# Patient Record
Sex: FEMALE | Race: WHITE | Employment: OTHER | URBAN - METROPOLITAN AREA
[De-identification: names, ages, dates, MRNs, and addresses within clinical notes are randomized per-mention and may not be internally consistent; named-entity substitution may affect disease eponyms.]

---

## 2017-01-16 ENCOUNTER — TELEPHONE (OUTPATIENT)
Dept: INTERNAL MEDICINE CLINIC | Age: 77
End: 2017-01-16

## 2017-01-16 DIAGNOSIS — B02.29 POSTHERPETIC NEURALGIA: Primary | ICD-10-CM

## 2017-01-16 NOTE — TELEPHONE ENCOUNTER
I am still having pain from my shingles and wonder if I should take Gabopentin again to help with this residual nerve pain.  It is a nagging pain and especially when trying to sleep at night.  This is not like the initial pain in May of last year but is still going on and it is most aggravating. Kranthi Valencia suggestion would be appreciated. Kacie Montalvo you.

## 2017-01-17 RX ORDER — GABAPENTIN 300 MG/1
300 CAPSULE ORAL 2 TIMES DAILY
Qty: 60 CAP | Refills: 5 | Status: SHIPPED | OUTPATIENT
Start: 2017-01-17 | End: 2017-10-26 | Stop reason: ALTCHOICE

## 2017-01-17 NOTE — TELEPHONE ENCOUNTER
Called in isabel 300 BID  Start at qhs and inc to bid in 1 week if not working  Watch out for sedation

## 2017-03-22 DIAGNOSIS — E03.4 HYPOTHYROIDISM DUE TO ACQUIRED ATROPHY OF THYROID: ICD-10-CM

## 2017-03-23 RX ORDER — LEVOTHYROXINE SODIUM 50 UG/1
50 TABLET ORAL
Qty: 90 TAB | Refills: 3 | Status: SHIPPED | OUTPATIENT
Start: 2017-03-23 | End: 2018-10-10 | Stop reason: SDUPTHER

## 2017-03-23 RX ORDER — EZETIMIBE 10 MG/1
TABLET ORAL
Qty: 90 TAB | Refills: 3 | Status: SHIPPED | OUTPATIENT
Start: 2017-03-23 | End: 2018-06-22 | Stop reason: SDUPTHER

## 2017-04-18 ENCOUNTER — HOSPITAL ENCOUNTER (OUTPATIENT)
Dept: LAB | Age: 77
Discharge: HOME | End: 2017-04-18
Payer: MEDICARE

## 2017-04-18 DIAGNOSIS — E03.4 HYPOTHYROIDISM DUE TO ACQUIRED ATROPHY OF THYROID: ICD-10-CM

## 2017-04-18 LAB
T4 FREE SERPL-MCNC: 1.1 NG/DL (ref 0.7–1.5)
TSH SERPL DL<=0.05 MIU/L-ACNC: 2.57 UIU/ML (ref 0.36–3.74)

## 2017-04-18 PROCEDURE — 84439 ASSAY OF FREE THYROXINE: CPT | Performed by: INTERNAL MEDICINE

## 2017-04-18 PROCEDURE — 84443 ASSAY THYROID STIM HORMONE: CPT | Performed by: INTERNAL MEDICINE

## 2017-04-18 PROCEDURE — 36415 COLL VENOUS BLD VENIPUNCTURE: CPT | Performed by: INTERNAL MEDICINE

## 2017-04-22 NOTE — PROGRESS NOTES
This is a Subsequent Medicare Annual Wellness Visit providing Personalized Prevention Plan Services (PPPS)     I have reviewed the patient's medical history in detail and updated the computerized patient record. History     Past Medical History:   Diagnosis Date    Colon adenomas     2003 Dr Belén Fox; 4/13 Dr Jackson CROW (degenerative joint disease)     Dyslipidemia     FHx: heart disease     Gastritis 2006    on EGD Dr. Sada Ohara, neg sprue    Hypertension     Hypothyroidism 2007    Dr. Adalid Miller Hypovitaminosis D     Lung nodule 06/2016    6 mm RLL    Obesity     peak weight 187 lbs, bmi 33.1 from 4/13    Osteopenia FRAX 4/12 9.7 and 1.4     DEXA t score 0.6 spine, -1 hip (12/09);  -0.4 spine, -1.3 hip (4/12); -1.2 wrist, -1.6 hip w FRAX 11/2.2 (4/14); -1.0 wrist, -1.6 hip w FRAX 12/2.6 (4/16)    Psoriasis 2003    on bx Dr. Marcelino Farrell hearing loss     left side Dr Divina Qiu Venous insufficiency 2003    on dopplers    Zoster 05/2016    left T11-12; mild postherpetic neuralgia      Past Surgical History:   Procedure Laterality Date    CARDIAC SURG PROCEDURE UNLIST  09/2016    thallium negative    CHEST SURGERY PROCEDURE UNLISTED  06/2016    CTA neg PE, 6 mm subpleural nodule    HX Chuck Desouzaus Nose left    HX COLONOSCOPY      benign polyp 5/08 Dr Belén Fox;  4/13 negative Dr. Ingris Caruso  2007    neg MRA/MRI head [facility]   [address] [facility]  9/10    CT head negative    VASCULAR SURGERY PROCEDURE UNLIST  2007    [facility]     Current Outpatient Prescriptions   Medication Sig Dispense Refill    lidocaine (LIDODERM) 5 % Apply patch to the affected area for 12 hours a day and remove for 12 hours a day. 30 Each 5    diph,Pertuss,Acell,,Tet Vac-PF (ADACEL) 2 Lf-(2.5-5-3-5 mcg)-5Lf/0.5 mL susp 0.5 mL by IntraMUSCular route once for 1 dose.  1 Syringe 0    DULoxetine (CYMBALTA) 30 mg capsule Take 1 Cap by mouth daily. 90 Cap 3    levothyroxine (SYNTHROID) 50 mcg tablet Take 1 Tab by mouth Daily (before breakfast). 90 Tab 3    ezetimibe (ZETIA) 10 mg tablet TAKE 1 TABLET BY MOUTH DAILY 90 Tab 3    gabapentin (NEURONTIN) 300 mg capsule Take 1 Cap by mouth two (2) times a day. 60 Cap 5    aspirin 81 mg chewable tablet Take 1 Tab by mouth daily. 90 Tab 3    atorvastatin (LIPITOR) 40 mg tablet Take 1 Tab by mouth daily. 90 Tab 3    Levothyroxine 75 mcg cap Take one tablet by mouth every other day before breakfast. Alternates with 50mcg 45 Cap 3    ergocalciferol (VITAMIN D2) 50,000 unit capsule Take 1 Cap by mouth every seven (7) days. 12 Cap 3    diazepam (VALIUM) 10 mg tablet Take 1 Tab by mouth every twelve (12) hours as needed for Anxiety. Max Daily Amount: 20 mg. 60 Tab 1    diclofenac-misoprostol (ARTHROTEC 50)  mg-mcg per tablet Take 1 Tab by mouth two (2) times a day.  30 Tab 0     Allergies   Allergen Reactions    Atenolol Other (comments)     Felt poorly    Cozaar [Losartan] Other (comments)     Headaches    Norvasc [Amlodipine] Other (comments)     [place] poorly       Family History   Problem Relation Age of Onset    Breast Cancer Mother     Cancer Mother     Heart Disease Father      Social History   Substance Use Topics    Smoking status: Never Smoker    Smokeless tobacco: Never Used    Alcohol use Yes      Comment: occasionally     Depression Risk Factor Screening:     PHQ 2 / 9, over the last two weeks 4/25/2017   Little interest or pleasure in doing things Not at all   Feeling down, depressed or hopeless Not at all   Total Score PHQ 2 0     SCREENINGS  Colonoscopy last done 4/13 Dr Franco Pérez last done 4/16  DEXA last done 4/16  Gyn last done Dr Peyton Key 1/14    Immunization History   Administered Date(s) Administered    Hepatitis A Vaccine 09/01/2008    Influenza High Dose Vaccine PF 10/17/2016    Pneumococcal Vaccine (Unspecified Type) 09/01/2008    TD Vaccine 09/01/2008    Typhoid Vaccine 09/01/2008    Yellow Fever Vaccine 09/01/2008    Zoster Vaccine, Live 01/01/2008     Alcohol Risk Factor Screening: On any occasion during the past 3 months, have you had more than 3 drinks containing alcohol? No    Do you average more than 7 drinks per week? No      Functional Ability and Level of Safety:     Hearing Loss   normal-to-mild    Vision - no acute complaints and is followed by Dr. Silviano Damon [facility]   Self-care. Requires assistance with: no ADLs    Fall Risk     Fall Risk Assessment, last 12 mths 4/25/2017   Able to walk? Yes   Fall in past 12 months? No     Abuse Screen   Patient is not abused    Review of Systems   A comprehensive review of systems was negative except for that written in the HPI. Physical Examination     Visit Vitals    /80    Pulse 77    Temp 98.3 °F (36.8 °C) (Oral)    Ht 5' 3\" (1.6 m)    Wt 170 lb (77.1 kg)    SpO2 97%    BMI 30.11 kg/m2       Evaluation of Cognitive Function:  Mood/affect:  happy  Appearance: age appropriate, overweight, well dressed and within normal Limits  Family member/caregiver input: na    Patient Care Team:  Nola Lawler MD as PCP - General (Internal Medicine)  Joce Lovelace MD (Surgery)  Shalini Malone, RN as Ambulatory Care Navigator (Internal Medicine)    Advice/Referrals/Counseling   Education and counseling provided:  Are appropriate based on today's review and evaluation  End-of-Life planning (with patient's consent)  Pneumococcal Vaccine  Screening Mammography  Cardiovascular screening blood test  Bone mass measurement (DEXA)  Diabetes screening test    Assessment/Plan       ICD-10-CM ICD-9-CM    1. Routine general medical examination at a health care facility Z00.00 V70.0    2. Screening for alcoholism Z13.89 V79.1    3. Advanced directives, counseling/discussion Z71.89 V65.49 ADVANCE CARE PLANNING FIRST 30 MINS   4.  Screening for depression Z13.89 V79.0 DEPRESSION SCREEN ANNUAL   5. Screen for colon cancer Z12.11 V76.51    6. Screening for diabetes mellitus Z13.1 V77.1    7. Screening for ischemic heart disease Z13.6 V81.0    8. Encounter for screening mammogram for malignant neoplasm of breast Z12.31 V76.12 [facility] MAMMO BI SCREENING INCL CAD   9. Postmenopausal Z78.0 V49.81    10. Postherpetic neuralgia B02.29 053.19 lidocaine (LIDODERM) 5 %      DULoxetine (CYMBALTA) 30 mg capsule   11. Primary hypertension I10 401.9    12. Obesity (BMI 30.0-34. 9) E66.9 278.00    13. Hypothyroidism due to acquired atrophy of thyroid E03.4 244.8      246.8    14. Dyslipidemia E78.5 272.4 LIPID PANEL      METABOLIC PANEL, COMPREHENSIVE      CBC W/O DIFF   15. Venous insufficiency dopplers 2003 I87.2 459.81    16. Colon adenomas 2003, last 2014 Dr Debbie Ann D12.6 211.3      current treatment plan is effective, no change in therapy  lab results and schedule of future lab studies reviewed with patient  reviewed diet, exercise and weight control. End of life discussion undertaken.   Will work on amd  Tdap script given  Colonoscopy beyond age  [de-identified] scheduled  DEXA to be scheduled 2018

## 2017-04-22 NOTE — ACP (ADVANCE CARE PLANNING)
Advance Care Planning    Advance Care Planning (ACP) Provider Note - Comprehensive     Date of ACP Conversation: 04/25/17  Persons included in Conversation:  patient  Length of ACP Conversation in minutes:  16 minutes    Authorized Decision Maker (if patient is incapable of making informed decisions): This person is:   Other Legally Authorized Decision Maker (e.g. Next of Kin)          General ACP for ALL Patients with Decision Making Capacity:   Importance of advance care planning, including choosing a healthcare agent to communicate patient's healthcare decisions if patient lost the ability to make decisions, such as after a sudden illness or accident  Understanding of the healthcare agent role was assessed and information provided  Exploration of values, goals, and preferences if recovery is not expected, even with continued medical treatment in the event of: Imminent death  Severe, permanent brain injury    Review of Existing Advance Directive:  na    For Serious or Chronic Illness:  Understanding of medical condition    Understanding of CPR, goals and expected outcomes, benefits and burdens discussed.     Interventions Provided:  Recommended completion of Advance Directive form after review of ACP materials and conversation with prospective healthcare agent   Recommended communicating the plan and making copies for the healthcare agent, personal physician, and others as appropriate (e.g., health system)  Recommended review of completed ACP document annually or upon change in health status

## 2017-04-22 NOTE — PROGRESS NOTES
64 y.o. WHITE OR  female who presents for evaluation. The postherpetic neuralgia is better on isabel. However, she is wondering if the cymbalta might be a better option for her. We discussed possible sfs    Vitals 4/25/2017 10/17/2016 9/7/2016 7/20/2016 6/27/2016   Weight 170 lb 169 lb 167 lb 178 lb 178 lb     She remains off bp med but has not been following her readings much. No cardiovascular complaints, however.  They are doing some work on the house but not much more exercise or activity than that    She was incidentally noted to have a RLL nodule in 6/16 on CTA    Denied any gi or gu complaints    No sx referable to the thyroid at this time    Past Medical History:   Diagnosis Date    Colon adenomas     2003 Dr Milan Alves; 4/13 Dr Mario CROW (degenerative joint disease)     Dyslipidemia     FHx: heart disease     Gastritis 2006    on EGD Dr. Filomena Pavon, neg sprue    Hypertension     Hypothyroidism 2007    Dr. Artur Rapp Hypovitaminosis D     Lung nodule 06/2016    6 mm RLL    Obesity     peak weight 187 lbs, bmi 33.1 from 4/13    Osteopenia FRAX 4/12 9.7 and 1.4     DEXA t score 0.6 spine, -1 hip (12/09);  -0.4 spine, -1.3 hip (4/12); -1.2 wrist, -1.6 hip w FRAX 11/2.2 (4/14); -1.0 wrist, -1.6 hip w FRAX 12/2.6 (4/16)    Psoriasis 2003    on bx Dr. Cheli Triplett hearing loss     left side Dr Kimberly Dotson Venous insufficiency 2003    on dopplers    Zoster 05/2016    left T11-12; mild postherpetic neuralgia     Past Surgical History:   Procedure Laterality Date    CARDIAC SURG PROCEDURE UNLIST  09/2016    thallium negative    CHEST SURGERY PROCEDURE UNLISTED  06/2016    CTA neg PE, 6 mm subpleural nodule    HX Alpheus Dun      Dr. Del Toro Backbone left    HX COLONOSCOPY      benign polyp 5/08 Dr Milan Alves;  4/13 negative Dr. Abilio Springer  2007    neg MRA/MRI head in [facility]  9/10    CT head negative    VASCULAR SURGERY PROCEDURE UNLIST  2007    [facility] AAA screen [facility]     Social History     Social History    Marital status:      Spouse name: N/A    Number of children: N/A    Years of education: N/A     Occupational History    ret office mgr      Social History Main Topics    Smoking status: Never Smoker    Smokeless tobacco: Never Used    Alcohol use Yes      Comment: occasionally    Drug use: No    Sexual activity: Not on file     Other Topics Concern    Not on file     Social History Narrative     Allergies   Allergen Reactions    Atenolol Other (comments)     Felt poorly    Cozaar [Losartan] Other (comments)     Headaches    Norvasc [Amlodipine] Other (comments)     Felt poorly       Current Outpatient Prescriptions   Medication Sig    lidocaine (LIDODERM) 5 % Apply patch to the affected area for 12 hours a day and remove for 12 hours a day.  diph,Pertuss,Acell,,Tet Vac-PF (ADACEL) 2 Lf-(2.5-5-3-5 mcg)-5Lf/0.5 mL susp 0.5 mL by IntraMUSCular route once for 1 dose.  DULoxetine (CYMBALTA) 30 mg capsule Take 1 Cap by mouth daily.  levothyroxine (SYNTHROID) 50 mcg tablet Take 1 Tab by mouth Daily (before breakfast).  ezetimibe (ZETIA) 10 mg tablet TAKE 1 TABLET BY MOUTH DAILY    gabapentin (NEURONTIN) 300 mg capsule Take 1 Cap by mouth two (2) times a day.  aspirin 81 mg chewable tablet Take 1 Tab by mouth daily.  atorvastatin (LIPITOR) 40 mg tablet Take 1 Tab by mouth daily.  Levothyroxine 75 mcg cap Take one tablet by mouth every other day before breakfast. Alternates with 50mcg    ergocalciferol (VITAMIN D2) 50,000 unit capsule Take 1 Cap by mouth every seven (7) days.  diazepam (VALIUM) 10 mg tablet Take 1 Tab by mouth every twelve (12) hours as needed for Anxiety. Max Daily Amount: 20 mg.    diclofenac-misoprostol (ARTHROTEC 50)  mg-mcg per tablet Take 1 Tab by mouth two (2) times a day.      No current facility-administered medications for this visit. LAST MEDICARE WELLNESS EXAM: 4/1/14, 4/8/14, 4/14/16, 4/25/17  ACP 4/25/17    REVIEW OF SYSTEMS:  Dr. Peyton Key 2014, mammo 4/16, DEXA 4/16, colo 4/13 Dr Santiago Favre, sees Dr Rad Flanagan  Ophtho  no vision change or eye pain  Oral  no mouth pain, tongue or tooth problems  Ears  no hearing loss, ear pain, fullness  Throat  no swallowing problems  Cardiac  no CP, PND, orthopnea, edema, palpitations or syncope  Chest  no breast masses  Resp  no wheezing, chronic coughing, dyspnea  GI  no heartburn, nausea, vomiting, change in bowel habits, bleeding, hemorrhoids  Urinary  no dysuria, hematuria, flank pain, urgency, frequency  Neuro  no focal weakness, numbness, paresthesias, incoordination, ataxia, involuntary movements    Visit Vitals    /80    Pulse 77    Temp 98.3 °F (36.8 °C) (Oral)    Ht 5' 3\" (1.6 m)    Wt 170 lb (77.1 kg)    SpO2 97%    BMI 30.11 kg/m2   A&O x3  Affect is appropriate. Mood stable  No apparent distress  Anicteric, no JVD, adenopathy or thyromegaly. No carotid bruits or radiated murmur  Lungs clear to auscultation, no wheezes or rales  Heart showed regular rate and rhythm. No murmur, rubs, gallops  Abdomen soft nontender, no hepatosplenomegaly or masses. Extremities without edema.   Pulses 1-2+ symmetrically    LABS  From 3/10 showed                       vit d 38.0  From 9/10 showed                         wbc 7.2, hb 14.3, plt 158  From 3/12 showed   gluc 88, cr 0.87, gfr 66, alt 15, tsh 1.04, ldl-p 1515  From 9/12 showed                                                    tsh 1.69, ldl-p 1469, chol 152, tg 165, hdl 42, ldl-c 77  From 3/13 showed   gluc 83, cr 0.91, gfr 62, alt 10,     ldl-p 1274, chol 124, tg 137, hdl 37, ldl-c 60, wbc 7.0, hb 14.3, plt 164  From 9/13 showed                chol 147, tg 139, hdl 39, ldl-c 80  From 3/14 showed   gluc 89, cr 1.02, gfr 53, alt<5,  tsh 1.66,         chol 148, tg 132, hdl 36, ldl-c 86, wbc 6.7, hb 14.4, plt 179, vit d 45.4   From 10/14 showed                chol 137, tg 117, hdl 40, ldl-c 74  From 4/15 showed   gluc 91, cr 1.24, gfr 42, alt 30,          wbc 6.8, hb 14.8, plt 176, tsh 0.64  From 10/15 showed gluc 93, cr 1.13, gfr 47,            tsh 0.32,          chol 139, tg 131, hdl 36, ldl-c 77  From 4/16 showed   gluc 86, cr 1.00, gfr 54, alt 24,            chol 142, tg 137, hdl 41, ldl-c 74, wbc 6.4, hb 13.9, plt 203, ua pro 30  From 6/16 showed   gluc 97, cr 1.20,           wbc 7.3, hb 14.1, plt 216, ck/trop neg  From 10/16 showed gluc 78, cr 1.03, gfr 52, alt 21,            chol 145, tg 151, hdl 43, ldl-c 72,           tsh 1.21    Results for orders placed or performed during the hospital encounter of 04/18/17   T4, FREE   Result Value Ref Range    T4, Free 1.1 0.7 - 1.5 NG/DL   TSH 3RD GENERATION   Result Value Ref Range    TSH 2.57 0.36 - 3.74 uIU/mL     Patient Active Problem List   Diagnosis Code    Colon adenomas 2003, last 2014 Dr Velázquez Hearing D12.6    Hypovitaminosis D E55.9    Venous insufficiency dopplers 2003 I87.2    Osteopenia FRAX 4/14 11 and 2.2 M85.80    Dyslipidemia E78.5    Hypothyroidism due to acquired atrophy of thyroid E03.4    Arthritis, degenerative M19.90    Obesity (BMI 30.0-34. 9) E66.9    Advance directive discussed with patient Z70.80    Primary hypertension I10     ASSESSMENT AND PLAN:  1. General.  F/U Dr. Meena Haines  2. Hypertension. Holding meds  3. Hypovit D. Continue current  4. Colon adenomas. F/U colo 2018, fiber  5. Dermatology. F/U Dr. Fidel Rogers  6. Lung nodule. F/U CT 6/17 to nesure stability  7. Dyslipidemia. Continue current   8. Hypothyroidism. Therapeutic dosing  9. Osteopenia. Wt bearing exercise, ca/D, DEXA 2016  10. Postherpetic neuralgia. Will try cymbalta, dc isabel if it works        RTC 10/17    Above conditions discussed at length and patient vocalized understanding.   All questions answered to patient satifaction

## 2017-04-25 ENCOUNTER — OFFICE VISIT (OUTPATIENT)
Dept: INTERNAL MEDICINE CLINIC | Age: 77
End: 2017-04-25

## 2017-04-25 VITALS
DIASTOLIC BLOOD PRESSURE: 80 MMHG | HEIGHT: 63 IN | SYSTOLIC BLOOD PRESSURE: 132 MMHG | BODY MASS INDEX: 30.12 KG/M2 | HEART RATE: 77 BPM | WEIGHT: 170 LBS | TEMPERATURE: 98.3 F | OXYGEN SATURATION: 97 %

## 2017-04-25 DIAGNOSIS — Z00.00 ROUTINE GENERAL MEDICAL EXAMINATION AT A HEALTH CARE FACILITY: Primary | ICD-10-CM

## 2017-04-25 DIAGNOSIS — Z13.1 SCREENING FOR DIABETES MELLITUS: ICD-10-CM

## 2017-04-25 DIAGNOSIS — B02.29 POSTHERPETIC NEURALGIA: ICD-10-CM

## 2017-04-25 DIAGNOSIS — Z78.0 POSTMENOPAUSAL: ICD-10-CM

## 2017-04-25 DIAGNOSIS — E03.4 HYPOTHYROIDISM DUE TO ACQUIRED ATROPHY OF THYROID: ICD-10-CM

## 2017-04-25 DIAGNOSIS — Z13.39 SCREENING FOR ALCOHOLISM: ICD-10-CM

## 2017-04-25 DIAGNOSIS — Z13.31 SCREENING FOR DEPRESSION: ICD-10-CM

## 2017-04-25 DIAGNOSIS — I10 ESSENTIAL HYPERTENSION WITH GOAL BLOOD PRESSURE LESS THAN 140/90: ICD-10-CM

## 2017-04-25 DIAGNOSIS — Z71.89 ADVANCED DIRECTIVES, COUNSELING/DISCUSSION: ICD-10-CM

## 2017-04-25 DIAGNOSIS — D12.6 COLON ADENOMAS: ICD-10-CM

## 2017-04-25 DIAGNOSIS — I87.2 VENOUS INSUFFICIENCY: ICD-10-CM

## 2017-04-25 DIAGNOSIS — Z12.31 ENCOUNTER FOR SCREENING MAMMOGRAM FOR MALIGNANT NEOPLASM OF BREAST: ICD-10-CM

## 2017-04-25 DIAGNOSIS — Z13.6 SCREENING FOR ISCHEMIC HEART DISEASE: ICD-10-CM

## 2017-04-25 DIAGNOSIS — Z12.11 SCREEN FOR COLON CANCER: ICD-10-CM

## 2017-04-25 DIAGNOSIS — E78.5 DYSLIPIDEMIA: ICD-10-CM

## 2017-04-25 DIAGNOSIS — E66.9 OBESITY (BMI 30.0-34.9): ICD-10-CM

## 2017-04-25 RX ORDER — LIDOCAINE 50 MG/G
PATCH TOPICAL
Qty: 30 EACH | Refills: 5 | Status: SHIPPED | OUTPATIENT
Start: 2017-04-25 | End: 2018-06-22 | Stop reason: SDUPTHER

## 2017-04-25 RX ORDER — DULOXETIN HYDROCHLORIDE 30 MG/1
30 CAPSULE, DELAYED RELEASE ORAL DAILY
Qty: 90 CAP | Refills: 3 | Status: SHIPPED | OUTPATIENT
Start: 2017-04-25 | End: 2017-10-26 | Stop reason: ALTCHOICE

## 2017-04-25 NOTE — MR AVS SNAPSHOT
Visit Information Date & Time Provider Department Dept. Phone Encounter #  
 4/25/2017  8:30 AM Redge Landau, MD Internist of [address] [place] [address] [phone] [id number] Your Appointments 10/18/2017  9:15 AM  
LAB with C NURSE VISIT Internist of [facility] ([facility]) Appt Note: lab  
 [address], [address] [facility], [facility] [address] [place]  
  
   
 [address], [address]  
  
    
 10/25/2017  9:30 AM  
Office Visit with Redge Landau, MD  
Intern[facility] of [address] [place] [address] [facility] Appt Note: 6 mo follow up  
 [address] [facility], [address] [id number] [address] [place]  
  
   
 [address], [address] Upcoming Health Maintenance Date Due DTaP/Tdap/Td series (1 - Tdap) 9/2/2008 GLAUCOMA SCREENING Q2Y 3/1/2018 MEDICARE YEARLY EXAM 4/26/2018 Allergies as of 4/25/2017  Review Complete On: 10/17/2016 By: Redge Landau, MD  
  
 Severity Noted Reaction Type Reactions Atenolol    Other (comments) [place] poorly Cozaar [Losartan]    Other (comments) Headaches Norvasc [Amlodipine]    Other (comments) Felt poorly Current Immunizations  Reviewed on 4/8/2015 Name Date Hepatitis A Vaccine 9/1/2008 Influenza High Dose Vaccine PF 10/17/2016 Pneumococcal Vaccine (Unspecified Type) 9/1/2008 TD Vaccine 9/1/2008 Typhoid Vaccine 9/1/2008 Yellow Fever Vaccine 9/1/2008 Zoster Vaccine, Live 1/1/2008 Not reviewed this visit You Were Diagnosed With   
  
 Codes Comments Routine general medical examination at a health care facility    -  Primary ICD-10-CM: Z00.00 ICD-9-CM: V70.0 Screening for alcoholism     ICD-10-CM: Z13.89 ICD-9-CM: V79.1 Advanced directives, counseling/discussion     ICD-10-CM: Z71.89 ICD-9-CM: V65.49 Screening for depression     ICD-10-CM: Z13.89 ICD-9-CM: V79.0 Screen for colon cancer     ICD-10-CM: Z12.11 ICD-9-CM: V76.51 Screening for diabetes mellitus     ICD-10-CM: Z13.1 ICD-9-CM: V77.1 Screening for ischemic heart disease     ICD-10-CM: Z13.6 ICD-9-CM: V81.0 Encounter for screening mammogram for malignant neoplasm of breast     ICD-10-CM: Z12.31 
ICD-9-CM: V76.12 Postmenopausal     ICD-10-CM: Z78.0 ICD-9-CM: V49.81 Postherpetic neuralgia     ICD-10-CM: B02.29 ICD-9-CM: 053.19 Vitals BP Pulse Temp Height(growth percentile) Weight(growth percentile) SpO2  
 132/80 77 98.3 °F (36.8 °C) (Oral) 5' 3\" (1.6 m) 170 lb (77.1 kg) 97% BMI OB Status Smoking Status 30.11 kg/m2 Postmenopausal Never Smoker Vitals History BMI and BSA Data Body Mass Index Body Surface Area  
 30.11 kg/m 2 1.85 m 2 Preferred Pharmacy Pharmacy Name Phone [address] [place] [address], [address] [place] [address] [phone] Your Updated Medication List  
  
   
This list is accurate as of: 4/25/17  9:12 AM.  Always use your most recent med list.  
  
  
  
  
 aspirin 81 mg chewable tablet Take 1 Tab by mouth daily. atorvastatin 40 mg tablet Commonly known as:  LIPITOR Take 1 Tab by mouth daily. diazePAM 10 mg tablet Commonly known as:  VALIUM Take 1 Tab by mouth every twelve (12) hours as needed for Anxiety. Max Daily Amount: 20 mg.  
  
 diclofenac-miSOPROStol  mg-mcg per tablet Commonly known as:  ARTHROTEC 50 Take 1 Tab by mouth two (2) times a day. diph,Pertuss(Acell),Tet Vac-PF 2 Lf-(2.5-5-3-5 mcg)-5Lf/0.5 mL susp Commonly known as:  ADACEL  
0.5 mL by IntraMUSCular route once for 1 dose. ergocalciferol 50,000 unit capsule Commonly known as:  VITAMIN D2 Take 1 Cap by mouth every seven (7) days. ezetimibe 10 mg tablet Commonly known as:  Fabiola Flow TAKE 1 TABLET BY MOUTH DAILY  
  
 gabapentin 300 mg capsule Commonly known as:  NEURONTIN  
 Take 1 Cap by mouth two (2) times a day. * Levothyroxine 75 mcg Cap Take one tablet by mouth every other day before breakfast. Alternates with 50mcg * levothyroxine 50 mcg tablet Commonly known as:  SYNTHROID Take 1 Tab by mouth Daily (before breakfast). lidocaine 5 % Commonly known as:  Adriana Gutting Apply patch to the affected area for 12 hours a day and remove for 12 hours a day. * Notice: This list has 2 medication(s) that are the same as other medications prescribed for you. Read the directions carefully, and ask your doctor or other care provider to review them with you. Prescriptions Printed Refills diph,Pertuss,Acell,,Tet Vac-PF (ADACEL) 2 Lf-(2.5-5-3-5 mcg)-5Lf/0.5 mL susp 0 Si.5 mL by IntraMUSCular route once for 1 dose. Class: Print Route: IntraMUSCular Prescriptions Sent to Pharmacy Refills  
 lidocaine (LIDODERM) 5 % 5 Sig: Apply patch to the affected area for 12 hours a day and remove for 12 hours a day. Class: Normal  
 Pharmacy: [address] [place] [address] Ph #: [phone] We Performed the Following ADVANCE CARE PLANNING FIRST 30 MINS [39120 CPT(R)] [place] [address] [[id number] [facility]] To-Do List   
 2017 Imaging:  [facility] MAMMO BI SCREENING INCL CAD   
  
 2017 11:00 AM  
  Appointment with KYM PERDOMO at [address] [place] [address] ([phone]) OUTSIDE FILMS  - Any outside films related to the study being scheduled should be brought with you on the day of the exam.  If this cannot be done there may be a delay in the reading of the study.   MEDICATIONS  - Patient must bring a complete list of all medications currently taking to include prescriptions, over-the-counter meds, herbals, vitamins & any dietary supplements  GENERAL INSTRUCTIONS  - On the day of your exam do not use any bath powder, deodorant or lotions on the armpit area. -Tenderness of breasts may cause an increase of discomfort during procedure. If you are experiencing breast tenderness on the day of your appointment and would like to reschedule, please call [phone]. Introducing [facility] SERVICES! Dear Gissell Mendoza: Thank you for requesting a SNADEC account. Our records indicate that you already have an active SNADEC account. You can access your account anytime at [email or web address] Did you know that you can access your hospital and ER discharge instructions at any time in SNADEC? You can also review all of your test results from your hospital stay or ER visit. Additional Information If you have questions, please visit the Frequently Asked Questions section of the SNADEC website at [email or web address] Remember, SNADEC is NOT to be used for urgent needs. For medical emergencies, dial 911. Now available from your iPhone and Android! Please provide this summary of care documentation to your next provider. Your primary care clinician is listed as Paola Ding. If you have any questions after today's visit, please call [phone].

## 2017-04-25 NOTE — PROGRESS NOTES
1. Have you been to the ER, urgent care clinic or hospitalized since your last visit? NO.     2. Have you seen or consulted any other health care providers outside of the [facility] since your last visit (Include any pap smears or colon screening)? NO      Do you have an Advanced Directive? NO    Would you like information on Advanced Directives?  YES

## 2017-04-29 ENCOUNTER — HOSPITAL ENCOUNTER (OUTPATIENT)
Dept: MAMMOGRAPHY | Age: 77
Discharge: HOME | End: 2017-04-29
Attending: INTERNAL MEDICINE
Payer: MEDICARE

## 2017-04-29 DIAGNOSIS — Z12.31 ENCOUNTER FOR SCREENING MAMMOGRAM FOR MALIGNANT NEOPLASM OF BREAST: ICD-10-CM

## 2017-04-29 PROCEDURE — 77063 BREAST TOMOSYNTHESIS BI: CPT

## 2017-05-18 ENCOUNTER — OFFICE VISIT (OUTPATIENT)
Dept: INTERNAL MEDICINE CLINIC | Age: 77
End: 2017-05-18

## 2017-05-18 VITALS
WEIGHT: 171 LBS | TEMPERATURE: 98.3 F | DIASTOLIC BLOOD PRESSURE: 88 MMHG | BODY MASS INDEX: 30.3 KG/M2 | SYSTOLIC BLOOD PRESSURE: 140 MMHG | HEART RATE: 83 BPM | HEIGHT: 63 IN | OXYGEN SATURATION: 98 %

## 2017-05-18 DIAGNOSIS — R21 RASH: ICD-10-CM

## 2017-05-18 DIAGNOSIS — L29.9 PRURITUS: Primary | ICD-10-CM

## 2017-05-18 RX ORDER — HYDROXYZINE PAMOATE 25 MG/1
25 CAPSULE ORAL
Qty: 30 CAP | Refills: 1 | Status: SHIPPED | OUTPATIENT
Start: 2017-05-18 | End: 2017-10-26 | Stop reason: ALTCHOICE

## 2017-05-18 NOTE — MR AVS SNAPSHOT
Visit Information Date & Time Provider Department Dept. Phone Encounter #  
 5/18/2017  1:45 PM Stephy Brown MD Internist of [address] [id number] Your Appointments 10/18/2017  9:15 AM  
LAB with [facility] NURSE VISIT [facility] [place] [facility] Appt Note: lab  
 [address], [address] Critical access hospital [address]  
  
   
 [address], [address]  
  
    
 10/25/2017  9:30 AM  
Office Visit with Stephy Brown MD  
[facility] of [address] [facility] Appt Note: 6 mo follow up  
 [address] [facility], [address] [id number] [address]  
  
   
 [address], [address] Upcoming Health Maintenance Date Due INFLUENZA AGE 9 TO ADULT 8/1/2017 GLAUCOMA SCREENING Q2Y 3/1/2018 MEDICARE YEARLY EXAM 4/26/2018 DTaP/Tdap/Td series (2 - Td) 4/25/2027 Allergies as of 5/18/2017  Review Complete On: 4/25/2017 By: Stephy Brown MD  
  
 Severity Noted Reaction Type Reactions Atenolol    Other (comments) [place] poorly Cozaar [Losartan]    Other (comments) Headaches Norvasc [Amlodipine]    Other (comments) Felt poorly Current Immunizations  Reviewed on 4/8/2015 Name Date Hepatitis A Vaccine 9/1/2008 Influenza High Dose Vaccine PF 10/17/2016 Pneumococcal Conjugate (PCV-13) 5/10/2017 Pneumococcal Vaccine (Unspecified Type) 9/1/2008 TD Vaccine 9/1/2008 Tdap 5/10/2017 Typhoid Vaccine 9/1/2008 Yellow Fever Vaccine 9/1/2008 Zoster Vaccine, Live 1/1/2008 Not reviewed this visit Vitals BP Pulse Temp Height(growth percentile) Weight(growth percentile) SpO2  
 140/88 83 98.3 °F (36.8 °C) (Oral) 5' 3\" (1.6 m) 171 lb (77.6 kg) 98% BMI OB Status Smoking Status 30.29 kg/m2 Postmenopausal Never Smoker Vitals History BMI and BSA Data Body Mass Index Body Surface Area 30.29 kg/m 2 1.86 m 2 Preferred Pharmacy Pharmacy Name Phone [address], [facility] [address] [facility] [facility] [address] [phone] Your Updated Medication List  
  
   
This list is accurate as of: 5/18/17  2:26 PM.  Always use your most recent med list.  
  
  
  
  
 aspirin 81 mg chewable tablet Take 1 Tab by mouth daily. atorvastatin 40 mg tablet Commonly known as:  LIPITOR Take 1 Tab by mouth daily. diazePAM 10 mg tablet Commonly known as:  VALIUM Take 1 Tab by mouth every twelve (12) hours as needed for Anxiety. Max Daily Amount: 20 mg.  
  
 diclofenac-miSOPROStol  mg-mcg per tablet Commonly known as:  ARTHROTEC 50 Take 1 Tab by mouth two (2) times a day. DULoxetine 30 mg capsule Commonly known as:  CYMBALTA Take 1 Cap by mouth daily. ergocalciferol 50,000 unit capsule Commonly known as:  VITAMIN D2 Take 1 Cap by mouth every seven (7) days. ezetimibe 10 mg tablet Commonly known as:  Fabiola Flow TAKE 1 TABLET BY MOUTH DAILY  
  
 gabapentin 300 mg capsule Commonly known as:  NEURONTIN Take 1 Cap by mouth two (2) times a day. * Levothyroxine 75 mcg Cap Take one tablet by mouth every other day before breakfast. Alternates with 50mcg * levothyroxine 50 mcg tablet Commonly known as:  SYNTHROID Take 1 Tab by mouth Daily (before breakfast). lidocaine 5 % Commonly known as:  Annamary Manual Apply patch to the affected area for 12 hours a day and remove for 12 hours a day. * Notice: This list has 2 medication(s) that are the same as other medications prescribed for you. Read the directions carefully, and ask your doctor or other care provider to review them with you. Introducing [facility]! Dear Avery Sanchez: Thank you for requesting a PayEase account. Our records indicate that you already have an active PayEase account.   You can access your account anytime at [email or web address] Did you know that you can access your hospital and ER discharge instructions at any time in Whistle [employer]? You can also review all of your test results from your hospital stay or ER visit. Additional Information If you have questions, please visit the Frequently Asked Questions section of the Whistle Group website at [email or web address] Remember, [employer] is NOT to be used for urgent needs. For medical emergencies, dial 911. Now available from your iPhone and Android! Please provide this summary of care documentation to your next provider. Your primary care clinician is listed as Edward Fontana. If you have any questions after today's visit, please call [phone].

## 2017-05-18 NOTE — PROGRESS NOTES
1. Have you been to the ER, urgent care clinic or hospitalized since your last visit? NO.     2. Have you seen or consulted any other health care providers outside of the [facility] since your last visit (Include any pap smears or colon screening)? NO      Do you have an Advanced Directive? NO    Would you like information on Advanced Directives?  YES

## 2017-05-18 NOTE — PROGRESS NOTES
64 y.o. WHITE OR  female who presents for evaluation. She thinks she might be getting bites. Started about a week ago, mostly in her legs/thighs. It's gone up into her abdomen and arms now. They did note some ticks the last time they were out in the yard but her  looks her over very closely and he got all of them off. No target-like lesions. She reports no viral syndrome type presentation. The rash is fairly pruritic, some new ones have popped up near her neck line in the last day or so. She is not on any new meds, she tried the Cymbalta one day and did not like it so she stopped it. She has appt w dr Vipul Beavers tomorrow    Past Medical History:   Diagnosis Date    Colon adenomas     2003 Dr Jayne Peralta; 4/13 Dr Rosalind CROW (degenerative joint disease)     Dyslipidemia     FHx: heart disease     Gastritis 2006    on EGD Dr. Sj Mathews, neg sprue    Hypertension     Hypothyroidism 2007    Dr. Jacinto Schwarz Hypovitaminosis D     Lung nodule 06/2016    6 mm RLL    Obesity     peak weight 187 lbs, bmi 33.1 from 4/13    Osteopenia FRAX 4/12 9.7 and 1.4     DEXA t score 0.6 spine, -1 hip (12/09);  -0.4 spine, -1.3 hip (4/12); -1.2 wrist, -1.6 hip w FRAX 11/2.2 (4/14); -1.0 wrist, -1.6 hip w FRAX 12/2.6 (4/16)    Psoriasis 2003    on bx Dr. Wilfrido Love hearing loss     left side Dr India Cunningham Venous insufficiency 2003    on dopplers    Zoster 05/2016    left T11-12; mild postherpetic neuralgia     Current Outpatient Prescriptions   Medication Sig    hydrOXYzine pamoate (VISTARIL) 25 mg capsule Take 1 Cap by mouth three (3) times daily as needed for Itching.  lidocaine (LIDODERM) 5 % Apply patch to the affected area for 12 hours a day and remove for 12 hours a day.  DULoxetine (CYMBALTA) 30 mg capsule Take 1 Cap by mouth daily.  levothyroxine (SYNTHROID) 50 mcg tablet Take 1 Tab by mouth Daily (before breakfast).     ezetimibe (ZETIA) 10 mg tablet TAKE 1 TABLET BY MOUTH DAILY    gabapentin (NEURONTIN) 300 mg capsule Take 1 Cap by mouth two (2) times a day.  aspirin 81 mg chewable tablet Take 1 Tab by mouth daily.  atorvastatin (LIPITOR) 40 mg tablet Take 1 Tab by mouth daily.  Levothyroxine 75 mcg cap Take one tablet by mouth every other day before breakfast. Alternates with 50mcg    ergocalciferol (VITAMIN D2) 50,000 unit capsule Take 1 Cap by mouth every seven (7) days.  diazepam (VALIUM) 10 mg tablet Take 1 Tab by mouth every twelve (12) hours as needed for Anxiety. Max Daily Amount: 20 mg.    diclofenac-misoprostol (ARTHROTEC 50)  mg-mcg per tablet Take 1 Tab by mouth two (2) times a day. No current facility-administered medications for this visit. Allergies   Allergen Reactions    Atenolol Other (comments)     Felt poorly    Cozaar [Losartan] Other (comments)     Headaches    Norvasc [Amlodipine] Other (comments)     [place] poorly       Visit Vitals    /88    Pulse 83    Temp 98.3 °F (36.8 °C) (Oral)    Ht 5' 3\" (1.6 m)    Wt 171 lb (77.6 kg)    SpO2 98%    BMI 30.29 kg/m2    scattered lesions that to me look like individual bites in her thighs, torso mostly in the anterior abdomen region, upper arms bilaterally. No secondary signs of infection or cellulitis. Assessment and plan:  1. Rash, probable insect bites. I gave her Vistaril, she will still go see Dr. Maki Wagner tomorrow. Suggested bombing the most occupied parts of the house with insecticide. Above conditions discussed at length and patient vocalized understanding.   All questions answered to patient satifaction

## 2017-10-18 ENCOUNTER — HOSPITAL ENCOUNTER (OUTPATIENT)
Dept: LAB | Age: 77
Discharge: HOME | End: 2017-10-18
Payer: MEDICARE

## 2017-10-18 DIAGNOSIS — E78.5 DYSLIPIDEMIA: ICD-10-CM

## 2017-10-18 LAB
ALBUMIN SERPL-MCNC: 4.1 G/DL (ref 3.4–5)
ALBUMIN/GLOB SERPL: 1.3 {RATIO} (ref 0.8–1.7)
ALP SERPL-CCNC: 117 U/L (ref 45–117)
ALT SERPL-CCNC: 27 U/L (ref 13–56)
ANION GAP SERPL CALC-SCNC: 9 MMOL/L (ref 3–18)
AST SERPL-CCNC: 23 U/L (ref 15–37)
BILIRUB SERPL-MCNC: 0.5 MG/DL (ref 0.2–1)
BUN SERPL-MCNC: 22 MG/DL (ref 7–18)
BUN/CREAT SERPL: 21 (ref 12–20)
CALCIUM SERPL-MCNC: 9.5 MG/DL (ref 8.5–10.1)
CHLORIDE SERPL-SCNC: 105 MMOL/L (ref 100–108)
CHOLEST SERPL-MCNC: 199 MG/DL
CO2 SERPL-SCNC: 27 MMOL/L (ref 21–32)
CREAT SERPL-MCNC: 1.03 MG/DL (ref 0.6–1.3)
ERYTHROCYTE [DISTWIDTH] IN BLOOD BY AUTOMATED COUNT: 15.9 % (ref 11.6–14.5)
GLOBULIN SER CALC-MCNC: 3.2 G/DL (ref 2–4)
GLUCOSE SERPL-MCNC: 79 MG/DL (ref 74–99)
HCT VFR BLD AUTO: 46.5 % (ref 35–45)
HDLC SERPL-MCNC: 45 MG/DL (ref 40–60)
HDLC SERPL: 4.4 {RATIO} (ref 0–5)
HGB BLD-MCNC: 14.7 G/DL (ref 12–16)
LDLC SERPL CALC-MCNC: 121.8 MG/DL (ref 0–100)
LIPID PROFILE,FLP: ABNORMAL
MCH RBC QN AUTO: 30.8 PG (ref 24–34)
MCHC RBC AUTO-ENTMCNC: 31.6 G/DL (ref 31–37)
MCV RBC AUTO: 97.3 FL (ref 74–97)
PLATELET # BLD AUTO: 208 K/UL (ref 135–420)
PMV BLD AUTO: 11.8 FL (ref 9.2–11.8)
POTASSIUM SERPL-SCNC: 4.2 MMOL/L (ref 3.5–5.5)
PROT SERPL-MCNC: 7.3 G/DL (ref 6.4–8.2)
RBC # BLD AUTO: 4.78 M/UL (ref 4.2–5.3)
SODIUM SERPL-SCNC: 141 MMOL/L (ref 136–145)
TRIGL SERPL-MCNC: 161 MG/DL (ref ?–150)
VLDLC SERPL CALC-MCNC: 32.2 MG/DL
WBC # BLD AUTO: 6.3 K/UL (ref 4.6–13.2)

## 2017-10-18 PROCEDURE — 80061 LIPID PANEL: CPT | Performed by: INTERNAL MEDICINE

## 2017-10-18 PROCEDURE — 80053 COMPREHEN METABOLIC PANEL: CPT | Performed by: INTERNAL MEDICINE

## 2017-10-18 PROCEDURE — 85027 COMPLETE CBC AUTOMATED: CPT | Performed by: INTERNAL MEDICINE

## 2017-10-18 PROCEDURE — 36415 COLL VENOUS BLD VENIPUNCTURE: CPT | Performed by: INTERNAL MEDICINE

## 2017-10-23 NOTE — PROGRESS NOTES
76 y.o. WHITE OR  female who presents for evaluation. The postherpetic neuralgia is a lot better and she's off the isabel and cymbalta. Occasionally flares up but tolerable    Vitals 4/25/2017 10/17/2016 9/7/2016 7/20/2016 6/27/2016   Weight 170 lb 169 lb 167 lb 178 lb 178 lb     She remains off alia and no elev readings. No cardiovascular complaints, however.  Not much exercise outside of everyday chores    Denied any gi or gu complaints    No sx referable to the thyroid     She held the lipid meds when she was having the rash issues at the behest of the dermatologist.  She restarted back recently    Past Medical History:   Diagnosis Date    Colon adenomas     2003 Dr Yasmin Means; 4/13 Dr Rema Canseco    DJD (degenerative joint disease)     Dyslipidemia     FHx: heart disease     Gastritis 2006    on EGD Dr. Theda Bence, neg sprue    Hypertension     Hypothyroidism 2007    Dr. Liz Mckinnon Hypovitaminosis D     Lung nodule 06/2016    6 mm RLL    Obesity     peak weight 187 lbs, bmi 33.1 from 4/13    Osteopenia FRAX 4/12 9.7 and 1.4     DEXA t score 0.6 spine, -1 hip (12/09);  -0.4 spine, -1.3 hip (4/12); -1.2 wrist, -1.6 hip w FRAX 11/2.2 (4/14); -1.0 wrist, -1.6 hip w FRAX 12/2.6 (4/16)    Psoriasis 2003    on bx Dr. Dalphine Dakin hearing loss     left side Dr Jacinto Lemon Venous insufficiency 2003    on dopplers    Zoster 05/2016    left T11-12; mild postherpetic neuralgia     Past Surgical History:   Procedure Laterality Date    CARDIAC SURG PROCEDURE UNLIST  09/2016    thallium negative    CHEST SURGERY PROCEDURE UNLISTED  06/2016    CTA neg PE, 6 mm subpleural nodule    HX Maureen Bee      Dr. Kavon Lin left    HX COLONOSCOPY      benign polyp 5/08 Dr Yasmin Means;  4/13 negative Dr. Gris Flor  2007    neg MRA/MRI head in [address] [place] [address]  9/10    CT head negative    VASCULAR SURGERY PROCEDURE UNLIST  2007    neg AAA screen [facility]     Social History     Social History    Marital status:      Spouse name: N/A    Number of children: N/A    Years of education: N/A     Occupational History    ret office mgr      Social History Main Topics    Smoking status: Never Smoker    Smokeless tobacco: Never Used    Alcohol use Yes      Comment: occasionally    Drug use: No    Sexual activity: Not on file     Other Topics Concern    Not on file     Social History Narrative     Allergies   Allergen Reactions    Atenolol Other (comments)     Felt poorly    Cozaar [Losartan] Other (comments)     Headaches    Norvasc [Amlodipine] Other (comments)     Felt poorly       Current Outpatient Prescriptions   Medication Sig    lidocaine (LIDODERM) 5 % Apply patch to the affected area for 12 hours a day and remove for 12 hours a day.  levothyroxine (SYNTHROID) 50 mcg tablet Take 1 Tab by mouth Daily (before breakfast).  ezetimibe (ZETIA) 10 mg tablet TAKE 1 TABLET BY MOUTH DAILY    aspirin 81 mg chewable tablet Take 1 Tab by mouth daily.  atorvastatin (LIPITOR) 40 mg tablet Take 1 Tab by mouth daily.  Levothyroxine 75 mcg cap Take one tablet by mouth every other day before breakfast. Alternates with 50mcg    ergocalciferol (VITAMIN D2) 50,000 unit capsule Take 1 Cap by mouth every seven (7) days.  diazepam (VALIUM) 10 mg tablet Take 1 Tab by mouth every twelve (12) hours as needed for Anxiety. Max Daily Amount: 20 mg.    hydrOXYzine pamoate (VISTARIL) 25 mg capsule Take 1 Cap by mouth three (3) times daily as needed for Itching.  DULoxetine (CYMBALTA) 30 mg capsule Take 1 Cap by mouth daily.  gabapentin (NEURONTIN) 300 mg capsule Take 1 Cap by mouth two (2) times a day.  diclofenac-misoprostol (ARTHROTEC 50)  mg-mcg per tablet Take 1 Tab by mouth two (2) times a day. No current facility-administered medications for this visit.       LAST MEDICARE WELLNESS EXAM: 4/1/14, 4/8/14, 4/14/16, 4/25/17  ACP 4/25/17    REVIEW OF SYSTEMS:  Dr. David Martinez 2014, mammo 4/16, DEXA 4/16, colo 4/13 Dr Amalia Tong, sees Dr Jake Matos  no vision change or eye pain  Oral  no mouth pain, tongue or tooth problems  Ears  no hearing loss, ear pain, fullness  Throat  no swallowing problems  Cardiac  no CP, PND, orthopnea, edema, palpitations or syncope  Chest  no breast masses  Resp  no wheezing, chronic coughing, dyspnea  GI  no heartburn, nausea, vomiting, change in bowel habits, bleeding, hemorrhoids  Urinary  no dysuria, hematuria, flank pain, urgency, frequency  Neuro  no focal weakness, numbness, paresthesias, incoordination, ataxia, involuntary movements    Visit Vitals    /70 (BP 1 Location: Right arm, BP Patient Position: Sitting)    Pulse 76    Temp 98 °F (36.7 °C) (Oral)    Resp 14    Ht 5' 3\" (1.6 m)    Wt 171 lb (77.6 kg)    SpO2 100%    BMI 30.29 kg/m2   A&O x3  Affect is appropriate. Mood stable  No apparent distress  Anicteric, no JVD, adenopathy or thyromegaly. No carotid bruits or radiated murmur  Lungs clear to auscultation, no wheezes or rales  Heart showed regular rate and rhythm. No murmur, rubs, gallops  Abdomen soft nontender, no hepatosplenomegaly or masses. Extremities without edema.   Pulses 1-2+ symmetrically    LABS  From 3/10 showed                       vit d 38.0  From 9/10 showed                         wbc 7.2, hb 14.3, plt 158  From 3/12 showed   gluc 88, cr 0.87, gfr 66, alt 15, tsh 1.04, ldl-p 1515  From 9/12 showed                                                    tsh 1.69, ldl-p 1469, chol 152, tg 165, hdl 42, ldl-c 77  From 3/13 showed   gluc 83, cr 0.91, gfr 62, alt 10,     ldl-p 1274, chol 124, tg 137, hdl 37, ldl-c 60, wbc 7.0, hb 14.3, plt 164  From 9/13 showed                chol 147, tg 139, hdl 39, ldl-c 80  From 3/14 showed   gluc 89, cr 1.02, gfr 53, alt<5,  tsh 1.66,         chol 148, tg 132, hdl 36, ldl-c 86, wbc 6.7, hb 14.4, plt 179, vit d 45.4   From 10/14 showed                chol 137, tg 117, hdl 40, ldl-c 74  From 4/15 showed   gluc 91, cr 1.24, gfr 42, alt 30,          wbc 6.8, hb 14.8, plt 176, tsh 0.64  From 10/15 showed gluc 93, cr 1.13, gfr 47,            tsh 0.32,          chol 139, tg 131, hdl 36, ldl-c 77  From 4/16 showed   gluc 86, cr 1.00, gfr 54, alt 24,            chol 142, tg 137, hdl 41, ldl-c 74, wbc 6.4, hb 13.9, plt 203, ua pro 30  From 6/16 showed   gluc 97, cr 1.20,           wbc 7.3, hb 14.1, plt 216, ck/trop neg  From 10/16 showed gluc 78, cr 1.03, gfr 52, alt 21,            chol 145, tg 151, hdl 43, ldl-c 72,           tsh 1.21  From 4/17 showed                       tsh 2.57, ft4 1.10    Results for orders placed or performed during the hospital encounter of 10/18/17   LIPID PANEL   Result Value Ref Range    LIPID PROFILE          Cholesterol, total 199 <200 MG/DL    Triglyceride 161 (H) <150 MG/DL    HDL Cholesterol 45 40 - 60 MG/DL    LDL, calculated 121.8 (H) 0 - 100 MG/DL    VLDL, calculated 32.2 MG/DL    CHOL/HDL Ratio 4.4 0 - 5.0     METABOLIC PANEL, COMPREHENSIVE   Result Value Ref Range    Sodium 141 136 - 145 mmol/L    Potassium 4.2 3.5 - 5.5 mmol/L    Chloride 105 100 - 108 mmol/L    CO2 27 21 - 32 mmol/L    Anion gap 9 3.0 - 18 mmol/L    Glucose 79 74 - 99 mg/dL    BUN 22 (H) 7.0 - 18 MG/DL    Creatinine 1.03 0.6 - 1.3 MG/DL    BUN/Creatinine ratio 21 (H) 12 - 20      GFR est AA >60 >60 ml/min/1.73m2    GFR est non-AA 52 (L) >60 ml/min/1.73m2    Calcium 9.5 8.5 - 10.1 MG/DL    Bilirubin, total 0.5 0.2 - 1.0 MG/DL    ALT (SGPT) 27 13 - 56 U/L    AST (SGOT) 23 15 - 37 U/L    Alk.  phosphatase 117 45 - 117 U/L    Protein, total 7.3 6.4 - 8.2 g/dL    Albumin 4.1 3.4 - 5.0 g/dL    Globulin 3.2 2.0 - 4.0 g/dL    A-G Ratio 1.3 0.8 - 1.7     CBC W/O DIFF   Result Value Ref Range    WBC 6.3 4.6 - 13.2 K/uL    RBC 4.78 4.20 - 5.30 M/uL    HGB 14.7 12.0 - 16.0 g/dL    HCT 46.5 (H) 35.0 - 45.0 %    MCV 97.3 (H) 74.0 - 97.0 FL    MCH 30.8 24.0 - 34.0 PG    MCHC 31.6 31.0 - 37.0 g/dL    RDW 15.9 (H) 11.6 - 14.5 %    PLATELET 418 285 - 635 K/uL    MPV 11.8 9.2 - 11.8 FL     Patient Active Problem List   Diagnosis Code    Colon adenomas 2003, last 2014 Dr Ryan Sanchez D12.6    Hypovitaminosis D E55.9    Venous insufficiency dopplers 2003 I87.2    Osteopenia FRAX 4/14 11 and 2.2 M85.80    Dyslipidemia E78.5    Hypothyroidism due to acquired atrophy of thyroid E03.4    Arthritis, degenerative M19.90    Obesity (BMI 30.0-34. 9) E66.9    Advance directive discussed with patient Z70.80    Primary hypertension I10     ASSESSMENT AND PLAN:  1. General.  F/U Dr. Berhane Stokes  2. Hypertension off meds currently  3. Hypovit D. Check labs next draw  4. Colon adenomas. F/U colo 2018, fiber  5. Dermatology. F/U Dr. Jarome Osler  6. Lung nodule. F/U CT to ensure stability  7. Dyslipidemia. Restarted meds as above  8. Hypothyroidism. Therapeutic dosing  9. Osteopenia. Wt bearing exercise, ca/D, DEXA 2016        RTC 4/18    Above conditions discussed at length and patient vocalized understanding.   All questions answered to patient satifaction

## 2017-10-25 ENCOUNTER — OFFICE VISIT (OUTPATIENT)
Dept: INTERNAL MEDICINE CLINIC | Age: 77
End: 2017-10-25

## 2017-10-25 VITALS
SYSTOLIC BLOOD PRESSURE: 120 MMHG | TEMPERATURE: 98 F | BODY MASS INDEX: 30.3 KG/M2 | OXYGEN SATURATION: 100 % | WEIGHT: 171 LBS | DIASTOLIC BLOOD PRESSURE: 70 MMHG | RESPIRATION RATE: 14 BRPM | HEIGHT: 63 IN | HEART RATE: 76 BPM

## 2017-10-25 DIAGNOSIS — I10 ESSENTIAL HYPERTENSION WITH GOAL BLOOD PRESSURE LESS THAN 140/90: ICD-10-CM

## 2017-10-25 DIAGNOSIS — R91.1 PULMONARY NODULE: ICD-10-CM

## 2017-10-25 DIAGNOSIS — E03.4 HYPOTHYROIDISM DUE TO ACQUIRED ATROPHY OF THYROID: ICD-10-CM

## 2017-10-25 DIAGNOSIS — D12.6 COLON ADENOMAS: ICD-10-CM

## 2017-10-25 DIAGNOSIS — M85.80 OSTEOPENIA, UNSPECIFIED LOCATION: ICD-10-CM

## 2017-10-25 DIAGNOSIS — E78.5 DYSLIPIDEMIA: ICD-10-CM

## 2017-10-25 DIAGNOSIS — I87.2 VENOUS INSUFFICIENCY: ICD-10-CM

## 2017-10-25 DIAGNOSIS — E55.9 HYPOVITAMINOSIS D: Primary | ICD-10-CM

## 2017-10-25 NOTE — MR AVS SNAPSHOT
Visit Information Date & Time Provider Department Dept. Phone Encounter #  
 10/25/2017  9:30 AM Treva Villanueva MD [facility] [phone] [id number] Your Appointments 4/30/2018 10:00 AM  
Office Visit with Treva Villanueva MD  
[facility]) Appt Note: 6 month f/u  
 [address] [facility], [address] [place] [address] [place]  
  
   
 [address] [facility] Upcoming Health Maintenance Date Due INFLUENZA AGE 9 TO ADULT 8/1/2017 GLAUCOMA SCREENING Q2Y 3/1/2018 MEDICARE YEARLY EXAM 4/26/2018 DTaP/Tdap/Td series (2 - Td) 5/10/2027 Allergies as of 10/25/2017  Review Complete On: 10/25/2017 By: Cooper Sales Severity Noted Reaction Type Reactions Atenolol    Other (comments) [place] poorly Cozaar [Losartan]    Other (comments) Headaches Norvasc [Amlodipine]    Other (comments) Felt poorly Current Immunizations  Reviewed on 4/8/2015 Name Date Hepatitis A Vaccine 9/1/2008 Influenza High Dose Vaccine PF 10/17/2016 Pneumococcal Conjugate (PCV-13) 5/10/2017 TD Vaccine 9/1/2008 Tdap 5/10/2017 Typhoid Vaccine 9/1/2008 Yellow Fever Vaccine 9/1/2008 ZZZ-RETIRED (DO NOT USE) Pneumococcal Vaccine (Unspecified Type) 9/1/2008 Zoster Vaccine, Live 1/1/2008 Not reviewed this visit Vitals BP Pulse Temp Resp Height(growth percentile) Weight(growth percentile) 120/70 (BP 1 Location: Right arm, BP Patient Position: Sitting) 76 98 °F (36.7 °C) (Oral) 14 5' 3\" (1.6 m) 171 lb (77.6 kg) SpO2 BMI OB Status Smoking Status 100% 30.29 kg/m2 Postmenopausal Never Smoker Vitals History BMI and BSA Data Body Mass Index Body Surface Area  
 30.29 kg/m 2 1.86 m 2 Preferred Pharmacy Pharmacy Name Phone [facility] [address] [place] [address], [address] [place], [place] [address] [phone] Your Updated Medication List  
  
   
This list is accurate as of: 10/25/17 10:25 AM.  Always use your most recent med list.  
  
  
  
  
 aspirin 81 mg chewable tablet Take 1 Tab by mouth daily. atorvastatin 40 mg tablet Commonly known as:  LIPITOR Take 1 Tab by mouth daily. diazePAM 10 mg tablet Commonly known as:  VALIUM Take 1 Tab by mouth every twelve (12) hours as needed for Anxiety. Max Daily Amount: 20 mg.  
  
 diclofenac-miSOPROStol  mg-mcg per tablet Commonly known as:  ARTHROTEC 50 Take 1 Tab by mouth two (2) times a day. DULoxetine 30 mg capsule Commonly known as:  CYMBALTA Take 1 Cap by mouth daily. ergocalciferol 50,000 unit capsule Commonly known as:  VITAMIN D2 Take 1 Cap by mouth every seven (7) days. ezetimibe 10 mg tablet Commonly known as:  Levell Antes TAKE 1 TABLET BY MOUTH DAILY  
  
 gabapentin 300 mg capsule Commonly known as:  NEURONTIN Take 1 Cap by mouth two (2) times a day.  
  
 hydrOXYzine pamoate 25 mg capsule Commonly known as:  VISTARIL Take 1 Cap by mouth three (3) times daily as needed for Itching. * Levothyroxine 75 mcg Cap Take one tablet by mouth every other day before breakfast. Alternates with 50mcg * levothyroxine 50 mcg tablet Commonly known as:  SYNTHROID Take 1 Tab by mouth Daily (before breakfast). lidocaine 5 % Commonly known as:  Delvis Greek Apply patch to the affected area for 12 hours a day and remove for 12 hours a day. * Notice: This list has 2 medication(s) that are the same as other medications prescribed for you. Read the directions carefully, and ask your doctor or other care provider to review them with you. Introducing [address]! Dear Bill Certain: Thank you for requesting a Applied [employer] account. Our records indicate that you already have an active Applied [employer] account. You can access your account anytime at [email or web address] Did you know that you can access your hospital and ER discharge instructions at any time in drumbi? You can also review all of your test results from your hospital stay or ER visit. Additional Information If you have questions, please visit the Frequently Asked Questions section of the drumbi website at [email or web address] Remember, drumbi is NOT to be used for urgent needs. For medical emergencies, dial 911. Now available from your iPhone and Android! Please provide this summary of care documentation to your next provider. Your primary care clinician is listed as Kofi Gallegos. If you have any questions after today's visit, please call [phone].

## 2017-10-25 NOTE — PROGRESS NOTES
1. Have you been to the ER, urgent care clinic or hospitalized since your last visit? NO.     2. Have you seen or consulted any other health care providers outside of the [address] [place] [address] since your last visit (Include any pap smears or colon screening)? NO      Do you have an Advanced Directive? NO    Would you like information on Advanced Directives?  YES

## 2017-10-30 ENCOUNTER — HOSPITAL ENCOUNTER (OUTPATIENT)
Dept: CT IMAGING | Age: 77
Discharge: HOME | End: 2017-10-30
Attending: INTERNAL MEDICINE
Payer: MEDICARE

## 2017-10-30 DIAGNOSIS — R91.1 PULMONARY NODULE: ICD-10-CM

## 2017-10-30 PROCEDURE — 71260 CT THORAX DX C+: CPT

## 2017-10-30 PROCEDURE — 74011636320 HC RX REV CODE- 636/320: Performed by: INTERNAL MEDICINE

## 2017-10-30 RX ADMIN — IOPAMIDOL 80 ML: 612 INJECTION, SOLUTION INTRAVENOUS at 07:33

## 2017-11-21 ENCOUNTER — TELEPHONE (OUTPATIENT)
Dept: INTERNAL MEDICINE CLINIC | Age: 77
End: 2017-11-21

## 2017-11-21 DIAGNOSIS — I51.7 CARDIOMEGALY: Primary | ICD-10-CM

## 2017-12-13 ENCOUNTER — HOSPITAL ENCOUNTER (OUTPATIENT)
Dept: NON INVASIVE DIAGNOSTICS | Age: 77
Discharge: HOME | End: 2017-12-13
Attending: INTERNAL MEDICINE
Payer: MEDICARE

## 2017-12-13 DIAGNOSIS — I51.7 CARDIOMEGALY: ICD-10-CM

## 2017-12-13 PROCEDURE — 93306 TTE W/DOPPLER COMPLETE: CPT

## 2017-12-18 RX ORDER — ERGOCALCIFEROL 1.25 MG/1
50000 CAPSULE ORAL
Qty: 12 CAP | Refills: 1 | Status: SHIPPED | OUTPATIENT
Start: 2017-12-18 | End: 2019-06-26 | Stop reason: SDUPTHER

## 2017-12-18 RX ORDER — LEVOTHYROXINE SODIUM 75 UG/1
CAPSULE ORAL
Qty: 45 CAP | Refills: 3 | Status: SHIPPED | OUTPATIENT
Start: 2017-12-18 | End: 2018-10-10 | Stop reason: SDUPTHER

## 2017-12-18 NOTE — TELEPHONE ENCOUNTER
From: Toney Cabral  To: Jose Alejandro Vaughn MD  Sent: 12/18/2017 9:28 AM EST  Subject: Medication Renewal Request    Original authorizing provider: MD Neil Moreno would like a refill of the following medications:  Levothyroxine 75 mcg cap Jose Alejandro Vaughn MD]    Preferred pharmacy: [address] [place] [address] Road:

## 2017-12-18 NOTE — TELEPHONE ENCOUNTER
Dr. Amanda Betancourt, we haven't checked patient's Vit D level since 3/2014 and it was 45 at that time. She is scheduled to have labs done in April and a Vit D level is ordered already. Do you want to only given enough Vit D to make it to that appt? Or have her hold the prescription strength until after labs are drawn?

## 2017-12-19 DIAGNOSIS — B02.29 POSTHERPETIC NEURALGIA: ICD-10-CM

## 2017-12-19 RX ORDER — DIAZEPAM 10 MG/1
10 TABLET ORAL
Qty: 60 TAB | Refills: 1 | Status: SHIPPED | OUTPATIENT
Start: 2017-12-19 | End: 2018-09-07 | Stop reason: SDUPTHER

## 2017-12-19 NOTE — TELEPHONE ENCOUNTER
From: Siddhartha Cabral  To: Yoandy Henry MD  Sent: 12/19/2017 4:01 PM EST  Subject: Medication Renewal Request    Original authorizing provider: MD Brandon Harden would like a refill of the following medications:  diazepam (VALIUM) 10 mg tablet Yoandy Henry MD]    Preferred pharmacy: [address] [place] [address] Road:

## 2018-02-27 RX ORDER — ATORVASTATIN CALCIUM 40 MG/1
40 TABLET, FILM COATED ORAL DAILY
Qty: 90 TAB | Refills: 3 | Status: SHIPPED | OUTPATIENT
Start: 2018-02-27 | End: 2018-08-18

## 2018-02-27 NOTE — TELEPHONE ENCOUNTER
From: Milton Cabral  To: Kaz Burton MD  Sent: 2/27/2018 8:49 AM EST  Subject: Medication Renewal Request    Original authorizing provider: MD Johana Rausch would like a refill of the following medications:  atorvastatin (LIPITOR) 40 mg tablet Kaz Burton MD]    Preferred pharmacy: [address] [place] [address] Road:

## 2018-03-27 ENCOUNTER — OFFICE VISIT (OUTPATIENT)
Dept: INTERNAL MEDICINE CLINIC | Age: 78
End: 2018-03-27

## 2018-03-27 DIAGNOSIS — I10 ESSENTIAL HYPERTENSION WITH GOAL BLOOD PRESSURE LESS THAN 140/90: ICD-10-CM

## 2018-03-27 DIAGNOSIS — E66.9 OBESITY (BMI 30.0-34.9): Primary | ICD-10-CM

## 2018-03-27 RX ORDER — LISINOPRIL 10 MG/1
10 TABLET ORAL DAILY
Qty: 90 TAB | Refills: 3 | Status: SHIPPED | OUTPATIENT
Start: 2018-03-27 | End: 2019-05-06 | Stop reason: SDUPTHER

## 2018-03-27 NOTE — PROGRESS NOTES
76 y.o. WHITE OR  female who presents for evaluation. Back in 6797, she was afflicted with zoster and lost a lot of weight due to dec appetite from the pain. So much so that she was actually able to come off the acei. In the last year or so, her bp is running high again, getting 150+ over 80+, completely asymptomatic. She's watching the diet, weight is actually continuing to be down.       Vitals 3/27/2018 10/25/2017 5/18/2017 4/25/2017 10/17/2016   Weight 162 lb 171 lb 171 lb 170 lb 169 lb     Vitals 9/7/2016 7/20/2016 6/27/2016 6/10/2016 6/10/2016 6/10/2016   Weight 167 lb 178 lb 178 lb        Vitals 6/10/2016 6/9/2016   Weight 183 lb 184 lb     LAST MEDICARE WELLNESS EXAM: 4/1/14, 4/8/14, 4/14/16, 4/25/17  ACP 4/25/17    IF 3/18    Past Medical History:   Diagnosis Date    Colon adenomas     2003 Dr Mona Chin; 4/13 Dr Barbara CROW (degenerative joint disease)     Dyslipidemia     FHx: heart disease     Gastritis 2006    on EGD Dr. Darnell Umaña, neg sprue    Hypertension     Hypothyroidism 2007    Dr. Jessica Saenz Hypovitaminosis D     Lung nodule 06/2016    6 mm RLL    Obesity     peak weight 187 lbs, bmi 33.1 from 4/13; IF 3/18    Osteopenia FRAX 4/12 9.7 and 1.4     DEXA t score 0.6 spine, -1 hip (12/09);  -0.4 spine, -1.3 hip (4/12); -1.2 wrist, -1.6 hip w FRAX 11/2.2 (4/14); -1.0 wrist, -1.6 hip w FRAX 12/2.6 (4/16)    Psoriasis 2003    on bx Dr. Franchesca Nolasco hearing loss     left side Dr Laura Fish Venous insufficiency 2003    on dopplers    Zoster 05/2016    left T11-12; mild postherpetic neuralgia     Past Surgical History:   Procedure Laterality Date    CARDIAC SURG PROCEDURE UNLIST  09/2016    thallium negative    CHEST SURGERY PROCEDURE UNLISTED  06/2016    CTA neg PE, 6 mm subpleural nodule    HX [facility]      Dr. Robert Vasquez left    HX COLONOSCOPY      benign polyp 5/08 Dr Mona Chin;  4/13 negative Dr. Hollingsworth Repress PROCEDURE UNLISTED  2007    neg MRA/MRI head in [address] [place] [address]  9/10    CT head negative    VASCULAR SURGERY PROCEDURE UNLIST  2007    [facility]     Social History     Social History    Marital status:      Spouse name: N/A    Number of children: N/A    Years of education: N/A     Occupational History    ret office mgr      Social History Main Topics    Smoking status: Never Smoker    Smokeless tobacco: Never Used    Alcohol use Yes      Comment: occasionally    Drug use: No    Sexual activity: Not on file     Other Topics Concern    Not on file     Social History Narrative     Allergies   Allergen Reactions    Atenolol Other (comments)     Felt poorly    Cozaar [Losartan] Other (comments)     Headaches    Norvasc [Amlodipine] Other (comments)     Felt poorly       Current Outpatient Prescriptions   Medication Sig    lisinopril (PRINIVIL, ZESTRIL) 10 mg tablet Take 1 Tab by mouth daily.  atorvastatin (LIPITOR) 40 mg tablet Take 1 Tab by mouth daily.  diazePAM (VALIUM) 10 mg tablet Take 1 Tab by mouth every twelve (12) hours as needed for Anxiety. Max Daily Amount: 20 mg.  Levothyroxine 75 mcg cap Take one tablet by mouth every other day before breakfast. Alternates with 50mcg    ergocalciferol (VITAMIN D2) 50,000 unit capsule Take 1 Cap by mouth every seven (7) days.  lidocaine (LIDODERM) 5 % Apply patch to the affected area for 12 hours a day and remove for 12 hours a day.  levothyroxine (SYNTHROID) 50 mcg tablet Take 1 Tab by mouth Daily (before breakfast).  ezetimibe (ZETIA) 10 mg tablet TAKE 1 TABLET BY MOUTH DAILY    aspirin 81 mg chewable tablet Take 1 Tab by mouth daily. No current facility-administered medications for this visit.       REVIEW OF SYSTEMS:  Dr. Jun Chase 2014, mammo 4/16, DEXA 4/16, colo 4/13 Dr Mallory Ortez, sees Dr Kylah Dodson  [facility]  no vision change or eye pain  Oral  no mouth pain, tongue or tooth problems  Ears  no hearing loss, ear pain, fullness  Throat  no swallowing problems  Cardiac  no CP, PND, orthopnea, edema, palpitations or syncope  Chest  no breast masses  Resp  no wheezing, chronic coughing, dyspnea  GI  no heartburn, nausea, vomiting, change in bowel habits, bleeding, hemorrhoids  Urinary  no dysuria, hematuria, flank pain, urgency, frequency  Neuro  no focal weakness, numbness, paresthesias, incoordination, ataxia, involuntary movements    Visit Vitals    BP (!) 155/95    Pulse 79    Temp 97.9 °F (36.6 °C) (Oral)    Resp 14    Ht 5' 3\" (1.6 m)    Wt 162 lb (73.5 kg)    SpO2 98%    BMI 28.7 kg/m2     Her machine was reading 155/95, manual was 132/88    A&O x3  Affect is appropriate. Mood stable  No apparent distress  Anicteric, no JVD, adenopathy or thyromegaly. No carotid bruits or radiated murmur  Lungs clear to auscultation, no wheezes or rales  Heart showed regular rate and rhythm. No murmur, rubs, gallops  Abdomen soft nontender, no hepatosplenomegaly or masses. Extremities without edema.   Pulses 1-2+ symmetrically    LABS  From 3/10 showed                         vit d 38.0  From 9/10 showed                            wbc 7.2, hb 14.3, plt 158  From 3/12 showed   gluc 88, cr 0.87, gfr 66, alt 15, tsh 1.04, ldl-p 1515  From 9/12 showed                                                    tsh 1.69, ldl-p 1469, chol 152, tg 165, hdl 42, ldl-c 77  From 3/13 showed   gluc 83, cr 0.91, gfr 62, alt 10,     ldl-p 1274, chol 124, tg 137, hdl 37, ldl-c 60,    wbc 7.0, hb 14.3, plt 164  From 9/13 showed                chol 147, tg 139, hdl 39, ldl-c 80  From 3/14 showed   gluc 89, cr 1.02, gfr 53, alt<5,  tsh 1.66,         chol 148, tg 132, hdl 36, ldl-c 86,    wbc 6.7, hb 14.4, plt 179, vit d 45.4   From 10/14 showed                chol 137, tg 117, hdl 40, ldl-c 74  From 4/15 showed   gluc 91, cr 1.24, gfr 42, alt 30,             wbc 6.8, hb 14.8, plt 176, tsh 0.64  From 10/15 showed gluc 93, cr 1.13, gfr 47,            tsh 0.32,          chol 139, tg 131, hdl 36, ldl-c 77   From 4/16 showed   gluc 86, cr 1.00, gfr 54, alt 24,            chol 142, tg 137, hdl 41, ldl-c 74,   wbc 6.4, hb 13.9, plt 203, ua pro 30  From 6/16 showed   gluc 97, cr 1.20,             wbc 7.3, hb 14.1, plt 216, ck/trop neg  From 10/16 showed gluc 78, cr 1.03, gfr 52, alt 21,            chol 145, tg 151, hdl 43, ldl-c 72,             tsh 1.21  From 4/17 showed                         tsh 2.57, ft4 1.10  From 10/17 showed gluc 79, cr 1.03, gfr 52, alt 27,            chol 199, tg 161, hdl 45, ldl-c 122, wbc 6.3, hb 14.7, plt 208    Patient Active Problem List   Diagnosis Code    Colon adenomas 2003, last 2014 Dr Howie Gutierrez D12.6    Hypovitaminosis D E55.9    Venous insufficiency dopplers 2003 I87.2    Osteopenia FRAX 4/14 11 and 2.2 M85.80    Dyslipidemia E78.5    Hypothyroidism due to acquired atrophy of thyroid E03.4    Arthritis, degenerative M19.90    Obesity (BMI 30.0-34. 9) E66.9    Advance directive discussed with patient Z70.80    Primary hypertension I10     ASSESSMENT AND PLAN:  1. General.  F/U Dr. Bull Quintanilla  2. Hypertension. Long discussion about new AHA/ACC recs. She is interested in trying lisinopril back as it worked before very well  3. Overweight. Congratulated her on the weight loss thus far. Intermittent fasting discussed at length. Explained the concepts in detail. Went over possible physiologic changes that could occur and how that would possibly impact her situation in a positive way. Discussed 16:8 program in particular. We also went over the differences between hunger and spencer hypoglycemia. Look up low insulin index foods also. Mirtha Brown She will do some more research and consider implementing in the near future          RTC 4/18    Above conditions discussed at length and patient vocalized understanding.   All questions answered to patient satisfaction

## 2018-03-27 NOTE — PROGRESS NOTES
1. Have you been to the ER, urgent care clinic or hospitalized since your last visit? NO.     2. Have you seen or consulted any other health care providers outside of the [address] [place] [address] since your last visit (Include any pap smears or colon screening)? NO      Do you have an Advanced Directive? NO    Would you like information on Advanced Directives? NO    Chief Complaint   Patient presents with    Hypertension     Patient stated for 1 week her blood pressure has been elevated. not currently on any blood pressure meds.

## 2018-04-05 VITALS
HEART RATE: 79 BPM | DIASTOLIC BLOOD PRESSURE: 95 MMHG | OXYGEN SATURATION: 98 % | WEIGHT: 172 LBS | HEIGHT: 63 IN | TEMPERATURE: 97.9 F | SYSTOLIC BLOOD PRESSURE: 155 MMHG | RESPIRATION RATE: 14 BRPM | BODY MASS INDEX: 30.48 KG/M2

## 2018-04-16 ENCOUNTER — OFFICE VISIT (OUTPATIENT)
Dept: ORTHOPEDIC SURGERY | Age: 78
End: 2018-04-16

## 2018-04-16 VITALS
WEIGHT: 172.4 LBS | SYSTOLIC BLOOD PRESSURE: 101 MMHG | HEART RATE: 74 BPM | BODY MASS INDEX: 30.55 KG/M2 | HEIGHT: 63 IN | DIASTOLIC BLOOD PRESSURE: 70 MMHG | RESPIRATION RATE: 16 BRPM

## 2018-04-16 DIAGNOSIS — M51.36 DDD (DEGENERATIVE DISC DISEASE), LUMBAR: ICD-10-CM

## 2018-04-16 DIAGNOSIS — M43.16 SPONDYLOLISTHESIS, LUMBAR REGION: ICD-10-CM

## 2018-04-16 DIAGNOSIS — M54.50 LOW BACK PAIN WITHOUT SCIATICA, UNSPECIFIED BACK PAIN LATERALITY, UNSPECIFIED CHRONICITY: Primary | ICD-10-CM

## 2018-04-16 DIAGNOSIS — M47.816 SPONDYLOSIS OF LUMBAR REGION WITHOUT MYELOPATHY OR RADICULOPATHY: ICD-10-CM

## 2018-04-16 DIAGNOSIS — M41.9 SCOLIOSIS OF LUMBAR SPINE, UNSPECIFIED SCOLIOSIS TYPE: ICD-10-CM

## 2018-04-16 RX ORDER — IBUPROFEN 800 MG/1
800 TABLET ORAL
Qty: 45 TAB | Refills: 0 | Status: SHIPPED | OUTPATIENT
Start: 2018-04-16 | End: 2020-02-06

## 2018-04-16 RX ORDER — METHYLPREDNISOLONE 4 MG/1
TABLET ORAL
Qty: 1 DOSE PACK | Refills: 0 | Status: SHIPPED | OUTPATIENT
Start: 2018-04-16 | End: 2018-05-07

## 2018-04-16 NOTE — MR AVS SNAPSHOT
[address] [place] [address] 
 
 
 [address] [facility] 
[phone] Patient: Deniz Johnson MRN: [id number] PKN:[id number] Visit Information Date & Time Provider Department Dept. Phone Encounter #  
 4/16/2018  9:25 AM Bobby Suarez  [facility] [address] [facility] - [place] [id number] Follow-up Instructions Return in about 4 weeks (around 5/14/2018). Your Appointments 4/23/2018  7:55 AM  
LAB with [facility] NURSE VISIT [facility] [address] [place] [address] ([address] [place] [address]) Appt Note: lab  
 [address], [address] [id number] [address]  
  
   
 [address], [address]  
  
    
 5/1/2018 10:35 AM  
LAB with [facility] NURSE VISIT [facility] [address] [place] [address] ([address] [place] [address]) Appt Note: pt r/s from 4/30/18  
 [address] [facility], [address] [address] [address] [address]  
[phone]  
  
    
 5/7/2018  9:20 AM  
Office Visit with Nasima Abbott MD  
Internists of [address] [place] [address]) Appt Note: 6 month f/u; pt r/s from 4/30/18  
 [address] [facility], [address] [address] [address]  
  
   
 [address], [address] Upcoming Health Maintenance Date Due  
 GLAUCOMA SCREENING Q2Y 3/1/2018 MEDICARE YEARLY EXAM 4/26/2018 DTaP/Tdap/Td series (2 - Td) 5/10/2027 Allergies as of 4/16/2018  Review Complete On: 4/16/2018 By: Bobby Suarez MD  
  
 Severity Noted Reaction Type Reactions Atenolol    Other (comments) [place] poorly Cozaar [Losartan]    Other (comments) Headaches Norvasc [Amlodipine]    Other (comments) Felt poorly Current Immunizations  Reviewed on 4/8/2015 Name Date Hepatitis A Vaccine 9/1/2008 Influenza High Dose Vaccine PF 10/17/2016 Pneumococcal Conjugate (PCV-13) 5/10/2017 TD Vaccine 9/1/2008 Tdap 5/10/2017 Typhoid Vaccine 9/1/2008 Yellow Fever Vaccine 9/1/2008 ZZZ-RETIRED (DO NOT USE) Pneumococcal Vaccine (Unspecified Type) 9/1/2008 Zoster Vaccine, Live 1/1/2008 Not reviewed this visit You Were Diagnosed With   
  
 Codes Comments Low back pain without sciatica, unspecified back pain laterality, unspecified chronicity    -  Primary ICD-10-CM: M54.5 ICD-9-CM: 724.2 Spondylolisthesis, lumbar region     ICD-10-CM: M43.16 
ICD-9-CM: 738.4 Spondylosis of lumbar region without myelopathy or radiculopathy     ICD-10-CM: M47.816 ICD-9-CM: 721.3 Scoliosis of lumbar spine, unspecified scoliosis type     ICD-10-CM: M41.9 ICD-9-CM: 737.30   
 DDD (degenerative disc disease), lumbar     ICD-10-CM: M51.36 
ICD-9-CM: 722.52 Vitals BP Pulse Resp Height(growth percentile) Weight(growth percentile) BMI  
 101/70 74 16 5' 3\" (1.6 m) 172 lb 6.4 oz (78.2 kg) 30.54 kg/m2 OB Status Smoking Status Postmenopausal Never Smoker BMI and BSA Data Body Mass Index Body Surface Area 30.54 kg/m 2 1.86 m 2 Preferred Pharmacy Pharmacy Name Phone [address] [place] [address], [address] [place] [address] [phone] Your Updated Medication List  
  
   
This list is accurate as of 4/16/18 10:01 AM.  Always use your most recent med list.  
  
  
  
  
 aspirin 81 mg chewable tablet Take 1 Tab by mouth daily. atorvastatin 40 mg tablet Commonly known as:  LIPITOR Take 1 Tab by mouth daily. diazePAM 10 mg tablet Commonly known as:  VALIUM Take 1 Tab by mouth every twelve (12) hours as needed for Anxiety. Max Daily Amount: 20 mg.  
  
 ergocalciferol 50,000 unit capsule Commonly known as:  VITAMIN D2 Take 1 Cap by mouth every seven (7) days. ezetimibe 10 mg tablet Commonly known as:  Nevin Maori TAKE 1 TABLET BY MOUTH DAILY  
  
 ibuprofen 800 mg tablet Commonly known as:  MOTRIN Take 1 Tab by mouth three (3) times daily (with meals). * levothyroxine 50 mcg tablet Commonly known as:  SYNTHROID Take 1 Tab by mouth Daily (before breakfast). * Levothyroxine 75 mcg Cap Take one tablet by mouth every other day before breakfast. Alternates with 50mcg  
  
 lidocaine 5 % Commonly known as:  Ja Ax Apply patch to the affected area for 12 hours a day and remove for 12 hours a day. lisinopril 10 mg tablet Commonly known as:  Prateek Astudilloorn Take 1 Tab by mouth daily. methylPREDNISolone 4 mg tablet Commonly known as:  Erika Linear Per dose pack instructions * Notice: This list has 2 medication(s) that are the same as other medications prescribed for you. Read the directions carefully, and ask your doctor or other care provider to review them with you. Prescriptions Printed Refills  
 methylPREDNISolone (MEDROL DOSEPACK) 4 mg tablet 0 Sig: Per dose pack instructions Class: Print  
 ibuprofen (MOTRIN) 800 mg tablet 0 Sig: Take 1 Tab by mouth three (3) times daily (with meals). Class: Print Route: Oral  
  
We Performed the Following AMB POC XRAY, SPINE, LUMBOSACRAL; 2 O [92325 CPT(R)] Follow-up Instructions Return in about 4 weeks (around 5/14/2018). Introducing [facility]! Dear Iram Velasquez: Thank you for requesting a [employer] account. Our records indicate that you already have an active [employer] account. You can access your account anytime at [email or web address]. [email or web address] Did you know that you can access your hospital and ER discharge instructions at any time in [employer]? You can also review all of your test results from your hospital stay or ER visit. Additional Information If you have questions, please visit the Frequently Asked Questions section of the [employer] website at [email or web address] Remember, [employer] is NOT to be used for urgent needs. For medical emergencies, dial 911. Now available from your iPhone and Android! Please provide this summary of care documentation to your next provider. Your primary care clinician is listed as Loretta Bolivar. If you have any questions after today's visit, please call [phone].

## 2018-05-02 ENCOUNTER — APPOINTMENT (OUTPATIENT)
Dept: INTERNAL MEDICINE CLINIC | Age: 78
End: 2018-05-02

## 2018-05-02 ENCOUNTER — HOSPITAL ENCOUNTER (OUTPATIENT)
Dept: LAB | Age: 78
Discharge: HOME | End: 2018-05-02
Payer: MEDICARE

## 2018-05-02 DIAGNOSIS — E78.5 DYSLIPIDEMIA: ICD-10-CM

## 2018-05-02 DIAGNOSIS — E55.9 HYPOVITAMINOSIS D: ICD-10-CM

## 2018-05-02 DIAGNOSIS — E03.4 HYPOTHYROIDISM DUE TO ACQUIRED ATROPHY OF THYROID: ICD-10-CM

## 2018-05-02 LAB
CHOLEST SERPL-MCNC: 300 MG/DL
HDLC SERPL-MCNC: 40 MG/DL (ref 40–60)
HDLC SERPL: 7.5 {RATIO} (ref 0–5)
LDLC SERPL CALC-MCNC: 220.8 MG/DL (ref 0–100)
LIPID PROFILE,FLP: ABNORMAL
T4 FREE SERPL-MCNC: 1.1 NG/DL (ref 0.7–1.5)
TRIGL SERPL-MCNC: 196 MG/DL (ref ?–150)
TSH SERPL DL<=0.05 MIU/L-ACNC: 5.23 UIU/ML (ref 0.36–3.74)
VLDLC SERPL CALC-MCNC: 39.2 MG/DL

## 2018-05-02 PROCEDURE — 84443 ASSAY THYROID STIM HORMONE: CPT | Performed by: INTERNAL MEDICINE

## 2018-05-02 PROCEDURE — 82306 VITAMIN D 25 HYDROXY: CPT | Performed by: INTERNAL MEDICINE

## 2018-05-02 PROCEDURE — 36415 COLL VENOUS BLD VENIPUNCTURE: CPT | Performed by: INTERNAL MEDICINE

## 2018-05-02 PROCEDURE — 80061 LIPID PANEL: CPT | Performed by: INTERNAL MEDICINE

## 2018-05-02 PROCEDURE — 84439 ASSAY OF FREE THYROXINE: CPT | Performed by: INTERNAL MEDICINE

## 2018-05-03 LAB — 25(OH)D3 SERPL-MCNC: 57.8 NG/ML (ref 30–100)

## 2018-05-07 ENCOUNTER — OFFICE VISIT (OUTPATIENT)
Dept: INTERNAL MEDICINE CLINIC | Age: 78
End: 2018-05-07

## 2018-05-07 ENCOUNTER — HOSPITAL ENCOUNTER (OUTPATIENT)
Dept: LAB | Age: 78
Discharge: HOME | End: 2018-05-07
Payer: MEDICARE

## 2018-05-07 VITALS
BODY MASS INDEX: 30.65 KG/M2 | TEMPERATURE: 97.8 F | SYSTOLIC BLOOD PRESSURE: 108 MMHG | OXYGEN SATURATION: 98 % | WEIGHT: 173 LBS | HEIGHT: 63 IN | HEART RATE: 80 BPM | DIASTOLIC BLOOD PRESSURE: 72 MMHG

## 2018-05-07 DIAGNOSIS — M43.16 SPONDYLOLISTHESIS, LUMBAR REGION: ICD-10-CM

## 2018-05-07 DIAGNOSIS — M94.9 DISORDER OF BONE AND CARTILAGE: ICD-10-CM

## 2018-05-07 DIAGNOSIS — M85.80 OSTEOPENIA, UNSPECIFIED LOCATION: ICD-10-CM

## 2018-05-07 DIAGNOSIS — Z13.1 SCREENING FOR DIABETES MELLITUS: ICD-10-CM

## 2018-05-07 DIAGNOSIS — Z13.31 SCREENING FOR DEPRESSION: ICD-10-CM

## 2018-05-07 DIAGNOSIS — R10.9 ABDOMINAL PAIN, UNSPECIFIED ABDOMINAL LOCATION: ICD-10-CM

## 2018-05-07 DIAGNOSIS — D12.6 COLON ADENOMAS: ICD-10-CM

## 2018-05-07 DIAGNOSIS — E03.4 HYPOTHYROIDISM DUE TO ACQUIRED ATROPHY OF THYROID: ICD-10-CM

## 2018-05-07 DIAGNOSIS — Z00.00 MEDICARE ANNUAL WELLNESS VISIT, SUBSEQUENT: Primary | ICD-10-CM

## 2018-05-07 DIAGNOSIS — I10 ESSENTIAL HYPERTENSION WITH GOAL BLOOD PRESSURE LESS THAN 140/90: ICD-10-CM

## 2018-05-07 DIAGNOSIS — E78.5 DYSLIPIDEMIA: ICD-10-CM

## 2018-05-07 DIAGNOSIS — E55.9 HYPOVITAMINOSIS D: ICD-10-CM

## 2018-05-07 DIAGNOSIS — Z13.39 SCREENING FOR ALCOHOLISM: ICD-10-CM

## 2018-05-07 DIAGNOSIS — E66.9 OBESITY (BMI 30.0-34.9): ICD-10-CM

## 2018-05-07 DIAGNOSIS — Z12.31 ENCOUNTER FOR SCREENING MAMMOGRAM FOR MALIGNANT NEOPLASM OF BREAST: ICD-10-CM

## 2018-05-07 DIAGNOSIS — Z12.11 SCREEN FOR COLON CANCER: ICD-10-CM

## 2018-05-07 DIAGNOSIS — M89.9 DISORDER OF BONE AND CARTILAGE: ICD-10-CM

## 2018-05-07 DIAGNOSIS — Z71.89 ADVANCED DIRECTIVES, COUNSELING/DISCUSSION: ICD-10-CM

## 2018-05-07 DIAGNOSIS — Z13.6 SCREENING FOR ISCHEMIC HEART DISEASE: ICD-10-CM

## 2018-05-07 LAB
ALBUMIN SERPL-MCNC: 3.9 G/DL (ref 3.4–5)
ALBUMIN/GLOB SERPL: 1.3 {RATIO} (ref 0.8–1.7)
ALP SERPL-CCNC: 91 U/L (ref 45–117)
ALT SERPL-CCNC: 22 U/L (ref 13–56)
ANION GAP SERPL CALC-SCNC: 10 MMOL/L (ref 3–18)
APPEARANCE UR: CLEAR
AST SERPL-CCNC: 20 U/L (ref 15–37)
BACTERIA URNS QL MICRO: NEGATIVE /HPF
BASOPHILS # BLD: 0 K/UL (ref 0–0.06)
BASOPHILS NFR BLD: 0 % (ref 0–2)
BILIRUB SERPL-MCNC: 0.4 MG/DL (ref 0.2–1)
BILIRUB UR QL: NEGATIVE
BUN SERPL-MCNC: 22 MG/DL (ref 7–18)
BUN/CREAT SERPL: 19 (ref 12–20)
CALCIUM SERPL-MCNC: 9.5 MG/DL (ref 8.5–10.1)
CHLORIDE SERPL-SCNC: 106 MMOL/L (ref 100–108)
CO2 SERPL-SCNC: 26 MMOL/L (ref 21–32)
COLOR UR: YELLOW
CREAT SERPL-MCNC: 1.16 MG/DL (ref 0.6–1.3)
DIFFERENTIAL METHOD BLD: ABNORMAL
EOSINOPHIL # BLD: 0.3 K/UL (ref 0–0.4)
EOSINOPHIL NFR BLD: 5 % (ref 0–5)
EPITH CASTS URNS QL MICRO: NORMAL /LPF (ref 0–5)
ERYTHROCYTE [DISTWIDTH] IN BLOOD BY AUTOMATED COUNT: 15.2 % (ref 11.6–14.5)
GLOBULIN SER CALC-MCNC: 3.1 G/DL (ref 2–4)
GLUCOSE SERPL-MCNC: 92 MG/DL (ref 74–99)
GLUCOSE UR STRIP.AUTO-MCNC: NEGATIVE MG/DL
HCT VFR BLD AUTO: 43.7 % (ref 35–45)
HGB BLD-MCNC: 13.8 G/DL (ref 12–16)
HGB UR QL STRIP: NEGATIVE
KETONES UR QL STRIP.AUTO: ABNORMAL MG/DL
LEUKOCYTE ESTERASE UR QL STRIP.AUTO: ABNORMAL
LIPASE SERPL-CCNC: 127 U/L (ref 73–393)
LYMPHOCYTES # BLD: 1.4 K/UL (ref 0.9–3.6)
LYMPHOCYTES NFR BLD: 25 % (ref 21–52)
MCH RBC QN AUTO: 31 PG (ref 24–34)
MCHC RBC AUTO-ENTMCNC: 31.6 G/DL (ref 31–37)
MCV RBC AUTO: 98.2 FL (ref 74–97)
MONOCYTES # BLD: 0.5 K/UL (ref 0.05–1.2)
MONOCYTES NFR BLD: 9 % (ref 3–10)
NEUTS SEG # BLD: 3.4 K/UL (ref 1.8–8)
NEUTS SEG NFR BLD: 61 % (ref 40–73)
NITRITE UR QL STRIP.AUTO: NEGATIVE
PH UR STRIP: 5 [PH] (ref 5–8)
PLATELET # BLD AUTO: 192 K/UL (ref 135–420)
PMV BLD AUTO: 12.1 FL (ref 9.2–11.8)
POTASSIUM SERPL-SCNC: 4 MMOL/L (ref 3.5–5.5)
PROT SERPL-MCNC: 7 G/DL (ref 6.4–8.2)
PROT UR STRIP-MCNC: NEGATIVE MG/DL
RBC # BLD AUTO: 4.45 M/UL (ref 4.2–5.3)
RBC #/AREA URNS HPF: 0 /HPF (ref 0–5)
SODIUM SERPL-SCNC: 142 MMOL/L (ref 136–145)
SP GR UR REFRACTOMETRY: 1.02 (ref 1–1.03)
UROBILINOGEN UR QL STRIP.AUTO: 0.2 EU/DL (ref 0.2–1)
WBC # BLD AUTO: 5.6 K/UL (ref 4.6–13.2)
WBC URNS QL MICRO: NORMAL /HPF (ref 0–4)

## 2018-05-07 PROCEDURE — 83690 ASSAY OF LIPASE: CPT | Performed by: INTERNAL MEDICINE

## 2018-05-07 PROCEDURE — 81001 URINALYSIS AUTO W/SCOPE: CPT | Performed by: INTERNAL MEDICINE

## 2018-05-07 PROCEDURE — 85025 COMPLETE CBC W/AUTO DIFF WBC: CPT | Performed by: INTERNAL MEDICINE

## 2018-05-07 PROCEDURE — 80053 COMPREHEN METABOLIC PANEL: CPT | Performed by: INTERNAL MEDICINE

## 2018-05-07 RX ORDER — OMEPRAZOLE 40 MG/1
40 CAPSULE, DELAYED RELEASE ORAL DAILY
Qty: 90 CAP | Refills: 1 | Status: SHIPPED | OUTPATIENT
Start: 2018-05-07 | End: 2021-05-12 | Stop reason: CLARIF

## 2018-05-07 NOTE — ACP (ADVANCE CARE PLANNING)
Advance Care Planning    Advance Care Planning (ACP) Provider Note - Comprehensive     Date of ACP Conversation: 05/07/18  Persons included in Conversation:  patient  Length of ACP Conversation in minutes:  16 minutes    Authorized Decision Maker (if patient is incapable of making informed decisions): This person is:   Other Legally Authorized Decision Maker (e.g. Next of Kin)          General ACP for ALL Patients with Decision Making Capacity:   Importance of advance care planning, including choosing a healthcare agent to communicate patient's healthcare decisions if patient lost the ability to make decisions, such as after a sudden illness or accident  Understanding of the healthcare agent role was assessed and information provided  Exploration of values, goals, and preferences if recovery is not expected, even with continued medical treatment in the event of: Imminent death  Severe, permanent brain injury    Review of Existing Advance Directive:  na    For Serious or Chronic Illness:  Understanding of medical condition    Understanding of CPR, goals and expected outcomes, benefits and burdens discussed.     Interventions Provided:  Recommended completion of Advance Directive form after review of ACP materials and conversation with prospective healthcare agent   Recommended communicating the plan and making copies for the healthcare agent, personal physician, and others as appropriate (e.g., health system)  Recommended review of completed ACP document annually or upon change in health status

## 2018-05-07 NOTE — PROGRESS NOTES
This is a Subsequent Medicare Annual Wellness Visit    I have reviewed the patient's medical history in detail and updated the computerized patient record. History     Past Medical History:   Diagnosis Date    Colon adenomas     2003 Dr Ngozi Shah; 4/13 Dr Dariana CROW (degenerative joint disease)     Dyslipidemia     FHx: heart disease     Gastritis 2006    on EGD Dr. Aubrey Alberts, neg sprue    Hypertension     Hypothyroidism 2007    Dr. Mcnamara Nurse Hypovitaminosis D     Lung nodule 06/2016    6 mm RLL (6/16); no change (10/17)    Obesity     peak weight 187 lbs, bmi 33.1 from 4/13; IF 3/18    Osteopenia FRAX 4/12 9.7 and 1.4     DEXA t score 0.6 spine, -1 hip (12/09);  -0.4 spine, -1.3 hip (4/12); -1.2 wrist, -1.6 hip w FRAX 11/2.2 (4/14); -1.0 wrist, -1.6 hip w FRAX 12/2.6 (4/16)    Psoriasis 2003    on bx Dr. Sima Jacob hearing loss     left side Dr Hilario Zacarias Venous insufficiency 2003    on dopplers    Zoster 05/2016    left T11-12; mild postherpetic neuralgia      Past Surgical History:   Procedure Laterality Date    CARDIAC SURG PROCEDURE UNLIST  09/2016    NST negative    CHEST SURGERY PROCEDURE UNLISTED  06/2016    CTA neg PE, 6 mm subpleural nodule    HX [facility]      Dr. River Zhou left    HX COLONOSCOPY      benign polyp 5/08 Dr Ngozi Shah;  4/13 negative Dr. Ellis Monzon  2007    neg MRA/MRI head in [facility]   [address] [facility]  9/10    CT head negative    VASCULAR SURGERY PROCEDURE UNLIST  2007    neg AAA screen [facility]     Current Outpatient Prescriptions   Medication Sig Dispense Refill    omeprazole (PRILOSEC) 40 mg capsule Take 1 Cap by mouth daily. 90 Cap 1    lisinopril (PRINIVIL, ZESTRIL) 10 mg tablet Take 1 Tab by mouth daily. 90 Tab 3    diazePAM (VALIUM) 10 mg tablet Take 1 Tab by mouth every twelve (12) hours as needed for Anxiety.  Max Daily Amount: 20 mg. 60 Tab 1    Levothyroxine 75 mcg cap Take one tablet by mouth every other day before breakfast. Alternates with 50mcg 45 Cap 3    ergocalciferol (VITAMIN D2) 50,000 unit capsule Take 1 Cap by mouth every seven (7) days. 12 Cap 1    lidocaine (LIDODERM) 5 % Apply patch to the affected area for 12 hours a day and remove for 12 hours a day. 30 Each 5    levothyroxine (SYNTHROID) 50 mcg tablet Take 1 Tab by mouth Daily (before breakfast). 90 Tab 3    aspirin 81 mg chewable tablet Take 1 Tab by mouth daily. 90 Tab 3    ibuprofen (MOTRIN) 800 mg tablet Take 1 Tab by mouth three (3) times daily (with meals). 45 Tab 0    atorvastatin (LIPITOR) 40 mg tablet Take 1 Tab by mouth daily.  90 Tab 3    ezetimibe (ZETIA) 10 mg tablet TAKE 1 TABLET BY MOUTH DAILY 90 Tab 3     Allergies   Allergen Reactions    Atenolol Other (comments)     Felt poorly    Cozaar [Losartan] Other (comments)     Headaches    Norvasc [Amlodipine] Other (comments)     [place] poorly       Family History   Problem Relation Age of Onset    Breast Cancer Mother     Cancer Mother     Heart Disease Father      Social History   Substance Use Topics    Smoking status: Never Smoker    Smokeless tobacco: Never Used    Alcohol use Yes      Comment: occasionally     Depression Risk Factor Screening:     PHQ over the last two weeks 5/7/2018   Little interest or pleasure in doing things Not at all   Feeling down, depressed or hopeless Not at all   Total Score PHQ 2 0     SCREENINGS  Colonoscopy last done 4/13 Dr Carmela Martinez last done 4/17  DEXA last done 4/16  Gyn last done Dr Jori Aase 1/14    Immunization History   Administered Date(s) Administered    Hepatitis A Vaccine 09/01/2008    Influenza High Dose Vaccine PF 10/17/2016    Pneumococcal Conjugate (PCV-13) 05/10/2017    TD Vaccine 09/01/2008    Tdap 05/10/2017    Typhoid Vaccine 09/01/2008    Yellow Fever Vaccine 09/01/2008    ZZZ-RETIRED (DO NOT USE) Pneumococcal Vaccine (Unspecified Type) 09/01/2008    Zoster Vaccine, Live 01/01/2008     Alcohol Risk Factor Screening: On any occasion during the past 3 months, have you had more than 3 drinks containing alcohol? No    Do you average more than 7 drinks per week? No      Functional Ability and Level of Safety:     Hearing Loss   normal-to-mild    Vision - no acute complaints and is followed by Dr. Harsha Vu of Daily Living   Self-care. Requires assistance with: no ADLs    Fall Risk     Fall Risk Assessment, last 12 mths 5/7/2018   Able to walk? Yes   Fall in past 12 months? No     Abuse Screen   Patient is not abused    Review of Systems   A comprehensive review of systems was negative except for that written in the HPI. Physical Examination     Visit Vitals    /72 (BP 1 Location: Right arm, BP Patient Position: Sitting)    Pulse 80    Temp 97.8 °F (36.6 °C) (Oral)    Ht 5' 3\" (1.6 m)    Wt 173 lb (78.5 kg)    SpO2 98%    BMI 30.65 kg/m2       Evaluation of Cognitive Function:  Mood/affect:  happy  Appearance: age appropriate, overweight, well dressed and within normal Limits  Family member/caregiver input: na    Patient Care Team:  Jame Edmonds MD as PCP - General (Internal Medicine)  Juan Bolden MD (Surgery)  Joan Givens, RN as Ambulatory Care Navigator (Internal Medicine)    Advice/Referrals/Counseling   Education and counseling provided:  Are appropriate based on today's review and evaluation  End-of-Life planning (with patient's consent)  Pneumococcal Vaccine  Screening Mammography  Cardiovascular screening blood test  Bone mass measurement (DEXA)  Diabetes screening test    Assessment/Plan       ICD-10-CM ICD-9-CM    1. Medicare annual wellness visit, subsequent Z00.00 V70.0    2. Abdominal pain, unspecified abdominal location D28.2 724.95 METABOLIC PANEL, COMPREHENSIVE      CBC WITH AUTOMATED DIFF      LIPASE      URINALYSIS W/ RFLX MICROSCOPIC      omeprazole (PRILOSEC) 40 mg capsule   3. Hypovitaminosis D E55.9 268.9 VITAMIN D, 25 HYDROXY   4. Colon adenomas 2003, last 2014 Dr Loretta Valiente D12.6 211.3    5. Dyslipidemia E78.5 272.4 LIPID PANEL   6. Hypothyroidism due to acquired atrophy of thyroid E03.4 244.8 T4, FREE     246.8 TSH 3RD GENERATION   7. Obesity (BMI 30.0-34. 9) E66.9 278.00    8. Primary hypertension I10 401.9    9. Spondylolisthesis, lumbar region M43.16 738.4    10. Osteopenia, unspecified location M85.80 733.90 DEXA BONE DENSITY STUDY AXIAL   11. Advanced directives, counseling/discussion Z71.89 V65.49 ADVANCE CARE PLANNING FIRST 30 MINS   12. Screening for alcoholism Z13.89 V79.1 [place] ANNUAL ALCOHOL SCREEN 15 MIN   13. Screening for depression Z13.89 V79.0 [facility] 68   14. Screen for colon cancer Z12.11 V76.51    15. Screening for diabetes mellitus Z13.1 V77.1    16. Screening for ischemic heart disease Z13.6 V81.0    17. Encounter for screening mammogram for malignant neoplasm of breast Z12.31 V76.12 SARWAT MAMMO BI SCREENING INCL CAD   25. Disorder of bone and cartilage M89.9 733.90 DEXA BONE DENSITY STUDY AXIAL    M94.9     19. Body mass index 30.0-30.9, adult Z68.30 V85.30      current treatment plan is effective, no change in therapy  lab results and schedule of future lab studies reviewed with patient  reviewed diet, exercise and weight control. End of life discussion undertaken. Will work on amd  Colonoscopy beyond age? ?  Mammo to be scheduled  DEXA to be scheduled 2018 or later

## 2018-05-07 NOTE — PROGRESS NOTES
1. Have you been to the ER, urgent care clinic or hospitalized since your last visit? NO.     2. Have you seen or consulted any other health care providers outside of the [facility] since your last visit (Include any pap smears or colon screening)? NO      Do you have an Advanced Directive? NO    Would you like information on Advanced Directives? NO    Chief Complaint   Patient presents with    Thyroid Problem     6 month follow up with labs    Abdominal Pain     pt stated having sharp pain across top abdomen that varies throught out day.  onset 1 week

## 2018-05-07 NOTE — PROGRESS NOTES
76 y.o. WHITE OR  female who presents for evaluation. At the last visit in March, we started her back on the alia as the bp went up. Better controlled now and no sfx to report    She came off the lipitor 'because of a rash in her left cheek a week ago'. She had seen derm years ago for something similar and they postulated that could be the reason but it didn't turn out to be as she's been on it since then no problems. Denies any cardiovascular complaints. Not much exercise as she has been helping her  who injured his shoulder    The last 2 weeks, she's been having intermittent midepig/BUQ discomfrot. No associated n/v/d/bleeding, no fever, no dysuria, hematuria, ureteral colic. It's episodic, sometimes happens at night, not related to eating.       Reports no sx referable to the thyroid    LAST MEDICARE WELLNESS EXAM: 4/1/14, 4/8/14, 4/14/16, 4/25/17, 5/7/18    Past Medical History:   Diagnosis Date    Colon adenomas     2003 Dr Jamel Michelle; 4/13 Dr You CROW (degenerative joint disease)     Dyslipidemia     FHx: heart disease     Gastritis 2006    on EGD Dr. Kait Ramos, neg sprue    Hypertension     Hypothyroidism 2007    Dr. Mariano Topete Hypovitaminosis D     Lung nodule 06/2016    6 mm RLL (6/16); no change (10/17)    Obesity     peak weight 187 lbs, bmi 33.1 from 4/13; IF 3/18    Osteopenia FRAX 4/12 9.7 and 1.4     DEXA t score 0.6 spine, -1 hip (12/09);  -0.4 spine, -1.3 hip (4/12); -1.2 wrist, -1.6 hip w FRAX 11/2.2 (4/14); -1.0 wrist, -1.6 hip w FRAX 12/2.6 (4/16)    Psoriasis 2003    on bx Dr. Blas White hearing loss     left side Dr Tessa Epstein Venous insufficiency 2003    on dopplers    Zoster 05/2016    left T11-12; mild postherpetic neuralgia     Past Surgical History:   Procedure Laterality Date    CARDIAC SURG PROCEDURE UNLIST  09/2016    NST negative    CHEST SURGERY PROCEDURE UNLISTED  06/2016    CTA neg PE, 6 mm subpleural nodule    HX CARPAL Almaz Delatorre left    HX COLONOSCOPY      benign polyp 5/08 Dr Oates Flatten;  4/13 negative Dr. Constantine Davis  2007    neg MRA/MRI head in [address] [place] [address]  9/10    CT head negative    VASCULAR SURGERY PROCEDURE UNLIST  2007    neg AAA [facility]     Social History     Social History    Marital status:      Spouse name: N/A    Number of children: N/A    Years of education: N/A     Occupational History    ret office mgr      Social History Main Topics    Smoking status: Never Smoker    Smokeless tobacco: Never Used    Alcohol use Yes      Comment: occasionally    Drug use: No    Sexual activity: Not on file     Other Topics Concern    Not on file     Social History Narrative     Allergies   Allergen Reactions    Atenolol Other (comments)     Felt poorly    Cozaar [Losartan] Other (comments)     Headaches    Norvasc [Amlodipine] Other (comments)     Felt poorly       Current Outpatient Prescriptions   Medication Sig    lisinopril (PRINIVIL, ZESTRIL) 10 mg tablet Take 1 Tab by mouth daily.  diazePAM (VALIUM) 10 mg tablet Take 1 Tab by mouth every twelve (12) hours as needed for Anxiety. Max Daily Amount: 20 mg.  Levothyroxine 75 mcg cap Take one tablet by mouth every other day before breakfast. Alternates with 50mcg    ergocalciferol (VITAMIN D2) 50,000 unit capsule Take 1 Cap by mouth every seven (7) days.  lidocaine (LIDODERM) 5 % Apply patch to the affected area for 12 hours a day and remove for 12 hours a day.  levothyroxine (SYNTHROID) 50 mcg tablet Take 1 Tab by mouth Daily (before breakfast).  aspirin 81 mg chewable tablet Take 1 Tab by mouth daily.  methylPREDNISolone (MEDROL DOSEPACK) 4 mg tablet Per dose pack instructions    ibuprofen (MOTRIN) 800 mg tablet Take 1 Tab by mouth three (3) times daily (with meals).     atorvastatin (LIPITOR) 40 mg tablet Take 1 Tab by mouth daily.  ezetimibe (ZETIA) 10 mg tablet TAKE 1 TABLET BY MOUTH DAILY     No current facility-administered medications for this visit. REVIEW OF SYSTEMS:  Dr. Tapan Dove 2014, mammo 4/16, DEXA 4/16, colo 4/13 Dr Loretta Valiente, sees Dr Hodges Hence  no vision change or eye pain  Oral  no mouth pain, tongue or tooth problems  Ears  no hearing loss, ear pain, fullness  Throat  no swallowing problems  Cardiac  no CP, PND, orthopnea, edema, palpitations or syncope  Chest  no breast masses  Resp  no wheezing, chronic coughing, dyspnea  GI  no heartburn, nausea, vomiting, change in bowel habits, bleeding, hemorrhoids  Urinary  no dysuria, hematuria, flank pain, urgency, frequency  Neuro  no focal weakness, numbness, paresthesias, incoordination, ataxia, involuntary movements    Visit Vitals    /72 (BP 1 Location: Right arm, BP Patient Position: Sitting)    Pulse 80    Temp 97.8 °F (36.6 °C) (Oral)    Ht 5' 3\" (1.6 m)    Wt 173 lb (78.5 kg)    SpO2 98%    BMI 30.65 kg/m2   A&O x3  Affect is appropriate. Mood stable  No apparent distress  Anicteric, no JVD, adenopathy or thyromegaly. No carotid bruits or radiated murmur  Lungs clear to auscultation, no wheezes or rales  Heart showed regular rate and rhythm. No murmur, rubs, gallops  Abdomen soft nondistended, no hepatosplenomegaly or masses. Minimal tenderness without rebound/guarding in the BUQ  Extremities without edema.   Pulses 1-2+ symmetrically    LABS  From 3/10 showed                         vit d 38.0  From 9/10 showed                            wbc 7.2, hb 14.3, plt 158  From 3/12 showed   gluc 88, cr 0.87, gfr 66, alt 15, tsh 1.04, ldl-p 1515  From 9/12 showed                                                    tsh 1.69, ldl-p 1469, chol 152, tg 165, hdl 42, ldl-c 77  From 3/13 showed   gluc 83, cr 0.91, gfr 62, alt 10,     ldl-p 1274, chol 124, tg 137, hdl 37, ldl-c 60,    wbc 7.0, hb 14.3, plt 164  From 9/13 showed chol 147, tg 139, hdl 39, ldl-c 80  From 3/14 showed   gluc 89, cr 1.02, gfr 53, alt<5,  tsh 1.66,         chol 148, tg 132, hdl 36, ldl-c 86,    wbc 6.7, hb 14.4, plt 179, vit d 45.4   From 10/14 showed                chol 137, tg 117, hdl 40, ldl-c 74  From 4/15 showed   gluc 91, cr 1.24, gfr 42, alt 30,             wbc 6.8, hb 14.8, plt 176, tsh 0.64  From 10/15 showed gluc 93, cr 1.13, gfr 47,            tsh 0.32,          chol 139, tg 131, hdl 36, ldl-c 77   From 4/16 showed   gluc 86, cr 1.00, gfr 54, alt 24,            chol 142, tg 137, hdl 41, ldl-c 74,   wbc 6.4, hb 13.9, plt 203, ua pro 30  From 6/16 showed   gluc 97, cr 1.20,             wbc 7.3, hb 14.1, plt 216, ck/trop neg  From 10/16 showed gluc 78, cr 1.03, gfr 52, alt 21,            chol 145, tg 151, hdl 43, ldl-c 72,             tsh 1.21  From 4/17 showed                         tsh 2.57, ft4 1.10  From 10/17 showed gluc 79, cr 1.03, gfr 52, alt 27,            chol 199, tg 161, hdl 45, ldl-c 122, wbc 6.3, hb 14.7, plt 208    Results for orders placed or performed during the hospital encounter of 05/02/18   VITAMIN D, 25 HYDROXY   Result Value Ref Range    Vitamin D 25-Hydroxy 57.8 30 - 100 ng/mL   LIPID PANEL   Result Value Ref Range    LIPID PROFILE          Cholesterol, total 300 (H) <200 MG/DL    Triglyceride 196 (H) <150 MG/DL    HDL Cholesterol 40 40 - 60 MG/DL    LDL, calculated 220.8 (H) 0 - 100 MG/DL    VLDL, calculated 39.2 MG/DL    CHOL/HDL Ratio 7.5 (H) 0 - 5.0     T4, FREE   Result Value Ref Range    T4, Free 1.1 0.7 - 1.5 NG/DL   TSH 3RD GENERATION   Result Value Ref Range    TSH 5.23 (H) 0.36 - 3.74 uIU/mL     Patient Active Problem List   Diagnosis Code    Colon adenomas 2003, last 2014 Dr Krishna Segura D12.6    Hypovitaminosis D E55.9    Venous insufficiency dopplers 2003 I87.2    Osteopenia FRAX 4/14 11 and 2.2 M85.80    Dyslipidemia E78.5    Hypothyroidism due to acquired atrophy of thyroid E03.4    Arthritis, degenerative M19.90    Obesity (BMI 30.0-34. 9) E66.9    Advance directive discussed with patient Z70.80    Primary hypertension I10    Spondylolisthesis, lumbar region M43.16    Spondylosis of lumbar region without myelopathy or radiculopathy M47.816    Scoliosis of lumbar spine M41.9    DDD (degenerative disc disease), lumbar M51.36     ASSESSMENT AND PLAN:  1. General.  F/U Dr. Mary Alice Escalera  2. Hypertension. Continue current regimen. 3.  Hypovit D.  dec vit d to every other week dosing  4. Colon adenomas. F/U colo 2018? Fiber  5. Overweight. Lifestyle and dietary measures. Portion control reiterated. 6.  Lung nodule. F/U CT 10/18  7. Dyslipidemia. Restart lipitor   8. Hypothyroidism. Recheck next draw and go from there  9. Osteopenia. Wt bearing exercise, ca/D, DEXA 2018 or later  10. Abd pain. Etiology unclear. Check labs, empiric ppi, CT or Us pending results        RTC 8/18    Above conditions discussed at length and patient vocalized understanding.   All questions answered to patient satisfaction

## 2018-05-07 NOTE — PATIENT INSTRUCTIONS

## 2018-05-13 ENCOUNTER — TELEPHONE (OUTPATIENT)
Dept: INTERNAL MEDICINE CLINIC | Age: 78
End: 2018-05-13

## 2018-05-13 DIAGNOSIS — R10.9 ABDOMINAL PAIN, UNSPECIFIED ABDOMINAL LOCATION: Primary | ICD-10-CM

## 2018-05-14 NOTE — TELEPHONE ENCOUNTER
Pt returned call, she is still having abdominal pain so she will proceed with CT scan. It is scheduled for  5/18/18. She is aware that labs were normal.  Verbalized understanding.

## 2018-05-14 NOTE — TELEPHONE ENCOUNTER
I sent , [facility], a message asking her to cancel this CT scan. When I spoke with patient earlier and asked though, she said she was still having the abdominal pain and wanted it done, I guess she changed her mind because the pain isn't consistent.

## 2018-05-14 NOTE — TELEPHONE ENCOUNTER
Pt calling, says she doesn't think she needs to have the cat scan right now. She will let RD know if she changes her mind. Says she hasn't really had any more pain.

## 2018-05-15 NOTE — TELEPHONE ENCOUNTER
Pt. Calling asking to speak to [facility]. Says she thinks she needs to do the CT scan now for Friday if it hasn't already been canceled.

## 2018-05-16 ENCOUNTER — OFFICE VISIT (OUTPATIENT)
Dept: ORTHOPEDIC SURGERY | Age: 78
End: 2018-05-16

## 2018-05-16 VITALS
HEART RATE: 73 BPM | BODY MASS INDEX: 30.83 KG/M2 | OXYGEN SATURATION: 95 % | SYSTOLIC BLOOD PRESSURE: 111 MMHG | HEIGHT: 63 IN | DIASTOLIC BLOOD PRESSURE: 68 MMHG | WEIGHT: 174 LBS

## 2018-05-16 DIAGNOSIS — M47.816 SPONDYLOSIS OF LUMBAR REGION WITHOUT MYELOPATHY OR RADICULOPATHY: ICD-10-CM

## 2018-05-16 DIAGNOSIS — M70.62 TROCHANTERIC BURSITIS, LEFT HIP: Primary | ICD-10-CM

## 2018-05-16 DIAGNOSIS — M43.16 SPONDYLOLISTHESIS, LUMBAR REGION: ICD-10-CM

## 2018-05-16 DIAGNOSIS — M51.36 DDD (DEGENERATIVE DISC DISEASE), LUMBAR: ICD-10-CM

## 2018-05-16 DIAGNOSIS — M41.9 SCOLIOSIS OF LUMBAR SPINE, UNSPECIFIED SCOLIOSIS TYPE: ICD-10-CM

## 2018-05-16 NOTE — TELEPHONE ENCOUNTER
I called and spoke with patient and she has changed her mind, again, and she wants to proceed with the CT scan as originally planned. I let her know that the CT for Friday had already been cancelled and I gave her the # to the Дмитрий Lozano, our  so that she can call and reschedule this.

## 2018-05-16 NOTE — PROGRESS NOTES
[facility]  [address], [place]   Phone: [phone]  Fax: [phone]        PROGRESS NOTE      HISTORY OF PRESENT ILLNESS:  The patient is a 76 y.o. female and was seen today for follow up of left-sided low back pain without radicular symptoms ongoing since late-March 2018. Denies specific injury/trauma. Lying on her left side exacerbates her pain. Pt reports left hip numbness d/t previous shingles. Pt has tried Ibuprofen without relief. Pt denies h/o lumbar spinal surgery/blocks. She has not attended physical therapy/chiropractor. Pt denies change in bowel or bladder habits. Pt denies fever, weight loss, or skin changes. Preliminary reading of lumbar plain films revealed mild-to-moderate scoliosis, apex L1 convex to the right. Grade I retrolisthesis of L2 on L3. Disc space narrowing noted throughout all levels of the lumbar spine; severity is difficult to assess due to her scoliosis. No acute pathology identified. The patient is RHD. At her last clinical appointment, the patient presented for acute onset of left-sided low back pain without radicular symptoms. She was neurologically intact. We discussed multiple treatment options. She wished to proceed conservatively. I started her on Medrol Dosepak. I gave her a prescription for Ibuprofen 800 mg TID x 2 weeks following completion of the Medrol Dosepak. The patient returns today with left hip bursitis. She rates pain 5/10, a slight decrease since her last visit (6/10). Pt states she gained no relief with MDP.  reviewed. Body mass index is 30.82 kg/(m^2).       PCP: Nyla Dietz MD      Past Medical History:   Diagnosis Date    Colon adenomas     2003 Dr Jonathan Pacheco; 4/13 Dr Namrata CROW (degenerative joint disease)     Dyslipidemia     FHx: heart disease     Gastritis 2006    on EGD Dr. Jennifer Molina, neg sprue    Hypertension     Hypothyroidism 2007    Dr. Kirt Meyers Hypovitaminosis D     Lung nodule 06/2016    6 mm RLL (6/16); no change (10/17)    Obesity     peak weight 187 lbs, bmi 33.1 from 4/13; IF 3/18    Osteopenia FRAX 4/12 9.7 and 1.4     DEXA t score 0.6 spine, -1 hip (12/09);  -0.4 spine, -1.3 hip (4/12); -1.2 wrist, -1.6 hip w FRAX 11/2.2 (4/14); -1.0 wrist, -1.6 hip w FRAX 12/2.6 (4/16)    Psoriasis 2003    on bx Dr. Zakia Whitehead    Sensorineural hearing loss     left side Dr Eric Zambrano Venous insufficiency 2003    on dopplers    Zoster 05/2016    left T11-12; mild postherpetic neuralgia        Social History     Social History    Marital status:      Spouse name: N/A    Number of children: N/A    Years of education: N/A     Occupational History    ret office mgr      Social History Main Topics    Smoking status: Never Smoker    Smokeless tobacco: Never Used    Alcohol use Yes      Comment: occasionally    Drug use: No    Sexual activity: Not on file     Other Topics Concern    Not on file     Social History Narrative       Current Outpatient Prescriptions   Medication Sig Dispense Refill    omeprazole (PRILOSEC) 40 mg capsule Take 1 Cap by mouth daily. 90 Cap 1    ibuprofen (MOTRIN) 800 mg tablet Take 1 Tab by mouth three (3) times daily (with meals). 45 Tab 0    lisinopril (PRINIVIL, ZESTRIL) 10 mg tablet Take 1 Tab by mouth daily. 90 Tab 3    atorvastatin (LIPITOR) 40 mg tablet Take 1 Tab by mouth daily. 90 Tab 3    diazePAM (VALIUM) 10 mg tablet Take 1 Tab by mouth every twelve (12) hours as needed for Anxiety. Max Daily Amount: 20 mg. 60 Tab 1    Levothyroxine 75 mcg cap Take one tablet by mouth every other day before breakfast. Alternates with 50mcg 45 Cap 3    ergocalciferol (VITAMIN D2) 50,000 unit capsule Take 1 Cap by mouth every seven (7) days. 12 Cap 1    lidocaine (LIDODERM) 5 % Apply patch to the affected area for 12 hours a day and remove for 12 hours a day.  30 Each 5    levothyroxine (SYNTHROID) 50 mcg tablet Take 1 Tab by mouth Daily (before breakfast). 90 Tab 3    ezetimibe (ZETIA) 10 mg tablet TAKE 1 TABLET BY MOUTH DAILY 90 Tab 3    aspirin 81 mg chewable tablet Take 1 Tab by mouth daily. 90 Tab 3       Allergies   Allergen Reactions    Atenolol Other (comments)     Felt poorly    Cozaar [Losartan] Other (comments)     Headaches    Norvasc [Amlodipine] Other (comments)     Felt poorly            PHYSICAL EXAMINATION    Visit Vitals    /68    Pulse 73    Ht 5' 3\" (1.6 m)    Wt 78.9 kg (174 lb)    SpO2 95%    BMI 30.82 kg/m2       CONSTITUTIONAL: NAD, A&O x 3  SENSATION: Increased tenderness to palpation of the left trochanteric bursa joint. Intact to light touch throughout  NEURO: Erlin's is negative bilaterally. RANGE OF MOTION: The patient has full passive range of motion in all four extremities. MOTOR:  Straight Leg Raise: Negative, bilateral               Hip Flex Knee Ext Knee Flex Ankle DF GTE Ankle PF Tone   Right +4/5 +4/5 +4/5 +4/5 +4/5 +4/5 +4/5   Left +4/5 +4/5 +4/5 +4/5 +4/5 +4/5 +4/5         ASSESSMENT   Diagnoses and all orders for this visit:    1. Trochanteric bursitis, left hip    2. Spondylolisthesis, lumbar region    3. Spondylosis of lumbar region without myelopathy or radiculopathy    4. Scoliosis of lumbar spine, unspecified scoliosis type    5. DDD (degenerative disc disease), lumbar          IMPRESSION AND PLAN:  Clinically, she demonstrates left trochanteric bursitis. She declines treatment. I will see the patient back on an as-needed basis. Written by Jessica Miguel, as dictated by Chi Kulkarni MD  I examined the patient, reviewed and agree with the note.

## 2018-05-16 NOTE — MR AVS SNAPSHOT
[facility] 
 
 
 [address] [facility] 
[phone] Patient: Caro Beck MRN: [id number] HDE:[id number] Visit Information Date & Time Provider Department Dept. Phone Encounter #  
 5/16/2018  1:45 PM Miguel Del Cid  [facility], [address] [facility] [place] [phone] [id number] Follow-up Instructions Return if symptoms worsen or fail to improve. Upcoming Health Maintenance Date Due  
 GLAUCOMA SCREENING Q2Y 3/1/2018 Influenza Age 5 to Adult 8/1/2018 MEDICARE YEARLY EXAM 5/8/2019 DTaP/Tdap/Td series (2 - Td) 5/10/2027 Allergies as of 5/16/2018  Review Complete On: 5/16/2018 By: Miguel Del Cid MD  
  
 Severity Noted Reaction Type Reactions Atenolol    Other (comments) [place] poorly Cozaar [Losartan]    Other (comments) Headaches Norvasc [Amlodipine]    Other (comments) Felt poorly Current Immunizations  Reviewed on 4/8/2015 Name Date Hepatitis A Vaccine 9/1/2008 Influenza High Dose Vaccine PF 10/17/2016 Pneumococcal Conjugate (PCV-13) 5/10/2017 TD Vaccine 9/1/2008 Tdap 5/10/2017 Typhoid Vaccine 9/1/2008 Yellow Fever Vaccine 9/1/2008 ZZZ-RETIRED (DO NOT USE) Pneumococcal Vaccine (Unspecified Type) 9/1/2008 Zoster Vaccine, Live 1/1/2008 Not reviewed this visit You Were Diagnosed With   
  
 Codes Comments Trochanteric bursitis, left hip    -  Primary ICD-10-CM: M70.62 ICD-9-CM: 726.5 Spondylolisthesis, lumbar region     ICD-10-CM: M43.16 
ICD-9-CM: 738.4 Spondylosis of lumbar region without myelopathy or radiculopathy     ICD-10-CM: M47.816 ICD-9-CM: 721.3 Scoliosis of lumbar spine, unspecified scoliosis type     ICD-10-CM: M41.9 ICD-9-CM: 737.30   
 DDD (degenerative disc disease), lumbar     ICD-10-CM: M51.36 
ICD-9-CM: 722.52 Vitals BP Pulse Height(growth percentile) Weight(growth percentile) SpO2 BMI 111/68 73 5' 3\" (1.6 m) 174 lb (78.9 kg) 95% 30.82 kg/m2 OB Status Smoking Status Postmenopausal Never Smoker BMI and BSA Data Body Mass Index Body Surface Area  
 30.82 kg/m 2 1.87 m 2 Preferred Pharmacy Pharmacy Name Phone [facility]te [address] . [facility] [address], [facility]. [facility] [address] [phone] Your Updated Medication List  
  
   
This list is accurate as of 5/16/18  2:09 PM.  Always use your most recent med list.  
  
  
  
  
 aspirin 81 mg chewable tablet Take 1 Tab by mouth daily. atorvastatin 40 mg tablet Commonly known as:  LIPITOR Take 1 Tab by mouth daily. diazePAM 10 mg tablet Commonly known as:  VALIUM Take 1 Tab by mouth every twelve (12) hours as needed for Anxiety. Max Daily Amount: 20 mg.  
  
 ergocalciferol 50,000 unit capsule Commonly known as:  VITAMIN D2 Take 1 Cap by mouth every seven (7) days. ezetimibe 10 mg tablet Commonly known as:  Nasra Rupal TAKE 1 TABLET BY MOUTH DAILY  
  
 ibuprofen 800 mg tablet Commonly known as:  MOTRIN Take 1 Tab by mouth three (3) times daily (with meals). * levothyroxine 50 mcg tablet Commonly known as:  SYNTHROID Take 1 Tab by mouth Daily (before breakfast). * Levothyroxine 75 mcg Cap Take one tablet by mouth every other day before breakfast. Alternates with 50mcg  
  
 lidocaine 5 % Commonly known as:  Arnie Constant Apply patch to the affected area for 12 hours a day and remove for 12 hours a day. lisinopril 10 mg tablet Commonly known as:  Deandra Patrice Take 1 Tab by mouth daily. omeprazole 40 mg capsule Commonly known as:  PRILOSEC Take 1 Cap by mouth daily. * Notice: This list has 2 medication(s) that are the same as other medications prescribed for you. Read the directions carefully, and ask your doctor or other care provider to review them with you. Follow-up Instructions Return if symptoms worsen or fail to improve. To-Do List   
 05/22/2018 8:30 AM  
  Appointment with [facility] SARWAT RM 1 at [address] [place] [address] ([phone]) OUTSIDE FILMS  - Any outside films related to the study being scheduled should be brought with you on the day of the exam.  If this cannot be done there may be a delay in the reading of the study. MEDICATIONS  - Patient must bring a complete list of all medications currently taking to include prescriptions, over-the-counter meds, herbals, vitamins & any dietary supplements  GENERAL INSTRUCTIONS  - On the day of your exam do not use any bath powder, deodorant or lotions on the armpit area. -Tenderness of breasts may cause an increase of discomfort during procedure. If you are experiencing breast tenderness on the day of your appointment and would like to reschedule, please call [phone].  
  
 05/22/2018 9:00 AM  
  Appointment with [facility] BONE DEXA RM 1 at [facility] RAD BONE DENSITY ([phone]) OUTSIDE FILMS  - Any outside films related to the study being scheduled should be brought with you on the day of the exam.  If this cannot be done there may be a delay in the reading of the study. MEDICATIONS  - Patient must bring a complete list of all medications currently taking to include prescriptions, over-the-counter meds, herbals, vitamins & any dietary supplements - Patient must discontinue use of calcium, vitamins, or calcium supplements including antacids (calcium based) 24 hours before scan. GENERAL INSTRUCTIONS  - [facility] cannot accommodate patients on stretchers, patient must be able to walk into the room and be able to sit up for a portion of the exam.  
  
  
Introducing [facility]! Dear Tristin Yousif: Thank you for requesting a [employer] account. Our records indicate that you already have an active [employer] account. You can access your account anytime at [email or web address] Did you know that you can access your hospital and ER discharge instructions at any time in [employer]? You can also review all of your test results from your hospital stay or ER visit. Additional Information If you have questions, please visit the Frequently Asked Questions section of the [employer] website at [email or web address]/. Remember, [employer] is NOT to be used for urgent needs. For medical emergencies, dial 911. Now available from your iPhone and Android! Please provide this summary of care documentation to your next provider. Your primary care clinician is listed as Akil Gillis. If you have any questions after today's visit, please call [phone].

## 2018-05-22 ENCOUNTER — HOSPITAL ENCOUNTER (OUTPATIENT)
Dept: MAMMOGRAPHY | Age: 78
Discharge: HOME | End: 2018-05-22
Attending: INTERNAL MEDICINE
Payer: MEDICARE

## 2018-05-22 ENCOUNTER — HOSPITAL ENCOUNTER (OUTPATIENT)
Dept: BONE DENSITY | Age: 78
Discharge: HOME | End: 2018-05-22
Attending: INTERNAL MEDICINE
Payer: MEDICARE

## 2018-05-22 DIAGNOSIS — M85.80 OSTEOPENIA, UNSPECIFIED LOCATION: ICD-10-CM

## 2018-05-22 DIAGNOSIS — Z12.31 ENCOUNTER FOR SCREENING MAMMOGRAM FOR MALIGNANT NEOPLASM OF BREAST: ICD-10-CM

## 2018-05-22 DIAGNOSIS — M89.9 DISORDER OF BONE AND CARTILAGE: ICD-10-CM

## 2018-05-22 DIAGNOSIS — M94.9 DISORDER OF BONE AND CARTILAGE: ICD-10-CM

## 2018-05-22 PROCEDURE — 77063 BREAST TOMOSYNTHESIS BI: CPT

## 2018-05-22 PROCEDURE — 77080 DXA BONE DENSITY AXIAL: CPT

## 2018-05-31 ENCOUNTER — HOSPITAL ENCOUNTER (OUTPATIENT)
Dept: CT IMAGING | Age: 78
Discharge: HOME | End: 2018-05-31
Attending: INTERNAL MEDICINE
Payer: MEDICARE

## 2018-05-31 DIAGNOSIS — R10.9 ABDOMINAL PAIN, UNSPECIFIED ABDOMINAL LOCATION: ICD-10-CM

## 2018-05-31 LAB — CREAT UR-MCNC: 1.2 MG/DL (ref 0.6–1.3)

## 2018-05-31 PROCEDURE — 74011636320 HC RX REV CODE- 636/320: Performed by: INTERNAL MEDICINE

## 2018-05-31 PROCEDURE — 74177 CT ABD & PELVIS W/CONTRAST: CPT

## 2018-05-31 PROCEDURE — 82565 ASSAY OF CREATININE: CPT

## 2018-05-31 PROCEDURE — 74178 CT ABD&PLV WO CNTR FLWD CNTR: CPT

## 2018-05-31 RX ADMIN — IOPAMIDOL 80 ML: 612 INJECTION, SOLUTION INTRAVENOUS at 09:16

## 2018-06-03 ENCOUNTER — TELEPHONE (OUTPATIENT)
Dept: INTERNAL MEDICINE CLINIC | Age: 78
End: 2018-06-03

## 2018-06-03 DIAGNOSIS — I71.40 ABDOMINAL AORTIC ANEURYSM (AAA) WITHOUT RUPTURE: ICD-10-CM

## 2018-06-03 DIAGNOSIS — R93.5 ABNORMAL CT OF THE ABDOMEN: Primary | ICD-10-CM

## 2018-06-03 NOTE — TELEPHONE ENCOUNTER
plscall    IMPRESSION:     1. No acute diagnostic abnormality identified. 2. Persistent moderate common bile duct dilatation with moderate ductal wall  thickening and enhancement at the distal portion. 4 mm hyperdense nodule also  seen at the posterior lumen of the mid common bile duct. Recommend further  evaluation by MRCP for further evaluation and to completely exclude potential  neoplastic process. 3. Tiny hypodense nodule in the dome of the right lobe appears stable since the  prior CT in 2016 as probably tiny cyst.    4. Fusiform infrarenal AAA with a large asymmetric intramural thrombosis. 5. Solid nodule at right middle lobe, stable over 2 years interval as likely a  noncalcified granuloma.         Radiology rec mrcp - ordered

## 2018-06-07 ENCOUNTER — HOSPITAL ENCOUNTER (OUTPATIENT)
Age: 78
Discharge: HOME | End: 2018-06-07
Attending: INTERNAL MEDICINE
Payer: MEDICARE

## 2018-06-07 DIAGNOSIS — R93.5 ABNORMAL CT OF THE ABDOMEN: ICD-10-CM

## 2018-06-07 PROCEDURE — 74181 MRI ABDOMEN W/O CONTRAST: CPT

## 2018-06-08 ENCOUNTER — TELEPHONE (OUTPATIENT)
Dept: INTERNAL MEDICINE CLINIC | Age: 78
End: 2018-06-08

## 2018-06-08 DIAGNOSIS — K80.50 COMMON BILE DUCT STONE: ICD-10-CM

## 2018-06-08 DIAGNOSIS — K86.2 PANCREAS CYST: Primary | ICD-10-CM

## 2018-06-08 NOTE — TELEPHONE ENCOUNTER
pls call    Impression:    1.  Chronic CBD dilation since 2016.  2. Adherent calculus versus polyp in mid CBD adjacent to but not obstructing  cystic duct insertion. Stable to 5/31/2018 exam. The Distinction could be made  with a with and without contrast CT or liver protocol exam to assess for  enhancement. 3. There is additional wall enhancement versus calcification in the lower CBD on  the prior CT. Current MRI appearance is not definitive for biliary malignancy at  this time, with a nonspecific mildly lobular contour here. 4. Subcentimeter pancreatic cyst favoring IPMN without malignant features. Chronic evidence of previous pancreatitis. 5. Subcentimeter renal cysts. 6. Stable AAA. 7. Scoliosis and degenerative disc disease. Possible stone or polyp in common bile duct?   Not sure if this is causing sx  Also possible panc cyst?  Unclear if any of there are causing her sx    Suggest opinion gastro dr Sincere Dennis - schedule pls

## 2018-06-13 ENCOUNTER — TELEPHONE (OUTPATIENT)
Dept: INTERNAL MEDICINE CLINIC | Age: 78
End: 2018-06-13

## 2018-06-13 NOTE — TELEPHONE ENCOUNTER
----- Message from LES Cabral sent at 6/13/2018  9:37 AM EDT -----  Regarding: Test Results Question  Contact: [phone]    Could you post a copy of my MRI done recently. Also, need to inquire about aneurysm on this test and is there anything I need to be cautious about, etc. or nothing to worry about. I have an appointment in July with vascular office.

## 2018-06-13 NOTE — TELEPHONE ENCOUNTER
Results should be on cc available for viewing on her home computer  Mail hard copy of last CT and mri pls

## 2018-06-14 NOTE — TELEPHONE ENCOUNTER
No, the aneurysm is small yet and won't need anything done anytime soon  Just needs to establish with the specialist so they can follow  Not much to do except keep the bp down really

## 2018-06-14 NOTE — TELEPHONE ENCOUNTER
RD,    Can you please address the part of the message that asks about her needing to worry about the aneurysm or was patient notified? Hard copies mailed.

## 2018-06-22 DIAGNOSIS — B02.29 POSTHERPETIC NEURALGIA: ICD-10-CM

## 2018-06-22 RX ORDER — EZETIMIBE 10 MG/1
TABLET ORAL
Qty: 90 TAB | Refills: 3 | Status: SHIPPED | OUTPATIENT
Start: 2018-06-22 | End: 2018-08-18

## 2018-06-22 RX ORDER — LIDOCAINE 50 MG/G
PATCH TOPICAL
Qty: 30 EACH | Refills: 5 | Status: SHIPPED | OUTPATIENT
Start: 2018-06-22 | End: 2020-01-31 | Stop reason: SDUPTHER

## 2018-06-22 NOTE — TELEPHONE ENCOUNTER
From: Shun Cabral  To: Nasima Abbott MD  Sent: 6/22/2018 10:44 AM EDT  Subject: Medication Renewal Request    Original authorizing provider: MD Mina Galvez Yimi Cabral would like a refill of the following medications:  ezetimibe (ZETIA) 10 mg tablet Nasima Abbott MD]    Preferred pharmacy: [address] [place] [address] Road:

## 2018-06-22 NOTE — TELEPHONE ENCOUNTER
From: Bowen Cabral  To: Heydi Cristobal MD  Sent: 6/22/2018 11:18 AM EDT  Subject: Medication Renewal Request    Original authorizing provider: MD Debbie Lindsay would like a refill of the following medications:  lidocaine (LIDODERM) 5 % Heydi Cristobal MD]    Preferred pharmacy: [address] [place] [address] Road:

## 2018-07-05 ENCOUNTER — HOSPITAL ENCOUNTER (OUTPATIENT)
Dept: GENERAL RADIOLOGY | Age: 78
Discharge: HOME | End: 2018-07-05
Payer: MEDICARE

## 2018-07-05 ENCOUNTER — HOSPITAL ENCOUNTER (OUTPATIENT)
Dept: LAB | Age: 78
Discharge: HOME | End: 2018-07-05
Payer: MEDICARE

## 2018-07-05 ENCOUNTER — OFFICE VISIT (OUTPATIENT)
Dept: VASCULAR SURGERY | Age: 78
End: 2018-07-05

## 2018-07-05 VITALS
BODY MASS INDEX: 30.48 KG/M2 | RESPIRATION RATE: 16 BRPM | WEIGHT: 172 LBS | HEIGHT: 63 IN | HEART RATE: 66 BPM | SYSTOLIC BLOOD PRESSURE: 138 MMHG | DIASTOLIC BLOOD PRESSURE: 70 MMHG

## 2018-07-05 DIAGNOSIS — Z01.818 PREOP TESTING: ICD-10-CM

## 2018-07-05 DIAGNOSIS — I10 HYPERTENSION, UNSPECIFIED TYPE: ICD-10-CM

## 2018-07-05 DIAGNOSIS — Z86.19 HISTORY OF SHINGLES: ICD-10-CM

## 2018-07-05 DIAGNOSIS — I71.40 ABDOMINAL AORTIC ANEURYSM (AAA) WITHOUT RUPTURE: Primary | ICD-10-CM

## 2018-07-05 DIAGNOSIS — K80.50: ICD-10-CM

## 2018-07-05 LAB
ANION GAP SERPL CALC-SCNC: 5 MMOL/L (ref 3–18)
ATRIAL RATE: 56 BPM
BASOPHILS # BLD: 0 K/UL (ref 0–0.06)
BASOPHILS NFR BLD: 1 % (ref 0–2)
BUN SERPL-MCNC: 20 MG/DL (ref 7–18)
BUN/CREAT SERPL: 18 (ref 12–20)
CALCIUM SERPL-MCNC: 9.4 MG/DL (ref 8.5–10.1)
CALCULATED P AXIS, ECG09: 41 DEGREES
CALCULATED R AXIS, ECG10: -37 DEGREES
CALCULATED T AXIS, ECG11: 23 DEGREES
CHLORIDE SERPL-SCNC: 108 MMOL/L (ref 100–108)
CO2 SERPL-SCNC: 29 MMOL/L (ref 21–32)
CREAT SERPL-MCNC: 1.09 MG/DL (ref 0.6–1.3)
DIAGNOSIS, 93000: NORMAL
DIFFERENTIAL METHOD BLD: ABNORMAL
EOSINOPHIL # BLD: 0.2 K/UL (ref 0–0.4)
EOSINOPHIL NFR BLD: 3 % (ref 0–5)
ERYTHROCYTE [DISTWIDTH] IN BLOOD BY AUTOMATED COUNT: 14.3 % (ref 11.6–14.5)
GLUCOSE SERPL-MCNC: 88 MG/DL (ref 74–99)
HCT VFR BLD AUTO: 41.2 % (ref 35–45)
HGB BLD-MCNC: 13.5 G/DL (ref 12–16)
LYMPHOCYTES # BLD: 1.8 K/UL (ref 0.9–3.6)
LYMPHOCYTES NFR BLD: 24 % (ref 21–52)
MCH RBC QN AUTO: 31 PG (ref 24–34)
MCHC RBC AUTO-ENTMCNC: 32.8 G/DL (ref 31–37)
MCV RBC AUTO: 94.7 FL (ref 74–97)
MONOCYTES # BLD: 0.6 K/UL (ref 0.05–1.2)
MONOCYTES NFR BLD: 8 % (ref 3–10)
NEUTS SEG # BLD: 4.9 K/UL (ref 1.8–8)
NEUTS SEG NFR BLD: 64 % (ref 40–73)
P-R INTERVAL, ECG05: 174 MS
PLATELET # BLD AUTO: 166 K/UL (ref 135–420)
PMV BLD AUTO: 12.1 FL (ref 9.2–11.8)
POTASSIUM SERPL-SCNC: 4.4 MMOL/L (ref 3.5–5.5)
Q-T INTERVAL, ECG07: 428 MS
QRS DURATION, ECG06: 106 MS
QTC CALCULATION (BEZET), ECG08: 413 MS
RBC # BLD AUTO: 4.35 M/UL (ref 4.2–5.3)
SODIUM SERPL-SCNC: 142 MMOL/L (ref 136–145)
VENTRICULAR RATE, ECG03: 56 BPM
WBC # BLD AUTO: 7.5 K/UL (ref 4.6–13.2)

## 2018-07-05 PROCEDURE — 80048 BASIC METABOLIC PNL TOTAL CA: CPT | Performed by: PHYSICIAN ASSISTANT

## 2018-07-05 PROCEDURE — 36415 COLL VENOUS BLD VENIPUNCTURE: CPT | Performed by: PHYSICIAN ASSISTANT

## 2018-07-05 PROCEDURE — 93005 ELECTROCARDIOGRAM TRACING: CPT

## 2018-07-05 PROCEDURE — 85025 COMPLETE CBC W/AUTO DIFF WBC: CPT | Performed by: PHYSICIAN ASSISTANT

## 2018-07-05 PROCEDURE — 71046 X-RAY EXAM CHEST 2 VIEWS: CPT

## 2018-07-05 NOTE — PROGRESS NOTES
[facility]    Chief Complaint   Patient presents with    New Patient    Abdominal Aortic Aneurysm       HPI    Monet Zamudio is a 76 y.o. female who presents to the office today at the request of her PCP for AAA. She states that several months ago she was having some upper abdominal discomfort and was sent for CT scan. She denies any associated nausea or vomiting. She denies any change in her bowel habits. She states that she was found to have common bile duct dilatation with concern for possible bilary stone and is scheduled for a procedure per GI on the 24th of this month. She states that her abdominal pain seems to have resolved at this time. She denies any current abdominal pain or back pain. She does have some left hip pain which she states is residual from her recent bout of shingles. CT scan also revealed incidental finding of fusiform infrarenal AAA measuring ~4.1x4.6cm with a large asymmetric intramural thrombosis. She does state that her father had an aortic aneurysm and one of her sisters also had some type of abdominal aneurysm. She denies any history of tobacco abuse. She has no personal history of diabetes. She denies any symptoms of claudication and no rest pain. She has no history of renal disease that she is aware of and no history of heart disease. Otherwise she states that she is doing fairly well and is in her usual state of health.     Past Medical History:   Diagnosis Date    Colon adenomas     2003 Dr Wilber Ospina; 4/13 Dr Flavio CROW (degenerative joint disease)     Dyslipidemia     FHx: heart disease     Gastritis 2006    on EGD Dr. Dilma Owens, neg sprue    Hypertension     Hypothyroidism 2007    Dr. Adrianne Molina Hypovitaminosis D     Lung nodule 06/2016    6 mm RLL (6/16); no change (10/17)    Obesity     peak weight 187 lbs, bmi 33.1 from 4/13; IF 3/18    Osteopenia FRAX 4/12 9.7 and 1.4     DEXA t score 0.6 spine, -1 hip (12/09);  -0.4 spine, -1.3 hip (4/12); -1.2 wrist, -1.6 hip w FRAX 11/2.2 (4/14); -1.0 wrist, -1.6 hip w FRAX 12/2.6 (4/16); -0.7 wrist, -1.6 hip (5/18)    Psoriasis 2003    on bx Dr. Adan Long hearing loss     left side Dr Derick Fung Venous insufficiency 2003    on dopplers    Zoster 05/2016    left T11-12; mild postherpetic neuralgia     Patient Active Problem List   Diagnosis Code    Colon adenomas 2003, last 2014 Dr Dolores Allen D12.6    Hypovitaminosis D E55.9    Venous insufficiency dopplers 2003 I87.2    Osteopenia FRAX 4/14 11 and 2.2 M85.80    Dyslipidemia E78.5    Hypothyroidism due to acquired atrophy of thyroid E03.4    Arthritis, degenerative M19.90    Obesity (BMI 30.0-34. 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 Valentin [employer] left    HX COLONOSCOPY      benign polyp 5/08 Dr Dorota Zavala;  4/13 negative Dr. Tika Mcfarlane  2007    neg MRA/MRI head [facility]    NEUROLOGICAL PROCEDURE UNLISTED  9/10    CT head negative    VASCULAR SURGERY PROCEDURE UNLIST  2007    [facility]     Current Outpatient Prescriptions   Medication Sig Dispense Refill    ezetimibe (ZETIA) 10 mg tablet TAKE 1 TABLET BY MOUTH DAILY 90 Tab 3    lidocaine (LIDODERM) 5 % Apply patch to the affected area for 12 hours a day and remove for 12 hours a day. 30 Each 5    omeprazole (PRILOSEC) 40 mg capsule Take 1 Cap by mouth daily.  90 Cap 1    ibuprofen (MOTRIN) 800 mg tablet Take 1 Tab by mouth three (3) times daily (with meals). 45 Tab 0    lisinopril (PRINIVIL, ZESTRIL) 10 mg tablet Take 1 Tab by mouth daily. 90 Tab 3    atorvastatin (LIPITOR) 40 mg tablet Take 1 Tab by mouth daily. 90 Tab 3    diazePAM (VALIUM) 10 mg tablet Take 1 Tab by mouth every twelve (12) hours as needed for Anxiety. Max Daily Amount: 20 mg. 60 Tab 1    Levothyroxine 75 mcg cap Take one tablet by mouth every other day before breakfast. Alternates with 50mcg 45 Cap 3    ergocalciferol (VITAMIN D2) 50,000 unit capsule Take 1 Cap by mouth every seven (7) days. 12 Cap 1    levothyroxine (SYNTHROID) 50 mcg tablet Take 1 Tab by mouth Daily (before breakfast). 90 Tab 3    aspirin 81 mg chewable tablet Take 1 Tab by mouth daily.  90 Tab 3     Allergies   Allergen Reactions    Atenolol Other (comments)     Felt poorly    Cozaar [Losartan] Other (comments)     Headaches    Norvasc [Amlodipine] Other (comments)     Felt poorly       Social History     Social History    Marital status:      Spouse name: N/A    Number of children: N/A    Years of education: N/A     Occupational History    ret office mgr      Social History Main Topics    Smoking status: Never Smoker    Smokeless tobacco: Never Used    Alcohol use Yes      Comment: occasionally    Drug use: No    Sexual activity: Not on file     Other Topics Concern    Not on file     Social History Narrative      Family History   Problem Relation Age of Onset    Breast Cancer Mother     Cancer Mother     Heart Disease Father        Review of Systems    Constitutional: negative   HEENT: negative   Respiratory: negative   Cardiovascular: negative   Gastrointestinal: negative   Genitourinary:negative   Hematologic/lymphatic: negative   Musculoskeletal:negative   Neurological: positive for postherpetic left hip pain, intermittent RUQ pain   Behavioral/Psych: negative   Endocrine: negative   Allergic/Immunologic: negative      Physical Exam:    Visit Vitals    BP 138/70 (BP 1 Location: Left arm, BP Patient Position: Sitting)    Pulse 66    Resp 16    Ht 5' 3\" (1.6 m)    Wt 172 lb (78 kg)    BMI 30.47 kg/m2      General: Well-appearing elderly female in no acute distress   HEENT: EOMI, no scleral icterus is noted. No carotid bruits are heard bilaterally   Cardiovascular: Regular rhythm normal S1-S2 no rubs murmurs or gallops   Pulmonary: No increased work or breathing is noted. Clear to auscultation bilaterally. No wheeze, rales or rhonchi. Abdomen: Positive bowel sounds, soft, nontender, nondistended. No rebound or guarding is noted. Palpable pulsatile abdominal mass is felt. Extremities: Warm and well perfused bilaterally. Pt has no BLE edema. Palpable femoral and pedal pulses. Neuro: Cranial nerves II through XII are grossly intact   Integument: No ulcerations are identified visibly      Impression and Plan:  Aldo Pickens is a 76 y.o. female with 4.6 cm AAA. Currently she is asymptomatic. She denies any abdominal pains or back pains at this time. Imaging was reviewed with patient in the office today. I also reviewed her CT scan with Dr. Jeannie Panchal and she has been recommended for aneurysm repair with EVAR. Risks versus benefits of the procedure were discussed with patient at length. Educational material was also given in the office today. All questions answered to the best my ability. Patient would like to proceed with aortic repair at this time. She states that she would like to have her aneurysm repaired prior to her GI procedure as she feels that this is the most dangerous of her problems. I did discuss with her that she is perfectly fine to wait until after the 24th for her repair as this is not emergent/urgent surgery; however she feels that she would rather have her EVAR first.  She will proceed today with preoperative testing and is scheduled for the 17th of this month for surgery. Plan was discussed.  Patient expresses understanding and agrees. [address] [place], [place]        PLEASE NOTE:  This document has been produced using voice recognition software. Unrecognized errors in transcription may be present.

## 2018-07-13 ENCOUNTER — ANESTHESIA EVENT (OUTPATIENT)
Dept: CARDIOTHORACIC SURGERY | Age: 78
DRG: 269 | End: 2018-07-13
Payer: MEDICARE

## 2018-07-17 ENCOUNTER — HOSPITAL ENCOUNTER (INPATIENT)
Age: 78
LOS: 1 days | Discharge: HOME | DRG: 269 | End: 2018-07-18
Attending: SURGERY | Admitting: SURGERY
Payer: MEDICARE

## 2018-07-17 ENCOUNTER — ANESTHESIA (OUTPATIENT)
Dept: CARDIOTHORACIC SURGERY | Age: 78
DRG: 269 | End: 2018-07-17
Payer: MEDICARE

## 2018-07-17 ENCOUNTER — APPOINTMENT (OUTPATIENT)
Dept: GENERAL RADIOLOGY | Age: 78
DRG: 269 | End: 2018-07-17
Attending: SURGERY
Payer: MEDICARE

## 2018-07-17 DIAGNOSIS — I71.40 AAA (ABDOMINAL AORTIC ANEURYSM) WITHOUT RUPTURE: Primary | ICD-10-CM

## 2018-07-17 LAB
ABO + RH BLD: NORMAL
BLOOD GROUP ANTIBODIES SERPL: NORMAL
SPECIMEN EXP DATE BLD: NORMAL

## 2018-07-17 PROCEDURE — C1894 INTRO/SHEATH, NON-LASER: HCPCS | Performed by: SURGERY

## 2018-07-17 PROCEDURE — C1769 GUIDE WIRE: HCPCS | Performed by: SURGERY

## 2018-07-17 PROCEDURE — 74011250636 HC RX REV CODE- 250/636: Performed by: SURGERY

## 2018-07-17 PROCEDURE — 77030018719 HC DRSG PTCH ANTIMIC J&J -A

## 2018-07-17 PROCEDURE — 77030002986 HC SUT PROL J&J -A: Performed by: SURGERY

## 2018-07-17 PROCEDURE — 86900 BLOOD TYPING SEROLOGIC ABO: CPT | Performed by: SURGERY

## 2018-07-17 PROCEDURE — 77030018836 HC SOL IRR NACL ICUM -A: Performed by: SURGERY

## 2018-07-17 PROCEDURE — 74011250636 HC RX REV CODE- 250/636: Performed by: ANESTHESIOLOGY

## 2018-07-17 PROCEDURE — C1760 CLOSURE DEV, VASC: HCPCS | Performed by: SURGERY

## 2018-07-17 PROCEDURE — 77030005401 HC CATH RAD ARRO -A

## 2018-07-17 PROCEDURE — 77030026918 HC ADMN ST IV BLD BD -A

## 2018-07-17 PROCEDURE — 77030013079 HC BLNKT BAIR HGGR 3M -A: Performed by: SURGERY

## 2018-07-17 PROCEDURE — 77030002933 HC SUT MCRYL J&J -A: Performed by: SURGERY

## 2018-07-17 PROCEDURE — 77030008584 HC TOOL GDWRE DEV TERU -A: Performed by: SURGERY

## 2018-07-17 PROCEDURE — 74011250637 HC RX REV CODE- 250/637: Performed by: SURGERY

## 2018-07-17 PROCEDURE — 74011250636 HC RX REV CODE- 250/636

## 2018-07-17 PROCEDURE — C1753 CATH, INTRAVAS ULTRASOUND: HCPCS | Performed by: SURGERY

## 2018-07-17 PROCEDURE — 77030019896 HC KT ARTERIAL LN TELE -B: Performed by: SURGERY

## 2018-07-17 PROCEDURE — 77030003390 HC NDL ANGI MRTM -A: Performed by: SURGERY

## 2018-07-17 PROCEDURE — 65620000000 HC RM CCU GENERAL

## 2018-07-17 PROCEDURE — 74011250637 HC RX REV CODE- 250/637: Performed by: ANESTHESIOLOGY

## 2018-07-17 PROCEDURE — C1725 CATH, TRANSLUMIN NON-LASER: HCPCS | Performed by: SURGERY

## 2018-07-17 PROCEDURE — 03HY32Z INSERTION OF MONITORING DEVICE INTO UPPER ARTERY, PERCUTANEOUS APPROACH: ICD-10-PCS | Performed by: SURGERY

## 2018-07-17 PROCEDURE — 74011000250 HC RX REV CODE- 250

## 2018-07-17 PROCEDURE — 04V03DZ RESTRICTION OF ABDOMINAL AORTA WITH INTRALUMINAL DEVICE, PERCUTANEOUS APPROACH: ICD-10-PCS | Performed by: SURGERY

## 2018-07-17 PROCEDURE — 76060000035 HC ANESTHESIA 2 TO 2.5 HR: Performed by: SURGERY

## 2018-07-17 PROCEDURE — 77030028837 HC SYR ANGI PWR INJ COEU -A: Performed by: SURGERY

## 2018-07-17 PROCEDURE — 77030013079 HC BLNKT BAIR HGGR 3M -A

## 2018-07-17 PROCEDURE — 77030002996 HC SUT SLK J&J -A: Performed by: SURGERY

## 2018-07-17 PROCEDURE — 77030010514 HC APPL CLP LIG COVD -B: Performed by: SURGERY

## 2018-07-17 PROCEDURE — 77030034103 HC GRFT ENDVAS EXT XCLDR WLGO -I3: Performed by: SURGERY

## 2018-07-17 PROCEDURE — 77030034850: Performed by: SURGERY

## 2018-07-17 PROCEDURE — 77030013797 HC KT TRNSDUC PRSSR EDWD -A: Performed by: SURGERY

## 2018-07-17 PROCEDURE — 77030011640 HC PAD GRND REM COVD -A: Performed by: SURGERY

## 2018-07-17 PROCEDURE — 77030008683 HC TU ET CUF COVD -A

## 2018-07-17 PROCEDURE — C1874 STENT, COATED/COV W/DEL SYS: HCPCS | Performed by: SURGERY

## 2018-07-17 PROCEDURE — 77030004565 HC CATH ANGI DX TMPO CARD -B: Performed by: SURGERY

## 2018-07-17 PROCEDURE — 76010000108 HC CV SURG 2 TO 2.5 HR: Performed by: SURGERY

## 2018-07-17 PROCEDURE — C1887 CATHETER, GUIDING: HCPCS | Performed by: SURGERY

## 2018-07-17 DEVICE — IMPLANTABLE DEVICE: Type: IMPLANTABLE DEVICE | Site: AORTA | Status: FUNCTIONAL

## 2018-07-17 DEVICE — GRAFT ENDOPROS L12CM DST DIA12MM CONTRALATERAL LEG W/ C3: Type: IMPLANTABLE DEVICE | Site: ARTERIAL | Status: FUNCTIONAL

## 2018-07-17 RX ORDER — HEPARIN SODIUM 1000 [USP'U]/ML
INJECTION, SOLUTION INTRAVENOUS; SUBCUTANEOUS AS NEEDED
Status: DISCONTINUED | OUTPATIENT
Start: 2018-07-17 | End: 2018-07-17 | Stop reason: HOSPADM

## 2018-07-17 RX ORDER — INSULIN LISPRO 100 [IU]/ML
INJECTION, SOLUTION INTRAVENOUS; SUBCUTANEOUS ONCE
Status: DISCONTINUED | OUTPATIENT
Start: 2018-07-17 | End: 2018-07-17 | Stop reason: HOSPADM

## 2018-07-17 RX ORDER — LIDOCAINE HYDROCHLORIDE 10 MG/ML
INJECTION, SOLUTION EPIDURAL; INFILTRATION; INTRACAUDAL; PERINEURAL
Status: DISCONTINUED
Start: 2018-07-17 | End: 2018-07-17

## 2018-07-17 RX ORDER — SODIUM CHLORIDE 0.9 % (FLUSH) 0.9 %
5-10 SYRINGE (ML) INJECTION AS NEEDED
Status: DISCONTINUED | OUTPATIENT
Start: 2018-07-17 | End: 2018-07-17 | Stop reason: HOSPADM

## 2018-07-17 RX ORDER — SODIUM CHLORIDE, SODIUM LACTATE, POTASSIUM CHLORIDE, CALCIUM CHLORIDE 600; 310; 30; 20 MG/100ML; MG/100ML; MG/100ML; MG/100ML
INJECTION, SOLUTION INTRAVENOUS
Status: DISCONTINUED | OUTPATIENT
Start: 2018-07-17 | End: 2018-07-17 | Stop reason: HOSPADM

## 2018-07-17 RX ORDER — GUAIFENESIN 100 MG/5ML
81 LIQUID (ML) ORAL DAILY
Status: DISCONTINUED | OUTPATIENT
Start: 2018-07-17 | End: 2018-07-18 | Stop reason: HOSPADM

## 2018-07-17 RX ORDER — ONDANSETRON 2 MG/ML
INJECTION INTRAMUSCULAR; INTRAVENOUS AS NEEDED
Status: DISCONTINUED | OUTPATIENT
Start: 2018-07-17 | End: 2018-07-17 | Stop reason: HOSPADM

## 2018-07-17 RX ORDER — HEPARIN SODIUM 200 [USP'U]/100ML
INJECTION, SOLUTION INTRAVENOUS
Status: DISCONTINUED
Start: 2018-07-17 | End: 2018-07-17

## 2018-07-17 RX ORDER — NALOXONE HYDROCHLORIDE 0.4 MG/ML
0.4 INJECTION, SOLUTION INTRAMUSCULAR; INTRAVENOUS; SUBCUTANEOUS AS NEEDED
Status: DISCONTINUED | OUTPATIENT
Start: 2018-07-17 | End: 2018-07-18 | Stop reason: HOSPADM

## 2018-07-17 RX ORDER — EPHEDRINE SULFATE/0.9% NACL/PF 25 MG/5 ML
SYRINGE (ML) INTRAVENOUS AS NEEDED
Status: DISCONTINUED | OUTPATIENT
Start: 2018-07-17 | End: 2018-07-17 | Stop reason: HOSPADM

## 2018-07-17 RX ORDER — SODIUM CHLORIDE, SODIUM LACTATE, POTASSIUM CHLORIDE, CALCIUM CHLORIDE 600; 310; 30; 20 MG/100ML; MG/100ML; MG/100ML; MG/100ML
75 INJECTION, SOLUTION INTRAVENOUS CONTINUOUS
Status: DISCONTINUED | OUTPATIENT
Start: 2018-07-17 | End: 2018-07-17 | Stop reason: HOSPADM

## 2018-07-17 RX ORDER — SODIUM CHLORIDE 0.9 % (FLUSH) 0.9 %
5-10 SYRINGE (ML) INJECTION EVERY 8 HOURS
Status: DISCONTINUED | OUTPATIENT
Start: 2018-07-17 | End: 2018-07-18 | Stop reason: HOSPADM

## 2018-07-17 RX ORDER — SUCCINYLCHOLINE CHLORIDE 20 MG/ML
INJECTION INTRAMUSCULAR; INTRAVENOUS AS NEEDED
Status: DISCONTINUED | OUTPATIENT
Start: 2018-07-17 | End: 2018-07-17 | Stop reason: HOSPADM

## 2018-07-17 RX ORDER — SODIUM CHLORIDE, SODIUM LACTATE, POTASSIUM CHLORIDE, CALCIUM CHLORIDE 600; 310; 30; 20 MG/100ML; MG/100ML; MG/100ML; MG/100ML
25 INJECTION, SOLUTION INTRAVENOUS CONTINUOUS
Status: DISCONTINUED | OUTPATIENT
Start: 2018-07-17 | End: 2018-07-18 | Stop reason: HOSPADM

## 2018-07-17 RX ORDER — PHENYLEPHRINE HCL IN 0.9% NACL 1 MG/10 ML
SYRINGE (ML) INTRAVENOUS AS NEEDED
Status: DISCONTINUED | OUTPATIENT
Start: 2018-07-17 | End: 2018-07-17 | Stop reason: HOSPADM

## 2018-07-17 RX ORDER — FENTANYL CITRATE 50 UG/ML
INJECTION, SOLUTION INTRAMUSCULAR; INTRAVENOUS AS NEEDED
Status: DISCONTINUED | OUTPATIENT
Start: 2018-07-17 | End: 2018-07-17 | Stop reason: HOSPADM

## 2018-07-17 RX ORDER — CEFAZOLIN SODIUM 2 G/50ML
2 SOLUTION INTRAVENOUS ONCE
Status: COMPLETED | OUTPATIENT
Start: 2018-07-17 | End: 2018-07-17

## 2018-07-17 RX ORDER — LIDOCAINE HYDROCHLORIDE 20 MG/ML
INJECTION, SOLUTION EPIDURAL; INFILTRATION; INTRACAUDAL; PERINEURAL AS NEEDED
Status: DISCONTINUED | OUTPATIENT
Start: 2018-07-17 | End: 2018-07-17 | Stop reason: HOSPADM

## 2018-07-17 RX ORDER — ONDANSETRON 2 MG/ML
4 INJECTION INTRAMUSCULAR; INTRAVENOUS
Status: DISCONTINUED | OUTPATIENT
Start: 2018-07-17 | End: 2018-07-18 | Stop reason: HOSPADM

## 2018-07-17 RX ORDER — DIAZEPAM 5 MG/1
10 TABLET ORAL
Status: DISCONTINUED | OUTPATIENT
Start: 2018-07-17 | End: 2018-07-18 | Stop reason: HOSPADM

## 2018-07-17 RX ORDER — ATORVASTATIN CALCIUM 40 MG/1
40 TABLET, FILM COATED ORAL DAILY
Status: DISCONTINUED | OUTPATIENT
Start: 2018-07-17 | End: 2018-07-18 | Stop reason: HOSPADM

## 2018-07-17 RX ORDER — PROPOFOL 10 MG/ML
INJECTION, EMULSION INTRAVENOUS AS NEEDED
Status: DISCONTINUED | OUTPATIENT
Start: 2018-07-17 | End: 2018-07-17 | Stop reason: HOSPADM

## 2018-07-17 RX ORDER — EZETIMIBE 10 MG/1
10 TABLET ORAL DAILY
Status: DISCONTINUED | OUTPATIENT
Start: 2018-07-17 | End: 2018-07-18 | Stop reason: HOSPADM

## 2018-07-17 RX ORDER — LISINOPRIL 10 MG/1
10 TABLET ORAL DAILY
Status: DISCONTINUED | OUTPATIENT
Start: 2018-07-17 | End: 2018-07-18 | Stop reason: HOSPADM

## 2018-07-17 RX ORDER — SODIUM CHLORIDE 0.9 % (FLUSH) 0.9 %
5-10 SYRINGE (ML) INJECTION AS NEEDED
Status: DISCONTINUED | OUTPATIENT
Start: 2018-07-17 | End: 2018-07-18 | Stop reason: HOSPADM

## 2018-07-17 RX ORDER — PANTOPRAZOLE SODIUM 40 MG/1
40 TABLET, DELAYED RELEASE ORAL
Status: DISCONTINUED | OUTPATIENT
Start: 2018-07-18 | End: 2018-07-18 | Stop reason: HOSPADM

## 2018-07-17 RX ORDER — HEPARIN SODIUM 5000 [USP'U]/ML
5000 INJECTION, SOLUTION INTRAVENOUS; SUBCUTANEOUS EVERY 8 HOURS
Status: DISCONTINUED | OUTPATIENT
Start: 2018-07-17 | End: 2018-07-18 | Stop reason: HOSPADM

## 2018-07-17 RX ORDER — HEPARIN SODIUM 200 [USP'U]/100ML
INJECTION, SOLUTION INTRAVENOUS
Status: DISCONTINUED | OUTPATIENT
Start: 2018-07-17 | End: 2018-07-17 | Stop reason: HOSPADM

## 2018-07-17 RX ORDER — OXYCODONE AND ACETAMINOPHEN 5; 325 MG/1; MG/1
1 TABLET ORAL
Status: DISCONTINUED | OUTPATIENT
Start: 2018-07-17 | End: 2018-07-18 | Stop reason: HOSPADM

## 2018-07-17 RX ORDER — ACETAMINOPHEN 325 MG/1
650 TABLET ORAL
Status: DISCONTINUED | OUTPATIENT
Start: 2018-07-17 | End: 2018-07-18 | Stop reason: HOSPADM

## 2018-07-17 RX ORDER — SODIUM CHLORIDE 0.9 % (FLUSH) 0.9 %
5-10 SYRINGE (ML) INJECTION EVERY 8 HOURS
Status: DISCONTINUED | OUTPATIENT
Start: 2018-07-17 | End: 2018-07-17 | Stop reason: HOSPADM

## 2018-07-17 RX ORDER — MIDAZOLAM HYDROCHLORIDE 1 MG/ML
INJECTION, SOLUTION INTRAMUSCULAR; INTRAVENOUS AS NEEDED
Status: DISCONTINUED | OUTPATIENT
Start: 2018-07-17 | End: 2018-07-17 | Stop reason: HOSPADM

## 2018-07-17 RX ORDER — LEVOTHYROXINE SODIUM 50 UG/1
50 TABLET ORAL
Status: DISCONTINUED | OUTPATIENT
Start: 2018-07-18 | End: 2018-07-18 | Stop reason: HOSPADM

## 2018-07-17 RX ORDER — MORPHINE SULFATE 2 MG/ML
2 INJECTION, SOLUTION INTRAMUSCULAR; INTRAVENOUS
Status: DISCONTINUED | OUTPATIENT
Start: 2018-07-17 | End: 2018-07-18 | Stop reason: HOSPADM

## 2018-07-17 RX ORDER — FAMOTIDINE 20 MG/1
20 TABLET, FILM COATED ORAL ONCE
Status: COMPLETED | OUTPATIENT
Start: 2018-07-17 | End: 2018-07-17

## 2018-07-17 RX ADMIN — Medication 100 MCG: at 09:18

## 2018-07-17 RX ADMIN — Medication 10 ML: at 13:34

## 2018-07-17 RX ADMIN — Medication 100 MCG: at 08:30

## 2018-07-17 RX ADMIN — FAMOTIDINE 20 MG: 20 TABLET ORAL at 07:24

## 2018-07-17 RX ADMIN — HEPARIN SODIUM 5000 UNITS: 5000 INJECTION, SOLUTION INTRAVENOUS; SUBCUTANEOUS at 20:06

## 2018-07-17 RX ADMIN — MIDAZOLAM HYDROCHLORIDE 2 MG: 1 INJECTION, SOLUTION INTRAMUSCULAR; INTRAVENOUS at 08:14

## 2018-07-17 RX ADMIN — FENTANYL CITRATE 100 MCG: 50 INJECTION, SOLUTION INTRAMUSCULAR; INTRAVENOUS at 08:16

## 2018-07-17 RX ADMIN — HEPARIN SODIUM 6000 UNITS: 1000 INJECTION, SOLUTION INTRAVENOUS; SUBCUTANEOUS at 09:10

## 2018-07-17 RX ADMIN — Medication 5 MG: at 08:20

## 2018-07-17 RX ADMIN — CEFAZOLIN SODIUM 2 G: 2 SOLUTION INTRAVENOUS at 08:17

## 2018-07-17 RX ADMIN — SODIUM CHLORIDE, SODIUM LACTATE, POTASSIUM CHLORIDE, AND CALCIUM CHLORIDE 75 ML/HR: 600; 310; 30; 20 INJECTION, SOLUTION INTRAVENOUS at 07:31

## 2018-07-17 RX ADMIN — FENTANYL CITRATE 50 MCG: 50 INJECTION, SOLUTION INTRAMUSCULAR; INTRAVENOUS at 10:18

## 2018-07-17 RX ADMIN — FENTANYL CITRATE 50 MCG: 50 INJECTION, SOLUTION INTRAMUSCULAR; INTRAVENOUS at 09:56

## 2018-07-17 RX ADMIN — HEPARIN SODIUM 5000 UNITS: 5000 INJECTION, SOLUTION INTRAVENOUS; SUBCUTANEOUS at 13:44

## 2018-07-17 RX ADMIN — SODIUM CHLORIDE, SODIUM LACTATE, POTASSIUM CHLORIDE, CALCIUM CHLORIDE: 600; 310; 30; 20 INJECTION, SOLUTION INTRAVENOUS at 08:28

## 2018-07-17 RX ADMIN — Medication 10 ML: at 22:00

## 2018-07-17 RX ADMIN — LIDOCAINE HYDROCHLORIDE 80 MG: 20 INJECTION, SOLUTION EPIDURAL; INFILTRATION; INTRACAUDAL; PERINEURAL at 08:16

## 2018-07-17 RX ADMIN — SODIUM CHLORIDE, SODIUM LACTATE, POTASSIUM CHLORIDE, AND CALCIUM CHLORIDE 25 ML/HR: 600; 310; 30; 20 INJECTION, SOLUTION INTRAVENOUS at 10:34

## 2018-07-17 RX ADMIN — ONDANSETRON 4 MG: 2 INJECTION INTRAMUSCULAR; INTRAVENOUS at 15:38

## 2018-07-17 RX ADMIN — FENTANYL CITRATE 50 MCG: 50 INJECTION, SOLUTION INTRAMUSCULAR; INTRAVENOUS at 09:54

## 2018-07-17 RX ADMIN — ONDANSETRON 4 MG: 2 INJECTION INTRAMUSCULAR; INTRAVENOUS at 10:07

## 2018-07-17 RX ADMIN — LISINOPRIL 10 MG: 10 TABLET ORAL at 14:26

## 2018-07-17 RX ADMIN — Medication 10 MG: at 08:26

## 2018-07-17 RX ADMIN — PROPOFOL 120 MG: 10 INJECTION, EMULSION INTRAVENOUS at 08:16

## 2018-07-17 RX ADMIN — Medication 2 MG: at 14:27

## 2018-07-17 RX ADMIN — SUCCINYLCHOLINE CHLORIDE 100 MG: 20 INJECTION INTRAMUSCULAR; INTRAVENOUS at 08:16

## 2018-07-17 NOTE — PERIOP NOTES
TRANSFER - OUT REPORT:    Verbal report given to [facility] SAUNDRA AMADOR on Hemant Thomas  being transferred to [address] [place] [address] for routine post - op       Report consisted of patients Situation, Background, Assessment and   Recommendations(SBAR). Information from the following report(s) SBAR, Procedure Summary and MAR was reviewed with the receiving nurse. Lines:   Peripheral IV 07/17/18 Right Antecubital (Active)   Site Assessment Clean, dry, & intact 7/17/2018  7:19 AM   Phlebitis Assessment 0 7/17/2018  7:19 AM   Infiltration Assessment 0 7/17/2018  7:19 AM   Dressing Status Clean, dry, & intact 7/17/2018  7:19 AM   Dressing Type Transparent; Topical skin adhesive 7/17/2018  7:19 AM   Hub Color/Line Status Green; Infusing;Flushed;Patent 7/17/2018  7:19 AM       Peripheral IV 07/17/18 Left Antecubital (Active)   Site Assessment Clean, dry, & intact 7/17/2018  7:21 AM   Phlebitis Assessment 0 7/17/2018  7:21 AM   Infiltration Assessment 0 7/17/2018  7:21 AM   Dressing Status Clean, dry, & intact 7/17/2018  7:21 AM   Dressing Type Transparent; Topical skin adhesive 7/17/2018  7:21 AM   Hub Color/Line Status Green;Capped;Flushed;Patent 7/17/2018  7:21 AM       Peripheral IV 07/17/18 Right Wrist (Active)       Arterial Line Left Radial artery (Active)        Opportunity for questions and clarification was provided.       Patient transported with:   Monitor  O2 @ 3 liters  Registered Nurse

## 2018-07-17 NOTE — ANESTHESIA PROCEDURE NOTES
Arterial Line Placement    Start time: 7/17/2018 8:21 AM  End time: 7/17/2018 8:30 AM  Performed by: Alysha Hogan by: Jared Epstein     Pre-Procedure  Indications:  Arterial pressure monitoring and blood sampling  Preanesthetic Checklist: patient identified, risks and benefits discussed, anesthesia consent, site marked, patient being monitored, timeout performed and patient being monitored      Procedure:   Prep:  Chlorhexidine  Orientation:  Left  Location:  Axillary artery  Catheter size:  20 G  Number of attempts:  2  Cont Cardiac Output Sensor: Yes      Assessment:   Post-procedure:  Line secured and sterile dressing applied  Patient Tolerance:  Patient tolerated the procedure well with no immediate complications

## 2018-07-17 NOTE — INTERVAL H&P NOTE
H&P Update:  Laurence Benavides was seen and examined. History and physical has been reviewed. The patient has been examined.  There have been no significant clinical changes since the completion of the originally dated History and Physical.    Signed By: Oleg Johnston MD     July 17, 2018 7:48 AM

## 2018-07-17 NOTE — H&P (VIEW-ONLY)
[facility]    Chief Complaint   Patient presents with    New Patient    Abdominal Aortic Aneurysm       HPI    Priscila Torre is a 76 y.o. female who presents to the office today at the request of her PCP for AAA. She states that several months ago she was having some upper abdominal discomfort and was sent for CT scan. She denies any associated nausea or vomiting. She denies any change in her bowel habits. She states that she was found to have common bile duct dilatation with concern for possible bilary stone and is scheduled for a procedure per GI on the 24th of this month. She states that her abdominal pain seems to have resolved at this time. She denies any current abdominal pain or back pain. She does have some left hip pain which she states is residual from her recent bout of shingles. CT scan also revealed incidental finding of fusiform infrarenal AAA measuring ~4.1x4.6cm with a large asymmetric intramural thrombosis. She does state that her father had an aortic aneurysm and one of her sisters also had some type of abdominal aneurysm. She denies any history of tobacco abuse. She has no personal history of diabetes. She denies any symptoms of claudication and no rest pain. She has no history of renal disease that she is aware of and no history of heart disease. Otherwise she states that she is doing fairly well and is in her usual state of health.     Past Medical History:   Diagnosis Date    Colon adenomas     2003 Dr Gerri Campbell; 4/13 Dr Eleazar CROW (degenerative joint disease)     Dyslipidemia     FHx: heart disease     Gastritis 2006    on EGD Dr. Fernanda Boateng, neg sprue    Hypertension     Hypothyroidism 2007    Dr. Dominique Taylor Hypovitaminosis D     Lung nodule 06/2016    6 mm RLL (6/16); no change (10/17)    Obesity     peak weight 187 lbs, bmi 33.1 from 4/13; IF 3/18    Osteopenia FRAX 4/12 9.7 and 1.4     DEXA t score 0.6 spine, -1 hip (12/09);  -0.4 spine, -1.3 hip (4/12); -1.2 wrist, -1.6 hip w FRAX 11/2.2 (4/14); -1.0 wrist, -1.6 hip w FRAX 12/2.6 (4/16); -0.7 wrist, -1.6 hip (5/18)    Psoriasis 2003    on bx Dr. Hunt hearing loss     left side Dr Yessica Shelton Venous insufficiency 2003    on dopplers    Zoster 05/2016    left T11-12; mild postherpetic neuralgia     Patient Active Problem List   Diagnosis Code    Colon adenomas 2003, last 2014 Dr Rodriguez Gip D12.6    Hypovitaminosis D E55.9    Venous insufficiency dopplers 2003 I87.2    Osteopenia FRAX 4/14 11 and 2.2 M85.80    Dyslipidemia E78.5    Hypothyroidism due to acquired atrophy of thyroid E03.4    Arthritis, degenerative M19.90    Obesity (BMI 30.0-34. 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 Miguel Worley left    HX COLONOSCOPY      benign polyp 5/08 Dr Mary Nascimento;  4/13 negative Dr. Keke Orourke  2007    neg MRA[facility] head [facility]    NEUROLOGICAL PROCEDURE UNLISTED  9/10    CT head negative    VASCULAR SURGERY PROCEDURE UNLIST  2007    neg AAA screen [facility]     Current Outpatient Prescriptions   Medication Sig Dispense Refill    ezetimibe (ZETIA) 10 mg tablet TAKE 1 TABLET BY MOUTH DAILY 90 Tab 3    lidocaine (LIDODERM) 5 % Apply patch to the affected area for 12 hours a day and remove for 12 hours a day. 30 Each 5    omeprazole (PRILOSEC) 40 mg capsule Take 1 Cap by mouth daily.  90 Cap 1    ibuprofen (MOTRIN) 800 mg tablet Take 1 Tab by mouth three (3) times daily (with meals). 45 Tab 0    lisinopril (PRINIVIL, ZESTRIL) 10 mg tablet Take 1 Tab by mouth daily. 90 Tab 3    atorvastatin (LIPITOR) 40 mg tablet Take 1 Tab by mouth daily. 90 Tab 3    diazePAM (VALIUM) 10 mg tablet Take 1 Tab by mouth every twelve (12) hours as needed for Anxiety. Max Daily Amount: 20 mg. 60 Tab 1    Levothyroxine 75 mcg cap Take one tablet by mouth every other day before breakfast. Alternates with 50mcg 45 Cap 3    ergocalciferol (VITAMIN D2) 50,000 unit capsule Take 1 Cap by mouth every seven (7) days. 12 Cap 1    levothyroxine (SYNTHROID) 50 mcg tablet Take 1 Tab by mouth Daily (before breakfast). 90 Tab 3    aspirin 81 mg chewable tablet Take 1 Tab by mouth daily.  90 Tab 3     Allergies   Allergen Reactions    Atenolol Other (comments)     Felt poorly    Cozaar [Losartan] Other (comments)     Headaches    Norvasc [Amlodipine] Other (comments)     Felt poorly       Social History     Social History    Marital status:      Spouse name: N/A    Number of children: N/A    Years of education: N/A     Occupational History    ret office mgr      Social History Main Topics    Smoking status: Never Smoker    Smokeless tobacco: Never Used    Alcohol use Yes      Comment: occasionally    Drug use: No    Sexual activity: Not on file     Other Topics Concern    Not on file     Social History Narrative      Family History   Problem Relation Age of Onset    Breast Cancer Mother     Cancer Mother     Heart Disease Father        Review of Systems    Constitutional: negative   HEENT: negative   Respiratory: negative   Cardiovascular: negative   Gastrointestinal: negative   Genitourinary:negative   Hematologic/lymphatic: negative   Musculoskeletal:negative   Neurological: positive for postherpetic left hip pain, intermittent RUQ pain   Behavioral/Psych: negative   Endocrine: negative   Allergic/Immunologic: negative      Physical Exam:    Visit Vitals    BP 138/70 (BP 1 Location: Left arm, BP Patient Position: Sitting)    Pulse 66    Resp 16    Ht 5' 3\" (1.6 m)    Wt 172 lb (78 kg)    BMI 30.47 kg/m2      General: Well-appearing elderly female in no acute distress   HEENT: EOMI, no scleral icterus is noted. No carotid bruits are heard bilaterally   Cardiovascular: Regular rhythm normal S1-S2 no rubs murmurs or gallops   Pulmonary: No increased work or breathing is noted. Clear to auscultation bilaterally. No wheeze, rales or rhonchi. Abdomen: Positive bowel sounds, soft, nontender, nondistended. No rebound or guarding is noted. Palpable pulsatile abdominal mass is felt. Extremities: Warm and well perfused bilaterally. Pt has no BLE edema. Palpable femoral and pedal pulses. Neuro: Cranial nerves II through XII are grossly intact   Integument: No ulcerations are identified visibly      Impression and Plan:  Garry Montana is a 76 y.o. female with 4.6 cm AAA. Currently she is asymptomatic. She denies any abdominal pains or back pains at this time. Imaging was reviewed with patient in the office today. I also reviewed her CT scan with Dr. Joselyn Estrada and she has been recommended for aneurysm repair with EVAR. Risks versus benefits of the procedure were discussed with patient at length. Educational material was also given in the office today. All questions answered to the best my ability. Patient would like to proceed with aortic repair at this time. She states that she would like to have her aneurysm repaired prior to her GI procedure as she feels that this is the most dangerous of her problems. I did discuss with her that she is perfectly fine to wait until after the 24th for her repair as this is not emergent/urgent surgery; however she feels that she would rather have her EVAR first.  She will proceed today with preoperative testing and is scheduled for the 17th of this month for surgery. Plan was discussed.  Patient expresses understanding and agrees. [address] [place], [place]        PLEASE NOTE:  This document has been produced using voice recognition software. Unrecognized errors in transcription may be present.

## 2018-07-17 NOTE — OP NOTES
1703 [facility] REPORT    Seun Wade  MR#: [id number]  : 1940  ACCOUNT #: [de-identified]   DATE OF SERVICE: 2018    SURGEON:  Meera Jacques MD    PREOPERATIVE DIAGNOSIS:  Infrarenal abdominal aortic aneurysm without rupture. POSTOPERATIVE DIAGNOSIS:  Infrarenal abdominal aortic aneurysm without rupture. PROCEDURES PERFORMED:  1. Ultrasound-guided access of bilateral common femoral arteries. 2.  Catheter in aorta. 3.  Aortogram.  4.  Intravascular repair of abdominal aortic aneurysm using a bifurcated modular device, [place] 28.5 x 14 x 12, with a contralateral limb 12 x 12 on the right side. 5.  Intravascular ultrasound of aorta, bilateral common iliac arteries and bilateral external iliac arteries. CULTURES:  None. SPECIMENS REMOVED:  None. DRAINS:  None. ESTIMATED BLOOD LOSS:  50 mL. ANESTHESIA:  General.    COMPLICATIONS:  None. ASSISTANTS:  None. IMPLANTS:  Above. INDICATION FOR THE PROCEDURE:  The patient is a 68-year-old female with infrarenal abdominal aortic aneurysm, recommended for repair. The patient was given risks and benefits of the procedure including but not limited to bleeding, infection, damage to tissue structures, MI, stroke, and death as well as loss of lower extremity and need for further surgery. The patient was understanding of all the risks and underwent the procedure. OPERATIVE FINDINGS:  The patient does have an abdominal aortic aneurysm repaired with EVAR. PROCEDURE:  The patient was correctly identified in the precath area, taken to the operating room in stable condition. The patient had a preincision timeout per Anesthesia. The patient was prepped and draped in normal sterile fashion according to CDC guidelines for aseptic technique. Patient also had preoperative antibiotics prior to the incision. Ultrasound was used to visualize bilateral common femoral arteries.   Picture was taken and copied to the patient's chart. We then took a single entry needle and gained access into the common femoral artery. Once the needle tip was identified within the artery, a [employer] wire was then placed. A small skin incision was created using an 11 blade and a 6-Russian short sheath was then placed. We then bluntly dissected down to the artery. We then placed our ProGlide sutures at 10 and 2. I then placed an 8-Russian sheath in. At this point, we then heparinized the patient to keep ACT greater than 200 throughout the case. We then were able to on the left side perform IVUS imaging of the aorta, common iliac artery and external iliac artery, marked out where the lowest renal was on the left side. We then placed the IVUS on the right side, performed IVUS imaging of the aorta, common iliac artery and external iliac artery. We were able to karely out all the hypos and perform our measurements with the IVUS imaging of the aorta and then on the left side, we upsized to an 18-Russian sheath and then brought up our 28.5 x 14 x 12 main body device. This was then deployed. This actually fell down, so we came in on the contralateral side on the right side. We took our Mancuso catheter and angled Glidewire to gain access into the contralateral limb, confirmed with twirling a ContraFlush catheter. We then were able to gain access into the renal with a wire, identified where this was, so pushed up our device appropriately. We did shoot an angio, showing where the renal was, also confirming. We were then happy with our positioning. We then using our previous angiography and our marker catheter were able to bring up our contralateral limb of 16 x 12 x 12. This was then deployed appropriately. We then deployed our main body device appropriately based on preoperative imaging using IVUS for the hypo and then a Q50 ballooned out all of our stents.   We then through the right side, shot an aortogram.  This showed complete resolution of the aneurysm. Some minor type 2 endoleak through some minor blanching of the couple of lumbars but this was quite small and will likely go away in time. We then were able to place Bentson wires up bilaterally, close over ProGlides on the right side. We had a complete closure without any leaking. On the left side, we did have some minor oozing that was taken care of with a 6-Kinyarwanda Angio-Seal.  We then copiously irrigated, made a small stitch with a 4-0 Vicryl suture and Dermabond for dressing. Patient tolerated the procedure well and was gently awoken from her anesthesia and taken to the ICU in stable condition.       MD MAYELA Rossi /   D: 07/17/2018 10:09     T: 07/17/2018 11:42  JOB #: [id number]

## 2018-07-17 NOTE — ANESTHESIA PREPROCEDURE EVALUATION
Anesthetic History   No history of anesthetic complications            Review of Systems / Medical History  Patient summary reviewed and pertinent labs reviewed    Pulmonary  Within defined limits                 Neuro/Psych   Within defined limits           Cardiovascular    Hypertension: well controlled              Exercise tolerance: >4 METS  Comments: AAA-4.6cms   GI/Hepatic/Renal               Comments: Biliary Cholilithiasis Endo/Other      Hypothyroidism: well controlled  Morbid obesity and arthritis     Other Findings   Comments: Documentation of current medication  Current medications obtained, documented and obtained? YES      Risk Factors for Postoperative nausea/vomiting:       History of postoperative nausea/vomiting? NO       Female? YES       Motion sickness? NO       Intended opioid administration for postoperative analgesia? YES      Smoking Abstinence:  Current Smoker? NO  Elective Surgery? YES  Seen preoperatively by anesthesiologist or proxy prior to day of surgery? YES  Pt abstained from smoking 24 hours prior to anesthesia?  N/A    Preventive care/screening for High Blood Pressure:  Aged 18 years and older: YES  Screened for high blood pressure: YES  Patients with high blood pressure referred to primary care provider   for BP management: YES               Physical Exam    Airway  Mallampati: II  TM Distance: 4 - 6 cm  Neck ROM: normal range of motion   Mouth opening: Normal     Cardiovascular  Regular rate and rhythm,  S1 and S2 normal,  no murmur, click, rub, or gallop             Dental    Dentition: Upper partial plate     Pulmonary  Breath sounds clear to auscultation               Abdominal  GI exam deferred       Other Findings            Anesthetic Plan    ASA: 3  Anesthesia type: general    Monitoring Plan: Arterial line      Induction: Intravenous  Anesthetic plan and risks discussed with: Patient

## 2018-07-17 NOTE — BRIEF OP NOTE
BRIEF OPERATIVE NOTE    Date of Procedure: 7/17/2018   Preoperative Diagnosis: AAA (abdominal aortic aneurysm) without rupture (HCC) [I71.4]  Postoperative Diagnosis: AAA (abdominal aortic aneurysm) without rupture (HCC) [I71.4]    Procedure(s):  AORTIC ABDOMINAL ANEURYSM REPAIR ENDOVASCULAR (EVAR)  Surgeon(s) and Role:     * Pelon Garcia MD - Primary         Surgical Assistant: none    Surgical Staff:  Circ-1: Tiffanie Hernandez RN  Circ-2: Enrico Joy RN  Radiology Technician: RT Richard  Scrub Tech-1: Tia Hollis  Surg Asst-1: [employer] Paredes  Event Time In   Incision Start 0455   Incision Close 1763     Anesthesia: General   Estimated Blood Loss: 50mL  Specimens: * No specimens in log *   Findings: AAA   Complications: none  Implants:   Implant Name Type Inv.  Item Serial No.  Lot No. LRB No. Used Action   STENT GRFT TRNK AAA [id number] -- EXCLUDER C3 - [id number]  STENT GRFT TRNK AAA [id number] -- EXCLUDER C3 [id number] [facility]  N/A 1 Implanted   GRAFT ENDV EXCLUDER [id number] --  - [id number]   GRAFT ENDV EXCLUDER [id number] --  [id number]  [employer]   N/A 1 Implanted

## 2018-07-18 VITALS
RESPIRATION RATE: 21 BRPM | TEMPERATURE: 98.5 F | DIASTOLIC BLOOD PRESSURE: 50 MMHG | SYSTOLIC BLOOD PRESSURE: 115 MMHG | WEIGHT: 174.16 LBS | HEIGHT: 65 IN | OXYGEN SATURATION: 99 % | HEART RATE: 75 BPM | BODY MASS INDEX: 29.02 KG/M2

## 2018-07-18 LAB
ANION GAP SERPL CALC-SCNC: 8 MMOL/L (ref 3–18)
BUN SERPL-MCNC: 19 MG/DL (ref 7–18)
BUN/CREAT SERPL: 21 (ref 12–20)
CALCIUM SERPL-MCNC: 9 MG/DL (ref 8.5–10.1)
CHLORIDE SERPL-SCNC: 106 MMOL/L (ref 100–108)
CO2 SERPL-SCNC: 25 MMOL/L (ref 21–32)
CREAT SERPL-MCNC: 0.89 MG/DL (ref 0.6–1.3)
ERYTHROCYTE [DISTWIDTH] IN BLOOD BY AUTOMATED COUNT: 14.4 % (ref 11.6–14.5)
GLUCOSE SERPL-MCNC: 90 MG/DL (ref 74–99)
HCT VFR BLD AUTO: 35.8 % (ref 35–45)
HGB BLD-MCNC: 12 G/DL (ref 12–16)
MCH RBC QN AUTO: 30.6 PG (ref 24–34)
MCHC RBC AUTO-ENTMCNC: 33.5 G/DL (ref 31–37)
MCV RBC AUTO: 91.3 FL (ref 74–97)
PLATELET # BLD AUTO: 157 K/UL (ref 135–420)
PMV BLD AUTO: 11.1 FL (ref 9.2–11.8)
POTASSIUM SERPL-SCNC: 4 MMOL/L (ref 3.5–5.5)
RBC # BLD AUTO: 3.92 M/UL (ref 4.2–5.3)
SODIUM SERPL-SCNC: 139 MMOL/L (ref 136–145)
WBC # BLD AUTO: 12.1 K/UL (ref 4.6–13.2)

## 2018-07-18 PROCEDURE — 74011250636 HC RX REV CODE- 250/636: Performed by: SURGERY

## 2018-07-18 PROCEDURE — 74011250637 HC RX REV CODE- 250/637: Performed by: SURGERY

## 2018-07-18 PROCEDURE — 85027 COMPLETE CBC AUTOMATED: CPT | Performed by: SURGERY

## 2018-07-18 PROCEDURE — 80048 BASIC METABOLIC PNL TOTAL CA: CPT | Performed by: SURGERY

## 2018-07-18 PROCEDURE — 36415 COLL VENOUS BLD VENIPUNCTURE: CPT | Performed by: SURGERY

## 2018-07-18 RX ORDER — OXYCODONE AND ACETAMINOPHEN 5; 325 MG/1; MG/1
1 TABLET ORAL
Qty: 30 TAB | Refills: 0 | Status: SHIPPED | OUTPATIENT
Start: 2018-07-18 | End: 2020-10-08

## 2018-07-18 RX ADMIN — PANTOPRAZOLE SODIUM 40 MG: 40 TABLET, DELAYED RELEASE ORAL at 07:38

## 2018-07-18 RX ADMIN — LISINOPRIL 10 MG: 10 TABLET ORAL at 08:02

## 2018-07-18 RX ADMIN — ASPIRIN 81 MG CHEWABLE TABLET 81 MG: 81 TABLET CHEWABLE at 08:02

## 2018-07-18 RX ADMIN — HEPARIN SODIUM 5000 UNITS: 5000 INJECTION, SOLUTION INTRAVENOUS; SUBCUTANEOUS at 03:30

## 2018-07-18 RX ADMIN — Medication 10 ML: at 06:00

## 2018-07-18 RX ADMIN — ATORVASTATIN CALCIUM 40 MG: 40 TABLET, FILM COATED ORAL at 08:02

## 2018-07-18 RX ADMIN — EZETIMIBE 10 MG: 10 TABLET ORAL at 08:02

## 2018-07-18 RX ADMIN — LEVOTHYROXINE SODIUM 50 MCG: 50 TABLET ORAL at 07:38

## 2018-07-18 NOTE — PROGRESS NOTES
0715 Bedside and Verbal shift change report given to Trinidad (oncoming nurse) by Corky Madrid RN   (offgoing nurse). Report included the following information SBAR, Kardex, Procedure Summary, Intake/Output, MAR and Alarm Parameters .

## 2018-07-18 NOTE — DISCHARGE SUMMARY
Physician Discharge Summary     Patient ID:  Aldo Pickens  [id number]  31 y.o.  1940    Admit date: 7/17/2018    Discharge date: 7/18/2018      Admitting Physician: Nicole Cristobal MD     Discharge Physician: Nicole Cristobal MD     Admission Diagnoses: AAA (abdominal aortic aneurysm) without rupture [facility]) [I71.4]    Discharge Diagnoses: There are no discharge diagnoses documented for the most recent discharge. Procedures for this admission: Procedure(s):  AORTIC ABDOMINAL ANEURYSM REPAIR ENDOVASCULAR (EVAR)    Discharged Condition: stable    Hospital Course: AAA     Aldo Pickens  went to the operating room and underwent endovascular repair of his enlarging abdominal aortic aneurysm. The maximum aortic aneurysm diameter was 4.6 cm. She has done well, and recovery in the cardiovascular intensive care unit has been uneventful. On the morning after surgery, the patient is awake, alert, and oriented. Hemodynamically stable. Lower extremities show no evidence of ischemia. The  abdomen is soft and non tender. Laboratory studies are unremarkable. After ambulating and taking breakfast this morning, she should be ready for hospital discharge. Discharge Exam: GEN: alert, cooperative, no distress, appears stated age  LUNG: clear to auscultation bilaterally  HEART: regular rate and rhythm, S1, S2 normal, no murmur, click, rub or gallop  ABDOMEN:  no change  EXTREMITIES:   no edema and warm and well perfused with good pulses  Incision:  b/l groins c/d/i    Disposition: home    Patient Instructions:   Current Discharge Medication List      START taking these medications    Details   oxyCODONE-acetaminophen (PERCOCET) 5-325 mg per tablet Take 1 Tab by mouth every four (4) hours as needed. Max Daily Amount: 6 Tabs.   Qty: 30 Tab, Refills: 0    Associated Diagnoses: AAA (abdominal aortic aneurysm) without rupture ([facility] [address]         CONTINUE these medications which have NOT CHANGED    Details   ezetimibe (ZETIA) 10 mg tablet TAKE 1 TABLET BY MOUTH DAILY  Qty: 90 Tab, Refills: 3      lidocaine (LIDODERM) 5 % Apply patch to the affected area for 12 hours a day and remove for 12 hours a day. Qty: 30 Each, Refills: 5    Associated Diagnoses: Postherpetic neuralgia      omeprazole (PRILOSEC) 40 mg capsule Take 1 Cap by mouth daily. Qty: 90 Cap, Refills: 1    Associated Diagnoses: Abdominal pain, unspecified abdominal location      lisinopril (PRINIVIL, ZESTRIL) 10 mg tablet Take 1 Tab by mouth daily. Qty: 90 Tab, Refills: 3    Associated Diagnoses: Essential hypertension with goal blood pressure less than 140/90      atorvastatin (LIPITOR) 40 mg tablet Take 1 Tab by mouth daily. Qty: 90 Tab, Refills: 3      diazePAM (VALIUM) 10 mg tablet Take 1 Tab by mouth every twelve (12) hours as needed for Anxiety. Max Daily Amount: 20 mg.  Qty: 60 Tab, Refills: 1    Associated Diagnoses: Postherpetic neuralgia      Levothyroxine 75 mcg cap Take one tablet by mouth every other day before breakfast. Alternates with 50mcg  Qty: 45 Cap, Refills: 3      ergocalciferol (VITAMIN D2) 50,000 unit capsule Take 1 Cap by mouth every seven (7) days. Qty: 12 Cap, Refills: 1      levothyroxine (SYNTHROID) 50 mcg tablet Take 1 Tab by mouth Daily (before breakfast). Qty: 90 Tab, Refills: 3    Associated Diagnoses: Hypothyroidism due to acquired atrophy of thyroid      ibuprofen (MOTRIN) 800 mg tablet Take 1 Tab by mouth three (3) times daily (with meals). Qty: 45 Tab, Refills: 0      aspirin 81 mg chewable tablet Take 1 Tab by mouth daily. Qty: 90 Tab, Refills: 3             Reference discharge instructions as provided by nursing for diet, labs, medications, activity, wound care and any outpatient referrals.     Follow-up with dr Kami Mcmillan in 10-14 days     Signed:  Jamaica Le  7/18/2018  8:17 AM

## 2018-07-19 ENCOUNTER — PATIENT OUTREACH (OUTPATIENT)
Dept: INTERNAL MEDICINE CLINIC | Age: 78
End: 2018-07-19

## 2018-07-19 NOTE — Clinical Note
[facility] 7/17-7/18 AAA repair SOHEILA appt 7/25/18 at 9 AM NN made contact 7/19

## 2018-07-19 NOTE — PROGRESS NOTES
Hospital Discharge Follow-Up      Date/Time:  2018 1:43 PM    Patient was admitted to [facility] on 18 and discharged on 18 for AA repair. The physician discharge summary was available at the time of outreach. Patient was contacted within 1 business days of discharge. Top Challenges reviewed with the provider   None         Method of communication with provider :chart routing    Inpatient RRAT score: N/A  Was this a readmission? no      Nurse Navigator (NN) contacted the patient by telephone to perform post hospital discharge assessment. Verified name and  with patient as identifiers. Provided introduction to self, and explanation of the Nurse Navigator role. Patient reported:  Soreness/tenderness to bilateral groin  Taking Percocet which helps sleep and lessens pain  Wounds closed with glue; wounds D/C/I; steri-strips present    Patient denied:  S/s infection  SOB  CP  Dizziness      Reviewed discharge instructions and red flags with patient who verbalized understanding. Patient given an opportunity to ask questions and does not have any further questions or concerns at this time. The patient agrees to contact the PCP office for questions related to their healthcare. NN provided contact information for future reference. Disease Specific:   N/A       Barriers to care? No apparent barriers to care noted at this time. Advance Care Planning:   Does patient have an Advance Directive:  not on file     Medication(s):   New Medications at Discharge: Percocet  Changed Medications at Discharge: None  Discontinued Medications at Discharge: None      There were no barriers to obtaining medications identified at this time. Referral to Pharm D needed: no     Current Outpatient Prescriptions   Medication Sig    oxyCODONE-acetaminophen (PERCOCET) 5-325 mg per tablet Take 1 Tab by mouth every four (4) hours as needed. Max Daily Amount: 6 Tabs.     ezetimibe (ZETIA) 10 mg tablet TAKE 1 TABLET BY MOUTH DAILY    lidocaine (LIDODERM) 5 % Apply patch to the affected area for 12 hours a day and remove for 12 hours a day.  omeprazole (PRILOSEC) 40 mg capsule Take 1 Cap by mouth daily.  ibuprofen (MOTRIN) 800 mg tablet Take 1 Tab by mouth three (3) times daily (with meals).  lisinopril (PRINIVIL, ZESTRIL) 10 mg tablet Take 1 Tab by mouth daily.  atorvastatin (LIPITOR) 40 mg tablet Take 1 Tab by mouth daily.  diazePAM (VALIUM) 10 mg tablet Take 1 Tab by mouth every twelve (12) hours as needed for Anxiety. Max Daily Amount: 20 mg.  Levothyroxine 75 mcg cap Take one tablet by mouth every other day before breakfast. Alternates with 50mcg    ergocalciferol (VITAMIN D2) 50,000 unit capsule Take 1 Cap by mouth every seven (7) days. (Patient taking differently: Take 50,000 Units by mouth every fourteen (14) days.)    levothyroxine (SYNTHROID) 50 mcg tablet Take 1 Tab by mouth Daily (before breakfast).  aspirin 81 mg chewable tablet Take 1 Tab by mouth daily. No current facility-administered medications for this visit. There are no discontinued medications. [facility] follow up appointment(s):   Future Appointments  Date Time Provider Mariana Gray   7/25/2018 9:00 AM GARRETT Blanco [facility] [address]   7/31/2018 2:00  [facility] [address] Attends follow-up appointments as directed. Plan: Notify of upcoming appts. 7/19/18: Patient aware of upcoming appts with PA and vascular.  Prevent complications post hospitalization. No admissions post 30 days from discharge.  Understands red flags post discharge. Plan: Educate on red flags of infection to monitor and report.   7/19/18: Patient denied s/s infection and verbalized understanding of red flags to monitor/report

## 2018-07-25 ENCOUNTER — PATIENT OUTREACH (OUTPATIENT)
Dept: INTERNAL MEDICINE CLINIC | Age: 78
End: 2018-07-25

## 2018-07-25 ENCOUNTER — OFFICE VISIT (OUTPATIENT)
Dept: INTERNAL MEDICINE CLINIC | Age: 78
End: 2018-07-25

## 2018-07-25 VITALS
HEIGHT: 65 IN | DIASTOLIC BLOOD PRESSURE: 66 MMHG | SYSTOLIC BLOOD PRESSURE: 112 MMHG | OXYGEN SATURATION: 97 % | HEART RATE: 77 BPM | BODY MASS INDEX: 28.49 KG/M2 | WEIGHT: 171 LBS | RESPIRATION RATE: 14 BRPM | TEMPERATURE: 98.3 F

## 2018-07-25 DIAGNOSIS — Z98.890 S/P AAA (ABDOMINAL AORTIC ANEURYSM) REPAIR: ICD-10-CM

## 2018-07-25 DIAGNOSIS — Z86.79 S/P AAA (ABDOMINAL AORTIC ANEURYSM) REPAIR: ICD-10-CM

## 2018-07-25 DIAGNOSIS — I71.40 AAA (ABDOMINAL AORTIC ANEURYSM) WITHOUT RUPTURE: Primary | ICD-10-CM

## 2018-07-25 NOTE — PROGRESS NOTES
HPI/History  Rowan Austin is a 76 y.o.  female who presents for hospital f/u, 7/17-7/18. NN contacted 7/19. Pt underwent endovascular AAA repair. Doing well since discharge. Bilat groin sites. No signs of local/systemic infection. No cardiopulmonary sxs. No other sxs or complaints. Vitals look fine. Has f/u with vascular on 7/31. Patient Active Problem List   Diagnosis Code    Colon adenomas 2003, last 2014 Dr Sam Zavala D12.6    Hypovitaminosis D E55.9    Venous insufficiency dopplers 2003 I87.2    Osteopenia FRAX 4/14 11 and 2.2 M85.80    Dyslipidemia E78.5    Hypothyroidism due to acquired atrophy of thyroid E03.4    Arthritis, degenerative M19.90    Obesity (BMI 30.0-34. 9) E66.9    Advance directive discussed with patient Z70.80    Primary hypertension I10    Spondylolisthesis, lumbar region M43.16    Spondylosis of lumbar region without myelopathy or radiculopathy M47.816    Scoliosis of lumbar spine M41.9    DDD (degenerative disc disease), lumbar M51.36    AAA (abdominal aortic aneurysm) without rupture ([address] I71.4     Past Medical History:   Diagnosis Date    Colon adenomas     2003 Dr Lucy Norris; 4/13 Dr Sam Zavala    DJD (degenerative joint disease)     Dyslipidemia     FHx: heart disease     Gastritis 2006    on EGD Dr. Murray Pereira, neg sprue    Hypertension     Hypothyroidism 2007    Dr. Governor Stephens Hypovitaminosis D     Lung nodule 06/2016    6 mm RLL (6/16); no change (10/17)    Obesity     peak weight 187 lbs, bmi 33.1 from 4/13; IF 3/18    Osteopenia FRAX 4/12 9.7 and 1.4     DEXA t score 0.6 spine, -1 hip (12/09);  -0.4 spine, -1.3 hip (4/12); -1.2 wrist, -1.6 hip w FRAX 11/2.2 (4/14); -1.0 wrist, -1.6 hip w FRAX 12/2.6 (4/16); -0.7 wrist, -1.6 hip (5/18)    Pancreatitis, chronic ([address] 2018    Psoriasis 2003    on bx Dr. Bala Ndiayedanisha hearing loss     left side Dr Brooks Brain Venous insufficiency 2003    on dopplers    Zoster 05/2016    left T11-12; mild postherpetic neuralgia     Past Surgical History:   Procedure Laterality Date    CARDIAC SURG PROCEDURE UNLIST  09/2016    NST negative    CHEST SURGERY PROCEDURE UNLISTED  06/2016    CTA neg PE, 6 mm subpleural nodule    HX Maureen Bee      Dr. Kavon Lin left    HX CARPAL TUNNEL RELEASE Left     HX CATARACT REMOVAL Bilateral     HX COLONOSCOPY      benign polyp 5/08 Dr Yasmin Means;  4/13 negative Dr. Sherry Stark  2007    neg MRA/MRI head [facility] NEUROLOGICAL PROCEDURE UNLISTED  9/10    CT head negative    VASCULAR SURGERY PROCEDURE UNLIST  2007    neg AAA screen in [facility]     Social History     Social History    Marital status:      Spouse name: N/A    Number of children: N/A    Years of education: N/A     Occupational History    ret office mgr      Social History Main Topics    Smoking status: Never Smoker    Smokeless tobacco: Never Used    Alcohol use Yes      Comment: occasionally    Drug use: No    Sexual activity: Not on file     Other Topics Concern    Not on file     Social History Narrative     Family History   Problem Relation Age of Onset    Breast Cancer Mother     Cancer Mother     Heart Disease Father      Current Outpatient Prescriptions   Medication Sig    oxyCODONE-acetaminophen (PERCOCET) 5-325 mg per tablet Take 1 Tab by mouth every four (4) hours as needed. Max Daily Amount: 6 Tabs.  ezetimibe (ZETIA) 10 mg tablet TAKE 1 TABLET BY MOUTH DAILY    lidocaine (LIDODERM) 5 % Apply patch to the affected area for 12 hours a day and remove for 12 hours a day.  omeprazole (PRILOSEC) 40 mg capsule Take 1 Cap by mouth daily. (Patient taking differently: Take 40 mg by mouth as needed.)    ibuprofen (MOTRIN) 800 mg tablet Take 1 Tab by mouth three (3) times daily (with meals).  lisinopril (PRINIVIL, ZESTRIL) 10 mg tablet Take 1 Tab by mouth daily.     atorvastatin (LIPITOR) 40 mg tablet Take 1 Tab by mouth daily.  diazePAM (VALIUM) 10 mg tablet Take 1 Tab by mouth every twelve (12) hours as needed for Anxiety. Max Daily Amount: 20 mg.  Levothyroxine 75 mcg cap Take one tablet by mouth every other day before breakfast. Alternates with 50mcg    ergocalciferol (VITAMIN D2) 50,000 unit capsule Take 1 Cap by mouth every seven (7) days. (Patient taking differently: Take 50,000 Units by mouth every fourteen (14) days.)    levothyroxine (SYNTHROID) 50 mcg tablet Take 1 Tab by mouth Daily (before breakfast).  aspirin 81 mg chewable tablet Take 1 Tab by mouth daily. No current facility-administered medications for this visit. Allergies   Allergen Reactions    Atenolol Other (comments)     Felt poorly    Cozaar [Losartan] Other (comments)     Headaches    Norvasc [Amlodipine] Other (comments)     Cartwright poorly         Review of Systems  Aside from those included in HPI, remainder of complete ROS negative. Physical Examination  Visit Vitals    /66 (BP 1 Location: Right arm, BP Patient Position: Sitting)    Pulse 77    Temp 98.3 °F (36.8 °C) (Oral)    Resp 14    Ht 5' 4.5\" (1.638 m)    Wt 171 lb (77.6 kg)    SpO2 97%    BMI 28.9 kg/m2     Reviewed notes/summaries and labs. General - Alert and in no acute distress. Pt appears well, comfortable, and in good spirits. Pleasant, engaging. Nontoxic. Not anxious, non-diaphoretic. Mental status - Appropriate mood, behavior, speech content, dress, and thought processes. Pulm - No tachypnea, retractions, or cyanosis. Good respiratory effort. Clear to auscultation bilat. No appreciable wheezes, rales, or rhonchi. Cardiovascular - Normal rate, regular rhythm. No appreciable murmurs or gallops. No peripheral edema or other significant findings. Bilat groin sites look to be doing well with no bleeding, discoloration, signs of infection/cellulitis, or other concerning findings. Assessment and Plan  1. Hospital f/u s/p AAA repair - Pt looks to be doing well. Symptomatically stable. Will monitor and keep f/u with vascular as scheduled. Return/call if needed or developments, ED if ominous sxs. Further planning as warranted. Pt happily agrees with plan. PLEASE NOTE:   This document has been produced using voice recognition software. Unrecognized errors in transcription may be present.     [employer] [address] [place] [address]  [phone]  7/25/2018

## 2018-07-25 NOTE — PROGRESS NOTES
Goals Addressed             Most Recent     Attends follow-up appointments as directed. On track (7/25/2018)             Plan: Notify of upcoming appts. 7/19/18: Patient aware of upcoming appts with PA and vascular. 7/25/18: Attended appt with PA as scheduled.

## 2018-07-25 NOTE — PROGRESS NOTES
1. Have you been to the ER, urgent care clinic or hospitalized since your last visit? YES    2. Have you seen or consulted any other health care providers outside of the [address] [place] [address] since your last visit (Include any pap smears or colon screening)? NO      Do you have an Advanced Directive? NO    Would you like information on Advanced Directives?  NO

## 2018-07-25 NOTE — MR AVS SNAPSHOT
April Peña 
 
 
 [address] [facility], [facility] [address] [facility] [address] 
[phone] Patient: Garry Montana MRN: [id number] Visit Information Date & Time Provider Department Dept. Phone Encounter #  
 7/25/2018  9:00 AM [place], [place] [facility] [phone] [id number] Your Appointments 7/31/2018  2:00 PM  
Follow Up with [facility] ([address] [facility]) Appt Note: D/C  EVAR  
 [address] [place], [place] [address] [address] [facility] [address]  
[phone] [address] [facility] [address] [facility] [address] Upcoming Health Maintenance Date Due Influenza Age 5 to Adult 8/1/2018 MEDICARE YEARLY EXAM 5/8/2019 GLAUCOMA SCREENING Q2Y 5/17/2020 DTaP/Tdap/Td series (2 - Td) 5/10/2027 Allergies as of 7/25/2018  Review Complete On: 7/25/2018 By: Sally Gomezix, LPN Severity Noted Reaction Type Reactions Atenolol    Other (comments) [place] poorly Cozaar [Losartan]    Other (comments) Headaches Norvasc [Amlodipine]    Other (comments) Felt poorly Current Immunizations  Reviewed on 4/8/2015 Name Date Hepatitis A Vaccine 9/1/2008 Influenza High Dose Vaccine PF 10/17/2016 Pneumococcal Conjugate (PCV-13) 5/10/2017 TD Vaccine 9/1/2008 Tdap 5/10/2017 Typhoid Vaccine 9/1/2008 Yellow Fever Vaccine 9/1/2008 ZZZ-RETIRED (DO NOT USE) Pneumococcal Vaccine (Unspecified Type) 9/1/2008 Zoster Vaccine, Live 1/1/2008 Not reviewed this visit Vitals BP Pulse Temp Resp Height(growth percentile) Weight(growth percentile) 112/66 (BP 1 Location: Right arm, BP Patient Position: Sitting) 77 98.3 °F (36.8 °C) (Oral) 14 5' 4.5\" (1.638 m) 171 lb (77.6 kg) SpO2 BMI OB Status Smoking Status 97% 28.9 kg/m2 Postmenopausal Never Smoker Vitals History BMI and BSA Data Body Mass Index Body Surface Area  
 28.9 kg/m 2 1.88 m 2 Preferred Pharmacy Pharmacy Name Phone [address], [facility] [address] [facility] [address] [phone] Your Updated Medication List  
  
   
This list is accurate as of 7/25/18  9:12 AM.  Always use your most recent med list.  
  
  
  
  
 aspirin 81 mg chewable tablet Take 1 Tab by mouth daily. atorvastatin 40 mg tablet Commonly known as:  LIPITOR Take 1 Tab by mouth daily. diazePAM 10 mg tablet Commonly known as:  VALIUM Take 1 Tab by mouth every twelve (12) hours as needed for Anxiety. Max Daily Amount: 20 mg.  
  
 ergocalciferol 50,000 unit capsule Commonly known as:  VITAMIN D2 Take 1 Cap by mouth every seven (7) days. ezetimibe 10 mg tablet Commonly known as:  Ada Somers TAKE 1 TABLET BY MOUTH DAILY  
  
 ibuprofen 800 mg tablet Commonly known as:  MOTRIN Take 1 Tab by mouth three (3) times daily (with meals). * levothyroxine 50 mcg tablet Commonly known as:  SYNTHROID Take 1 Tab by mouth Daily (before breakfast). * Levothyroxine 75 mcg Cap Take one tablet by mouth every other day before breakfast. Alternates with 50mcg  
  
 lidocaine 5 % Commonly known as:  Cameron Patch Apply patch to the affected area for 12 hours a day and remove for 12 hours a day. lisinopril 10 mg tablet Commonly known as:  Brandy Lights Take 1 Tab by mouth daily. omeprazole 40 mg capsule Commonly known as:  PRILOSEC Take 1 Cap by mouth daily. oxyCODONE-acetaminophen 5-325 mg per tablet Commonly known as:  PERCOCET Take 1 Tab by mouth every four (4) hours as needed. Max Daily Amount: 6 Tabs. * Notice: This list has 2 medication(s) that are the same as other medications prescribed for you. Read the directions carefully, and ask your doctor or other care provider to review them with you. Introducing [address]! Dear Jose L Umanzor: Thank you for requesting a Dish[employer]fm account. Our records indicate that you already have an active Dish.fm account. You can access your account anytime at [email or web address] Did you know that you can access your hospital and ER discharge instructions at any time in Dish.fm? You can also review all of your test results from your hospital stay or ER visit. Additional Information If you have questions, please visit the Frequently Asked Questions section of the [email or web address] website at [email or web address] Remember, Dish.fm is NOT to be used for urgent needs. For medical emergencies, dial 911. Now available from your iPhone and Android! Please provide this summary of care documentation to your next provider. Your primary care clinician is listed as Miguelangel Currie. If you have any questions after today's visit, please call [phone].

## 2018-07-31 ENCOUNTER — OFFICE VISIT (OUTPATIENT)
Dept: VASCULAR SURGERY | Age: 78
End: 2018-07-31

## 2018-07-31 VITALS
HEIGHT: 65 IN | DIASTOLIC BLOOD PRESSURE: 60 MMHG | HEART RATE: 76 BPM | BODY MASS INDEX: 28.49 KG/M2 | WEIGHT: 171 LBS | SYSTOLIC BLOOD PRESSURE: 110 MMHG | RESPIRATION RATE: 17 BRPM

## 2018-07-31 DIAGNOSIS — I71.40 AAA (ABDOMINAL AORTIC ANEURYSM) WITHOUT RUPTURE: Primary | ICD-10-CM

## 2018-07-31 DIAGNOSIS — M43.16 SPONDYLOLISTHESIS, LUMBAR REGION: ICD-10-CM

## 2018-07-31 DIAGNOSIS — M51.36 DDD (DEGENERATIVE DISC DISEASE), LUMBAR: ICD-10-CM

## 2018-07-31 NOTE — PROGRESS NOTES
Keli Spivey [facility]    Chief Complaint   Patient presents with    Surgical Follow-up       History and Physical    Marry Best is a 76 y.o. female who presents to the office today in follow-up after EVAR for AAA. She is doing quite well postoperatively. She denies any abdominal pain or back pain outside of her chronic low back pain. She denies any symptoms of claudication and no rest pain. No fevers or chills. No issues with her puncture sites and she states that groins are healing well. She does report some fatigue but feels this may be slightly improving. Otherwise she is without complaint in the office today.     Past Medical History:   Diagnosis Date    Colon adenomas     2003 Dr Espinoza Diss; 4/13 Dr Merline Hill    DJD (degenerative joint disease)     Dyslipidemia     FHx: heart disease     Gastritis 2006    on EGD Dr. Norman Crowley, neg sprue    Hypertension     Hypothyroidism 2007    Dr. Mahnaz Chauhan Hypovitaminosis D     Lung nodule 06/2016    6 mm RLL (6/16); no change (10/17)    Obesity     peak weight 187 lbs, bmi 33.1 from 4/13; IF 3/18    Osteopenia FRAX 4/12 9.7 and 1.4     DEXA t score 0.6 spine, -1 hip (12/09);  -0.4 spine, -1.3 hip (4/12); -1.2 wrist, -1.6 hip w FRAX 11/2.2 (4/14); -1.0 wrist, -1.6 hip w FRAX 12/2.6 (4/16); -0.7 wrist, -1.6 hip (5/18)    Pancreatitis, chronic ([address] 2018    Psoriasis 2003    on bx Dr. Marrero  Sensorineural hearing loss     left side Dr To Jeffries Venous insufficiency 2003    on dopplers    Zoster 05/2016    left T11-12; mild postherpetic neuralgia     Past Surgical History:   Procedure Laterality Date    CARDIAC SURG PROCEDURE UNLIST  09/2016    NST negative    CHEST SURGERY PROCEDURE UNLISTED  06/2016    CTA neg PE, 6 mm subpleural nodule    HX CARPAL TUNNEL RELEASE      Dr. Mary Odell left    HX CARPAL TUNNEL RELEASE Left     HX CATARACT REMOVAL Bilateral     HX COLONOSCOPY      benign polyp 5/08 Dr Espinoza Diss;  4/13 negative Dr. Joseph Castaneda ORTHOPAEDIC      HX TONSILLECTOMY      NEUROLOGICAL PROCEDURE UNLISTED  2007    neg MRA/MRI head in [address] [place] [address]  9/10    CT head negative    VASCULAR SURGERY PROCEDURE UNLIST  2007    neg AAA screen [facility]     Patient Active Problem List   Diagnosis Code    Colon adenomas 2003, last 2014 Dr Cordelia Johnson D12.6    Hypovitaminosis D E55.9    Venous insufficiency dopplers 2003 I87.2    Osteopenia FRAX 4/14 11 and 2.2 M85.80    Dyslipidemia E78.5    Hypothyroidism due to acquired atrophy of thyroid E03.4    Arthritis, degenerative M19.90    Obesity (BMI 30.0-34. 9) E66.9    Advance directive discussed with patient Z70.80    Primary hypertension I10    Spondylolisthesis, lumbar region M43.16    Spondylosis of lumbar region without myelopathy or radiculopathy M47.816    Scoliosis of lumbar spine M41.9    DDD (degenerative disc disease), lumbar M51.36    AAA (abdominal aortic aneurysm) without rupture (HCC) I71.4     Current Outpatient Prescriptions   Medication Sig Dispense Refill    oxyCODONE-acetaminophen (PERCOCET) 5-325 mg per tablet Take 1 Tab by mouth every four (4) hours as needed. Max Daily Amount: 6 Tabs. 30 Tab 0    ezetimibe (ZETIA) 10 mg tablet TAKE 1 TABLET BY MOUTH DAILY 90 Tab 3    lidocaine (LIDODERM) 5 % Apply patch to the affected area for 12 hours a day and remove for 12 hours a day. 30 Each 5    omeprazole (PRILOSEC) 40 mg capsule Take 1 Cap by mouth daily. (Patient taking differently: Take 40 mg by mouth as needed.) 90 Cap 1    ibuprofen (MOTRIN) 800 mg tablet Take 1 Tab by mouth three (3) times daily (with meals). 45 Tab 0    lisinopril (PRINIVIL, ZESTRIL) 10 mg tablet Take 1 Tab by mouth daily. 90 Tab 3    atorvastatin (LIPITOR) 40 mg tablet Take 1 Tab by mouth daily. 90 Tab 3    diazePAM (VALIUM) 10 mg tablet Take 1 Tab by mouth every twelve (12) hours as needed for Anxiety.  Max Daily Amount: 20 mg. 60 Tab 1    Levothyroxine 75 mcg cap Take one tablet by mouth every other day before breakfast. Alternates with 50mcg 45 Cap 3    ergocalciferol (VITAMIN D2) 50,000 unit capsule Take 1 Cap by mouth every seven (7) days. (Patient taking differently: Take 50,000 Units by mouth every fourteen (14) days.) 12 Cap 1    levothyroxine (SYNTHROID) 50 mcg tablet Take 1 Tab by mouth Daily (before breakfast). 90 Tab 3    aspirin 81 mg chewable tablet Take 1 Tab by mouth daily. 90 Tab 3     Allergies   Allergen Reactions    Atenolol Other (comments)     Felt poorly    Cozaar [Losartan] Other (comments)     Headaches    Norvasc [Amlodipine] Other (comments)     [place] poorly         Physical Exam:    Visit Vitals    /60 (BP 1 Location: Left arm, BP Patient Position: Sitting)    Pulse 76    Resp 17    Ht 5' 4.5\" (1.638 m)    Wt 171 lb (77.6 kg)    BMI 28.9 kg/m2      General: Well-appearing elderly female in no acute distress   HEENT: EOMI, no scleral icterus is noted. Pulmonary: No increased work or breathing is noted. Abdomen: nondistended. Extremities: Warm and well perfused bilaterally. Pt has no significant BLE edema. Neuro: Cranial nerves II through XII are grossly intact   Integument: No ulcerations are identified visibly      Impression and Plan:  Laurence Benavides is a 76 y.o. female with AAA now ~2 weeks status post EVAR. She is doing well postoperatively. Discussed that we will obtain follow-up CTA scan in 2-3 weeks and she will follow-up afterwards. Discussed that if her CTA scan looks good and she continues to feel well then we can repeat imaging with ultrasound in 6 months. Plan was discussed. Patient expresses understanding and agrees. Follow-up Disposition:  Return in about 3 weeks (around 8/21/2018). Viv Rhodes  [phone]        PLEASE NOTE:  This document has been produced using voice recognition software. Unrecognized errors in transcription may be present.

## 2018-07-31 NOTE — MR AVS SNAPSHOT
[address] [facility] [address] 
 
 
 [address] [place] [address]37 [address] [facility] [address] 
[phone] Patient: Agustín Little MRN: [id number] SHUKRI:[birth year] Visit Information Date & Time Provider Department Dept. Phone Encounter #  
 7/31/2018  2:00 PM [facility] [phone] Follow-up Instructions Return in about 3 weeks (around 8/21/2018). Your Appointments 8/23/2018 10:30 AM  
Follow Up with [facility] ([facility]) Appt Note: 3 WEEK FOLLOW UP AFTER CTA AT [facility] WILL CALL TO [address] [place] [address] 642 [address] [facility] [address]  
[phone] [address] [place] [address]  
  
    
 11/6/2018  9:00 AM  
LAB with [address] [facility] NURSE VISIT [facility] ([facility]) Appt Note: lab  
 [address], [address] [place] [address] [address] [place]  
  
   
 [address], [address]  
  
    
 11/13/2018 10:20 AM  
Office Visit with Aidee Velázquez MD  
[facility]) Appt Note: 6 month f/u  
 [address] [facility], [address] [address] [address] [place]  
  
   
 [address], [address] Upcoming Health Maintenance Date Due Influenza Age 5 to Adult 8/1/2018 MEDICARE YEARLY EXAM 5/8/2019 GLAUCOMA SCREENING Q2Y 5/17/2020 DTaP/Tdap/Td series (2 - Td) 5/10/2027 Allergies as of 7/31/2018  Review Complete On: 7/31/2018 By: GARRETT Salcido Severity Noted Reaction Type Reactions Atenolol    Other (comments) [place] poorly Cozaar [Losartan]    Other (comments) Headaches Norvasc [Amlodipine]    Other (comments) Felt poorly Current Immunizations  Reviewed on 4/8/2015 Name Date Hepatitis A Vaccine 9/1/2008 Influenza High Dose Vaccine PF 10/17/2016 Pneumococcal Conjugate (PCV-13) 5/10/2017 TD Vaccine 9/1/2008 Tdap 5/10/2017 Typhoid Vaccine 9/1/2008 Yellow Fever Vaccine 9/1/2008 ZZZ-RETIRED (DO NOT USE) Pneumococcal Vaccine (Unspecified Type) 9/1/2008 Zoster Vaccine, Live 1/1/2008 Not reviewed this visit You Were Diagnosed With   
  
 Codes Comments AAA (abdominal aortic aneurysm) without rupture (HCC)    -  Primary ICD-10-CM: I71.4 ICD-9-CM: 703. 4 Vitals BP Pulse Resp Height(growth percentile) Weight(growth percentile) BMI  
 110/60 (BP 1 Location: Left arm, BP Patient Position: Sitting) 76 17 5' 4.5\" (1.638 m) 171 lb (77.6 kg) 28.9 kg/m2 OB Status Smoking Status Postmenopausal Never Smoker Vitals History BMI and BSA Data Body Mass Index Body Surface Area  
 28.9 kg/m 2 1.88 m 2 Preferred Pharmacy Pharmacy Name Phone [address] [place] [address], [address] [place] [address] [phone] Your Updated Medication List  
  
   
This list is accurate as of 7/31/18  2:19 PM.  Always use your most recent med list.  
  
  
  
  
 aspirin 81 mg chewable tablet Take 1 Tab by mouth daily. atorvastatin 40 mg tablet Commonly known as:  LIPITOR Take 1 Tab by mouth daily. diazePAM 10 mg tablet Commonly known as:  VALIUM Take 1 Tab by mouth every twelve (12) hours as needed for Anxiety. Max Daily Amount: 20 mg.  
  
 ergocalciferol 50,000 unit capsule Commonly known as:  VITAMIN D2 Take 1 Cap by mouth every seven (7) days. ezetimibe 10 mg tablet Commonly known as:  Garcia Duane TAKE 1 TABLET BY MOUTH DAILY  
  
 ibuprofen 800 mg tablet Commonly known as:  MOTRIN Take 1 Tab by mouth three (3) times daily (with meals). * levothyroxine 50 mcg tablet Commonly known as:  SYNTHROID Take 1 Tab by mouth Daily (before breakfast). * Levothyroxine 75 mcg Cap Take one tablet by mouth every other day before breakfast. Alternates with 50mcg  
  
 lidocaine 5 % Commonly known as:  Yeni Rhododendron Apply patch to the affected area for 12 hours a day and remove for 12 hours a day. lisinopril 10 mg tablet Commonly known as:  Merilynn Lan Take 1 Tab by mouth daily. omeprazole 40 mg capsule Commonly known as:  PRILOSEC Take 1 Cap by mouth daily. oxyCODONE-acetaminophen 5-325 mg per tablet Commonly known as:  PERCOCET Take 1 Tab by mouth every four (4) hours as needed. Max Daily Amount: 6 Tabs. * Notice: This list has 2 medication(s) that are the same as other medications prescribed for you. Read the directions carefully, and ask your doctor or other care provider to review them with you. Follow-up Instructions Return in about 3 weeks (around 8/21/2018). To-Do List   
 08/17/2018 Imaging:  CTA ABD PELV W WO CONT Introducing [facility]! Dear Sandy Tirado: Thank you for requesting a TidePool account. Our records indicate that you already have an active TidePool account. You can access your account anytime at [email or web address] Did you know that you can access your hospital and ER discharge instructions at any time in TidePool? You can also review all of your test results from your hospital stay or ER visit. Additional Information If you have questions, please visit the Frequently Asked Questions section of the TidePool website at [email or web address] Remember, TidePool is NOT to be used for urgent needs. For medical emergencies, dial 911. Now available from your iPhone and Android! Please provide this summary of care documentation to your next provider. Your primary care clinician is listed as Ghislaine Larios. If you have any questions after today's visit, please call [phone].

## 2018-08-01 ENCOUNTER — PATIENT OUTREACH (OUTPATIENT)
Dept: INTERNAL MEDICINE CLINIC | Age: 78
End: 2018-08-01

## 2018-08-01 NOTE — PROGRESS NOTES
Goals Addressed Most Recent  Attends follow-up appointments as directed. On track (8/1/2018) Plan: Notify of upcoming appts. 7/19/18: Patient aware of upcoming appts with PA and vascular. 7/25/18: Attended appt with PA as scheduled. 8/1/18: Patient attended vascular appt on 7/31 as scheduled.

## 2018-08-07 ENCOUNTER — HOSPITAL ENCOUNTER (OUTPATIENT)
Dept: CT IMAGING | Age: 78
Discharge: HOME | End: 2018-08-07
Attending: PHYSICIAN ASSISTANT
Payer: MEDICARE

## 2018-08-07 DIAGNOSIS — I71.40 AAA (ABDOMINAL AORTIC ANEURYSM) WITHOUT RUPTURE: ICD-10-CM

## 2018-08-07 PROCEDURE — 74011636320 HC RX REV CODE- 636/320: Performed by: PHYSICIAN ASSISTANT

## 2018-08-07 PROCEDURE — 74174 CTA ABD&PLVS W/CONTRAST: CPT

## 2018-08-07 RX ADMIN — IOPAMIDOL 100 ML: 612 INJECTION, SOLUTION INTRAVENOUS at 07:00

## 2018-08-14 ENCOUNTER — PATIENT OUTREACH (OUTPATIENT)
Dept: INTERNAL MEDICINE CLINIC | Age: 78
End: 2018-08-14

## 2018-08-15 ENCOUNTER — TELEPHONE (OUTPATIENT)
Dept: VASCULAR SURGERY | Age: 78
End: 2018-08-15

## 2018-08-15 ENCOUNTER — ANESTHESIA EVENT (OUTPATIENT)
Dept: ENDOSCOPY | Age: 78
DRG: 444 | End: 2018-08-15
Payer: MEDICARE

## 2018-08-15 NOTE — TELEPHONE ENCOUNTER
Patient called, asked to speak with you. She stated that Dr Jitendra Teixeira performed a ENDOVASCULAR REPAIR on 07/17 and Dr Edelmira King is going to do a ENDOSCOPY    @  and she was wondering if it was too soon for her to have this or if it is ok. Please call her.

## 2018-08-16 ENCOUNTER — HOSPITAL ENCOUNTER (INPATIENT)
Age: 78
LOS: 1 days | Discharge: HOME | DRG: 444 | End: 2018-08-17
Attending: INTERNAL MEDICINE | Admitting: HOSPITALIST
Payer: MEDICARE

## 2018-08-16 ENCOUNTER — APPOINTMENT (OUTPATIENT)
Dept: GENERAL RADIOLOGY | Age: 78
DRG: 444 | End: 2018-08-16
Attending: INTERNAL MEDICINE
Payer: MEDICARE

## 2018-08-16 ENCOUNTER — ANESTHESIA (OUTPATIENT)
Dept: ENDOSCOPY | Age: 78
DRG: 444 | End: 2018-08-16
Payer: MEDICARE

## 2018-08-16 DIAGNOSIS — K85.10 ACUTE BILIARY PANCREATITIS, UNSPECIFIED COMPLICATION STATUS: ICD-10-CM

## 2018-08-16 PROBLEM — K85.90 ACUTE PANCREATITIS: Status: ACTIVE | Noted: 2018-08-16

## 2018-08-16 LAB
ALBUMIN SERPL-MCNC: 3.3 G/DL (ref 3.4–5)
ALBUMIN/GLOB SERPL: 0.9 {RATIO} (ref 0.8–1.7)
ALP SERPL-CCNC: 111 U/L (ref 45–117)
ALT SERPL-CCNC: 34 U/L (ref 13–56)
ANION GAP SERPL CALC-SCNC: 8 MMOL/L (ref 3–18)
AST SERPL-CCNC: 44 U/L (ref 15–37)
BASOPHILS # BLD: 0 K/UL (ref 0–0.1)
BASOPHILS NFR BLD: 0 % (ref 0–2)
BILIRUB SERPL-MCNC: 1.4 MG/DL (ref 0.2–1)
BUN SERPL-MCNC: 18 MG/DL (ref 7–18)
BUN/CREAT SERPL: 21 (ref 12–20)
CALCIUM SERPL-MCNC: 8.8 MG/DL (ref 8.5–10.1)
CHLORIDE SERPL-SCNC: 107 MMOL/L (ref 100–108)
CO2 SERPL-SCNC: 24 MMOL/L (ref 21–32)
CREAT SERPL-MCNC: 0.87 MG/DL (ref 0.6–1.3)
DIFFERENTIAL METHOD BLD: ABNORMAL
EOSINOPHIL # BLD: 0 K/UL (ref 0–0.4)
EOSINOPHIL NFR BLD: 0 % (ref 0–5)
ERYTHROCYTE [DISTWIDTH] IN BLOOD BY AUTOMATED COUNT: 13.6 % (ref 11.6–14.5)
GLOBULIN SER CALC-MCNC: 3.7 G/DL (ref 2–4)
GLUCOSE SERPL-MCNC: 141 MG/DL (ref 74–99)
HCT VFR BLD AUTO: 39.6 % (ref 35–45)
HGB BLD-MCNC: 13 G/DL (ref 12–16)
LIPASE SERPL-CCNC: 8028 U/L (ref 73–393)
LYMPHOCYTES # BLD: 1 K/UL (ref 0.9–3.6)
LYMPHOCYTES NFR BLD: 9 % (ref 21–52)
MCH RBC QN AUTO: 30.6 PG (ref 24–34)
MCHC RBC AUTO-ENTMCNC: 32.8 G/DL (ref 31–37)
MCV RBC AUTO: 93.2 FL (ref 74–97)
MONOCYTES # BLD: 0.4 K/UL (ref 0.05–1.2)
MONOCYTES NFR BLD: 4 % (ref 3–10)
NEUTS SEG # BLD: 9.5 K/UL (ref 1.8–8)
NEUTS SEG NFR BLD: 87 % (ref 40–73)
PLATELET # BLD AUTO: 179 K/UL (ref 135–420)
PMV BLD AUTO: 11.3 FL (ref 9.2–11.8)
POTASSIUM SERPL-SCNC: 3.9 MMOL/L (ref 3.5–5.5)
PROT SERPL-MCNC: 7 G/DL (ref 6.4–8.2)
RBC # BLD AUTO: 4.25 M/UL (ref 4.2–5.3)
SODIUM SERPL-SCNC: 139 MMOL/L (ref 136–145)
WBC # BLD AUTO: 10.9 K/UL (ref 4.6–13.2)

## 2018-08-16 PROCEDURE — 74330 X-RAY BILE/PANC ENDOSCOPY: CPT

## 2018-08-16 PROCEDURE — C1726 CATH, BAL DIL, NON-VASCULAR: HCPCS | Performed by: INTERNAL MEDICINE

## 2018-08-16 PROCEDURE — 85025 COMPLETE CBC W/AUTO DIFF WBC: CPT | Performed by: INTERNAL MEDICINE

## 2018-08-16 PROCEDURE — 77030006969 HC BSKT BILI RTVR BSC -D: Performed by: INTERNAL MEDICINE

## 2018-08-16 PROCEDURE — 77030009426 HC FCPS BIOP ENDOSC BSC -B: Performed by: INTERNAL MEDICINE

## 2018-08-16 PROCEDURE — 99218 HC RM OBSERVATION: CPT

## 2018-08-16 PROCEDURE — BF101ZZ FLUOROSCOPY OF BILE DUCTS USING LOW OSMOLAR CONTRAST: ICD-10-PCS | Performed by: INTERNAL MEDICINE

## 2018-08-16 PROCEDURE — 77030018846 HC SOL IRR STRL H20 ICUM -A: Performed by: INTERNAL MEDICINE

## 2018-08-16 PROCEDURE — 74011250636 HC RX REV CODE- 250/636: Performed by: NURSE ANESTHETIST, CERTIFIED REGISTERED

## 2018-08-16 PROCEDURE — 74011250636 HC RX REV CODE- 250/636: Performed by: HOSPITALIST

## 2018-08-16 PROCEDURE — 77030012596 HC SPHNTOM BILI BSC -E: Performed by: INTERNAL MEDICINE

## 2018-08-16 PROCEDURE — 74011000258 HC RX REV CODE- 258: Performed by: INTERNAL MEDICINE

## 2018-08-16 PROCEDURE — C9113 INJ PANTOPRAZOLE SODIUM, VIA: HCPCS | Performed by: INTERNAL MEDICINE

## 2018-08-16 PROCEDURE — 74011000250 HC RX REV CODE- 250: Performed by: INTERNAL MEDICINE

## 2018-08-16 PROCEDURE — 77030018712 HC DEV BLLN INFL BSC -B: Performed by: INTERNAL MEDICINE

## 2018-08-16 PROCEDURE — 83690 ASSAY OF LIPASE: CPT | Performed by: INTERNAL MEDICINE

## 2018-08-16 PROCEDURE — 74011250636 HC RX REV CODE- 250/636

## 2018-08-16 PROCEDURE — 74011636320 HC RX REV CODE- 636/320: Performed by: INTERNAL MEDICINE

## 2018-08-16 PROCEDURE — 77030007288 HC DEV LOK BILI BSC -A: Performed by: INTERNAL MEDICINE

## 2018-08-16 PROCEDURE — 74011250636 HC RX REV CODE- 250/636: Performed by: INTERNAL MEDICINE

## 2018-08-16 PROCEDURE — 74011000250 HC RX REV CODE- 250

## 2018-08-16 PROCEDURE — 76040000007: Performed by: INTERNAL MEDICINE

## 2018-08-16 PROCEDURE — 74011250637 HC RX REV CODE- 250/637: Performed by: INTERNAL MEDICINE

## 2018-08-16 PROCEDURE — 77030007290 HC DEV RTVR RTH STRC -G: Performed by: INTERNAL MEDICINE

## 2018-08-16 PROCEDURE — 0FC98ZZ EXTIRPATION OF MATTER FROM COMMON BILE DUCT, VIA NATURAL OR ARTIFICIAL OPENING ENDOSCOPIC: ICD-10-PCS | Performed by: INTERNAL MEDICINE

## 2018-08-16 PROCEDURE — 80053 COMPREHEN METABOLIC PANEL: CPT | Performed by: INTERNAL MEDICINE

## 2018-08-16 PROCEDURE — 77030009038 HC CATH BILI STN RTVR BSC -C: Performed by: INTERNAL MEDICINE

## 2018-08-16 PROCEDURE — 77030031670 HC DEV INFL ENDOTEK BIG60 MRTM -B: Performed by: INTERNAL MEDICINE

## 2018-08-16 PROCEDURE — 36415 COLL VENOUS BLD VENIPUNCTURE: CPT | Performed by: INTERNAL MEDICINE

## 2018-08-16 PROCEDURE — 74011000250 HC RX REV CODE- 250: Performed by: NURSE ANESTHETIST, CERTIFIED REGISTERED

## 2018-08-16 PROCEDURE — 76060000032 HC ANESTHESIA 0.5 TO 1 HR: Performed by: INTERNAL MEDICINE

## 2018-08-16 RX ORDER — HYDROMORPHONE HYDROCHLORIDE 2 MG/ML
1 INJECTION, SOLUTION INTRAMUSCULAR; INTRAVENOUS; SUBCUTANEOUS
Status: DISCONTINUED | OUTPATIENT
Start: 2018-08-16 | End: 2018-08-18 | Stop reason: HOSPADM

## 2018-08-16 RX ORDER — DEXTROSE 50 % IN WATER (D50W) INTRAVENOUS SYRINGE
25-50 AS NEEDED
Status: DISCONTINUED | OUTPATIENT
Start: 2018-08-16 | End: 2018-08-18 | Stop reason: HOSPADM

## 2018-08-16 RX ORDER — ACETAMINOPHEN 500 MG
500 TABLET ORAL ONCE
Status: COMPLETED | OUTPATIENT
Start: 2018-08-16 | End: 2018-08-16

## 2018-08-16 RX ORDER — ONDANSETRON 2 MG/ML
4 INJECTION INTRAMUSCULAR; INTRAVENOUS
Status: DISCONTINUED | OUTPATIENT
Start: 2018-08-16 | End: 2018-08-18 | Stop reason: HOSPADM

## 2018-08-16 RX ORDER — SODIUM CHLORIDE 0.9 % (FLUSH) 0.9 %
5-10 SYRINGE (ML) INJECTION AS NEEDED
Status: DISCONTINUED | OUTPATIENT
Start: 2018-08-16 | End: 2018-08-18 | Stop reason: HOSPADM

## 2018-08-16 RX ORDER — HEPARIN SODIUM 5000 [USP'U]/ML
5000 INJECTION, SOLUTION INTRAVENOUS; SUBCUTANEOUS EVERY 8 HOURS
Status: DISCONTINUED | OUTPATIENT
Start: 2018-08-16 | End: 2018-08-18 | Stop reason: HOSPADM

## 2018-08-16 RX ORDER — HYDROMORPHONE HYDROCHLORIDE 1 MG/ML
1 INJECTION, SOLUTION INTRAMUSCULAR; INTRAVENOUS; SUBCUTANEOUS
Status: DISCONTINUED | OUTPATIENT
Start: 2018-08-16 | End: 2018-08-16 | Stop reason: RX

## 2018-08-16 RX ORDER — LIDOCAINE HYDROCHLORIDE 10 MG/ML
0.1 INJECTION, SOLUTION EPIDURAL; INFILTRATION; INTRACAUDAL; PERINEURAL AS NEEDED
Status: DISCONTINUED | OUTPATIENT
Start: 2018-08-16 | End: 2018-08-18 | Stop reason: HOSPADM

## 2018-08-16 RX ORDER — SODIUM CHLORIDE 0.9 % (FLUSH) 0.9 %
5-10 SYRINGE (ML) INJECTION EVERY 8 HOURS
Status: DISCONTINUED | OUTPATIENT
Start: 2018-08-16 | End: 2018-08-18 | Stop reason: HOSPADM

## 2018-08-16 RX ORDER — MORPHINE SULFATE 2 MG/ML
2 INJECTION, SOLUTION INTRAMUSCULAR; INTRAVENOUS AS NEEDED
Status: DISCONTINUED | OUTPATIENT
Start: 2018-08-16 | End: 2018-08-18 | Stop reason: HOSPADM

## 2018-08-16 RX ORDER — MAGNESIUM SULFATE 100 %
4 CRYSTALS MISCELLANEOUS AS NEEDED
Status: DISCONTINUED | OUTPATIENT
Start: 2018-08-16 | End: 2018-08-18 | Stop reason: HOSPADM

## 2018-08-16 RX ORDER — ACETAMINOPHEN 325 MG/1
650 TABLET ORAL
Status: DISCONTINUED | OUTPATIENT
Start: 2018-08-16 | End: 2018-08-18 | Stop reason: HOSPADM

## 2018-08-16 RX ORDER — SODIUM CHLORIDE, SODIUM LACTATE, POTASSIUM CHLORIDE, CALCIUM CHLORIDE 600; 310; 30; 20 MG/100ML; MG/100ML; MG/100ML; MG/100ML
50 INJECTION, SOLUTION INTRAVENOUS CONTINUOUS
Status: DISCONTINUED | OUTPATIENT
Start: 2018-08-16 | End: 2018-08-16

## 2018-08-16 RX ORDER — HYDROMORPHONE HYDROCHLORIDE 2 MG/ML
INJECTION, SOLUTION INTRAMUSCULAR; INTRAVENOUS; SUBCUTANEOUS
Status: COMPLETED
Start: 2018-08-16 | End: 2018-08-16

## 2018-08-16 RX ORDER — SODIUM CHLORIDE, SODIUM LACTATE, POTASSIUM CHLORIDE, CALCIUM CHLORIDE 600; 310; 30; 20 MG/100ML; MG/100ML; MG/100ML; MG/100ML
75 INJECTION, SOLUTION INTRAVENOUS CONTINUOUS
Status: DISCONTINUED | OUTPATIENT
Start: 2018-08-16 | End: 2018-08-16

## 2018-08-16 RX ORDER — ONDANSETRON 2 MG/ML
4 INJECTION INTRAMUSCULAR; INTRAVENOUS ONCE
Status: COMPLETED | OUTPATIENT
Start: 2018-08-16 | End: 2018-08-16

## 2018-08-16 RX ORDER — SODIUM CHLORIDE, SODIUM LACTATE, POTASSIUM CHLORIDE, CALCIUM CHLORIDE 600; 310; 30; 20 MG/100ML; MG/100ML; MG/100ML; MG/100ML
150 INJECTION, SOLUTION INTRAVENOUS CONTINUOUS
Status: DISCONTINUED | OUTPATIENT
Start: 2018-08-16 | End: 2018-08-16

## 2018-08-16 RX ORDER — GLYCOPYRROLATE 0.2 MG/ML
INJECTION INTRAMUSCULAR; INTRAVENOUS AS NEEDED
Status: DISCONTINUED | OUTPATIENT
Start: 2018-08-16 | End: 2018-08-16 | Stop reason: HOSPADM

## 2018-08-16 RX ORDER — FENTANYL CITRATE 50 UG/ML
INJECTION, SOLUTION INTRAMUSCULAR; INTRAVENOUS AS NEEDED
Status: DISCONTINUED | OUTPATIENT
Start: 2018-08-16 | End: 2018-08-16 | Stop reason: HOSPADM

## 2018-08-16 RX ORDER — OXYCODONE AND ACETAMINOPHEN 5; 325 MG/1; MG/1
1 TABLET ORAL
Status: DISCONTINUED | OUTPATIENT
Start: 2018-08-16 | End: 2018-08-16

## 2018-08-16 RX ORDER — PROPOFOL 10 MG/ML
INJECTION, EMULSION INTRAVENOUS
Status: DISCONTINUED | OUTPATIENT
Start: 2018-08-16 | End: 2018-08-16 | Stop reason: HOSPADM

## 2018-08-16 RX ORDER — SODIUM CHLORIDE, SODIUM LACTATE, POTASSIUM CHLORIDE, CALCIUM CHLORIDE 600; 310; 30; 20 MG/100ML; MG/100ML; MG/100ML; MG/100ML
125 INJECTION, SOLUTION INTRAVENOUS CONTINUOUS
Status: DISCONTINUED | OUTPATIENT
Start: 2018-08-16 | End: 2018-08-18 | Stop reason: HOSPADM

## 2018-08-16 RX ORDER — PROPOFOL 10 MG/ML
INJECTION, EMULSION INTRAVENOUS AS NEEDED
Status: DISCONTINUED | OUTPATIENT
Start: 2018-08-16 | End: 2018-08-16 | Stop reason: HOSPADM

## 2018-08-16 RX ADMIN — GLYCOPYRROLATE 0.2 MG: 0.2 INJECTION INTRAMUSCULAR; INTRAVENOUS at 08:29

## 2018-08-16 RX ADMIN — SODIUM CHLORIDE, SODIUM LACTATE, POTASSIUM CHLORIDE, AND CALCIUM CHLORIDE 75 ML/HR: 600; 310; 30; 20 INJECTION, SOLUTION INTRAVENOUS at 18:12

## 2018-08-16 RX ADMIN — FENTANYL CITRATE 50 MCG: 50 INJECTION, SOLUTION INTRAMUSCULAR; INTRAVENOUS at 08:52

## 2018-08-16 RX ADMIN — SODIUM CHLORIDE, SODIUM LACTATE, POTASSIUM CHLORIDE, AND CALCIUM CHLORIDE 125 ML/HR: 600; 310; 30; 20 INJECTION, SOLUTION INTRAVENOUS at 13:53

## 2018-08-16 RX ADMIN — SODIUM CHLORIDE, SODIUM LACTATE, POTASSIUM CHLORIDE, AND CALCIUM CHLORIDE 75 ML/HR: 600; 310; 30; 20 INJECTION, SOLUTION INTRAVENOUS at 09:41

## 2018-08-16 RX ADMIN — PROPOFOL 20 MG: 10 INJECTION, EMULSION INTRAVENOUS at 08:54

## 2018-08-16 RX ADMIN — PROPOFOL 50 MCG/KG/MIN: 10 INJECTION, EMULSION INTRAVENOUS at 08:35

## 2018-08-16 RX ADMIN — PROMETHAZINE HYDROCHLORIDE 12.5 MG: 25 INJECTION INTRAMUSCULAR; INTRAVENOUS at 16:50

## 2018-08-16 RX ADMIN — ACETAMINOPHEN 500 MG: 500 TABLET, FILM COATED ORAL at 12:46

## 2018-08-16 RX ADMIN — FAMOTIDINE 20 MG: 10 INJECTION, SOLUTION INTRAVENOUS at 07:47

## 2018-08-16 RX ADMIN — HEPARIN SODIUM 5000 UNITS: 5000 INJECTION, SOLUTION INTRAVENOUS; SUBCUTANEOUS at 18:11

## 2018-08-16 RX ADMIN — Medication 10 ML: at 21:13

## 2018-08-16 RX ADMIN — SODIUM CHLORIDE, SODIUM LACTATE, POTASSIUM CHLORIDE, AND CALCIUM CHLORIDE 50 ML/HR: 600; 310; 30; 20 INJECTION, SOLUTION INTRAVENOUS at 07:40

## 2018-08-16 RX ADMIN — Medication 10 ML: at 18:13

## 2018-08-16 RX ADMIN — SODIUM CHLORIDE, SODIUM LACTATE, POTASSIUM CHLORIDE, AND CALCIUM CHLORIDE 75 ML/HR: 600; 310; 30; 20 INJECTION, SOLUTION INTRAVENOUS at 20:46

## 2018-08-16 RX ADMIN — FENTANYL CITRATE 50 MCG: 50 INJECTION, SOLUTION INTRAMUSCULAR; INTRAVENOUS at 08:34

## 2018-08-16 RX ADMIN — ONDANSETRON 4 MG: 2 INJECTION, SOLUTION INTRAMUSCULAR; INTRAVENOUS at 14:21

## 2018-08-16 RX ADMIN — PROPOFOL 40 MG: 10 INJECTION, EMULSION INTRAVENOUS at 08:33

## 2018-08-16 RX ADMIN — HYDROMORPHONE HYDROCHLORIDE 1 MG: 2 INJECTION INTRAMUSCULAR; INTRAVENOUS; SUBCUTANEOUS at 14:23

## 2018-08-16 RX ADMIN — SODIUM CHLORIDE 40 MG: 9 INJECTION INTRAMUSCULAR; INTRAVENOUS; SUBCUTANEOUS at 16:50

## 2018-08-16 NOTE — IP AVS SNAPSHOT
[address] Patient: Gavin Dempsey MRN: [id number] ZJU:3/[birth year]60 A check karely indicates which time of day the medication should be taken. My Medications CHANGE how you take these medications Instructions Each Dose to Equal  
 Morning Noon Evening Bedtime  
 ergocalciferol 50,000 unit capsule Commonly known as:  VITAMIN D2 What changed:  when to take this Your last dose was: Your next dose is: Take 1 Cap by mouth every seven (7) days. [address] Units  
    
   
   
   
  
 omeprazole 40 mg capsule Commonly known as:  PRILOSEC What changed:   
- when to take this 
- reasons to take this Your last dose was: Your next dose is: Take 1 Cap by mouth daily. 40 mg CONTINUE taking these medications Instructions Each Dose to Equal  
 Morning Noon Evening Bedtime  
 aspirin 81 mg chewable tablet Your last dose was: Your next dose is: Take 1 Tab by mouth daily. 81 mg  
    
   
   
   
  
 diazePAM 10 mg tablet Commonly known as:  VALIUM Your last dose was: Your next dose is: Take 1 Tab by mouth every twelve (12) hours as needed for Anxiety. Max Daily Amount: 20 mg.  
 10 mg  
    
   
   
   
  
 ibuprofen 800 mg tablet Commonly known as:  MOTRIN Your last dose was: Your next dose is: Take 1 Tab by mouth three (3) times daily (with meals). 800 mg  
    
   
   
   
  
 * levothyroxine 50 mcg tablet Commonly known as:  SYNTHROID Your last dose was: Your next dose is: Take 1 Tab by mouth Daily (before breakfast). 50 mcg * Levothyroxine 75 mcg Cap Your last dose was: Your next dose is: Take one tablet by mouth every other day before breakfast. Alternates with 50mcg  
     
   
   
   
  
 lidocaine 5 % Commonly known as:  Minor Alt Your last dose was: Your next dose is:    
   
   
 Apply patch to the affected area for 12 hours a day and remove for 12 hours a day. lisinopril 10 mg tablet Commonly known as:  Andra Primmer Your last dose was: Your next dose is: Take 1 Tab by mouth daily. 10 mg  
    
   
   
   
  
 oxyCODONE-acetaminophen 5-325 mg per tablet Commonly known as:  PERCOCET Your last dose was: Your next dose is: Take 1 Tab by mouth every four (4) hours as needed. Max Daily Amount: 6 Tabs. 1 Tab * Notice: This list has 2 medication(s) that are the same as other medications prescribed for you. Read the directions carefully, and ask your doctor or other care provider to review them with you. STOP taking these medications   
 atorvastatin 40 mg tablet Commonly known as:  LIPITOR  
   
  
 ezetimibe 10 mg tablet Commonly known as:  Mague Hernándezo

## 2018-08-16 NOTE — PERIOP NOTES
TRANSFER - OUT REPORT:    Verbal report given to Candice Cynthia (name) on Magdalene oYung  being transferred to 5 S(unit) for change in patient condition(Post ERCP pancreatitis)       Report consisted of patients Situation, Background, Assessment and   Recommendations(SBAR). Information from the following report(s) SBAR, Procedure Summary, Intake/Output, MAR and Med Rec Status was reviewed with the receiving nurse. Lines:   Peripheral IV 08/16/18 Left Wrist (Active)   Site Assessment Clean, dry, & intact 8/16/2018  1:55 PM   Phlebitis Assessment 0 8/16/2018  1:55 PM   Infiltration Assessment 0 8/16/2018  1:55 PM   Dressing Status Clean, dry, & intact 8/16/2018  1:55 PM   Dressing Type Transparent;Tape 8/16/2018  1:55 PM   Hub Color/Line Status Infusing;Patent 8/16/2018  1:55 PM        Opportunity for questions and clarification was provided.       Patient transported with:   Rowl

## 2018-08-16 NOTE — DISCHARGE INSTRUCTIONS
Learning About Acute Pancreatitis  What is acute pancreatitis? The pancreas is an organ behind the stomach. It makes hormones like insulin that help control how your body uses sugar. It also makes enzymes that help you digest food. Usually these enzymes flow from the pancreas to the intestines. But if they leak into the pancreas, they can irritate it and cause pain and swelling. When this happens suddenly, it's called acute pancreatitis. What causes it? Most of the time, acute pancreatitis is caused by gallstones or by heavy alcohol use. But several other things can cause it, such as:  · High levels of fats in the blood. · An injury. · A problem after a surgery or a procedure. · Certain medicines. What are the symptoms? The main symptom is pain in the upper belly. The pain can be severe. You also may have a fever, nausea, or vomiting. Some people get so sick that they have problems breathing. They also may have low blood pressure. How is it diagnosed? Your doctor will diagnose pancreatitis with an exam and by looking at your lab tests. Your doctor may think that you have this problem based on your symptoms and where you have pain in your belly. You may have blood tests of enzymes called amylase and lipase. In pancreatitis, the level of these enzymes is usually much higher than normal.  You also may have imaging tests of the belly. These may include an ultrasound, a CT scan, or an MRI. A special MRI called MRCP can show images of the bile ducts. This test can be very helpful when gallstones are causing the problem. How is it treated? Treatment of acute pancreatitis usually takes place in the hospital. It focuses on taking care of pain and supporting your body while your pancreas heals. In severe cases, treatment may occur in an intensive care unit to support breathing, treat serious infections, or help raise very low blood pressure.   If a gallstone is causing the problem, the gallstone may need to be removed. This is done during a procedure called ERCP. The doctor puts a scope in your mouth and moves it gently through the stomach and into the ducts from the pancreas and gallbladder. The doctor is then able to see a stone and remove it. Sometimes the gallbladder, which makes gallstones, needs to be removed by surgery. People with pancreatitis often need a lot of fluid to help support their other organs and their blood pressure. They get fluids through a vein (intravenous, or IV). Instead of food by mouth, nutrition is sometimes given through a vein while the pancreas heals. Follow-up care is a key part of your treatment and safety. Be sure to make and go to all appointments, and call your doctor if you are having problems. It's also a good idea to know your test results and keep a list of the medicines you take. Where can you learn more? Go to http://[employer] vascular solutionchava.info/. Enter T411 in the search box to learn more about \"Learning About Acute Pancreatitis. \"  Current as of: May 12, 2017  Content Version: 11.7  © 4797-3740 [employer]. Care instructions adapted under license by [employer] (which disclaims liability or warranty for this information). If you have questions about a medical condition or this instruction, always ask your healthcare professional. [address] any warranty or liability for your use of this information. Please do not take your cholesterol medications (zetia and lipitor) until you see your primary care doctor because your liver testing was abnormal. Have your pcp check your liver testing before you re start taking these meds. Endoscopic Retrograde Cholangiopancreatogram (ERCP): What to Expect at [address] [place] [address]  After you have an endoscopic retrograde cholangiopancreatogram (ERCP), you probably will stay at the hospital or clinic for 1 to 2 hours. This will allow the medicine to wear off.  You will be able to go home after your doctor or a nurse checks to make sure you are not having any problems. If you stay in the hospital overnight, you may go home the next day. You may have a sore throat for a day or two after the procedure. This care sheet gives you a general idea about how long it will take for you to recover. But each person recovers at a different pace. Follow the steps below to get better as quickly as possible. How can you care for yourself at home? Activity    · Rest as much as you need to after you go home.     · You should be able to go back to your usual activities the day after the procedure. Diet    · Follow your doctor's directions for eating after the procedure.     · Drink plenty of fluids (unless your doctor tells you not to). Medicines    · Your doctor will tell you if and when you can restart your medicines. He or she will also give you instructions about taking any new medicines.     · If you take blood thinners, such as warfarin (Coumadin), clopidogrel (Plavix), or aspirin, be sure to talk to your doctor. He or she will tell you if and when to start taking those medicines again. Make sure that you understand exactly what your doctor wants you to do.     · If you have a sore throat the next day, use an over-the-counter spray to numb your throat. Be safe with medicines. Read and follow all instructions on the label. Follow-up care is a key part of your treatment and safety. Be sure to make and go to all appointments, and call your doctor if you are having problems. It's also a good idea to know your test results and keep a list of the medicines you take. When should you call for help? Call 911 anytime you think you may need emergency care.  For example, call if:    · You passed out (lost consciousness).     · Your stools are maroon or very bloody.     · You have trouble breathing.    Call your doctor now or go to the emergency room if:    · You have new or worse belly pain.     · You have pain that does not get better after you take pain medicine.     · You have a fever.     · You cannot pass stools or gas.     · You are sick to your stomach or cannot hold down fluids.     · You have blood in your stools.    Watch closely for changes in your health, and be sure to contact your doctor if:    · Your throat still hurts after a day or two.     · You do not get better as expected. Where can you learn more? Go to http://tal-chava.info/. Enter F928 in the search box to learn more about \"Endoscopic Retrograde Cholangiopancreatogram (ERCP): What to Expect at Home. \"  Current as of: May 12, 2017  Content Version: 11.7  © 5544-8457 [employer]. Care instructions adapted under license by [employer] (which disclaims liability or warranty for this information). If you have questions about a medical condition or this instruction, always ask your healthcare professional. [place] [address] any warranty or liability for your use of this information. DISCHARGE SUMMARY from Nurse    PATIENT INSTRUCTIONS:    After general anesthesia or intravenous sedation, for 24 hours or while taking prescription Narcotics:  · Limit your activities  · Do not drive and operate hazardous machinery  · Do not make important personal or business decisions  · Do  not drink alcoholic beverages  · If you have not urinated within 8 hours after discharge, please contact your surgeon on call. Report the following to your surgeon:  · Excessive pain, swelling, redness or odor of or around the surgical area  · Temperature over 100.5  · Nausea and vomiting lasting longer than 4 hours or if unable to take medications  · Any signs of decreased circulation or nerve impairment to extremity: change in color, persistent  numbness, tingling, coldness or increase pain  · Any questions    *  Please give a list of your current medications to your Primary Care Provider.     * Please update this list whenever your medications are discontinued, doses are      changed, or new medications (including over-the-counter products) are added. *  Please carry medication information at all times in case of emergency situations. These are general instructions for a healthy lifestyle:    No smoking/ No tobacco products/ Avoid exposure to second hand smoke  Surgeon General's Warning:  Quitting smoking now greatly reduces serious risk to your health. Obesity, smoking, and sedentary lifestyle greatly increases your risk for illness    A healthy diet, regular physical exercise & weight monitoring are important for maintaining a healthy lifestyle    You may be retaining fluid if you have a history of heart failure or if you experience any of the following symptoms:  Weight gain of 3 pounds or more overnight or 5 pounds in a week, increased swelling in our hands or feet or shortness of breath while lying flat in bed. Please call your doctor as soon as you notice any of these symptoms; do not wait until your next office visit. Recognize signs and symptoms of STROKE:    F-face looks uneven    A-arms unable to move or move unevenly    S-speech slurred or non-existent    T-time-call 911 as soon as signs and symptoms begin-DO NOT go       Back to bed or wait to see if you get better-TIME IS BRAIN. Warning Signs of HEART ATTACK     Call 911 if you have these symptoms:   Chest discomfort. Most heart attacks involve discomfort in the center of the chest that lasts more than a few minutes, or that goes away and comes back. It can feel like uncomfortable pressure, squeezing, fullness, or pain.  Discomfort in other areas of the upper body. Symptoms can include pain or discomfort in one or both arms, the back, neck, jaw, or stomach.  Shortness of breath with or without chest discomfort.  Other signs may include breaking out in a cold sweat, nausea, or lightheadedness.   Don't wait more than five minutes to call 911 - MINUTES MATTER! Fast action can save your life. Calling 911 is almost always the fastest way to get lifesaving treatment. Emergency Medical Services staff can begin treatment when they arrive -- up to an hour sooner than if someone gets to the hospital by car. The discharge information has been reviewed with the patient and spouse. The patient and spouse verbalized understanding. Discharge medications reviewed with the patient and spouse and appropriate educational materials and side effects teaching were provided.   ___________________________________________________________________________________________________________________________________

## 2018-08-16 NOTE — ROUTINE PROCESS
Bedside and Verbal shift change report given to Jennifer Martell RN (oncoming nurse) by Sarahy Ayala RN (offgoing nurse). Report included the following information SBAR, Kardex, OR Summary, Procedure Summary, Intake/Output, MAR, Recent Results and Med Rec Status.

## 2018-08-16 NOTE — ANESTHESIA PREPROCEDURE EVALUATION
Anesthetic History   No history of anesthetic complications            Review of Systems / Medical History  Patient summary reviewed, nursing notes reviewed and pertinent labs reviewed    Pulmonary  Within defined limits                 Neuro/Psych   Within defined limits           Cardiovascular    Hypertension: well controlled              Exercise tolerance: >4 METS  Comments: Recent AAA repair. No cp or sob   GI/Hepatic/Renal  Within defined limits              Endo/Other      Hypothyroidism: well controlled  Morbid obesity and arthritis     Other Findings   Comments: Documentation of current medication  Current medications obtained, documented and obtained? YES      Risk Factors for Postoperative nausea/vomiting:       History of postoperative nausea/vomiting? NO       Female? YES       Motion sickness? NO       Intended opioid administration for postoperative analgesia? NO      Smoking Abstinence:  Current Smoker? NO  Elective Surgery? YES  Seen preoperatively by anesthesiologist or proxy prior to day of surgery? YES  Pt abstained from smoking 24 hours prior to anesthesia?  N/A    Preventive care/screening for High Blood Pressure:  Aged 18 years and older: YES  Screened for high blood pressure: YES  Patients with high blood pressure referred to primary care provider   for BP management: YES               Physical Exam    Airway  Mallampati: II  TM Distance: 4 - 6 cm  Neck ROM: normal range of motion   Mouth opening: Normal     Cardiovascular  Regular rate and rhythm,  S1 and S2 normal,  no murmur, click, rub, or gallop  Rhythm: regular  Rate: normal         Dental    Dentition: Upper partial plate     Pulmonary  Breath sounds clear to auscultation               Abdominal  GI exam deferred       Other Findings            Anesthetic Plan    ASA: 3  Anesthesia type: MAC and general - backup          Induction: Intravenous  Anesthetic plan and risks discussed with: Patient

## 2018-08-16 NOTE — H&P
HISTORY & PHYSICAL            Patient: Aldo Pickens MRN: [id number]  CSN: [id number]    YOB: 1940  Age: 76 y.o. Sex: female    DOA: 8/16/2018 LOS:  LOS: 0 days        DOA: 8/16/2018        Assessment/Plan     Active Problems:    Acute pancreatitis (8/16/2018)        Plan:  1. Acute pancreatitis - s/p ERCP - NPO , IVF , pain control - GI on board - Lipase is elevated - will monitor   2. Recent Aortic Aneurysm repair   3. H/o HTN - Hold meds for now   4.  HypoT4 - holding synthroid for now   DVT px - heparin   FC           Severity of Signs & Symptoms -- Moderate  Risk of adverse events -- Moderate  Current Medical Rx Plan - As Above   Patient history & comorbidities - Per HPI  Discharge Plan -- Home            HPI:     Aldo Pickens is a 76 y.o. female who is being admitted post ERCP - Acute pancreatitis   She was admitted for out pt procedure for ERCP & stone removal - following completion of the procedure she started c/o pain & nausea   GI saw pt & advised she be admitted for overnight Obs     Past Medical History:   Diagnosis Date    AAA (abdominal aortic aneurysm) without rupture ([facility] [address]     AAA repair 7/2018    Colon adenomas     2003 Dr Cristobal Hughes; 4/13 Dr Casey Heading    DJD (degenerative joint disease)     Dyslipidemia     FHx: heart disease     Gastritis 2006    on EGD Dr. Leyla Zaragoza, neg sprue    Hypertension     Hypothyroidism 2007    Dr. Aidee Ward Hypovitaminosis D     Lung nodule 06/2016    6 mm RLL (6/16); no change (10/17)    Obesity     peak weight 187 lbs, bmi 33.1 from 4/13; IF 3/18    Osteopenia FRAX 4/12 9.7 and 1.4     DEXA t score 0.6 spine, -1 hip (12/09);  -0.4 spine, -1.3 hip (4/12); -1.2 wrist, -1.6 hip w FRAX 11/2.2 (4/14); -1.0 wrist, -1.6 hip w FRAX 12/2.6 (4/16); -0.7 wrist, -1.6 hip (5/18)    Pancreatitis, chronic ([facility] [address] 2018    Psoriasis 2003    on bx Dr. Selene Ahmadi hearing loss     left side Dr Criss Meredith Venous insufficiency 2003    on dopplers    Zoster 05/2016    left T11-12; mild postherpetic neuralgia       Past Surgical History:   Procedure Laterality Date    CARDIAC SURG PROCEDURE UNLIST  09/2016    NST negative    CHEST SURGERY PROCEDURE UNLISTED  06/2016    CTA neg PE, 6 mm subpleural nodule    HX AAA REPAIR  07/17/2018    HX Kari Brittle      Dr. Flo Cota left    HX CARPAL TUNNEL RELEASE Left     HX CATARACT REMOVAL Bilateral     HX COLONOSCOPY      benign polyp 5/08 Dr Varun Alan;  4/13 negative Dr. Ag Dixon  2007    neg MRA/MRI head in [facility] NEUROLOGICAL PROCEDURE UNLISTED  9/10    CT head negative    VASCULAR SURGERY PROCEDURE UNLIST  2007    [facility]       Family History   Problem Relation Age of Onset    Breast Cancer Mother     Cancer Mother     Heart Disease Father        Social History     Social History    Marital status:      Spouse name: N/A    Number of children: N/A    Years of education: N/A     Occupational History    ret office mgr      Social History Main Topics    Smoking status: Never Smoker    Smokeless tobacco: Never Used    Alcohol use Yes      Comment: occasionally    Drug use: No    Sexual activity: Not Asked     Other Topics Concern    None     Social History Narrative       Prior to Admission medications    Medication Sig Start Date End Date Taking? Authorizing Provider   oxyCODONE-acetaminophen (PERCOCET) 5-325 mg per tablet Take 1 Tab by mouth every four (4) hours as needed. Max Daily Amount: 6 Tabs. 7/18/18  Yes GARRETT Jones   ezetimibe (ZETIA) 10 mg tablet TAKE 1 TABLET BY MOUTH DAILY 6/22/18  Yes Raina Bernal MD   lidocaine (LIDODERM) 5 % Apply patch to the affected area for 12 hours a day and remove for 12 hours a day. 6/22/18  Yes Raina Bernal MD   omeprazole (PRILOSEC) 40 mg capsule Take 1 Cap by mouth daily.   Patient taking differently: Take 40 mg by mouth as needed. 5/7/18  Yes Roxana Meier MD   lisinopril (PRINIVIL, ZESTRIL) 10 mg tablet Take 1 Tab by mouth daily. 3/27/18  Yes Roxana Meier MD   atorvastatin (LIPITOR) 40 mg tablet Take 1 Tab by mouth daily. 2/27/18  Yes Roxana Meier MD   diazePAM (VALIUM) 10 mg tablet Take 1 Tab by mouth every twelve (12) hours as needed for Anxiety. Max Daily Amount: 20 mg. 12/19/17  Yes Roxana Meier MD   Levothyroxine 75 mcg cap Take one tablet by mouth every other day before breakfast. Alternates with 50mcg 12/18/17  Yes Roxana Meier MD   ergocalciferol (VITAMIN D2) 50,000 unit capsule Take 1 Cap by mouth every seven (7) days. Patient taking differently: Take 50,000 Units by mouth every fourteen (14) days. 12/18/17  Yes Roxana Meier MD   levothyroxine (SYNTHROID) 50 mcg tablet Take 1 Tab by mouth Daily (before breakfast). 3/23/17  Yes Roxana Meier MD   aspirin 81 mg chewable tablet Take 1 Tab by mouth daily. 12/12/16  Yes Roxana Meier MD   ibuprofen (MOTRIN) 800 mg tablet Take 1 Tab by mouth three (3) times daily (with meals). 4/16/18   Andrea Thompson MD       Allergies   Allergen Reactions    Atenolol Other (comments)     Felt poorly    Cozaar [Losartan] Other (comments)     Headaches    Norvasc [Amlodipine] Other (comments)     [place] poorly         Review of Systems  A comprehensive review of systems was negative except for that written in the History of Present Illness. Physical Exam:      Visit Vitals    /85 (BP 1 Location: Right arm, BP Patient Position: At rest)    Pulse 66    Temp 97.5 °F (36.4 °C)    Resp 16    Ht 5' 4\" (1.626 m)    Wt 78.6 kg (173 lb 4 oz)    SpO2 91%    Breastfeeding No    BMI 29.74 kg/m2       Physical Exam:    Gen: In general, this is a well nourished female in no acute distress  HEENT: Sclerae nonicteric. Oral mucous membranes moist. Dentition normal  Neck: Supple with midline trachea.    CV: RRR without murmur or rub appreciated. Resp:Respirations are unlabored without use of accessory muscles. Lung fields bilaterally without wheezes or rhonchi. Abd: Soft, ++ tender, nondistended. Extrem: Extremities are warm, without cyanosis or clubbing. No pitting pretibial edema. Skin: Warm, no visible rashes. Neuro: Patient is alert, oriented, and cooperative. No obvious focal defects. Moves all 4 extremities. Labs Reviewed:    Recent Results (from the past 24 hour(s))   CBC WITH AUTOMATED DIFF    Collection Time: 08/16/18  3:45 PM   Result Value Ref Range    WBC 10.9 4.6 - 13.2 K/uL    RBC 4.25 4.20 - 5.30 M/uL    HGB 13.0 12.0 - 16.0 g/dL    HCT 39.6 35.0 - 45.0 %    MCV 93.2 74.0 - 97.0 FL    MCH 30.6 24.0 - 34.0 PG    MCHC 32.8 31.0 - 37.0 g/dL    RDW 13.6 11.6 - 14.5 %    PLATELET 500 739 - 680 K/uL    MPV 11.3 9.2 - 11.8 FL    NEUTROPHILS 87 (H) 40 - 73 %    LYMPHOCYTES 9 (L) 21 - 52 %    MONOCYTES 4 3 - 10 %    EOSINOPHILS 0 0 - 5 %    BASOPHILS 0 0 - 2 %    ABS. NEUTROPHILS 9.5 (H) 1.8 - 8.0 K/UL    ABS. LYMPHOCYTES 1.0 0.9 - 3.6 K/UL    ABS. MONOCYTES 0.4 0.05 - 1.2 K/UL    ABS. EOSINOPHILS 0.0 0.0 - 0.4 K/UL    ABS. BASOPHILS 0.0 0.0 - 0.1 K/UL    DF AUTOMATED     METABOLIC PANEL, COMPREHENSIVE    Collection Time: 08/16/18  3:45 PM   Result Value Ref Range    Sodium 139 136 - 145 mmol/L    Potassium 3.9 3.5 - 5.5 mmol/L    Chloride 107 100 - 108 mmol/L    CO2 24 21 - 32 mmol/L    Anion gap 8 3.0 - 18 mmol/L    Glucose 141 (H) 74 - 99 mg/dL    BUN 18 7.0 - 18 MG/DL    Creatinine 0.87 0.6 - 1.3 MG/DL    BUN/Creatinine ratio 21 (H) 12 - 20      GFR est AA >60 >60 ml/min/1.73m2    GFR est non-AA >60 >60 ml/min/1.73m2    Calcium 8.8 8.5 - 10.1 MG/DL    Bilirubin, total 1.4 (H) 0.2 - 1.0 MG/DL    ALT (SGPT) 34 13 - 56 U/L    AST (SGOT) 44 (H) 15 - 37 U/L    Alk.  phosphatase 111 45 - 117 U/L    Protein, total 7.0 6.4 - 8.2 g/dL    Albumin 3.3 (L) 3.4 - 5.0 g/dL    Globulin 3.7 2.0 - 4.0 g/dL    A-G Ratio 0.9 0.8 - 1.7 LIPASE    Collection Time: 08/16/18  3:45 PM   Result Value Ref Range    Lipase 8028 (H) 73 - 393 U/L       Imaging Reviewed:    X ray ERCP Fluoroscopy     IMPRESSION:     Initial visualization of at least one possibly 2 distal common bile duct filling  defects. By the completion of the exam only a few filling defects remaining secondary to  injected gas bubbles. Cm Soria MD  8/16/2018, 4:20 PM

## 2018-08-16 NOTE — ANESTHESIA POSTPROCEDURE EVALUATION
Post-Anesthesia Evaluation and Assessment    Patient: Sahara Burrell MRN: [id number]  SSN: [id number]    YOB: 1940  Age: 76 y.o. Sex: female       Cardiovascular Function/Vital Signs  Visit Vitals    BP 96/47    Pulse 67    Temp 36.4 °C (97.6 °F)    Resp 16    Ht 5' 4\" (1.626 m)    Wt 78.6 kg (173 lb 4 oz)    SpO2 98%    Breastfeeding No    BMI 29.74 kg/m2       Patient is status post MAC anesthesia for Procedure(s):  ENDOSCOPIC RETROGRADE CHOLANGIOPANCREATOGRAPHY WITH CHOLANGIOSCOPY (ASKED FOR SPYGLASS)/C- ARM w basket retrieval, w sphincterotomy w ballon extraction. Nausea/Vomiting: None    Postoperative hydration reviewed and adequate. Pain:  Pain Scale 1: Numeric (0 - 10) (08/16/18 0745)  Pain Intensity 1: 0 (08/16/18 0745)   Managed    Neurological Status: At baseline    Mental Status and Level of Consciousness: Arousable    Pulmonary Status:   O2 Device: Nasal cannula (08/16/18 0918)   Adequate oxygenation and airway patent    Complications related to anesthesia: None    Post-anesthesia assessment completed.  No concerns      Signed By: Cely Cortes MD     August 16, 2018

## 2018-08-16 NOTE — CONSULTS
[facility]   [email or web address]      Impression:   1. Post ERCP epigastric pain. Pancreatitis is likely. Will give IV fluids. Will need observation for at least 24-48 hrs. S/p ERCP and stone removal.     Plan:     1. Continue IV fluids. Start oral pain meds for now. Admit for observation. Diet to be advanced in am.    Chief Complaint:       HPI:  Agustín Little is a 76 y.o. female who is being admitted for post ercp upper abdominal pain which is likely pancreatitis. She underwent ercp with sphincterotomy with ampullary balloon dilation. Single stone noted which was removed with extraction balloon and basket. Now has pain 5/10 going to the back. She is being admitted for pain management and IV fluids.      PMH:   Past Medical History:   Diagnosis Date    AAA (abdominal aortic aneurysm) without rupture ([facility]     AAA repair 7/2018    Colon adenomas     2003 Dr Rose Villalta; 4/13 Dr Carl Barros    DJD (degenerative joint disease)     Dyslipidemia     FHx: heart disease     Gastritis 2006    on EGD Dr. Remi Alejandre, neg sprue    Hypertension     Hypothyroidism 2007    Dr. Carmelita Pardo Hypovitaminosis D     Lung nodule 06/2016    6 mm RLL (6/16); no change (10/17)    Obesity     peak weight 187 lbs, bmi 33.1 from 4/13; IF 3/18    Osteopenia FRAX 4/12 9.7 and 1.4     DEXA t score 0.6 spine, -1 hip (12/09);  -0.4 spine, -1.3 hip (4/12); -1.2 wrist, -1.6 hip w FRAX 11/2.2 (4/14); -1.0 wrist, -1.6 hip w FRAX 12/2.6 (4/16); -0.7 wrist, -1.6 hip (5/18)    Pancreatitis, chronic ([facility] 2018    Psoriasis 2003    on bx Dr. Marlon Goodman hearing loss     left side Dr Jamee Hodge Venous insufficiency 2003    on dopplers    Zoster 05/2016    left T11-12; mild postherpetic neuralgia       PSH:   Past Surgical History:   Procedure Laterality Date    CARDIAC SURG PROCEDURE UNLIST  09/2016    NST negative    CHEST SURGERY PROCEDURE UNLISTED  06/2016    CTA neg PE, 6 mm subpleural nodule    HX AAA REPAIR  07/17/2018    HX Miguel Murillo David left    HX CARPAL TUNNEL RELEASE Left     HX CATARACT REMOVAL Bilateral     HX COLONOSCOPY      benign polyp 5/08 Dr Gretchen Novak;  4/13 negative Dr. Gilberto Larson  2007    neg MRA/MRI head in [address] Roberto NEUROLOGICAL PROCEDURE UNLISTED  9/10    CT head negative    VASCULAR SURGERY PROCEDURE UNLIST  2007    neg AAA screen in [facility]       Social HX:   Social History     Social History    Marital status:      Spouse name: N/A    Number of children: N/A    Years of education: N/A     Occupational History    ret office mgr      Social History Main Topics    Smoking status: Never Smoker    Smokeless tobacco: Never Used    Alcohol use Yes      Comment: occasionally    Drug use: No    Sexual activity: Not on file     Other Topics Concern    Not on file     Social History Narrative       FHX:   Family History   Problem Relation Age of Onset    Breast Cancer Mother     Cancer Mother     Heart Disease Father        Allergy:   Allergies   Allergen Reactions    Atenolol Other (comments)     Felt poorly    Cozaar [Losartan] Other (comments)     Headaches    Norvasc [Amlodipine] Other (comments)     Moran poorly         Home Medications:     Prescriptions Prior to Admission   Medication Sig    oxyCODONE-acetaminophen (PERCOCET) 5-325 mg per tablet Take 1 Tab by mouth every four (4) hours as needed. Max Daily Amount: 6 Tabs.  ezetimibe (ZETIA) 10 mg tablet TAKE 1 TABLET BY MOUTH DAILY    lidocaine (LIDODERM) 5 % Apply patch to the affected area for 12 hours a day and remove for 12 hours a day.  omeprazole (PRILOSEC) 40 mg capsule Take 1 Cap by mouth daily. (Patient taking differently: Take 40 mg by mouth as needed.)    lisinopril (PRINIVIL, ZESTRIL) 10 mg tablet Take 1 Tab by mouth daily.     atorvastatin (LIPITOR) 40 mg tablet Take 1 Tab by mouth daily.  diazePAM (VALIUM) 10 mg tablet Take 1 Tab by mouth every twelve (12) hours as needed for Anxiety. Max Daily Amount: 20 mg.  Levothyroxine 75 mcg cap Take one tablet by mouth every other day before breakfast. Alternates with 50mcg    ergocalciferol (VITAMIN D2) 50,000 unit capsule Take 1 Cap by mouth every seven (7) days. (Patient taking differently: Take 50,000 Units by mouth every fourteen (14) days.)    levothyroxine (SYNTHROID) 50 mcg tablet Take 1 Tab by mouth Daily (before breakfast).  aspirin 81 mg chewable tablet Take 1 Tab by mouth daily.  ibuprofen (MOTRIN) 800 mg tablet Take 1 Tab by mouth three (3) times daily (with meals).        Medications:       Current Facility-Administered Medications:     lidocaine (PF) (XYLOCAINE) 10 mg/mL (1 %) injection 0.1 mL, 0.1 mL, SubCUTAneous, PRN, Day Bejnamin CRNA    sodium chloride (NS) flush 5-10 mL, 5-10 mL, IntraVENous, PRN, Enoch Freeman CRNA    [facility]ron [facility]) injection 4 mg, 4 mg, IntraVENous, ONCE, Enoch Freeman CRNA    glucose chewable tablet 16 g, 4 Tab, Oral, PRN, Enoch Freeman CRNA    glucagon (GLUCAGEN) injection 1 mg, 1 mg, IntraMUSCular, PRN, Enoch Freeman CRNA    dextrose (D50W) injection syrg 12.5-25 g, 25-50 mL, IntraVENous, PRN, Enoch Freeman CRNA    morphine injection 2 mg, 2 mg, IntraVENous, PRN, Enoch Freeman CRNA    indomethacin (INDOCIN) rectal suppository, , , PRN, Kalli Garcia MD, 100 mg at 08/16/18 0908    ioversol (OPTIRAY) 320 mg iodine/mL contrast injection, , , PRN, Martell Shaffer MD, 13 mL at 08/16/18 0908    lactated Ringers infusion, 125 mL/hr, IntraVENous, CONTINUOUS, Martell Shaffer MD    oxyCODONE-acetaminophen (PERCOCET) 5-325 mg per tablet 1 Tab, 1 Tab, Oral, Q8H PRN, Martell Shaffer MD    pantoprazole (PROTONIX) 40 mg in sodium chloride 0.9% 10 mL injection, 40 mg, IntraVENous, DAILY, Martell Shaffer MD      Review of Systems:     Constitutional: No fevers, chills, weight loss, fatigue. Skin: No rashes, pruritis, jaundice,    HENT: No headaches,  auditory changes out of ordinary. Eyes: No visual changes, blurred vision,   jaundice. Cardiovascular: No chest pain, heart palpitations. Respiratory: No cough, SOB, PND chest discomfort, orthopnea. Gastrointestinal: Abdominal pain. Genitourinary: No dysuria or hematuria. Musculoskeletal: No weakness, arthralgias, wasting. Endo: No sweats. Heme: No bruising, easy bleeding. Allergies: As noted. Neurological: Cranial nerves intact. Alert and oriented. Gait not assessed. Psychiatric:  No anxiety, depression, hallucinations or mood swings       Physical Assessment:     Visit Vitals    /90 (BP Patient Position: At rest)    Pulse (!) 56    Temp 96.8 °F (36 °C)    Resp 15    Ht 5' 4\" (1.626 m)    Wt 78.6 kg (173 lb 4 oz)    SpO2 94%    Breastfeeding No    BMI 29.74 kg/m2       constitutional: appearance: well developed, well nourished,  in no acute distress. skin: inspection: no rashes, icterus  no clubbing  palpation: Turgor normal.   eyes: inspection: normal conjunctivae and lids; no jaundice pupils: unremarkable. ENMT: mouth: mucous membrane moist and pink fair dentition,  neck: Supple thyroid: not enlarged with normal trachea, no masses No JVD  respiratory: effort: normal air entry; good air movement and clear to auscultation. cardiovascular: Sounds are normal. normal rhythm; no murmurs. abdominal: abdomen:epigastric tenderness. rectal: hemoccult/guaiac: not performed. musculoskeletal: digits and nails: no clubbing, cyanosis, edema and wasting  gait: not examined. lymphatic: No adenopathy in the neck. neurologic: cranial nerves: II-XII normal. Generally non focal.  psychiatric: judgement/insight: within normal limits. memory: within normal limits for recent and remote events. mood and affect: Normal affect.        Basic Metabolic Profile   No results for input(s): NA, K, CL, CO2, BUN, GLU, CA, MG, PHOS in the last 72 hours. No lab exists for component: CREAT      CBC w/Diff    No results for input(s): WBC, RBC, HGB, HCT, MCV, MCH, MCHC, RDW, PLT, HGBEXT, HCTEXT, PLTEXT in the last 72 hours. No lab exists for component: MPV No results for input(s): GRANS, LYMPH, EOS, PRO, MYELO, METAS, BLAST in the last 72 hours. No lab exists for component: MONO, BASO     Hepatic Function   No results for input(s): ALB, TP, TBILI, GPT, SGOT, AP, AML, LPSE in the last 72 hours. No lab exists for component: Symone Marie MD, M.D. [facility], [address] [place] [address]  [email or web address]. [facility]

## 2018-08-16 NOTE — H&P
Chief Complaint:  Abdominal pain rt UQ. History of present illness: CT showed common bile duct stone vs polyp 5x6 mm on MRCP. Lower CBD abnormal on MRCP. Dilated cbd. Pain still mild rt UQ. No fever nausea or vomiting. PMH:   Past Medical History:   Diagnosis Date    [facility]     [facility] repair 7/2018    Colon adenomas     2003 Dr Dory Jones; 4/13 Dr Arlet Cespedes    DJD (degenerative joint disease)     Dyslipidemia     FHx: heart disease     Gastritis 2006    on EGD Dr. Rita Comer, neg sprue    Hypertension     Hypothyroidism 2007    Dr. Gómez Pedraza Hypovitaminosis D     Lung nodule 06/2016    6 mm RLL (6/16); no change (10/17)    Obesity     peak weight 187 lbs, bmi 33.1 from 4/13; IF 3/18    Osteopenia FRAX 4/12 9.7 and 1.4     DEXA t score 0.6 spine, -1 hip (12/09);  -0.4 spine, -1.3 hip (4/12); -1.2 wrist, -1.6 hip w FRAX 11/2.2 (4/14); -1.0 wrist, -1.6 hip w FRAX 12/2.6 (4/16); -0.7 wrist, -1.6 hip (5/18)    [facility]    Psoriasis 2003    on bx Dr. Sherron Ware hearing loss     left side Dr Krishna Flanagan Venous insufficiency 2003    on dopplers    Zoster 05/2016    left T11-12; mild postherpetic neuralgia     Allergies:    Allergies   Allergen Reactions    Atenolol Other (comments)     Felt poorly    Cozaar [Losartan] Other (comments)     Headaches    Norvasc [Amlodipine] Other (comments)     Felt poorly       Medications:   Current Facility-Administered Medications:     lidocaine (PF) (XYLOCAINE) 10 mg/mL (1 %) injection 0.1 mL, 0.1 mL, SubCUTAneous, PRN, Patricia Jean CRNA    lactated Ringers infusion, 50 mL/hr, IntraVENous, CONTINUOUS, Patricia Jean CRNA, Last Rate: 50 mL/hr at 08/16/18 0740, 50 mL/hr at 08/16/18 0740  FH:   Family History   Problem Relation Age of Onset    Breast Cancer Mother     Cancer Mother     Heart Disease Father      Social:   Social History     Social History    Marital status:  Spouse name: N/A    Number of children: N/A    Years of education: N/A     Occupational History    ret office mgr      Social History Main Topics    Smoking status: Never Smoker    Smokeless tobacco: Never Used    Alcohol use Yes      Comment: occasionally    Drug use: No    Sexual activity: Not Asked     Other Topics Concern    None     Social History Narrative     Surgical H:   Past Surgical History:   Procedure Laterality Date    CARDIAC SURG PROCEDURE UNLIST  09/2016    NST negative    CHEST SURGERY PROCEDURE UNLISTED  06/2016    CTA neg PE, 6 mm subpleural nodule    HX AAA REPAIR  07/17/2018    HX Elizabeth Womack left    HX CARPAL TUNNEL RELEASE Left     HX CATARACT REMOVAL Bilateral     HX COLONOSCOPY      benign polyp 5/08 Dr Alley Arreola;  4/13 negative Dr. Rhona Deras  2007    neg MRA/MRI head in [facility]  9/10    CT head negative    VASCULAR SURGERY PROCEDURE UNLIST  2007    neg AAA screen in [facility]       ROS: positive for abdominal pain    Physical Exam:   Visit Vitals    /87    Pulse 68    Temp 98.4 °F (36.9 °C)    Resp 18    Ht 5' 4\" (1.626 m)    Wt 78.6 kg (173 lb 4 oz)    SpO2 98%    Breastfeeding No    BMI 29.74 kg/m2     General appearance: alert, no distress  Eyes: pupils equal and reactive, extraocular eye movements intact  Nodes: No gross adenopathy in neck.   Skin: no spider angiomata, jaundice, palmar erythema   Respiratory: clear to auscultation bilaterally  Cardiovascular: regular heart rate, no murmurs, no JVD, normal rate and regular rhythm  Abdomen: soft, non-tender, liver not enlarged, spleen not palpable, no obvious ascites  Extremities: no muscle wasting, no gross arthritic changes  Neurologic: alert and oriented, cranial nerves grossly intact, no asterixis    Labs: No results found for this or any previous visit (from the past 24 hour(s)). Imp/ Plan: Will proceed with ERCP with removal of adherent stone or biopsy of nodule as planned. Risk benefits alternative including but not limited to infection, bleeding, perforation of viscous, allergic reaction and resultant morbidity and mortality was discussed. Risk of pancreatitis 3 to 5% chance. Risk of inability to achieve cannulation was discussed. May need a stent placement. Chance of missing a significant lesion was discussed given limited scope of brushing and biopsy of cbd.       Sonam Heard MD  [facility], [facility]

## 2018-08-16 NOTE — IP AVS SNAPSHOT
[address] [facility] 
 
 
 [address] Patient: Sahara Burrell MRN: [id number] KEVYN:[id number] About your hospitalization You were admitted on:  August 16, 2018 You last received care in the:  [facility] You were discharged on:  August 17, 2018 Why you were hospitalized Your primary diagnosis was:  Not on File Your diagnoses also included:  Acute Pancreatitis Follow-up Information Follow up With Details Comments Contact Info Stella Horne MD In 1 week [place] will call to schedule appointment Monday morning and f/u with patient. 3351 [facility] [address] 706 [facility] 
[phone] Tong Nam MD In 3 weeks  will call to schedule appointment Monday morning and f/u with patient. 100 [facility] [address] [facility] 706 [facility] 
[phone] Eben Dorado MD In 1 week  will call to schedule appointment Monday morning and f/u with patient. [address] [facility] [address]  [facility] [place] [address] 
[phone] Your Scheduled Appointments Thursday August 23, 2018 10:30 AM EDT Follow Up with [address] [place], [place] [facility] ([address] [facility]) [address] [place] [address] [address] [facility]  
[phone] Discharge Orders None A check karely indicates which time of day the medication should be taken. My Medications CHANGE how you take these medications Instructions Each Dose to Equal  
 Morning Noon Evening Bedtime  
 ergocalciferol 50,000 unit capsule Commonly known as:  VITAMIN D2 What changed:  when to take this Your last dose was: Your next dose is: Take 1 Cap by mouth every seven (7) days. [address] Units  
    
   
   
   
  
 omeprazole 40 mg capsule Commonly known as:  PRILOSEC What changed:   
- when to take this 
- reasons to take this Your last dose was: Your next dose is: Take 1 Cap by mouth daily. 40 mg CONTINUE taking these medications Instructions Each Dose to Equal  
 Morning Noon Evening Bedtime  
 aspirin 81 mg chewable tablet Your last dose was: Your next dose is: Take 1 Tab by mouth daily. 81 mg  
    
   
   
   
  
 diazePAM 10 mg tablet Commonly known as:  VALIUM Your last dose was: Your next dose is: Take 1 Tab by mouth every twelve (12) hours as needed for Anxiety. Max Daily Amount: 20 mg.  
 10 mg  
    
   
   
   
  
 ibuprofen 800 mg tablet Commonly known as:  MOTRIN Your last dose was: Your next dose is: Take 1 Tab by mouth three (3) times daily (with meals). 800 mg  
    
   
   
   
  
 * levothyroxine 50 mcg tablet Commonly known as:  SYNTHROID Your last dose was: Your next dose is: Take 1 Tab by mouth Daily (before breakfast). 50 mcg * Levothyroxine 75 mcg Cap Your last dose was: Your next dose is: Take one tablet by mouth every other day before breakfast. Alternates with 50mcg  
     
   
   
   
  
 lidocaine 5 % Commonly known as:  Genet Thurston Your last dose was: Your next dose is:    
   
   
 Apply patch to the affected area for 12 hours a day and remove for 12 hours a day. lisinopril 10 mg tablet Commonly known as:  Ester Crystal Your last dose was: Your next dose is: Take 1 Tab by mouth daily. 10 mg  
    
   
   
   
  
 oxyCODONE-acetaminophen 5-325 mg per tablet Commonly known as:  PERCOCET Your last dose was: Your next dose is: Take 1 Tab by mouth every four (4) hours as needed. Max Daily Amount: 6 Tabs. 1 Tab * Notice: This list has 2 medication(s) that are the same as other medications prescribed for you. Read the directions carefully, and ask your doctor or other care provider to review them with you. STOP taking these medications   
 atorvastatin 40 mg tablet Commonly known as:  LIPITOR  
   
  
 ezetimibe 10 mg tablet Commonly known as:  Levell Antes Opioid Education Prescription Opioids: What You Need to Know: 
 
Prescription opioids can be used to help relieve moderate-to-severe pain and are often prescribed following a surgery or injury, or for certain health conditions. These medications can be an important part of treatment but also come with serious risks. Opioids are strong pain medicines. Examples include hydrocodone, oxycodone, fentanyl, and morphine. Heroin is an example of an illegal opioid. It is important to work with your health care provider to make sure you are getting the safest, most effective care. WHAT ARE THE RISKS AND SIDE EFFECTS OF OPIOID USE? Prescription opioids carry serious risks of addiction and overdose, especially with prolonged use. An opioid overdose, often marked by slow breathing, can cause sudden death. The use of prescription opioids can have a number of side effects as well, even when taken as directed. · Tolerance-meaning you might need to take more of a medication for the same pain relief · Physical dependence-meaning you have symptoms of withdrawal when the medication is stopped. Withdrawal symptoms can include nausea, sweating, chills, diarrhea, stomach cramps, and muscle aches. Withdrawal can last up to several weeks, depending on which drug you took and how long you took it. · Increased sensitivity to pain · Constipation · Nausea, vomiting, and dry mouth · Sleepiness and dizziness · Confusion · Depression · Low levels of testosterone that can result in lower sex drive, energy, and strength · Itching and sweating RISKS ARE GREATER WITH:      
· History of drug misuse, substance use disorder, or overdose · Mental health conditions (such as depression or anxiety) · Sleep apnea · Older age (72 years or older) · Pregnancy Avoid alcohol while taking prescription opioids. Also, unless specifically advised by your health care provider, medications to avoid include: · Benzodiazepines (such as Xanax or Valium) · Muscle relaxants (such as Soma or Flexeril) · Hypnotics (such as Ambien or Lunesta) · Other prescription opioids KNOW YOUR OPTIONS Talk to your health care provider about ways to manage your pain that don't involve prescription opioids. Some of these options may actually work better and have fewer risks and side effects. Options may include: 
· Pain relievers such as acetaminophen, ibuprofen, and naproxen · Some medications that are also used for depression or seizures · Physical therapy and exercise · Counseling to help patients learn how to cope better with triggers of pain and stress. · Application of heat or cold compress · Massage therapy · Relaxation techniques Be Informed Make sure you know the name of your medication, how much and how often to take it, and its potential risks & side effects. IF YOU ARE PRESCRIBED OPIOIDS FOR PAIN: 
· Never take opioids in greater amounts or more often than prescribed. Remember the goal is not to be pain-free but to manage your pain at a tolerable level. · Follow up with your primary care provider to: · Work together to create a plan on how to manage your pain. · Talk about ways to help manage your pain that don't involve prescription opioids. · Talk about any and all concerns and side effects. · Help prevent misuse and abuse. · Never sell or share prescription opioids · Help prevent misuse and abuse. · Store prescription opioids in a secure place and out of reach of others (this may include visitors, children, friends, and family). · Safely dispose of unused/unwanted prescription opioids: Find your community drug take-back program or your pharmacy mail-back program, or flush them down the toilet, following guidance from the Food and Drug Administration (www.fda.gov/Drugs/ResourcesForYou). · Visit www.cdc.gov/drugoverdose to learn about the risks of opioid abuse and overdose. · If you believe you may be struggling with addiction, tell your health care provider and ask for guidance or call [employer] at 3-196-523-ZLPB. Discharge Instructions Learning About Acute Pancreatitis What is acute pancreatitis? The pancreas is an organ behind the stomach. It makes hormones like insulin that help control how your body uses sugar. It also makes enzymes that help you digest food. Usually these enzymes flow from the pancreas to the intestines. But if they leak into the pancreas, they can irritate it and cause pain and swelling. When this happens suddenly, it's called acute pancreatitis. What causes it? Most of the time, acute pancreatitis is caused by gallstones or by heavy alcohol use. But several other things can cause it, such as: 
· High levels of fats in the blood. · An injury. · A problem after a surgery or a procedure. · Certain medicines. What are the symptoms? The main symptom is pain in the upper belly. The pain can be severe. You also may have a fever, nausea, or vomiting. Some people get so sick that they have problems breathing. They also may have low blood pressure. How is it diagnosed? Your doctor will diagnose pancreatitis with an exam and by looking at your lab tests. Your doctor may think that you have this problem based on your symptoms and where you have pain in your belly. You may have blood tests of enzymes called amylase and lipase. In pancreatitis, the level of these enzymes is usually much higher than normal. 
You also may have imaging tests of the belly. These may include an ultrasound, a CT scan, or an MRI. A special MRI called MRCP can show images of the bile ducts. This test can be very helpful when gallstones are causing the problem. How is it treated? Treatment of acute pancreatitis usually takes place in the hospital. It focuses on taking care of pain and supporting your body while your pancreas heals. In severe cases, treatment may occur in an intensive care unit to support breathing, treat serious infections, or help raise very low blood pressure. If a gallstone is causing the problem, the gallstone may need to be removed. This is done during a procedure called ERCP. The doctor puts a scope in your mouth and moves it gently through the stomach and into the ducts from the pancreas and gallbladder. The doctor is then able to see a stone and remove it. Sometimes the gallbladder, which makes gallstones, needs to be removed by surgery. People with pancreatitis often need a lot of fluid to help support their other organs and their blood pressure. They get fluids through a vein (intravenous, or IV). Instead of food by mouth, nutrition is sometimes given through a vein while the pancreas heals. Follow-up care is a key part of your treatment and safety. Be sure to make and go to all appointments, and call your doctor if you are having problems. It's also a good idea to know your test results and keep a list of the medicines you take. Where can you learn more? Go to http://tal-chava.info/. Enter O418 in the search box to learn more about \"Learning About Acute Pancreatitis. \" Current as of: May 12, 2017 Content Version: 11.7 © 9914-6653 [employer], [employer].  Care instructions adapted under license by [address] (which disclaims liability or warranty for this information). If you have questions about a medical condition or this instruction, always ask your healthcare professional. [address] any warranty or liability for your use of this information. Please do not take your cholesterol medications (zetia and lipitor) until you see your primary care doctor because your liver testing was abnormal. Have your pcp check your liver testing before you re start taking these meds. Endoscopic Retrograde Cholangiopancreatogram (ERCP): What to Expect at [facility] Your Recovery After you have an endoscopic retrograde cholangiopancreatogram (ERCP), you probably will stay at the hospital or clinic for 1 to 2 hours. This will allow the medicine to wear off. You will be able to go home after your doctor or a nurse checks to make sure you are not having any problems. If you stay in the hospital overnight, you may go home the next day. You may have a sore throat for a day or two after the procedure. This care sheet gives you a general idea about how long it will take for you to recover. But each person recovers at a different pace. Follow the steps below to get better as quickly as possible. How can you care for yourself at home? Activity 
  · Rest as much as you need to after you go home.  
  · You should be able to go back to your usual activities the day after the procedure. Diet 
  · Follow your doctor's directions for eating after the procedure.  
  · Drink plenty of fluids (unless your doctor tells you not to). Medicines 
  · Your doctor will tell you if and when you can restart your medicines. He or she will also give you instructions about taking any new medicines.  
  · If you take blood thinners, such as warfarin (Coumadin), clopidogrel (Plavix), or aspirin, be sure to talk to your doctor.  He or she will tell you if and when to start taking those medicines again. Make sure that you understand exactly what your doctor wants you to do.  
  · If you have a sore throat the next day, use an over-the-counter spray to numb your throat. Be safe with medicines. Read and follow all instructions on the label. Follow-up care is a key part of your treatment and safety. Be sure to make and go to all appointments, and call your doctor if you are having problems. It's also a good idea to know your test results and keep a list of the medicines you take. When should you call for help? Call 911 anytime you think you may need emergency care. For example, call if: 
  · You passed out (lost consciousness).  
  · Your stools are maroon or very bloody.  
  · You have trouble breathing.  
 Call your doctor now or go to the emergency room if: 
  · You have new or worse belly pain.  
  · You have pain that does not get better after you take pain medicine.  
  · You have a fever.  
  · You cannot pass stools or gas.  
  · You are sick to your stomach or cannot hold down fluids.  
  · You have blood in your stools.  
 Watch closely for changes in your health, and be sure to contact your doctor if: 
  · Your throat still hurts after a day or two.  
  · You do not get better as expected. Where can you learn more? Go to http://tal-chava.info/. Enter I944 in the search box to learn more about \"Endoscopic Retrograde Cholangiopancreatogram (ERCP): What to Expect at Home. \" Current as of: May 12, 2017 Content Version: 11.7 © 5745-8770 [employer]. Care instructions adapted under license by [employer] (which disclaims liability or warranty for this information). If you have questions about a medical condition or this instruction, always ask your healthcare professional. [address] any warranty or liability for your use of this information. DISCHARGE SUMMARY from Nurse PATIENT INSTRUCTIONS: 
 
 
F-face looks uneven A-arms unable to move or move unevenly S-speech slurred or non-existent T-time-call 911 as soon as signs and symptoms begin-DO NOT go Back to bed or wait to see if you get better-TIME IS BRAIN. Warning Signs of HEART ATTACK Call 911 if you have these symptoms: 
? Chest discomfort. Most heart attacks involve discomfort in the center of the chest that lasts more than a few minutes, or that goes away and comes back. It can feel like uncomfortable pressure, squeezing, fullness, or pain. ? Discomfort in other areas of the upper body. Symptoms can include pain or discomfort in one or both arms, the back, neck, jaw, or stomach. ? Shortness of breath with or without chest discomfort. ? Other signs may include breaking out in a cold sweat, nausea, or lightheadedness. Don't wait more than five minutes to call [address]! Fast action can save your life. Calling 911 is almost always the fastest way to get lifesaving treatment. Emergency Medical Services staff can begin treatment when they arrive  up to an hour sooner than if someone gets to the hospital by car. The discharge information has been reviewed with the patient and spouse. The patient and spouse verbalized understanding. Discharge medications reviewed with the patient and spouse and appropriate educational materials and side effects teaching were provided. ___________________________________________________________________________________________________________________________________ ACO Transitions of Care Introducing [facility] [address] offers a voluntary care coordination program to provide high quality service and care to [facility] fee-for-service beneficiaries. Mary Lala was designed to help you enhance your health and well-being through the following services: ? Transitions of Care  support for individuals who are transitioning from one care setting to another (example: Hospital to home). ? Chronic and Complex Care Coordination  support for individuals and caregivers of those with serious or chronic illnesses or with more than one chronic (ongoing) condition and those who take a number of different medications. If you meet specific medical criteria, a [facility] [address] may call you directly to coordinate your care with your primary care physician and your other care providers. For questions about the [facility] programs, please, contact your physicians office. For general questions or additional information about Accountable Care Organizations: 
Please visit www.medicare.gov/acos. html or call 1-800-MEDICARE (9-755.701.5162) TTY users should call 2-654.880.6895. [employer] Announcement We are excited to announce that we are making your provider's discharge notes available to you in [employer]. You will see these notes when they are completed and signed by the physician that discharged you from your recent hospital stay. If you have any questions or concerns about any information you see in [employer], please call the Health Information Department where you were seen or reach out to your Primary Care Provider for more information about your plan of care. Introducing hospitals & HEALTH SERVICES! Dear Haydee Grijalva: Thank you for requesting a [employer] account. Our records indicate that you already have an active [employer] account. You can access your account anytime at https://COM DEV. [employer]/COM DEV Did you know that you can access your hospital and ER discharge instructions at any time in [employer]? You can also review all of your test results from your hospital stay or ER visit. Additional Information If you have questions, please visit the Frequently Asked Questions section of the [employer] website at [email or web address] Remember, [employer] is NOT to be used for urgent needs. For medical emergencies, dial 911. Now available from your iPhone and Android! Introducing Pancho Corona As a [facility] patient, I wanted to make you aware of our electronic visit tool called Pancho Corona. [employer] allows you to connect within minutes with a medical provider 24 hours a day, seven days a week via a mobile device or tablet or logging into a secure website from your computer. You can access Pancho Corona from anywhere in the [place]. A virtual visit might be right for you when you have a simple condition and feel like you just dont want to get out of bed, or cant get away from work for an appointment, when your regular [facility] provider is not available (evenings, weekends or holidays), or when youre out of town and need minor care. Electronic visits cost only $49 and if the [employer] provider determines a prescription is needed to treat your condition, one can be electronically transmitted to a nearby pharmacy*. Please take a moment to enroll today if you have not already done so. The enrollment process is free and takes just a few minutes. To enroll, please download the [employer] gini to your tablet or phone, or visit [email or web address] to enroll on your computer. And, as an [address] [place] [address] patient with a [employer] account, the results of your visits will be scanned into your electronic medical record and your primary care provider will be able to view the scanned results. We urge you to continue to see your regular [facility] provider for your ongoing medical care. And while your primary care provider may not be the one available when you seek a Pancho Corona virtual visit, the peace of mind you get from getting a real diagnosis real time can be priceless. For more information on Pancho Corona, view our Frequently Asked Questions (FAQs) at [email or web address]. Sincerely, 
 
Rafaela George MD 
Chief Medical Officer Gennaro Mejia *:  certain medications cannot be prescribed via Pancho Corona Providers Seen During Your Hospitalization Provider Specialty Primary office phone Mario Haider MD Gastroenterology [phone] Maine Donahue MD Internal Medicine [phone] Tucker Martinez MD Internal Medicine [phone] Your Primary Care Physician (PCP) Primary Care Physician Office Phone Office Fax Julia Gandhi [phone] You are allergic to the following Allergen Reactions Atenolol Other (comments) Felt poorly Cozaar (Losartan) Other (comments) Headaches Norvasc (Amlodipine) Other (comments) Felt poorly Recent Documentation Height Weight Breastfeeding? BMI OB Status Smoking Status 1.626 m 78.6 kg No 29.74 kg/m2 Postmenopausal Never Smoker Emergency Contacts Name Discharge Info Relation Home Work Mobile [address] [facility] CAREGIVER [3] Spouse [3] [phone] Patient Belongings The following personal items are in your possession at time of discharge: 
  Dental Appliances: Partials, Uppers, Lowers  Visual Aid: Glasses Please provide this summary of care documentation to your next provider. Signatures-by signing, you are acknowledging that this After Visit Summary has been reviewed with you and you have received a copy. Patient Signature:  ____________________________________________________________ Date:  ____________________________________________________________  
  
Jesus Barriga Provider Signature:  ____________________________________________________________ Date:  ____________________________________________________________

## 2018-08-16 NOTE — PROGRESS NOTES
CMS went to speak with the pt in regards to the \"Patient Guide to Observation & Outpatient Care\" and the [facility] Outpatient Observation Notice. \"  Pt was able to review and sign the documents provided. Family were present at bedside. Signed documents can be found in the chart for review; additional copies were left with the pt for review.

## 2018-08-16 NOTE — PROCEDURES
[facility]  [address] [facility] [address] [facility], [address]            ERCP NOTE    NAME:  Oliver Neal   :   1940   MRN:   [id number]     Date/Time:  2018 9:12 AM    Procedure Type:   ERCPwith biliary sphincterotomy, biliary stone removal, ampullary dilation. Indications: common bile duct stone  Pre-operative Diagnosis: see indication above  Post-operative Diagnosis:  See findings below  : Veronica Marcano MD    Referring Provider:     Attila Galindo MD    Sedation:  MAC anesthesia Propofol    Procedure Details:  After informed consent was obtained with all risks and benefits of procedure explained, the patient was taken to the fluoroscopy suite and placed in the prone position. Upon sequential sedation as per above, the Olympus duodenoscope [id number]   was inserted via the mouthpeice and carefully advanced to the second portion of the duodenum. The quality of visualization was excellent. The duodenoscope was withdrawn into a short position. Findings:   Esophagus:normal  Stomach: erosions seen. Duodenum/jejunum: normal    Ampulla:-normal at 3 rd attempt the wire passed towards cbd direction and dye injected. Cholangiogram: -filling defect, 7 mm in size, located in the common bile duct mid portion  Pancreatogram:not performed    Specimen Removed:  None  Complications: None. EBL:  None. Interventions:    Pancreatic: none  Biliary: Canulation  successful,  standard Papillotomy of 6 mm done, Stone Extraction  balloon with  complete clearance and ampullary dilation to 8 mm with a CRE balloon. The stone was pigmented dark. Debris came out as it go crushed with the balloon. The whole stone did not come out. Post extraction cholangiogram was negative for filling defect. I also passed basket up the cbd 3 times to clear the debris.      Impression:  -Dilated cbd with CBD stone extracted with balloon and basked after ampullary dilation, Balloon inflated to 9mm to extract the stone. Pancreatogram -not performed    Recommendations:  Clear liquid diet and advance as tolerated. Resume normal medication(s). Discharge Disposition:  Will watch for pancreatitis. Indomethacin was given 100 mg.  Home following recovery in Endoscopy    Rochelle Fry MD  8/16/2018  9:12 AM

## 2018-08-16 NOTE — PERIOP NOTES
Lab notified of pending labs scheduled for [phone]. Receiving Nurse, Patel Yip made aware lab notified and specimen still needs to be collected. Gave update on pt response to tx.

## 2018-08-17 ENCOUNTER — APPOINTMENT (OUTPATIENT)
Dept: ULTRASOUND IMAGING | Age: 78
DRG: 444 | End: 2018-08-17
Attending: SURGERY
Payer: MEDICARE

## 2018-08-17 VITALS
RESPIRATION RATE: 18 BRPM | WEIGHT: 173.25 LBS | HEART RATE: 68 BPM | SYSTOLIC BLOOD PRESSURE: 144 MMHG | OXYGEN SATURATION: 94 % | TEMPERATURE: 98.8 F | HEIGHT: 64 IN | BODY MASS INDEX: 29.58 KG/M2 | DIASTOLIC BLOOD PRESSURE: 75 MMHG

## 2018-08-17 LAB
ALBUMIN SERPL-MCNC: 2.8 G/DL (ref 3.4–5)
ALBUMIN/GLOB SERPL: 0.9 {RATIO} (ref 0.8–1.7)
ALP SERPL-CCNC: 107 U/L (ref 45–117)
ALT SERPL-CCNC: 60 U/L (ref 13–56)
ANION GAP SERPL CALC-SCNC: 8 MMOL/L (ref 3–18)
APTT PPP: 26.9 SEC (ref 23–36.4)
AST SERPL-CCNC: 63 U/L (ref 15–37)
BILIRUB SERPL-MCNC: 1.5 MG/DL (ref 0.2–1)
BUN SERPL-MCNC: 21 MG/DL (ref 7–18)
BUN/CREAT SERPL: 19 (ref 12–20)
CALCIUM SERPL-MCNC: 8.5 MG/DL (ref 8.5–10.1)
CHLORIDE SERPL-SCNC: 106 MMOL/L (ref 100–108)
CHOLEST SERPL-MCNC: 112 MG/DL
CO2 SERPL-SCNC: 26 MMOL/L (ref 21–32)
CREAT SERPL-MCNC: 1.09 MG/DL (ref 0.6–1.3)
ERYTHROCYTE [DISTWIDTH] IN BLOOD BY AUTOMATED COUNT: 13.6 % (ref 11.6–14.5)
GLOBULIN SER CALC-MCNC: 3.2 G/DL (ref 2–4)
GLUCOSE SERPL-MCNC: 98 MG/DL (ref 74–99)
HCT VFR BLD AUTO: 35.4 % (ref 35–45)
HDLC SERPL-MCNC: 35 MG/DL (ref 40–60)
HDLC SERPL: 3.2 {RATIO} (ref 0–5)
HGB BLD-MCNC: 11.8 G/DL (ref 12–16)
LDLC SERPL CALC-MCNC: 56.8 MG/DL (ref 0–100)
LIPID PROFILE,FLP: ABNORMAL
MCH RBC QN AUTO: 30.3 PG (ref 24–34)
MCHC RBC AUTO-ENTMCNC: 33.3 G/DL (ref 31–37)
MCV RBC AUTO: 91 FL (ref 74–97)
PLATELET # BLD AUTO: 153 K/UL (ref 135–420)
PMV BLD AUTO: 11 FL (ref 9.2–11.8)
POTASSIUM SERPL-SCNC: 4.2 MMOL/L (ref 3.5–5.5)
PROT SERPL-MCNC: 6 G/DL (ref 6.4–8.2)
RBC # BLD AUTO: 3.89 M/UL (ref 4.2–5.3)
SODIUM SERPL-SCNC: 140 MMOL/L (ref 136–145)
TRIGL SERPL-MCNC: 101 MG/DL (ref ?–150)
VLDLC SERPL CALC-MCNC: 20.2 MG/DL
WBC # BLD AUTO: 9.7 K/UL (ref 4.6–13.2)

## 2018-08-17 PROCEDURE — 36415 COLL VENOUS BLD VENIPUNCTURE: CPT | Performed by: HOSPITALIST

## 2018-08-17 PROCEDURE — 80053 COMPREHEN METABOLIC PANEL: CPT | Performed by: HOSPITALIST

## 2018-08-17 PROCEDURE — 85027 COMPLETE CBC AUTOMATED: CPT | Performed by: HOSPITALIST

## 2018-08-17 PROCEDURE — 99218 HC RM OBSERVATION: CPT

## 2018-08-17 PROCEDURE — 74011250637 HC RX REV CODE- 250/637: Performed by: HOSPITALIST

## 2018-08-17 PROCEDURE — C9113 INJ PANTOPRAZOLE SODIUM, VIA: HCPCS | Performed by: INTERNAL MEDICINE

## 2018-08-17 PROCEDURE — 80061 LIPID PANEL: CPT | Performed by: HOSPITALIST

## 2018-08-17 PROCEDURE — 74011250636 HC RX REV CODE- 250/636: Performed by: INTERNAL MEDICINE

## 2018-08-17 PROCEDURE — 65270000029 HC RM PRIVATE

## 2018-08-17 PROCEDURE — 85730 THROMBOPLASTIN TIME PARTIAL: CPT | Performed by: HOSPITALIST

## 2018-08-17 PROCEDURE — 74011000250 HC RX REV CODE- 250: Performed by: INTERNAL MEDICINE

## 2018-08-17 PROCEDURE — 76705 ECHO EXAM OF ABDOMEN: CPT

## 2018-08-17 PROCEDURE — 74011250636 HC RX REV CODE- 250/636: Performed by: HOSPITALIST

## 2018-08-17 PROCEDURE — 74011250636 HC RX REV CODE- 250/636: Performed by: NURSE ANESTHETIST, CERTIFIED REGISTERED

## 2018-08-17 PROCEDURE — 74011250637 HC RX REV CODE- 250/637: Performed by: INTERNAL MEDICINE

## 2018-08-17 RX ORDER — LEVOTHYROXINE SODIUM 50 UG/1
50 TABLET ORAL
Status: COMPLETED | OUTPATIENT
Start: 2018-08-17 | End: 2018-08-17

## 2018-08-17 RX ORDER — MORPHINE SULFATE 2 MG/ML
1 INJECTION, SOLUTION INTRAMUSCULAR; INTRAVENOUS ONCE
Status: COMPLETED | OUTPATIENT
Start: 2018-08-17 | End: 2018-08-17

## 2018-08-17 RX ORDER — PANTOPRAZOLE SODIUM 40 MG/1
40 TABLET, DELAYED RELEASE ORAL
Status: DISCONTINUED | OUTPATIENT
Start: 2018-08-18 | End: 2018-08-18 | Stop reason: HOSPADM

## 2018-08-17 RX ADMIN — SODIUM CHLORIDE, SODIUM LACTATE, POTASSIUM CHLORIDE, AND CALCIUM CHLORIDE 75 ML/HR: 600; 310; 30; 20 INJECTION, SOLUTION INTRAVENOUS at 09:20

## 2018-08-17 RX ADMIN — HEPARIN SODIUM 5000 UNITS: 5000 INJECTION, SOLUTION INTRAVENOUS; SUBCUTANEOUS at 02:15

## 2018-08-17 RX ADMIN — ACETAMINOPHEN 650 MG: 325 TABLET, FILM COATED ORAL at 11:49

## 2018-08-17 RX ADMIN — Medication 1 MG: at 05:50

## 2018-08-17 RX ADMIN — SODIUM CHLORIDE 40 MG: 9 INJECTION INTRAMUSCULAR; INTRAVENOUS; SUBCUTANEOUS at 09:20

## 2018-08-17 RX ADMIN — Medication 2 MG: at 05:00

## 2018-08-17 RX ADMIN — Medication 10 ML: at 14:17

## 2018-08-17 RX ADMIN — LEVOTHYROXINE SODIUM 50 MCG: 50 TABLET ORAL at 10:07

## 2018-08-17 RX ADMIN — Medication 10 ML: at 07:03

## 2018-08-17 RX ADMIN — HEPARIN SODIUM 5000 UNITS: 5000 INJECTION, SOLUTION INTRAVENOUS; SUBCUTANEOUS at 09:21

## 2018-08-17 NOTE — DISCHARGE SUMMARY
[facility]  Discharge Summary       Patient: Rian Mendoza Age: 76 y.o. : 1940 MR#: [id number] [id number]: [id number]  PCP on record: Mook Mireles MD  Admit date: 2018  Discharge date: 2018    Consults: DREA Sauer,-surgery   - LISSET Condon,-GI  Procedures:- 18: ERCP with biliary sphincterotomy, biliary stone removal, ampullary dilation  -     Significant Diagnostic Studies: -18:  1. Approximately 1 cm echogenic heterogeneous structure adherent to the anterior  gallbladder wall for example on image 49478 of series 1 likely represents a  small bundle of gallstones however a gallbladder polyp or gallbladder carcinoma  would be difficult to exclude as this is an unusual configuration. Additionally  there are punctate mineralizations in the gallbladder wall that could represent  porcelain gallbladder. The findings are not conspicuous on recent CT 2018. In an abundance of caution, further evaluation with MRI advised in this  age group if clinically indicated. 2. Pancreatic duct prominence and echogenic pancreas. MRCP is recommended at the  time of MRI for further evaluation. 3. The patient's known aortic aneurysm is redemonstrated. Reference made to  2018 CT for further details. 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 She stayed overnight and was treated for post ERCP pancreatitis. She was seen by the gen surgeon and us was done of the ruq. She was noted to have gb abnormality as pasted above stones vs neoplastic process. Result has been discussed w/ surgeon who ordered the test. She is to f/u as outpt w/ surgeon. On the day after her admission, she felt better and was able to tolerate regular diet the next day and was dc'd.             Today's examination of the patient revealed:     Subjective:     Objective:   VS:   Visit Vitals    /76 (BP 1 Location: Right arm, BP Patient Position: At rest)    Pulse 61    Temp 98.8 °F (37.1 °C)    Resp 20    Ht 5' 4\" (1.626 m)    Wt 78.6 kg (173 lb 4 oz)    SpO2 95%    Breastfeeding No    BMI 29.74 kg/m2      Tmax/24hrs: Temp (24hrs), Av.9 °F (36.6 °C), Min:97.1 °F (36.2 °C), Max:98.8 °F (37.1 °C)     Input/Output:   Intake/Output Summary (Last 24 hours) at 18 1602  Last data filed at 18 1000   Gross per 24 hour   Intake             1515 ml   Output                0 ml   Net             1515 ml       General: alert, awake, in nad   Cardiovascular:  Rrr, no murmurs  Pulmonary:  ctab  GI:soft, nt, nd    Extremities: no edema   Additional:      Labs:    Recent Results (from the past 24 hour(s))   METABOLIC PANEL, COMPREHENSIVE    Collection Time: 18  3:40 AM   Result Value Ref Range    Sodium 140 136 - 145 mmol/L    Potassium 4.2 3.5 - 5.5 mmol/L    Chloride 106 100 - 108 mmol/L    CO2 26 21 - 32 mmol/L    Anion gap 8 3.0 - 18 mmol/L Glucose 98 74 - 99 mg/dL    BUN 21 (H) 7.0 - 18 MG/DL    Creatinine 1.09 0.6 - 1.3 MG/DL    BUN/Creatinine ratio 19 12 - 20      GFR est AA 59 (L) >60 ml/min/1.73m2    GFR est non-AA 48 (L) >60 ml/min/1.73m2    Calcium 8.5 8.5 - 10.1 MG/DL    Bilirubin, total 1.5 (H) 0.2 - 1.0 MG/DL    ALT (SGPT) 60 (H) 13 - 56 U/L    AST (SGOT) 63 (H) 15 - 37 U/L    Alk. phosphatase 107 45 - 117 U/L    Protein, total 6.0 (L) 6.4 - 8.2 g/dL    Albumin 2.8 (L) 3.4 - 5.0 g/dL    Globulin 3.2 2.0 - 4.0 g/dL    A-G Ratio 0.9 0.8 - 1.7     LIPID PANEL    Collection Time: 08/17/18  3:40 AM   Result Value Ref Range    LIPID PROFILE          Cholesterol, total 112 <200 MG/DL    Triglyceride 101 <150 MG/DL    HDL Cholesterol 35 (L) 40 - 60 MG/DL    LDL, calculated 56.8 0 - 100 MG/DL    VLDL, calculated 20.2 MG/DL    CHOL/HDL Ratio 3.2 0 - 5.0     CBC W/O DIFF    Collection Time: 08/17/18  3:40 AM   Result Value Ref Range    WBC 9.7 4.6 - 13.2 K/uL    RBC 3.89 (L) 4.20 - 5.30 M/uL    HGB 11.8 (L) 12.0 - 16.0 g/dL    HCT 35.4 35.0 - 45.0 %    MCV 91.0 74.0 - 97.0 FL    MCH 30.3 24.0 - 34.0 PG    MCHC 33.3 31.0 - 37.0 g/dL    RDW 13.6 11.6 - 14.5 %    PLATELET 006 230 - 909 K/uL    MPV 11.0 9.2 - 11.8 FL   PTT    Collection Time: 08/17/18  3:40 AM   Result Value Ref Range    aPTT 26.9 23.0 - 36.4 SEC     Additional Data Reviewed:     Condition: stable  Disposition:    [x]Home   [x]Home with Home Health   []SNF/NH   []Rehab   []Home with family   []Alternate Facility:____________________      Discharge Medications:     Current Discharge Medication List      CONTINUE these medications which have NOT CHANGED    Details   oxyCODONE-acetaminophen (PERCOCET) 5-325 mg per tablet Take 1 Tab by mouth every four (4) hours as needed. Max Daily Amount: 6 Tabs.   Qty: 30 Tab, Refills: 0    Associated Diagnoses: AAA (abdominal aortic aneurysm) without rupture (HCC)      lidocaine (LIDODERM) 5 % Apply patch to the affected area for 12 hours a day and remove for 12 hours a day. Qty: 30 Each, Refills: 5    Associated Diagnoses: Postherpetic neuralgia      omeprazole (PRILOSEC) 40 mg capsule Take 1 Cap by mouth daily. Qty: 90 Cap, Refills: 1    Associated Diagnoses: Abdominal pain, unspecified abdominal location      lisinopril (PRINIVIL, ZESTRIL) 10 mg tablet Take 1 Tab by mouth daily. Qty: 90 Tab, Refills: 3    Associated Diagnoses: Essential hypertension with goal blood pressure less than 140/90      diazePAM (VALIUM) 10 mg tablet Take 1 Tab by mouth every twelve (12) hours as needed for Anxiety. Max Daily Amount: 20 mg.  Qty: 60 Tab, Refills: 1    Associated Diagnoses: Postherpetic neuralgia      Levothyroxine 75 mcg cap Take one tablet by mouth every other day before breakfast. Alternates with 50mcg  Qty: 45 Cap, Refills: 3      ergocalciferol (VITAMIN D2) 50,000 unit capsule Take 1 Cap by mouth every seven (7) days. Qty: 12 Cap, Refills: 1      levothyroxine (SYNTHROID) 50 mcg tablet Take 1 Tab by mouth Daily (before breakfast). Qty: 90 Tab, Refills: 3    Associated Diagnoses: Hypothyroidism due to acquired atrophy of thyroid      aspirin 81 mg chewable tablet Take 1 Tab by mouth daily. Qty: 90 Tab, Refills: 3      ibuprofen (MOTRIN) 800 mg tablet Take 1 Tab by mouth three (3) times daily (with meals). Qty: 45 Tab, Refills: 0         STOP taking these medications       ezetimibe (ZETIA) 10 mg tablet Comments:   Reason for Stopping:         atorvastatin (LIPITOR) 40 mg tablet Comments:   Reason for Stopping: Follow-up Appointments:   1. Your PCP: Gisela Alfred MD, within 7-10days  2.  Gen surgeon in 2 wks            >30 minutes spent coordinating this discharge (review instructions/follow-up, prescriptions, preparing report for sign off)    Signed:  Nathan Jaffe MD  8/17/2018  4:02 PM

## 2018-08-17 NOTE — PROGRESS NOTES
Seen today by me twice. Pain has pretty much gone. No nausea. No fever. Passing flatus. No dizzyness  PE:   Visit Vitals    /76 (BP 1 Location: Right arm, BP Patient Position: At rest)    Pulse 61    Temp 98.8 °F (37.1 °C)    Resp 20    Ht 5' 4\" (1.626 m)    Wt 78.6 kg (173 lb 4 oz)    SpO2 95%    Breastfeeding No    BMI 29.74 kg/m2     Abdomen soft BS present  Lungs CTA  Recent Results (from the past 12 hour(s))   METABOLIC PANEL, COMPREHENSIVE    Collection Time: 08/17/18  3:40 AM   Result Value Ref Range    Sodium 140 136 - 145 mmol/L    Potassium 4.2 3.5 - 5.5 mmol/L    Chloride 106 100 - 108 mmol/L    CO2 26 21 - 32 mmol/L    Anion gap 8 3.0 - 18 mmol/L    Glucose 98 74 - 99 mg/dL    BUN 21 (H) 7.0 - 18 MG/DL    Creatinine 1.09 0.6 - 1.3 MG/DL    BUN/Creatinine ratio 19 12 - 20      GFR est AA 59 (L) >60 ml/min/1.73m2    GFR est non-AA 48 (L) >60 ml/min/1.73m2    Calcium 8.5 8.5 - 10.1 MG/DL    Bilirubin, total 1.5 (H) 0.2 - 1.0 MG/DL    ALT (SGPT) 60 (H) 13 - 56 U/L    AST (SGOT) 63 (H) 15 - 37 U/L    Alk.  phosphatase 107 45 - 117 U/L    Protein, total 6.0 (L) 6.4 - 8.2 g/dL    Albumin 2.8 (L) 3.4 - 5.0 g/dL    Globulin 3.2 2.0 - 4.0 g/dL    A-G Ratio 0.9 0.8 - 1.7     LIPID PANEL    Collection Time: 08/17/18  3:40 AM   Result Value Ref Range    LIPID PROFILE          Cholesterol, total 112 <200 MG/DL    Triglyceride 101 <150 MG/DL    HDL Cholesterol 35 (L) 40 - 60 MG/DL    LDL, calculated 56.8 0 - 100 MG/DL    VLDL, calculated 20.2 MG/DL    CHOL/HDL Ratio 3.2 0 - 5.0     CBC W/O DIFF    Collection Time: 08/17/18  3:40 AM   Result Value Ref Range    WBC 9.7 4.6 - 13.2 K/uL    RBC 3.89 (L) 4.20 - 5.30 M/uL    HGB 11.8 (L) 12.0 - 16.0 g/dL    HCT 35.4 35.0 - 45.0 %    MCV 91.0 74.0 - 97.0 FL    MCH 30.3 24.0 - 34.0 PG    MCHC 33.3 31.0 - 37.0 g/dL    RDW 13.6 11.6 - 14.5 %    PLATELET 004 463 - 813 K/uL    MPV 11.0 9.2 - 11.8 FL   PTT    Collection Time: 08/17/18  3:40 AM   Result Value Ref Range aPTT 26.9 23.0 - 36.4 SEC     A/P: Post ERCP epigastric pain/Pancreatitis. Improved. CBD stones removed. No stones in GB. Spoke with Surgery Dr. Ino Box. Will do rt UQ ultrasound for gall stones. May need cholecystectomy with IOC. Advance diet and d/c home if pain free.      Daniel Gordillo MD

## 2018-08-17 NOTE — PROGRESS NOTES
Patient called out stating that she is having sharp abdominal pain which is different than the pain she was having earlier. She states that this pain \"feels like when I had my AAA repaired. \" Vital signs are stable at this time and the area of pain she is referring to is in the area consistent with diagnosis. Contacted Dr. Gayathri Lira and notified her of the above findings. Morphine has already been given. Per Dr. Gayathri Lira no further orders or interventions at this time. Will continue to monitor.

## 2018-08-17 NOTE — ROUTINE PROCESS
Bedside and Verbal shift change report given to Kenji Maharaj RN (oncoming nurse) by Alex Rees RN (offgoing nurse). Report included the following information SBAR, Kardex, Procedure Summary, Intake/Output, MAR, Recent Results and Med Rec Status.

## 2018-08-17 NOTE — PROGRESS NOTES
Reason for Admission:  Right upper quadrant pain                   RRAT Score:   16               Do you (patient/family) have any concerns for transition/discharge? None at this time               Plan for utilizing home health:   Home      Likelihood of readmission? Moderate              Transition of Care Plan:       Patient was admitted with a dx of right upper quadrant pain. Patient was alert and oriented, and independent with her ADL's. Patient lives at home with spouse. Patient has designated her  Alexye Bardales (phone: [phone]) to participate in her discharge plan and to receive any needed information. Patient's PCP is Kingsley Dakins MD and her last visit was a couple of months ago. Patient is not utilizing any healthcare services at this time. D/c plan at this time is to return home. Will continue to follow and assist with d/c planning as d/c needs are identified. NIDIA GonzalezW, Care Manager. Care Management Interventions  PCP Verified by CM: Yes (April 2018)  Palliative Care Criteria Met (RRAT>21 & CHF Dx)?: No  Mode of Transport at Discharge:  Other (see comment) (Family)  Transition of Care Consult (CM Consult): Discharge Planning  Discharge Durable Medical Equipment: No  Physical Therapy Consult: No  Occupational Therapy Consult: No  Current Support Network: Lives with Spouse  Confirm Follow Up Transport: Family  Plan discussed with Pt/Family/Caregiver: Yes  Discharge Location  Discharge Placement: Home

## 2018-08-17 NOTE — HOME CARE
Attempted to make [facility] rounds - patient not in room - left brochure on bedside table - GLORIA Urena LPN

## 2018-08-17 NOTE — PROGRESS NOTES
Patient still states that she is having the same pain despite receiving 2mg of morphine. She still states that the pain is in the same area but is radiating through to her back. Vitals signs are still stable. See Flowsheet. Contacted Dr. Yvan Green and notified her of the above findings and pt's history. Dr. Yvan Green ordered a one-time dose of 1 mg IV morphine. No further orders at this time. Continue to monitor.

## 2018-08-17 NOTE — PROGRESS NOTES
met with patient, completed the initial Spiritual Assessment of the patient, and offered Pastoral Care, see flow sheets for interventions. Patient indicated she is [employer] and that a  does not need to visit her. Pastoral support provided. Devotional given. She hopes to go home today. Patient does not have any Catholic/cultural needs that will affect patients preferences in health care. Chart reviewed. Chaplains will continue to follow and will provide pastoral care on an as needed/as requested basis. Kendall Leal MDiv.   Board Certified [employer] [phone]

## 2018-08-17 NOTE — PROGRESS NOTES
After second dose of morphine, the patient states that she feels better and the pain is subsiding. Vital signs remain unchanged. She denies any further needs. Call bell within reach. Will continue to monitor.

## 2018-08-17 NOTE — PROGRESS NOTES
0800:  Report received,care assumed. 0920:  New orders for clear liquid diet received. Patient informed plan of care, she verbalizes understanding. Clear liquid tray served to patient. 1000:  Received call from Ultrasound dept stating patient must be NPO for 6-8hours prior to ultrasound abdomen being performed as ordered, time begins now as pt just finishing clear liquid tray. Pt and  updated to plan of care, both verbalize understanding. Attending provider called to inform plan of care. 1300:  Provider informed patient and this RN after Ultrasound complete this afternoon, patient may be discharged as orders written. Pt verbalizes understanding plan of care. 1414: To Ultrasound dept for testing. 1440:  Sebastian gave verbal order for patient to eat regular food prior to discharge today to allow for evaluation of tolerance. 1800:  Regular dinner tray eaten without pain or nausea. Discharge home to occur this evening. 1845:  Discharge instructions reviewed with patient at length; she verbalizes understanding all info. Await  to arrive for transport home. 1915:  Discharged home via wheelchair to private vehicle.

## 2018-08-17 NOTE — CONSULTS
General Surgery Consult      Nichelle Huang [facility]  Admit date: 2018    MRN: [id number]     : 1940     Age: 76 y.o. Attending Physician: Laura Roe MD, FACS      Subjective:     Keenan Yip is a 76 y.o. female who is currently being treated for mild pancreatitis that occurred post ERCP. The patient had abdominal pain, and an MRCP showed choledocholithiasis, so she underwent ERCP and stone extraction. No stent was placed. She developed mild pancreatitis and was admitted for medical treatment. I was consulted for possible need for cholecystectomy. She is currently feeling better. She had abdominal pain with nausea and vomiting but feeling much better now. She did had a CT scan and MRCP in addition to her ERCP, but there is no documentation of cholelithiasis.     Patient Active Problem List    Diagnosis Date Noted    Acute pancreatitis 2018    AAA (abdominal aortic aneurysm) without rupture ([address] 2018    Spondylolisthesis, lumbar region 2018    Spondylosis of lumbar region without myelopathy or radiculopathy 2018    Scoliosis of lumbar spine 2018    DDD (degenerative disc disease), lumbar 2018    Primary hypertension 2016    Advance directive discussed with patient 2016    Obesity (BMI 30.0-34.9) 2016    Hypothyroidism due to acquired atrophy of thyroid 10/07/2015    Arthritis, degenerative 10/07/2015    Dyslipidemia 10/01/2013    Osteopenia FRAX  11 and 2.2 2013    Hypovitaminosis D 10/24/2011    Venous insufficiency dopplers 2003 10/24/2011    Colon adenomas , last  Dr Jennifer King      Past Medical History:   Diagnosis Date    AAA (abdominal aortic aneurysm) without rupture [facility])     AAA repair 2018    Colon adenomas      Dr Hernandez Mar;  Dr Jennifer King    DJD (degenerative joint disease)     Dyslipidemia     FHx: heart disease     Gastritis 2006    on EGD Dr. Alona Gallego, neg sprue    Hypertension     Hypothyroidism 2007    Dr. Austin Smith    Hypovitaminosis D     Lung nodule 06/2016    6 mm RLL (6/16); no change (10/17)    Obesity     peak weight 187 lbs, bmi 33.1 from 4/13; IF 3/18    Osteopenia FRAX 4/12 9.7 and 1.4     DEXA t score 0.6 spine, -1 hip (12/09);  -0.4 spine, -1.3 hip (4/12); -1.2 wrist, -1.6 hip w FRAX 11/2.2 (4/14); -1.0 wrist, -1.6 hip w FRAX 12/2.6 (4/16); -0.7 wrist, -1.6 hip (5/18)    Pancreatitis, chronic ([facility] 2018    Psoriasis 2003    on bx Dr. Martinez Getting Sensorineural hearing loss     left side Dr Irene Sampson Venous insufficiency 2003    on dopplers    Zoster 05/2016    left T11-12; mild postherpetic neuralgia      Past Surgical History:   Procedure Laterality Date    CARDIAC SURG PROCEDURE UNLIST  09/2016    NST negative    CHEST SURGERY PROCEDURE UNLISTED  06/2016    CTA neg PE, 6 mm subpleural nodule    HX AAA REPAIR  07/17/2018    HX Ronnie Cooper left    HX CARPAL TUNNEL RELEASE Left     HX CATARACT REMOVAL Bilateral     HX COLONOSCOPY      benign polyp 5/08 Dr Gerri Campbell;  4/13 negative Dr. Ruma Freeman  2007    neg MRA/[facility] head in [facility] NEUROLOGICAL PROCEDURE UNLISTED  9/10    CT head negative    VASCULAR SURGERY PROCEDURE UNLIST  2007    neg AAA screen in [facility]      Social History   Substance Use Topics    Smoking status: Never Smoker    Smokeless tobacco: Never Used    Alcohol use Yes      Comment: occasionally      History   Smoking Status    Never Smoker   Smokeless Tobacco    Never Used     Family History   Problem Relation Age of Onset    Breast Cancer Mother     Cancer Mother     Heart Disease Father       Current Facility-Administered Medications   Medication Dose Route Frequency    levothyroxine (SYNTHROID) tablet 50 mcg  50 mcg Oral NOW    lidocaine (PF) (XYLOCAINE) 10 mg/mL (1 %) injection 0.1 mL  0.1 mL SubCUTAneous PRN    sodium chloride (NS) flush 5-10 mL  5-10 mL IntraVENous PRN    glucose chewable tablet 16 g  4 Tab Oral PRN    glucagon (GLUCAGEN) injection 1 mg  1 mg IntraMUSCular PRN    dextrose (D50W) injection syrg 12.5-25 g  25-50 mL IntraVENous PRN    morphine injection 2 mg  2 mg IntraVENous PRN    indomethacin (INDOCIN) rectal suppository    PRN    ioversol (OPTIRAY) 320 mg iodine/mL contrast injection    PRN    pantoprazole (PROTONIX) 40 mg in sodium chloride 0.9% 10 mL injection  40 mg IntraVENous DAILY    promethazine (PHENERGAN) 12.5 mg in 0.9% sodium chloride 50 mL IVPB  12.5 mg IntraVENous Q6H PRN    HYDROmorphone (DILAUDID) injection 1 mg  1 mg IntraVENous Q6H PRN    ondansetron (ZOFRAN) injection 4 mg  4 mg IntraVENous Q6H PRN    sodium chloride (NS) flush 5-10 mL  5-10 mL IntraVENous Q8H    sodium chloride (NS) flush 5-10 mL  5-10 mL IntraVENous PRN    acetaminophen (TYLENOL) tablet 650 mg  650 mg Oral Q6H PRN    heparin (porcine) injection 5,000 Units  5,000 Units SubCUTAneous Q8H    lactated Ringers infusion  75 mL/hr IntraVENous CONTINUOUS      Allergies   Allergen Reactions    Atenolol Other (comments)     Felt poorly    Cozaar [Losartan] Other (comments)     Headaches    Norvasc [Amlodipine] Other (comments)     [place] poorly          Review of Systems:  Constitutional: negative  Eyes: negative  Ears, Nose, Mouth, Throat, and Face: negative  Respiratory: negative  Cardiovascular: negative  Gastrointestinal: positive for nausea, vomiting and abdominal pain  Genitourinary:negative  Integument/Breast: negative  Hematologic/Lymphatic: negative  Musculoskeletal:negative  Neurological: negative  Behavioral/Psychiatric: negative  Endocrine: negative  Allergic/Immunologic: negative    Objective:     Visit Vitals    /68 (BP 1 Location: Right arm, BP Patient Position: At rest)    Pulse 67    Temp 98.5 °F (36.9 °C)    Resp 20    Ht 5' 4\" (1.626 m)    Wt 78.6 kg (173 lb 4 oz)    SpO2 92%    Breastfeeding No  BMI 29.74 kg/m2       Physical Exam:      General:  in no apparent distress, alert and oriented times 3   Eyes:  conjunctivae and sclerae normal, pupils equal, round, reactive to light   Throat & Neck: no erythema or exudates noted and neck supple and symmetrical; no palpable masses   Lungs:   clear to auscultation bilaterally   Heart:  Regular rate and rhythm   Abdomen:   rounded, soft, nontender, nondistended, no masses or organomegaly. No abdominal wall hernias. Extremities: extremities normal, atraumatic, no cyanosis or edema   Skin: Normal.         Imaging and Lab Review:     CBC:   Lab Results   Component Value Date/Time    WBC 9.7 08/17/2018 03:40 AM    RBC 3.89 (L) 08/17/2018 03:40 AM    HGB 11.8 (L) 08/17/2018 03:40 AM    HCT 35.4 08/17/2018 03:40 AM    PLATELET 054 42/09/7501 03:40 AM     BMP:   Lab Results   Component Value Date/Time    Glucose 98 08/17/2018 03:40 AM    Sodium 140 08/17/2018 03:40 AM    Potassium 4.2 08/17/2018 03:40 AM    Chloride 106 08/17/2018 03:40 AM    CO2 26 08/17/2018 03:40 AM    BUN 21 (H) 08/17/2018 03:40 AM    Creatinine 1.09 08/17/2018 03:40 AM    Calcium 8.5 08/17/2018 03:40 AM     CMP:  Lab Results   Component Value Date/Time    Glucose 98 08/17/2018 03:40 AM    Sodium 140 08/17/2018 03:40 AM    Potassium 4.2 08/17/2018 03:40 AM    Chloride 106 08/17/2018 03:40 AM    CO2 26 08/17/2018 03:40 AM    BUN 21 (H) 08/17/2018 03:40 AM    Creatinine 1.09 08/17/2018 03:40 AM    Calcium 8.5 08/17/2018 03:40 AM    Anion gap 8 08/17/2018 03:40 AM    BUN/Creatinine ratio 19 08/17/2018 03:40 AM    Alk.  phosphatase 107 08/17/2018 03:40 AM    Protein, total 6.0 (L) 08/17/2018 03:40 AM    Albumin 2.8 (L) 08/17/2018 03:40 AM    Globulin 3.2 08/17/2018 03:40 AM    A-G Ratio 0.9 08/17/2018 03:40 AM       Recent Results (from the past 24 hour(s))   CBC WITH AUTOMATED DIFF    Collection Time: 08/16/18  3:45 PM   Result Value Ref Range    WBC 10.9 4.6 - 13.2 K/uL    RBC 4.25 4.20 - 5.30 M/uL HGB 13.0 12.0 - 16.0 g/dL    HCT 39.6 35.0 - 45.0 %    MCV 93.2 74.0 - 97.0 FL    MCH 30.6 24.0 - 34.0 PG    MCHC 32.8 31.0 - 37.0 g/dL    RDW 13.6 11.6 - 14.5 %    PLATELET 748 969 - 914 K/uL    MPV 11.3 9.2 - 11.8 FL    NEUTROPHILS 87 (H) 40 - 73 %    LYMPHOCYTES 9 (L) 21 - 52 %    MONOCYTES 4 3 - 10 %    EOSINOPHILS 0 0 - 5 %    BASOPHILS 0 0 - 2 %    ABS. NEUTROPHILS 9.5 (H) 1.8 - 8.0 K/UL    ABS. LYMPHOCYTES 1.0 0.9 - 3.6 K/UL    ABS. MONOCYTES 0.4 0.05 - 1.2 K/UL    ABS. EOSINOPHILS 0.0 0.0 - 0.4 K/UL    ABS. BASOPHILS 0.0 0.0 - 0.1 K/UL    DF AUTOMATED     METABOLIC PANEL, COMPREHENSIVE    Collection Time: 08/16/18  3:45 PM   Result Value Ref Range    Sodium 139 136 - 145 mmol/L    Potassium 3.9 3.5 - 5.5 mmol/L    Chloride 107 100 - 108 mmol/L    CO2 24 21 - 32 mmol/L    Anion gap 8 3.0 - 18 mmol/L    Glucose 141 (H) 74 - 99 mg/dL    BUN 18 7.0 - 18 MG/DL    Creatinine 0.87 0.6 - 1.3 MG/DL    BUN/Creatinine ratio 21 (H) 12 - 20      GFR est AA >60 >60 ml/min/1.73m2    GFR est non-AA >60 >60 ml/min/1.73m2    Calcium 8.8 8.5 - 10.1 MG/DL    Bilirubin, total 1.4 (H) 0.2 - 1.0 MG/DL    ALT (SGPT) 34 13 - 56 U/L    AST (SGOT) 44 (H) 15 - 37 U/L    Alk.  phosphatase 111 45 - 117 U/L    Protein, total 7.0 6.4 - 8.2 g/dL    Albumin 3.3 (L) 3.4 - 5.0 g/dL    Globulin 3.7 2.0 - 4.0 g/dL    A-G Ratio 0.9 0.8 - 1.7     LIPASE    Collection Time: 08/16/18  3:45 PM   Result Value Ref Range    Lipase 8028 (H) 73 - 498 U/L   METABOLIC PANEL, COMPREHENSIVE    Collection Time: 08/17/18  3:40 AM   Result Value Ref Range    Sodium 140 136 - 145 mmol/L    Potassium 4.2 3.5 - 5.5 mmol/L    Chloride 106 100 - 108 mmol/L    CO2 26 21 - 32 mmol/L    Anion gap 8 3.0 - 18 mmol/L    Glucose 98 74 - 99 mg/dL    BUN 21 (H) 7.0 - 18 MG/DL    Creatinine 1.09 0.6 - 1.3 MG/DL    BUN/Creatinine ratio 19 12 - 20      GFR est AA 59 (L) >60 ml/min/1.73m2    GFR est non-AA 48 (L) >60 ml/min/1.73m2    Calcium 8.5 8.5 - 10.1 MG/DL    Bilirubin, total 1.5 (H) 0.2 - 1.0 MG/DL    ALT (SGPT) 60 (H) 13 - 56 U/L    AST (SGOT) 63 (H) 15 - 37 U/L    Alk. phosphatase 107 45 - 117 U/L    Protein, total 6.0 (L) 6.4 - 8.2 g/dL    Albumin 2.8 (L) 3.4 - 5.0 g/dL    Globulin 3.2 2.0 - 4.0 g/dL    A-G Ratio 0.9 0.8 - 1.7     LIPID PANEL    Collection Time: 08/17/18  3:40 AM   Result Value Ref Range    LIPID PROFILE          Cholesterol, total 112 <200 MG/DL    Triglyceride 101 <150 MG/DL    HDL Cholesterol 35 (L) 40 - 60 MG/DL    LDL, calculated 56.8 0 - 100 MG/DL    VLDL, calculated 20.2 MG/DL    CHOL/HDL Ratio 3.2 0 - 5.0     CBC W/O DIFF    Collection Time: 08/17/18  3:40 AM   Result Value Ref Range    WBC 9.7 4.6 - 13.2 K/uL    RBC 3.89 (L) 4.20 - 5.30 M/uL    HGB 11.8 (L) 12.0 - 16.0 g/dL    HCT 35.4 35.0 - 45.0 %    MCV 91.0 74.0 - 97.0 FL    MCH 30.3 24.0 - 34.0 PG    MCHC 33.3 31.0 - 37.0 g/dL    RDW 13.6 11.6 - 14.5 %    PLATELET 967 547 - 849 K/uL    MPV 11.0 9.2 - 11.8 FL   PTT    Collection Time: 08/17/18  3:40 AM   Result Value Ref Range    aPTT 26.9 23.0 - 36.4 SEC       images and reports reviewed    Assessment:   Laurence Benavides is a 76 y.o. female is presenting with acute pancreatitis S/P ERCP for choledocholithiasis. The patient is recovering well. I explained the indications for robotic cholecystectomy as well as the alternatives. I explained that the reason we will remove her gallbladder is to prevent another attack. I discussed the potential risks, including but not limited to bleeding, wound infection, trocar injuries, bile duct injury and leak, and also the possible need for conversion to open procedure. I also explained the firefly technology and how it allow us to visualize the biliary tree to avoid bile duct injury or leak. I also explained to her that we will order an U/S to document the presence of gallstones. Plan:     I will order an U/S to confirm the presence of cholelithiasis (Since it did not show on MRCP, CT and ERCP).   The mark would like top prtoceew ith the surgery as outpatient but would olike to discuss it with her anton first. If they agreed on the surgery, than my office will schedule her for outpatient robotic cholecystectomy with firefly (ICG) technology for identification of the biliary tree. Treatment of her mild pancreatitis and possible discharge home soon.     Please call me if you have any questions (cell phone: [phone])     Signed By: Chris Richter MD     August 17, 2018

## 2018-08-20 ENCOUNTER — PATIENT OUTREACH (OUTPATIENT)
Dept: INTERNAL MEDICINE CLINIC | Age: 78
End: 2018-08-20

## 2018-08-20 NOTE — PROGRESS NOTES
Hospital Discharge Follow-Up      Date/Time:  2018 10:54 AM    Patient was [facility] on 18 and discharged on 18 for endoscopic retrograde cholangiopancreatography with cholangioscopy. The physician discharge summary was available at the time of outreach. Patient was contacted within 1 business days of discharge. Top Challenges reviewed with the provider   -Lipitor and Zetia on hold as per patient  -awaiting call from Dr. Lili Stover to discuss possible gall bladder removal       Method of communication with provider :chart routing    Inpatient RRAT score: 12  Was this a readmission? no      Nurse Navigator (NN) contacted the patient by telephone to perform post hospital discharge assessment. Verified name and  with patient as identifiers. Provided introduction to self, and explanation of the Nurse Navigator role. Patient reported:   Burping more than usual in the past month  Was told not to take Zetia or Lipitor until seen by Dr. Edward Cash    Patient denied:   Abdominal pain  CP  N/V  Chills  SOB  Fever  Dizziness  Dysuria  Constipation/diarrhea      Advised patient to avoid fatty foods. Patient voiced understanding. Patient given an opportunity to ask questions and does not have any further questions or concerns at this time. The patient agrees to contact the PCP office for questions related to their healthcare. NN provided contact information for future reference. Disease Specific:   N/A    Summary of patient's top problems:  1. Acute pancreatitis  2. AAA w/ repair  3. HTN     Barriers to care? No apparent or voiced barriers to care noted at this time.      Advance Care Planning:   Does patient have an Advance Directive:  not on file     Medication(s):   New Medications at Discharge: None  Changed Medications at Discharge: None  Discontinued Medications at Discharge: None    Medication reconciliation was performed with patient, who verbalizes understanding of administration of home medications. There were no barriers to obtaining medications identified at this time. Referral to Pharm D needed: no     Current Outpatient Prescriptions   Medication Sig    oxyCODONE-acetaminophen (PERCOCET) 5-325 mg per tablet Take 1 Tab by mouth every four (4) hours as needed. Max Daily Amount: 6 Tabs.  lidocaine (LIDODERM) 5 % Apply patch to the affected area for 12 hours a day and remove for 12 hours a day.  lisinopril (PRINIVIL, ZESTRIL) 10 mg tablet Take 1 Tab by mouth daily.  Levothyroxine 75 mcg cap Take one tablet by mouth every other day before breakfast. Alternates with 50mcg    ergocalciferol (VITAMIN D2) 50,000 unit capsule Take 1 Cap by mouth every seven (7) days. (Patient taking differently: Take 50,000 Units by mouth every fourteen (14) days.)    levothyroxine (SYNTHROID) 50 mcg tablet Take 1 Tab by mouth Daily (before breakfast).  aspirin 81 mg chewable tablet Take 1 Tab by mouth daily.  omeprazole (PRILOSEC) 40 mg capsule Take 1 Cap by mouth daily. (Patient taking differently: Take 40 mg by mouth as needed.)    ibuprofen (MOTRIN) 800 mg tablet Take 1 Tab by mouth three (3) times daily (with meals).  diazePAM (VALIUM) 10 mg tablet Take 1 Tab by mouth every twelve (12) hours as needed for Anxiety. Max Daily Amount: 20 mg. No current facility-administered medications for this visit. There are no discontinued medications. [facility] follow up appointment(s):   Future Appointments  Date Time Provider Mariana Gray   8/23/2018 9:00 AM Sam Ferguson MD [facility]   8/23/2018 10:30 AM [address] [place] [address] [place]    11/6/2018 9:00 AM [facility] NURSE VISIT [facility] MARY [facility]   11/13/2018 10:20 AM Sam Ferguson MD [address] Attends follow-up appointments as directed. Plan: Notify of upcoming appts. 7/19/18: Patient aware of upcoming appts with PA and vascular.   7/25/18: Attended appt with PA as scheduled. 8/1/18: Patient attended vascular appt on 7/31 as scheduled. 8/14/18: Patient aware of appt for endoscopy as well as upcoming vascular appt. Plan: Notify of upcoming appt and offer/schedule PCP appt. 8/20/18: PCP appt scheduled.

## 2018-08-23 ENCOUNTER — OFFICE VISIT (OUTPATIENT)
Dept: VASCULAR SURGERY | Age: 78
End: 2018-08-23

## 2018-08-23 ENCOUNTER — OFFICE VISIT (OUTPATIENT)
Dept: INTERNAL MEDICINE CLINIC | Age: 78
End: 2018-08-23

## 2018-08-23 ENCOUNTER — TELEPHONE (OUTPATIENT)
Dept: SURGERY | Age: 78
End: 2018-08-23

## 2018-08-23 ENCOUNTER — TELEPHONE (OUTPATIENT)
Dept: INTERNAL MEDICINE CLINIC | Age: 78
End: 2018-08-23

## 2018-08-23 VITALS
OXYGEN SATURATION: 98 % | HEIGHT: 64 IN | DIASTOLIC BLOOD PRESSURE: 70 MMHG | BODY MASS INDEX: 29.19 KG/M2 | RESPIRATION RATE: 14 BRPM | HEART RATE: 78 BPM | WEIGHT: 171 LBS | SYSTOLIC BLOOD PRESSURE: 112 MMHG | TEMPERATURE: 98.1 F

## 2018-08-23 VITALS
RESPIRATION RATE: 17 BRPM | HEIGHT: 64 IN | DIASTOLIC BLOOD PRESSURE: 72 MMHG | WEIGHT: 171 LBS | SYSTOLIC BLOOD PRESSURE: 110 MMHG | HEART RATE: 74 BPM | BODY MASS INDEX: 29.19 KG/M2

## 2018-08-23 DIAGNOSIS — K86.2 PANCREAS CYST: ICD-10-CM

## 2018-08-23 DIAGNOSIS — Z86.79 S/P AAA REPAIR: ICD-10-CM

## 2018-08-23 DIAGNOSIS — Z98.890 S/P AAA REPAIR: ICD-10-CM

## 2018-08-23 DIAGNOSIS — I71.40 ABDOMINAL AORTIC ANEURYSM (AAA) WITHOUT RUPTURE: Primary | ICD-10-CM

## 2018-08-23 DIAGNOSIS — K85.10 ACUTE BILIARY PANCREATITIS, UNSPECIFIED COMPLICATION STATUS: ICD-10-CM

## 2018-08-23 DIAGNOSIS — L29.9 PRURITUS: Primary | ICD-10-CM

## 2018-08-23 DIAGNOSIS — K80.20 GALLSTONES: Primary | ICD-10-CM

## 2018-08-23 NOTE — TELEPHONE ENCOUNTER
Chiki Barbour is the name of the cream she uses. She request a refill of this or what ever cream the doctor wants her to use.

## 2018-08-23 NOTE — PROGRESS NOTES
1. Have you been to the ER, urgent care clinic or hospitalized since your last visit? YES. [facility] 8/16-8/17    2. Have you seen or consulted any other health care providers outside of the [address] [place] [address] since your last visit (Include any pap smears or colon screening)? NO      Do you have an Advanced Directive? NO      Would you like information on Advanced Directives?  NO    Chief Complaint   Patient presents with    Transitions Of Care     Acute pancreatitis

## 2018-08-23 NOTE — PROGRESS NOTES
Selene Escobar [facility]    Chief Complaint   Patient presents with    Abdominal Aortic Aneurysm       History and Physical    Asa Elvie is a 76 y.o. female with history of AAA now s/p EVAR on 7/17/18. She continues to do very well postoperatively. She denies any abdominal pain or back pain currently. She denies any symptoms of claudication and no rest pain. No fevers or chills. No issues with her puncture sites and she states that groins are healing well. She does report that she is to be scheduled for cholecystectomy in the near future. She presents today after her follow-up CTA scan for results. CTA shows stents are patent with no signs of endoleak.     Past Medical History:   Diagnosis Date    AAA (abdominal aortic aneurysm) without rupture (HCC)     AAA repair 7/2018    Colon adenomas     2003 Dr Leonor Vasquez; 4/13 Dr Ryan CROW (degenerative joint disease)     Dyslipidemia     FHx: heart disease     Gastritis 2006    on EGD Dr. Tony Salas, neg sprue    Hypertension     Hypothyroidism 2007    Dr. Roosevelt Shipley Hypovitaminosis D     Lung nodule 06/2016    6 mm RLL (6/16); no change (10/17)    Obesity     peak weight 187 lbs, bmi 33.1 from 4/13; IF 3/18    Osteopenia FRAX 4/12 9.7 and 1.4     DEXA t score 0.6 spine, -1 hip (12/09);  -0.4 spine, -1.3 hip (4/12); -1.2 wrist, -1.6 hip w FRAX 11/2.2 (4/14); -1.0 wrist, -1.6 hip w FRAX 12/2.6 (4/16); -0.7 wrist, -1.6 hip (5/18)    Pancreatitis, chronic ([address] 2018    Psoriasis 2003    on bx Dr. Parisi Port Sensorineural hearing loss     left side Dr Jt eMra Venous insufficiency 2003    on dopplers    Zoster 05/2016    left T11-12; mild postherpetic neuralgia     Past Surgical History:   Procedure Laterality Date    CARDIAC SURG PROCEDURE UNLIST  09/2016    NST negative    CHEST SURGERY PROCEDURE UNLISTED  06/2016    CTA neg PE, 6 mm subpleural nodule    HX AAA REPAIR  07/17/2018    HX Julisa Lyme      Dr. Montero Arms left    HX CARPAL TUNNEL RELEASE Left     HX CATARACT REMOVAL Bilateral     HX COLONOSCOPY      benign polyp 5/08 Dr Gumaro Mahoney;  4/13 negative Dr. Amada West  2007    neg MRA/MRI head in [address] [place] [address]  9/10    CT head negative    VASCULAR SURGERY PROCEDURE UNLIST  2007    [facility] AAA screen [facility]     Patient Active Problem List   Diagnosis Code    Colon adenomas 2003, last 2014 Dr Cassia Bryant D12.6    Hypovitaminosis D E55.9    Venous insufficiency dopplers 2003 I87.2    Osteopenia FRAX 4/14 11 and 2.2 M85.80    Dyslipidemia E78.5    Hypothyroidism due to acquired atrophy of thyroid E03.4    Arthritis, degenerative M19.90    Obesity (BMI 30.0-34. 9) E66.9    Advance directive discussed with patient Z70.80    Primary hypertension I10    Spondylolisthesis, lumbar region M43.16    Spondylosis of lumbar region without myelopathy or radiculopathy M47.816    Scoliosis of lumbar spine M41.9    DDD (degenerative disc disease), lumbar M51.36    AAA (abdominal aortic aneurysm) without rupture (HCC) I71.4    Acute pancreatitis K85.90     Current Outpatient Prescriptions   Medication Sig Dispense Refill    oxyCODONE-acetaminophen (PERCOCET) 5-325 mg per tablet Take 1 Tab by mouth every four (4) hours as needed. Max Daily Amount: 6 Tabs. 30 Tab 0    lidocaine (LIDODERM) 5 % Apply patch to the affected area for 12 hours a day and remove for 12 hours a day. 30 Each 5    omeprazole (PRILOSEC) 40 mg capsule Take 1 Cap by mouth daily. (Patient taking differently: Take 40 mg by mouth as needed.) 90 Cap 1    ibuprofen (MOTRIN) 800 mg tablet Take 1 Tab by mouth three (3) times daily (with meals). 45 Tab 0    lisinopril (PRINIVIL, ZESTRIL) 10 mg tablet Take 1 Tab by mouth daily. 90 Tab 3    diazePAM (VALIUM) 10 mg tablet Take 1 Tab by mouth every twelve (12) hours as needed for Anxiety.  Max Daily Amount: 20 mg. 60 Tab 1  Levothyroxine 75 mcg cap Take one tablet by mouth every other day before breakfast. Alternates with 50mcg 45 Cap 3    ergocalciferol (VITAMIN D2) 50,000 unit capsule Take 1 Cap by mouth every seven (7) days. (Patient taking differently: Take 50,000 Units by mouth every fourteen (14) days.) 12 Cap 1    levothyroxine (SYNTHROID) 50 mcg tablet Take 1 Tab by mouth Daily (before breakfast). 90 Tab 3    aspirin 81 mg chewable tablet Take 1 Tab by mouth daily. 90 Tab 3     Allergies   Allergen Reactions    Atenolol Other (comments)     Felt poorly    Cozaar [Losartan] Other (comments)     Headaches    Norvasc [Amlodipine] Other (comments)     [address] poorly         Physical Exam:    Visit Vitals    /72 (BP 1 Location: Left arm, BP Patient Position: Sitting)    Pulse 74    Resp 17    Ht 5' 4\" (1.626 m)    Wt 171 lb (77.6 kg)    BMI 29.35 kg/m2      General: Well-appearing elderly female in no acute distress   HEENT: EOMI, no scleral icterus is noted. Pulmonary: No increased work or breathing is noted. Abdomen: nondistended. Extremities: Warm and well perfused bilaterally. Pt has no significant BLE edema. Neuro: Cranial nerves II through XII are grossly intact   Integument: No ulcerations are identified visibly      Impression and Plan:  Kali Portillo is a 76 y.o. female with AAA now ~1 month status post EVAR. She is doing well postoperatively. CTA scan reviewed with patient as well as with Dr Aruna Merritt. Stent is patent with no signs of endoleak. Discussed that we will have her follow-up in 6 months with an ultrasound. She is certainly understanding to call the office sooner with any issues or new concerns. Plan was discussed. Patient expresses understanding and agrees. Follow-up Disposition:  Return in about 6 months (around 2/23/2019). Kendra Rhodes  [phone]        PLEASE NOTE:  This document has been produced using voice recognition software.  Unrecognized errors in transcription may be present.

## 2018-08-23 NOTE — PROGRESS NOTES
1. Have you been to an emergency room or urgent care clinic since your last visit? NO    Hospitalized since your last visit? If yes, where, when, and reason for visit? NO  2. Have you seen or consulted any other health care providers outside of the [facility] since your last visit including any procedures, health maintenance items. If yes, where, when and reason for visit?  NO

## 2018-08-23 NOTE — MR AVS SNAPSHOT
[address] [facility] 
 
 
 [address] 200 [facility] 
[phone] Patient: Dora Miranda MRN: [id number] SAT:2/44/8399 Visit Information Date & Time Provider Department Dept. Phone Encounter #  
 8/23/2018 10:30 AM [facility] [phone] [id number] Follow-up Instructions Return in about 6 months (around 2/23/2019). Your Appointments 8/23/2018 10:30 AM  
Follow Up with [facility] ([address] [place] [address]) Appt Note: 3 WEEK FOLLOW UP AFTER CTA AT [facility] AND STEVE WILL CALL  [facility] 25 [phone] [facility]  
[phone] [address]  
  
    
 11/6/2018  9:00 AM  
LAB with [facility] NURSE VISIT Internists of [address] [place] [address] ([address] [place] [address]) Appt Note: lab  
 [address], [address] [facility] [address] [place]  
  
   
 [address], [address]  
  
    
 11/13/2018 10:20 AM  
Office Visit with Rico Gibbs MD  
Internists of [address] [place] [address]) Appt Note: 6 month f/u  
 [address] [facility], [address] [facility] [address] [place]  
  
   
 54[address] [facility], [address]  
  
    
 3/5/2019  8:00 AM  
PROCEDURE with [facility] 1 [facility] ([address] [place] [address]) Appt Note: [facility] [place] 605 [facility] 25 002 200 [facility]  
[phone] [address]  
  
    
 3/14/2019  9:00 AM  
Follow Up with [facility] ([address] [place] [address]) Appt Note: 6 MONTH FOLLOW UP AFTER STUDY WITH PREP  
 [address] 840 200 [facility]  
[phone] 2300 [facility] 200 [facility] Upcoming Health Maintenance Date Due Influenza Age 5 to Adult 8/1/2018 MEDICARE YEARLY EXAM 5/8/2019 GLAUCOMA SCREENING Q2Y 5/17/2020 DTaP/Tdap/Td series (2 - Td) 5/10/2027 Allergies as of 8/23/2018  Review Complete On: 8/23/2018 By: Stuart Ramsey Severity Noted Reaction Type Reactions Atenolol    Other (comments) [place] poorly Cozaar [Losartan]    Other (comments) Headaches Norvasc [Amlodipine]    Other (comments) Felt poorly Current Immunizations  Reviewed on 4/8/2015 Name Date Hepatitis A Vaccine 9/1/2008 Influenza High Dose Vaccine PF 10/17/2016 Pneumococcal Conjugate (PCV-13) 5/10/2017 TD Vaccine 9/1/2008 Tdap 5/10/2017 Typhoid Vaccine 9/1/2008 Yellow Fever Vaccine 9/1/2008 ZZZ-RETIRED (DO NOT USE) Pneumococcal Vaccine (Unspecified Type) 9/1/2008 Zoster Vaccine, Live 1/1/2008 Not reviewed this visit You Were Diagnosed With   
  
 Codes Comments Abdominal aortic [facility]    -  Primary ICD-10-CM: I71.4 ICD-9-CM: 587. 4 Vitals BP Pulse Resp Height(growth percentile) Weight(growth percentile) BMI  
 110/72 (BP 1 Location: Left arm, BP Patient Position: Sitting) 74 17 5' 4\" (1.626 m) 171 lb (77.6 kg) 29.35 kg/m2 OB Status Smoking Status Postmenopausal Never Smoker Vitals History BMI and BSA Data Body Mass Index Body Surface Area  
 29.35 kg/m 2 1.87 m 2 Preferred Pharmacy Pharmacy Name Phone [address] [place] [address], [address] [place] [address] [phone] Your Updated Medication List  
  
   
This list is accurate as of 8/23/18 10:29 AM.  Always use your most recent med list.  
  
  
  
  
 aspirin 81 mg chewable tablet Take 1 Tab by mouth daily. diazePAM 10 mg tablet Commonly known as:  VALIUM Take 1 Tab by mouth every twelve (12) hours as needed for Anxiety. Max Daily Amount: 20 mg. ergocalciferol 50,000 unit capsule Commonly known as:  VITAMIN D2 Take 1 Cap by mouth every seven (7) days. ibuprofen 800 mg tablet Commonly known as:  MOTRIN Take 1 Tab by mouth three (3) times daily (with meals). * levothyroxine 50 mcg tablet Commonly known as:  SYNTHROID Take 1 Tab by mouth Daily (before breakfast). * Levothyroxine 75 mcg Cap Take one tablet by mouth every other day before breakfast. Alternates with 50mcg  
  
 lidocaine 5 % Commonly known as:  Patrici Copas Apply patch to the affected area for 12 hours a day and remove for 12 hours a day. lisinopril 10 mg tablet Commonly known as:  Donnald Code Take 1 Tab by mouth daily. omeprazole 40 mg capsule Commonly known as:  PRILOSEC Take 1 Cap by mouth daily. oxyCODONE-acetaminophen 5-325 mg per tablet Commonly known as:  PERCOCET Take 1 Tab by mouth every four (4) hours as needed. Max Daily Amount: 6 Tabs. * Notice: This list has 2 medication(s) that are the same as other medications prescribed for you. Read the directions carefully, and ask your doctor or other care provider to review them with you. Follow-up Instructions Return in about 6 months (around 2/23/2019). To-Do List   
 03/08/2019 Vascular/US:  DUPLEX AORTA/IVC/ILIAC/GRAFTS COMPLETE Introducing [facility] SERVICES! Dear Hayley Hendrix: Thank you for requesting a [employer] account. Our records indicate that you already have an active [employer] account. You can access your account anytime at [email or web address] Did you know that you can access your hospital and ER discharge instructions at any time in Ever[employer]? You can also review all of your test results from your hospital stay or ER visit. Additional Information If you have questions, please visit the Frequently Asked Questions section of the [employer] website at [email or web address] Remember, MyChart is NOT to be used for urgent needs. For medical emergencies, dial 911. Now available from your iPhone and Android! Please provide this summary of care documentation to your next provider. Your primary care clinician is listed as Hina Lance. If you have any questions after today's visit, please call [phone].

## 2018-08-23 NOTE — TELEPHONE ENCOUNTER
Ms. Elisa Branham called requesting to proceed with scheduling gallbladder surgery with Dr. Nataliya Scott following her recent hospitalization. I informed Ms. Elisa Scott is not in the office today however I will forward him a message regarding her desire to proceed with surgery and once I obtain an order I will call her to discuss surgery instructions/any clearances required.

## 2018-08-23 NOTE — MR AVS SNAPSHOT
303 [facility] 
 
 
 [address], [address] [place] [address] [facility] 
[phone] Patient: Pawel Bañuelos MRN: [id number] K:4/95/8949 Visit Information Date & Time Provider Department Dept. Phone Encounter #  
 8/23/2018  9:00 AM Yoandy Henry MD Internists of [address] [place] [address] [phone] [id number] Your Appointments 8/23/2018 10:30 AM  
Follow Up with GARRETT [facility] ([address] [place] [address]) Appt Note: 3 WEEK FOLLOW UP AFTER CTA AT [facility] AND STEVE WILL CALL TO [address] [place] [address]  
[phone] [address]  
  
    
 11/6/2018  9:00 AM  
LAB with [facility] NURSE VISIT Internists of [address] [place] [address] ([facility]) Appt Note: lab  
 [address], [address] [facility] [address] [place]  
  
   
 [address], [address]  
  
    
 11/13/2018 10:20 AM  
Office Visit with Yoandy Henry MD  
Internists of [address] [place] [address]) Appt Note: 6 month f/u  
 5445 [facility], [address] [place] [address] [address] [place]  
  
   
 [address], [address] Upcoming Health Maintenance Date Due Influenza Age 5 to Adult 8/1/2018 MEDICARE YEARLY EXAM 5/8/2019 GLAUCOMA SCREENING Q2Y 5/17/2020 DTaP/Tdap/Td series (2 - Td) 5/10/2027 Allergies as of 8/23/2018  Review Complete On: 8/23/2018 By: Carson Padilla LPN Severity Noted Reaction Type Reactions Atenolol    Other (comments) [place] poorly Cozaar [Losartan]    Other (comments) Headaches Norvasc [Amlodipine]    Other (comments) Felt poorly Current Immunizations  Reviewed on 4/8/2015 Name Date Hepatitis A Vaccine 9/1/2008 Influenza High Dose Vaccine PF 10/17/2016 Pneumococcal Conjugate (PCV-13) 5/10/2017 TD Vaccine 9/1/2008 Tdap 5/10/2017 Typhoid Vaccine 9/1/2008 Yellow Fever Vaccine 9/1/2008 ZZZ-RETIRED (DO NOT USE) Pneumococcal Vaccine (Unspecified Type) 9/1/2008 Zoster Vaccine, Live 1/1/2008 Not reviewed this visit Vitals BP Pulse Temp Resp Height(growth percentile) Weight(growth percentile) 112/70 78 98.1 °F (36.7 °C) (Oral) 14 5' 4\" (1.626 m) 171 lb (77.6 kg) SpO2 BMI OB Status Smoking Status 98% 29.35 kg/m2 Postmenopausal Never Smoker Vitals History BMI and BSA Data Body Mass Index Body Surface Area  
 29.35 kg/m 2 1.87 m 2 Preferred Pharmacy Pharmacy Name Phone [address] [facility] [address], [facility] [address] [facility] [address] [phone] Your Updated Medication List  
  
   
This list is accurate as of 8/23/18  9:31 AM.  Always use your most recent med list.  
  
  
  
  
 aspirin 81 mg chewable tablet Take 1 Tab by mouth daily. diazePAM 10 mg tablet Commonly known as:  VALIUM Take 1 Tab by mouth every twelve (12) hours as needed for Anxiety. Max Daily Amount: 20 mg.  
  
 ergocalciferol 50,000 unit capsule Commonly known as:  VITAMIN D2 Take 1 Cap by mouth every seven (7) days. ibuprofen 800 mg tablet Commonly known as:  MOTRIN Take 1 Tab by mouth three (3) times daily (with meals). * levothyroxine 50 mcg tablet Commonly known as:  SYNTHROID Take 1 Tab by mouth Daily (before breakfast). * Levothyroxine 75 mcg Cap Take one tablet by mouth every other day before breakfast. Alternates with 50mcg  
  
 lidocaine 5 % Commonly known as:  Nir Beltran Apply patch to the affected area for 12 hours a day and remove for 12 hours a day. lisinopril 10 mg tablet Commonly known as:  [place] Take 1 Tab by mouth daily. omeprazole 40 mg capsule Commonly known as:  PRILOSEC Take 1 Cap by mouth daily. oxyCODONE-acetaminophen 5-325 mg per tablet Commonly known as:  PERCOCET  
 Take 1 Tab by mouth every four (4) hours as needed. Max Daily Amount: 6 Tabs. * Notice: This list has 2 medication(s) that are the same as other medications prescribed for you. Read the directions carefully, and ask your doctor or other care provider to review them with you. Introducing [facility] SERVICES! Dear Andra Later: Thank you for requesting a [employer] account. Our records indicate that you already have an active [employer] account. You can access your account anytime at [email or web address] Did you know that you can access your hospital and ER discharge instructions at any time in [employer]? You can also review all of your test results from your hospital stay or ER visit. Additional Information If you have questions, please visit the Frequently Asked Questions section of the [employer] website at [email or web address] Remember, [employer] is NOT to be used for urgent needs. For medical emergencies, dial 911. Now available from your iPhone and Android! Please provide this summary of care documentation to your next provider. Your primary care clinician is listed as Kofi Gallegos. If you have any questions after today's visit, please call [phone].

## 2018-08-24 ENCOUNTER — TELEPHONE (OUTPATIENT)
Dept: SURGERY | Age: 78
End: 2018-08-24

## 2018-08-24 NOTE — TELEPHONE ENCOUNTER
Ms. Susan Rivero called to inquiry if Dr. Lorena Salgado has placed an order for her surgery. I informed Ms. Cabral I received the order this morning from Dr. Lorena Salgado to proceed with scheduling her surgery (Robotic Cholecystectomy) at [facility]. I schedule Ms. Cabral for Friday, September 14, 2018 at [facility] and reviewed all pre op instructions. Ms. Susan Rivero verbalized she understood all instructions.

## 2018-08-24 NOTE — PROGRESS NOTES
Patient being seen today for transitional care management    Patient was admitted to [facility] from 8/16-17/18    I thoroughly reviewed the discharge summary, notes, consults, labs and imaging studies in the electronic record. Pertinent details are summarized below and the record has been updated to reflect recent events    At our encounter 5/7/18, she was c/o abd pain so we got a CT abd which came back abnormal and we referred her to Dr Nik Orr. They decided to do ERCP and common bile duct stone was removed but procedure was complicated by pancreatitis so she was admitted overnight. She was seen by Dr Roula Pena and [address] showed suspected gb stone so outpt choley is planned.       She also underwent endovascular AAA repair in late July and is doing well, has f/u w vascular today    LAST MEDICARE WELLNESS EXAM: 4/1/14, 4/8/14, 4/14/16, 4/25/17, 5/7/18    Past Medical History:   Diagnosis Date    AAA (abdominal aortic aneurysm) without rupture ([facility]     AAA repair 7/2018 Dr Rony Villagran adenomas     2003 Dr Keena De Leon; 4/13 Dr Iván Jules    DJD (degenerative joint disease)     Dyslipidemia     FHx: heart disease     Gastritis 2006    on EGD Dr. Lakhwinder Gomez, neg sprue    Hypertension     Hypothyroidism 2007    Dr. Chapito Gilmore Hypovitaminosis D     IPMN (intraductal papillary mucinous neoplasm) 06/2018    on mrcp 8mm    Lung nodule 06/2016    6 mm RLL (6/16); no change (10/17)    Obesity     peak weight 187 lbs, bmi 33.1 from 4/13; IF 3/18    Osteopenia FRAX 4/12 9.7 and 1.4     DEXA t score 0.6 spine, -1 hip (12/09);  -0.4 spine, -1.3 hip (4/12); -1.2 wrist, -1.6 hip w FRAX 11/2.2 (4/14); -1.0 wrist, -1.6 hip w FRAX 12/2.6 (4/16); -0.7 wrist, -1.6 hip (5/18)    Pancreatitis, chronic ([address] 2018    Psoriasis 2003    on bx Dr. Luz Marina Zacarias hearing loss     left side Dr Deedee Begum Venous insufficiency 2003    on dopplers    Zoster 05/2016    left T11-12; mild postherpetic neuralgia     Past Surgical History: Procedure Laterality Date    CARDIAC SURG PROCEDURE UNLIST  09/2016    NST negative    CHEST SURGERY PROCEDURE UNLISTED  06/2016    CTA neg PE, 6 mm subpleural nodule    HX AAA REPAIR  07/17/2018    HX Jveon Bhagat left    HX CARPAL TUNNEL RELEASE Left     HX CATARACT REMOVAL Bilateral     HX COLONOSCOPY      benign polyp 5/08 Dr Mervat Romo;  4/13 negative Dr. Nolberto Irwin HX GI  08/2018    Dr Julia Go; ERCP s/p removal cbd stone complicated by pamcreatitis    HX ORTHOPAEDIC      HX TONSILLECTOMY      NEUROLOGICAL PROCEDURE UNLISTED  2007    neg MRA/MRI head in [address] [place] [address]  9/10    CT head negative    VASCULAR SURGERY PROCEDURE UNLIST  2007    neg AAA screen [facility]     Social History     Social History    Marital status:      Spouse name: N/A    Number of children: N/A    Years of education: N/A     Occupational History    ret office mgr      Social History Main Topics    Smoking status: Never Smoker    Smokeless tobacco: Never Used    Alcohol use Yes      Comment: occasionally    Drug use: No    Sexual activity: Not on file     Other Topics Concern    Not on file     Social History Narrative     Allergies   Allergen Reactions    Atenolol Other (comments)     Felt poorly    Cozaar [Losartan] Other (comments)     Headaches    Norvasc [Amlodipine] Other (comments)     Felt poorly       Current Outpatient Prescriptions   Medication Sig    oxyCODONE-acetaminophen (PERCOCET) 5-325 mg per tablet Take 1 Tab by mouth every four (4) hours as needed. Max Daily Amount: 6 Tabs.  lidocaine (LIDODERM) 5 % Apply patch to the affected area for 12 hours a day and remove for 12 hours a day.  omeprazole (PRILOSEC) 40 mg capsule Take 1 Cap by mouth daily. (Patient taking differently: Take 40 mg by mouth as needed.)    lisinopril (PRINIVIL, ZESTRIL) 10 mg tablet Take 1 Tab by mouth daily.     diazePAM (VALIUM) 10 mg tablet Take 1 Tab by mouth every twelve (12) hours as needed for Anxiety. Max Daily Amount: 20 mg.  Levothyroxine 75 mcg cap Take one tablet by mouth every other day before breakfast. Alternates with 50mcg    ergocalciferol (VITAMIN D2) 50,000 unit capsule Take 1 Cap by mouth every seven (7) days. (Patient taking differently: Take 50,000 Units by mouth every fourteen (14) days.)    levothyroxine (SYNTHROID) 50 mcg tablet Take 1 Tab by mouth Daily (before breakfast).  aspirin 81 mg chewable tablet Take 1 Tab by mouth daily.  camphor-menthol (MEN-PHOR) 0.5-0.5 % lotion Apply  to affected area as needed for Itching.  ibuprofen (MOTRIN) 800 mg tablet Take 1 Tab by mouth three (3) times daily (with meals). No current facility-administered medications for this visit. REVIEW OF SYSTEMS:  Dr. Pattie Carbajal 2014, mammo 4/16, DEXA 4/16, colo 4/13 Dr Ruel Vasquez, sees Dr Madhuri Stoner  Ophtho  no vision change or eye pain  Oral  no mouth pain, tongue or tooth problems  Ears  no hearing loss, ear pain, fullness  Throat  no swallowing problems  Cardiac  no CP, PND, orthopnea, edema, palpitations or syncope  Chest  no breast masses  Resp  no wheezing, chronic coughing, dyspnea  GI  no heartburn, nausea, vomiting, change in bowel habits, bleeding, hemorrhoids  Urinary  no dysuria, hematuria, flank pain, urgency, frequency  Neuro  no focal weakness, numbness, paresthesias, incoordination, ataxia, involuntary movements    Visit Vitals    /70    Pulse 78    Temp 98.1 °F (36.7 °C) (Oral)    Resp 14    Ht 5' 4\" (1.626 m)    Wt 171 lb (77.6 kg)    SpO2 98%    BMI 29.35 kg/m2   A&O x3  Affect is appropriate. Mood stable  No apparent distress  Anicteric, no JVD, adenopathy or thyromegaly. No carotid bruits or radiated murmur  Lungs clear to auscultation, no wheezes or rales  Heart showed regular rate and rhythm. No murmur, rubs, gallops  Abdomen soft nondistended, no hepatosplenomegaly or masses.    Extremities without edema.  Pulses 1-2+ symmetrically    LABS  From 3/10 showed                         vit d 38.0  From 9/10 showed                            wbc 7.2, hb 14.3, plt 158  From 3/12 showed   gluc 88, cr 0.87, gfr 66, alt 15, tsh 1.04, ldl-p 1515  From 9/12 showed                                                    tsh 1.69, ldl-p 1469, chol 152, tg 165, hdl 42, ldl-c 77  From 3/13 showed   gluc 83, cr 0.91, gfr 62, alt 10,     ldl-p 1274, chol 124, tg 137, hdl 37, ldl-c 60,    wbc 7.0, hb 14.3, plt 164  From 9/13 showed                chol 147, tg 139, hdl 39, ldl-c 80  From 3/14 showed   gluc 89, cr 1.02, gfr 53, alt<5,  tsh 1.66,         chol 148, tg 132, hdl 36, ldl-c 86,    wbc 6.7, hb 14.4, plt 179, vit d 45.4   From 10/14 showed                chol 137, tg 117, hdl 40, ldl-c 74  From 4/15 showed   gluc 91, cr 1.24, gfr 42, alt 30, tsh 0.64,           wbc 6.8, hb 14.8, plt 176  From 10/15 showed gluc 93, cr 1.13, gfr 47,            tsh 0.32,          chol 139, tg 131, hdl 36, ldl-c 77   From 4/16 showed   gluc 86, cr 1.00, gfr 54, alt 24,            chol 142, tg 137, hdl 41, ldl-c 74,   wbc 6.4, hb 13.9, plt 203, ua pro 30  From 6/16 showed   gluc 97, cr 1.20,             wbc 7.3, hb 14.1, plt 216, ck/trop neg  From 10/16 showed gluc 78, cr 1.03, gfr 52, alt 21, tsh 1.21,          chol 145, tg 151, hdl 43, ldl-c 72,               From 4/17 showed                tsh 2.57, ft4 1.10  From 10/17 showed gluc 79, cr 1.03, gfr 52, alt 27,            chol 199, tg 161, hdl 45, ldl-c 122, wbc 6.3, hb 14.7, plt 208  From 5/18 showed                tsh 5.23, ft4 1.10,     chol 320, tg 196, hdl 40, ldl-c 221,            vit d 57.0  From 8/18 showed   glu 141, cr 0.87, gfr>60, alt 34,           wb 10.9, hb 13.0, plt 179, lip 8208    Patient Active Problem List   Diagnosis Code    Colon adenomas 2003, last 2014 Dr Brigitte Long D12.6    Hypovitaminosis D E55.9    Venous insufficiency dopplers 2003 I87.2    Osteopenia FRAX 4/14 11 and 2.2 M85.80    Dyslipidemia E78.5    Hypothyroidism due to acquired atrophy of thyroid E03.4    Arthritis, degenerative M19.90    Obesity (BMI 30.0-34. 9) E66.9    Advance directive discussed with patient Z70.80    Primary hypertension I10    Spondylolisthesis, lumbar region M43.16    Spondylosis of lumbar region without myelopathy or radiculopathy M47.816    Scoliosis of lumbar spine M41.9    DDD (degenerative disc disease), lumbar M51.36    AAA (abdominal aortic aneurysm) without rupture (HCC) I71.4    Acute pancreatitis K85.90     ASSESSMENT AND PLAN:  1. General.  F/U Dr. Jean Dorsey  2. Hypertension. Continue current regimen. 3.  Hypovit D.  dec vit d to every other week dosing  4. Colon adenomas. F/U colo 2018? Fiber  5. Overweight. Lifestyle and dietary measures. Portion control reiterated. 6.  Lung nodule. F/U CT 10/18  7. Dyslipidemia. Lipitor  8. Hypothyroidism. Recheck next draw and go from there  9. Osteopenia. Wt bearing exercise, ca/D, DEXA 2018 or later  10. Pancreatitis. Supportive care  11. Gallstones. For cholecystectomy Dr Tabitha Zavala  12. Possible IPMN. F/u mri 6/19        RTC 8/18    Above conditions discussed at length and patient vocalized understanding.   All questions answered to patient satisfaction

## 2018-08-24 NOTE — TELEPHONE ENCOUNTER
I notified that her cream was sent to [address] [place] [address]. Dr. Claudeen Edinger she continue taking her Lipitor and Laban Hikes? The doctor took her off of these 2 meds last week when she was in the hospital.  Does she need to come in for labs?

## 2018-08-26 ENCOUNTER — PATIENT OUTREACH (OUTPATIENT)
Dept: INTERNAL MEDICINE CLINIC | Age: 78
End: 2018-08-26

## 2018-08-27 ENCOUNTER — TELEPHONE (OUTPATIENT)
Dept: INTERNAL MEDICINE CLINIC | Age: 78
End: 2018-08-27

## 2018-08-27 NOTE — TELEPHONE ENCOUNTER
----- Message from LES Cabral sent at 8/27/2018 11:01 AM EDT -----  Regarding: Prescription Question  Contact: [phone]  Upon discharge from the hospital I was told to stop my Zetia and Lipitor until I saw you. I forgot to ask you this on my last appointment. Do I need a new lab test before restarting or can I just restart these two meds. Thank you.     Manuela Bean

## 2018-08-27 NOTE — PROGRESS NOTES
Goals Addressed             Most Recent     Attends follow-up appointments as directed. On track (8/26/2018)             Plan: Notify of upcoming appts. 7/19/18: Patient aware of upcoming appts with PA and vascular. 7/25/18: Attended appt with PA as scheduled. 8/1/18: Patient attended vascular appt on 7/31 as scheduled. 8/14/18: Patient aware of appt for endoscopy as well as upcoming vascular appt. Plan: Notify of upcoming appt and offer/schedule PCP appt. 8/20/18: PCP appt scheduled. 8/26/18: Attended appt with PCP and vascular on 8/23/18 as scheduled.

## 2018-09-06 ENCOUNTER — TELEPHONE (OUTPATIENT)
Dept: INTERNAL MEDICINE CLINIC | Age: 78
End: 2018-09-06

## 2018-09-06 ENCOUNTER — PATIENT OUTREACH (OUTPATIENT)
Dept: INTERNAL MEDICINE CLINIC | Age: 78
End: 2018-09-06

## 2018-09-06 NOTE — PROGRESS NOTES
Contacted for patient for Transitions of Care Coordination  follow up. No answer. Left message introducing self, role and reason for call. Requested return call. Contact information provided.

## 2018-09-07 ENCOUNTER — TELEPHONE (OUTPATIENT)
Dept: INTERNAL MEDICINE CLINIC | Age: 78
End: 2018-09-07

## 2018-09-07 ENCOUNTER — PATIENT OUTREACH (OUTPATIENT)
Dept: INTERNAL MEDICINE CLINIC | Age: 78
End: 2018-09-07

## 2018-09-07 DIAGNOSIS — B02.29 POSTHERPETIC NEURALGIA: ICD-10-CM

## 2018-09-07 RX ORDER — DIAZEPAM 10 MG/1
10 TABLET ORAL
Qty: 60 TAB | Refills: 1 | Status: SHIPPED | OUTPATIENT
Start: 2018-09-07 | End: 2019-10-02 | Stop reason: SDUPTHER

## 2018-09-07 NOTE — Clinical Note
Lipitor and Zetia d/c'd at time of discharge. Patient wants to know if she start back taking or stay off of them. Please advise.

## 2018-09-07 NOTE — PROGRESS NOTES
Transitions of Care Coordination  Follow-Up Date/Time:  9/7/2018 10:10 AM 
  
NN contacted patient for Transitions of Care Coordination  follow up. Spoke to patient. Introduced self/role and reason for call. Patient reported:  
Little worn out still; \"I wear out a little quicker than I use to since I got stents for my AAA\" Pertinent negatives: Abdominal pain N/V Fever/chills Dizziness SOB 
CP Constipation Diarrhea Dysuria Medications:  
New medications since last outreach: no 
Does patient need refills on any medications: yes; Valium Medication changes since last outreach (dose adjustments or discontinued meds): no 
  
Barriers to care? No apparent or voiced barriers to care noted at this time. Specialist appointments since last outreach? no 
 
Advised patient to continue to avoid fatty foods. Patient voiced understanding. Patient scheduled for gall bladder removal 9/14/18. Patient requested Valium. Would like prescription sent to [facility] [address]. Will notify PCP. Patient reminded that there are physicians on call 24 hours a day / 7 days a week (M-F 5pm to 8am and from Friday 5pm until Monday 8a for the weekend) should the patient have questions or concerns. Future Appointments Date Time Provider Mariana Gray 9/28/2018 1:30 PM MD JESUS Johnson MARY SCHED  
11/6/2018 9:00 AM [facility] NURSE VISIT [facility] MARY SCHED  
11/13/2018 10:20 AM Sonu Bethea MD [facility]  
3/5/2019 8:00 AM [facility] MARY SCHED  
3/14/2019 9:00 AM Sugar Carlos, [address] [place] [address]  Other upcoming appointments: N/A Goals  Attends follow-up appointments as directed. Plan: Notify of upcoming appts. 7/19/18: Patient aware of upcoming appts with PA and vascular. 7/25/18: Attended appt with PA as scheduled. 8/1/18: Patient attended vascular appt on 7/31 as scheduled. 8/14/18: Patient aware of appt for endoscopy as well as upcoming vascular appt. Plan: Notify of upcoming appt and offer/schedule PCP appt. 8/20/18: PCP appt scheduled. 8/26/18: Attended appt with PCP and vascular on 8/23/18 as scheduled.

## 2018-09-13 ENCOUNTER — DOCUMENTATION ONLY (OUTPATIENT)
Dept: SURGERY | Age: 78
End: 2018-09-13

## 2018-09-13 NOTE — PROGRESS NOTES
.. PATIENT PRE AND POST OPERATIVE INSTRUCTIONS     [facility], [address]  [phone]    Before Surgery Instructions:   1) You must have someone available to drive you to and from your procedure and stay with you for the first 24 hours. 2) It is very important that you have nothing to eat or drink after midnight the night before your surgery. This includes chewing gum or sucking on hard candy. Take only heart, blood pressure and cholesterol medications the morning of surgery with only a sip of water. 3) Please stop taking Plavix 10 days prior to your surgery. Stop taking Coumadin 5 days prior to your surgery. Stop taking all Aspirin or Aspirin containing products 7 days prior to your surgery. Stop taking Advil, Motrin, Aleve, and etc. 3 days prior to your surgery. 4) If you take any diabetic medications please consult with your primary care physician on how to take them on the day of your surgery  5) Please stop all Herbal products 2 weeks prior to your surgery. 6) Please arrive at the hospital 2 hours prior to your surgery, unless you have been otherwise instructed. 7) Patients having an operation on their colon will be given a separate instruction sheet on their Bowel Prep. 8) For any pre-operative work up check in at the main entrance to [facility], and then go to Patient Registration. These studies are done on a walk in basis they are open from 7:00am to 5:00pm Monday through Friday. 9) Please wash your surgical site the morning of your surgery with soap and water. 10) If you are of child bearing age you will have pregnancy test done the morning of your surgery as soon as you arrive. 11) You may be contacted to change your surgery time. At times this is necessary due to equipment or staffing needs. After Surgery Instructions: You will need to be seen in the office for a follow-up visit 7-14 days after your surgery.  Please call after you have had the procedure to make this appointment. Unless otherwise instructed, you may remove your outer bandage and shower 48 hours after your surgery. If you develop a fever greater than 101, have any significant drainage, bleeding, swelling and/or pus of the wound. Please call our office immediately. Surgery Date and Time:  Tuesday, September 18, 2018 at 10:0am    Please check in at [facility], enter through the Emergency Room entrance and go up to the second floor. Please check in by 8:00am  the day of your surgery. You may contact Mello Grubbs with any questions at [phone].

## 2018-09-17 ENCOUNTER — PATIENT OUTREACH (OUTPATIENT)
Dept: INTERNAL MEDICINE CLINIC | Age: 78
End: 2018-09-17

## 2018-09-17 ENCOUNTER — ANESTHESIA EVENT (OUTPATIENT)
Dept: SURGERY | Age: 78
End: 2018-09-17
Payer: MEDICARE

## 2018-09-17 RX ORDER — ATORVASTATIN CALCIUM 40 MG/1
TABLET, FILM COATED ORAL
COMMUNITY
Start: 2018-06-12 | End: 2019-05-06 | Stop reason: SDUPTHER

## 2018-09-17 RX ORDER — EZETIMIBE 10 MG
TABLET ORAL
Status: ON HOLD | COMMUNITY
Start: 2018-06-28 | End: 2019-08-14

## 2018-09-17 NOTE — PERIOP NOTES
PAT - SURGICAL PRE-ADMISSION INSTRUCTIONS 
 
NAME:  Edgar Ruiz                                                          TODAY'S DATE:  9/17/2018 SURGERY DATE:  9/18/2018                                  SURGERY ARRIVAL TIME:   0800 1. Do NOT eat or drink anything, including candy or gum, after MIDNIGHT on 09/17/2018 , unless you have specific instructions from your Surgeon or Anesthesia Provider to do so. 2. No smoking on the day of surgery. 3. No alcohol 24 hours prior to the day of surgery. 4. No recreational drugs for one week prior to the day of surgery. 5. Leave all valuables, including money/purse, at home. 6. Remove all jewelry, nail polish, makeup (including mascara); no lotions, powders, deodorant, or perfume/cologne/after shave. 7. Glasses/Contact lenses and Dentures may be worn to the hospital.  They will be removed prior to surgery. 8. Call your doctor if symptoms of a cold or illness develop within 24 ours prior to surgery. 9. AN ADULT MUST DRIVE YOU HOME AFTER OUTPATIENT SURGERY. 10. If you are having an OUTPATIENT procedure, please make arrangements for a responsible adult to be with you for 24 hours after your surgery. 11. If you are admitted to the hospital, you will be assigned to a bed after surgery is complete. Normally a family member will not be able to see you until you are in your assigned bed. 15. Family is encouraged to accompany you to the hospital.  We ask visitors in the treatment area to be limited to ONE person at a time to ensure patient privacy. EXCEPTIONS WILL BE MADE AS NEEDED. 15. Children under 12 are discouraged from entering the treatment area and need to be supervised by an adult when in the waiting room. Special Instructions:  
 
Take these medications the morning of surgery with a sip of water:  Synthroid, Bring any pertinent legal medical records., STOP anticoagulants AT LEAST 1 WEEK PRIOR to your surgery or, follow other MD instructions: Stopped Nsaids and Aspirin last week Patient Prep: 
 
shower with anti-bacterial soap These surgical instructions were reviewed with Monae Alexis during the PAT phone call. Directions: On the morning of surgery, please go to the [address]. Enter the building from the [facility] entrance, 1st floor (next to the Emergency Room entrance). Take the elevator to the 2nd floor. Sign in at the Registration Desk. If you have any questions and/or concerns, please do not hesitate to call: 
(Prior to the day of surgery)  [facility] unit:  [phone] (Day of surgery)  [facility] unit:  [phone]

## 2018-09-17 NOTE — PROGRESS NOTES
Transitions of Care Coordination  Follow-Up Date/Time:  9/17/2018 2:13 PM 
  
NN contacted patient for Transitions of Care Coordination  follow up. Spoke to patient. Introduced self/role and reason for call. Patient reported:  
Fatigue about the same Pertinent negatives: Abdominal pain CP 
SOB 
N/V Dizziness Fever Chills Diarrhea/constipation Dysuria Medications:  
New medications since last outreach: no 
Does patient need refills on any medications: no 
Medication changes since last outreach (dose adjustments or discontinued meds): no 
 
Home Health company: N/A Barriers to care? No apparent or voiced barriers to care noted at this time. Specialist appointments since last outreach? no 
 
Patient verbalized surgery was postponed due to weather. Scheduled for cholecystectomy tomorrow (9/18/18). Patient reminded that there are physicians on call 24 hours a day / 7 days a week (M-F 5pm to 8am and from Friday 5pm until Monday 8a for the weekend) should the patient have questions or concerns. Future Appointments Date Time Provider Mariana Gray 10/1/2018 1:00 PM Baron Jeff MD [facility] MARY SCHED  
11/6/2018 9:00 AM [facility] NURSE VISIT [facility] MARY SCHED  
11/13/2018 10:20 AM Talia Araujo MD [facility]  
3/5/2019 8:00 AM [facility] MARY SCHED  
3/14/2019 9:00 AM Jaime Odonnell, [address] [place] [address] Dr Brito  Attends follow-up appointments as directed. Plan: Notify of upcoming appts. 7/19/18: Patient aware of upcoming appts with PA and vascular. 7/25/18: Attended appt with PA as scheduled. 8/1/18: Patient attended vascular appt on 7/31 as scheduled. 8/14/18: Patient aware of appt for endoscopy as well as upcoming vascular appt. Plan: Notify of upcoming appt and offer/schedule PCP appt. 8/20/18: PCP appt scheduled. 8/26/18: Attended appt with PCP and vascular on 8/23/18 as scheduled.

## 2018-09-18 ENCOUNTER — ANESTHESIA (OUTPATIENT)
Dept: SURGERY | Age: 78
End: 2018-09-18
Payer: MEDICARE

## 2018-09-18 ENCOUNTER — HOSPITAL ENCOUNTER (OUTPATIENT)
Age: 78
Setting detail: OUTPATIENT SURGERY
Discharge: HOME | End: 2018-09-18
Attending: SURGERY | Admitting: SURGERY
Payer: MEDICARE

## 2018-09-18 VITALS
WEIGHT: 172 LBS | TEMPERATURE: 97.2 F | OXYGEN SATURATION: 95 % | SYSTOLIC BLOOD PRESSURE: 156 MMHG | DIASTOLIC BLOOD PRESSURE: 77 MMHG | BODY MASS INDEX: 28.66 KG/M2 | RESPIRATION RATE: 20 BRPM | HEART RATE: 69 BPM | HEIGHT: 65 IN

## 2018-09-18 DIAGNOSIS — Z90.49 S/P CHOLECYSTECTOMY: Primary | ICD-10-CM

## 2018-09-18 PROCEDURE — 77030018842 HC SOL IRR SOD CL 9% BAXT -A: Performed by: SURGERY

## 2018-09-18 PROCEDURE — 74011250636 HC RX REV CODE- 250/636

## 2018-09-18 PROCEDURE — 77030035045 HC TRCR ENDOSC VRSPRT BLDLSS COVD -B: Performed by: SURGERY

## 2018-09-18 PROCEDURE — 77030010939 HC CLP LIG TELE -B: Performed by: SURGERY

## 2018-09-18 PROCEDURE — 77030010507 HC ADH SKN DERMBND J&J -B: Performed by: SURGERY

## 2018-09-18 PROCEDURE — 77030008683 HC TU ET CUF COVD -A: Performed by: ANESTHESIOLOGY

## 2018-09-18 PROCEDURE — 77030009852 HC PCH RTVR ENDOSC COVD -B: Performed by: SURGERY

## 2018-09-18 PROCEDURE — 74011250636 HC RX REV CODE- 250/636: Performed by: NURSE ANESTHETIST, CERTIFIED REGISTERED

## 2018-09-18 PROCEDURE — 77030002933 HC SUT MCRYL J&J -A: Performed by: SURGERY

## 2018-09-18 PROCEDURE — 74011250637 HC RX REV CODE- 250/637: Performed by: NURSE ANESTHETIST, CERTIFIED REGISTERED

## 2018-09-18 PROCEDURE — 77030008771 HC TU NG SALEM SUMP -A: Performed by: ANESTHESIOLOGY

## 2018-09-18 PROCEDURE — 76060000032 HC ANESTHESIA 0.5 TO 1 HR: Performed by: SURGERY

## 2018-09-18 PROCEDURE — 77030011267 HC ELECTRD BLD COVD -A: Performed by: SURGERY

## 2018-09-18 PROCEDURE — 74011000250 HC RX REV CODE- 250: Performed by: SURGERY

## 2018-09-18 PROCEDURE — 74011000272 HC RX REV CODE- 272: Performed by: SURGERY

## 2018-09-18 PROCEDURE — 88304 TISSUE EXAM BY PATHOLOGIST: CPT | Performed by: SURGERY

## 2018-09-18 PROCEDURE — 77030008477 HC STYL SATN SLP COVD -A: Performed by: ANESTHESIOLOGY

## 2018-09-18 PROCEDURE — 77030035048 HC TRCR ENDOSC OPTCL COVD -B: Performed by: SURGERY

## 2018-09-18 PROCEDURE — 76210000022 HC REC RM PH II 1.5 TO 2 HR: Performed by: SURGERY

## 2018-09-18 PROCEDURE — 74011000250 HC RX REV CODE- 250

## 2018-09-18 PROCEDURE — 76210000016 HC OR PH I REC 1 TO 1.5 HR: Performed by: SURGERY

## 2018-09-18 PROCEDURE — 77030032490 HC SLV COMPR SCD KNE COVD -B: Performed by: SURGERY

## 2018-09-18 PROCEDURE — 77030035277 HC OBTRTR BLDELSS DISP INTU -B: Performed by: SURGERY

## 2018-09-18 PROCEDURE — 76010000933 HC OR TIME 0.5 TO 1HR INTENSV - TIER 2: Performed by: SURGERY

## 2018-09-18 RX ORDER — CEFAZOLIN SODIUM 2 G/50ML
2 SOLUTION INTRAVENOUS
Status: DISCONTINUED | OUTPATIENT
Start: 2018-09-18 | End: 2018-09-18 | Stop reason: HOSPADM

## 2018-09-18 RX ORDER — FAMOTIDINE 20 MG/1
20 TABLET, FILM COATED ORAL ONCE
Status: COMPLETED | OUTPATIENT
Start: 2018-09-18 | End: 2018-09-18

## 2018-09-18 RX ORDER — OXYCODONE HYDROCHLORIDE 5 MG/1
5 TABLET ORAL
Status: COMPLETED | OUTPATIENT
Start: 2018-09-18 | End: 2018-09-18

## 2018-09-18 RX ORDER — OXYCODONE AND ACETAMINOPHEN 5; 325 MG/1; MG/1
1 TABLET ORAL
Qty: 24 TAB | Refills: 0 | Status: SHIPPED | OUTPATIENT
Start: 2018-09-18 | End: 2018-11-13 | Stop reason: SDUPTHER

## 2018-09-18 RX ORDER — GLYCOPYRROLATE 0.2 MG/ML
INJECTION INTRAMUSCULAR; INTRAVENOUS AS NEEDED
Status: DISCONTINUED | OUTPATIENT
Start: 2018-09-18 | End: 2018-09-18 | Stop reason: HOSPADM

## 2018-09-18 RX ORDER — FENTANYL CITRATE 50 UG/ML
50 INJECTION, SOLUTION INTRAMUSCULAR; INTRAVENOUS
Status: DISCONTINUED | OUTPATIENT
Start: 2018-09-18 | End: 2018-09-18 | Stop reason: HOSPADM

## 2018-09-18 RX ORDER — DIPHENHYDRAMINE HYDROCHLORIDE 50 MG/ML
12.5 INJECTION, SOLUTION INTRAMUSCULAR; INTRAVENOUS
Status: DISCONTINUED | OUTPATIENT
Start: 2018-09-18 | End: 2018-09-18 | Stop reason: HOSPADM

## 2018-09-18 RX ORDER — LIDOCAINE HYDROCHLORIDE 20 MG/ML
INJECTION, SOLUTION EPIDURAL; INFILTRATION; INTRACAUDAL; PERINEURAL AS NEEDED
Status: DISCONTINUED | OUTPATIENT
Start: 2018-09-18 | End: 2018-09-18 | Stop reason: HOSPADM

## 2018-09-18 RX ORDER — VECURONIUM BROMIDE FOR INJECTION 1 MG/ML
INJECTION, POWDER, LYOPHILIZED, FOR SOLUTION INTRAVENOUS AS NEEDED
Status: DISCONTINUED | OUTPATIENT
Start: 2018-09-18 | End: 2018-09-18 | Stop reason: HOSPADM

## 2018-09-18 RX ORDER — SODIUM CHLORIDE, SODIUM LACTATE, POTASSIUM CHLORIDE, CALCIUM CHLORIDE 600; 310; 30; 20 MG/100ML; MG/100ML; MG/100ML; MG/100ML
75 INJECTION, SOLUTION INTRAVENOUS CONTINUOUS
Status: DISPENSED | OUTPATIENT
Start: 2018-09-18 | End: 2018-09-18

## 2018-09-18 RX ORDER — SODIUM CHLORIDE, SODIUM LACTATE, POTASSIUM CHLORIDE, CALCIUM CHLORIDE 600; 310; 30; 20 MG/100ML; MG/100ML; MG/100ML; MG/100ML
50 INJECTION, SOLUTION INTRAVENOUS CONTINUOUS
Status: DISCONTINUED | OUTPATIENT
Start: 2018-09-18 | End: 2018-09-18 | Stop reason: HOSPADM

## 2018-09-18 RX ORDER — FENTANYL CITRATE 50 UG/ML
INJECTION, SOLUTION INTRAMUSCULAR; INTRAVENOUS AS NEEDED
Status: DISCONTINUED | OUTPATIENT
Start: 2018-09-18 | End: 2018-09-18 | Stop reason: HOSPADM

## 2018-09-18 RX ORDER — ONDANSETRON 2 MG/ML
4 INJECTION INTRAMUSCULAR; INTRAVENOUS ONCE
Status: COMPLETED | OUTPATIENT
Start: 2018-09-18 | End: 2018-09-18

## 2018-09-18 RX ORDER — EPHEDRINE SULFATE 50 MG/ML
INJECTION, SOLUTION INTRAVENOUS AS NEEDED
Status: DISCONTINUED | OUTPATIENT
Start: 2018-09-18 | End: 2018-09-18 | Stop reason: HOSPADM

## 2018-09-18 RX ORDER — INDOCYANINE GREEN AND WATER 25 MG
2.5 KIT INJECTION
Status: DISCONTINUED | OUTPATIENT
Start: 2018-09-18 | End: 2018-09-18 | Stop reason: HOSPADM

## 2018-09-18 RX ORDER — CEFAZOLIN SODIUM 2 G/50ML
SOLUTION INTRAVENOUS
Status: DISCONTINUED
Start: 2018-09-18 | End: 2018-09-18 | Stop reason: HOSPADM

## 2018-09-18 RX ORDER — ONDANSETRON 2 MG/ML
INJECTION INTRAMUSCULAR; INTRAVENOUS AS NEEDED
Status: DISCONTINUED | OUTPATIENT
Start: 2018-09-18 | End: 2018-09-18 | Stop reason: HOSPADM

## 2018-09-18 RX ORDER — PROPOFOL 10 MG/ML
INJECTION, EMULSION INTRAVENOUS AS NEEDED
Status: DISCONTINUED | OUTPATIENT
Start: 2018-09-18 | End: 2018-09-18 | Stop reason: HOSPADM

## 2018-09-18 RX ORDER — SUCCINYLCHOLINE CHLORIDE 20 MG/ML
INJECTION INTRAMUSCULAR; INTRAVENOUS AS NEEDED
Status: DISCONTINUED | OUTPATIENT
Start: 2018-09-18 | End: 2018-09-18 | Stop reason: HOSPADM

## 2018-09-18 RX ORDER — SODIUM CHLORIDE 0.9 % (FLUSH) 0.9 %
5-10 SYRINGE (ML) INJECTION EVERY 8 HOURS
Status: DISCONTINUED | OUTPATIENT
Start: 2018-09-18 | End: 2018-09-18 | Stop reason: HOSPADM

## 2018-09-18 RX ORDER — CEFAZOLIN SODIUM IN 0.9 % NACL 2 G/100 ML
PLASTIC BAG, INJECTION (ML) INTRAVENOUS AS NEEDED
Status: DISCONTINUED | OUTPATIENT
Start: 2018-09-18 | End: 2018-09-18 | Stop reason: HOSPADM

## 2018-09-18 RX ORDER — INSULIN LISPRO 100 [IU]/ML
INJECTION, SOLUTION INTRAVENOUS; SUBCUTANEOUS ONCE
Status: DISCONTINUED | OUTPATIENT
Start: 2018-09-18 | End: 2018-09-18 | Stop reason: HOSPADM

## 2018-09-18 RX ORDER — NEOSTIGMINE METHYLSULFATE 5 MG/5 ML
SYRINGE (ML) INTRAVENOUS AS NEEDED
Status: DISCONTINUED | OUTPATIENT
Start: 2018-09-18 | End: 2018-09-18 | Stop reason: HOSPADM

## 2018-09-18 RX ORDER — SODIUM CHLORIDE 0.9 % (FLUSH) 0.9 %
5-10 SYRINGE (ML) INJECTION AS NEEDED
Status: DISCONTINUED | OUTPATIENT
Start: 2018-09-18 | End: 2018-09-18 | Stop reason: HOSPADM

## 2018-09-18 RX ORDER — NALOXONE HYDROCHLORIDE 0.4 MG/ML
0.1 INJECTION, SOLUTION INTRAMUSCULAR; INTRAVENOUS; SUBCUTANEOUS AS NEEDED
Status: DISCONTINUED | OUTPATIENT
Start: 2018-09-18 | End: 2018-09-18 | Stop reason: HOSPADM

## 2018-09-18 RX ORDER — BUPIVACAINE HYDROCHLORIDE AND EPINEPHRINE 5; 5 MG/ML; UG/ML
INJECTION, SOLUTION EPIDURAL; INTRACAUDAL; PERINEURAL AS NEEDED
Status: DISCONTINUED | OUTPATIENT
Start: 2018-09-18 | End: 2018-09-18 | Stop reason: HOSPADM

## 2018-09-18 RX ADMIN — ONDANSETRON 4 MG: 2 INJECTION INTRAMUSCULAR; INTRAVENOUS at 10:58

## 2018-09-18 RX ADMIN — GLYCOPYRROLATE 0.4 MG: 0.2 INJECTION INTRAMUSCULAR; INTRAVENOUS at 11:08

## 2018-09-18 RX ADMIN — OXYCODONE HYDROCHLORIDE 5 MG: 5 TABLET ORAL at 13:35

## 2018-09-18 RX ADMIN — SUCCINYLCHOLINE CHLORIDE 100 MG: 20 INJECTION INTRAMUSCULAR; INTRAVENOUS at 10:41

## 2018-09-18 RX ADMIN — EPHEDRINE SULFATE 5 MG: 50 INJECTION, SOLUTION INTRAVENOUS at 10:51

## 2018-09-18 RX ADMIN — Medication 3 MG: at 11:08

## 2018-09-18 RX ADMIN — FENTANYL CITRATE 50 MCG: 50 INJECTION, SOLUTION INTRAMUSCULAR; INTRAVENOUS at 12:02

## 2018-09-18 RX ADMIN — FAMOTIDINE 20 MG: 20 TABLET ORAL at 08:52

## 2018-09-18 RX ADMIN — ONDANSETRON 4 MG: 2 INJECTION INTRAMUSCULAR; INTRAVENOUS at 12:34

## 2018-09-18 RX ADMIN — VECURONIUM BROMIDE FOR INJECTION 2 MG: 1 INJECTION, POWDER, LYOPHILIZED, FOR SOLUTION INTRAVENOUS at 10:47

## 2018-09-18 RX ADMIN — SODIUM CHLORIDE, SODIUM LACTATE, POTASSIUM CHLORIDE, AND CALCIUM CHLORIDE 50 ML/HR: 600; 310; 30; 20 INJECTION, SOLUTION INTRAVENOUS at 08:52

## 2018-09-18 RX ADMIN — Medication 2 G: at 10:45

## 2018-09-18 RX ADMIN — LIDOCAINE HYDROCHLORIDE 80 MG: 20 INJECTION, SOLUTION EPIDURAL; INFILTRATION; INTRACAUDAL; PERINEURAL at 10:41

## 2018-09-18 RX ADMIN — EPHEDRINE SULFATE 5 MG: 50 INJECTION, SOLUTION INTRAVENOUS at 10:55

## 2018-09-18 RX ADMIN — FENTANYL CITRATE 50 MCG: 50 INJECTION, SOLUTION INTRAMUSCULAR; INTRAVENOUS at 11:47

## 2018-09-18 RX ADMIN — PROPOFOL 120 MG: 10 INJECTION, EMULSION INTRAVENOUS at 10:41

## 2018-09-18 RX ADMIN — FENTANYL CITRATE 50 MCG: 50 INJECTION, SOLUTION INTRAMUSCULAR; INTRAVENOUS at 11:01

## 2018-09-18 RX ADMIN — FENTANYL CITRATE 50 MCG: 50 INJECTION, SOLUTION INTRAMUSCULAR; INTRAVENOUS at 10:40

## 2018-09-18 NOTE — DISCHARGE INSTRUCTIONS
Instructions Following Ambulatory Surgery    Patient: Tucker Mehta MRN: [id number]  SSN: [id number]    YOB: 1940  Age: 76 y.o. Sex: female      Activity  · As tolerated, walking encourage, stairs are okay  · Avoid strenuous activities - no lifting anything heavier than 15 pounds. · You may shower at home after 48 hours. Diet  · Regular diet after nausea from the anesthetic has passed. Pain  · Take pain medication as directed by your doctor  ·  Call your doctor if pain is NOT relieved by medication    Dressing Care  · Remove outer dressing (if any) after 48 hours. Leave steri-strips (if any) in place until they fall off. After Anesthesia  · For the first 24 hours: DO NOT Drive, Drink alcoholic beverages, or Make important decisions  · Be aware of dizziness following anesthesia and while taking pain medication    Call your doctor if  · Excessive bleeding that does not stop after holding mild pressure over the area  · Temperature of 101 degrees F or above  · Redness,excessive swelling or bruising, and/or green or yellow, smelly discharge from incision  · If nausea and vomiting continues    Follow-Up Phone Calls  · Call the office at [phone] to make your follow-up appointment    Appointment date/time: 2 weeks after the surgery. Dr. Susan Neal cell phone number is [phone]. Please call me if you have any concerns or questions. DISCHARGE SUMMARY from Nurse    PATIENT INSTRUCTIONS:    After general anesthesia or intravenous sedation, for 24 hours or while taking prescription Narcotics:  · Limit your activities  · Do not drive and operate hazardous machinery  · Do not make important personal or business decisions  · Do  not drink alcoholic beverages  · If you have not urinated within 8 hours after discharge, please contact your surgeon on call.     Report the following to your surgeon:  · Excessive pain, swelling, redness or odor of or around the surgical area  · Temperature over 100.5  · Nausea and vomiting lasting longer than 4 hours or if unable to take medications  · Any signs of decreased circulation or nerve impairment to extremity: change in color, persistent  numbness, tingling, coldness or increase pain  · Any questions    What to do at Home:  Recommended activity: Activity as tolerated and no driving for today    These are general instructions for a healthy lifestyle:    No smoking/ No tobacco products/ Avoid exposure to second hand smoke  Surgeon General's Warning:  Quitting smoking now greatly reduces serious risk to your health. Obesity, smoking, and sedentary lifestyle greatly increases your risk for illness    A healthy diet, regular physical exercise & weight monitoring are important for maintaining a healthy lifestyle    You may be retaining fluid if you have a history of heart failure or if you experience any of the following symptoms:  Weight gain of 3 pounds or more overnight or 5 pounds in a week, increased swelling in our hands or feet or shortness of breath while lying flat in bed. Please call your doctor as soon as you notice any of these symptoms; do not wait until your next office visit. Recognize signs and symptoms of STROKE:    F-face looks uneven    A-arms unable to move or move unevenly    S-speech slurred or non-existent    T-time-call 911 as soon as signs and symptoms begin-DO NOT go       Back to bed or wait to see if you get better-TIME IS BRAIN. Warning Signs of HEART ATTACK     Call 911 if you have these symptoms:   Chest discomfort. Most heart attacks involve discomfort in the center of the chest that lasts more than a few minutes, or that goes away and comes back. It can feel like uncomfortable pressure, squeezing, fullness, or pain.  Discomfort in other areas of the upper body. Symptoms can include pain or discomfort in one or both arms, the back, neck, jaw, or stomach.    Shortness of breath with or without chest discomfort.  Other signs may include breaking out in a cold sweat, nausea, or lightheadedness. Don't wait more than five minutes to call 911 - MINUTES MATTER! Fast action can save your life. Calling 911 is almost always the fastest way to get lifesaving treatment. Emergency Medical Services staff can begin treatment when they arrive -- up to an hour sooner than if someone gets to the hospital by car. The discharge information has been reviewed with the patient. The patient verbalized understanding. Discharge medications reviewed with the patient and appropriate educational materials and side effects teaching were provided. Patient armband removed and given to patient to take home. Patient was informed of the privacy risks if armband lost or stolen.

## 2018-09-18 NOTE — OP NOTES
[address] REPORT    Soumya Chavez  MR#: [id number]  : 1940  ACCOUNT #: [de-identified]   DATE OF SERVICE: 2018    PREOPERATIVE DIAGNOSES:  Cholelithiasis and choledocholithiasis. POSTOPERATIVE DIAGNOSES:  Cholecystitis, cholelithiasis and choledocholithiasis in the past.    PROCEDURE PERFORMED:  Robotic cholecystectomy with Firefly to identify the biliary tree. SURGEON:  Mary Garcia. Nancy Meléndez MD    ASSISTANT:  Murali Wang    ANESTHESIA:  General.    COMPLICATIONS:  None. SPECIMENS REMOVED:  Gallbladder. ESTIMATED BLOOD LOSS:  Minimal.    IMPLANTS:  None. DETAILS OF PROCEDURE:  The patient was brought to operating room. Anesthesia was induced. Scrubbing and draping of the abdomen was done in the usual manner. A timeout was performed. A skin incision in the supraumbilical area was performed. Veress needle was inserted. Abdomen was insufflated. A 12 mm port was inserted. Abdomen was explored. There was no pathology seen. Two other 8 mm ports were placed on the right and left side of the abdominal wall, 5 mm port was placed on the right upper quadrant. The robot was docked. At this point, the gallbladder was identified. It was retracted cephalad. There was significant adhesion between the omentum and the gallbladder. These were taken down using blunt dissection. At this point, the cystic duct and artery were dissected. Firefly was used to identify the junction of the cystic duct with the common bile duct. The cystic duct was clipped and divided. The cystic artery was cauterized. The gallbladder was taken out of the liver bed using electrocautery. Hemostasis was secured. The gallbladder was placed inside an EndoCatch; it was removed through the 12 mm port. At this point, the instruments were removed. The robot was undocked and the skin incisions were closed with 4-0 Monocryl.       MD Cipriano Cruz / TANGELA  D: 2018 11:23     T: 09/18/2018 11:35  JOB #: [id number]

## 2018-09-18 NOTE — PERIOP NOTES
[facility] care of patient upon arrival to PACU. Patient drowsy, eyes open to voice. Placed on monitor x3. VSS. Visual pain scale 0/10. 
 
1156  POC ( Caty Short) updated. In surgical waiting area.

## 2018-09-18 NOTE — IP AVS SNAPSHOT
[address] [address] Amy Ramirez  
[phone] Patient: Brad Cueva MRN: [id number] About your hospitalization You were admitted on:  September 18, 2018 You last received care in the:  [facility] PACU You were discharged on:  September 18, 2018 Why you were hospitalized Your primary diagnosis was:  Not on File Follow-up Information Follow up With Details Comments Contact Info Lisandra Cerrato MD   [address] [facility] [address] 200 [facility] 
[phone] Your Scheduled Appointments Monday October 01, 2018  1:00 PM EDT  
POST OP with MD Manuelito G[facility] [address] [address] 200 [facility]  
[phone] Tuesday November 06, 2018  9:00 AM EST  
[facility] [place]) [address] [facility] 200 [facility]  
[phone] Tuesday November 13, 2018 10:20 AM EST Office Visit with Lisandra Cerrato MD  
[facility]) [address] [place] [address]  
[phone] Discharge Orders None A check karely indicates which time of day the medication should be taken. My Medications CHANGE how you take these medications Instructions Each Dose to Equal  
 Morning Noon Evening Bedtime  
 ergocalciferol 50,000 unit capsule Commonly known as:  VITAMIN D2 What changed:  when to take this Your last dose was: Your next dose is: Take 1 Cap by mouth every seven (7) days. [address] Units  
    
   
   
   
  
 omeprazole 40 mg capsule Commonly known as:  PRILOSEC What changed:   
- when to take this 
- reasons to take this Your last dose was: Your next dose is: Take 1 Cap by mouth daily. 40 mg * oxyCODONE-acetaminophen 5-325 mg per tablet Commonly known as:  PERCOCET What changed:  Another medication with the same name was added. Make sure you understand how and when to take each. Your last dose was: Your next dose is: Take 1 Tab by mouth every four (4) hours as needed. Max Daily Amount: 6 Tabs. 1 Tab * oxyCODONE-acetaminophen 5-325 mg per tablet Commonly known as:  PERCOCET What changed: You were already taking a medication with the same name, and this prescription was added. Make sure you understand how and when to take each. Your last dose was: Your next dose is: Take 1 Tab by mouth every four (4) hours as needed for Pain. Max Daily Amount: 6 Tabs. 1 Tab * Notice: This list has 2 medication(s) that are the same as other medications prescribed for you. Read the directions carefully, and ask your doctor or other care provider to review them with you. CONTINUE taking these medications Instructions Each Dose to Equal  
 Morning Noon Evening Bedtime  
 aspirin 81 mg chewable tablet Your last dose was: Your next dose is: Take 1 Tab by mouth daily. 81 mg  
    
   
   
   
  
 atorvastatin 40 mg tablet Commonly known as:  LIPITOR Your last dose was: Your next dose is:    
   
   
      
   
   
   
  
 camphor-menthol 0.5-0.5 % lotion Commonly known as:  MEN-PHOR Your last dose was: Your next dose is:    
   
   
 Apply  to affected area as needed for Itching. diazePAM 10 mg tablet Commonly known as:  VALIUM Your last dose was: Your next dose is: Take 1 Tab by mouth every twelve (12) hours as needed for Anxiety. Max Daily Amount: 20 mg.  
 10 mg  
    
   
   
   
  
 ibuprofen 800 mg tablet Commonly known as:  MOTRIN  
   
 Your last dose was: Your next dose is: Take 1 Tab by mouth three (3) times daily (with meals). 800 mg  
    
   
   
   
  
 * levothyroxine 50 mcg tablet Commonly known as:  SYNTHROID Your last dose was: Your next dose is: Take 1 Tab by mouth Daily (before breakfast). 50 mcg * Levothyroxine 75 mcg Cap Your last dose was: Your next dose is: Take one tablet by mouth every other day before breakfast. Alternates with 50mcg  
     
   
   
   
  
 lidocaine 5 % Commonly known as:  Minor Alt Your last dose was: Your next dose is:    
   
   
 Apply patch to the affected area for 12 hours a day and remove for 12 hours a day. lisinopril 10 mg tablet Commonly known as:  Adnra Primmer Your last dose was: Your next dose is: Take 1 Tab by mouth daily. 10 mg  
    
   
   
   
  
 ZETIA 10 mg tablet Generic drug:  ezetimibe Your last dose was: Your next dose is: * Notice: This list has 2 medication(s) that are the same as other medications prescribed for you. Read the directions carefully, and ask your doctor or other care provider to review them with you. Where to Get Your Medications Information on where to get these meds will be given to you by the nurse or doctor. ! Ask your nurse or doctor about these medications  
  oxyCODONE-acetaminophen 5-325 mg per tablet Opioid Education Prescription Opioids: What You Need to Know: 
 
Prescription opioids can be used to help relieve moderate-to-severe pain and are often prescribed following a surgery or injury, or for certain health conditions. These medications can be an important part of treatment but also come with serious risks.   Opioids are strong pain medicines. Examples include hydrocodone, oxycodone, fentanyl, and morphine. Heroin is an example of an illegal opioid. It is important to work with your health care provider to make sure you are getting the safest, most effective care. WHAT ARE THE RISKS AND SIDE EFFECTS OF OPIOID USE? Prescription opioids carry serious risks of addiction and overdose, especially with prolonged use. An opioid overdose, often marked by slow breathing, can cause sudden death. The use of prescription opioids can have a number of side effects as well, even when taken as directed. · Tolerance-meaning you might need to take more of a medication for the same pain relief · Physical dependence-meaning you have symptoms of withdrawal when the medication is stopped. Withdrawal symptoms can include nausea, sweating, chills, diarrhea, stomach cramps, and muscle aches. Withdrawal can last up to several weeks, depending on which drug you took and how long you took it. · Increased sensitivity to pain · Constipation · Nausea, vomiting, and dry mouth · Sleepiness and dizziness · Confusion · Depression · Low levels of testosterone that can result in lower sex drive, energy, and strength · Itching and sweating RISKS ARE GREATER WITH:      
· History of drug misuse, substance use disorder, or overdose · Mental health conditions (such as depression or anxiety) · Sleep apnea · Older age (72 years or older) · Pregnancy Avoid alcohol while taking prescription opioids. Also, unless specifically advised by your health care provider, medications to avoid include: · Benzodiazepines (such as Xanax or Valium) · Muscle relaxants (such as Soma or Flexeril) · Hypnotics (such as Ambien or Lunesta) · Other prescription opioids KNOW YOUR OPTIONS Talk to your health care provider about ways to manage your pain that don't involve prescription opioids.   Some of these options may actually work better and have fewer risks and side effects. Options may include: 
· Pain relievers such as acetaminophen, ibuprofen, and naproxen · Some medications that are also used for depression or seizures · Physical therapy and exercise · Counseling to help patients learn how to cope better with triggers of pain and stress. · Application of heat or cold compress · Massage therapy · Relaxation techniques Be Informed Make sure you know the name of your medication, how much and how often to take it, and its potential risks & side effects. IF YOU ARE PRESCRIBED OPIOIDS FOR PAIN: 
· Never take opioids in greater amounts or more often than prescribed. Remember the goal is not to be pain-free but to manage your pain at a tolerable level. · Follow up with your primary care provider to: · Work together to create a plan on how to manage your pain. · Talk about ways to help manage your pain that don't involve prescription opioids. · Talk about any and all concerns and side effects. · Help prevent misuse and abuse. · Never sell or share prescription opioids · Help prevent misuse and abuse. · Store prescription opioids in a secure place and out of reach of others (this may include visitors, children, friends, and family). · Safely dispose of unused/unwanted prescription opioids: Find your community drug take-back program or your pharmacy mail-back program, or flush them down the toilet, following guidance from the Food and Drug Administration (www.fda.gov/Drugs/ResourcesForYou). · Visit www.cdc.gov/drugoverdose to learn about the risks of opioid abuse and overdose. · If you believe you may be struggling with addiction, tell your health care provider and ask for guidance or call [address] [place] [address] [employer] at 6-860-225-BJYW. Discharge Instructions Instructions Following Ambulatory Surgery Patient: Dora Miranda MRN: [id number]  SSN: [id number] YOB: 1940  Age: 76 y.o. Sex: female Activity · As tolerated, walking encourage, stairs are okay · Avoid strenuous activities - no lifting anything heavier than 15 pounds. · You may shower at home after 48 hours. Diet · Regular diet after nausea from the anesthetic has passed. Pain · Take pain medication as directed by your doctor ·  Call your doctor if pain is NOT relieved by medication Dressing Care · Remove outer dressing (if any) after 48 hours. Leave steri-strips (if any) in place until they fall off. After Anesthesia · For the first 24 hours: DO NOT Drive, Drink alcoholic beverages, or Make important decisions · Be aware of dizziness following anesthesia and while taking pain medication Call your doctor if 
· Excessive bleeding that does not stop after holding mild pressure over the area · Temperature of 101 degrees F or above · Redness,excessive swelling or bruising, and/or green or yellow, smelly discharge from incision · If nausea and vomiting continues Follow-Up Phone Calls · Call the office at [phone] to make your follow-up appointment Appointment date/time: 2 weeks after the surgery. Dr. LuisA lberto Michelle cell phone number is [phone]. Please call me if you have any concerns or questions. DISCHARGE SUMMARY from Nurse PATIENT INSTRUCTIONS: 
 
After general anesthesia or intravenous sedation, for 24 hours or while taking prescription Narcotics: · Limit your activities · Do not drive and operate hazardous machinery · Do not make important personal or business decisions · Do  not drink alcoholic beverages · If you have not urinated within 8 hours after discharge, please contact your surgeon on call. Report the following to your surgeon: 
· Excessive pain, swelling, redness or odor of or around the surgical area · Temperature over 100.5 · Nausea and vomiting lasting longer than 4 hours or if unable to take medications · Any signs of decreased circulation or nerve impairment to extremity: change in color, persistent  numbness, tingling, coldness or increase pain · Any questions What to do at Home: 
Recommended activity: Activity as tolerated and no driving for today These are general instructions for a healthy lifestyle: No smoking/ No tobacco products/ Avoid exposure to second hand smoke Surgeon General's Warning:  Quitting smoking now greatly reduces serious risk to your health. Obesity, smoking, and sedentary lifestyle greatly increases your risk for illness A healthy diet, regular physical exercise & weight monitoring are important for maintaining a healthy lifestyle You may be retaining fluid if you have a history of heart failure or if you experience any of the following symptoms:  Weight gain of 3 pounds or more overnight or 5 pounds in a week, increased swelling in our hands or feet or shortness of breath while lying flat in bed. Please call your doctor as soon as you notice any of these symptoms; do not wait until your next office visit. Recognize signs and symptoms of STROKE: 
 
F-face looks uneven A-arms unable to move or move unevenly S-speech slurred or non-existent T-time-call 911 as soon as signs and symptoms begin-DO NOT go Back to bed or wait to see if you get better-TIME IS BRAIN. Warning Signs of HEART ATTACK Call 911 if you have these symptoms: 
? Chest discomfort. Most heart attacks involve discomfort in the center of the chest that lasts more than a few minutes, or that goes away and comes back. It can feel like uncomfortable pressure, squeezing, fullness, or pain. ? Discomfort in other areas of the upper body. Symptoms can include pain or discomfort in one or both arms, the back, neck, jaw, or stomach. ? Shortness of breath with or without chest discomfort. ? Other signs may include breaking out in a cold sweat, nausea, or lightheadedness. Don't wait more than five minutes to call [address]! Fast action can save your life. Calling 911 is almost always the fastest way to get lifesaving treatment. Emergency Medical Services staff can begin treatment when they arrive  up to an hour sooner than if someone gets to the hospital by car. The discharge information has been reviewed with the patient. The patient verbalized understanding. Discharge medications reviewed with the patient and appropriate educational materials and side effects teaching were provided. Patient armband removed and given to patient to take home. Patient was informed of the privacy risks if armband lost or stolen. ACO Transitions of Care Introducing [employer] [address] offers a voluntary care coordination program to provide high quality service and care to [facility] fee-for-service beneficiaries. Pattie Agustin was designed to help you enhance your health and well-being through the following services: ? Transitions of Care  support for individuals who are transitioning from one care setting to another (example: Hospital to home). ? Chronic and Complex Care Coordination  support for individuals and caregivers of those with serious or chronic illnesses or with more than one chronic (ongoing) condition and those who take a number of different medications. If you meet specific medical criteria, a [address] may call you directly to coordinate your care with your primary care physician and your other care providers. For questions about the [facility] programs, please, contact your physicians office. For general questions or additional information about Accountable Care Organizations: 
Please visit www.medicare.gov/acos. html or call 1-800-MEDICARE (1-496.570.1815) TTY users should call 1-970.780.6585. Introducing [facility] & HEALTH SERVICES! Dear Hayley Hendrix: Thank you for requesting a [employer] account. Our records indicate that you already have an active [employer] account. You can access your account anytime at [email or web address] Did you know that you can access your hospital and ER discharge instructions at any time in [employer]? You can also review all of your test results from your hospital stay or ER visit. Additional Information If you have questions, please visit the Frequently Asked Questions section of the [employer] website at [email or web address] Remember, [employer] is NOT to be used for urgent needs. For medical emergencies, dial 911. Now available from your iPhone and Android! Introducing Pancho Corona As a [facility] patient, I wanted to make you aware of our electronic visit tool called Pancho Corona. [employer] allows you to connect within minutes with a medical provider 24 hours a day, seven days a week via a mobile device or tablet or logging into a secure website from your computer. You can access Pancho Corona from anywhere in the [place]. A virtual visit might be right for you when you have a simple condition and feel like you just dont want to get out of bed, or cant get away from work for an appointment, when your regular [facility] provider is not available (evenings, weekends or holidays), or when youre out of town and need minor care. Electronic visits cost only $49 and if the [employer] provider determines a prescription is needed to treat your condition, one can be electronically transmitted to a nearby pharmacy*. Please take a moment to enroll today if you have not already done so. The enrollment process is free and takes just a few minutes.   To enroll, please download the [employer] gini to your tablet or phone, or visit [email or web address] to enroll on your computer. And, as an [address] [place] [address] patient with a [employer] account, the results of your visits will be scanned into your electronic medical record and your primary care provider will be able to view the scanned results. We urge you to continue to see your regular [facility] provider for your ongoing medical care. And while your primary care provider may not be the one available when you seek a [email or web address] virtual visit, the peace of mind you get from getting a real diagnosis real time can be priceless. For more information on [email or web address], view our Frequently Asked Questions (FAQs) at [email or web address]. Sincerely, 
 
Kaylin Roque MD 
Chief Medical Officer [facility] Lu[facility] Me[facility]ia *:  certain medications cannot be prescribed via [email or web address] Providers Seen During Your Hospitalization Provider Specialty Primary office phone Lyle Blancas MD Surgery [phone] Your Primary Care Physician (PCP) Primary Care Physician Office Phone Office Fax Marvin Infantew [phone] You are allergic to the following Allergen Reactions Atenolol Other (comments) Felt poorly Cozaar (Losartan) Other (comments) Headaches Norvasc (Amlodipine) Other (comments) Felt poorly Recent Documentation Height Weight BMI OB Status Smoking Status 1.638 m 78 kg 29.07 kg/m2 Postmenopausal Never Smoker Emergency Contacts Name Discharge Info Relation Home Work Mobile [address] CAREGIVER [3] Spouse [3] [phone] Patient Belongings The following personal items are in your possession at time of discharge: 
  Dental Appliances: None  Visual Aid: None      Home Medications: None   Jewelry: None  Clothing: Shirt, Socks, Footwear, Undergarments, Pants    Other Valuables: [employer], Cell Phone Please provide this summary of care documentation to your next provider. Signatures-by signing, you are acknowledging that this After Visit Summary has been reviewed with you and you have received a copy. Patient Signature:  ____________________________________________________________ Date:  ____________________________________________________________  
  
Wagner Mais Provider Signature:  ____________________________________________________________ Date:  ____________________________________________________________

## 2018-09-18 NOTE — PERIOP NOTES
Phase 2 Recovery Summary Patient arrived to Phase 2 at 12 [facility]:  
 09/18/18 1204 09/18/18 1207 09/18/18 1217 09/18/18 1245 BP: 161/76 164/74 166/80 156/77 Pulse: 73 76 78 69 Resp: 19 22 23 20 Temp:   97.2 °F (36.2 °C) SpO2: 98% 97% 97% 95% Weight:      
Height:      
 
 
oriented to time, place, person and situation Lines and Drains Peripheral Intravenous Line:  
Peripheral IV 09/18/18 Right Hand (Active) Site Assessment Clean, dry, & intact 9/18/2018 12:51 PM  
Phlebitis Assessment 0 9/18/2018 12:51 PM  
Infiltration Assessment 0 9/18/2018 12:51 PM  
Dressing Status Clean, dry, & intact 9/18/2018 12:51 PM  
Dressing Type Tape;Transparent 9/18/2018 12:51 PM  
Hub Color/Line Status Pink; Infusing 9/18/2018 12:51 PM  
Alcohol Cap Used Yes 9/18/2018 12:17 PM  
 
 
Wound Wound Abdomen Anterior (Active) DRESSING STATUS Clean, dry, and intact 9/18/2018  1:28 PM  
DRESSING TYPE Topical skin adhesive/glue 9/18/2018  1:28 PM  
Incision site well approximated? Yes 9/18/2018  1:28 PM  
Number of days:0 Wound Abdomen (Active) Number of days:0 Pain medication given at 1335, patient stated pain was 8/10. Patient in and out of sleep. Patient discharged to home with family  at 0 Tamela Lala

## 2018-09-18 NOTE — ANESTHESIA POSTPROCEDURE EVALUATION
Post-Anesthesia Evaluation and Assessment Patient: Agustín Little MRN: [id number]  SSN: [id number] YOB: 1940  Age: 76 y.o. Sex: female Cardiovascular Function/Vital Signs Visit Vitals  /77 (BP 1 Location: Left arm, BP Patient Position: At rest)  Pulse 69  Temp 36.2 °C (97.2 °F)  Resp 20  
 Ht 5' 4.5\" (1.638 m)  Wt 78 kg (172 lb)  SpO2 95%  BMI 29.07 kg/m2 Patient is status post general anesthesia for Procedure(s): 
Davinci multiport CHOLECYSTECTOMY with firefly ROBOTIC ASSISTED. Nausea/Vomiting: None Postoperative hydration reviewed and adequate. Pain: 
Pain Scale 1: Numeric (0 - 10) (09/18/18 1245) Pain Intensity 1: 0 (09/18/18 1245) Managed Neurological Status:  
Neuro (WDL): Within Defined Limits (09/18/18 1217) Neuro Neurologic State: Alert (09/18/18 1217) Orientation Level: Oriented X4 (09/18/18 1217) Cognition: Appropriate decision making; Appropriate for age attention/concentration; Appropriate safety awareness; Follows commands (09/18/18 1217) Speech: Clear (09/18/18 1217) LUE Motor Response: Purposeful (09/18/18 1217) LLE Motor Response: Purposeful (09/18/18 1217) RUE Motor Response: Purposeful (09/18/18 1217) RLE Motor Response: Purposeful (09/18/18 1217) At baseline Mental Status and Level of Consciousness: Alert and oriented Pulmonary Status:  
O2 Device: Room air (09/18/18 1217) Adequate oxygenation and airway patent Complications related to anesthesia: None Post-anesthesia assessment completed. No concerns Signed By: Ryan Pierre CRNA September 18, 2018

## 2018-09-18 NOTE — PERIOP NOTES
Phase 2 Recovery Summary Patient arrived to Phase 2 at 12 Report received from [address] Vitals:  
 09/18/18 1204 09/18/18 1207 09/18/18 1217 09/18/18 1245 BP: 161/76 164/74 166/80 156/77 Pulse: 73 76 78 69 Resp: 19 22 23 20 Temp:   97.2 °F (36.2 °C) SpO2: 98% 97% 97% 95% Weight:      
Height:      
 
 
oriented to time, place, person and situation Lines and Drains Peripheral Intravenous Line:  
Peripheral IV 09/18/18 Right Hand (Active) Site Assessment Clean, dry, & intact 9/18/2018 12:51 PM  
Phlebitis Assessment 0 9/18/2018 12:51 PM  
Infiltration Assessment 0 9/18/2018 12:51 PM  
Dressing Status Clean, dry, & intact 9/18/2018 12:51 PM  
Dressing Type Tape;Transparent 9/18/2018 12:51 PM  
Hub Color/Line Status Pink; Infusing 9/18/2018 12:51 PM  
Alcohol Cap Used Yes 9/18/2018 12:17 PM  
 
 
Wound Wound Abdomen Anterior (Active) DRESSING STATUS Clean, dry, and intact 9/18/2018  1:28 PM  
DRESSING TYPE Topical skin adhesive/glue 9/18/2018  1:28 PM  
Incision site well approximated? Yes 9/18/2018  1:28 PM  
Number of days:0 Wound Abdomen (Active) Number of days:0 Assumed care of patient from [address]. Patient currently resting in recliner. Pain medication administered per [facility] after complaining of to abdomen. Patient stated that pain had decreased and denied signs and symptoms of nausea and vomiting. Discharge instructions given to patient and . Questions and concerns addressed and informed to contact surgeon if questions arise prior to follow up visit. Patient taken to personal vehicle via wheelchair and left in stable condition. Patient discharged to home with   at [address] [place] [address]

## 2018-09-18 NOTE — PROGRESS NOTES
Date of Surgery Update: 
Edgar Barragan was seen and examined. History and physical has been reviewed. The patient has been examined. There have been no significant clinical changes since the completion of the originally dated History and Physical. Will proceed with robotic cholecystectomy with firefly.   
 
Signed By: Ginger Candelaria MD   
 September 18, 2018 9:51 AM

## 2018-09-18 NOTE — BRIEF OP NOTE
BRIEF OPERATIVE NOTE Date of Procedure: 9/18/2018 Preoperative Diagnosis: gallstones  k80.20 Postoperative Diagnosis: gallstones  k80.20 Procedure(s): 
Davinci multiport CHOLECYSTECTOMY with firefly ROBOTIC ASSISTED Surgeon(s) and Role: Ale Hanks MD - Primary Surgical Staff: 
Circ-1: Mary Mcgregor RN Scrub Tech-1: Po Ratliff Surg Asst-1: Boby Robbins Surg Asst-Relief: Tony Thomas Event Time In Incision Start [phone] Incision Close 1117 Anesthesia: General  
Estimated Blood Loss: Minimal 
Specimens:  
ID Type Source Tests Collected by Time Destination 1 : Trang Wang MD 9/18/2018 1106 Pathology Findings: Cholecystitis. Complications: None. Implants: * No implants in log *

## 2018-09-18 NOTE — ANESTHESIA PREPROCEDURE EVALUATION
Anesthetic History No history of anesthetic complications Review of Systems / Medical History Patient summary reviewed and pertinent labs reviewed Pulmonary Within defined limits Neuro/Psych Psychiatric history Cardiovascular Hypertension: well controlled Exercise tolerance: >4 METS Comments: AAA-4.6cms GI/Hepatic/Renal 
  
 
 
 
 
 
Comments: Biliary Cholilithiasis Endo/Other Hypothyroidism: well controlled Morbid obesity and arthritis Other Findings Comments: Documentation of current medication Current medications obtained, documented and obtained? YES Risk Factors for Postoperative nausea/vomiting: 
     History of postoperative nausea/vomiting? NO Female? YES Motion sickness? NO Intended opioid administration for postoperative analgesia? YES Smoking Abstinence: 
Current Smoker? NO Elective Surgery? YES Seen preoperatively by anesthesiologist or proxy prior to day of surgery? YES Pt abstained from smoking 24 hours prior to anesthesia? N/A Preventive care/screening for High Blood Pressure: 
Aged 18 years and older: YES Screened for high blood pressure: YES Patients with high blood pressure referred to primary care provider 
 for BP management: YES Physical Exam 
 
Airway Mallampati: II 
TM Distance: 4 - 6 cm Neck ROM: normal range of motion Mouth opening: Normal 
 
 Cardiovascular Regular rate and rhythm,  S1 and S2 normal,  no murmur, click, rub, or gallop Dental 
 
Dentition: Upper partial plate Pulmonary Breath sounds clear to auscultation Abdominal 
GI exam deferred Other Findings Anesthetic Plan ASA: 3 Anesthesia type: general 
 
Monitoring Plan: Arterial line Induction: Intravenous Anesthetic plan and risks discussed with: Patient

## 2018-09-18 NOTE — H&P
Subjective:  
Penny Woodall is a 76 y.o. female who is currently being treated for mild pancreatitis that occurred post ERCP. The patient had abdominal pain, and an MRCP showed choledocholithiasis, so she underwent ERCP and stone extraction. No stent was placed. She developed mild pancreatitis and was admitted for medical treatment. I was consulted for possible need for cholecystectomy. She is currently feeling better. She had abdominal pain with nausea and vomiting but feeling much better now. She did had a CT scan and MRCP in addition to her ERCP, but there is no documentation of cholelithiasis. Patient Active Problem List  
 Diagnosis Date Noted  Acute pancreatitis 08/16/2018  AAA (abdominal aortic aneurysm) without rupture ([address] 07/17/2018  Spondylolisthesis, lumbar region 04/16/2018  Spondylosis of lumbar region without myelopathy or radiculopathy 04/16/2018  Scoliosis of lumbar spine 04/16/2018  DDD (degenerative disc disease), lumbar 04/16/2018  Primary hypertension 09/07/2016  Advance directive discussed with patient 04/14/2016  Obesity (BMI 30.0-34.9) 04/11/2016  Hypothyroidism due to acquired atrophy of thyroid 10/07/2015  Arthritis, degenerative 10/07/2015  Dyslipidemia 10/01/2013  Osteopenia FRAX 4/14 11 and 2.2 04/02/2013  Hypovitaminosis D 10/24/2011  Venous insufficiency dopplers 2003 10/24/2011  Colon adenomas 2003, last 2014 Dr Mary Jane Ponce Past Medical History:  
Diagnosis Date  AAA (abdominal aortic aneurysm) without rupture ([address] AAA repair 7/2018  Colon adenomas 2003 Dr Champ Ibrahim; 4/13 Dr Mary Jane Ponce  DJD (degenerative joint disease)  Dyslipidemia  FHx: heart disease  Gastritis 2006  
 on EGD Dr. Hillary Newman, neg sprue  Hypertension  Hypothyroidism 2007 Dr. Alvia Leyden  Hypovitaminosis D   
 Lung nodule 06/2016  
 6 mm RLL (6/16); no change (10/17)  Obesity peak weight 187 lbs, bmi 33.1 from 4/13; IF 3/18  Osteopenia FRAX 4/12 9.7 and 1.4 DEXA t score 0.6 spine, -1 hip (12/09); -0.4 spine, -1.3 hip (4/12); -1.2 wrist, -1.6 hip w FRAX 11/2.2 (4/14); -1.0 wrist, -1.6 hip w FRAX 12/2.6 (4/16); -0.7 wrist, -1.6 hip (5/18)  Pancreatitis, chronic ([facility] 2018  Psoriasis 2003 on bx Dr. Juan Montilla  Sensorineural hearing loss   
 left side Dr Trav Penn  Venous insufficiency 2003  
 on dopplers  Zoster 05/2016  
 left T11-12; mild postherpetic neuralgia Past Surgical History:  
Procedure Laterality Date  CARDIAC SURG PROCEDURE UNLIST  09/2016 NST negative  CHEST SURGERY PROCEDURE UNLISTED  06/2016 CTA neg PE, 6 mm subpleural nodule  HX AAA REPAIR  07/17/2018  HX CARPAL TUNNEL RELEASE Dr. Kavon Lin left  HX CARPAL TUNNEL RELEASE Left  HX CATARACT REMOVAL Bilateral   
 HX COLONOSCOPY    
 benign polyp 5/08 Dr Yasmin Means; 4/13 negative Dr. Rema Canseco  HX ORTHOPAEDIC    
 HX TONSILLECTOMY  NEUROLOGICAL PROCEDURE UNLISTED  2007  
 neg MRA/MRI head in [address] [place] [address]  9/10 CT head negative  VASCULAR SURGERY PROCEDURE UNLIST  2007  
 neg AAA screen in [facility] Social History Substance Use Topics  Smoking status: Never Smoker  Smokeless tobacco: Never Used  Alcohol use Yes Comment: occasionally History Smoking Status  Never Smoker Smokeless Tobacco  
 Never Used Family History Problem Relation Age of Onset  Breast Cancer Mother  Cancer Mother  Heart Disease Father Current Facility-Administered Medications Medication Dose Route Frequency  levothyroxine (SYNTHROID) tablet 50 mcg 50 mcg Oral NOW  lidocaine (PF) (XYLOCAINE) 10 mg/mL (1 %) injection 0.1 mL 0.1 mL SubCUTAneous PRN  
 sodium chloride (NS) flush 5-10 mL 5-10 mL IntraVENous PRN  
 glucose chewable tablet 16 g 4 Tab Oral PRN  
  glucagon (GLUCAGEN) injection 1 mg 1 mg IntraMUSCular PRN  
 dextrose (D50W) injection syrg 12.5-25 g 25-50 mL IntraVENous PRN  
 morphine injection 2 mg 2 mg IntraVENous PRN  
 indomethacin (INDOCIN) rectal suppository   PRN  
 ioversol (OPTIRAY) 320 mg iodine/mL contrast injection   PRN  pantoprazole (PROTONIX) 40 mg in sodium chloride 0.9% 10 mL injection 40 mg IntraVENous DAILY  promethazine (PHENERGAN) 12.5 mg in 0.9% sodium chloride 50 mL IVPB 12.5 mg IntraVENous Q6H PRN  
 HYDROmorphone (DILAUDID) injection 1 mg 1 mg IntraVENous Q6H PRN  
 ondansetron (ZOFRAN) injection 4 mg 4 mg IntraVENous Q6H PRN  
 sodium chloride (NS) flush 5-10 mL 5-10 mL IntraVENous Q8H  
 sodium chloride (NS) flush 5-10 mL 5-10 mL IntraVENous PRN  
 acetaminophen (TYLENOL) tablet 650 mg 650 mg Oral Q6H PRN  
 heparin (porcine) injection 5,000 Units 5,000 Units SubCUTAneous Q8H  
 lactated Ringers infusion 75 mL/hr IntraVENous CONTINUOUS Allergies Allergen Reactions  Atenolol Other (comments) Felt poorly  Cozaar [Losartan] Other (comments) Headaches  Norvasc [Amlodipine] Other (comments) Felt poorly Review of Systems:  
Constitutional: negative Eyes: negative Ears, Nose, Mouth, Throat, and Face: negative Respiratory: negative Cardiovascular: negative Gastrointestinal: positive for nausea, vomiting and abdominal pain Genitourinary:negative Integument/Breast: negative Hematologic/Lymphatic: negative Musculoskeletal:negative Neurological: negative Behavioral/Psychiatric: negative Endocrine: negative Allergic/Immunologic: negative Objective:  
 
    
Visit Vitals  /68 (BP 1 Location: Right arm, BP Patient Position: At rest)  Pulse 67  Temp 98.5 °F (36.9 °C)  Resp 20  
 Ht 5' 4\" (1.626 m)  Wt 78.6 kg (173 lb 4 oz)  SpO2 92%  Breastfeeding No  
 BMI 29.74 kg/m2 Physical Exam: General:  in no apparent distress, alert and oriented times 3 Eyes:  conjunctivae and sclerae normal, pupils equal, round, reactive to light Throat & Neck: no erythema or exudates noted and neck supple and symmetrical; no palpable masses Lungs:  clear to auscultation bilaterally Heart:  Regular rate and rhythm Abdomen:  rounded, soft, nontender, nondistended, no masses or organomegaly. No abdominal wall hernias. Extremities: extremities normal, atraumatic, no cyanosis or edema Skin: Normal.  
Imaging and Lab Review: CBC:  
     
Lab Results Component Value Date/Time WBC 9.7 08/17/2018 03:40 AM  
 RBC 3.89 (L) 08/17/2018 03:40 AM  
 HGB 11.8 (L) 08/17/2018 03:40 AM  
 HCT 35.4 08/17/2018 03:40 AM  
 PLATELET 732 45/84/4407 03:40 AM  
 
BMP:  
     
Lab Results Component Value Date/Time Glucose 98 08/17/2018 03:40 AM  
 Sodium 140 08/17/2018 03:40 AM  
 Potassium 4.2 08/17/2018 03:40 AM  
 Chloride 106 08/17/2018 03:40 AM  
 CO2 26 08/17/2018 03:40 AM  
 BUN 21 (H) 08/17/2018 03:40 AM  
 Creatinine 1.09 08/17/2018 03:40 AM  
 Calcium 8.5 08/17/2018 03:40 AM  
 
CMP:  
     
Lab Results Component Value Date/Time Glucose 98 08/17/2018 03:40 AM  
 Sodium 140 08/17/2018 03:40 AM  
 Potassium 4.2 08/17/2018 03:40 AM  
 Chloride 106 08/17/2018 03:40 AM  
 CO2 26 08/17/2018 03:40 AM  
 BUN 21 (H) 08/17/2018 03:40 AM  
 Creatinine 1.09 08/17/2018 03:40 AM  
 Calcium 8.5 08/17/2018 03:40 AM  
 Anion gap 8 08/17/2018 03:40 AM  
 BUN/Creatinine ratio 19 08/17/2018 03:40 AM  
 Alk. phosphatase 107 08/17/2018 03:40 AM  
 Protein, total 6.0 (L) 08/17/2018 03:40 AM  
 Albumin 2.8 (L) 08/17/2018 03:40 AM  
 Globulin 3.2 08/17/2018 03:40 AM  
 A-G Ratio 0.9 08/17/2018 03:40 AM  
 
 Recent Results images and reports reviewed Assessment:  
Braxton Wu is a 76 y.o. female is presenting with acute pancreatitis S/P ERCP for choledocholithiasis. The patient is recovering well. I explained the indications for robotic cholecystectomy as well as the alternatives. I explained that the reason we will remove her gallbladder is to prevent another attack. I discussed the potential risks, including but not limited to bleeding, wound infection, trocar injuries, bile duct injury and leak, and also the possible need for conversion to open procedure. I also explained the firefly technology and how it allow us to visualize the biliary tree to avoid bile duct injury or leak. I also explained to her that we will order an U/S to document the presence of gallstones. Plan:  
 robotic cholecystectomy with firefly (ICG) technology for identification of the biliary tree.

## 2018-09-19 ENCOUNTER — PATIENT OUTREACH (OUTPATIENT)
Dept: INTERNAL MEDICINE CLINIC | Age: 78
End: 2018-09-19

## 2018-09-19 NOTE — PROGRESS NOTES
Hospital Discharge Follow-Up Date/Time:  2018 11:45 AM 
 
Patient was admitted to [facility] on 18 and discharged on 18 for Davinci multipoint cholecystectomy  With [facility] robotic assisted. The physician discharge summary was available at the time of outreach. Patient was contacted within 1 business days of discharge. Top Challenges reviewed with the provider None Method of communication with provider :none Inpatient RRAT score: not calcualted Was this a readmission? no  
 
Nurse Navigator (NN) contacted the patient by telephone to perform post hospital discharge assessment. Verified name and  with patient as identifiers. Provided introduction to self, and explanation of the Nurse Navigator role. Patient reported:  
5/10 achy abdominal pain 3 surgical wounds closed with glue, open to air Surgical wounds black and blue, little bit of redness Little nausea this morning; was able to tolerate coffee and toast this morning Urinating w/o problems No BM yet Not passing gas Patient denied:  
Drainage Warmth Swelling Chills Fever Dizziness SOB 
CP/pressure Reviewed discharge instructions and red flags (redness, warmth at site(s), swelling, bleeding or pus-like drainage, chills, fever (> 100.5), nausea/vomiting that prevents eating/drinking/taking medications, SOB, chest pain/pressure or any other new/concerning s/s) with patient who verbalized understanding. Patient given an opportunity to ask questions and does not have any further questions or concerns at this time. The patient agrees to contact the PCP office for questions related to their healthcare. NN provided contact information for future reference. Disease Specific:   N/A Home Health orders at discharge: none Barriers to care? No apparent or voiced barriers to care noted at this time. Advance Care Planning:  
Does patient have an Advance Directive:  not on file Medication(s):  
New Medications at Discharge: Percocet Changed Medications at Discharge: None Discontinued Medications at Discharge: None Medication reconciliation was performed with patient, who verbalizes understanding of administration of home medications. There were no barriers to obtaining medications identified at this time. Referral to Pharm D needed: no  
 
Current Outpatient Prescriptions Medication Sig  
 oxyCODONE-acetaminophen (PERCOCET) 5-325 mg per tablet Take 1 Tab by mouth every four (4) hours as needed for Pain. Max Daily Amount: 6 Tabs.  atorvastatin (LIPITOR) 40 mg tablet  ZETIA 10 mg tablet  diazePAM (VALIUM) 10 mg tablet Take 1 Tab by mouth every twelve (12) hours as needed for Anxiety. Max Daily Amount: 20 mg.  
 camphor-menthol (MEN-PHOR) 0.5-0.5 % lotion Apply  to affected area as needed for Itching.  oxyCODONE-acetaminophen (PERCOCET) 5-325 mg per tablet Take 1 Tab by mouth every four (4) hours as needed. Max Daily Amount: 6 Tabs.  lidocaine (LIDODERM) 5 % Apply patch to the affected area for 12 hours a day and remove for 12 hours a day.  omeprazole (PRILOSEC) 40 mg capsule Take 1 Cap by mouth daily. (Patient taking differently: Take 40 mg by mouth as needed.)  ibuprofen (MOTRIN) 800 mg tablet Take 1 Tab by mouth three (3) times daily (with meals).  lisinopril (PRINIVIL, ZESTRIL) 10 mg tablet Take 1 Tab by mouth daily.  Levothyroxine 75 mcg cap Take one tablet by mouth every other day before breakfast. Alternates with 50mcg  ergocalciferol (VITAMIN D2) 50,000 unit capsule Take 1 Cap by mouth every seven (7) days. (Patient taking differently: Take 50,000 Units by mouth every fourteen (14) days.)  levothyroxine (SYNTHROID) 50 mcg tablet Take 1 Tab by mouth Daily (before breakfast).  aspirin 81 mg chewable tablet Take 1 Tab by mouth daily. No current facility-administered medications for this visit. Facility-Administered Medications Ordered in Other Visits Medication Dose Route Frequency  sodium chloride irrigation 0.9 % 500 mL Irrigation    PRN There are no discontinued medications. [facility] follow up appointment(s): Future Appointments Date Time Provider Mariana Gray 10/1/2018 1:00 PM Rell Rivera MD [facility] MARY SCHED  
11/6/2018 9:00 AM [facility] NURSE VISIT [facility] M[facility] SCHED  
11/13/2018 10:20 AM Trip Kang MD [address]  
3/5/2019 8:00 AM [facility] IMAGING 1 [facility] M[facility] SCHED  
3/14/2019 9:00 AM Darling Franklin, [address]  Non-[facility] follow up appointment(s): Dr. Lili Stover (GI) 10/1/18 [facility]:  n/a  
 
 
Goals  Attends follow-up appointments as directed. Plan: Notify of upcoming appts. 7/19/18: Patient aware of upcoming appts with PA and vascular. 7/25/18: Attended appt with PA as scheduled. 8/1/18: Patient attended vascular appt on 7/31 as scheduled. 8/14/18: Patient aware of appt for endoscopy as well as upcoming vascular appt. Plan: Notify of upcoming appt and offer/schedule PCP appt. 8/20/18: PCP appt scheduled. 8/26/18: Attended appt with PCP and vascular on 8/23/18 as scheduled.

## 2018-10-01 ENCOUNTER — PATIENT OUTREACH (OUTPATIENT)
Dept: INTERNAL MEDICINE CLINIC | Age: 78
End: 2018-10-01

## 2018-10-01 ENCOUNTER — OFFICE VISIT (OUTPATIENT)
Dept: SURGERY | Age: 78
End: 2018-10-01

## 2018-10-01 VITALS
HEART RATE: 78 BPM | TEMPERATURE: 97.8 F | SYSTOLIC BLOOD PRESSURE: 122 MMHG | RESPIRATION RATE: 16 BRPM | WEIGHT: 169 LBS | HEIGHT: 65 IN | BODY MASS INDEX: 28.16 KG/M2 | OXYGEN SATURATION: 97 % | DIASTOLIC BLOOD PRESSURE: 80 MMHG

## 2018-10-01 DIAGNOSIS — Z09 POSTOPERATIVE EXAMINATION: Primary | ICD-10-CM

## 2018-10-01 NOTE — MR AVS SNAPSHOT
[facility] [address] [facility] [address] 
[phone] Patient: Stephan Kasper MRN: [id number] [facility]:3/26/0247 Visit Information Date & Time Provider Department Dept. Phone Encounter #  
 10/1/2018  1:00 PM Jewell Flores [facility] [phone] [id number] Your Appointments 11/6/2018  9:00 AM  
LAB with [facility] NURSE VISIT [facility] [address] [place] [address] ([facility]) Appt Note: lab  
 [address], [address] [facility] [address]  
  
   
 [address], [address]  
  
    
 11/13/2018 10:20 AM  
Office Visit with Aydin Schreiber MD  
[facility] [address] [place] [address] Appt Note: 6 month f/u  
 [address] [facility], [address] [facility] [address]  
  
   
 [address] [facility], [address]  
  
    
 3/5/2019  8:00 AM  
PROCEDURE with [facility] 1 [facility] ([facility]) Appt Note: [place] [address] [facility]  
[phone] [address]  
  
    
 3/14/2019  9:00 AM  
Follow Up with GARRETT [facility] ([facility]) Appt Note: 6 MONTH FOLLOW UP AFTER STUDY WITH PREP  
 [address] [facility]  
[phone] 2300 [facility] [address] [facility] Upcoming Health Maintenance Date Due Shingrix Vaccine Age 50> (1 of 2) 9/28/1988 Influenza Age 5 to Adult 12/23/2018* MEDICARE YEARLY EXAM 5/8/2019 GLAUCOMA SCREENING Q2Y 5/17/2020 DTaP/Tdap/Td series (2 - Td) 5/10/2027 *Topic was postponed. The date shown is not the original due date.    
  
Allergies as of 10/1/2018  Review Complete On: 10/1/2018 By: Santi Mai LPN  
  
 Severity Noted Reaction Type Reactions Atenolol    Other (comments) [place] poorly Cozaar [Losartan]    Other (comments) Headaches Norvasc [Amlodipine]    Other (comments) Felt poorly Current Immunizations  Reviewed on 4/8/2015 Name Date Hepatitis A Vaccine 9/1/2008 Influenza High Dose Vaccine PF 10/17/2016 Pneumococcal Conjugate (PCV-13) 5/10/2017 TD Vaccine 9/1/2008 Tdap 5/10/2017 Typhoid Vaccine 9/1/2008 Yellow Fever Vaccine 9/1/2008 ZZZ-RETIRED (DO NOT USE) Pneumococcal Vaccine (Unspecified Type) 9/1/2008 Zoster Vaccine, Live 1/1/2008 Not reviewed this visit You Were Diagnosed With   
  
 Codes Comments Postoperative examination    -  Primary ICD-10-CM: T59 ICD-9-CM: V67.00 Vitals BP Pulse Temp Resp Height(growth percentile) Weight(growth percentile) 122/80 78 97.8 °F (36.6 °C) (Oral) 16 5' 4.5\" (1.638 m) 169 lb (76.7 kg) SpO2 BMI OB Status Smoking Status 97% 28.56 kg/m2 Postmenopausal Never Smoker Vitals History BMI and BSA Data Body Mass Index Body Surface Area 28.56 kg/m 2 1.87 m 2 Preferred Pharmacy Pharmacy Name Phone [facility] [address] [facility], [address] [place] [address] [phone] Your Updated Medication List  
  
   
This list is accurate as of 10/1/18  1:30 PM.  Always use your most recent med list.  
  
  
  
  
 aspirin 81 mg chewable tablet Take 1 Tab by mouth daily. atorvastatin 40 mg tablet Commonly known as:  LIPITOR  
  
 camphor-menthol 0.5-0.5 % lotion Commonly known as:  MEN-PHOR Apply  to affected area as needed for Itching. diazePAM 10 mg tablet Commonly known as:  VALIUM Take 1 Tab by mouth every twelve (12) hours as needed for Anxiety. Max Daily Amount: 20 mg.  
  
 ergocalciferol 50,000 unit capsule Commonly known as:  VITAMIN D2 Take 1 Cap by mouth every seven (7) days. ibuprofen 800 mg tablet Commonly known as:  MOTRIN Take 1 Tab by mouth three (3) times daily (with meals). * levothyroxine 50 mcg tablet Commonly known as:  SYNTHROID Take 1 Tab by mouth Daily (before breakfast). * Levothyroxine 75 mcg Cap Take one tablet by mouth every other day before breakfast. Alternates with 50mcg  
  
 lidocaine 5 % Commonly known as:  Minerva Curd Apply patch to the affected area for 12 hours a day and remove for 12 hours a day. lisinopril 10 mg tablet Commonly known as:  Vilinda [place] Take 1 Tab by mouth daily. omeprazole 40 mg capsule Commonly known as:  PRILOSEC Take 1 Cap by mouth daily. * oxyCODONE-acetaminophen 5-325 mg per tablet Commonly known as:  PERCOCET Take 1 Tab by mouth every four (4) hours as needed. Max Daily Amount: 6 Tabs. * oxyCODONE-acetaminophen 5-325 mg per tablet Commonly known as:  PERCOCET Take 1 Tab by mouth every four (4) hours as needed for Pain. Max Daily Amount: 6 Tabs. ZETIA 10 mg tablet Generic drug:  ezetimibe * Notice: This list has 4 medication(s) that are the same as other medications prescribed for you. Read the directions carefully, and ask your doctor or other care provider to review them with you. Introducing [address]! Dear Oz Sanderson: Thank you for requesting a M-Files account. Our records indicate that you already have an active M-Files account. You can access your account anytime at [email or web address] Did you know that you can access your hospital and ER discharge instructions at any time in M-Files? You can also review all of your test results from your hospital stay or ER visit. Additional Information If you have questions, please visit the Frequently Asked Questions section of the M-Files website at [email or web address] Remember, M-Files is NOT to be used for urgent needs. For medical emergencies, dial 911. Now available from your iPhone and Android! Please provide this summary of care documentation to your next provider. Your primary care clinician is listed as Roosevelt Schneider. If you have any questions after today's visit, please call [phone].

## 2018-10-01 NOTE — PROGRESS NOTES
Patient seen and examined. She is doing well. Tolerating regular diet. Her abdomen is soft and nontender. Her wounds are healing well. Pathology showed chronic cholecystitis.   Follow-up as needed

## 2018-10-01 NOTE — PROGRESS NOTES
Goals Addressed Most Recent  Attends follow-up appointments as directed. On track (10/1/2018) Plan: Notify of upcoming appts. 7/19/18: Patient aware of upcoming appts with PA and vascular. 7/25/18: Attended appt with PA as scheduled. 8/1/18: Patient attended vascular appt on 7/31 as scheduled. 8/14/18: Patient aware of appt for endoscopy as well as upcoming vascular appt. Plan: Notify of upcoming appt and offer/schedule PCP appt. 8/20/18: PCP appt scheduled. 8/26/18: Attended appt with PCP and vascular on 8/23/18 as scheduled. 10/1/18: Patient attended appt with Dr. Greta Epstein (GI) as scheduled.

## 2018-10-10 DIAGNOSIS — E03.4 HYPOTHYROIDISM DUE TO ACQUIRED ATROPHY OF THYROID: ICD-10-CM

## 2018-10-10 RX ORDER — LEVOTHYROXINE SODIUM 50 UG/1
50 TABLET ORAL
Qty: 90 TAB | Refills: 3 | Status: SHIPPED | OUTPATIENT
Start: 2018-10-10 | End: 2018-11-28 | Stop reason: ALTCHOICE

## 2018-10-10 RX ORDER — LEVOTHYROXINE SODIUM 75 UG/1
CAPSULE ORAL
Qty: 45 CAP | Refills: 3 | Status: SHIPPED | OUTPATIENT
Start: 2018-10-10 | End: 2019-06-26 | Stop reason: SDUPTHER

## 2018-10-10 NOTE — TELEPHONE ENCOUNTER
From: Carito Cabral  To: Renate Curiel MD  Sent: 10/10/2018 8:56 AM EDT  Subject: Medication Renewal Request    Original authorizing provider: MD Srinivas Guerrero would like a refill of the following medications:  Levothyroxine 75 mcg cap Renate Curiel MD]    Preferred pharmacy: [facility]    Comment:

## 2018-10-10 NOTE — TELEPHONE ENCOUNTER
From: Bernadine Cabral  To: Leonard Gustafson MD  Sent: 10/10/2018 8:54 AM EDT  Subject: Medication Renewal Request    Original authorizing provider: Leonard Gustafson MD Edith Gastondelvis Cabral would like a refill of the following medications:  levothyroxine (SYNTHROID) 50 mcg tablet Leonard Gustafson MD]    Preferred pharmacy: [facility]    Comment:

## 2018-10-19 ENCOUNTER — PATIENT OUTREACH (OUTPATIENT)
Dept: INTERNAL MEDICINE CLINIC | Age: 78
End: 2018-10-19

## 2018-10-19 NOTE — PROGRESS NOTES
Patient has graduated from the Transitions of Care Coordination  program on 10/19/18. Patient's symptoms are stable at this time. Patient/family has the ability to self-manage. Care management goals have been completed at this time. No further nurse navigator follow up scheduled. Goals Addressed This Visit's Progress  COMPLETED: Attends follow-up appointments as directed. Plan: Notify of upcoming appts. 7/19/18: Patient aware of upcoming appts with PA and vascular. 7/25/18: Attended appt with PA as scheduled. 8/1/18: Patient attended vascular appt on 7/31 as scheduled. 8/14/18: Patient aware of appt for endoscopy as well as upcoming vascular appt. Plan: Notify of upcoming appt and offer/schedule PCP appt. 8/20/18: PCP appt scheduled. 8/26/18: Attended appt with PCP and vascular on 8/23/18 as scheduled. 10/1/18: Patient attended appt with Dr. Franco Farley (GI) as scheduled. Pt has nurse navigator's contact information for any further questions, concerns, or needs. Patients upcoming visits:   
Future Appointments Date Time Provider Mariana Gray 10/26/2018  9:55 AM [facility] NURSE VISIT [facility] MARY SCHED  
11/13/2018 10:20 AM Mendy Sainz MD [facility] MARY SCHED  
3/5/2019  8:00 AM [facility] 2VV MARY SCHED  
3/14/2019  9:00 AM GARRETT Weber [address] [facility]

## 2018-10-25 NOTE — PROGRESS NOTES
1. Have you been to an emergency room or urgent care clinic since your last visit? no    Hospitalized since your last visit? If yes, where, when, and reason for visit? Scheduled Surgery    2. Have you seen or consulted any other health care providers outside of the [facility] since your last visit including any procedures, health maintenance items.  If yes, where, when and reason for visit? no none

## 2018-10-26 ENCOUNTER — HOSPITAL ENCOUNTER (OUTPATIENT)
Dept: LAB | Age: 78
Discharge: HOME | End: 2018-10-26
Payer: MEDICARE

## 2018-10-26 ENCOUNTER — APPOINTMENT (OUTPATIENT)
Dept: INTERNAL MEDICINE CLINIC | Age: 78
End: 2018-10-26

## 2018-10-26 ENCOUNTER — HOSPITAL ENCOUNTER (OUTPATIENT)
Dept: ULTRASOUND IMAGING | Age: 78
Discharge: HOME | End: 2018-10-26
Attending: INTERNAL MEDICINE
Payer: MEDICARE

## 2018-10-26 DIAGNOSIS — E78.5 DYSLIPIDEMIA: ICD-10-CM

## 2018-10-26 DIAGNOSIS — E03.4 HYPOTHYROIDISM DUE TO ACQUIRED ATROPHY OF THYROID: ICD-10-CM

## 2018-10-26 DIAGNOSIS — E55.9 HYPOVITAMINOSIS D: ICD-10-CM

## 2018-10-26 DIAGNOSIS — K80.50 CHOLEDOCHOLITHIASIS: ICD-10-CM

## 2018-10-26 LAB
CHOLEST SERPL-MCNC: 133 MG/DL
HDLC SERPL-MCNC: 41 MG/DL (ref 40–60)
HDLC SERPL: 3.2 {RATIO} (ref 0–5)
LDLC SERPL CALC-MCNC: 57.8 MG/DL (ref 0–100)
LIPID PROFILE,FLP: ABNORMAL
T4 FREE SERPL-MCNC: 1 NG/DL (ref 0.7–1.5)
TRIGL SERPL-MCNC: 171 MG/DL (ref ?–150)
TSH SERPL DL<=0.05 MIU/L-ACNC: 4.2 UIU/ML (ref 0.36–3.74)
VLDLC SERPL CALC-MCNC: 34.2 MG/DL

## 2018-10-26 PROCEDURE — 84439 ASSAY OF FREE THYROXINE: CPT | Performed by: INTERNAL MEDICINE

## 2018-10-26 PROCEDURE — 80061 LIPID PANEL: CPT | Performed by: INTERNAL MEDICINE

## 2018-10-26 PROCEDURE — 82306 VITAMIN D 25 HYDROXY: CPT | Performed by: INTERNAL MEDICINE

## 2018-10-26 PROCEDURE — 84443 ASSAY THYROID STIM HORMONE: CPT | Performed by: INTERNAL MEDICINE

## 2018-10-26 PROCEDURE — 76705 ECHO EXAM OF ABDOMEN: CPT

## 2018-10-26 PROCEDURE — 36415 COLL VENOUS BLD VENIPUNCTURE: CPT | Performed by: INTERNAL MEDICINE

## 2018-10-27 LAB — 25(OH)D3 SERPL-MCNC: 56.5 NG/ML (ref 30–100)

## 2018-11-08 NOTE — PROGRESS NOTES
[de-identified] y.o. WHITE OR  female who presents for evaluation. 
  
Her bp has been better controlled. Denies any cardiovascular complaints. No set exercise outside of adls. She continues to f/u w vascular for the aaa repair 
  
Started back the statin and no issues to report 
  
No gi or gu complaints outside if gassiness. She is interested in going back to the spine specialist although requesting different provider 
  
Reports no sx referable to the thyroid. Her last 2 tsh readings have been above the cutoff LAST MEDICARE WELLNESS EXAM: 4/1/14, 4/8/14, 4/14/16, 4/25/17, 5/7/18 Past Medical History:  
Diagnosis Date  AAA (abdominal aortic aneurysm) without rupture ([address] AAA repair 7/2018 Dr Stephens Pass  Colon adenomas 2003 Dr uSdarshan Rashid; 4/13 Dr Mile Espitia (degenerative joint disease)  Dyslipidemia  FHx: heart disease  Gastritis 2006  
 on EGD Dr. Scot Jordan, neg sprue  Hypertension  Hypothyroidism 2007 Dr. Carranza Spine  Hypovitaminosis D   
 IPMN (intraductal papillary mucinous neoplasm) 06/2018 on mrcp 8mm  Lung nodule 06/2016  
 6 mm RLL (6/16); no change (10/17)  Obesity   
 peak weight 187 lbs, bmi 33.1 from 4/13; IF 3/18  Osteopenia FRAX 4/12 9.7 and 1.4 DEXA t score 0.6 spine, -1 hip (12/09);  -0.4 spine, -1.3 hip (4/12); -1.2 wrist, -1.6 hip w FRAX 11/2.2 (4/14); -1.0 wrist, -1.6 hip w FRAX 12/2.6 (4/16); -0.7 wrist, -1.6 hip (5/18)  Pancreatitis, chronic ([address] 2018  Psoriasis 2003 on bx Dr. Pablo Miranda  Psychiatric disorder Anxiety  Sensorineural hearing loss   
 left side Dr Francisco Palacios  Venous insufficiency 2003  
 on dopplers  Zoster 05/2016  
 left T11-12; mild postherpetic neuralgia Past Surgical History:  
Procedure Laterality Date  CARDIAC SURG PROCEDURE UNLIST  09/2016 NST negative  CHEST SURGERY PROCEDURE UNLISTED  06/2016 CTA neg PE, 6 mm subpleural nodule  HX AAA REPAIR  07/17/2018  HX CARPAL TUNNEL RELEASE Dr. Tracey Rodrigues left  HX CARPAL TUNNEL RELEASE Left  HX CATARACT REMOVAL Bilateral   
 HX COLONOSCOPY    
 benign polyp 5/08 Dr Vasu Guerra;  4/13 negative Dr. Burr Ranks  HX GI  08/2018 Dr Julio Cesar Arshad; ERCP s/p removal cbd stone complicated by pamcreatitis  HX LAP CHOLECYSTECTOMY  09/18/2018 Dr. Kessler [place]  HX ORTHOPAEDIC    
 HX TONSILLECTOMY  NEUROLOGICAL PROCEDURE UNLISTED  2007  
 neg MRA/MRI head in [address] [place] [address]  9/10 CT head negative  VASCULAR SURGERY PROCEDURE UNLIST  2007  
 neg AAA screen [facility] Social History Socioeconomic History  Marital status:  Spouse name: Not on file  Number of children: Not on file  Years of education: Not on file  Highest education level: Not on file Social Needs  Financial resource strain: Not on file  Food insecurity - worry: Not on file  Food insecurity - inability: Not on file  Transportation needs - medical: Not on file  Transportation needs - non-medical: Not on file Occupational History  Occupation: ret office mgr Tobacco Use  Smoking status: Never Smoker  Smokeless tobacco: Never Used Substance and Sexual Activity  Alcohol use: Yes Comment: occasionally  Drug use: No  
 Sexual activity: Not on file Other Topics Concern  Not on file Social History Narrative  Not on file Allergies Allergen Reactions  Atenolol Other (comments) Felt poorly  Cozaar [Losartan] Other (comments) Headaches  Norvasc [Amlodipine] Other (comments) Felt poorly Current Outpatient Medications Medication Sig  levothyroxine (SYNTHROID) 50 mcg tablet Take 1 Tab by mouth Daily (before breakfast).  Levothyroxine 75 mcg cap Take one tablet by mouth every other day before breakfast. Alternates with 50mcg  atorvastatin (LIPITOR) 40 mg tablet  ZETIA 10 mg tablet  diazePAM (VALIUM) 10 mg tablet Take 1 Tab by mouth every twelve (12) hours as needed for Anxiety. Max Daily Amount: 20 mg.  
 camphor-menthol (MEN-PHOR) 0.5-0.5 % lotion Apply  to affected area as needed for Itching.  oxyCODONE-acetaminophen (PERCOCET) 5-325 mg per tablet Take 1 Tab by mouth every four (4) hours as needed. Max Daily Amount: 6 Tabs.  lidocaine (LIDODERM) 5 % Apply patch to the affected area for 12 hours a day and remove for 12 hours a day.  omeprazole (PRILOSEC) 40 mg capsule Take 1 Cap by mouth daily. (Patient taking differently: Take 40 mg by mouth as needed.)  ibuprofen (MOTRIN) 800 mg tablet Take 1 Tab by mouth three (3) times daily (with meals).  lisinopril (PRINIVIL, ZESTRIL) 10 mg tablet Take 1 Tab by mouth daily.  ergocalciferol (VITAMIN D2) 50,000 unit capsule Take 1 Cap by mouth every seven (7) days. (Patient taking differently: Take 50,000 Units by mouth every fourteen (14) days.)  aspirin 81 mg chewable tablet Take 1 Tab by mouth daily. No current facility-administered medications for this visit. Facility-Administered Medications Ordered in Other Visits Medication Dose Route Frequency  sodium chloride irrigation 0.9 % 500 mL Irrigation    PRN  
 
REVIEW OF SYSTEMS:  Dr. Beatriz Das 2014, mammo 4/16, DEXA 4/16, colo 4/13 Dr Jolynn Louise, sees Dr Payton Ojeda Ophtho  no vision change or eye pain Oral  no mouth pain, tongue or tooth problems Ears  no hearing loss, ear pain, fullness Throat  no swallowing problems Cardiac  no CP, PND, orthopnea, edema, palpitations or syncope Chest  no breast masses Resp  no wheezing, chronic coughing, dyspnea GI  no heartburn, nausea, vomiting, change in bowel habits, bleeding, hemorrhoids Urinary  no dysuria, hematuria, flank pain, urgency, frequency Neuro  no focal weakness, numbness, paresthesias, incoordination, ataxia, involuntary movements Visit Vitals /74 Pulse 77 Temp 98.7 °F (37.1 °C) (Oral) Resp 14 Ht 5' 4.5\" (1.638 m) Wt 174 lb (78.9 kg) SpO2 96% BMI 29.41 kg/m² A&O x3 Affect is appropriate. Mood stable No apparent distress Anicteric, no JVD, adenopathy or thyromegaly. No carotid bruits or radiated murmur Lungs clear to auscultation, no wheezes or rales Heart showed regular rate and rhythm. No murmur, rubs, gallops Abdomen soft nondistended, no hepatosplenomegaly or masses. Extremities without edema. Pulses 1-2+ symmetrically LABS From 3/10 showed                         vit d 38.0 From 9/10 showed                            wbc 7.2, hb 14.3, plt 158 From 3/12 showed   gluc 88, cr 0.87, gfr 66, alt 15, tsh 1.04, ldl-p 1515 From 9/12 showed                                                    tsh 1.69, ldl-p 1469, chol 152, tg 165, hdl 42, ldl-c 77 From 3/13 showed   gluc 83, cr 0.91, gfr 62, alt 10,     ldl-p 1274, chol 124, tg 137, hdl 37, ldl-c 60,    wbc 7.0, hb 14.3, plt 164 From 9/13 showed                chol 147, tg 139, hdl 39, ldl-c 80 From 3/14 showed   gluc 89, cr 1.02, gfr 53, alt<5,  tsh 1.66,         chol 148, tg 132, hdl 36, ldl-c 86,    wbc 6.7, hb 14.4, plt 179, vit d 45.4 From 10/14 showed                chol 137, tg 117, hdl 40, ldl-c 74 From 4/15 showed   gluc 91, cr 1.24, gfr 42, alt 30, tsh 0.64,           wbc 6.8, hb 14.8, plt 176 From 10/15 showed gluc 93, cr 1.13, gfr 47,            tsh 0.32,          chol 139, tg 131, hdl 36, ldl-c 77 From 4/16 showed   gluc 86, cr 1.00, gfr 54, alt 24,            chol 142, tg 137, hdl 41, ldl-c 74,   wbc 6.4, hb 13.9, plt 203, ua pro 30 From 6/16 showed   gluc 97, cr 1.20,             wbc 7.3, hb 14.1, plt 216, ck/trop neg From 10/16 showed gluc 78, cr 1.03, gfr 52, alt 21, tsh 1.21,          chol 145, tg 151, hdl 43, ldl-c 72, From 4/17 showed                tsh 2.57, ft4 1.10 From 10/17 showed gluc 79, cr 1.03, gfr 52, alt 27,            chol 199, tg 161, hdl 45, ldl-c 122, wbc 6.3, hb 14.7, plt 208 From 5/18 showed                tsh 5.23, ft4 1.10,     chol 320, tg 196, hdl 40, ldl-c 221,            vit d 57.0 From 8/18 showed   glu 141, cr 0.87, gfr>60, alt 34,           wb 10.9, hb 13.0, plt 179, lip 8208 Results for orders placed or performed during the hospital encounter of 10/26/18 LIPID PANEL Result Value Ref Range LIPID PROFILE Cholesterol, total 133 <200 MG/DL Triglyceride 171 (H) <150 MG/DL  
 HDL Cholesterol 41 40 - 60 MG/DL  
 LDL, calculated 57.8 0 - 100 MG/DL VLDL, calculated 34.2 MG/DL  
 CHOL/HDL Ratio 3.2 0 - 5.0    
T4, FREE Result Value Ref Range T4, Free 1.0 0.7 - 1.5 NG/DL  
TSH 3RD GENERATION Result Value Ref Range TSH 4.20 (H) 0.36 - 3.74 uIU/mL VITAMIN D, 25 HYDROXY Result Value Ref Range Vitamin D 25-Hydroxy 56.5 30 - 100 ng/mL Patient Active Problem List  
Diagnosis Code  Colon adenomas 2003, last 2014 Dr Zoila Sapp D12.6  Hypovitaminosis D E55.9  Venous insufficiency dopplers 2003 I87.2  Osteopenia FRAX 4/14 11 and 2.2 M85.80  Dyslipidemia E78.5  Hypothyroidism due to acquired atrophy of thyroid E03.4  Arthritis, degenerative M19.90  Obesity (BMI 30.0-34. 9) E66.9  Advance directive discussed with patient Z70.80  
 Primary hypertension I10  Spondylolisthesis, lumbar region M43.16  Spondylosis of lumbar region without myelopathy or radiculopathy M47.816  Scoliosis of lumbar spine M41.9  DDD (degenerative disc disease), lumbar M51.36  
 Acute pancreatitis K85.90  
 S/P AAA repair Z98.890, Z86.79  
 
ASSESSMENT AND PLAN: 
1. General.  F/U Dr. Fab Anguiano 2. Hypertension. Continue current regimen. 3.  Hypovit D. Continue current regimen. 4.  Colon adenomas. F/U colo 2018? Fiber 5. Overweight. Lifestyle and dietary measures. Portion control reiterated. 6.  Lung nodule. F/U CT due now 7. Dyslipidemia. Lipitor 8.  Hypothyroidism. Will inc to 75 every day and recheck next visit 9. Osteopenia. Wt bearing exercise, ca/D, DEXA 2018 or later 10. Possible IPMN. F/u mri 6/19 
11.  Spine. Will send back to [place] 
 
 
 
RTC 5/19 Above conditions discussed at length and patient vocalized understanding. All questions answered to patient satisfaction

## 2018-11-12 NOTE — PROGRESS NOTES
1. Have you been to the ER, urgent care clinic or hospitalized since your last visit? NO.  
 
2. Have you seen or consulted any other health care providers outside of the [address] [place] [address] since your last visit (Include any pap smears or colon screening)? NO Do you have an Advanced Directive? NO Would you like information on Advanced Directives? NO Chief Complaint Patient presents with  Thyroid Problem 6 month follow up with labs

## 2018-11-13 ENCOUNTER — OFFICE VISIT (OUTPATIENT)
Dept: INTERNAL MEDICINE CLINIC | Age: 78
End: 2018-11-13

## 2018-11-13 ENCOUNTER — HOSPITAL ENCOUNTER (OUTPATIENT)
Dept: LAB | Age: 78
Discharge: HOME | End: 2018-11-13
Payer: MEDICARE

## 2018-11-13 VITALS
BODY MASS INDEX: 28.99 KG/M2 | SYSTOLIC BLOOD PRESSURE: 124 MMHG | HEART RATE: 77 BPM | HEIGHT: 65 IN | RESPIRATION RATE: 14 BRPM | TEMPERATURE: 98.7 F | OXYGEN SATURATION: 96 % | DIASTOLIC BLOOD PRESSURE: 74 MMHG | WEIGHT: 174 LBS

## 2018-11-13 DIAGNOSIS — E78.5 DYSLIPIDEMIA: ICD-10-CM

## 2018-11-13 DIAGNOSIS — M51.36 DDD (DEGENERATIVE DISC DISEASE), LUMBAR: ICD-10-CM

## 2018-11-13 DIAGNOSIS — M85.80 OSTEOPENIA, UNSPECIFIED LOCATION: ICD-10-CM

## 2018-11-13 DIAGNOSIS — M47.816 SPONDYLOSIS OF LUMBAR REGION WITHOUT MYELOPATHY OR RADICULOPATHY: ICD-10-CM

## 2018-11-13 DIAGNOSIS — D12.6 COLON ADENOMAS: ICD-10-CM

## 2018-11-13 DIAGNOSIS — E53.8 B12 DEFICIENCY: ICD-10-CM

## 2018-11-13 DIAGNOSIS — R91.1 LUNG NODULE: ICD-10-CM

## 2018-11-13 DIAGNOSIS — D64.9 ANEMIA, UNSPECIFIED TYPE: Primary | ICD-10-CM

## 2018-11-13 DIAGNOSIS — D64.9 ANEMIA, UNSPECIFIED TYPE: ICD-10-CM

## 2018-11-13 DIAGNOSIS — M43.16 SPONDYLOLISTHESIS, LUMBAR REGION: ICD-10-CM

## 2018-11-13 DIAGNOSIS — I10 ESSENTIAL HYPERTENSION WITH GOAL BLOOD PRESSURE LESS THAN 140/90: ICD-10-CM

## 2018-11-13 DIAGNOSIS — E66.9 OBESITY (BMI 30.0-34.9): ICD-10-CM

## 2018-11-13 DIAGNOSIS — E03.4 HYPOTHYROIDISM DUE TO ACQUIRED ATROPHY OF THYROID: ICD-10-CM

## 2018-11-13 LAB
ALBUMIN SERPL-MCNC: 3.8 G/DL (ref 3.4–5)
ALBUMIN/GLOB SERPL: 1.2 {RATIO} (ref 0.8–1.7)
ALP SERPL-CCNC: 101 U/L (ref 45–117)
ALT SERPL-CCNC: 27 U/L (ref 13–56)
ANION GAP SERPL CALC-SCNC: 4 MMOL/L (ref 3–18)
AST SERPL-CCNC: 27 U/L (ref 15–37)
BASOPHILS # BLD: 0 K/UL (ref 0–0.1)
BASOPHILS NFR BLD: 1 % (ref 0–2)
BILIRUB SERPL-MCNC: 0.5 MG/DL (ref 0.2–1)
BUN SERPL-MCNC: 19 MG/DL (ref 7–18)
BUN/CREAT SERPL: 19 (ref 12–20)
CALCIUM SERPL-MCNC: 9.1 MG/DL (ref 8.5–10.1)
CHLORIDE SERPL-SCNC: 110 MMOL/L (ref 100–108)
CO2 SERPL-SCNC: 28 MMOL/L (ref 21–32)
CREAT SERPL-MCNC: 0.99 MG/DL (ref 0.6–1.3)
DIFFERENTIAL METHOD BLD: ABNORMAL
EOSINOPHIL # BLD: 0.2 K/UL (ref 0–0.4)
EOSINOPHIL NFR BLD: 3 % (ref 0–5)
ERYTHROCYTE [DISTWIDTH] IN BLOOD BY AUTOMATED COUNT: 15.4 % (ref 11.6–14.5)
FOLATE SERPL-MCNC: 10.7 NG/ML (ref 3.1–17.5)
GLOBULIN SER CALC-MCNC: 3.3 G/DL (ref 2–4)
GLUCOSE SERPL-MCNC: 68 MG/DL (ref 74–99)
HCT VFR BLD AUTO: 43.2 % (ref 35–45)
HGB BLD-MCNC: 13.5 G/DL (ref 12–16)
IRON SATN MFR SERPL: 36 %
IRON SERPL-MCNC: 100 UG/DL (ref 50–175)
LYMPHOCYTES # BLD: 1.7 K/UL (ref 0.9–3.6)
LYMPHOCYTES NFR BLD: 29 % (ref 21–52)
MCH RBC QN AUTO: 30.8 PG (ref 24–34)
MCHC RBC AUTO-ENTMCNC: 31.3 G/DL (ref 31–37)
MCV RBC AUTO: 98.4 FL (ref 74–97)
MONOCYTES # BLD: 0.5 K/UL (ref 0.05–1.2)
MONOCYTES NFR BLD: 8 % (ref 3–10)
NEUTS SEG # BLD: 3.4 K/UL (ref 1.8–8)
NEUTS SEG NFR BLD: 59 % (ref 40–73)
PLATELET # BLD AUTO: 193 K/UL (ref 135–420)
PMV BLD AUTO: 11.6 FL (ref 9.2–11.8)
POTASSIUM SERPL-SCNC: 4.4 MMOL/L (ref 3.5–5.5)
PROT SERPL-MCNC: 7.1 G/DL (ref 6.4–8.2)
RBC # BLD AUTO: 4.39 M/UL (ref 4.2–5.3)
SODIUM SERPL-SCNC: 142 MMOL/L (ref 136–145)
TIBC SERPL-MCNC: 277 UG/DL (ref 250–450)
VIT B12 SERPL-MCNC: 360 PG/ML (ref 211–911)
WBC # BLD AUTO: 5.9 K/UL (ref 4.6–13.2)

## 2018-11-13 PROCEDURE — 84165 PROTEIN E-PHORESIS SERUM: CPT

## 2018-11-13 PROCEDURE — 80053 COMPREHEN METABOLIC PANEL: CPT

## 2018-11-13 PROCEDURE — 82607 VITAMIN B-12: CPT

## 2018-11-13 PROCEDURE — 83540 ASSAY OF IRON: CPT

## 2018-11-13 PROCEDURE — 85025 COMPLETE CBC W/AUTO DIFF WBC: CPT

## 2018-11-14 LAB
ALBUMIN SERPL ELPH-MCNC: 3.9 G/DL (ref 2.9–4.4)
ALBUMIN/GLOB SERPL: 1.3 {RATIO} (ref 0.7–1.7)
ALPHA1 GLOB SERPL ELPH-MCNC: 0.2 G/DL (ref 0–0.4)
ALPHA2 GLOB SERPL ELPH-MCNC: 0.8 G/DL (ref 0.4–1)
B-GLOBULIN SERPL ELPH-MCNC: 1.1 G/DL (ref 0.7–1.3)
GAMMA GLOB SERPL ELPH-MCNC: 0.9 G/DL (ref 0.4–1.8)
GLOBULIN SER CALC-MCNC: 3 G/DL (ref 2.2–3.9)
M PROTEIN SERPL ELPH-MCNC: NORMAL G/DL
PROT SERPL-MCNC: 6.9 G/DL (ref 6–8.5)

## 2018-11-15 ENCOUNTER — TELEPHONE (OUTPATIENT)
Dept: INTERNAL MEDICINE CLINIC | Age: 78
End: 2018-11-15

## 2018-11-28 ENCOUNTER — OFFICE VISIT (OUTPATIENT)
Dept: ORTHOPEDIC SURGERY | Age: 78
End: 2018-11-28

## 2018-11-28 VITALS
TEMPERATURE: 97.4 F | WEIGHT: 173.2 LBS | HEIGHT: 65 IN | BODY MASS INDEX: 28.86 KG/M2 | RESPIRATION RATE: 16 BRPM | SYSTOLIC BLOOD PRESSURE: 125 MMHG | DIASTOLIC BLOOD PRESSURE: 81 MMHG | OXYGEN SATURATION: 97 % | HEART RATE: 64 BPM

## 2018-11-28 DIAGNOSIS — M43.16 SPONDYLOLISTHESIS, LUMBAR REGION: Primary | ICD-10-CM

## 2018-11-28 DIAGNOSIS — M41.9 SCOLIOSIS OF LUMBAR SPINE, UNSPECIFIED SCOLIOSIS TYPE: ICD-10-CM

## 2018-11-28 DIAGNOSIS — M62.830 MUSCLE SPASM OF BACK: ICD-10-CM

## 2018-11-28 DIAGNOSIS — M47.816 SPONDYLOSIS OF LUMBAR REGION WITHOUT MYELOPATHY OR RADICULOPATHY: ICD-10-CM

## 2018-11-28 NOTE — PROGRESS NOTES
[address] 
[address] [place], [facility] Phone: [phone] Fax: [phone] Pt's YOB: 1940 ASSESSMENT Diagnoses and all orders for this visit: 1. Spondylolisthesis, lumbar region -     MRI LUMB SPINE WO CONT; Future 2. Spondylosis of lumbar region without myelopathy or radiculopathy -     MRI LUMB SPINE WO CONT; Future 3. Scoliosis of lumbar spine, unspecified scoliosis type -     MRI LUMB SPINE WO CONT; Future 4. Muscle spasm of back IMPRESSION AND PLAN: 
Stephan Kasper is a [de-identified] y.o. female with history of chronic lumbar pain. Pt complains of progressive pain across the lower back and in both hips. She denies any weakness or pain radiating down the legs at this time. Pt has been taking Percocet as needed. 1) Pt was given information on lumbar arthritis exercises. 2) A lumbar MRI was ordered. She has progressive lower back pain x 10 years with difficulty standing/walking. 3) Pt may take Tylenol Extra Strength 1-2 tabs BID-TID as needed. 4) Ms. Keyur Edmonds has a reminder for a \"due or due soon\" health maintenance. I have asked that she contact her primary care provider, Jun Castellon MD, for follow-up on this health maintenance. 5)  demonstrated consistency with prescribing. 6) Pt will follow-up in 3-4 weeks or sooner if needed. HISTORY OF PRESENT ILLNESS: 
Stephan Kasper is a [de-identified] y.o. female with history of chronic lumbar pain. She was previously seen by Dr. Adriane Galindo. Pt complains of progressive pain across the lower back and in both hips. Her pain is exacerbated when standing/walking for short periods of time and alleviated when sitting and laying down. She denies any weakness or pain radiating down the legs at this time. Pt notes that she had an aortic aneurysm repair in 07/2018 with Dr. Lebron Syed. She is not diabetic. Pt has been taking Percocet as needed. She admits that she occasionally takes 1/2 tab of Valium to help with sleep. Pt denies ever trying steroid injections and denies any history of liver disease. Pt at this time desires to proceed with a lumbar MRI. Pain Scale: 6/10 PCP: Jesus Shah MD 
  
Past Medical History:  
Diagnosis Date  AAA (abdominal aortic aneurysm) without rupture ([facility] AAA repair 7/2018 Dr Joshua Frias  Colon adenomas 2003 Dr Jorge Daniels; 4/13 Dr Beverly Merrill  DJD (degenerative joint disease)  Dyslipidemia  FHx: heart disease  Gastritis 2006  
 on EGD Dr. Vania Thomas, neg sprue  Hypertension  Hypothyroidism 2007 Dr. Michael Rose  Hypovitaminosis D   
 IPMN (intraductal papillary mucinous neoplasm) 06/2018 on mrcp 8mm  Lung nodule 06/2016  
 6 mm RLL (6/16); no change (10/17)  Obesity   
 peak weight 187 lbs, bmi 33.1 from 4/13; IF 3/18  Osteopenia FRAX 4/12 9.7 and 1.4 DEXA t score 0.6 spine, -1 hip (12/09);  -0.4 spine, -1.3 hip (4/12); -1.2 wrist, -1.6 hip w FRAX 11/2.2 (4/14); -1.0 wrist, -1.6 hip w FRAX 12/2.6 (4/16); -0.7 wrist, -1.6 hip (5/18)  Pancreatitis, chronic ([facility] 2018  Psoriasis 2003 on bx Dr. Rony Torres  Psychiatric disorder Anxiety  Sensorineural hearing loss   
 left side Dr Hina Luna  Venous insufficiency 2003  
 on dopplers  Zoster 05/2016  
 left T11-12; mild postherpetic neuralgia Social History Socioeconomic History  Marital status:  Spouse name: Not on file  Number of children: Not on file  Years of education: Not on file  Highest education level: Not on file Social Needs  Financial resource strain: Not on file  Food insecurity - worry: Not on file  Food insecurity - inability: Not on file  Transportation needs - medical: Not on file  Transportation needs - non-medical: Not on file Occupational History  Occupation: ret office mgr Tobacco Use  Smoking status: Never Smoker  Smokeless tobacco: Never Used Substance and Sexual Activity  Alcohol use: Yes Comment: occasionally  Drug use: No  
 Sexual activity: Not on file Other Topics Concern  Not on file Social History Narrative  Not on file Current Outpatient Medications Medication Sig Dispense Refill  Levothyroxine 75 mcg cap Take one tablet by mouth every other day before breakfast. Alternates with 50mcg 45 Cap 3  
 atorvastatin (LIPITOR) 40 mg tablet  ZETIA 10 mg tablet  diazePAM (VALIUM) 10 mg tablet Take 1 Tab by mouth every twelve (12) hours as needed for Anxiety. Max Daily Amount: 20 mg. 60 Tab 1  camphor-menthol (MEN-PHOR) 0.5-0.5 % lotion Apply  to affected area as needed for Itching. 222 mL 2  
 oxyCODONE-acetaminophen (PERCOCET) 5-325 mg per tablet Take 1 Tab by mouth every four (4) hours as needed. Max Daily Amount: 6 Tabs. 30 Tab 0  
 lidocaine (LIDODERM) 5 % Apply patch to the affected area for 12 hours a day and remove for 12 hours a day. 30 Each 5  
 omeprazole (PRILOSEC) 40 mg capsule Take 1 Cap by mouth daily. (Patient taking differently: Take 40 mg by mouth as needed.) 90 Cap 1  ibuprofen (MOTRIN) 800 mg tablet Take 1 Tab by mouth three (3) times daily (with meals). 45 Tab 0  
 lisinopril (PRINIVIL, ZESTRIL) 10 mg tablet Take 1 Tab by mouth daily. 90 Tab 3  
 ergocalciferol (VITAMIN D2) 50,000 unit capsule Take 1 Cap by mouth every seven (7) days. (Patient taking differently: Take 50,000 Units by mouth every fourteen (14) days.) 12 Cap 1  
 aspirin 81 mg chewable tablet Take 1 Tab by mouth daily. 90 Tab 3 Facility-Administered Medications Ordered in Other Visits Medication Dose Route Frequency Provider Last Rate Last Dose  sodium chloride irrigation 0.9 % 500 mL Irrigation    PRN Ibeth Zamora MD      
 
 
Allergies Allergen Reactions  Atenolol Other (comments) Felt poorly  Cozaar [Losartan] Other (comments) Headaches  Norvasc [Amlodipine] Other (comments) Felt poorly REVIEW OF SYSTEMS Constitutional: Negative for fever, chills, or weight change. Respiratory: Negative for cough or shortness of breath. Cardiovascular: Negative for chest pain or palpitations. Gastrointestinal: Negative for acid reflux, change in bowel habits, or constipation. Genitourinary: Negative for dysuria and flank pain. Musculoskeletal: Positive for lumbar pain. Skin: Negative for rash. Neurological: Negative for headaches, dizziness, or numbness. Endo/Heme/Allergies: Negative for increased bruising. Psychiatric/Behavioral: Negative for difficulty with sleep. PHYSICAL EXAMINATION Visit Vitals /81 Pulse 64 Temp 97.4 °F (36.3 °C) Resp 16 Ht 5' 4.5\" (1.638 m) Wt 173 lb 3.2 oz (78.6 kg) SpO2 97% BMI 29.27 kg/m² Constitutional: Awake, alert, and in no acute distress. Neurological: 1+ symmetrical DTRs in the upper extremities. 1+ symmetrical DTRs in the lower extremities. Sensation to light touch is intact. Negative Erlin's sign bilaterally. Skin: warm, dry, and intact. Musculoskeletal: Tenderness to palpation in the lower lumbar region. Moderate pain with extension and axial loading. No different with forward flexion. Decreased range of motion with internal rotation of her right hip; good range of motion on the left. Negative straight leg raise bilaterally. Biceps  Triceps Deltoids Wrist Ext Wrist Flex Hand Intrin Right +4/5 +4/5 +4/5 +4/5 +4/5 +4/5 Left +4/5 +4/5 +4/5 +4/5 +4/5 +4/5 Hip Flex  Quads Hamstrings Ankle DF EHL Ankle PF Right +4/5 +4/5 +4/5 +4/5 +4/5 +4/5 Left +4/5 +4/5 +4/5 +4/5 +4/5 +4/5 IMAGING: 
 
Lumbar spine 4 V x-rays from 041/16/208 were personally reviewed with the patient and demonstrated: 
AP and lateral radiographs of the lumbar spine demonstrated diffuse degenerative changes, degenerative scoliosis, and bilateral hip arthrosis. No gross instability. . 
 
Written by Margarita Awan, as dictated by Bao Landin MD. 
I, Dr. Bao Landin confirm that all documentation is accurate.

## 2018-11-28 NOTE — PATIENT INSTRUCTIONS
Low Back Arthritis: Exercises Your Care Instructions Here are some examples of typical rehabilitation exercises for your condition. Start each exercise slowly. Ease off the exercise if you start to have pain. Your doctor or physical therapist will tell you when you can start these exercises and which ones will work best for you. When you are not being active, find a comfortable position for rest. Some people are comfortable on the floor or a medium-firm bed with a small pillow under their head and another under their knees. Some people prefer to lie on their side with a pillow between their knees. Don't stay in one position for too long. Take short walks (10 to 20 minutes) every 2 to 3 hours. Avoid slopes, hills, and stairs until you feel better. Walk only distances you can manage without pain, especially leg pain. How to do the exercises Pelvic tilt 1. Lie on your back with your knees bent. 2. \"Brace\" your stomachtighten your muscles by pulling in and imagining your belly button moving toward your spine. 3. Press your lower back into the floor. You should feel your hips and pelvis rock back. 4. Hold for 6 seconds while breathing smoothly. 5. Relax and allow your pelvis and hips to rock forward. 6. Repeat 8 to 12 times. Back stretches 1. Get down on your hands and knees on the floor. 2. Relax your head and allow it to droop. Round your back up toward the ceiling until you feel a nice stretch in your upper, middle, and lower back. Hold this stretch for as long as it feels comfortable, or about 15 to 30 seconds. 3. Return to the starting position with a flat back while you are on your hands and knees. 4. Let your back sway by pressing your stomach toward the floor. Lift your buttocks toward the ceiling. 5. Hold this position for 15 to 30 seconds. 6. Repeat 2 to 4 times. Follow-up care is a key part of your treatment and safety.  Be sure to make and go to all appointments, and call your doctor if you are having problems. It's also a good idea to know your test results and keep a list of the medicines you take. Where can you learn more? Go to http://tal-chava.info/. Enter E264 in the search box to learn more about \"Low Back Arthritis: Exercises. \" Current as of: November 29, 2017 Content Version: 11.8 © 6068-4518 [employer], [employer]. Care instructions adapted under license by [employer] (which disclaims liability or warranty for this information). If you have questions about a medical condition or this instruction, always ask your healthcare professional. [address] any warranty or liability for your use of this information.

## 2018-12-03 ENCOUNTER — HOSPITAL ENCOUNTER (OUTPATIENT)
Age: 78
Discharge: HOME | End: 2018-12-03
Attending: PHYSICAL MEDICINE & REHABILITATION
Payer: MEDICARE

## 2018-12-03 DIAGNOSIS — M43.16 SPONDYLOLISTHESIS, LUMBAR REGION: ICD-10-CM

## 2018-12-03 DIAGNOSIS — M47.816 SPONDYLOSIS OF LUMBAR REGION WITHOUT MYELOPATHY OR RADICULOPATHY: ICD-10-CM

## 2018-12-03 DIAGNOSIS — M41.9 SCOLIOSIS OF LUMBAR SPINE, UNSPECIFIED SCOLIOSIS TYPE: ICD-10-CM

## 2018-12-03 PROCEDURE — 72148 MRI LUMBAR SPINE W/O DYE: CPT

## 2018-12-06 ENCOUNTER — OFFICE VISIT (OUTPATIENT)
Dept: ORTHOPEDIC SURGERY | Age: 78
End: 2018-12-06

## 2018-12-06 VITALS
OXYGEN SATURATION: 97 % | HEIGHT: 65 IN | SYSTOLIC BLOOD PRESSURE: 152 MMHG | DIASTOLIC BLOOD PRESSURE: 79 MMHG | BODY MASS INDEX: 28.79 KG/M2 | HEART RATE: 75 BPM | WEIGHT: 172.8 LBS | TEMPERATURE: 97.4 F | RESPIRATION RATE: 17 BRPM

## 2018-12-06 DIAGNOSIS — M41.50 DEGENERATIVE SCOLIOSIS: ICD-10-CM

## 2018-12-06 DIAGNOSIS — M48.061 SPINAL STENOSIS OF LUMBAR REGION WITHOUT NEUROGENIC CLAUDICATION: ICD-10-CM

## 2018-12-06 DIAGNOSIS — M51.36 DDD (DEGENERATIVE DISC DISEASE), LUMBAR: Primary | ICD-10-CM

## 2018-12-06 RX ORDER — PIROXICAM 10 MG/1
CAPSULE ORAL
Qty: 30 CAP | Refills: 2 | Status: SHIPPED | OUTPATIENT
Start: 2018-12-06 | End: 2020-02-06

## 2018-12-06 NOTE — PROGRESS NOTES
Verbal order entered per Dr. Suzette Underwood as documented on blue sheet: 
-Feldene 10mg 1 cap po every day with food. Disp: 30 Rf: 2

## 2018-12-06 NOTE — PATIENT INSTRUCTIONS
Learning About Degenerative Disc Disease What is degenerative disc disease? Degenerative disc disease is not really a disease. It's a term used to describe the normal changes in your spinal discs as you age. Spinal discs are small, spongy discs that separate the bones (vertebrae) that make up the spine. The discs act as shock absorbers for the spine, so it can flex, bend, and twist. 
Degenerative disc disease can take place in one or more places along the spine. It most often occurs in the discs in the lower back and the neck. What causes it? As we age, our spinal discs break down, or degenerate. This breakdown causes the symptoms of degenerative disc disease in some people. When the discs break down, they can lose fluid and dry out, and their outer layers can have tiny cracks or tears. This leads to less padding and less space between the bones in the spine. The body reacts to this by making bony growths on the spine called bone spurs. These spurs can press on the spinal nerve roots or spinal cord. This can cause pain and can affect how well the nerves work. What are the symptoms? Many people with degenerative disc disease have no pain. But others have severe pain or other symptoms that limit their activities. Some of the most common symptoms are: 
· Pain in the back or neck. Where the pain occurs depends on which discs are affected. · Pain that gets worse when you move, such as bending over, reaching up, or twisting. · Pain that may occur in the rear end (buttocks), arm, or leg if a nerve is pinched. · Numbness or tingling in your arm or leg. How is it diagnosed? A doctor can often diagnose degenerative disc disease while doing a physical exam. If your exam shows no signs of a serious condition, imaging tests (such as an X-ray) aren't likely to help your doctor find the cause of your symptoms.  
Sometimes degenerative disc disease is found when an X-ray is taken for another reason, such as an injury or other health problem. But even if the doctor finds degenerative disc disease, that doesn't always mean that you will have symptoms. How is it treated? There are several things you can do at home to manage pain from this problem. · To relieve pain, use ice or heat (whichever feels better) on the affected area. ? Put ice or a cold pack on the area for 10 to 20 minutes at a time. Put a thin cloth between the ice and your skin. ? Put a warm water bottle, a heating pad set on low, or a warm cloth on your back. Put a thin cloth between the heating pad and your skin. Do not go to sleep with a heating pad on your skin. · Ask your doctor if you can take acetaminophen (such as Tylenol) or nonsteroidal anti-inflammatory drugs, such as ibuprofen or naproxen. Your doctor can prescribe stronger medicines if needed. Be safe with medicines. Read and follow all instructions on the label. · Get some exercise every day. Exercise is one of the best ways to help your back feel better and stay better. It's best to start each exercise slowly. You may notice a little soreness, and that's okay. But if an exercise makes your pain worse, stop doing it. Here are things you can try: 
? Walking. It's the simplest and maybe the best activity for your back. It gets your blood moving and helps your muscles stay strong. ? Exercises that gently stretch and strengthen your stomach, back, and leg muscles. The stronger those muscles are, the better they're able to protect your back. If you have constant or severe pain in your back or spine, you may need other treatments, such as physical therapy. In some cases, your doctor may suggest surgery. Follow-up care is a key part of your treatment and safety. Be sure to make and go to all appointments, and call your doctor if you are having problems. It's also a good idea to know your test results and keep a list of the medicines you take. Where can you learn more? Go to http://tal-chava.info/. Enter I503 in the search box to learn more about \"Learning About Degenerative Disc Disease. \" Current as of: November 29, 2017 Content Version: 11.8 © 4314-7849 Healthwise, [employer]. Care instructions adapted under license by [employer] (which disclaims liability or warranty for this information). If you have questions about a medical condition or this instruction, always ask your healthcare professional. [place] [address] any warranty or liability for your use of this information.

## 2018-12-06 NOTE — PROGRESS NOTES
[facility] [address] 
[address], [address] Phone: [phone] Fax: [phone] Sharron Gustafson : 1940 PCP: Meera Braxton MD 
 
PROGRESS NOTE ASSESSMENT AND PLAN Diagnoses and all orders for this visit: 1. DDD (degenerative disc disease), lumbar 2. Spinal stenosis of lumbar region without neurogenic claudication 1. Advised to continue HEP. 2. Trial of Feldene. Do not take Ibuprofen at same time. 3. Discussed RF 4. Released from specialty care to PCP. May have future fills through PCP if agreeable. 5. Given information on DDD Follow-up Disposition: 
Return if symptoms worsen or fail to improve. HISTORY OF PRESENT ILLNESS Dariela Beauchamp is a [de-identified] y.o. female. RHD. Last visit pt was sent to have a lumbar spine MRI by Dr. Jean-Pierre Mcgregor. Images reviewed with the pt. Last seen by Dr. Jean-Pierre Mcgregor 18, previously seen by Dr. Prisca Gillespie. Pt reports previous relief with Feldene. Pt denies being on blood thinners. Pt affirms staying active, doing things such as cutting the grass. She reports increased pain with walking. No hx GIB. Location of pain: low back Does pain radiate into extremities: no 
Does patient have weakness: no  
Pt denies saddle paresthesias. Medications pt is on: Tylenol ES PRN with some relief. Percocet PRN, left over from procedures this year. Lidoderm patches with intermittent relief. Pt denies recent ED visits or hospitalizations. Denies persistent fevers, chills, weight changes, neurogenic bowel or bladder symptoms. Treatments patient has tried: 
Physical therapy:No 
Doing HEP: Unknown Non-opioid medications: Yes Spinal injections: Unknown Spinal surgery- No.  
 
 
 reviewed. Pt notes that she had an aortic aneurysm repair in 2018 with Dr. Micah Ha. Pain Assessment  2018 Location of Pain Back Location Modifiers - Severity of Pain 6  
 Quality of Pain Aching Quality of Pain Comment - Duration of Pain - Frequency of Pain Constant Aggravating Factors Walking Aggravating Factors Comment sitting straight up Limiting Behavior -  
Relieving Factors (No Data) Relieving Factors Comment pain meds, recliner Result of Injury - MRI Results (most recent): 
Results from Hospital Encounter encounter on 12/03/18 MRI LUMB SPINE WO CONT Narrative MR Lumbar spine without contrast 
 
HISTORY: increased lumbar pain. Difficulty with standing/walking. Also has hip 
pain and history of spondylolisthesis COMPARISON: Prior body CT May 2018 and August 2018 Technique: Multi-sequence multiplanar T1, T2, STIR imaging with and without fat 
saturation obtained centered on the lumbar spine. FINDINGS:  
 
Alignment: Mild to moderate right convex lumbar scoliosis with apex at L2. Minimal degenerative anterolisthesis of L5. Also slight retrolisthesis of L1 and 
T11. Vertebral body height: Normal 
Marrow signal: No concerning signal. Several levels of Schmorl's nodes. Several 
levels of chronic fatty endplate changes as well as other levels of discogenic 
endplate edema. The latter is most notable at T11-12. Disc spaces: Severe narrowing and desiccation of T11-12, L1-2, L4-5. More 
moderately so at other levels, sparing L5-S1. Conus: Terminates at T12-L1 Axial imaging correlation: 
 
T11-12: Broad-based disc osteophyte complex with mild facet arthropathy. Patent 
canal and foramina. T12-L1: Mild disc bulge and facet arthropathy. Minimal impression upon the 
thecal sac. Patent canal and foramina. L1-2: Broad-based disc osteophyte complex. Bilateral facet arthropathy. Mild 
concentric spinal stenosis. Mild foraminal stenoses. L2-3: Mild broad-based disc osteophyte complex. Bilateral facet arthropathy. Mild concentric spinal stenosis. Moderate left and mild right foraminal 
stenosis. L3-4: Broad-based disc osteophyte complex. Bilateral facet arthropathy. Ligamentum flavum thickening and buckling. Moderate concentric spinal stenosis. Mild foraminal stenoses. L4-5: Broad-based disc osteophyte complex with eccentric prominence to the 
right. Bilateral facet arthropathy. Moderate spinal stenosis. More pronounced 
narrowing of right lateral recess with transient distortion of crossing right L5 
nerve. Moderately severe right foraminal stenosis with distortion of perineural 
fat. L5-S1: Uncovering of the disc. Minor disc bulge. Bilateral facet arthropathy. Patent canal and foramina. Other structures: Partial visualization of AAA with prior endovascular stent 
deployment. Several small caliber renal cysts. Impression IMPRESSION: 
 
1. Lumbar scoliosis with multilevel degenerative findings. 
-Up to a moderate spinal stenosis, at L4-5 and L3-4 
-Moderately severe right L4-5 foraminal stenosis with potential impingement of 
the exiting right L4 nerve. There is also potentially impingement of the 
crossing right L5 nerve at this level. Thank you for enabling us to participate in the care of this patient. PAST MEDICAL HISTORY Past Medical History:  
Diagnosis Date  AAA (abdominal aortic aneurysm) without rupture ([facility] [address] AAA repair 7/2018 Dr Geneva Yusuf  Colon adenomas 2003 Dr Mike Wayne; 4/13 Dr Clinton Joseph  DJD (degenerative joint disease)  Dyslipidemia  FHx: heart disease  Gastritis 2006  
 on EGD Dr. Lena Valiente, neg sprue  Hypertension  Hypothyroidism 2007 Dr. Natividad Spurling  Hypovitaminosis D   
 IPMN (intraductal papillary mucinous neoplasm) 06/2018 on mrcp 8mm  Lung nodule 06/2016  
 6 mm RLL (6/16); no change (10/17)  Obesity   
 peak weight 187 lbs, bmi 33.1 from 4/13; IF 3/18  Osteopenia FRAX 4/12 9.7 and 1.4  DEXA t score 0.6 spine, -1 hip (12/09);  -0.4 spine, -1.3 hip (4/12); -1.2 wrist, -1.6 hip w FRAX 11/2.2 (4/14); -1.0 wrist, -1.6 hip w FRAX 12/2.6 (4/16); -0.7 wrist, -1.6 hip (5/18)  Pancreatitis, chronic ([facility] [address] 2018  Psoriasis 2003 on bx Dr. Lissette Larry  Psychiatric disorder Anxiety  Sensorineural hearing loss   
 left side Dr Eduar Yu  Venous insufficiency 2003  
 on dopplers  Zoster 05/2016  
 left T11-12; mild postherpetic neuralgia Past Surgical History:  
Procedure Laterality Date  CARDIAC SURG PROCEDURE UNLIST  09/2016 NST negative  CHEST SURGERY PROCEDURE UNLISTED  06/2016 CTA neg PE, 6 mm subpleural nodule  HX AAA REPAIR  07/17/2018  HX CARPAL TUNNEL RELEASE Dr. Sunshine Ye left  HX CARPAL TUNNEL RELEASE Left  HX CATARACT REMOVAL Bilateral   
 HX COLONOSCOPY    
 benign polyp 5/08 Dr Bertram Ellison;  4/13 negative Dr. Zoila Sapp  HX GI  08/2018 Dr Ike Abrams; ERCP s/p removal cbd stone complicated by pamcreatitis  HX LAP CHOLECYSTECTOMY  09/18/2018 Dr. Rolinda Runner  HX ORTHOPAEDIC    
 HX TONSILLECTOMY  NEUROLOGICAL PROCEDURE UNLISTED  2007  
 neg MRA/MRI head in [address] [place] [address]  9/10 CT head negative  VASCULAR SURGERY PROCEDURE UNLIST  2007  
 neg AAA screen [facility] MEDICATIONS Current Outpatient Medications Medication Sig Dispense Refill  Levothyroxine 75 mcg cap Take one tablet by mouth every other day before breakfast. Alternates with 50mcg 45 Cap 3  
 atorvastatin (LIPITOR) 40 mg tablet  ZETIA 10 mg tablet  diazePAM (VALIUM) 10 mg tablet Take 1 Tab by mouth every twelve (12) hours as needed for Anxiety. Max Daily Amount: 20 mg. (Patient taking differently: Take 10 mg by mouth as needed for Anxiety.) 60 Tab 1  camphor-menthol (MEN-PHOR) 0.5-0.5 % lotion Apply  to affected area as needed for Itching.  222 mL 2  
 oxyCODONE-acetaminophen (PERCOCET) 5-325 mg per tablet Take 1 Tab by mouth every four (4) hours as needed. Max Daily Amount: 6 Tabs. (Patient taking differently: Take 1 Tab by mouth as needed.) 30 Tab 0  
 lidocaine (LIDODERM) 5 % Apply patch to the affected area for 12 hours a day and remove for 12 hours a day. 30 Each 5  
 omeprazole (PRILOSEC) 40 mg capsule Take 1 Cap by mouth daily. (Patient taking differently: Take 40 mg by mouth as needed.) 90 Cap 1  ibuprofen (MOTRIN) 800 mg tablet Take 1 Tab by mouth three (3) times daily (with meals). 45 Tab 0  
 lisinopril (PRINIVIL, ZESTRIL) 10 mg tablet Take 1 Tab by mouth daily. 90 Tab 3  
 ergocalciferol (VITAMIN D2) 50,000 unit capsule Take 1 Cap by mouth every seven (7) days. (Patient taking differently: Take 50,000 Units by mouth every fourteen (14) days.) 12 Cap 1  
 aspirin 81 mg chewable tablet Take 1 Tab by mouth daily. 90 Tab 3 Facility-Administered Medications Ordered in Other Visits Medication Dose Route Frequency Provider Last Rate Last Dose  sodium chloride irrigation 0.9 % 500 mL Irrigation    PRN Jovana Santillan MD      
  
 
ALLERGIES Allergies Allergen Reactions  Atenolol Other (comments) Felt poorly  Cozaar [Losartan] Other (comments) Headaches  Norvasc [Amlodipine] Other (comments) [place] poorly SOCIAL HISTORY Social History Socioeconomic History  Marital status:  Spouse name: Not on file  Number of children: Not on file  Years of education: Not on file  Highest education level: Not on file Social Needs  Financial resource strain: Not on file  Food insecurity - worry: Not on file  Food insecurity - inability: Not on file  Transportation needs - medical: Not on file  Transportation needs - non-medical: Not on file Occupational History  Occupation: ret office mgr Tobacco Use  Smoking status: Never Smoker  Smokeless tobacco: Never Used Substance and Sexual Activity  Alcohol use:  Yes  
 Comment: occasionally  Drug use: No  
 Sexual activity: Not on file Other Topics Concern  Not on file Social History Narrative  Not on file FAMILY HISTORY Family History Problem Relation Age of Onset  Breast Cancer Mother  Cancer Mother  Heart Disease Father REVIEW OF SYSTEMS Review of Systems Constitutional: Negative for chills, fever and weight loss. Respiratory: Negative for shortness of breath. Cardiovascular: Negative for chest pain. Gastrointestinal: Negative for constipation. Negative for fecal incontinence Genitourinary: Negative for dysuria. Negative for urinary incontinence Musculoskeletal:  
     Per HPI Skin: Negative for rash. Neurological: Negative for dizziness, tingling, tremors, focal weakness and headaches. Endo/Heme/Allergies: Does not bruise/bleed easily. Psychiatric/Behavioral: The patient does not have insomnia. PHYSICAL EXAMINATION Visit Vitals /79 Pulse 75 Temp 97.4 °F (36.3 °C) Resp 17 Ht 5' 4.5\" (1.638 m) Wt 172 lb 12.8 oz (78.4 kg) SpO2 97% BMI 29.20 kg/m² Accompanied by self. Constitutional:  Well developed, well nourished, in no acute distress. Psychiatric: Affect and mood are appropriate. Integumentary: No rashes or abrasions noted on exposed areas. Cardiovascular/Peripheral Vascular: Intact l pulses. No peripheral edema is noted. Lymphatic:  No evidence of lymphedema. No cervical lymphadenopathy. SPINE/MUSCULOSKELETAL EXAM 
 
Lumbar spine: No rash, ecchymosis, or gross obliquity. No fasciculations. No focal atrophy is noted. Tenderness to palpation B/L L4-5. No tenderness to palpation at the sciatic notch. SI joints non-tender. Trochanters non tender. MOTOR:   
 
 Hip Flex  Quads Hamstrings Ankle DF EHL Ankle PF Right +4/5 +4/5 +4/5 +4/5 +4/5 +4/5 Left +4/5 +4/5 +4/5 +4/5 +4/5 +4/5 Straight Leg raise negative. Ambulation without assistive device. FWB. Written by Nguyen Murray, as dictated by Brad Matos MD. 
 
I, Dr. Brad Matos MD, confirm that all documentation is accurate. Ms. Shaheen Watson may have a reminder for a \"due or due soon\" health maintenance. I have asked that she contact her primary care provider for follow-up on this health maintenance.

## 2018-12-13 ENCOUNTER — HOSPITAL ENCOUNTER (OUTPATIENT)
Dept: CT IMAGING | Age: 78
Discharge: HOME | End: 2018-12-13
Attending: INTERNAL MEDICINE
Payer: MEDICARE

## 2018-12-13 DIAGNOSIS — R91.1 LUNG NODULE: ICD-10-CM

## 2018-12-13 PROCEDURE — 71250 CT THORAX DX C-: CPT

## 2018-12-17 ENCOUNTER — TELEPHONE (OUTPATIENT)
Dept: INTERNAL MEDICINE CLINIC | Age: 78
End: 2018-12-17

## 2019-03-12 ENCOUNTER — APPOINTMENT (OUTPATIENT)
Dept: INTERNAL MEDICINE CLINIC | Age: 79
End: 2019-03-12

## 2019-03-13 ENCOUNTER — HOSPITAL ENCOUNTER (OUTPATIENT)
Dept: LAB | Age: 79
Discharge: HOME | End: 2019-03-13
Payer: MEDICARE

## 2019-03-13 ENCOUNTER — APPOINTMENT (OUTPATIENT)
Dept: INTERNAL MEDICINE CLINIC | Age: 79
End: 2019-03-13

## 2019-03-13 DIAGNOSIS — E03.4 HYPOTHYROIDISM DUE TO ACQUIRED ATROPHY OF THYROID: ICD-10-CM

## 2019-03-13 LAB
ALBUMIN SERPL-MCNC: 4 G/DL (ref 3.4–5)
ALBUMIN/GLOB SERPL: 1.4 {RATIO} (ref 0.8–1.7)
ALP SERPL-CCNC: 99 U/L (ref 45–117)
ALT SERPL-CCNC: 23 U/L (ref 13–56)
ANION GAP SERPL CALC-SCNC: 3 MMOL/L (ref 3–18)
AST SERPL-CCNC: 17 U/L (ref 15–37)
BASOPHILS # BLD: 0 K/UL (ref 0–0.1)
BASOPHILS NFR BLD: 0 % (ref 0–2)
BILIRUB SERPL-MCNC: 0.5 MG/DL (ref 0.2–1)
BUN SERPL-MCNC: 25 MG/DL (ref 7–18)
BUN/CREAT SERPL: 22 (ref 12–20)
CALCIUM SERPL-MCNC: 9.1 MG/DL (ref 8.5–10.1)
CHLORIDE SERPL-SCNC: 109 MMOL/L (ref 100–108)
CO2 SERPL-SCNC: 28 MMOL/L (ref 21–32)
CREAT SERPL-MCNC: 1.13 MG/DL (ref 0.6–1.3)
DIFFERENTIAL METHOD BLD: ABNORMAL
EOSINOPHIL # BLD: 0.2 K/UL (ref 0–0.4)
EOSINOPHIL NFR BLD: 3 % (ref 0–5)
ERYTHROCYTE [DISTWIDTH] IN BLOOD BY AUTOMATED COUNT: 15.2 % (ref 11.6–14.5)
FOLATE SERPL-MCNC: 6 NG/ML (ref 3.1–17.5)
GLOBULIN SER CALC-MCNC: 2.9 G/DL (ref 2–4)
GLUCOSE SERPL-MCNC: 81 MG/DL (ref 74–99)
HCT VFR BLD AUTO: 46.6 % (ref 35–45)
HGB BLD-MCNC: 14.2 G/DL (ref 12–16)
IRON SATN MFR SERPL: 34 %
IRON SERPL-MCNC: 91 UG/DL (ref 50–175)
LYMPHOCYTES # BLD: 1.5 K/UL (ref 0.9–3.6)
LYMPHOCYTES NFR BLD: 26 % (ref 21–52)
MCH RBC QN AUTO: 30.6 PG (ref 24–34)
MCHC RBC AUTO-ENTMCNC: 30.5 G/DL (ref 31–37)
MCV RBC AUTO: 100.4 FL (ref 74–97)
MONOCYTES # BLD: 0.4 K/UL (ref 0.05–1.2)
MONOCYTES NFR BLD: 7 % (ref 3–10)
NEUTS SEG # BLD: 3.6 K/UL (ref 1.8–8)
NEUTS SEG NFR BLD: 64 % (ref 40–73)
PLATELET # BLD AUTO: 195 K/UL (ref 135–420)
PMV BLD AUTO: 11.4 FL (ref 9.2–11.8)
POTASSIUM SERPL-SCNC: 4.5 MMOL/L (ref 3.5–5.5)
PROT SERPL-MCNC: 6.9 G/DL (ref 6.4–8.2)
RBC # BLD AUTO: 4.64 M/UL (ref 4.2–5.3)
SODIUM SERPL-SCNC: 140 MMOL/L (ref 136–145)
T4 FREE SERPL-MCNC: 1.1 NG/DL (ref 0.7–1.5)
TIBC SERPL-MCNC: 267 UG/DL (ref 250–450)
TSH SERPL DL<=0.05 MIU/L-ACNC: 3.5 UIU/ML (ref 0.36–3.74)
VIT B12 SERPL-MCNC: 306 PG/ML (ref 211–911)
WBC # BLD AUTO: 5.7 K/UL (ref 4.6–13.2)

## 2019-03-13 PROCEDURE — 84439 ASSAY OF FREE THYROXINE: CPT

## 2019-03-13 PROCEDURE — 82607 VITAMIN B-12: CPT

## 2019-03-13 PROCEDURE — 84165 PROTEIN E-PHORESIS SERUM: CPT

## 2019-03-13 PROCEDURE — 85025 COMPLETE CBC W/AUTO DIFF WBC: CPT

## 2019-03-13 PROCEDURE — 36415 COLL VENOUS BLD VENIPUNCTURE: CPT

## 2019-03-13 PROCEDURE — 83540 ASSAY OF IRON: CPT

## 2019-03-13 PROCEDURE — 84443 ASSAY THYROID STIM HORMONE: CPT

## 2019-03-13 PROCEDURE — 80053 COMPREHEN METABOLIC PANEL: CPT

## 2019-03-14 ENCOUNTER — OFFICE VISIT (OUTPATIENT)
Dept: VASCULAR SURGERY | Age: 79
End: 2019-03-14

## 2019-03-14 VITALS
DIASTOLIC BLOOD PRESSURE: 70 MMHG | SYSTOLIC BLOOD PRESSURE: 130 MMHG | RESPIRATION RATE: 16 BRPM | HEIGHT: 65 IN | HEART RATE: 70 BPM | BODY MASS INDEX: 28.66 KG/M2 | WEIGHT: 172 LBS

## 2019-03-14 DIAGNOSIS — Z95.828 HISTORY OF ENDOVASCULAR STENT GRAFT FOR ABDOMINAL AORTIC ANEURYSM (AAA): Primary | ICD-10-CM

## 2019-03-14 LAB
ALBUMIN SERPL ELPH-MCNC: 3.8 G/DL (ref 2.9–4.4)
ALBUMIN/GLOB SERPL: 1.2 {RATIO} (ref 0.7–1.7)
ALPHA1 GLOB SERPL ELPH-MCNC: 0.2 G/DL (ref 0–0.4)
ALPHA2 GLOB SERPL ELPH-MCNC: 0.8 G/DL (ref 0.4–1)
B-GLOBULIN SERPL ELPH-MCNC: 1.1 G/DL (ref 0.7–1.3)
GAMMA GLOB SERPL ELPH-MCNC: 1 G/DL (ref 0.4–1.8)
GLOBULIN SER CALC-MCNC: 3.1 G/DL (ref 2.2–3.9)
M PROTEIN SERPL ELPH-MCNC: NORMAL G/DL
PROT SERPL-MCNC: 6.9 G/DL (ref 6–8.5)

## 2019-03-14 NOTE — PROGRESS NOTES
[facility]    Chief Complaint   Patient presents with    Abdominal Aortic Aneurysm       History and Physical    Kya Purchase is a [de-identified] y.o. female with history of AAA now s/p EVAR on 7/17/18. She continues to do very well postoperatively. She denies any abdominal pain. She is currently suffering from her known spinal disease issues and plans to follow up with her spine doctor. She denies any symptoms of claudication and no rest pain.  Last month she was able to travel to [place] and went on a cruise    Past Medical History:   Diagnosis Date    AAA (abdominal aortic aneurysm) without rupture [facility])     AAA repair 7/2018 Dr Sierra Filler adenomas     2003 Dr Ngozi Shah; 4/13 Dr Dariana Potts    DJD (degenerative joint disease)     Dyslipidemia     FHx: heart disease     Gastritis 2006    on EGD Dr. Aubrey Alberts, neg sprue    Hypertension     Hypothyroidism 2007    Dr. Mcnamara Nurse Hypovitaminosis D     IPMN (intraductal papillary mucinous neoplasm) 06/2018    on mrcp 8mm    Lung nodule 06/2016    6 mm RLL (6/16); no change (10/17); no change (12/18)    Obesity     peak weight 187 lbs, bmi 33.1 from 4/13; IF 3/18    Osteopenia FRAX 4/12 9.7 and 1.4     DEXA t score 0.6 spine, -1 hip (12/09);  -0.4 spine, -1.3 hip (4/12); -1.2 wrist, -1.6 hip w FRAX 11/2.2 (4/14); -1.0 wrist, -1.6 hip w FRAX 12/2.6 (4/16); -0.7 wrist, -1.6 hip (5/18)    Pancreatitis, chronic ([address] 2018    Psoriasis 2003    on bx Dr. Alex Braga disorder     Anxiety    Sensorineural hearing loss     left side Dr Hilario Zacarias Venous insufficiency 2003    on dopplers    Zoster 05/2016    left T11-12; mild postherpetic neuralgia     Patient Active Problem List   Diagnosis Code    Colon adenomas 2003, last 2014 Dr Dariana Potts D12.6    Hypovitaminosis D E55.9    Venous insufficiency dopplers 2003 I87.2    Osteopenia FRAX 4/14 11 and 2.2 M85.80    Dyslipidemia E78.5    Hypothyroidism due to acquired atrophy of thyroid E03.4    Arthritis, degenerative M19.90    Obesity (BMI 30.0-34. 9) E66.9    Advance directive discussed with patient Z70.80    Primary hypertension I10    Spondylolisthesis, lumbar region M43.16    Spondylosis of lumbar region without myelopathy or radiculopathy M47.816    Scoliosis of lumbar spine M41.9    DDD (degenerative disc disease), lumbar M51.36    S/P AAA repair Z98.890, Z86.79     Past Surgical History:   Procedure Laterality Date    CARDIAC SURG PROCEDURE UNLIST  09/2016    NST negative    CHEST SURGERY PROCEDURE UNLISTED  06/2016    CTA neg PE, 6 mm subpleural nodule    HX AAA REPAIR  07/17/2018    HX [address]      Dr. Magdalene Naylor left    HX CARPAL TUNNEL RELEASE Left     HX CATARACT REMOVAL Bilateral     HX COLONOSCOPY      benign polyp 5/08 Dr Rocio Kimble;  4/13 negative Dr. Ethan Hartmann HX GI  08/2018    Dr Marianela Hua; ERCP s/p removal cbd stone complicated by pamcreatitis    HX LAP CHOLECYSTECTOMY  09/18/2018    Dr. Markel Cabrera  2007    neg MRA/MRI head [facility] NEUROLOGICAL PROCEDURE UNLISTED  9/10    CT head negative    VASCULAR SURGERY PROCEDURE UNLIST  2007    [facility]     Current Outpatient Medications   Medication Sig Dispense Refill    piroxicam (FELDENE) 10 mg capsule 1 cap po every day with food 30 Cap 2    Levothyroxine 75 mcg cap Take one tablet by mouth every other day before breakfast. Alternates with 50mcg 45 Cap 3    atorvastatin (LIPITOR) 40 mg tablet       ZETIA 10 mg tablet       diazePAM (VALIUM) 10 mg tablet Take 1 Tab by mouth every twelve (12) hours as needed for Anxiety. Max Daily Amount: 20 mg. (Patient taking differently: Take 10 mg by mouth as needed for Anxiety.) 60 Tab 1    camphor-menthol (MEN-PHOR) 0.5-0.5 % lotion Apply  to affected area as needed for Itching.  222 mL 2    oxyCODONE-acetaminophen (PERCOCET) 5-325 mg per tablet Take 1 Tab by mouth every four (4) hours as needed. Max Daily Amount: 6 Tabs. (Patient taking differently: Take 1 Tab by mouth as needed.) 30 Tab 0    lidocaine (LIDODERM) 5 % Apply patch to the affected area for 12 hours a day and remove for 12 hours a day. 30 Each 5    omeprazole (PRILOSEC) 40 mg capsule Take 1 Cap by mouth daily. (Patient taking differently: Take 40 mg by mouth as needed.) 90 Cap 1    ibuprofen (MOTRIN) 800 mg tablet Take 1 Tab by mouth three (3) times daily (with meals). 45 Tab 0    lisinopril (PRINIVIL, ZESTRIL) 10 mg tablet Take 1 Tab by mouth daily. 90 Tab 3    ergocalciferol (VITAMIN D2) 50,000 unit capsule Take 1 Cap by mouth every seven (7) days. (Patient taking differently: Take 50,000 Units by mouth every fourteen (14) days.) 12 Cap 1    aspirin 81 mg chewable tablet Take 1 Tab by mouth daily. 90 Tab 3     Facility-Administered Medications Ordered in Other Visits   Medication Dose Route Frequency Provider Last Rate Last Dose    sodium chloride irrigation 0.9 % 500 mL Irrigation    PRN Lou Lucio MD         Allergies   Allergen Reactions    Atenolol Other (comments)     Felt poorly    Cozaar [Losartan] Other (comments)     Headaches    Norvasc [Amlodipine] Other (comments)     [place] poorly         Review of Systems    Review of Systems - History obtained from chart review and the patient  General ROS: negative  Psychological ROS: negative  Ophthalmic ROS: negative  Respiratory ROS: negative  Cardiovascular ROS: negative  Gastrointestinal ROS: negative  Musculoskeletal ROS: positive for - pain in back - lower  Neurological ROS: negative  Dermatological ROS: negative  Vascular ROS: negative     Physical   Visit Vitals  /70 (BP 1 Location: Left arm, BP Patient Position: Sitting)   Pulse 70   Resp 16   Ht 5' 4.5\" (1.638 m)   Wt 172 lb (78 kg)   BMI 29.07 kg/m²     General:  Alert, cooperative, no distress. Head:  Normocephalic, without obvious abnormality, atraumatic.    Eyes: Conjunctivae/corneas clear. Pupils equal, round, reactive to light. Extraocular movements intact. Neck:         No bruits   Lungs:   Clear to auscultation bilaterally. Heart:  Regular rate and rhythm, S1, S2 normal   Extremities: Extremities normal, atraumatic, no cyanosis or edema. Pulses: 2+ and symmetric all extremities. Skin: Skin color, texture, turgor normal. No rashes or lesions. Vascular studies:  EVAR     No endoleak is noted. Triphasic waveforms in the superior attachment, graft body, right limb, left limb, right distal attachment and left distal attachment. AAA sac measures 4.01 x 4.07 cm Trv as compared with a measurement of 4.9 x 4.0 cm Trv by CT on 08/07/18. Renal     Bilateral renal arteries patent proximally. Mesenteric     Inferior mesenteric artery is not visualized. Celiac and proximal SMA patent. Impression/Plan:     ICD-10-CM ICD-9-CM    1. History of endovascular stent graft for abdominal aortic aneurysm (AAA) Z95.828 V43.4 DUPLEX AORTA/IVC/ILIAC/GRAFTS COMPLETE     No orders of the defined types were placed in this encounter. Reassured of her results  Will follow up again with duplex in about 6 months and then transition to yearly thereafter    Follow-up Disposition:  Return in about 6 months (around 9/14/2019). GARRETT Aguilar    Portions of this note have been entered using voice recognition software.

## 2019-03-14 NOTE — PROGRESS NOTES
1. Have you been to an emergency room or urgent care clinic since your last visit? NO    Hospitalized since your last visit? If yes, where, when, and reason for visit? NO  2. Have you seen or consulted any other health care providers outside of the [facility] since your last visit including any procedures, health maintenance items. If yes, where, when and reason for visit?  NO

## 2019-03-21 ENCOUNTER — OFFICE VISIT (OUTPATIENT)
Dept: INTERNAL MEDICINE CLINIC | Age: 79
End: 2019-03-21

## 2019-03-21 VITALS
DIASTOLIC BLOOD PRESSURE: 74 MMHG | TEMPERATURE: 98.5 F | RESPIRATION RATE: 14 BRPM | WEIGHT: 175 LBS | BODY MASS INDEX: 29.16 KG/M2 | OXYGEN SATURATION: 98 % | HEIGHT: 65 IN | HEART RATE: 68 BPM | SYSTOLIC BLOOD PRESSURE: 122 MMHG

## 2019-03-21 DIAGNOSIS — E03.4 HYPOTHYROIDISM DUE TO ACQUIRED ATROPHY OF THYROID: ICD-10-CM

## 2019-03-21 DIAGNOSIS — Z98.890 S/P AAA REPAIR: ICD-10-CM

## 2019-03-21 DIAGNOSIS — E78.5 DYSLIPIDEMIA: ICD-10-CM

## 2019-03-21 DIAGNOSIS — Z86.79 S/P AAA REPAIR: ICD-10-CM

## 2019-03-21 DIAGNOSIS — I10 ESSENTIAL HYPERTENSION WITH GOAL BLOOD PRESSURE LESS THAN 140/90: Primary | ICD-10-CM

## 2019-03-21 DIAGNOSIS — D12.6 COLON ADENOMAS: ICD-10-CM

## 2019-03-21 DIAGNOSIS — M43.16 SPONDYLOLISTHESIS, LUMBAR REGION: ICD-10-CM

## 2019-03-21 DIAGNOSIS — I87.2 VENOUS INSUFFICIENCY: ICD-10-CM

## 2019-03-21 NOTE — PROGRESS NOTES
Chief Complaint Patient presents with  Cholesterol Problem 4 month follow up with labs 1. Have you been to the ER, urgent care clinic or hospitalized since your last visit? NO.  
 
2. Have you seen or consulted any other health care providers outside of the [address] [place] [address] since your last visit (Include any pap smears or colon screening)?  NO

## 2019-03-21 NOTE — PROGRESS NOTES
[de-identified] y.o. WHITE OR  female who presents for evaluation. 
  
Her bp is controlled. Denies any cardiovascular complaints. No set exercise outside of adls although they just came back from a trip to [address] [place] [address] where 'they walked all day long' She continues to f/u w vascular for the aaa repair 
  
No gi or gu complaints outside of having to get up middle of the night periodically to urinate. No incontinence issues She saw Dr Manju Ellis and was started on feldene although that doesn't seem to be helping. She will schedule f/u LAST MEDICARE WELLNESS EXAM: 4/1/14, 4/8/14, 4/14/16, 4/25/17, 5/7/18 Past Medical History:  
Diagnosis Date  AAA (abdominal aortic aneurysm) without rupture ([facility] [address] AAA repair 7/2018 Dr Chrissie Cranker  Colon adenomas 2003 Dr Dariel Villegas; 4/13 Dr Shonda Matthews  DJD (degenerative joint disease)  Dyslipidemia  FHx: heart disease  Gastritis 2006  
 on EGD Dr. Allie Patel, neg sprue  Hypertension  Hypothyroidism 2007 Dr. Fang Lindsay  Hypovitaminosis D   
 IPMN (intraductal papillary mucinous neoplasm) 06/2018 on mrcp 8mm  Lung nodule 06/2016  
 6 mm RLL (6/16); no change (10/17); no change (12/18)  Obesity   
 peak weight 187 lbs, bmi 33.1 from 4/13; IF 3/18  Osteopenia FRAX 4/12 9.7 and 1.4 DEXA t score 0.6 spine, -1 hip (12/09);  -0.4 spine, -1.3 hip (4/12); -1.2 wrist, -1.6 hip w FRAX 11/2.2 (4/14); -1.0 wrist, -1.6 hip w FRAX 12/2.6 (4/16); -0.7 wrist, -1.6 hip (5/18)  Pancreatitis, chronic ([address] 2018  Psoriasis 2003 on bx Dr. Salomon Hearing  Psychiatric disorder Anxiety  Sensorineural hearing loss   
 left side Dr Rasmussen Nip  Venous insufficiency 2003  
 on dopplers  Zoster 05/2016  
 left T11-12; mild postherpetic neuralgia Past Surgical History:  
Procedure Laterality Date  CARDIAC SURG PROCEDURE UNLIST  09/2016 NST negative  CHEST SURGERY PROCEDURE UNLISTED  06/2016 CTA neg PE, 6 mm subpleural nodule  HX AAA REPAIR  07/17/2018  HX CARPAL TUNNEL RELEASE Dr. Kulwant Suh left  HX CARPAL TUNNEL RELEASE Left  HX CATARACT REMOVAL Bilateral   
 HX COLONOSCOPY    
 benign polyp 5/08 Dr Maxwell Haile;  4/13 negative Dr. Dory Tran  HX GI  08/2018 Dr Kofi Sage; ERCP s/p removal cbd stone complicated by pamcreatitis  HX LAP CHOLECYSTECTOMY  09/18/2018 Dr. Yaw Moreno  HX ORTHOPAEDIC    
 HX TONSILLECTOMY  NEUROLOGICAL PROCEDURE UNLISTED  2007  
 neg MRA/MRI head in [address] [place] [address]  9/10 CT head negative  VASCULAR SURGERY PROCEDURE UNLIST  2007  
 neg AAA screen in [facility] Social History Socioeconomic History  Marital status:  Spouse name: Not on file  Number of children: Not on file  Years of education: Not on file  Highest education level: Not on file Occupational History  Occupation: ret office mgr Social Needs  Financial resource strain: Not on file  Food insecurity:  
  Worry: Not on file Inability: Not on file  Transportation needs:  
  Medical: Not on file Non-medical: Not on file Tobacco Use  Smoking status: Never Smoker  Smokeless tobacco: Never Used Substance and Sexual Activity  Alcohol use: Yes Comment: occasionally  Drug use: No  
 Sexual activity: Not on file Lifestyle  Physical activity:  
  Days per week: Not on file Minutes per session: Not on file  Stress: Not on file Relationships  Social connections:  
  Talks on phone: Not on file Gets together: Not on file Attends Cheondoism service: Not on file Active member of club or organization: Not on file Attends meetings of clubs or organizations: Not on file Relationship status: Not on file  Intimate partner violence:  
  Fear of current or ex partner: Not on file Emotionally abused: Not on file Physically abused: Not on file Forced sexual activity: Not on file Other Topics Concern  Not on file Social History Narrative  Not on file Allergies Allergen Reactions  Atenolol Other (comments) Felt poorly  Cozaar [Losartan] Other (comments) Headaches  Norvasc [Amlodipine] Other (comments) Felt poorly Current Outpatient Medications Medication Sig  piroxicam (FELDENE) 10 mg capsule 1 cap po every day with food  Levothyroxine 75 mcg cap Take one tablet by mouth every other day before breakfast. Alternates with 50mcg  atorvastatin (LIPITOR) 40 mg tablet  ZETIA 10 mg tablet  diazePAM (VALIUM) 10 mg tablet Take 1 Tab by mouth every twelve (12) hours as needed for Anxiety. Max Daily Amount: 20 mg. (Patient taking differently: Take 10 mg by mouth as needed for Anxiety.)  camphor-menthol (MEN-PHOR) 0.5-0.5 % lotion Apply  to affected area as needed for Itching.  oxyCODONE-acetaminophen (PERCOCET) 5-325 mg per tablet Take 1 Tab by mouth every four (4) hours as needed. Max Daily Amount: 6 Tabs. (Patient taking differently: Take 1 Tab by mouth as needed.)  lidocaine (LIDODERM) 5 % Apply patch to the affected area for 12 hours a day and remove for 12 hours a day.  omeprazole (PRILOSEC) 40 mg capsule Take 1 Cap by mouth daily. (Patient taking differently: Take 40 mg by mouth as needed.)  lisinopril (PRINIVIL, ZESTRIL) 10 mg tablet Take 1 Tab by mouth daily.  ergocalciferol (VITAMIN D2) 50,000 unit capsule Take 1 Cap by mouth every seven (7) days. (Patient taking differently: Take 50,000 Units by mouth every fourteen (14) days.)  aspirin 81 mg chewable tablet Take 1 Tab by mouth daily.  ibuprofen (MOTRIN) 800 mg tablet Take 1 Tab by mouth three (3) times daily (with meals). No current facility-administered medications for this visit. Facility-Administered Medications Ordered in Other Visits Medication Dose Route Frequency  sodium chloride irrigation 0.9 % 500 mL Irrigation    PRN  
 
 REVIEW OF SYSTEMS:  Dr. Arnold Pro 2014, mammo 4/16, DEXA 4/16, colo 4/13 Dr Nubia Sewell, sees Dr Lorena Choi Ophtho  no vision change or eye pain Oral  no mouth pain, tongue or tooth problems Ears  no hearing loss, ear pain, fullness Throat  no swallowing problems Cardiac  no CP, PND, orthopnea, edema, palpitations or syncope Chest  no breast masses Resp  no wheezing, chronic coughing, dyspnea GI  no heartburn, nausea, vomiting, change in bowel habits, bleeding, hemorrhoids Urinary  no dysuria, hematuria, flank pain, urgency, frequency Neuro  no focal weakness, numbness, paresthesias, incoordination, ataxia, involuntary movements Visit Vitals /74 Pulse 68 Temp 98.5 °F (36.9 °C) (Oral) Resp 14 Ht 5' 4.5\" (1.638 m) Wt 175 lb (79.4 kg) SpO2 98% BMI 29.57 kg/m² A&O x3 Affect is appropriate. Mood stable No apparent distress Anicteric, no JVD, adenopathy or thyromegaly. No carotid bruits or radiated murmur Lungs clear to auscultation, no wheezes or rales Heart showed regular rate and rhythm. No murmur, rubs, gallops Abdomen soft nondistended, no hepatosplenomegaly or masses. Extremities without edema. Pulses 1-2+ symmetrically LABS From 3/10 showed                         vit d 38.0 From 9/10 showed                            wbc 7.2, hb 14.3, plt 158 From 3/12 showed   gluc 88, cr 0.87, gfr 66, alt 15, tsh 1.04, ldl-p 1515 From 9/12 showed                                                    tsh 1.69, ldl-p 1469, chol 152, tg 165, hdl 42, ldl-c 77 From 3/13 showed   gluc 83, cr 0.91, gfr 62, alt 10,     ldl-p 1274, chol 124, tg 137, hdl 37, ldl-c 60,    wbc 7.0, hb 14.3, plt 164 From 9/13 showed                chol 147, tg 139, hdl 39, ldl-c 80 From 3/14 showed   gluc 89, cr 1.02, gfr 53, alt<5,  tsh 1.66,         chol 148, tg 132, hdl 36, ldl-c 86,    wbc 6.7, hb 14.4, plt 179, vit d 45.4 From 10/14 showed                chol 137, tg 117, hdl 40, ldl-c 74 From 4/15 showed   gluc 91, cr 1.24, gfr 42, alt 30, tsh 0.64,           wbc 6.8, hb 14.8, plt 176 From 10/15 showed gluc 93, cr 1.13, gfr 47,            tsh 0.32,          chol 139, tg 131, hdl 36, ldl-c 77 From 4/16 showed   gluc 86, cr 1.00, gfr 54, alt 24,            chol 142, tg 137, hdl 41, ldl-c 74,   wbc 6.4, hb 13.9, plt 203, ua pro 30 From 6/16 showed   gluc 97, cr 1.20,             wbc 7.3, hb 14.1, plt 216, ck/trop neg From 10/16 showed gluc 78, cr 1.03, gfr 52, alt 21, tsh 1.21,          chol 145, tg 151, hdl 43, ldl-c 72, From 4/17 showed                tsh 2.57, ft4 1.10 From 10/17 showed gluc 79, cr 1.03, gfr 52, alt 27,            chol 199, tg 161, hdl 45, ldl-c 122, wbc 6.3, hb 14.7, plt 208 From 5/18 showed                tsh 5.23, ft4 1.10,     chol 320, tg 196, hdl 40, ldl-c 221,            vit d 57.0 From 8/18 showed   glu 141, cr 0.87, gfr>60, alt 34,           wb 10.9, hb 13.0, plt 179, lip 8208 From 11/18 showed      tsh 4.20, ft4 1.00,    chol 133, tg 171, hdl 41, ldl-c 58,          vit d 56.5 Results for orders placed or performed during the hospital encounter of 03/13/19 TSH 3RD GENERATION Result Value Ref Range TSH 3.50 0.36 - 3.74 uIU/mL T4, FREE Result Value Ref Range T4, Free 1.1 0.7 - 1.5 NG/DL  
VITAMIN B12 & FOLATE Result Value Ref Range Vitamin B12 306 211 - 911 pg/mL Folate 6.0 3.10 - 17.50 ng/mL CBC WITH AUTOMATED DIFF Result Value Ref Range WBC 5.7 4.6 - 13.2 K/uL  
 RBC 4.64 4.20 - 5.30 M/uL  
 HGB 14.2 12.0 - 16.0 g/dL HCT 46.6 (H) 35.0 - 45.0 % .4 (H) 74.0 - 97.0 FL  
 MCH 30.6 24.0 - 34.0 PG  
 MCHC 30.5 (L) 31.0 - 37.0 g/dL  
 RDW 15.2 (H) 11.6 - 14.5 % PLATELET 896 750 - 322 K/uL MPV 11.4 9.2 - 11.8 FL  
 NEUTROPHILS 64 40 - 73 % LYMPHOCYTES 26 21 - 52 % MONOCYTES 7 3 - 10 % EOSINOPHILS 3 0 - 5 % BASOPHILS 0 0 - 2 %  
 ABS. NEUTROPHILS 3.6 1.8 - 8.0 K/UL ABS. LYMPHOCYTES 1.5 0.9 - 3.6 K/UL  
 ABS. MONOCYTES 0.4 0.05 - 1.2 K/UL  
 ABS. EOSINOPHILS 0.2 0.0 - 0.4 K/UL  
 ABS. BASOPHILS 0.0 0.0 - 0.1 K/UL  
 DF AUTOMATED IRON PROFILE Result Value Ref Range Iron 91 50 - 175 ug/dL TIBC 267 250 - 450 ug/dL Iron % saturation 34 % METABOLIC PANEL, COMPREHENSIVE Result Value Ref Range Sodium 140 136 - 145 mmol/L Potassium 4.5 3.5 - 5.5 mmol/L Chloride 109 (H) 100 - 108 mmol/L  
 CO2 28 21 - 32 mmol/L Anion gap 3 3.0 - 18 mmol/L Glucose 81 74 - 99 mg/dL BUN 25 (H) 7.0 - 18 MG/DL Creatinine 1.13 0.6 - 1.3 MG/DL  
 BUN/Creatinine ratio 22 (H) 12 - 20 GFR est AA 56 (L) >60 ml/min/1.73m2 GFR est non-AA 46 (L) >60 ml/min/1.73m2 Calcium 9.1 8.5 - 10.1 MG/DL Bilirubin, total 0.5 0.2 - 1.0 MG/DL  
 ALT (SGPT) 23 13 - 56 U/L  
 AST (SGOT) 17 15 - 37 U/L Alk. phosphatase 99 45 - 117 U/L Protein, total 6.9 6.4 - 8.2 g/dL Albumin 4.0 3.4 - 5.0 g/dL Globulin 2.9 2.0 - 4.0 g/dL A-G Ratio 1.4 0.8 - 1.7 PROTEIN ELECTROPHORESIS Result Value Ref Range Protein, total 6.9 6.0 - 8.5 g/dL Albumin 3.8 2.9 - 4.4 g/dL Alpha-1-globulin 0.2 0.0 - 0.4 g/dL ALPHA-2 GLOBULIN 0.8 0.4 - 1.0 g/dL Beta globulin 1.1 0.7 - 1.3 g/dL Gamma globulin 1.0 0.4 - 1.8 g/dL M-Nain Not Observed Not Observed g/dL Globulin, total 3.1 2.2 - 3.9 g/dL A/G ratio 1.2 0.7 - 1.7 Patient Active Problem List  
Diagnosis Code  Colon adenomas 2003, last 2014 Dr Dory Tran D12.6  Hypovitaminosis D E55.9  Venous insufficiency dopplers 2003 I87.2  Osteopenia FRAX 4/14 11 and 2.2 M85.80  Dyslipidemia E78.5  Hypothyroidism due to acquired atrophy of thyroid E03.4  Arthritis, degenerative M19.90  Obesity (BMI 30.0-34. 9) E66.9  Advance directive discussed with patient Z70.80  
 Primary hypertension I10  Spondylolisthesis, lumbar region M43.16  
  Spondylosis of lumbar region without myelopathy or radiculopathy M47.816  Scoliosis of lumbar spine M41.9  DDD (degenerative disc disease), lumbar M51.36  
 S/P AAA repair Z98.890, Z86.79  
 
ASSESSMENT AND PLAN: 
1. General.  F/U Dr. Santiago Davidson 2. Hypertension. Continue current regimen. 3.  Hypovit D. Continue current regimen. 4.  Colon adenomas. F/U colo 2018? Fiber 5. Overweight. Lifestyle and dietary measures. Portion control reiterated. 6.  Lung nodule. No further f/u recommended 7. Dyslipidemia. Lipitor 8. Hypothyroidism. Continue current regimen. 9.  Osteopenia. Wt bearing exercise, ca/D, DEXA 2018 or later 10. Possible IPMN. F/u mri 6/19 
11.  Spine. Per Dr Yaima Baker RTC 9/19 Above conditions discussed at length and patient vocalized understanding. All questions answered to patient satisfaction

## 2019-05-06 DIAGNOSIS — I10 ESSENTIAL HYPERTENSION WITH GOAL BLOOD PRESSURE LESS THAN 140/90: ICD-10-CM

## 2019-05-06 NOTE — TELEPHONE ENCOUNTER
Please input sig and qty for Lipitor as I do not see it on chart. Last Visit: 3/21/19 with MD Ari Bella  Next Appointment: 10/2/19 with MD Ari Bella    Requested Prescriptions     Pending Prescriptions Disp Refills    atorvastatin (LIPITOR) 40 mg tablet      lisinopril (PRINIVIL, ZESTRIL) 10 mg tablet 90 Tab 3     Sig: Take 1 Tab by mouth daily.

## 2019-05-08 RX ORDER — LISINOPRIL 10 MG/1
10 TABLET ORAL DAILY
Qty: 90 TAB | Refills: 3 | Status: SHIPPED | OUTPATIENT
Start: 2019-05-08 | End: 2020-06-09 | Stop reason: SDUPTHER

## 2019-05-08 RX ORDER — ATORVASTATIN CALCIUM 40 MG/1
40 TABLET, FILM COATED ORAL DAILY
Qty: 90 TAB | Refills: 3 | Status: SHIPPED | OUTPATIENT
Start: 2019-05-08 | End: 2020-06-09 | Stop reason: SDUPTHER

## 2019-06-18 ENCOUNTER — OFFICE VISIT (OUTPATIENT)
Dept: ORTHOPEDIC SURGERY | Age: 79
End: 2019-06-18

## 2019-06-18 VITALS
HEIGHT: 65 IN | WEIGHT: 177.4 LBS | DIASTOLIC BLOOD PRESSURE: 56 MMHG | BODY MASS INDEX: 29.56 KG/M2 | OXYGEN SATURATION: 96 % | TEMPERATURE: 98.3 F | HEART RATE: 73 BPM | SYSTOLIC BLOOD PRESSURE: 112 MMHG

## 2019-06-18 DIAGNOSIS — M62.838 MUSCLE SPASM: ICD-10-CM

## 2019-06-18 DIAGNOSIS — M17.0 PRIMARY OSTEOARTHRITIS OF BOTH KNEES: ICD-10-CM

## 2019-06-18 DIAGNOSIS — M47.816 LUMBAR FACET ARTHROPATHY: Primary | ICD-10-CM

## 2019-06-18 DIAGNOSIS — M48.061 SPINAL STENOSIS OF LUMBAR REGION WITHOUT NEUROGENIC CLAUDICATION: ICD-10-CM

## 2019-06-18 RX ORDER — CELECOXIB 200 MG/1
CAPSULE ORAL
Qty: 30 CAP | Refills: 2 | Status: SHIPPED | OUTPATIENT
Start: 2019-06-18 | End: 2019-06-18 | Stop reason: SDUPTHER

## 2019-06-18 RX ORDER — CELECOXIB 200 MG/1
CAPSULE ORAL
Qty: 30 CAP | Refills: 2 | Status: SHIPPED | OUTPATIENT
Start: 2019-06-18 | End: 2020-10-08

## 2019-06-18 RX ORDER — DICLOFENAC SODIUM 10 MG/G
GEL TOPICAL
Qty: 5 EACH | Refills: 2 | Status: SHIPPED | OUTPATIENT
Start: 2019-06-18 | End: 2020-10-08

## 2019-06-18 RX ORDER — DICLOFENAC SODIUM 10 MG/G
GEL TOPICAL
Qty: 5 EACH | Refills: 2 | Status: SHIPPED | OUTPATIENT
Start: 2019-06-18 | End: 2019-06-18 | Stop reason: SDUPTHER

## 2019-06-18 NOTE — LETTER
6/19/19 Patient: Gavin Dempsey YOB: 1940 Date of Visit: 6/18/2019 George Pacheco MD 
[address] [place] [address] VIA In Basket Dear George Pacheco MD, Thank you for referring Ms. Marcelo Delaney to [facility] for evaluation. My notes for this consultation are attached. If you have questions, please do not hesitate to call me. I look forward to following your patient along with you. Sincerely, Dick Daniels MD

## 2019-06-18 NOTE — PATIENT INSTRUCTIONS
Low Back Arthritis: Exercises Your Care Instructions Here are some examples of typical rehabilitation exercises for your condition. Start each exercise slowly. Ease off the exercise if you start to have pain. Your doctor or physical therapist will tell you when you can start these exercises and which ones will work best for you. When you are not being active, find a comfortable position for rest. Some people are comfortable on the floor or a medium-firm bed with a small pillow under their head and another under their knees. Some people prefer to lie on their side with a pillow between their knees. Don't stay in one position for too long. Take short walks (10 to 20 minutes) every 2 to 3 hours. Avoid slopes, hills, and stairs until you feel better. Walk only distances you can manage without pain, especially leg pain. How to do the exercises Pelvic tilt 1. Lie on your back with your knees bent. 2. \"Brace\" your stomachtighten your muscles by pulling in and imagining your belly button moving toward your spine. 3. Press your lower back into the floor. You should feel your hips and pelvis rock back. 4. Hold for 6 seconds while breathing smoothly. 5. Relax and allow your pelvis and hips to rock forward. 6. Repeat 8 to 12 times. Back stretches 1. Get down on your hands and knees on the floor. 2. Relax your head and allow it to droop. Round your back up toward the ceiling until you feel a nice stretch in your upper, middle, and lower back. Hold this stretch for as long as it feels comfortable, or about 15 to 30 seconds. 3. Return to the starting position with a flat back while you are on your hands and knees. 4. Let your back sway by pressing your stomach toward the floor. Lift your buttocks toward the ceiling. 5. Hold this position for 15 to 30 seconds. 6. Repeat 2 to 4 times. Follow-up care is a key part of your treatment and safety.  Be sure to make and go to all appointments, and call your doctor if you are having problems. It's also a good idea to know your test results and keep a list of the medicines you take. Where can you learn more? Go to http://tal-chava.info/. Enter Y991 in the search box to learn more about \"Low Back Arthritis: Exercises. \" Current as of: September 20, 2018 Content Version: 11.9 © 1772-6991 [employer]. Care instructions adapted under license by [employer] (which disclaims liability or warranty for this information). If you have questions about a medical condition or this instruction, always ask your healthcare professional. [address] any warranty or liability for your use of this information.

## 2019-06-18 NOTE — PROGRESS NOTES
[facility]  [address] [place], [facility] [address]  Phone: [phone]  Fax: [phone]    Pt's YOB: 1940    ASSESSMENT   Diagnoses and all orders for this visit:    1. Lumbar facet arthropathy  -     celecoxib (CELEBREX) 200 mg capsule; Take 1 tab by mouth daily as needed for pain with food. 2. Muscle spasm    3. Spinal stenosis of lumbar region without neurogenic claudication  -     celecoxib (CELEBREX) 200 mg capsule; Take 1 tab by mouth daily as needed for pain with food. 4. Primary osteoarthritis of both knees  -     diclofenac (VOLTAREN) 1 % gel; Apply 4 grams to both knees 3-4 x daily as needed. IMPRESSION AND PLAN:  Esequiel Walden is a [de-identified] y.o. right hand dominant female with history of lumbar pain. Pt complains of pain across the lower back and in the left buttock that is worse with ambulation. She notes minimal relief with Feldene and uses Lidocaine as needed. 1) Pt was given information on lumbar arthritis exercises. 2) She was prescribed Celebrex 200 mg 1 tab daily prn with food. 3) Pt may wear her lumbar support intermittently. 4) Ms. Evelyn Fan has a reminder for a \"due or due soon\" health maintenance. I have asked that she contact her primary care provider, Jacques Pacheco MD, for follow-up on this health maintenance. 5)  demonstrated consistency with prescribing. Follow-up and Dispositions    · Return in about 6 weeks (around 7/30/2019) for Medication follow up. HISTORY OF PRESENT ILLNESS:  Esequiel Walden is a [de-identified] y.o. right hand dominant female with history of lumbar pain and was last followed by Dr. Rick Salcedo on 12/06/2019. Pt complains of pain across the lower back and in the left buttock that is worse with ambulation. She notes that her symptoms improve when she takes a break to sit down in her recliner. Pt denies any pain radiating into the legs, weakness, or recent falls. She has two stents and notes that she had previous surgery to repair an aortic aneurysm. Pt also had a previous cholecystectomy. She notes minimal relief with Feldene. She uses Lidocaine patches as needed with benefit. Pt denies any restrictions with NSAID's or a history of renal disease. She reports improvement when intermittently wearing a lumbar support. Pt at this time desires to proceed with medication evaluation.     Pain Scale: /10    PCP: Lane Manjarrez MD     Past Medical History:   Diagnosis Date    AAA (abdominal aortic aneurysm) without rupture ([facility] [address]     AAA repair 7/2018 Dr Javier Roper adenomas     2003 Dr Roberta Garcia; 4/13 Dr Brigitte Long    DJD (degenerative joint disease)     Dyslipidemia     FHx: heart disease     Gastritis 2006    on EGD Dr. Corky Luong, neg sprue    Hypertension     Hypothyroidism 2007    Dr. Devante Irene Hypovitaminosis D     IPMN (intraductal papillary mucinous neoplasm) 06/2018    on mrcp 8mm    Lung nodule 06/2016    6 mm RLL (6/16); no change (10/17); no change (12/18)    Obesity     peak weight 187 lbs, bmi 33.1 from 4/13; IF 3/18    Osteopenia FRAX 4/12 9.7 and 1.4     DEXA t score 0.6 spine, -1 hip (12/09);  -0.4 spine, -1.3 hip (4/12); -1.2 wrist, -1.6 hip w FRAX 11/2.2 (4/14); -1.0 wrist, -1.6 hip w FRAX 12/2.6 (4/16); -0.7 wrist, -1.6 hip (5/18)    Pancreatitis, chronic ([facility] [address] 2018    Psoriasis 2003    on bx Dr. Mary Ann Dodson Psychiatric disorder     Anxiety    Sensorineural hearing loss     left side Dr Esther Currie Venous insufficiency 2003    on dopplers    Zoster 05/2016    left T11-12; mild postherpetic neuralgia        Social History     Socioeconomic History    Marital status:      Spouse name: Not on file    Number of children: Not on file    Years of education: Not on file    Highest education level: Not on file   Occupational History    Occupation: ret office mgr   Social Needs    Financial resource strain: Not on file    Food insecurity: Worry: Not on file     Inability: Not on file    Transportation needs:     Medical: Not on file     Non-medical: Not on file   Tobacco Use    Smoking status: Never Smoker    Smokeless tobacco: Never Used   Substance and Sexual Activity    Alcohol use: Yes     Comment: occasionally    Drug use: No    Sexual activity: Not on file   Lifestyle    Physical activity:     Days per week: Not on file     Minutes per session: Not on file    Stress: Not on file   Relationships    Social connections:     Talks on phone: Not on file     Gets together: Not on file     Attends Jewish service: Not on file     Active member of club or organization: Not on file     Attends meetings of clubs or organizations: Not on file     Relationship status: Not on file    Intimate partner violence:     Fear of current or ex partner: Not on file     Emotionally abused: Not on file     Physically abused: Not on file     Forced sexual activity: Not on file   Other Topics Concern    Not on file   Social History Narrative    Not on file       Current Outpatient Medications   Medication Sig Dispense Refill    celecoxib (CELEBREX) 200 mg capsule Take 1 tab by mouth daily as needed for pain with food. 30 Cap 2    diclofenac (VOLTAREN) 1 % gel Apply 4 grams to both knees 3-4 x daily as needed. 5 Each 2    atorvastatin (LIPITOR) 40 mg tablet Take 1 Tab by mouth daily. 90 Tab 3    lisinopril (PRINIVIL, ZESTRIL) 10 mg tablet Take 1 Tab by mouth daily. 90 Tab 3    Levothyroxine 75 mcg cap Take one tablet by mouth every other day before breakfast. Alternates with 50mcg 45 Cap 3    ZETIA 10 mg tablet       diazePAM (VALIUM) 10 mg tablet Take 1 Tab by mouth every twelve (12) hours as needed for Anxiety. Max Daily Amount: 20 mg. (Patient taking differently: Take 10 mg by mouth as needed for Anxiety.) 60 Tab 1    lidocaine (LIDODERM) 5 % Apply patch to the affected area for 12 hours a day and remove for 12 hours a day.  30 Each 5    ergocalciferol (VITAMIN D2) 50,000 unit capsule Take 1 Cap by mouth every seven (7) days. (Patient taking differently: Take 50,000 Units by mouth every fourteen (14) days.) 12 Cap 1    aspirin 81 mg chewable tablet Take 1 Tab by mouth daily. 90 Tab 3    piroxicam (FELDENE) 10 mg capsule 1 cap po every day with food 30 Cap 2    camphor-menthol (MEN-PHOR) 0.5-0.5 % lotion Apply  to affected area as needed for Itching. 222 mL 2    oxyCODONE-acetaminophen (PERCOCET) 5-325 mg per tablet Take 1 Tab by mouth every four (4) hours as needed. Max Daily Amount: 6 Tabs. (Patient taking differently: Take 1 Tab by mouth as needed.) 30 Tab 0    omeprazole (PRILOSEC) 40 mg capsule Take 1 Cap by mouth daily. (Patient taking differently: Take 40 mg by mouth as needed.) 90 Cap 1    ibuprofen (MOTRIN) 800 mg tablet Take 1 Tab by mouth three (3) times daily (with meals). 45 Tab 0     Facility-Administered Medications Ordered in Other Visits   Medication Dose Route Frequency Provider Last Rate Last Dose    sodium chloride irrigation 0.9 % 500 mL Irrigation    PRN Vincenzo Obregon MD           Allergies   Allergen Reactions    Atenolol Other (comments)     Felt poorly    Cozaar [Losartan] Other (comments)     Headaches    Norvasc [Amlodipine] Other (comments)     [place] poorly           REVIEW OF SYSTEMS    Constitutional: Negative for fever, chills, or weight change. Respiratory: Negative for cough or shortness of breath. Cardiovascular: Negative for chest pain or palpitations. Gastrointestinal: Negative for acid reflux, change in bowel habits, or constipation. Genitourinary: Negative for dysuria and flank pain. Musculoskeletal: Positive for lumbar and knee pain. Skin: Negative for rash. Neurological: Negative for headaches, dizziness, or numbness. Endo/Heme/Allergies: Negative for increased bruising. Psychiatric/Behavioral: Negative for difficulty with sleep.       PHYSICAL EXAMINATION  Visit Vitals  /56 (BP 1 Location: Left arm, BP Patient Position: Sitting)   Pulse 73   Temp 98.3 °F (36.8 °C) (Oral)   Ht 5' 4.5\" (1.638 m)   Wt 177 lb 6.4 oz (80.5 kg)   SpO2 96%   BMI 29.98 kg/m²       Constitutional: Awake, alert, and in no acute distress. Neurological: 1+ symmetrical DTRs in the upper extremities. 1+ symmetrical DTRs in the lower extremities. Sensation to light touch is intact. Negative Erlin's sign bilaterally. Skin: warm, dry, and intact. Musculoskeletal: Tenderness to palpation in the lower lumbar region. Moderate pain with extension and axial loading. No change with forward flexion. Mild decreased range of motion with internal rotation of her right hip; good range of motion on the left. Negative straight leg raise bilaterally. Biceps  Triceps Deltoids Wrist Ext Wrist Flex Hand Intrin   Right +4/5 +4/5 +4/5 +4/5 +4/5 +4/5   Left +4/5 +4/5 +4/5 +4/5 +4/5 +4/5      Hip Flex  Quads Hamstrings Ankle DF EHL Ankle PF   Right +4/5 +4/5 +4/5 +4/5 +4/5 +4/5   Left +4/5 +4/5 +4/5 +4/5 +4/5 +4/5     IMAGING:    Lumbar spine MRI from 12/03/2018 was personally reviewed with the patient and demonstrated:  Results from [place] encounter on 12/03/18   MRI LUMB SPINE WO CONT    Narrative MR Lumbar spine without contrast    HISTORY: increased lumbar pain. Difficulty with standing/walking. Also has hip  pain and history of spondylolisthesis    COMPARISON: Prior body CT May 2018 and August 2018    Technique: Multi-sequence multiplanar T1, T2, STIR imaging with and without fat  saturation obtained centered on the lumbar spine. FINDINGS:     Alignment: Mild to moderate right convex lumbar scoliosis with apex at L2. Minimal degenerative anterolisthesis of L5. Also slight retrolisthesis of L1 and  T11. Vertebral body height: Normal  Marrow signal: No concerning signal. Several levels of Schmorl's nodes.  Several  levels of chronic fatty endplate changes as well as other levels of discogenic  endplate edema. The latter is most notable at T11-12. Disc spaces: Severe narrowing and desiccation of T11-12, L1-2, L4-5. More  moderately so at other levels, sparing L5-S1. Conus: Terminates at T12-L1    Axial imaging correlation:    T11-12: Broad-based disc osteophyte complex with mild facet arthropathy. Patent  canal and foramina. T12-L1: Mild disc bulge and facet arthropathy. Minimal impression upon the  thecal sac. Patent canal and foramina. L1-2: Broad-based disc osteophyte complex. Bilateral facet arthropathy. Mild  concentric spinal stenosis. Mild foraminal stenoses. L2-3: Mild broad-based disc osteophyte complex. Bilateral facet arthropathy. Mild concentric spinal stenosis. Moderate left and mild right foraminal  stenosis. L3-4: Broad-based disc osteophyte complex. Bilateral facet arthropathy. Ligamentum flavum thickening and buckling. Moderate concentric spinal stenosis. Mild foraminal stenoses. L4-5: Broad-based disc osteophyte complex with eccentric prominence to the  right. Bilateral facet arthropathy. Moderate spinal stenosis. More pronounced  narrowing of right lateral recess with transient distortion of crossing right L5  nerve. Moderately severe right foraminal stenosis with distortion of perineural  fat. L5-S1: Uncovering of the disc. Minor disc bulge. Bilateral facet arthropathy. Patent canal and foramina. Other structures: Partial visualization of AAA with prior endovascular stent  deployment. Several small caliber renal cysts. Impression IMPRESSION:    1. Lumbar scoliosis with multilevel degenerative findings.  -Up to a moderate spinal stenosis, at L4-5 and L3-4  -Moderately severe right L4-5 foraminal stenosis with potential impingement of  the exiting right L4 nerve. There is also potentially impingement of the  crossing right L5 nerve at this level. Thank you for enabling us to participate in the care of this patient.        Written by Francisco Beckham as dictated by Holli Albarran MD.  I, Dr. Holli Albarran confirm that all documentation is accurate.

## 2019-07-31 ENCOUNTER — OFFICE VISIT (OUTPATIENT)
Dept: ORTHOPEDIC SURGERY | Age: 79
End: 2019-07-31

## 2019-07-31 VITALS
OXYGEN SATURATION: 95 % | HEART RATE: 76 BPM | HEIGHT: 65 IN | DIASTOLIC BLOOD PRESSURE: 65 MMHG | WEIGHT: 176 LBS | BODY MASS INDEX: 29.32 KG/M2 | SYSTOLIC BLOOD PRESSURE: 114 MMHG | TEMPERATURE: 98.1 F

## 2019-07-31 DIAGNOSIS — M47.816 LUMBAR FACET ARTHROPATHY: Primary | ICD-10-CM

## 2019-07-31 DIAGNOSIS — M62.838 MUSCLE SPASM: ICD-10-CM

## 2019-07-31 DIAGNOSIS — M48.061 SPINAL STENOSIS OF LUMBAR REGION WITHOUT NEUROGENIC CLAUDICATION: ICD-10-CM

## 2019-07-31 NOTE — LETTER
7/31/19 Patient: Triston Mccurdy YOB: 1940 Date of Visit: 7/31/2019 Bharati Roberto MD 
[address] [place] [address] VIA In Basket Dear Bharati Roberto MD, Thank you for referring Ms. Max Castillo to [facility] for evaluation. My notes for this consultation are attached. If you have questions, please do not hesitate to call me. I look forward to following your patient along with you. Sincerely, Bernice Abernathy MD

## 2019-07-31 NOTE — PATIENT INSTRUCTIONS
Low Back Arthritis: Exercises Introduction Here are some examples of typical rehabilitation exercises for your condition. Start each exercise slowly. Ease off the exercise if you start to have pain. Your doctor or physical therapist will tell you when you can start these exercises and which ones will work best for you. When you are not being active, find a comfortable position for rest. Some people are comfortable on the floor or a medium-firm bed with a small pillow under their head and another under their knees. Some people prefer to lie on their side with a pillow between their knees. Don't stay in one position for too long. Take short walks (10 to 20 minutes) every 2 to 3 hours. Avoid slopes, hills, and stairs until you feel better. Walk only distances you can manage without pain, especially leg pain. How to do the exercises Pelvic tilt 1. Lie on your back with your knees bent. 2. \"Brace\" your stomachtighten your muscles by pulling in and imagining your belly button moving toward your spine. 3. Press your lower back into the floor. You should feel your hips and pelvis rock back. 4. Hold for 6 seconds while breathing smoothly. 5. Relax and allow your pelvis and hips to rock forward. 6. Repeat 8 to 12 times. Back stretches 1. Get down on your hands and knees on the floor. 2. Relax your head and allow it to droop. Round your back up toward the ceiling until you feel a nice stretch in your upper, middle, and lower back. Hold this stretch for as long as it feels comfortable, or about 15 to 30 seconds. 3. Return to the starting position with a flat back while you are on your hands and knees. 4. Let your back sway by pressing your stomach toward the floor. Lift your buttocks toward the ceiling. 5. Hold this position for 15 to 30 seconds. 6. Repeat 2 to 4 times. Follow-up care is a key part of your treatment and safety.  Be sure to make and go to all appointments, and call your doctor if you are having problems. It's also a good idea to know your test results and keep a list of the medicines you take. Where can you learn more? Go to http://tal-chava.info/. Enter X278 in the search box to learn more about \"Low Back Arthritis: Exercises. \" Current as of: September 20, 2018 Content Version: 12.1 © 1775-1591 [employer]. Care instructions adapted under license by [employer] (which disclaims liability or warranty for this information). If you have questions about a medical condition or this instruction, always ask your healthcare professional. [address] any warranty or liability for your use of this information.

## 2019-07-31 NOTE — PROGRESS NOTES
[facility]  [address]opher Rodriguez[address], [facility] [address]  Phone: [phone]  Fax: [phone]    Pt's YOB: 1940    ASSESSMENT   Diagnoses and all orders for this visit:    1. Lumbar facet arthropathy  -     SCHEDULE SURGERY    2. Muscle spasm  -     SCHEDULE SURGERY    3. Spinal stenosis of lumbar region without neurogenic claudication  -     SCHEDULE SURGERY         IMPRESSION AND PLAN:  Soledad Aguilar is a [de-identified] y.o. right hand dominant female with history of lumbar pain. Pt complains of pain across the lower back that is worse with ambulation. She notes minimal relief with Celebrex 200 mg and Voltaren 1% gel. 1) Pt was given information on lumbar arthritis exercises. 2) Discussed treatment options with the patient including steroid injections and a lumbar RFA. 3) She was scheduled for bilateral L4-5 and bilateral L5-S1 facet injection. 4) Ms. Fabricio Dale has a reminder for a \"due or due soon\" health maintenance. I have asked that she contact her primary care provider, Kd Freed MD, for follow-up on this health maintenance. 5)  demonstrated consistency with prescribing. Follow-up and Dispositions    · Return in about 1 month (around 8/28/2019) for Injection follow up. HISTORY OF PRESENT ILLNESS:  Soledad Aguilar is a [de-identified] y.o. right hand dominant female with history of lumbar pain and presents to the office today for medication follow up. She notes minimal relief with Celebrex 200 mg and Voltaren 1% gel. Pt complains of pain across the lower back but denies any pain radiating down the legs at this time. She reports that her lower back pain is worse when walking. She denies any weakness in her legs and describes her pain as dull and aching at times and also sharp and stabbing. Pt denies any previous lumbar injections. Pt at this time desires to proceed with steroid injections.     Pain Scale: 7/10    PCP: Erica Nash MD     Past Medical History:   Diagnosis Date    AAA (abdominal aortic aneurysm) without rupture [facility])     AAA repair 7/2018 Dr Abida Law adenomas     2003 Dr Iona Winters; 4/13 Dr Anoop CALHOUND (degenerative joint disease)     Dyslipidemia     FHx: heart disease     Gastritis 2006    on EGD Dr. Tyron Mcfarlane, neg sprue    Hypertension     Hypothyroidism 2007    Dr. Evgeny Machuca Hypovitaminosis D     IPMN (intraductal papillary mucinous neoplasm) 06/2018    on mrcp 8mm    Lung nodule 06/2016    6 mm RLL (6/16); no change (10/17); no change (12/18)    Obesity     peak weight 187 lbs, bmi 33.1 from 4/13; IF 3/18    Osteopenia FRAX 4/12 9.7 and 1.4     DEXA t score 0.6 spine, -1 hip (12/09);  -0.4 spine, -1.3 hip (4/12); -1.2 wrist, -1.6 hip w FRAX 11/2.2 (4/14); -1.0 wrist, -1.6 hip w FRAX 12/2.6 (4/16); -0.7 wrist, -1.6 hip (5/18)    Pancreatitis, chronic ([facility] [address] 2018    Psoriasis 2003    on bx Dr. Ren Carver Psychiatric disorder     Anxiety    Sensorineural hearing loss     left side Dr Hardik Daniels Venous insufficiency 2003    on dopplers    Zoster 05/2016    left T11-12; mild postherpetic neuralgia        Social History     Socioeconomic History    Marital status:      Spouse name: Not on file    Number of children: Not on file    Years of education: Not on file    Highest education level: Not on file   Occupational History    Occupation: ret office mgr   Social Needs    Financial resource strain: Not on file    Food insecurity:     Worry: Not on file     Inability: Not on file    Transportation needs:     Medical: Not on file     Non-medical: Not on file   Tobacco Use    Smoking status: Never Smoker    Smokeless tobacco: Never Used   Substance and Sexual Activity    Alcohol use: Yes     Comment: occasionally    Drug use: No    Sexual activity: Not on file   Lifestyle    Physical activity:     Days per week: Not on file     Minutes per session: Not on file    Stress: Not on file   Relationships    Social connections:     Talks on phone: Not on file     Gets together: Not on file     Attends Jewish service: Not on file     Active member of club or organization: Not on file     Attends meetings of clubs or organizations: Not on file     Relationship status: Not on file    Intimate partner violence:     Fear of current or ex partner: Not on file     Emotionally abused: Not on file     Physically abused: Not on file     Forced sexual activity: Not on file   Other Topics Concern    Not on file   Social History Narrative    Not on file       Current Outpatient Medications   Medication Sig Dispense Refill    Levothyroxine 75 mcg cap Take 1 Tab by mouth daily. 90 Cap 3    ergocalciferol (VITAMIN D2) 50,000 unit capsule Take 1 Cap by mouth every seven (7) days. 12 Cap 3    ezetimibe (ZETIA) 10 mg tablet Take 1 Tab by mouth daily. 90 Tab 3    celecoxib (CELEBREX) 200 mg capsule Take 1 tab by mouth daily as needed for pain with food. 30 Cap 2    atorvastatin (LIPITOR) 40 mg tablet Take 1 Tab by mouth daily. 90 Tab 3    lisinopril (PRINIVIL, ZESTRIL) 10 mg tablet Take 1 Tab by mouth daily. 90 Tab 3    ZETIA 10 mg tablet       diazePAM (VALIUM) 10 mg tablet Take 1 Tab by mouth every twelve (12) hours as needed for Anxiety. Max Daily Amount: 20 mg. (Patient taking differently: Take 10 mg by mouth as needed for Anxiety.) 60 Tab 1    lidocaine (LIDODERM) 5 % Apply patch to the affected area for 12 hours a day and remove for 12 hours a day. 30 Each 5    aspirin 81 mg chewable tablet Take 1 Tab by mouth daily. 90 Tab 3    diclofenac (VOLTAREN) 1 % gel Apply 4 grams to both knees 3-4 x daily as needed. 5 Each 2    piroxicam (FELDENE) 10 mg capsule 1 cap po every day with food 30 Cap 2    camphor-menthol (MEN-PHOR) 0.5-0.5 % lotion Apply  to affected area as needed for Itching.  222 mL 2    oxyCODONE-acetaminophen (PERCOCET) 5-325 mg per tablet Take 1 Tab by mouth every four (4) hours as needed. Max Daily Amount: 6 Tabs. (Patient taking differently: Take 1 Tab by mouth as needed.) 30 Tab 0    omeprazole (PRILOSEC) 40 mg capsule Take 1 Cap by mouth daily. (Patient taking differently: Take 40 mg by mouth as needed.) 90 Cap 1    ibuprofen (MOTRIN) 800 mg tablet Take 1 Tab by mouth three (3) times daily (with meals). 45 Tab 0     Facility-Administered Medications Ordered in Other Visits   Medication Dose Route Frequency Provider Last Rate Last Dose    sodium chloride irrigation 0.9 % 500 mL Irrigation    PRN Julee Ferguson MD           Allergies   Allergen Reactions    Atenolol Other (comments)     Felt poorly    Cozaar [Losartan] Other (comments)     Headaches    Norvasc [Amlodipine] Other (comments)     [place] poorly           REVIEW OF SYSTEMS    Constitutional: Negative for fever, chills, or weight change. Respiratory: Negative for cough or shortness of breath. Cardiovascular: Negative for chest pain or palpitations. Gastrointestinal: Negative for acid reflux, change in bowel habits, or constipation. Genitourinary: Negative for dysuria and flank pain. Musculoskeletal: Positive for lumbar pain. Skin: Negative for rash. Neurological: Negative for headaches, dizziness, or numbness. Endo/Heme/Allergies: Negative for increased bruising. Psychiatric/Behavioral: Negative for difficulty with sleep. PHYSICAL EXAMINATION  Visit Vitals  /65 (BP 1 Location: Right arm, BP Patient Position: Sitting)   Pulse 76   Temp 98.1 °F (36.7 °C) (Oral)   Ht 5' 4.5\" (1.638 m)   Wt 176 lb (79.8 kg)   SpO2 95%   BMI 29.74 kg/m²       Constitutional: Awake, alert, and in no acute distress. Neurological: 1+ symmetrical DTRs in the upper extremities. 1+ symmetrical DTRs in the lower extremities. Sensation to light touch is intact. Negative Adkins's sign bilaterally. Skin: warm, dry, and intact. Musculoskeletal: Tenderness to palpation in the lower lumbar region.  Moderate pain with extension and axial loading. No pain with forward flexion. No pain with internal or external rotation of her hips. Negative straight leg raise bilaterally. Hip Flex  Quads Hamstrings Ankle DF EHL Ankle PF   Right +4/5 +4/5 +4/5 +4/5 +4/5 +4/5   Left +4/5 +4/5 +4/5 +4/5 +4/5 +4/5     IMAGING:    Lumbar spine MRI from 12/03/2018 was personally reviewed with the patient and demonstrated:       Results from [facility] on 12/03/18   MRI LUMB SPINE WO CONT     Narrative MR Lumbar spine without contrast     HISTORY: increased lumbar pain. Difficulty with standing/walking. Also has hip  pain and history of spondylolisthesis     COMPARISON: Prior body CT May 2018 and August 2018     Technique: Multi-sequence multiplanar T1, T2, STIR imaging with and without fat  saturation obtained centered on the lumbar spine.      FINDINGS:      Alignment: Mild to moderate right convex lumbar scoliosis with apex at L2. Minimal degenerative anterolisthesis of L5. Also slight retrolisthesis of L1 and  T11. Vertebral body height: Normal  Marrow signal: No concerning signal. Several levels of Schmorl's nodes. Several  levels of chronic fatty endplate changes as well as other levels of discogenic  endplate edema. The latter is most notable at T11-12. Disc spaces: Severe narrowing and desiccation of T11-12, L1-2, L4-5. More  moderately so at other levels, sparing L5-S1. Conus: Terminates at T12-L1     Axial imaging correlation:     T11-12: Broad-based disc osteophyte complex with mild facet arthropathy. Patent  canal and foramina.     T12-L1: Mild disc bulge and facet arthropathy. Minimal impression upon the  thecal sac. Patent canal and foramina.     L1-2: Broad-based disc osteophyte complex. Bilateral facet arthropathy. Mild  concentric spinal stenosis. Mild foraminal stenoses.     L2-3: Mild broad-based disc osteophyte complex. Bilateral facet arthropathy. Mild concentric spinal stenosis.  Moderate left and mild right foraminal  stenosis.     L3-4: Broad-based disc osteophyte complex. Bilateral facet arthropathy. Ligamentum flavum thickening and buckling. Moderate concentric spinal stenosis. Mild foraminal stenoses.     L4-5: Broad-based disc osteophyte complex with eccentric prominence to the  right. Bilateral facet arthropathy. Moderate spinal stenosis. More pronounced  narrowing of right lateral recess with transient distortion of crossing right L5  nerve. Moderately severe right foraminal stenosis with distortion of perineural  fat.     L5-S1: Uncovering of the disc. Minor disc bulge. Bilateral facet arthropathy. Patent canal and foramina.     Other structures: Partial visualization of AAA with prior endovascular stent  deployment. Several small caliber renal cysts.           Impression IMPRESSION:     1. Lumbar scoliosis with multilevel degenerative findings.  -Up to a moderate spinal stenosis, at L4-5 and L3-4  -Moderately severe right L4-5 foraminal stenosis with potential impingement of  the exiting right L4 nerve. There is also potentially impingement of the  crossing right L5 nerve at this level.     Thank you for enabling us to participate in the care of this patient.           Written by Jeremy Gayle, as dictated by Ki Penaloza MD.  I, Dr. Ki Penaloza confirm that all documentation is accurate.

## 2019-08-14 ENCOUNTER — HOSPITAL ENCOUNTER (OUTPATIENT)
Age: 79
Setting detail: OUTPATIENT SURGERY
Discharge: HOME | End: 2019-08-14
Attending: PHYSICAL MEDICINE & REHABILITATION | Admitting: PHYSICAL MEDICINE & REHABILITATION
Payer: MEDICARE

## 2019-08-14 ENCOUNTER — APPOINTMENT (OUTPATIENT)
Dept: GENERAL RADIOLOGY | Age: 79
End: 2019-08-14
Attending: PHYSICAL MEDICINE & REHABILITATION
Payer: MEDICARE

## 2019-08-14 VITALS
BODY MASS INDEX: 29.32 KG/M2 | DIASTOLIC BLOOD PRESSURE: 70 MMHG | TEMPERATURE: 98.2 F | RESPIRATION RATE: 16 BRPM | SYSTOLIC BLOOD PRESSURE: 150 MMHG | WEIGHT: 176 LBS | HEART RATE: 66 BPM | HEIGHT: 65 IN | OXYGEN SATURATION: 98 %

## 2019-08-14 PROCEDURE — 74011250637 HC RX REV CODE- 250/637: Performed by: PHYSICAL MEDICINE & REHABILITATION

## 2019-08-14 PROCEDURE — 76010000009 HC PAIN MGT 0 TO 30 MIN PROC: Performed by: PHYSICAL MEDICINE & REHABILITATION

## 2019-08-14 PROCEDURE — 74011250636 HC RX REV CODE- 250/636: Performed by: PHYSICAL MEDICINE & REHABILITATION

## 2019-08-14 PROCEDURE — 77030039433 HC TY MYLEOGRAM BD -B: Performed by: PHYSICAL MEDICINE & REHABILITATION

## 2019-08-14 RX ORDER — DIAZEPAM 5 MG/1
5-20 TABLET ORAL ONCE
Status: COMPLETED | OUTPATIENT
Start: 2019-08-14 | End: 2019-08-14

## 2019-08-14 RX ORDER — LIDOCAINE HYDROCHLORIDE 10 MG/ML
INJECTION, SOLUTION EPIDURAL; INFILTRATION; INTRACAUDAL; PERINEURAL AS NEEDED
Status: DISCONTINUED | OUTPATIENT
Start: 2019-08-14 | End: 2019-08-14 | Stop reason: HOSPADM

## 2019-08-14 RX ORDER — DEXAMETHASONE SODIUM PHOSPHATE 100 MG/10ML
INJECTION INTRAMUSCULAR; INTRAVENOUS AS NEEDED
Status: DISCONTINUED | OUTPATIENT
Start: 2019-08-14 | End: 2019-08-14 | Stop reason: HOSPADM

## 2019-08-14 RX ADMIN — DIAZEPAM 5 MG: 5 TABLET ORAL at 13:04

## 2019-08-14 NOTE — DISCHARGE INSTRUCTIONS
[facility]   ([place])  Dr. Haroldo House, Dr. Baltazar Garrett, Dr. Curtis Thomas not drive a car, operate heavy machinery or dangerous equipment for 24 hours. * Activity as tolerated; rest for the remainder of the day. * Resume pre-block medications including those for your family doctor. * Do not drink alcoholic beverages for 24 hours. Alcohol and the medications you have received may interact and cause an adverse reaction. * You may feel better this evening and worse tomorrow, as the numbing medications wears off and the steroid has yet to begin to work. After 48 hrs the steroid should begin to release bringing you relief. * You may shower this evening and remove any bandages. * Avoid hot tubs and heating pads for 24 hours. You may use cold packs on the procedure site as tolerated for the first 24 hours. * If a headache develops, drink plenty of fluids and rest.  Take over the counter medications for headache if needed. If the headache continues longer than 24 hours, call MD at the [address] [place] [address]. [phone]    * Continue taking pain medications as needed. * You may resume your regular diet if tolerated. Otherwise, start with sips of water and advance slowly. * If Diabetic: check your blood sugar three times a day for the next 3 days. If your sugar is greater than 300 call your family doctor. If your sugar is greater than 400, have someone transport you to the nearest Emergency Room. * If you experience any of the following problems, Please Call the [address] [place] [address] at [phone].         * Shortness of Breath    * Fever of 101 or higher    * Nausea / Vomiting    * Severe Headache    * Weakness or numbness in arms or legs that is not      resolving    * Prolonged increase in pain greater than 4 days      DISCHARGE SUMMARY from Nurse      PATIENT INSTRUCTIONS:    After oral sedation, for 24 hours or while taking prescription Narcotics:  · Limit your activities  · Do not drive and operate hazardous machinery  · Do not make important personal or business decisions  · Do  not drink alcoholic beverages  · If you have not urinated within 8 hours after discharge, please contact your surgeon on call. Report the following to your surgeon:  · Excessive pain, swelling, redness or odor of or around the surgical area  · Temperature over 101  · Nausea and vomiting lasting longer than 4 hours or if unable to take medications  · Any signs of decreased circulation or nerve impairment to extremity: change in color, persistent  numbness, tingling, coldness or increase pain  · Any questions            What to do at Home:  Recommended activity: Activity as tolerated, NO DRIVING FOR 12 Hours post injection          *  Please give a list of your current medications to your Primary Care Provider. *  Please update this list whenever your medications are discontinued, doses are      changed, or new medications (including over-the-counter products) are added. *  Please carry medication information at all times in case of emergency situations. These are general instructions for a healthy lifestyle:    No smoking/ No tobacco products/ Avoid exposure to second hand smoke    Surgeon General's Warning:  Quitting smoking now greatly reduces serious risk to your health. Obesity, smoking, and sedentary lifestyle greatly increases your risk for illness    A healthy diet, regular physical exercise & weight monitoring are important for maintaining a healthy lifestyle    You may be retaining fluid if you have a history of heart failure or if you experience any of the following symptoms:  Weight gain of 3 pounds or more overnight or 5 pounds in a week, increased swelling in our hands or feet or shortness of breath while lying flat in bed.   Please call your doctor as soon as you notice any of these symptoms; do not wait until your next office visit. Recognize signs and symptoms of STROKE:    F-face looks uneven    A-arms unable to move or move unevenly    S-speech slurred or non-existent    T-time-call 911 as soon as signs and symptoms begin-DO NOT go       Back to bed or wait to see if you get better-TIME IS BRAIN.

## 2019-08-14 NOTE — H&P
Date of Surgery Update:  Triston Mccurdy was seen and examined. History and physical has been reviewed. The patient has been examined. There have been no significant clinical changes since the last office visit.       Signed By: Bernice Abernathy MD     August 14, 2019 1:00 PM

## 2019-08-14 NOTE — PROCEDURES
Facet Joint Block Procedure Note    Patient Name: Getachew Vela [facility]    Date of Procedure: August 14, 2019    Preoperative Diagnosis: LUMBAR FACET ARTHROPATHY, MUSCLE SPASM    Post Operative Diagnosis:LUMBAR FACET ARTHROPATHY, MUSCLE SPASM     Procedure:  bilateral  L4-L5 Facet Joint Block  bilateral  L5-S1 Facet Joint Block    Consent: Informed consent was obtained prior to the procedure. The patient was given the opportunity to ask questions regarding the procedure and its associated risks. In addition to the potential risks associated with the procedure itself, the patient was informed both verbally and in writing of potential side effects of the use of glucocorticoids. The patient appeared to comprehend the informed consent and desired to have the procedure performed. Procedure: The patient was placed in the prone position on the flouroscopy table and the back was prepped and draped in the usual sterile manner. At each blocked level, the exact location of the facet joint was identified with flouroscopy, and after local Lidocaine 1% injection, a #22 gauge spinal needle was then advanced toward the joint. A total of 30 mg of preservative free Dexamethasone and 5 cc of Lidocaine was injected around and equally divided among all of the sites. The patient tolerated the procedure well. The injection area was cleaned and bandaids applied. No excessive bleeding was noted. Patient dressed and was discharged to home with instructions.        Shima Segovia MD  August 14, 2019

## 2019-09-18 ENCOUNTER — OFFICE VISIT (OUTPATIENT)
Dept: ORTHOPEDIC SURGERY | Age: 79
End: 2019-09-18

## 2019-09-18 VITALS
HEART RATE: 68 BPM | DIASTOLIC BLOOD PRESSURE: 72 MMHG | HEIGHT: 65 IN | WEIGHT: 177 LBS | BODY MASS INDEX: 29.49 KG/M2 | SYSTOLIC BLOOD PRESSURE: 123 MMHG

## 2019-09-18 DIAGNOSIS — M48.061 SPINAL STENOSIS OF LUMBAR REGION WITHOUT NEUROGENIC CLAUDICATION: ICD-10-CM

## 2019-09-18 DIAGNOSIS — M62.838 MUSCLE SPASM: ICD-10-CM

## 2019-09-18 DIAGNOSIS — M47.816 LUMBAR FACET ARTHROPATHY: Primary | ICD-10-CM

## 2019-09-18 NOTE — PROGRESS NOTES
[facility]  [address] [place], [facility] [address]  Phone: [phone]  Fax: [phone]    Pt's YOB: 1940    ASSESSMENT   Diagnoses and all orders for this visit:    1. Lumbar facet arthropathy  -     SCHEDULE SURGERY    2. Muscle spasm  -     SCHEDULE SURGERY    3. Spinal stenosis of lumbar region without neurogenic claudication  -     SCHEDULE SURGERY         IMPRESSION AND PLAN:  Sirisha Dudley is a [de-identified] y.o. right hand dominant female with history of lumbar pain. She notes relief lasting about 2 weeks with bilateral L4-5 and bilateral L5-S1 facet injections on 08/14/2019. Pt complains of pain in the lower back that is worse when walking. 1) Pt was given information on lumbar arthritis exercises. 2) Discussed treatment options with the patient including a lumbar RFA or additional steroid injections. 3) Pt was given information on radiofrequency ablation. 4) She was scheduled for a left L4-5 and left L5-S1 facet injections. 5) I recommended the patient try OTC Aspercaine, Salonpas, or capsaicin patches if needed. 6) Ms. Misael Astorga has a reminder for a \"due or due soon\" health maintenance. I have asked that she contact her primary care provider, Rafaela Jacobs MD, for follow-up on this health maintenance. 7)  demonstrated consistency with prescribing. Follow-up and Dispositions    · Return in about 6 weeks (around 10/30/2019) for Injection follow up. HISTORY OF PRESENT ILLNESS:  Sirisha Dudley is a [de-identified] y.o. right hand dominant female with history of lumbar pain and presents to the office today for follow up. She notes relief lasting about 2 weeks with bilateral L4-5 and bilateral L5-S1 facet injections on 08/14/2019. Her pain is primarily on the lower left part of the lumbar region. Pt complains of pain in the lower back when walking which generally improves when sitting or laying down.  She reports benefit when wearing a lumbar support while walking. Pt denies any weakness in the legs at this time. She discontinued the Celebrex 200 mg since it was not effective and notes that she uses Voltaren 1% gel intermittently as needed. Pt occasionally uses OTC lidocaine patches as needed. Pt at this time desires to proceed with steroid injections.     Pain Scale: 7/10    PCP: Miguel Zavala MD     Past Medical History:   Diagnosis Date    AAA (abdominal aortic aneurysm) without rupture ([facility] [address]     AAA repair 7/2018 Dr Livier Tristan adenomas     2003 Dr April Hansen; 4/13 Dr Maxwell Che    DJD (degenerative joint disease)     Dyslipidemia     FHx: heart disease     Gastritis 2006    on EGD Dr. Consuelo Butts, neg sprue    Hypertension     Hypothyroidism 2007    Dr. Argelia Edwards Hypovitaminosis D     IPMN (intraductal papillary mucinous neoplasm) 06/2018    on mrcp 8mm    Lung nodule 06/2016    6 mm RLL (6/16); no change (10/17); no change (12/18)    Obesity     peak weight 187 lbs, bmi 33.1 from 4/13; IF 3/18    Osteopenia FRAX 4/12 9.7 and 1.4     DEXA t score 0.6 spine, -1 hip (12/09);  -0.4 spine, -1.3 hip (4/12); -1.2 wrist, -1.6 hip w FRAX 11/2.2 (4/14); -1.0 wrist, -1.6 hip w FRAX 12/2.6 (4/16); -0.7 wrist, -1.6 hip (5/18)    Pancreatitis, chronic ([facility] [address] 2018    Psoriasis 2003    on bx Dr. Diana Mehta Psychiatric disorder     Anxiety    Sensorineural hearing loss     left side Dr Shakira Anna Venous insufficiency 2003    on dopplers    Zoster 05/2016    left T11-12; mild postherpetic neuralgia        Social History     Socioeconomic History    Marital status:      Spouse name: Not on file    Number of children: Not on file    Years of education: Not on file    Highest education level: Not on file   Occupational History    Occupation: ret office mgr   Social Needs    Financial resource strain: Not on file    Food insecurity:     Worry: Not on file     Inability: Not on file    Transportation needs: Medical: Not on file     Non-medical: Not on file   Tobacco Use    Smoking status: Never Smoker    Smokeless tobacco: Never Used   Substance and Sexual Activity    Alcohol use: Yes     Comment: occasionally    Drug use: No    Sexual activity: Not on file   Lifestyle    Physical activity:     Days per week: Not on file     Minutes per session: Not on file    Stress: Not on file   Relationships    Social connections:     Talks on phone: Not on file     Gets together: Not on file     Attends Buddhism service: Not on file     Active member of club or organization: Not on file     Attends meetings of clubs or organizations: Not on file     Relationship status: Not on file    Intimate partner violence:     Fear of current or ex partner: Not on file     Emotionally abused: Not on file     Physically abused: Not on file     Forced sexual activity: Not on file   Other Topics Concern    Not on file   Social History Narrative    Not on file       Current Outpatient Medications   Medication Sig Dispense Refill    Levothyroxine 75 mcg cap Take 1 Tab by mouth daily. 90 Cap 3    ergocalciferol (VITAMIN D2) 50,000 unit capsule Take 1 Cap by mouth every seven (7) days. 12 Cap 3    ezetimibe (ZETIA) 10 mg tablet Take 1 Tab by mouth daily. 90 Tab 3    celecoxib (CELEBREX) 200 mg capsule Take 1 tab by mouth daily as needed for pain with food. 30 Cap 2    diclofenac (VOLTAREN) 1 % gel Apply 4 grams to both knees 3-4 x daily as needed. 5 Each 2    atorvastatin (LIPITOR) 40 mg tablet Take 1 Tab by mouth daily. 90 Tab 3    lisinopril (PRINIVIL, ZESTRIL) 10 mg tablet Take 1 Tab by mouth daily. 90 Tab 3    piroxicam (FELDENE) 10 mg capsule 1 cap po every day with food 30 Cap 2    diazePAM (VALIUM) 10 mg tablet Take 1 Tab by mouth every twelve (12) hours as needed for Anxiety. Max Daily Amount: 20 mg.  (Patient taking differently: Take 10 mg by mouth as needed for Anxiety.) 60 Tab 1    lidocaine (LIDODERM) 5 % Apply patch to the affected area for 12 hours a day and remove for 12 hours a day. 30 Each 5    omeprazole (PRILOSEC) 40 mg capsule Take 1 Cap by mouth daily. (Patient taking differently: Take 40 mg by mouth as needed.) 90 Cap 1    ibuprofen (MOTRIN) 800 mg tablet Take 1 Tab by mouth three (3) times daily (with meals). 45 Tab 0    aspirin 81 mg chewable tablet Take 1 Tab by mouth daily. 90 Tab 3    oxyCODONE-acetaminophen (PERCOCET) 5-325 mg per tablet Take 1 Tab by mouth every four (4) hours as needed. Max Daily Amount: 6 Tabs. (Patient not taking: Reported on 9/18/2019) 30 Tab 0     Facility-Administered Medications Ordered in Other Visits   Medication Dose Route Frequency Provider Last Rate Last Dose    sodium chloride irrigation 0.9 % 500 mL Irrigation    PRN He Feliz MD           Allergies   Allergen Reactions    Atenolol Other (comments)     Felt poorly    Cozaar [Losartan] Other (comments)     Headaches    Norvasc [Amlodipine] Other (comments)     Elizabeth poorly           REVIEW OF SYSTEMS    Constitutional: Negative for fever, chills, or weight change. Respiratory: Negative for cough or shortness of breath. Cardiovascular: Negative for chest pain or palpitations. Gastrointestinal: Negative for acid reflux, change in bowel habits, or constipation. Genitourinary: Negative for dysuria and flank pain. Musculoskeletal: Positive for lumbar pain. Skin: Negative for rash. Neurological: Negative for headaches, dizziness, or numbness. Endo/Heme/Allergies: Negative for increased bruising. Psychiatric/Behavioral: Negative for difficulty with sleep. PHYSICAL EXAMINATION  Visit Vitals  /72   Pulse 68   Ht 5' 4.5\" (1.638 m)   Wt 177 lb (80.3 kg)   BMI 29.91 kg/m²       Constitutional: Awake, alert, and in no acute distress. Neurological: 1+ symmetrical DTRs in the lower extremities. Sensation to light touch is intact. Skin: warm, dry, and intact.    Musculoskeletal: Tenderness to palpation in the left lower lumbar region. Moderate pain with extension and axial loading. Improvement with forward flexion. No pain with internal or external rotation of her hips. Negative straight leg raise bilaterally. Hip Flex  Quads Hamstrings Ankle DF EHL Ankle PF   Right +4/5 +4/5 +4/5 +4/5 +4/5 +4/5   Left +4/5 +4/5 +4/5 +4/5 +4/5 +4/5     IMAGING:    Lumbar spine MRI from 12/03/2018 was personally reviewed with the patient and demonstrated:          Results from [facility] on 12/03/18   MRI LUMB SPINE WO CONT     Narrative MR Lumbar spine without contrast     HISTORY: increased lumbar pain. Difficulty with standing/walking. Also has hip  pain and history of spondylolisthesis     COMPARISON: Prior body CT May 2018 and August 2018     Technique: Multi-sequence multiplanar T1, T2, STIR imaging with and without fat  saturation obtained centered on the lumbar spine.      FINDINGS:      Alignment: Mild to moderate right convex lumbar scoliosis with apex at L2. Minimal degenerative anterolisthesis of L5. Also slight retrolisthesis of L1 and  T11. Vertebral body height: Normal  Marrow signal: No concerning signal. Several levels of Schmorl's nodes. Several  levels of chronic fatty endplate changes as well as other levels of discogenic  endplate edema. The latter is most notable at T11-12. Disc spaces: Severe narrowing and desiccation of T11-12, L1-2, L4-5. More  moderately so at other levels, sparing L5-S1. Conus: Terminates at T12-L1     Axial imaging correlation:     T11-12: Broad-based disc osteophyte complex with mild facet arthropathy. Patent  canal and foramina.     T12-L1: Mild disc bulge and facet arthropathy. Minimal impression upon the  thecal sac. Patent canal and foramina.     L1-2: Broad-based disc osteophyte complex. Bilateral facet arthropathy. Mild  concentric spinal stenosis. Mild foraminal stenoses.     L2-3: Mild broad-based disc osteophyte complex.  Bilateral facet arthropathy. Mild concentric spinal stenosis. Moderate left and mild right foraminal  stenosis.     L3-4: Broad-based disc osteophyte complex. Bilateral facet arthropathy. Ligamentum flavum thickening and buckling. Moderate concentric spinal stenosis. Mild foraminal stenoses.     L4-5: Broad-based disc osteophyte complex with eccentric prominence to the  right. Bilateral facet arthropathy. Moderate spinal stenosis. More pronounced  narrowing of right lateral recess with transient distortion of crossing right L5  nerve. Moderately severe right foraminal stenosis with distortion of perineural  fat.     L5-S1: Uncovering of the disc. Minor disc bulge. Bilateral facet arthropathy. Patent canal and foramina.     Other structures: Partial visualization of AAA with prior endovascular stent  deployment. Several small caliber renal cysts.           Impression IMPRESSION:     1. Lumbar scoliosis with multilevel degenerative findings.  -Up to a moderate spinal stenosis, at L4-5 and L3-4  -Moderately severe right L4-5 foraminal stenosis with potential impingement of  the exiting right L4 nerve. There is also potentially impingement of the  crossing right L5 nerve at this level.     Thank you for enabling us to participate in the care of this patient.          Written by Riki Moritz, as dictated by Cristiana Montenegro MD.  I, Dr. Cristiana Montenegro confirm that all documentation is accurate.

## 2019-09-18 NOTE — PATIENT INSTRUCTIONS
Learning About Medial Branch Block and Neurotomy  What are medial branch block and neurotomy? Facet joints connect your vertebrae to each other. Problems in these joints can cause chronic (long-term) pain in the neck or back. They can sometimes affect the shoulders, arms, buttocks, or legs. Medial branch nerves are the nerves that carry many of the pain messages from your facet joints. Radiofrequency medial branch neurotomy is a type of medial branch neurotomy that is used to relieve arthritis pain. It uses radio waves to damage nerves in your neck or back so that they can no longer send pain messages to your brain. Before your doctor knows if a neurotomy will help you, he or she will do a medial branch block to find out if certain nerves are the ones that are a source of your pain. You will need two separate visits to the outpatient center or hospital to have both procedures. How is a medial branch block done? The doctor will use a tiny needle to numb the skin where you will get the block. Then he or she puts the block needle into the numbed area. You may feel some pressure, but you should not feel pain. Using fluoroscopy (live X-ray) to guide the needle, the doctor injects medicine onto one or more nerves to make them numb. If you get relief from your pain in the next 4 to 6 hours, it's a sign that those nerves may be contributing to your pain. The relief will last only a short time. You may then have a medial branch neurotomy at a later visit to try to get longer relief. It takes 20 to 30 minutes to get the block. You can go home after the doctor watches you for about an hour. You will get instructions on how to report how much pain you have when you are at home. You will need someone to drive you home. How is medial branch neurotomy done? The doctor will use a tiny needle to numb the skin where you will get the neurotomy. Then he or she puts the neurotomy needle into the numbed area.  You may feel some pressure. Using fluoroscopy (live X-ray) to guide the needle, the doctor sends radio waves through the needle to the nerve for 60 to 90 seconds. The radio waves heat the nerve, which damages it. The doctor may do this several times. And he or she may treat more than one nerve. It takes 45 to 90 minutes to get a neurotomy, depending on how many nerves are heated. You will probably go home 30 to 60 minutes later. You will need someone to drive you home. What can you expect after a neurotomy? You may feel a little sore or tender at the injection site at first. But after a successful neurotomy, most people have pain relief right away. It often lasts for 9 to 12 months or longer. Sometimes the pain relief is permanent. If your pain does come back, it may mean that the damaged nerve has healed and can send pain messages again. Or it can mean that a different nerve is causing pain. Your doctor will discuss your options with you. Follow-up care is a key part of your treatment and safety. Be sure to make and go to all appointments, and call your doctor if you are having problems. It's also a good idea to know your test results and keep a list of the medicines you take. Where can you learn more? Go to http://tal-chava.info/. Enter L656 in the search box to learn more about \"Learning About Medial Branch Block and Neurotomy. \"  Current as of: March 28, 2019  Content Version: 12.1  © 9086-9671 [employer]. Care instructions adapted under license by [employer] (which disclaims liability or warranty for this information). If you have questions about a medical condition or this instruction, always ask your healthcare professional. [place] [address] any warranty or liability for your use of this information. Low Back Arthritis: Exercises  Introduction  Here are some examples of typical rehabilitation exercises for your condition.  Start each exercise slowly. Ease off the exercise if you start to have pain. Your doctor or physical therapist will tell you when you can start these exercises and which ones will work best for you. When you are not being active, find a comfortable position for rest. Some people are comfortable on the floor or a medium-firm bed with a small pillow under their head and another under their knees. Some people prefer to lie on their side with a pillow between their knees. Don't stay in one position for too long. Take short walks (10 to 20 minutes) every 2 to 3 hours. Avoid slopes, hills, and stairs until you feel better. Walk only distances you can manage without pain, especially leg pain. How to do the exercises  Pelvic tilt    1. Lie on your back with your knees bent. 2. \"Brace\" your stomach--tighten your muscles by pulling in and imagining your belly button moving toward your spine. 3. Press your lower back into the floor. You should feel your hips and pelvis rock back. 4. Hold for 6 seconds while breathing smoothly. 5. Relax and allow your pelvis and hips to rock forward. 6. Repeat 8 to 12 times. Back stretches    1. Get down on your hands and knees on the floor. 2. Relax your head and allow it to droop. Round your back up toward the ceiling until you feel a nice stretch in your upper, middle, and lower back. Hold this stretch for as long as it feels comfortable, or about 15 to 30 seconds. 3. Return to the starting position with a flat back while you are on your hands and knees. 4. Let your back sway by pressing your stomach toward the floor. Lift your buttocks toward the ceiling. 5. Hold this position for 15 to 30 seconds. 6. Repeat 2 to 4 times. Follow-up care is a key part of your treatment and safety. Be sure to make and go to all appointments, and call your doctor if you are having problems. It's also a good idea to know your test results and keep a list of the medicines you take.   Where can you learn more?  Go to http://tal-chava.info/. Enter Q655 in the search box to learn more about \"Low Back Arthritis: Exercises. \"  Current as of: September 20, 2018  Content Version: 12.1  © 3034-1719 [employer]. Care instructions adapted under license by [employer] (which disclaims liability or warranty for this information). If you have questions about a medical condition or this instruction, always ask your healthcare professional. [address] any warranty or liability for your use of this information.

## 2019-09-18 NOTE — LETTER
9/19/19 Patient: Shaylee Hemphill YOB: 1940 Date of Visit: 9/18/2019 Dung Huerta MD 
[address] [place] [address] VIA In Basket Dear Dung Huerta MD, Thank you for referring Ms. Cassia Smith to [facility] for evaluation. My notes for this consultation are attached. If you have questions, please do not hesitate to call me. I look forward to following your patient along with you. Sincerely, Mirtha Morrow MD

## 2019-09-23 ENCOUNTER — APPOINTMENT (OUTPATIENT)
Dept: INTERNAL MEDICINE CLINIC | Age: 79
End: 2019-09-23

## 2019-09-23 ENCOUNTER — HOSPITAL ENCOUNTER (OUTPATIENT)
Dept: LAB | Age: 79
Discharge: HOME | End: 2019-09-23
Payer: MEDICARE

## 2019-09-23 DIAGNOSIS — E03.4 HYPOTHYROIDISM DUE TO ACQUIRED ATROPHY OF THYROID: ICD-10-CM

## 2019-09-23 DIAGNOSIS — E78.5 DYSLIPIDEMIA: ICD-10-CM

## 2019-09-23 LAB — TSH SERPL DL<=0.05 MIU/L-ACNC: 1.4 UIU/ML (ref 0.36–3.74)

## 2019-09-23 PROCEDURE — 36415 COLL VENOUS BLD VENIPUNCTURE: CPT

## 2019-09-23 PROCEDURE — 80061 LIPID PANEL: CPT

## 2019-09-23 PROCEDURE — 84443 ASSAY THYROID STIM HORMONE: CPT

## 2019-09-24 LAB
CHOLEST SERPL-MCNC: 151 MG/DL
HDLC SERPL-MCNC: 42 MG/DL (ref 40–60)
HDLC SERPL: 3.6 {RATIO} (ref 0–5)
LDLC SERPL CALC-MCNC: 82 MG/DL (ref 0–100)
LIPID PROFILE,FLP: NORMAL
TRIGL SERPL-MCNC: 135 MG/DL (ref ?–150)
VLDLC SERPL CALC-MCNC: 27 MG/DL

## 2019-09-25 ENCOUNTER — APPOINTMENT (OUTPATIENT)
Dept: GENERAL RADIOLOGY | Age: 79
End: 2019-09-25
Attending: PHYSICAL MEDICINE & REHABILITATION
Payer: MEDICARE

## 2019-09-25 ENCOUNTER — HOSPITAL ENCOUNTER (OUTPATIENT)
Age: 79
Setting detail: OUTPATIENT SURGERY
Discharge: HOME | End: 2019-09-25
Attending: PHYSICAL MEDICINE & REHABILITATION | Admitting: PHYSICAL MEDICINE & REHABILITATION
Payer: MEDICARE

## 2019-09-25 VITALS
DIASTOLIC BLOOD PRESSURE: 74 MMHG | BODY MASS INDEX: 29.49 KG/M2 | SYSTOLIC BLOOD PRESSURE: 124 MMHG | RESPIRATION RATE: 20 BRPM | TEMPERATURE: 98.3 F | OXYGEN SATURATION: 95 % | HEIGHT: 65 IN | HEART RATE: 76 BPM | WEIGHT: 177 LBS

## 2019-09-25 PROCEDURE — 74011000250 HC RX REV CODE- 250: Performed by: PHYSICAL MEDICINE & REHABILITATION

## 2019-09-25 PROCEDURE — 74011250637 HC RX REV CODE- 250/637: Performed by: PHYSICAL MEDICINE & REHABILITATION

## 2019-09-25 PROCEDURE — 77030039433 HC TY MYLEOGRAM BD -B: Performed by: PHYSICAL MEDICINE & REHABILITATION

## 2019-09-25 PROCEDURE — 74011636320 HC RX REV CODE- 636/320: Performed by: PHYSICAL MEDICINE & REHABILITATION

## 2019-09-25 PROCEDURE — 76010000009 HC PAIN MGT 0 TO 30 MIN PROC: Performed by: PHYSICAL MEDICINE & REHABILITATION

## 2019-09-25 PROCEDURE — 74011250636 HC RX REV CODE- 250/636: Performed by: PHYSICAL MEDICINE & REHABILITATION

## 2019-09-25 RX ORDER — LIDOCAINE HYDROCHLORIDE 10 MG/ML
INJECTION, SOLUTION EPIDURAL; INFILTRATION; INTRACAUDAL; PERINEURAL AS NEEDED
Status: DISCONTINUED | OUTPATIENT
Start: 2019-09-25 | End: 2019-09-25 | Stop reason: HOSPADM

## 2019-09-25 RX ORDER — DEXAMETHASONE SODIUM PHOSPHATE 100 MG/10ML
INJECTION INTRAMUSCULAR; INTRAVENOUS AS NEEDED
Status: DISCONTINUED | OUTPATIENT
Start: 2019-09-25 | End: 2019-09-25 | Stop reason: HOSPADM

## 2019-09-25 RX ORDER — DIAZEPAM 5 MG/1
5-20 TABLET ORAL ONCE
Status: COMPLETED | OUTPATIENT
Start: 2019-09-25 | End: 2019-09-25

## 2019-09-25 RX ADMIN — DIAZEPAM 5 MG: 5 TABLET ORAL at 13:34

## 2019-09-25 NOTE — DISCHARGE INSTRUCTIONS
[facility]   ([place])  Dr. Kenney Ibanez, Dr. Snyder, Dr. Coco Regalado not drive a car, operate heavy machinery or dangerous equipment for 24 hours. * Activity as tolerated; rest for the remainder of the day. * Resume pre-block medications including those for your family doctor. * Do not drink alcoholic beverages for 24 hours. Alcohol and the medications you have received may interact and cause an adverse reaction. * You may feel better this evening and worse tomorrow, as the numbing medications wears off and the steroid has yet to begin to work. After 48 hrs the steroid should begin to release bringing you relief. * You may shower this evening and remove any bandages. * Avoid hot tubs and heating pads for 24 hours. You may use cold packs on the procedure site as tolerated for the first 24 hours. * If a headache develops, drink plenty of fluids and rest.  Take over the counter medications for headache if needed. If the headache continues longer than 24 hours, call MD at the [address] [place] [address]. [phone]    * Continue taking pain medications as needed. * You may resume your regular diet if tolerated. Otherwise, start with sips of water and advance slowly. * If Diabetic: check your blood sugar three times a day for the next 3 days. If your sugar is greater than 300 call your family doctor. If your sugar is greater than 400, have someone transport you to the nearest Emergency Room. * If you experience any of the following problems, Please Call the [address] [place] [address] at [phone].         * Shortness of Breath    * Fever of 101 or higher    * Nausea / Vomiting    * Severe Headache    * Weakness or numbness in arms or legs that is not      resolving    * Prolonged increase in pain greater than 4 days      DISCHARGE SUMMARY from Nurse      PATIENT INSTRUCTIONS:    After oral sedation, for 24 hours or while taking prescription Narcotics:  · Limit your activities  · Do not drive and operate hazardous machinery  · Do not make important personal or business decisions  · Do  not drink alcoholic beverages  · If you have not urinated within 8 hours after discharge, please contact your surgeon on call. Report the following to your surgeon:  · Excessive pain, swelling, redness or odor of or around the surgical area  · Temperature over 101  · Nausea and vomiting lasting longer than 4 hours or if unable to take medications  · Any signs of decreased circulation or nerve impairment to extremity: change in color, persistent  numbness, tingling, coldness or increase pain  · Any questions            What to do at Home:  Recommended activity: Activity as tolerated, NO DRIVING FOR 12 Hours post injection          *  Please give a list of your current medications to your Primary Care Provider. *  Please update this list whenever your medications are discontinued, doses are      changed, or new medications (including over-the-counter products) are added. *  Please carry medication information at all times in case of emergency situations. These are general instructions for a healthy lifestyle:    No smoking/ No tobacco products/ Avoid exposure to second hand smoke    Surgeon General's Warning:  Quitting smoking now greatly reduces serious risk to your health. Obesity, smoking, and sedentary lifestyle greatly increases your risk for illness    A healthy diet, regular physical exercise & weight monitoring are important for maintaining a healthy lifestyle    You may be retaining fluid if you have a history of heart failure or if you experience any of the following symptoms:  Weight gain of 3 pounds or more overnight or 5 pounds in a week, increased swelling in our hands or feet or shortness of breath while lying flat in bed.   Please call your doctor as soon as you notice any of these symptoms; do not wait until your next office visit. Recognize signs and symptoms of STROKE:    F-face looks uneven    A-arms unable to move or move unevenly    S-speech slurred or non-existent    T-time-call 911 as soon as signs and symptoms begin-DO NOT go       Back to bed or wait to see if you get better-TIME IS BRAIN.

## 2019-09-25 NOTE — PROCEDURES
Facet Joint Block Procedure Note    Patient Name: Damaris Roper [facility]    Date of Procedure: September 25, 2019    Preoperative Diagnosis: LUMBAR FACET ARTHROPATHY, SPINAL STENOSIS OF LUMBAR SPINE WITHOUT NEUROGENIC CLAUDICATION, MUSCLE SPASM OF BACK    Post Operative Diagnosis:LUMBAR FACET ARTHROPATHY, SPINAL STENOSIS OF LUMBAR SPINE WITHOUT NEUROGENIC CLAUDICATION, MUSCLE SPASM OF BACK     Procedure:  left L4-L5 Facet Joint Block  left L5-S1 Facet Joint Block    Consent: Informed consent was obtained prior to the procedure. The patient was given the opportunity to ask questions regarding the procedure and its associated risks. In addition to the potential risks associated with the procedure itself, the patient was informed both verbally and in writing of potential side effects of the use of glucocorticoids. The patient appeared to comprehend the informed consent and desired to have the procedure performed. Procedure: The patient was placed in the prone position on the flouroscopy table and the back was prepped and draped in the usual sterile manner. At each blocked level, the exact location of the facet joint was identified with flouroscopy, and after local Lidocaine 1% injection, a #22 gauge spinal needle was then advanced toward the joint. A total of 10 mg of preservative free Dexamethasone and 5 cc of Lidocaine was injected around and equally divided among all of the sites. The patient tolerated the procedure well. The injection area was cleaned and bandaids applied. No excessive bleeding was noted. Patient dressed and was discharged to home with instructions.        Dary Aquino MD  September 25, 2019

## 2019-09-26 ENCOUNTER — OFFICE VISIT (OUTPATIENT)
Dept: VASCULAR SURGERY | Age: 79
End: 2019-09-26

## 2019-09-26 VITALS
DIASTOLIC BLOOD PRESSURE: 68 MMHG | WEIGHT: 177 LBS | BODY MASS INDEX: 29.49 KG/M2 | HEART RATE: 68 BPM | RESPIRATION RATE: 17 BRPM | HEIGHT: 65 IN | SYSTOLIC BLOOD PRESSURE: 136 MMHG

## 2019-09-26 DIAGNOSIS — Z95.828 HISTORY OF ENDOVASCULAR STENT GRAFT FOR ABDOMINAL AORTIC ANEURYSM: Primary | ICD-10-CM

## 2019-09-26 DIAGNOSIS — I71.40 AAA (ABDOMINAL AORTIC ANEURYSM) WITHOUT RUPTURE: ICD-10-CM

## 2019-09-26 NOTE — PROGRESS NOTES
Katherine Lemons [facility]    Chief Complaint   Patient presents with    Abdominal Aortic Aneurysm       History and Physical    Katherine Cabral is a [de-identified] y.o. female with history of AAA now s/p EVAR on 7/17/18. She did very well postoperatively.  She denies any abdominal pain. She has spinal disease issues and continues to follow up with her spine doctor. She denies any symptoms of claudication and no rest pain. Past Medical History:   Diagnosis Date    AAA (abdominal aortic aneurysm) without rupture [facility])     AAA repair 7/2018 Dr Edith Thurman adenomas     2003 Dr Angelique Hart; 4/13 Dr Pete Colón    DJD (degenerative joint disease)     Dyslipidemia     FHx: heart disease     Gastritis 2006    on EGD Dr. Jinny Zhao, neg sprue    Hypertension     Hypothyroidism 2007    Dr. Derek Parra Hypovitaminosis D     IPMN (intraductal papillary mucinous neoplasm) 06/2018    on mrcp 8mm    Lung nodule 06/2016    6 mm RLL (6/16); no change (10/17); no change (12/18)    Obesity     peak weight 187 lbs, bmi 33.1 from 4/13; IF 3/18    Osteopenia FRAX 4/12 9.7 and 1.4     DEXA t score 0.6 spine, -1 hip (12/09);  -0.4 spine, -1.3 hip (4/12); -1.2 wrist, -1.6 hip w FRAX 11/2.2 (4/14); -1.0 wrist, -1.6 hip w FRAX 12/2.6 (4/16); -0.7 wrist, -1.6 hip (5/18)    Pancreatitis, chronic ([address] 2018    Psoriasis 2003    on bx Dr. Chiqui Berrios disorder     Anxiety    Sensorineural hearing loss     left side Dr Andrew Wright Venous insufficiency 2003    on dopplers    Zoster 05/2016    left T11-12; mild postherpetic neuralgia     Patient Active Problem List   Diagnosis Code    Colon adenomas 2003, last 2014 Dr Pete Colón D12.6    Hypovitaminosis D E55.9    Venous insufficiency dopplers 2003 I87.2    Osteopenia FRAX 4/14 11 and 2.2 M85.80    Dyslipidemia E78.5    Hypothyroidism due to acquired atrophy of thyroid E03.4    Arthritis, degenerative M19.90    Obesity (BMI 30.0-34. 9) E66.9    Advance directive discussed with patient Z71.89    Primary hypertension I10    Spondylolisthesis, lumbar region M43.16    Spondylosis of lumbar region without myelopathy or radiculopathy M47.816    Scoliosis of lumbar spine M41.9    DDD (degenerative disc disease), lumbar M51.36    S/P AAA repair Z98.890, Z86.79     Past Surgical History:   Procedure Laterality Date    CARDIAC SURG PROCEDURE UNLIST  09/2016    NST negative    CHEST SURGERY PROCEDURE UNLISTED  06/2016    CTA neg PE, 6 mm subpleural nodule    HX AAA REPAIR  07/17/2018    HX Margret Awa      Dr. Remy Egan left    HX CARPAL TUNNEL RELEASE Left     HX CATARACT REMOVAL Bilateral     HX COLONOSCOPY      benign polyp 5/08 Dr Sabi Sapp;  4/13 negative Dr. Cory Bhardwaj HX GI  08/2018    Dr Gaynel Eisenmenger; ERCP s/p removal cbd stone complicated by pamcreatitis    HX LAP CHOLECYSTECTOMY  09/18/2018    Dr. Cristobal Roca  2007    neg MRA/MRI head in [facility] NEUROLOGICAL PROCEDURE UNLISTED  9/10    CT head negative    VASCULAR SURGERY PROCEDURE UNLIST  2007    neg AAA screen [facility]     Current Outpatient Medications   Medication Sig Dispense Refill    Levothyroxine 75 mcg cap Take 1 Tab by mouth daily. 90 Cap 3    ergocalciferol (VITAMIN D2) 50,000 unit capsule Take 1 Cap by mouth every seven (7) days. 12 Cap 3    ezetimibe (ZETIA) 10 mg tablet Take 1 Tab by mouth daily. 90 Tab 3    celecoxib (CELEBREX) 200 mg capsule Take 1 tab by mouth daily as needed for pain with food. 30 Cap 2    diclofenac (VOLTAREN) 1 % gel Apply 4 grams to both knees 3-4 x daily as needed. 5 Each 2    atorvastatin (LIPITOR) 40 mg tablet Take 1 Tab by mouth daily. 90 Tab 3    lisinopril (PRINIVIL, ZESTRIL) 10 mg tablet Take 1 Tab by mouth daily.  90 Tab 3    piroxicam (FELDENE) 10 mg capsule 1 cap po every day with food 30 Cap 2    diazePAM (VALIUM) 10 mg tablet Take 1 Tab by mouth every twelve (12) hours as needed for Anxiety. Max Daily Amount: 20 mg. (Patient taking differently: Take 10 mg by mouth as needed for Anxiety.) 60 Tab 1    oxyCODONE-acetaminophen (PERCOCET) 5-325 mg per tablet Take 1 Tab by mouth every four (4) hours as needed. Max Daily Amount: 6 Tabs. 30 Tab 0    lidocaine (LIDODERM) 5 % Apply patch to the affected area for 12 hours a day and remove for 12 hours a day. 30 Each 5    omeprazole (PRILOSEC) 40 mg capsule Take 1 Cap by mouth daily. (Patient taking differently: Take 40 mg by mouth as needed.) 90 Cap 1    ibuprofen (MOTRIN) 800 mg tablet Take 1 Tab by mouth three (3) times daily (with meals). 45 Tab 0    aspirin 81 mg chewable tablet Take 1 Tab by mouth daily. 90 Tab 3     Facility-Administered Medications Ordered in Other Visits   Medication Dose Route Frequency Provider Last Rate Last Dose    sodium chloride irrigation 0.9 % 500 mL Irrigation    PRN Kim Evans MD         Allergies   Allergen Reactions    Atenolol Other (comments)     Felt poorly    Cozaar [Losartan] Other (comments)     Headaches    Norvasc [Amlodipine] Other (comments)     [place] poorly         Review of Systems    Review of Systems - History obtained from chart review and the patient  General ROS: negative  Psychological ROS: negative  Ophthalmic ROS: negative  Respiratory ROS: negative  Cardiovascular ROS: negative  Gastrointestinal ROS: negative  Musculoskeletal ROS: positive for - pain in back - lower  Neurological ROS: negative  Dermatological ROS: negative  Vascular ROS: negative        Physical   Visit Vitals  /68 (BP 1 Location: Left arm, BP Patient Position: Sitting)   Pulse 68   Resp 17   Ht 5' 4.5\" (1.638 m)   Wt 177 lb (80.3 kg)   BMI 29.91 kg/m²     General:  Alert, cooperative, no distress. Head:  Normocephalic, without obvious abnormality, atraumatic. Eyes:    Conjunctivae/corneas clear. Pupils equal, round, reactive to light. Extraocular movements intact.    Neck:         No bruits   Lungs: Clear to auscultation bilaterally. Heart:  Regular rate and rhythm, S1, S2 normal   Extremities: Extremities normal, atraumatic, no cyanosis or edema. Pulses: 2+ and symmetric all extremities. Skin: Skin color, texture, turgor normal. No rashes or lesions     Vascular studies:  Patent endovascular aortic stent with no leak. Residual sac 3.76 x 3.95cm  Triphasic flow with no stenosis. In March, AAA sac measured 4.01 x 4.07 cm Trv as compared with a measurement of 4.9 x 4.0 cm Trv by CT on 08/07/18. Impression/Plan:     ICD-10-CM ICD-9-CM    1. History of endovascular stent graft for abdominal aortic aneurysm Z95.828 V43.4 DUPLEX AORTA/IVC/ILIAC/GRAFTS COMPLETE   2. AAA (abdominal aortic aneurysm) without rupture (HCC) I71.4 441.4 DUPLEX AORTA/IVC/ILIAC/GRAFTS COMPLETE     No orders of the defined types were placed in this encounter. Follow-up and Dispositions    · Return in about 1 year (around 9/26/2020). GARRETT Royal    Portions of this note have been entered using voice recognition software.

## 2019-09-26 NOTE — PROGRESS NOTES
1. Have you been to an emergency room or urgent care clinic since your last visit? NO    Hospitalized since your last visit? If yes, where, when, and reason for visit? NO  2. Have you seen or consulted any other health care providers outside of the [facility] since your last visit including any procedures, health maintenance items. If yes, where, when and reason for visit?  NO

## 2019-09-30 NOTE — PROGRESS NOTES
78 y.o. WHITE OR  female who presents for evaluation.     Denies any cardiovascular complaints. No set exercise outside of adls and walking periodically.   She ha snot been following her bp    She continues to f/u w vascular for the aaa repair     No gi or gu complaints outside of nocturia although no incontinence issues    She continues to see Dr Titus Boothe and had epidural July and Sept, has f/u in Nov    [address]: 4/1/14, 4/8/14, 4/14/16, 4/25/17, 5/7/18, 10/2/19    Past Medical History:   Diagnosis Date    AAA (abdominal aortic aneurysm) without rupture ([address]     AAA repair 7/2018 Dr Juan Arellano adenomas     2003 Dr Agus Dodd; 4/13 Dr Garcia Verduzco    DJD (degenerative joint disease)     Dyslipidemia     FHx: heart disease     Gastritis 2006    on EGD Dr. Treva Wasserman, neg sprue    Hypertension     Hypothyroidism 2007    Dr. Zia Burnett Hypovitaminosis D     IPMN (intraductal papillary mucinous neoplasm) 06/2018    on mrcp 8mm    Lung nodule 06/2016    6 mm RLL (6/16); no change (10/17); no change (12/18)    Obesity     peak weight 187 lbs, bmi 33.1 from 4/13; IF 3/18    Osteopenia FRAX 4/12 9.7 and 1.4     DEXA t score 0.6 spine, -1 hip (12/09);  -0.4 spine, -1.3 hip (4/12); -1.2 wrist, -1.6 hip w FRAX 11/2.2 (4/14); -1.0 wrist, -1.6 hip w FRAX 12/2.6 (4/16); -0.7 wrist, -1.6 hip (5/18)    Pancreatitis, chronic ([address] 2018    Psoriasis 2003    on bx Dr. Cisco Hernandez Psychiatric disorder     Anxiety    Sensorineural hearing loss     left side Dr Alonso Joyce Venous insufficiency 2003    on dopplers    Zoster 05/2016    left T11-12; mild postherpetic neuralgia     Past Surgical History:   Procedure Laterality Date    CARDIAC SURG PROCEDURE UNLIST  09/2016    NST negative    CHEST SURGERY PROCEDURE UNLISTED  06/2016    CTA neg PE, 6 mm subpleural nodule    HX AAA REPAIR  07/17/2018    HX Keke Plan      Dr. Gm Butst left    HX CARPAL TUNNEL RELEASE Left     HX CATARACT REMOVAL Bilateral     HX COLONOSCOPY      benign polyp 5/08 Dr Kat Pelayo;  4/13 negative Dr. Rosa M Guzman HX GI  08/2018    Dr Yajaira Cates; ERCP s/p removal cbd stone complicated by pamcreatitis    HX LAP CHOLECYSTECTOMY  09/18/2018    Dr. Alejandra Cordova  2007    neg MRA/MRI head in [facility] NEUROLOGICAL PROCEDURE UNLISTED  9/10    CT head negative    VASCULAR SURGERY PROCEDURE UNLIST  2007    neg AAA screen in [facility]     Social History     Socioeconomic History    Marital status:      Spouse name: Not on file    Number of children: Not on file    Years of education: Not on file    Highest education level: Not on file   Occupational History    Occupation: ret office mgr   Social Needs    Financial resource strain: Not on file    Food insecurity:     Worry: Not on file     Inability: Not on file    Transportation needs:     Medical: Not on file     Non-medical: Not on file   Tobacco Use    Smoking status: Never Smoker    Smokeless tobacco: Never Used   Substance and Sexual Activity    Alcohol use: Yes     Comment: occasionally    Drug use: No    Sexual activity: Not on file   Lifestyle    Physical activity:     Days per week: Not on file     Minutes per session: Not on file    Stress: Not on file   Relationships    Social connections:     Talks on phone: Not on file     Gets together: Not on file     Attends Islam service: Not on file     Active member of club or organization: Not on file     Attends meetings of clubs or organizations: Not on file     Relationship status: Not on file    Intimate partner violence:     Fear of current or ex partner: Not on file     Emotionally abused: Not on file     Physically abused: Not on file     Forced sexual activity: Not on file   Other Topics Concern    Not on file   Social History Narrative    Not on file     Allergies   Allergen Reactions    Atenolol Other (comments) Felt poorly    Cozaar [Losartan] Other (comments)     Headaches    Norvasc [Amlodipine] Other (comments)     Felt poorly       Current Outpatient Medications   Medication Sig    diazePAM (VALIUM) 10 mg tablet Take 1 Tab by mouth every twelve (12) hours as needed for Anxiety. Max Daily Amount: 20 mg.  Levothyroxine 75 mcg cap Take 1 Tab by mouth daily.  ergocalciferol (VITAMIN D2) 50,000 unit capsule Take 1 Cap by mouth every seven (7) days.  ezetimibe (ZETIA) 10 mg tablet Take 1 Tab by mouth daily.  celecoxib (CELEBREX) 200 mg capsule Take 1 tab by mouth daily as needed for pain with food.  atorvastatin (LIPITOR) 40 mg tablet Take 1 Tab by mouth daily.  lisinopril (PRINIVIL, ZESTRIL) 10 mg tablet Take 1 Tab by mouth daily.  piroxicam (FELDENE) 10 mg capsule 1 cap po every day with food    lidocaine (LIDODERM) 5 % Apply patch to the affected area for 12 hours a day and remove for 12 hours a day.  aspirin 81 mg chewable tablet Take 1 Tab by mouth daily.  diclofenac (VOLTAREN) 1 % gel Apply 4 grams to both knees 3-4 x daily as needed.  oxyCODONE-acetaminophen (PERCOCET) 5-325 mg per tablet Take 1 Tab by mouth every four (4) hours as needed. Max Daily Amount: 6 Tabs.  omeprazole (PRILOSEC) 40 mg capsule Take 1 Cap by mouth daily. (Patient taking differently: Take 40 mg by mouth as needed.)    ibuprofen (MOTRIN) 800 mg tablet Take 1 Tab by mouth three (3) times daily (with meals). No current facility-administered medications for this visit.       Facility-Administered Medications Ordered in Other Visits   Medication Dose Route Frequency    sodium chloride irrigation 0.9 % 500 mL Irrigation    PRN     REVIEW OF SYSTEMS:  Dr. Odette Freeman 2014, mammo 5/18, DEXA 5/19, colo 4/13 Dr Jovan Ladd, sees Dr Beverly Plascenciamarshall  no vision change or eye pain  Oral  no mouth pain, tongue or tooth problems  Ears  no hearing loss, ear pain, fullness  Throat  no swallowing problems  Cardiac  no CP, PND, orthopnea, edema, palpitations or syncope  Chest  no breast masses  Resp  no wheezing, chronic coughing, dyspnea  GI  no heartburn, nausea, vomiting, change in bowel habits, bleeding, hemorrhoids  Urinary  no dysuria, hematuria, flank pain, urgency, frequency  Neuro  no focal weakness, numbness, paresthesias, incoordination, ataxia, involuntary movements    Visit Vitals  BP 90/52   Pulse 79   Temp 98.3 °F (36.8 °C) (Oral)   Resp 14   Ht 5' 4.5\" (1.638 m)   Wt 179 lb (81.2 kg)   SpO2 95%   BMI 30.25 kg/m²   A&O x3  Affect is appropriate. Mood stable  No apparent distress  Anicteric, no JVD, adenopathy or thyromegaly. No carotid bruits or radiated murmur  Lungs clear to auscultation, no wheezes or rales  Heart showed regular rate and rhythm. No murmur, rubs, gallops  Abdomen soft nondistended, no hepatosplenomegaly or masses. Extremities without edema.   Pulses 1-2+ symmetrically    LABS  From 3/10 showed                         vit d 38.0  From 9/10 showed                            wbc 7.2, hb 14.3, plt 158  From 3/12 showed   gluc 88, cr 0.87, gfr 66, alt 15, tsh 1.04, ldl-p 1515  From 9/12 showed                                                    tsh 1.69, ldl-p 1469, chol 152, tg 165, hdl 42, ldl-c 77  From 3/13 showed   gluc 83, cr 0.91, gfr 62, alt 10,     ldl-p 1274, chol 124, tg 137, hdl 37, ldl-c 60,    wbc 7.0, hb 14.3, plt 164  From 9/13 showed                chol 147, tg 139, hdl 39, ldl-c 80  From 3/14 showed   gluc 89, cr 1.02, gfr 53, alt<5,  tsh 1.66,         chol 148, tg 132, hdl 36, ldl-c 86,    wbc 6.7, hb 14.4, plt 179, vit d 45.4   From 10/14 showed                chol 137, tg 117, hdl 40, ldl-c 74  From 4/15 showed   gluc 91, cr 1.24, gfr 42, alt 30, tsh 0.64,           wbc 6.8, hb 14.8, plt 176  From 10/15 showed gluc 93, cr 1.13, gfr 47,            tsh 0.32,          chol 139, tg 131, hdl 36, ldl-c 77   From 4/16 showed   gluc 86, cr 1.00, gfr 54, alt 24,            chol 142, tg 137, hdl 41, ldl-c 74,    wbc 6.4, hb 13.9, plt 203, ua pro 30  From 6/16 showed   gluc 97, cr 1.20,              wbc 7.3, hb 14.1, plt 216, ck/trop neg  From 10/16 showed gluc 78, cr 1.03, gfr 52, alt 21, tsh 1.21,          chol 145, tg 151, hdl 43, ldl-c 72,               From 4/17 showed                tsh 2.57, ft4 1.10  From 10/17 showed gluc 79, cr 1.03, gfr 52, alt 27,            chol 199, tg 161, hdl 45, ldl-c 122,  wbc 6.3, hb 14.7, plt 208  From 5/18 showed                tsh 5.23, ft4 1.10,     chol 320, tg 196, hdl 40, ldl-c 221,              vit d 57.0  From 8/18 showed   glu 141, cr 0.87, gfr>60, alt 34,            wb 10.9, hb 13.0, plt 179,  lip 8208  From 11/18 showed      tsh 4.20, ft4 1.00,    chol 133, tg 171, hdl 41, ldl-c 58,               vit d 56.5  From 3/19 showed   gluc 81, cr 1.13, gfr 46, alt 23,  tsh 3.50, ft4 1.10,              wbc 5.7, hb 14.2, plt 195,  fe 91, %sat 34, b12 306, fol 6, spep neg    Results for orders placed or performed during the hospital encounter of 09/23/19   LIPID PANEL   Result Value Ref Range    LIPID PROFILE          Cholesterol, total 151 <200 MG/DL    Triglyceride 135 <150 MG/DL    HDL Cholesterol 42 40 - 60 MG/DL    LDL, calculated 82 0 - 100 MG/DL    VLDL, calculated 27 MG/DL    CHOL/HDL Ratio 3.6 0 - 5.0     TSH 3RD GENERATION   Result Value Ref Range    TSH 1.40 0.36 - 3.74 uIU/mL     Patient Active Problem List   Diagnosis Code    Colon adenomas 2003, last 2014 Dr Karen Solano D12.6    Hypovitaminosis D E55.9    Venous insufficiency dopplers 2003 I87.2    Osteopenia FRAX 4/14 11 and 2.2 M85.80    Dyslipidemia E78.5    Hypothyroidism due to acquired atrophy of thyroid E03.4    Arthritis, degenerative M19.90    Obesity (BMI 30.0-34. 9) E66.9    Advance directive discussed with patient Z70.80    Primary hypertension I10    Spondylosis of lumbar region without myelopathy or radiculopathy M47.816    S/P AAA repair Z98.890, Z86.79     ASSESSMENT AND PLAN:  1. General.  F/U Dr. Sangita Whipple  2. Hypertension. Continue current regimen. 3.  Hypovit D. Continue current regimen. 4.  Colon adenomas. F/U colo 2018? Fiber  5. Overweight. Lifestyle and dietary measures. Portion control reiterated. 6.  Lung nodule. No further f/u recommended  7. Dyslipidemia. Lipitor  8. Hypothyroidism. Continue current regimen. 9.  Osteopenia. Wt bearing exercise, ca/D, DEXA 2020 or later  10. Possible IPMN. F/u mri to be done  11. Spine. Per Dr Snyder        RTC 3/20    Above conditions discussed at length and patient vocalized understanding.   All questions answered to patient satisfaction

## 2019-09-30 NOTE — PROGRESS NOTES
This is a Subsequent Medicare Annual Wellness Visit    I have reviewed the patient's medical history in detail and updated the computerized patient record.      History     Past Medical History:   Diagnosis Date    AAA (abdominal aortic aneurysm) without rupture (HCC)     AAA repair 7/2018 Dr Laura Valerio adenomas     2003 Dr Dorinda Cano; 4/13 Dr Nickolas Baldwin    DJD (degenerative joint disease)     Dyslipidemia     FHx: heart disease     Gastritis 2006    on EGD Dr. Abi Smith, neg sprue    Hypertension     Hypothyroidism 2007    Dr. Mario King Hypovitaminosis D     IPMN (intraductal papillary mucinous neoplasm) 06/2018    on mrcp 8mm    Lung nodule 06/2016    6 mm RLL (6/16); no change (10/17); no change (12/18)    Obesity     peak weight 187 lbs, bmi 33.1 from 4/13; IF 3/18    Osteopenia FRAX 4/12 9.7 and 1.4     DEXA t score 0.6 spine, -1 hip (12/09);  -0.4 spine, -1.3 hip (4/12); -1.2 wrist, -1.6 hip w FRAX 11/2.2 (4/14); -1.0 wrist, -1.6 hip w FRAX 12/2.6 (4/16); -0.7 wrist, -1.6 hip (5/18)    Pancreatitis, chronic ([address] 2018    Psoriasis 2003    on bx Dr. Mat Brito Sensorineural hearing loss     left side Dr Dionna Gongora Venous insufficiency 2003    on dopplers    Zoster 05/2016    left T11-12; mild postherpetic neuralgia      Past Surgical History:   Procedure Laterality Date    CARDIAC SURG PROCEDURE UNLIST  09/2016    NST negative    CHEST SURGERY PROCEDURE UNLISTED  06/2016    CTA neg PE, 6 mm subpleural nodule    HX AAA REPAIR  07/17/2018    HX Ratna Gandhi left    HX CARPAL TUNNEL RELEASE Left     HX CATARACT REMOVAL Bilateral     HX COLONOSCOPY      benign polyp 5/08 Dr Dorinda Cano;  4/13 negative Dr. Tommye Meckel HX GI  08/2018    Dr Kyle Sal; ERCP s/p removal cbd stone complicated by pamcreatitis    HX LAP CHOLECYSTECTOMY  09/18/2018    Dr. Azul Parham  2007    neg M[facility]    NEUROLOGICAL PROCEDURE UNLISTED  9/10    CT head negative    VASCULAR SURGERY PROCEDURE UNLIST  2007    neg AAA screen [facility]     Current Outpatient Medications   Medication Sig Dispense Refill    diazePAM (VALIUM) 10 mg tablet Take 1 Tab by mouth every twelve (12) hours as needed for Anxiety. Max Daily Amount: 20 mg. 60 Tab 1    Levothyroxine 75 mcg cap Take 1 Tab by mouth daily. 90 Cap 3    ergocalciferol (VITAMIN D2) 50,000 unit capsule Take 1 Cap by mouth every seven (7) days. 12 Cap 3    ezetimibe (ZETIA) 10 mg tablet Take 1 Tab by mouth daily. 90 Tab 3    celecoxib (CELEBREX) 200 mg capsule Take 1 tab by mouth daily as needed for pain with food. 30 Cap 2    atorvastatin (LIPITOR) 40 mg tablet Take 1 Tab by mouth daily. 90 Tab 3    lisinopril (PRINIVIL, ZESTRIL) 10 mg tablet Take 1 Tab by mouth daily. 90 Tab 3    piroxicam (FELDENE) 10 mg capsule 1 cap po every day with food 30 Cap 2    lidocaine (LIDODERM) 5 % Apply patch to the affected area for 12 hours a day and remove for 12 hours a day. 30 Each 5    aspirin 81 mg chewable tablet Take 1 Tab by mouth daily. 90 Tab 3    diclofenac (VOLTAREN) 1 % gel Apply 4 grams to both knees 3-4 x daily as needed. 5 Each 2    oxyCODONE-acetaminophen (PERCOCET) 5-325 mg per tablet Take 1 Tab by mouth every four (4) hours as needed. Max Daily Amount: 6 Tabs. 30 Tab 0    omeprazole (PRILOSEC) 40 mg capsule Take 1 Cap by mouth daily. (Patient taking differently: Take 40 mg by mouth as needed.) 90 Cap 1    ibuprofen (MOTRIN) 800 mg tablet Take 1 Tab by mouth three (3) times daily (with meals).  45 Tab 0     Facility-Administered Medications Ordered in Other Visits   Medication Dose Route Frequency Provider Last Rate Last Dose    sodium chloride irrigation 0.9 % 500 mL Irrigation    PRN Kranthi Kinney MD         Allergies   Allergen Reactions    Atenolol Other (comments)     Felt poorly    Cozaar [Losartan] Other (comments) Headaches    Norvasc [Amlodipine] Other (comments)     Brandon poorly       Family History   Problem Relation Age of Onset    Breast Cancer Mother     Cancer Mother     Heart Disease Father      Social History     Tobacco Use    Smoking status: Never Smoker    Smokeless tobacco: Never Used   Substance Use Topics    Alcohol use: Yes     Comment: occasionally     Depression Risk Factor Screening:     3 most recent PHQ Screens 3/21/2019   Little interest or pleasure in doing things Not at all   Feeling down, depressed, irritable, or hopeless Not at all   Total Score PHQ 2 0     SCREENINGS  Colonoscopy last done 4/13 Dr Clementine Homans last done 5/18  DEXA last done 5/18  Gyn last done Dr Mali Zhao 1/14    Immunization History   Administered Date(s) Administered    (RETIRED) Pneumococcal Vaccine (Unspecified Type) 09/01/2008    Hep A Vaccine 09/09/2008, 05/07/2009    Hepatitis A Vaccine 09/01/2008    Influenza High Dose Vaccine PF 10/17/2016, 11/13/2018    Influenza Vaccine (Tri) Adjuvanted 10/02/2019    Pneumococcal Conjugate (PCV-13) 05/10/2017    TD Vaccine 09/01/2008    Tdap 05/10/2017    Typhoid Vaccine 09/01/2008    Yellow Fever Vaccine 09/01/2008    Zoster Recombinant 09/18/2018    Zoster Vaccine, Live 01/01/2008     Alcohol Risk Factor Screening: On any occasion during the past 3 months, have you had more than 3 drinks containing alcohol? No    Do you average more than 7 drinks per week? No      Functional Ability and Level of Safety:     Hearing Loss   normal-to-mild    Vision - no acute complaints and is followed by Dr. Henrik Cuenca [facility]   Self-care. Requires assistance with: no ADLs    Fall Risk     Fall Risk Assessment, last 12 mths 7/31/2019   Able to walk? Yes   Fall in past 12 months?  No   Fall with injury? -   Number of falls in past 12 months -   Fall Risk Score -     Abuse Screen   Patient is not abused    Review of Systems   A comprehensive review of systems was negative except for that written in the HPI. Physical Examination     Visit Vitals  /62   Pulse 79   Temp 98.3 °F (36.8 °C) (Oral)   Resp 14   Ht 5' 4.5\" (1.638 m)   Wt 179 lb (81.2 kg)   SpO2 95%   BMI 30.25 kg/m²       Evaluation of Cognitive Function:  Mood/affect:  happy  Appearance: age appropriate, overweight, well dressed and within normal Limits  Family member/caregiver input: na    Patient Care Team:  Angie Park MD as PCP - General (Internal Medicine)  Martell Ferrell MD (Surgery)  Serge Mason RN as [address] (Internal Medicine)  [place], [address] [place] [address] (Physician Assistant)  Leopold Pita, MD as Surgeon (Surgery)  GARRETT Estrada (Physician Assistant)    Advice/Referrals/Counseling   Education and counseling provided:  Are appropriate based on today's review and evaluation  End-of-Life planning (with patient's consent)  Pneumococcal Vaccine  Screening Mammography  Cardiovascular screening blood test  Bone mass measurement (DEXA)  Diabetes screening test    Assessment/Plan       ICD-10-CM ICD-9-CM    1. Medicare annual wellness visit, subsequent Z00.00 V70.0    2. Postherpetic neuralgia B02.29 053.19 diazePAM (VALIUM) 10 mg tablet   3. Medication management Z79.899 V58.69 PAIN MGMT PANEL W/REFL, UR   4. Encounter for immunization Z23 V03.89 INFLUENZA VACCINE INACTIVATED (IIV), SUBUNIT, ADJUVANTED, IM      ADMIN INFLUENZA VIRUS VAC   5. Venous insufficiency dopplers 2003 I87.2 459.81    6. S/P AAA repair Z98.890 V45.89     Z86.79     7. Primary hypertension I10 401.9 CBC W/O DIFF      METABOLIC PANEL, COMPREHENSIVE   8. Obesity (BMI 30.0-34. 9) E66.9 278.00    9. Hypovitaminosis D E55.9 268.9    10. Hypothyroidism due to acquired atrophy of thyroid E03.4 244.8      246.8    11. Dyslipidemia E78.5 272.4    12. Colon adenomas 2003, last 2014 Dr Pratibha Huggins D12.6 211.3    13. Osteoarthritis, unspecified osteoarthritis type, unspecified site M19.90 715.90    14. Advanced directives, counseling/discussion Z71.89 V65.49 ADVANCE CARE PLANNING FIRST 30 MINS   15. Screening for alcoholism Z13.39 V79.1 HI ANNUAL ALCOHOL SCREEN 15 MIN   16. Screening for depression Z13.31 V79.0 [facility]   17. Screening for diabetes mellitus Z13.1 V77.1    18. Encounter for screening mammogram for malignant neoplasm of breast Z12.31 V76.12 CANCELED: SARWAT MAMMO BI SCREENING INCL CAD   23. Body mass index 30.0-30.9, adult Z68.30 V85.30 HI BEHAVIOR  OBESITY 15M   20. IPMN (intraductal papillary mucinous neoplasm) D49.0 239.0 MRI ABD W MRCP W CONT     current treatment plan is effective, no change in therapy  lab results and schedule of future lab studies reviewed with patient  reviewed diet, exercise and weight control. End of life discussion undertaken.   Will work on amd  Colonoscopy beyond age  [de-identified] to be scheduled  DEXA to be scheduled 2020 or later

## 2019-10-02 ENCOUNTER — DOCUMENTATION ONLY (OUTPATIENT)
Dept: INTERNAL MEDICINE CLINIC | Age: 79
End: 2019-10-02

## 2019-10-02 ENCOUNTER — HOSPITAL ENCOUNTER (OUTPATIENT)
Dept: LAB | Age: 79
Discharge: HOME | End: 2019-10-02
Payer: MEDICARE

## 2019-10-02 ENCOUNTER — OFFICE VISIT (OUTPATIENT)
Dept: INTERNAL MEDICINE CLINIC | Age: 79
End: 2019-10-02

## 2019-10-02 VITALS
SYSTOLIC BLOOD PRESSURE: 104 MMHG | HEART RATE: 79 BPM | TEMPERATURE: 98.3 F | OXYGEN SATURATION: 95 % | RESPIRATION RATE: 14 BRPM | HEIGHT: 65 IN | DIASTOLIC BLOOD PRESSURE: 62 MMHG | BODY MASS INDEX: 29.82 KG/M2 | WEIGHT: 179 LBS

## 2019-10-02 DIAGNOSIS — Z86.79 S/P AAA REPAIR: ICD-10-CM

## 2019-10-02 DIAGNOSIS — E66.9 OBESITY (BMI 30.0-34.9): ICD-10-CM

## 2019-10-02 DIAGNOSIS — Z79.899 MEDICATION MANAGEMENT: ICD-10-CM

## 2019-10-02 DIAGNOSIS — Z12.31 ENCOUNTER FOR SCREENING MAMMOGRAM FOR MALIGNANT NEOPLASM OF BREAST: ICD-10-CM

## 2019-10-02 DIAGNOSIS — I87.2 VENOUS INSUFFICIENCY: ICD-10-CM

## 2019-10-02 DIAGNOSIS — Z00.00 MEDICARE ANNUAL WELLNESS VISIT, SUBSEQUENT: Primary | ICD-10-CM

## 2019-10-02 DIAGNOSIS — Z98.890 S/P AAA REPAIR: ICD-10-CM

## 2019-10-02 DIAGNOSIS — Z71.89 ADVANCED DIRECTIVES, COUNSELING/DISCUSSION: ICD-10-CM

## 2019-10-02 DIAGNOSIS — B02.29 POSTHERPETIC NEURALGIA: ICD-10-CM

## 2019-10-02 DIAGNOSIS — I10 ESSENTIAL HYPERTENSION WITH GOAL BLOOD PRESSURE LESS THAN 140/90: ICD-10-CM

## 2019-10-02 DIAGNOSIS — E55.9 HYPOVITAMINOSIS D: ICD-10-CM

## 2019-10-02 DIAGNOSIS — Z23 ENCOUNTER FOR IMMUNIZATION: ICD-10-CM

## 2019-10-02 DIAGNOSIS — Z13.1 SCREENING FOR DIABETES MELLITUS: ICD-10-CM

## 2019-10-02 DIAGNOSIS — D49.0 IPMN (INTRADUCTAL PAPILLARY MUCINOUS NEOPLASM): ICD-10-CM

## 2019-10-02 DIAGNOSIS — E78.5 DYSLIPIDEMIA: ICD-10-CM

## 2019-10-02 DIAGNOSIS — Z13.31 SCREENING FOR DEPRESSION: ICD-10-CM

## 2019-10-02 DIAGNOSIS — D12.6 COLON ADENOMAS: ICD-10-CM

## 2019-10-02 DIAGNOSIS — E03.4 HYPOTHYROIDISM DUE TO ACQUIRED ATROPHY OF THYROID: ICD-10-CM

## 2019-10-02 DIAGNOSIS — Z13.39 SCREENING FOR ALCOHOLISM: ICD-10-CM

## 2019-10-02 DIAGNOSIS — M19.90 OSTEOARTHRITIS, UNSPECIFIED OSTEOARTHRITIS TYPE, UNSPECIFIED SITE: ICD-10-CM

## 2019-10-02 PROBLEM — M41.9 SCOLIOSIS OF LUMBAR SPINE: Status: RESOLVED | Noted: 2018-04-16 | Resolved: 2019-10-02

## 2019-10-02 PROBLEM — M43.16 SPONDYLOLISTHESIS, LUMBAR REGION: Status: RESOLVED | Noted: 2018-04-16 | Resolved: 2019-10-02

## 2019-10-02 PROBLEM — M51.36 DDD (DEGENERATIVE DISC DISEASE), LUMBAR: Status: RESOLVED | Noted: 2018-04-16 | Resolved: 2019-10-02

## 2019-10-02 PROCEDURE — 80346 BENZODIAZEPINES1-12: CPT

## 2019-10-02 PROCEDURE — 80307 DRUG TEST PRSMV CHEM ANLYZR: CPT

## 2019-10-02 RX ORDER — DIAZEPAM 10 MG/1
10 TABLET ORAL
Qty: 60 TAB | Refills: 1 | Status: SHIPPED | OUTPATIENT
Start: 2019-10-02 | End: 2022-02-20

## 2019-10-02 NOTE — ACP (ADVANCE CARE PLANNING)
Advance Care Planning    Advance Care Planning (ACP) Provider Note - Comprehensive     Date of ACP Conversation: 10/02/19  Persons included in Conversation:  patient  Length of ACP Conversation in minutes:  16 minutes    Authorized Decision Maker (if patient is incapable of making informed decisions): This person is:     Other Legally Authorized Decision Maker (e.g. Next of Kin)          General ACP for ALL Patients with Decision Making Capacity:   Importance of advance care planning, including choosing a healthcare agent to communicate patient's healthcare decisions if patient lost the ability to make decisions, such as after a sudden illness or accident  Understanding of the healthcare agent role was assessed and information provided    Review of Existing Advance Directive: They have the forms at home but haven't filled out as yet    For Serious or Chronic Illness:  Understanding of medical condition    Understanding of CPR, goals and expected outcomes, benefits and burdens discussed.     Interventions Provided:  Recommended completion of Advance Directive form after review of ACP materials and conversation with prospective healthcare agent   Recommended communicating the plan and making copies for the healthcare agent, personal physician, and others as appropriate (e.g., health system)  Recommended review of completed ACP document annually or upon change in health status

## 2019-10-02 NOTE — LETTER
Name:.Betty Kee [facility]:[birth year] MR #:[id number] [address] [facility] Page 1 of 5 CONTROLLED SUBSTANCE AGREEMENT I may be prescribed medications that are controlled substances as part  of my treatment plan for management of my medical condition(s). The goal of my treatment plan is to maintain and/or improve my health and wellbeing. Because controlled substances have an increased risk of abuse or harm, continual re-evaluation is needed determine if the goals of my treatment plan are being met for my safety and the safety of others. I, Renetta Cabral  am entering into this Controlled Substance Agreement with my provider, Melanie Hernandez at [place] [address] . I understand that successful treatment requires mutual trust and honesty between me and my provider. I understand that there are state and federal laws and regulations which apply to the medications that my provider may prescribe that must be followed. I understand there are risks and benefits ts of taking the medicines that my provider may prescribe. I understand and agree that following this Agreement is necessary in continuing my provider-patient relationship and success of my treatment plan. As a part of my treatment plan, I agree to the following: COMMUNICATION: 
 
1. I will communicate fully with my provider about my medical condition(s), including the effect on my daily life and how well my medications are helping. I will tell my provider all of the medications that I take for any reason, including medications I receive from another health care provider, and will notify my provider about all issues, problems or concerns, including any side effects, which may be related to my medications. I understand that this information allows my provider to adjust my treatment plan to help manage my medical condition. I understand that this information will become part of my permanent medical record. 2. I will notify my provider if I have a history of alcohol/drug misuse/addiction or if I have had treatment for alcohol/drug addiction in the past, or if I have a new problem with or concern about alcohol/drug use/addiction, because this increases the likelihood of high risk behaviors and may lead to serious medical conditions. 3. Females Only: I will notify my provider if I am or become pregnant, or if I intend to become pregnant, or if I intend to breastfeed. I understand that communication of these issues with my provider is important, due to possible effects my medication could have on an unborn fetus or breastfeeding child. Initials_____ Name:.Betty Moody BVR:7/06/1956 MR #:[id number] [address] [facility] Page 2 of 5 MISUSE OF MEDICATIONS / DRUGS: 
 
1. I agree to take all controlled substances as prescribed, and will not misuse or abuse any controlled substances prescribed by my provider. For my safety, I will not increase the amount of medicine I take without first talking with and getting permission from my provider. 2. If I have a medical emergency, another health care provider may prescribe me medication. If I seek emergency treatment, I will notify my provider within seventy-two (72) hours. 3. I understand that my provider may discuss my use and/or possible misuse/abuse of controlled substances and alcohol, as appropriate, with any health care provider involved in my care, pharmacist or legal authority. ILLEGAL DRUGS: 
 
1. I will not use illegal drugs of any kind, including but not limited to marijuana, heroin, cocaine, or any prescription drug which is not prescribed to me. DRUG DIVERSION / PRESCRIPTION FRAUD: 
 
1. I will not share, sell, trade, give away, or otherwise misuse my prescriptions or medications. 2. I will not alter any prescriptions provided to me by my provider.  
 
SINGLE PROVIDER: 
 
 1. I agree that all controlled substances that I take will be prescribed only by my provider (or his/her covering provider) under this Agreement. This agreement does not prevent me from seeking emergency medical treatment or receiving pain management related to a surgery. PROTECTING MEDICATIONS: 
 
1. I am responsible for keeping my prescriptions and medications in a safe and secure place including safeguarding them from loss or theft. I understand that lost, stolen or damaged/destroyed prescriptions or medications will not be replaced. Initials____ Name:.Betty Adams VMV:[birth year] MR #:[id number] [address] [place] [address] Page 3 of 5 PRESCRIPTION RENEWALS/REFILLS: 
 
1. I will follow my controlled substance medication schedule as prescribed by my provider. 2. I understand and agree that I will make any requests for renewals or refills of my prescriptions only at the time of an office visit or during my providers regular office hours subject to the prescription refill requirements of the individual practice. 3. I understand that my provider may not call in prescriptions for controlled substances to my pharmacy. 4. I understand that my provider may adjust or discontinue these medications as deemed appropriate for my medical treatment plan. This Agreement does not guarantee the prescription of controlled medications. 5. I agree that if my medications are adjusted or discontinued, I will properly dispose of any remaining medications. I understand that I will be required to dispose of any remaining controlled medications prior to being provided with any prescriptions for other controlled medications. 6. I understand that the renewal of my prescription depends on my medical condition, my consistent participation, and my adherence with my treatment plan and this Agreement.  
 
7. I understand that if I do not keep an appointment with my provider, I may not receive a renewal or refill for my controlled substance medication. PRESCRIPTION MONITORING / DRUG TESTIN. I understand that my provider may require me to provide urine, saliva or blood for testing at any time. I understand that this testing will be used to monitor for safety and adherence with my treatment plan and this Agreement. 2. I understand that my provider may ask me to provide an observed urine specimen, which means that a nurse or other health care provider may watch me provide urine, and I agree to cooperate if I am asked to provide an observed specimen. 3. I understand that if I do not provide urine, saliva or blood samples within two (2) hours of my providers request, or other timeframe decided by my provider, my treatment plan could be changed, or my prescriptions and medications may be changed or ended. 4. I understand that urine, saliva and blood test results will be a part of my permanent medical record. Initials_____ Name:.Betty Moody EJV: MR #:[id number] [address] [facility] Page 4 of 5 
 
5. I understand that my provider is required to obtain a copy of my State Prescription Monitoring Program () Report at any time in order to safely prescribe medications. 6. I will bring all of my prescribed controlled substance medications in their original bottles to all of my scheduled appointments. 7. I understand that my provider may ask me to come to the practice with all of my prescribed medications for a random pill count at any time. I agree to cooperate if I am asked to come in for a random pill count. I understand that if I do not arrive in the timeframe decided by my provider, my treatment plan could be changed, or my prescriptions and medications may be changed or ended.  
 
COOPERATION WITH INVESTIGATIONS: 
 
1. I authorize my provider and my pharmacy to cooperate fully with any local, state, or federal law enforcement agency in the investigation of any possible misuse, sale, or other diversion of my controlled substance prescriptions or medications. RISKS: 
 
1. I understand that my level of consciousness may be affected from the use of controlled substances, and I understand that there are risks, benefits, effects and potential alternatives (including no treatment) to the medications that my provider has prescribed. 2. I understand that I may become drowsy, tired, dizzy, constipated, and sick to my stomach, or have changes in my mood or in my sleep while taking my medications. I have talked with my provider about these possible side effects, risks, benefits, and alternative treatments, and my provider has answered all of my questions. 3. I understand that I should not suddenly stop taking my medications without first speaking with my provider. I understand that if I suddenly stop taking my medications, I may experience nausea, vomiting, sweating,anxiety, sleeplessness, itching or other uncomfortable feelings. 4. I will not take my medications with alcohol of any kind, including beer, wine or liquor. I understand that drinking alcohol with my medications increases the chances of side effects, including breathing problems or even death. 5. I understand that if I have a history of alcoholism or other drug addiction I may be at increased risk of addiction to my medications. Signs of addiction might include craving, compulsive use, and continued use despite harm. Since addiction is a disease, I understand my provider may decide to change my medications and refer me to appropriate treatment services. I understand that this information would become part of my permanent medical record. Initials_____ Name:.Betty Coker XN MR #:[id number] [address] [place] [address] Page 5 of 5  
 
 
 
1. I understand that if I do not adhere to this Agreement in any way, my provider may change my prescriptions, stop prescribing controlled substances or end our provider-patient relationship. 2. If my provider decides to stop prescribing medication, or decides to end our provider-patient relationship,my provider may require that I taper my medications slowly. If necessary, my provider may also provide a prescription for other medications to treat my withdrawal symptoms. UNDERSTANDING THIS AGREEMENT: 
 
I understand that my provider may adjust or stop my prescriptions for controlled substances based on my medical condition and my treatment plan. I understand that this Agreement does not guarantee that I will be prescribed medications or controlled substances. I understand that controlled substances may be just one part 
of my treatment plan. My initial on each page and my signature below shows that I have read each page of this Agreement, I have had an opportunity to ask questions, and all of my questions have been answered to my satisfaction by my provider. By signing below, I agree to comply with this Agreement, and I understand that if I do not follow the Agreements listed above, my provider may stop 
 
_________________________________________  Date/Time 10/2/2019 8:08 AM   
             (Patient Signature) 
 
________________________________________    Date/Time 10/2/2019 8:08 AM 
 (Parent or Guardian Signature if <18 yrs) 
 
_________________________________________ Date/Time 10/2/2019 8:08 AM 
 (Provider Signature)

## 2019-10-02 NOTE — PROGRESS NOTES
Discussion about weight loss    Body mass index is 30.25 kg/m². Discussion about modifying behaviors regarding diet and exercise undertaken    Increase activity as tolerated   - she is limited by her ortho/spine issues    Cut back carbs and fatty foods.     Calorie counting would be ideal   - she's trying to do these as best she can    Option of appetite suppressants discussed briefly and declined   - due to concerns about sfx and cost    Discussed sending to nutritionist for further teaching declined    Intermittent fasting discussed at length, concepts explained, risks and benefits elaborated upon     - she was unable to do this    We set initial target of up to 5% wt loss as being realistic over the next 6-12 months and possibly aiming for higher than that in the future     Will come back for reevaluation and further management at subsequent visits which will be determined by patient response    Total time 15 min

## 2019-10-02 NOTE — PATIENT INSTRUCTIONS
Vaccine Information Statement Influenza (Flu) Vaccine (Inactivated or Recombinant): What You Need to Know Many Vaccine Information Statements are available in Vietnamese and other languages. See www.immunize.org/vis Hojas de información sobre vacunas están disponibles en español y en muchos otros idiomas. Visite www.immunize.org/vis 1. Why get vaccinated? Influenza vaccine can prevent influenza (flu). Flu is a contagious disease that spreads around the [facility] every year, usually between October and May. Anyone can get the flu, but it is more dangerous for some people. Infants and young children, people 72years of age and older, pregnant women, and people with certain health conditions or a weakened immune system are at greatest risk of flu complications. Pneumonia, bronchitis, sinus infections and ear infections are examples of flu-related complications. If you have a medical condition, such as heart disease, cancer or diabetes, flu can make it worse. Flu can cause fever and chills, sore throat, muscle aches, fatigue, cough, headache, and runny or stuffy nose. Some people may have vomiting and diarrhea, though this is more common in children than adults. Each year thousands of people in the [facility] die from flu, and many more are hospitalized. Flu vaccine prevents millions of illnesses and flu-related visits to the doctor each year. 2. Influenza vaccines CDC recommends everyone 10months of age and older get vaccinated every flu season. Children 6 months through 6years of age may need 2 doses during a single flu season. Everyone else needs only 1 dose each flu season. It takes about 2 weeks for protection to develop after vaccination. There are many flu viruses, and they are always changing. Each year a new flu vaccine is made to protect against three or four viruses that are likely to cause disease in the upcoming flu season.  Even when the vaccine doesnt exactly match these viruses, it may still provide some protection. Influenza vaccine does not cause flu. Influenza vaccine may be given at the same time as other vaccines. 3. Talk with your health care provider Tell your vaccine provider if the person getting the vaccine: 
 Has had an allergic reaction after a previous dose of influenza vaccine, or has any severe, life-threatening allergies.  Has ever had Guillain-Barré Syndrome (also called GBS). In some cases, your health care provider may decide to postpone influenza vaccination to a future visit. People with minor illnesses, such as a cold, may be vaccinated. People who are moderately or severely ill should usually wait until they recover before getting influenza vaccine. Your health care provider can give you more information. 4. Risks of a reaction  Soreness, redness, and swelling where shot is given, fever, muscle aches, and headache can happen after influenza vaccine.  There may be a very small increased risk of Guillain-Barré Syndrome (GBS) after inactivated influenza vaccine (the flu shot). Chris Gholson children who get the flu shot along with pneumococcal vaccine (PCV13), and/or DTaP vaccine at the same time might be slightly more likely to have a seizure caused by fever. Tell your health care provider if a child who is getting flu vaccine has ever had a seizure. People sometimes faint after medical procedures, including vaccination. Tell your provider if you feel dizzy or have vision changes or ringing in the ears. As with any medicine, there is a very remote chance of a vaccine causing a severe allergic reaction, other serious injury, or death. 5. What if there is a serious problem? An allergic reaction could occur after the vaccinated person leaves the clinic.  If you see signs of a severe allergic reaction (hives, swelling of the face and throat, difficulty breathing, a fast heartbeat, dizziness, or weakness), call 9-1-1 and get the person to the nearest hospital. 
 
For other signs that concern you, call your health care provider. Adverse reactions should be reported to the Vaccine Adverse Event Reporting System (VAERS). Your health care provider will usually file this report, or you can do it yourself. Visit the VAERS website at www.vaers. hhs.gov or call 2-960.194.4349. VAERS is only for reporting reactions, and VAERS staff do not give medical advice. 6. The National Vaccine Injury Compensation Program 
 
The [facility] Vaccine Injury Compensation Program (VICP) is a federal program that was created to compensate people who may have been injured by certain vaccines. Visit the VICP website at www.[facility].gov/vaccinecompensation or call 2-686.659.5357 to learn about the program and about filing a claim. There is a time limit to file a claim for compensation. 7. How can I learn more?  Ask your health care provider.  Call your local or state health department.  Contact the Centers for Disease Control and Prevention (CDC): 
- Call 1-516.825.1775 (5-743-YES-INFO) or 
- Visit CDCs influenza website at www.cdc.gov/flu Vaccine Information Statement (Interim) Inactivated Influenza Vaccine 8/15/2019 
[address] 005DX-71 Department of [employer] Centers for Disease Control and Prevention Office Use Only Medicare Wellness Visit, Female The best way to live healthy is to have a lifestyle where you eat a well-balanced diet, exercise regularly, limit alcohol use, and quit all forms of tobacco/nicotine, if applicable. Regular preventive services are another way to keep healthy. Preventive services (vaccines, screening tests, monitoring & exams) can help personalize your care plan, which helps you manage your own care. Screening tests can find health problems at the earliest stages, when they are easiest to treat. Greg [facility]man follows the current, evidence-based guidelines published by the [facility] ([facility]) when recommending preventive services for our patients. Because we follow these guidelines, sometimes recommendations change over time as research supports it. (For example, mammograms used to be recommended annually. Even though Medicare will still pay for an annual mammogram, the newer guidelines recommend a mammogram every two years for women of average risk.) Of course, you and your doctor may decide to screen more often for some diseases, based on your risk and your health status. Preventive services for you include: - Medicare offers their members a free annual wellness visit, which is time for you and your primary care provider to discuss and plan for your preventive service needs. Take advantage of this benefit every year! 
-All adults over the age of 72 should receive the recommended pneumonia vaccines. Current USPSTF guidelines recommend a series of two vaccines for the best pneumonia protection.  
-All adults should have a flu vaccine yearly and a tetanus vaccine every 10 years. All adults age 61 and older should receive a shingles vaccine once in their lifetime.   
-A bone mass density test is recommended when a woman turns 65 to screen for osteoporosis. This test is only recommended one time, as a screening. Some providers will use this same test as a disease monitoring tool if you already have osteoporosis. -All adults age 38-68 who are overweight should have a diabetes screening test once every three years.  
-Other screening tests and preventive services for persons with diabetes include: an eye exam to screen for diabetic retinopathy, a kidney function test, a foot exam, and stricter control over your cholesterol.  
-Cardiovascular screening for adults with routine risk involves an electrocardiogram (ECG) at intervals determined by your doctor. -Colorectal cancer screenings should be done for adults age 54-65 with no increased risk factors for colorectal cancer. There are a number of acceptable methods of screening for this type of cancer. Each test has its own benefits and drawbacks. Discuss with your doctor what is most appropriate for you during your annual wellness visit. The different tests include: colonoscopy (considered the best screening method), a fecal occult blood test, a fecal DNA test, and sigmoidoscopy. -Breast cancer screenings are recommended every other year for women of normal risk, age 54-69. 
-Cervical cancer screenings for women over age 72 are only recommended with certain risk factors.  
-All adults born between [facility] should be screened once for Hepatitis C. Here is a list of your current Health Maintenance items (your personalized list of preventive services) with a due date: 
Health Maintenance Due Topic Date Due  
 Annual Well Visit  05/08/2019  Flu Vaccine  08/01/2019

## 2019-10-02 NOTE — PROGRESS NOTES
Yessy Taveras presents today for   Chief Complaint   Patient presents with    Annual Wellness Visit    Cholesterol Problem     6 month follow up with labs              Depression Screening:  3 most recent PHQ Screens 3/21/2019   Little interest or pleasure in doing things Not at all   Feeling down, depressed, irritable, or hopeless Not at all   Total Score PHQ 2 0       Learning Assessment:  Learning Assessment 3/14/2019   PRIMARY LEARNER Patient   HIGHEST LEVEL OF EDUCATION - PRIMARY LEARNER  -   BARRIERS PRIMARY LEARNER -   CO-LEARNER CAREGIVER -   PRIMARY LANGUAGE ENGLISH   LEARNER PREFERENCE PRIMARY LISTENING   ANSWERED BY patient   RELATIONSHIP SELF       Abuse Screening:  Abuse Screening Questionnaire 3/21/2019   Do you ever feel afraid of your partner? N   Are you in a relationship with someone who physically or mentally threatens you? N   Is it safe for you to go home? Y       Fall Risk  Fall Risk Assessment, last 12 mths 7/31/2019   Able to walk? Yes   Fall in past 12 months? No   Fall with injury? -   Number of falls in past 12 months -   Fall Risk Score -           Coordination of Care:  1. Have you been to the ER, urgent care clinic since your last visit? Hospitalized since your last visit? no    2. Have you seen or consulted any other health care providers outside of the [address] [place] [address] since your last visit? Include any pap smears or colon screening. No        Patient made aware of regulations per Board of medicine in regards to controlled substances. Patient aware a urine drug screen is needed and a controlled substance agreement needs to be signed. Per verbal order from [address] a urine drug was ordered. Yessy Taveras is a 78 y.o. female who presents for routine immunizations. Per verbal order from [address] for influenza, given in left deltoid. She denies any symptoms , reactions or allergies that would exclude them from being immunized today.   Risks and adverse reactions were discussed and the VIS was given to them. All questions were addressed. She was observed for 15 min post injection. There were no reactions observed.     Melo Alexis LPN

## 2019-10-06 LAB
ALPRAZ UR QL: NEGATIVE
AMPHETAMINES UR QL SCN: NEGATIVE NG/ML
BARBITURATES UR QL SCN: NEGATIVE NG/ML
BENZODIAZ UR QL SCN: NORMAL NG/ML
BENZODIAZ UR QL: POSITIVE NG/ML
BZE UR QL SCN: NEGATIVE NG/ML
CANNABINOIDS UR QL SCN: NEGATIVE NG/ML
CLONAZEPAM UR QL: NEGATIVE
CREAT UR-MCNC: 130.5 MG/DL (ref 20–300)
FENTANYL+NORFENTANYL UR QL SCN: NEGATIVE PG/ML
FLURAZEPAM UR QL: NEGATIVE
LORAZEPAM UR QL: NEGATIVE
MEPERIDINE UR QL: NEGATIVE NG/ML
METHADONE UR QL SCN: NEGATIVE NG/ML
MIDAZOLAM UR QL CFM: NEGATIVE
NORDIAZEPAM UR QL: NEGATIVE
OPIATES UR QL SCN: NEGATIVE NG/ML
OXAZEPAM UR CFM-MCNC: 219 NG/ML
OXAZEPAM UR QL: POSITIVE
OXYCODONE+OXYMORPHONE UR QL SCN: NEGATIVE NG/ML
PCP UR QL: NEGATIVE NG/ML
PH UR: 5.5 [PH] (ref 4.5–8.9)
PLEASE NOTE:, 733157: NORMAL
PROPOXYPH UR QL SCN: NEGATIVE NG/ML
SP GR UR: 1.02
TEMAZEPAM UR CFM-MCNC: 122 NG/ML
TEMAZEPAM UR QL CFM: POSITIVE
TRAMADOL UR QL SCN: NEGATIVE NG/ML
TRIAZOLAM UR QL: NEGATIVE

## 2019-10-17 ENCOUNTER — HOSPITAL ENCOUNTER (OUTPATIENT)
Dept: MRI IMAGING | Age: 79
Discharge: HOME | End: 2019-10-17
Attending: INTERNAL MEDICINE
Payer: MEDICARE

## 2019-10-17 VITALS — BODY MASS INDEX: 30.59 KG/M2 | WEIGHT: 181 LBS

## 2019-10-17 DIAGNOSIS — D49.0 IPMN (INTRADUCTAL PAPILLARY MUCINOUS NEOPLASM): ICD-10-CM

## 2019-10-17 LAB — CREAT UR-MCNC: 1.2 MG/DL (ref 0.6–1.3)

## 2019-10-17 PROCEDURE — A9577 INJ MULTIHANCE: HCPCS | Performed by: INTERNAL MEDICINE

## 2019-10-17 PROCEDURE — 74011250636 HC RX REV CODE- 250/636: Performed by: INTERNAL MEDICINE

## 2019-10-17 PROCEDURE — 82565 ASSAY OF CREATININE: CPT

## 2019-10-17 PROCEDURE — 74183 MRI ABD W/O CNTR FLWD CNTR: CPT

## 2019-10-17 RX ORDER — GADOTERATE MEGLUMINE 376.9 MG/ML
20 INJECTION INTRAVENOUS
Status: DISCONTINUED | OUTPATIENT
Start: 2019-10-17 | End: 2019-10-17

## 2019-10-17 RX ADMIN — GADOBENATE DIMEGLUMINE 17 ML: 529 INJECTION, SOLUTION INTRAVENOUS at 18:06

## 2019-10-23 ENCOUNTER — TELEPHONE (OUTPATIENT)
Dept: INTERNAL MEDICINE CLINIC | Age: 79
End: 2019-10-23

## 2019-10-23 NOTE — TELEPHONE ENCOUNTER
LMTCB on house phone, cell phone went directly to a voicemail box that is full- unable to leave message.

## 2020-01-31 DIAGNOSIS — B02.29 POSTHERPETIC NEURALGIA: ICD-10-CM

## 2020-01-31 RX ORDER — LIDOCAINE 50 MG/G
PATCH TOPICAL
Qty: 90 PATCH | Refills: 1 | Status: SHIPPED | OUTPATIENT
Start: 2020-01-31

## 2020-01-31 NOTE — TELEPHONE ENCOUNTER
Last Visit: 10/2/19 with MD Enrique Webb  Next Appointment: 4/2/20 with MD Enrique Webb  Previous Refill Encounter(s): 6/22/18 #30 with 5 refills    Requested Prescriptions     Pending Prescriptions Disp Refills    lidocaine (LIDODERM) 5 % 90 Patch 1     Sig: Apply patch to the affected area for 12 hours a day and remove for 12 hours a day.

## 2020-02-06 ENCOUNTER — OFFICE VISIT (OUTPATIENT)
Dept: INTERNAL MEDICINE CLINIC | Age: 80
End: 2020-02-06

## 2020-02-06 ENCOUNTER — HOSPITAL ENCOUNTER (OUTPATIENT)
Dept: LAB | Age: 80
Discharge: HOME | End: 2020-02-06
Payer: MEDICARE

## 2020-02-06 VITALS
SYSTOLIC BLOOD PRESSURE: 100 MMHG | TEMPERATURE: 98.3 F | HEIGHT: 65 IN | HEART RATE: 77 BPM | RESPIRATION RATE: 14 BRPM | WEIGHT: 178 LBS | BODY MASS INDEX: 29.66 KG/M2 | OXYGEN SATURATION: 97 % | DIASTOLIC BLOOD PRESSURE: 68 MMHG

## 2020-02-06 DIAGNOSIS — R10.9 ABDOMINAL PAIN, UNSPECIFIED ABDOMINAL LOCATION: ICD-10-CM

## 2020-02-06 DIAGNOSIS — M47.816 SPONDYLOSIS OF LUMBAR REGION WITHOUT MYELOPATHY OR RADICULOPATHY: ICD-10-CM

## 2020-02-06 DIAGNOSIS — R10.9 ABDOMINAL PAIN, UNSPECIFIED ABDOMINAL LOCATION: Primary | ICD-10-CM

## 2020-02-06 LAB
ALBUMIN SERPL-MCNC: 3.6 G/DL (ref 3.4–5)
ALBUMIN/GLOB SERPL: 1.1 {RATIO} (ref 0.8–1.7)
ALP SERPL-CCNC: 95 U/L (ref 45–117)
ALT SERPL-CCNC: 22 U/L (ref 13–56)
ANION GAP SERPL CALC-SCNC: 8 MMOL/L (ref 3–18)
AST SERPL-CCNC: 18 U/L (ref 10–38)
BILIRUB SERPL-MCNC: 0.7 MG/DL (ref 0.2–1)
BUN SERPL-MCNC: 29 MG/DL (ref 7–18)
BUN/CREAT SERPL: 21 (ref 12–20)
CALCIUM SERPL-MCNC: 9.1 MG/DL (ref 8.5–10.1)
CHLORIDE SERPL-SCNC: 111 MMOL/L (ref 100–111)
CO2 SERPL-SCNC: 24 MMOL/L (ref 21–32)
CREAT SERPL-MCNC: 1.38 MG/DL (ref 0.6–1.3)
ERYTHROCYTE [DISTWIDTH] IN BLOOD BY AUTOMATED COUNT: 15.3 % (ref 11.6–14.5)
GLOBULIN SER CALC-MCNC: 3.2 G/DL (ref 2–4)
GLUCOSE SERPL-MCNC: 81 MG/DL (ref 74–99)
HCT VFR BLD AUTO: 44.2 % (ref 35–45)
HGB BLD-MCNC: 14.3 G/DL (ref 12–16)
LIPASE SERPL-CCNC: 151 U/L (ref 73–393)
MCH RBC QN AUTO: 31.5 PG (ref 24–34)
MCHC RBC AUTO-ENTMCNC: 32.4 G/DL (ref 31–37)
MCV RBC AUTO: 97.4 FL (ref 74–97)
PLATELET # BLD AUTO: 174 K/UL (ref 135–420)
PMV BLD AUTO: 12.7 FL (ref 9.2–11.8)
POTASSIUM SERPL-SCNC: 4.4 MMOL/L (ref 3.5–5.5)
PROT SERPL-MCNC: 6.8 G/DL (ref 6.4–8.2)
RBC # BLD AUTO: 4.54 M/UL (ref 4.2–5.3)
SODIUM SERPL-SCNC: 143 MMOL/L (ref 136–145)
WBC # BLD AUTO: 7.7 K/UL (ref 4.6–13.2)

## 2020-02-06 PROCEDURE — 83690 ASSAY OF LIPASE: CPT

## 2020-02-06 PROCEDURE — 85027 COMPLETE CBC AUTOMATED: CPT

## 2020-02-06 PROCEDURE — 80053 COMPREHEN METABOLIC PANEL: CPT

## 2020-02-06 RX ORDER — GABAPENTIN 100 MG/1
100 CAPSULE ORAL 3 TIMES DAILY
Qty: 90 CAP | Refills: 5 | Status: SHIPPED | OUTPATIENT
Start: 2020-02-06 | End: 2020-03-04 | Stop reason: SDUPTHER

## 2020-02-06 NOTE — PROGRESS NOTES
Edgar Ruiz presents today for   Chief Complaint   Patient presents with    Back Pain     had 3 injections, PT and nothing has helped. lower back pain    Abdominal Pain     LUQ pain. intermittent, achy x couple months              Depression Screening:  3 most recent PHQ Screens 2/6/2020   Little interest or pleasure in doing things Not at all   Feeling down, depressed, irritable, or hopeless Not at all   Total Score PHQ 2 0       Learning Assessment:  Learning Assessment 3/14/2019   PRIMARY LEARNER Patient   HIGHEST LEVEL OF EDUCATION - PRIMARY LEARNER  -   BARRIERS PRIMARY LEARNER -   CO-LEARNER CAREGIVER -   PRIMARY LANGUAGE ENGLISH   LEARNER PREFERENCE PRIMARY LISTENING   ANSWERED BY patient   RELATIONSHIP SELF       Abuse Screening:  Abuse Screening Questionnaire 2/6/2020   Do you ever feel afraid of your partner? N   Are you in a relationship with someone who physically or mentally threatens you? N   Is it safe for you to go home? Y       Fall Risk  Fall Risk Assessment, last 12 mths 2/6/2020   Able to walk? Yes   Fall in past 12 months? No   Fall with injury? -   Number of falls in past 12 months -   Fall Risk Score -           Coordination of Care:  1. Have you been to the ER, urgent care clinic since your last visit? Hospitalized since your last visit? no    2. Have you seen or consulted any other health care providers outside of the [address] [place] [address] Roberto since your last visit? Include any pap smears or colon screening.  no

## 2020-02-06 NOTE — PROGRESS NOTES
78 y.o. WHITE OR  female who presents for evaluation. She is asking for referral back to Dr Snyder. She has lumbar disease and had not responded to epidurals. She then saw Dr Paris Rodriguez and received 3 more shots, they were going to refer her to Dr Ivan Harley in the same group but she would prefer to have Dr Hanson Generous do any surgeries. She has not tried gabapentin type meds and she is open to at least trying that    Also, she's been having intermittent LUQ discomfort. Currently not using ppi. However, she also reports currently not taking celebrex or nsaid. No n/v/d, bleeding, anorexia, urinary complaints, bms look normal to her.   Also, she had mri abd 10/19 to f.u IPMN and no change and no new findings    Past Medical History:   Diagnosis Date    AAA (abdominal aortic aneurysm) without rupture ([facility] [address]     AAA repair 7/2018 Dr Aleks Perkins adenomas     2003 Dr Jan Newman; 4/13 Dr Magda Li    DJD (degenerative joint disease)     Dyslipidemia     FHx: heart disease     Gastritis 2006    on EGD Dr. Kirby Lujan, neg sprue    Hypertension     Hypothyroidism 2007    Dr. Dionisio Hernández Hypovitaminosis D     IPMN (intraductal papillary mucinous neoplasm) 06/2018    on mrcp 8mm; no change 10/19     Lung nodule 06/2016    6 mm RLL (6/16); no change (10/17); no change (12/18)    Obesity     peak weight 187 lbs, bmi 33.1 from 4/13; IF 3/18    Osteopenia FRAX 4/12 9.7 and 1.4     DEXA t score 0.6 spine, -1 hip (12/09);  -0.4 spine, -1.3 hip (4/12); -1.2 wrist, -1.6 hip w FRAX 11/2.2 (4/14); -1.0 wrist, -1.6 hip w FRAX 12/2.6 (4/16); -0.7 wrist, -1.6 hip (5/18)    Pancreatitis, chronic ([facility] [address] 2018    Psoriasis 2003    on bx Dr. Ofelia Riddle hearing loss     left side Dr Anca Jessica Venous insufficiency 2003    on dopplers    Zoster 05/2016    left T11-12; mild postherpetic neuralgia     Past Surgical History:   Procedure Laterality Date    CARDIAC SURG PROCEDURE UNLIST  09/2016    NST negative    CHEST SURGERY PROCEDURE UNLISTED  06/2016    CTA neg PE, 6 mm subpleural nodule    HX AAA REPAIR  07/17/2018    HX [place]      Dr. Yanely Bueno LEFT    HX CATARACT REMOVAL Bilateral     HX COLONOSCOPY      benign polyp 5/08 Dr Nellie Mosher;  4/13 negative Dr. Amira Robles HX GI  08/2018    Dr Ulrich ; ERCP s/p removal cbd stone complicated by pamcreatitis    HX LAP CHOLECYSTECTOMY  09/18/2018    Dr. Raghavendra Blount  2007    neg MRA/MRI head in [facility]    NEUROLOGICAL PROCEDURE UNLISTED  9/10    CT head negative     Social History     Socioeconomic History    Marital status:      Spouse name: Not on file    Number of children: Not on file    Years of education: Not on file    Highest education level: Not on file   Occupational History    Occupation: ret office mgr   Social Needs    Financial resource strain: Not on file    Food insecurity:     Worry: Not on file     Inability: Not on file    Transportation needs:     Medical: Not on file     Non-medical: Not on file   Tobacco Use    Smoking status: Never Smoker    Smokeless tobacco: Never Used   Substance and Sexual Activity    Alcohol use: Yes     Comment: occasionally    Drug use: No    Sexual activity: Not on file   Lifestyle    Physical activity:     Days per week: Not on file     Minutes per session: Not on file    Stress: Not on file   Relationships    Social connections:     Talks on phone: Not on file     Gets together: Not on file     Attends Jain service: Not on file     Active member of club or organization: Not on file     Attends meetings of clubs or organizations: Not on file     Relationship status: Not on file    Intimate partner violence:     Fear of current or ex partner: Not on file     Emotionally abused: Not on file     Physically abused: Not on file     Forced sexual activity: Not on file   Other Topics Concern    Not on file   Social History Narrative    Not on file     Current Outpatient Medications   Medication Sig    gabapentin (NEURONTIN) 100 mg capsule Take 1 Cap by mouth three (3) times daily. Max Daily Amount: 300 mg.    lidocaine (LIDODERM) 5 % Apply patch to the affected area for 12 hours a day and remove for 12 hours a day.  diazePAM (VALIUM) 10 mg tablet Take 1 Tab by mouth every twelve (12) hours as needed for Anxiety. Max Daily Amount: 20 mg.  Levothyroxine 75 mcg cap Take 1 Tab by mouth daily.  ergocalciferol (VITAMIN D2) 50,000 unit capsule Take 1 Cap by mouth every seven (7) days.  ezetimibe (ZETIA) 10 mg tablet Take 1 Tab by mouth daily.  celecoxib (CELEBREX) 200 mg capsule Take 1 tab by mouth daily as needed for pain with food.  atorvastatin (LIPITOR) 40 mg tablet Take 1 Tab by mouth daily.  lisinopril (PRINIVIL, ZESTRIL) 10 mg tablet Take 1 Tab by mouth daily.  oxyCODONE-acetaminophen (PERCOCET) 5-325 mg per tablet Take 1 Tab by mouth every four (4) hours as needed. Max Daily Amount: 6 Tabs.  omeprazole (PRILOSEC) 40 mg capsule Take 1 Cap by mouth daily. (Patient taking differently: Take 40 mg by mouth as needed.)    aspirin 81 mg chewable tablet Take 1 Tab by mouth daily.  diclofenac (VOLTAREN) 1 % gel Apply 4 grams to both knees 3-4 x daily as needed. No current facility-administered medications for this visit.       Allergies   Allergen Reactions    Atenolol Other (comments)     Felt poorly    Cozaar [Losartan] Other (comments)     Headaches    Norvasc [Amlodipine] Other (comments)     Larned poorly       REVIEW OF SYSTEMS:   Ophtho  no vision change or eye pain  Oral  no mouth pain, tongue or tooth problems  Ears  no hearing loss, ear pain, fullness, no swallowing problems  Cardiac  no CP, PND, orthopnea, edema, palpitations or syncope  Chest  no breast masses  Resp  no wheezing, chronic coughing, dyspnea  GI  no heartburn, nausea, vomiting, change in bowel habits, bleeding, hemorrhoids  Urinary  no dysuria, hematuria, flank pain, urgency, frequency    Visit Vitals  /68   Pulse 77   Temp 98.3 °F (36.8 °C) (Oral)   Resp 14   Ht 5' 4.5\" (1.638 m)   Wt 178 lb (80.7 kg)   SpO2 97%   BMI 30.08 kg/m²   A&O x3  Affect is appropriate. Mood stable  No apparent distress  Anicteric, no JVD, adenopathy or thyromegaly. No carotid bruits or radiated murmur  Lungs clear to auscultation, no wheezes or rales  Heart showed regular rate and rhythm. No murmur, rubs, gallops  Abdomen soft nontender, no hepatosplenomegaly or masses. Extremities without edema. Pulses 1-2+ symmetrically    Assessment and plan:  1. Abd pain. check labs, restart ppi, GI consult if no better  2. Chronic back pain. Trial gabapentin after discussing possible sfx, will refer back to Dr George Minus      Above conditions discussed at length and patient vocalized understanding.   All questions answered to patient satisfaction

## 2020-02-24 ENCOUNTER — OFFICE VISIT (OUTPATIENT)
Dept: ORTHOPEDIC SURGERY | Age: 80
End: 2020-02-24

## 2020-02-24 VITALS
WEIGHT: 181.2 LBS | TEMPERATURE: 98.5 F | BODY MASS INDEX: 30.93 KG/M2 | HEIGHT: 64 IN | OXYGEN SATURATION: 99 % | RESPIRATION RATE: 18 BRPM | SYSTOLIC BLOOD PRESSURE: 108 MMHG | HEART RATE: 71 BPM | DIASTOLIC BLOOD PRESSURE: 55 MMHG

## 2020-02-24 DIAGNOSIS — M47.816 LUMBAR FACET ARTHROPATHY: Primary | ICD-10-CM

## 2020-02-24 DIAGNOSIS — M48.061 SPINAL STENOSIS OF LUMBAR REGION WITHOUT NEUROGENIC CLAUDICATION: ICD-10-CM

## 2020-02-24 DIAGNOSIS — M62.838 MUSCLE SPASM: ICD-10-CM

## 2020-02-24 RX ORDER — METHOCARBAMOL 500 MG/1
TABLET, FILM COATED ORAL
Qty: 60 TAB | Refills: 1 | Status: SHIPPED | OUTPATIENT
Start: 2020-02-24 | End: 2020-10-08

## 2020-02-24 NOTE — PATIENT INSTRUCTIONS
Learning About Medial Branch Block and Neurotomy What are medial branch block and neurotomy? Facet joints connect your vertebrae to each other. Problems in these joints can cause chronic (long-term) pain in the neck or back. They can sometimes affect the shoulders, arms, buttocks, or legs. Medial branch nerves are the nerves that carry many of the pain messages from your facet joints. Radiofrequency medial branch neurotomy is a type of medial branch neurotomy that is used to relieve arthritis pain. It uses radio waves to damage nerves in your neck or back so that they can no longer send pain messages to your brain. Before your doctor knows if a neurotomy will help you, he or she will do a medial branch block to find out if certain nerves are the ones that are a source of your pain. You will need two separate visits to the outpatient center or hospital to have both procedures. How is a medial branch block done? The doctor will use a tiny needle to numb the skin where you will get the block. Then he or she puts the block needle into the numbed area. You may feel some pressure, but you should not feel pain. Using fluoroscopy (live X-ray) to guide the needle, the doctor injects medicine onto one or more nerves to make them numb. If you get relief from your pain in the next 4 to 6 hours, it's a sign that those nerves may be contributing to your pain. The relief will last only a short time. You may then have a medial branch neurotomy at a later visit to try to get longer relief. It takes 20 to 30 minutes to get the block. You can go home after the doctor watches you for about an hour. You will get instructions on how to report how much pain you have when you are at home. You will need someone to drive you home. How is medial branch neurotomy done? The doctor will use a tiny needle to numb the skin where you will get the neurotomy. Then he or she puts the neurotomy needle into the numbed area. You may feel some pressure. Using fluoroscopy (live X-ray) to guide the needle, the doctor sends radio waves through the needle to the nerve for 60 to 90 seconds. The radio waves heat the nerve, which damages it. The doctor may do this several times. And he or she may treat more than one nerve. It takes 45 to 90 minutes to get a neurotomy, depending on how many nerves are heated. You will probably go home 30 to 60 minutes later. You will need someone to drive you home. What can you expect after a neurotomy? You may feel a little sore or tender at the injection site at first. But after a successful neurotomy, most people have pain relief right away. It often lasts for 9 to 12 months or longer. Sometimes the pain relief is permanent. If your pain does come back, it may mean that the damaged nerve has healed and can send pain messages again. Or it can mean that a different nerve is causing pain. Your doctor will discuss your options with you. Follow-up care is a key part of your treatment and safety. Be sure to make and go to all appointments, and call your doctor if you are having problems. It's also a good idea to know your test results and keep a list of the medicines you take. Where can you learn more? Go to http://tal-chava.info/. Enter O306 in the search box to learn more about \"Learning About Medial Branch Block and Neurotomy. \" Current as of: March 28, 2019 Content Version: 12.2 © 6737-7211 [employer]. Care instructions adapted under license by [employer] (which disclaims liability or warranty for this information). If you have questions about a medical condition or this instruction, always ask your healthcare professional. [place] [address] any warranty or liability for your use of this information.

## 2020-02-24 NOTE — LETTER
2/26/20 Patient: Surya Bermeo YOB: 1940 Date of Visit: 2/24/2020 Linus Wood MD 
[address] [place] [address] VIA In Basket Dear Linus Wood MD, Thank you for referring Ms. Maycol Cancino to [facility] for evaluation. My notes for this consultation are attached. If you have questions, please do not hesitate to call me. I look forward to following your patient along with you. Sincerely, Acacia Gillis MD

## 2020-02-24 NOTE — PROGRESS NOTES
[facility]  [address], [facility] [address]  Phone: [phone]  Fax: [phone]    Pt's YOB: 1940    ASSESSMENT   Diagnoses and all orders for this visit:    1. Lumbar facet arthropathy    2. Spinal stenosis of lumbar region without neurogenic claudication    3. Muscle spasm  -     methocarbamol (ROBAXIN) 500 mg tablet; Take 1 tab by mouth BID-TID as needed for muscle spasm. IMPRESSION AND PLAN:  Neill Osgood is a 78 y.o. right hand dominant female with history of lumbar pain. Pt complains of pain across the lower back that worsens with ambulation. She followed up with Dr. Galindo Orr, had a series of 3 epidural injections with minimal relief, and underwent extensive physical therapy with traction without improvement. Pt is currently taking Neurontin 100 mg cap 1 QAM, 1 cap in the afternoon, and 2 tabs QHS with benefit. 1) Pt was given information on lumbar arthritis exercises and a RFA. 2) Discussed treatment options with the patient including medication, aquatic physical therapy, and a RFA. 3) I recommended the patient try water exercise. 4) Pt was prescribed Robaxin 500 mg 1 tab BID-TID prn muscle spasm. 5) Will obtain medical release for records from Dr. Galindo Orr. 6) Ms. Hussain Willis has a reminder for a \"due or due soon\" health maintenance. I have asked that she contact her primary care provider, Dotty Guadarrama MD, for follow-up on this health maintenance. 7)  demonstrated consistency with prescribing. Follow-up and Dispositions    · Return in about 6 weeks (around 4/6/2020) for Medication follow up. HISTORY OF PRESENT ILLNESS:  Neill Osgood is a 78 y.o. right hand dominant female with history of lumbar pain and presents to the office today for follow up. She complains of pain across the lower back that worsens with ambulation.  Pt denies any pain or weakness in the legs at this time. She followed up with Dr. Shakira Choi since her last office visit since she was told there were no schedule openings until 02/2020. Pt had a series of 3 epidural injections with minimal relief and she notes severe pain after her first injection. Pt also underwent extensive physical therapy with traction at [facility] without improvement. She admits to temporary relief with a left L4-5 and left L5-S1 facet injection on 09/25/2019. Pt has been using Lidoderm patches as needed with benefit. She admits to improvement since her PCP, Jacques Pacheco MD, recently started her on Neurontin 100 mg cap 1 QAM, 1 cap in the afternoon, and 2 tabs QHS. Pt notes that Jacques Pacheco MD recommended she increase the Neurontin 100 mg to 2 tabs TID. She denies ever taking Robaxin. Pt at this time desires to proceed with medication evaluation.     Pain Scale: 6/10    PCP: Jacques Pacheco MD     Past Medical History:   Diagnosis Date    AAA (abdominal aortic aneurysm) without rupture ([facility] [address]     AAA repair 7/2018 Dr Mary Anne Castañeda adenomas     2003 Dr Joselyn Garner; 4/13 Dr Isauro Cuba    DJD (degenerative joint disease)     Dyslipidemia     FHx: heart disease     Gastritis 2006    on EGD Dr. Linda Guillen, neg sprue    Hypertension     Hypothyroidism 2007    Dr. Acevedo Phlegm Hypovitaminosis D     IPMN (intraductal papillary mucinous neoplasm) 06/2018    on mrcp 8mm; no change 10/19     Lung nodule 06/2016    6 mm RLL (6/16); no change (10/17); no change (12/18)    Obesity     peak weight 187 lbs, bmi 33.1 from 4/13; IF 3/18    Osteopenia FRAX 4/12 9.7 and 1.4     DEXA t score 0.6 spine, -1 hip (12/09);  -0.4 spine, -1.3 hip (4/12); -1.2 wrist, -1.6 hip w FRAX 11/2.2 (4/14); -1.0 wrist, -1.6 hip w FRAX 12/2.6 (4/16); -0.7 wrist, -1.6 hip (5/18)    Pancreatitis, chronic ([facility] [address] 2018    Psoriasis 2003    on bx Dr. Sebastian Pandey hearing loss     left side Dr Dami Payne Venous insufficiency 2003    on dopplers    Zoster 05/2016    left T11-12; mild postherpetic neuralgia        Social History     Socioeconomic History    Marital status:      Spouse name: Not on file    Number of children: Not on file    Years of education: Not on file    Highest education level: Not on file   Occupational History    Occupation: ret office mgr   Social Needs    Financial resource strain: Not on file    Food insecurity:     Worry: Not on file     Inability: Not on file    Transportation needs:     Medical: Not on file     Non-medical: Not on file   Tobacco Use    Smoking status: Never Smoker    Smokeless tobacco: Never Used   Substance and Sexual Activity    Alcohol use: Yes     Comment: occasionally    Drug use: No    Sexual activity: Not on file   Lifestyle    Physical activity:     Days per week: Not on file     Minutes per session: Not on file    Stress: Not on file   Relationships    Social connections:     Talks on phone: Not on file     Gets together: Not on file     Attends Orthodoxy service: Not on file     Active member of club or organization: Not on file     Attends meetings of clubs or organizations: Not on file     Relationship status: Not on file    Intimate partner violence:     Fear of current or ex partner: Not on file     Emotionally abused: Not on file     Physically abused: Not on file     Forced sexual activity: Not on file   Other Topics Concern    Not on file   Social History Narrative    Not on file       Current Outpatient Medications   Medication Sig Dispense Refill    methocarbamol (ROBAXIN) 500 mg tablet Take 1 tab by mouth BID-TID as needed for muscle spasm. 60 Tab 1    gabapentin (NEURONTIN) 100 mg capsule Take 1 Cap by mouth three (3) times daily. Max Daily Amount: 300 mg. 90 Cap 5    lidocaine (LIDODERM) 5 % Apply patch to the affected area for 12 hours a day and remove for 12 hours a day.  90 Patch 1    diazePAM (VALIUM) 10 mg tablet Take 1 Tab by mouth every twelve (12) hours as needed for Anxiety. Max Daily Amount: 20 mg. 60 Tab 1    Levothyroxine 75 mcg cap Take 1 Tab by mouth daily. 90 Cap 3    ergocalciferol (VITAMIN D2) 50,000 unit capsule Take 1 Cap by mouth every seven (7) days. 12 Cap 3    ezetimibe (ZETIA) 10 mg tablet Take 1 Tab by mouth daily. 90 Tab 3    celecoxib (CELEBREX) 200 mg capsule Take 1 tab by mouth daily as needed for pain with food. 30 Cap 2    diclofenac (VOLTAREN) 1 % gel Apply 4 grams to both knees 3-4 x daily as needed. 5 Each 2    atorvastatin (LIPITOR) 40 mg tablet Take 1 Tab by mouth daily. 90 Tab 3    lisinopril (PRINIVIL, ZESTRIL) 10 mg tablet Take 1 Tab by mouth daily. 90 Tab 3    omeprazole (PRILOSEC) 40 mg capsule Take 1 Cap by mouth daily. (Patient taking differently: Take 40 mg by mouth as needed.) 90 Cap 1    aspirin 81 mg chewable tablet Take 1 Tab by mouth daily. 90 Tab 3    oxyCODONE-acetaminophen (PERCOCET) 5-325 mg per tablet Take 1 Tab by mouth every four (4) hours as needed. Max Daily Amount: 6 Tabs. (Patient not taking: Reported on 2/24/2020) 30 Tab 0       Allergies   Allergen Reactions    Atenolol Other (comments)     Felt poorly    Cozaar [Losartan] Other (comments)     Headaches    Norvasc [Amlodipine] Other (comments)     Rudolph poorly           REVIEW OF SYSTEMS    Constitutional: Negative for fever, chills, or weight change. Respiratory: Negative for cough or shortness of breath. Cardiovascular: Negative for chest pain or palpitations. Gastrointestinal: Negative for acid reflux, change in bowel habits, or constipation. Genitourinary: Negative for dysuria and flank pain. Musculoskeletal: Positive for lumbar pain. Skin: Negative for rash. Neurological: Negative for headaches, dizziness, or numbness. Endo/Heme/Allergies: Negative for increased bruising. Psychiatric/Behavioral: Negative for difficulty with sleep.       PHYSICAL EXAMINATION  Visit Vitals  /55   Pulse 71   Temp 98.5 °F (36.9 °C) (Oral)   Resp 18   Ht 5' 4\" (1.626 m)   Wt 181 lb 3.2 oz (82.2 kg)   SpO2 99%   BMI 31.10 kg/m²       Constitutional: Awake, alert, and in no acute distress. Neurological: 1+ symmetrical DTRs in the upper extremities. 1+ symmetrical DTRs in the lower extremities. Sensation to light touch is intact. Negative Adkins's sign bilaterally. Skin: warm, dry, and intact. Musculoskeletal: Tenderness to palpation in the lower lumbar region. Moderate pain with extension and axial loading. Moderate pain with internal rotation of her left hip. Negative straight leg raise bilaterally. Biceps  Triceps Deltoids Wrist Ext Wrist Flex Hand Intrin   Right +4/5 +4/5 +4/5 +4/5 +4/5 +4/5   Left +4/5 +4/5 +4/5 +4/5 +4/5 +4/5      Hip Flex  Quads Hamstrings Ankle DF EHL Ankle PF   Right +4/5 +4/5 +4/5 +4/5 +4/5 +4/5   Left +4/5 +4/5 +4/5 +4/5 +4/5 +4/5     IMAGING:    Lumbar spine MRI from 12/03/2018 was personally reviewed with the patient and demonstrated:          Results from [facility] on 12/03/18   MRI LUMB SPINE WO CONT     Narrative MR Lumbar spine without contrast     HISTORY: increased lumbar pain. Difficulty with standing/walking. Also has hip  pain and history of spondylolisthesis     COMPARISON: Prior body CT May 2018 and August 2018     Technique: Multi-sequence multiplanar T1, T2, STIR imaging with and without fat  saturation obtained centered on the lumbar spine.      FINDINGS:      Alignment: Mild to moderate right convex lumbar scoliosis with apex at L2. Minimal degenerative anterolisthesis of L5. Also slight retrolisthesis of L1 and  T11. Vertebral body height: Normal  Marrow signal: No concerning signal. Several levels of Schmorl's nodes. Several  levels of chronic fatty endplate changes as well as other levels of discogenic  endplate edema. The latter is most notable at T11-12. Disc spaces: Severe narrowing and desiccation of T11-12, L1-2, L4-5.  More  moderately so at other levels, sparing L5-S1. Conus: Terminates at T12-L1     Axial imaging correlation:     T11-12: Broad-based disc osteophyte complex with mild facet arthropathy. Patent  canal and foramina.     T12-L1: Mild disc bulge and facet arthropathy. Minimal impression upon the  thecal sac. Patent canal and foramina.     L1-2: Broad-based disc osteophyte complex. Bilateral facet arthropathy. Mild  concentric spinal stenosis. Mild foraminal stenoses.     L2-3: Mild broad-based disc osteophyte complex. Bilateral facet arthropathy. Mild concentric spinal stenosis. Moderate left and mild right foraminal  stenosis.     L3-4: Broad-based disc osteophyte complex. Bilateral facet arthropathy. Ligamentum flavum thickening and buckling. Moderate concentric spinal stenosis. Mild foraminal stenoses.     L4-5: Broad-based disc osteophyte complex with eccentric prominence to the  right. Bilateral facet arthropathy. Moderate spinal stenosis. More pronounced  narrowing of right lateral recess with transient distortion of crossing right L5  nerve. Moderately severe right foraminal stenosis with distortion of perineural  fat.     L5-S1: Uncovering of the disc. Minor disc bulge. Bilateral facet arthropathy. Patent canal and foramina.     Other structures: Partial visualization of AAA with prior endovascular stent  deployment. Several small caliber renal cysts.           Impression IMPRESSION:     1. Lumbar scoliosis with multilevel degenerative findings.  -Up to a moderate spinal stenosis, at L4-5 and L3-4  -Moderately severe right L4-5 foraminal stenosis with potential impingement of  the exiting right L4 nerve. There is also potentially impingement of the  crossing right L5 nerve at this level.     Thank you for enabling us to participate in the care of this patient.            Written by Scotty Arnold, as dictated by Francisca Hayward MD.  I, Dr. Francisca Hayward confirm that all documentation is accurate.

## 2020-03-04 DIAGNOSIS — M47.816 SPONDYLOSIS OF LUMBAR REGION WITHOUT MYELOPATHY OR RADICULOPATHY: ICD-10-CM

## 2020-03-04 NOTE — TELEPHONE ENCOUNTER
Pt states that RD increased the hussein of gabapentin that she takes a day, said currently taking 200mg 3xdaily so she needs new rx to [facility]

## 2020-03-04 NOTE — TELEPHONE ENCOUNTER
Jacques Pacheco MD   to [facility]          2/18/20 4:40 PM   You can inc the dose to 300, 400, even 600   Find the dose that gets the most relief without giving the side effects   Once you have the final dose, we can call in that prescription for 3 mos 3 refills      Last read by Esequiel Walden at 10:14 PM on 2/18/2020. Last Visit: 02/06/2020 with MD Tiffanie De Leon  Next Appointment: 04/02/2020 with MD Tiffanie De Leon    Requested Prescriptions     Pending Prescriptions Disp Refills    gabapentin (NEURONTIN) 100 mg capsule 540 Cap 1     Sig: Take 2 Caps by mouth three (3) times daily. Max Daily Amount: 600 mg.

## 2020-03-05 DIAGNOSIS — M47.816 SPONDYLOSIS OF LUMBAR REGION WITHOUT MYELOPATHY OR RADICULOPATHY: ICD-10-CM

## 2020-03-05 RX ORDER — GABAPENTIN 100 MG/1
200 CAPSULE ORAL 3 TIMES DAILY
Qty: 540 CAP | Refills: 1 | Status: SHIPPED | OUTPATIENT
Start: 2020-03-05 | End: 2020-03-05 | Stop reason: SDUPTHER

## 2020-03-05 RX ORDER — GABAPENTIN 100 MG/1
200 CAPSULE ORAL 3 TIMES DAILY
Qty: 540 CAP | Refills: 1 | Status: SHIPPED | OUTPATIENT
Start: 2020-03-05 | End: 2021-01-29 | Stop reason: SDUPTHER

## 2020-03-24 ENCOUNTER — TELEPHONE (OUTPATIENT)
Dept: INTERNAL MEDICINE CLINIC | Age: 80
End: 2020-03-24

## 2020-03-24 NOTE — TELEPHONE ENCOUNTER
----- Message from LES Cabral sent at 3/23/2020  6:24 PM EDT -----  Regarding: Visit Follow-Up Question  Contact: [phone]  I have an appt on the 25th and wonder if it would be best to reschedule, as I have no ugent problem and have enough meds for another several months. This is due to the fact that I am 79 and do not know if it would be more risky to keep the appt as I am not ill at this time.     Beth Kan

## 2020-03-25 ENCOUNTER — HOSPITAL ENCOUNTER (OUTPATIENT)
Dept: LAB | Age: 80
Discharge: HOME | End: 2020-03-25
Payer: MEDICARE

## 2020-03-25 ENCOUNTER — APPOINTMENT (OUTPATIENT)
Dept: INTERNAL MEDICINE CLINIC | Age: 80
End: 2020-03-25

## 2020-03-25 DIAGNOSIS — I10 ESSENTIAL HYPERTENSION WITH GOAL BLOOD PRESSURE LESS THAN 140/90: ICD-10-CM

## 2020-03-25 LAB
ALBUMIN SERPL-MCNC: 3.8 G/DL (ref 3.4–5)
ALBUMIN/GLOB SERPL: 1.3 {RATIO} (ref 0.8–1.7)
ALP SERPL-CCNC: 87 U/L (ref 45–117)
ALT SERPL-CCNC: 22 U/L (ref 13–56)
ANION GAP SERPL CALC-SCNC: 4 MMOL/L (ref 3–18)
AST SERPL-CCNC: 18 U/L (ref 10–38)
BILIRUB SERPL-MCNC: 0.7 MG/DL (ref 0.2–1)
BUN SERPL-MCNC: 25 MG/DL (ref 7–18)
BUN/CREAT SERPL: 24 (ref 12–20)
CALCIUM SERPL-MCNC: 9 MG/DL (ref 8.5–10.1)
CHLORIDE SERPL-SCNC: 111 MMOL/L (ref 100–111)
CO2 SERPL-SCNC: 26 MMOL/L (ref 21–32)
CREAT SERPL-MCNC: 1.03 MG/DL (ref 0.6–1.3)
ERYTHROCYTE [DISTWIDTH] IN BLOOD BY AUTOMATED COUNT: 15.4 % (ref 11.6–14.5)
GLOBULIN SER CALC-MCNC: 2.9 G/DL (ref 2–4)
GLUCOSE SERPL-MCNC: 87 MG/DL (ref 74–99)
HCT VFR BLD AUTO: 43.4 % (ref 35–45)
HGB BLD-MCNC: 14.1 G/DL (ref 12–16)
MCH RBC QN AUTO: 31.9 PG (ref 24–34)
MCHC RBC AUTO-ENTMCNC: 32.5 G/DL (ref 31–37)
MCV RBC AUTO: 98.2 FL (ref 74–97)
PLATELET # BLD AUTO: 184 K/UL (ref 135–420)
PMV BLD AUTO: 12.5 FL (ref 9.2–11.8)
POTASSIUM SERPL-SCNC: 4.1 MMOL/L (ref 3.5–5.5)
PROT SERPL-MCNC: 6.7 G/DL (ref 6.4–8.2)
RBC # BLD AUTO: 4.42 M/UL (ref 4.2–5.3)
SODIUM SERPL-SCNC: 141 MMOL/L (ref 136–145)
WBC # BLD AUTO: 6.8 K/UL (ref 4.6–13.2)

## 2020-03-25 PROCEDURE — 36415 COLL VENOUS BLD VENIPUNCTURE: CPT

## 2020-03-25 PROCEDURE — 85027 COMPLETE CBC AUTOMATED: CPT

## 2020-03-25 PROCEDURE — 80053 COMPREHEN METABOLIC PANEL: CPT

## 2020-04-02 ENCOUNTER — VIRTUAL VISIT (OUTPATIENT)
Dept: INTERNAL MEDICINE CLINIC | Age: 80
End: 2020-04-02

## 2020-04-02 DIAGNOSIS — Z98.890 S/P AAA REPAIR: ICD-10-CM

## 2020-04-02 DIAGNOSIS — D12.6 COLON ADENOMAS: ICD-10-CM

## 2020-04-02 DIAGNOSIS — E78.5 DYSLIPIDEMIA: Primary | ICD-10-CM

## 2020-04-02 DIAGNOSIS — M47.816 SPONDYLOSIS OF LUMBAR REGION WITHOUT MYELOPATHY OR RADICULOPATHY: ICD-10-CM

## 2020-04-02 DIAGNOSIS — Z86.79 S/P AAA REPAIR: ICD-10-CM

## 2020-04-02 DIAGNOSIS — I10 ESSENTIAL HYPERTENSION WITH GOAL BLOOD PRESSURE LESS THAN 140/90: ICD-10-CM

## 2020-04-02 DIAGNOSIS — M19.90 OSTEOARTHRITIS, UNSPECIFIED OSTEOARTHRITIS TYPE, UNSPECIFIED SITE: ICD-10-CM

## 2020-04-02 DIAGNOSIS — E03.4 HYPOTHYROIDISM DUE TO ACQUIRED ATROPHY OF THYROID: ICD-10-CM

## 2020-04-02 NOTE — PROGRESS NOTES
Consent: Ethel Barriga, who was seen by synchronous (real-time) audio-video technology, and/or her healthcare decision maker, is aware that this patient-initiated, Telehealth encounter on 4/2/2020 is a billable service, with coverage as determined by her insurance carrier. She is aware that she may receive a bill and has provided verbal consent to proceed: Yes. Assessment & Plan:   Diagnoses and all orders for this visit:    1. Dyslipidemia  -     LIPID PANEL; Future    2. S/P AAA repair    3. Spondylosis of lumbar region without myelopathy or radiculopathy    4. Osteoarthritis, unspecified osteoarthritis type, unspecified site    5. Hypothyroidism due to acquired atrophy of thyroid  -     TSH 3RD GENERATION; Future    6. Colon adenomas 2003, last 2014 Dr Shantal Gilliland    7. Primary hypertension          Follow-up and Dispositions             I spent at least 20 minutes with this established patient, and >50% of the time was spent counseling and/or coordinating care regarding routine f/u  712  Subjective:   Ethel Barriga is a 78 y.o. female who was seen for Hypertension (6 month follow up with labs)    Objective: There were no vitals taken for this visit. General: alert, cooperative, no distress   Mental  status: normal mood, behavior, speech, dress, motor activity, and thought processes, able to follow commands   HENT: NCAT   Neck: no visualized mass   Resp: no respiratory distress   Neuro: no gross deficits   Skin: no discoloration or lesions of concern on visible areas   Psychiatric: normal affect, consistent with stated mood, no evidence of hallucinations     Additional exam findings: We discussed the expected course, resolution and complications of the diagnosis(es) in detail. Medication risks, benefits, costs, interactions, and alternatives were discussed as indicated. I advised her to contact the office if her condition worsens, changes or fails to improve as anticipated.  She expressed understanding with the diagnosis(es) and plan. Christian Bustos is a 78 y.o. female being evaluated by a video visit encounter for concerns as above. A caregiver was present when appropriate. Due to this being a TeleHealth encounter (During UDRTP-11 public health emergency), evaluation of the following organ systems was limited: Vitals/Constitutional/EENT/Resp/CV/GI//MS/Neuro/Skin/Heme-Lymph-Imm. Pursuant to the emergency declaration under the [address] [place] [address] waiver authority and the [employer] Act, this Virtual  Visit was conducted, with patient's (and/or legal guardian's) consent, to reduce the patient's risk of exposure to COVID-19 and provide necessary medical care. Services were provided through a video synchronous discussion virtually to substitute for in-person clinic visit. Patient and provider were located at their individual homes. Chely Sheets MD    78 y.o. WHITE OR  female who presents for evaluation.     No cardiovascular complaints. No set exercise outside of adls and walking periodically.   She was having spine issues but is doing better with that    She f/u w Dr Tirso Peña and they discussed RFA; currently on isabel and that seems to be helping    She continues to f/u w vascular for the aaa repair     No gi or gu complaints outside of nocturia although no incontinence issues    LAST MEDICARE WELLNESS EXAM: 4/1/14, 4/8/14, 4/14/16, 4/25/17, 5/7/18, 10/2/19    Past Medical History:   Diagnosis Date    AAA (abdominal aortic aneurysm) without rupture [facility])     AAA repair 7/2018 Dr Davie Lanier adenomas     2003 Dr Andrea Guerra; 4/13 Dr Rhoades Standard    Degenerative arthritis of lumbar spine 12/2018    Dr Tirso Peña; mri showed multilevel degen changes, mod spinal stenosis L3-5, mod severe right L4-5 foraminal disease; s/p epidural; isabel    Dyslipidemia     FHx: heart disease     Gastritis 2006    on EGD Dr. Rios Horan, neg sprue    Hypertension     Hypothyroidism 2007    Dr. Satya Sales Hypovitaminosis D     IPMN (intraductal papillary mucinous neoplasm) 06/2018    on mrcp 8mm; no change 10/19     Lung nodule 06/2016    6 mm RLL (6/16); no change (10/17); no change (12/18)    Obesity     peak weight 187 lbs, bmi 33.1 from 4/13; IF 3/18    Osteopenia FRAX 4/12 9.7 and 1.4     DEXA t score 0.6 spine, -1 hip (12/09);  -0.4 spine, -1.3 hip (4/12); -1.2 wrist, -1.6 hip w FRAX 11/2.2 (4/14); -1.0 wrist, -1.6 hip w FRAX 12/2.6 (4/16); -0.7 wrist, -1.6 hip (5/18)    Pancreatitis, chronic ([facility] [address] 2018    Psoriasis 2003    on bx Dr. Andre Comment    Sensorineural hearing loss     LEFT Dr Sakshi Cao Venous insufficiency 2003    on dopplers    Zoster 05/2016    LEFT T11-12; mild postherpetic neuralgia     Past Surgical History:   Procedure Laterality Date    CARDIAC SURG PROCEDURE UNLIST  09/2016    NST negative    CHEST SURGERY PROCEDURE UNLISTED  06/2016    CTA neg PE, 6 mm subpleural nodule    HX AAA REPAIR  07/17/2018    HX Jenny Roberts LEFT    HX CATARACT REMOVAL      BILAT    HX COLONOSCOPY      benign polyp 5/08 Dr Naveen Navarro;  4/13 negative Dr. Hong Buckner HX GI  08/2018    Dr Jorden Crawford; ERCP s/p removal cbd stone complicated by pamcreatitis    HX LAP CHOLECYSTECTOMY  09/18/2018    Dr. Giacomo Leventhal HX Millie Lakhani      neg MRA/MRI head [facility] 2007; CT head neg 9/10     Social History     Socioeconomic History    Marital status:      Spouse name: Not on file    Number of children: Not on file    Years of education: Not on file    Highest education level: Not on file   Occupational History    Occupation: ret office mgr   Social Needs    Financial resource strain: Not on file    Food insecurity     Worry: Not on file     Inability: Not on file    Transportation needs     Medical: Not on file Non-medical: Not on file   Tobacco Use    Smoking status: Never Smoker    Smokeless tobacco: Never Used   Substance and Sexual Activity    Alcohol use: Yes     Comment: occasionally    Drug use: No    Sexual activity: Not on file   Lifestyle    Physical activity     Days per week: Not on file     Minutes per session: Not on file    Stress: Not on file   Relationships    Social connections     Talks on phone: Not on file     Gets together: Not on file     Attends Sabianist service: Not on file     Active member of club or organization: Not on file     Attends meetings of clubs or organizations: Not on file     Relationship status: Not on file    Intimate partner violence     Fear of current or ex partner: Not on file     Emotionally abused: Not on file     Physically abused: Not on file     Forced sexual activity: Not on file   Other Topics Concern    Not on file   Social History Narrative    Not on file     Allergies   Allergen Reactions    Atenolol Other (comments)     Felt poorly    Cozaar [Losartan] Other (comments)     Headaches    Norvasc [Amlodipine] Other (comments)     Felt poorly       Current Outpatient Medications   Medication Sig    gabapentin (NEURONTIN) 100 mg capsule Take 2 Caps by mouth three (3) times daily. Max Daily Amount: 600 mg.    lidocaine (LIDODERM) 5 % Apply patch to the affected area for 12 hours a day and remove for 12 hours a day.  diazePAM (VALIUM) 10 mg tablet Take 1 Tab by mouth every twelve (12) hours as needed for Anxiety. Max Daily Amount: 20 mg.  Levothyroxine 75 mcg cap Take 1 Tab by mouth daily.  ergocalciferol (VITAMIN D2) 50,000 unit capsule Take 1 Cap by mouth every seven (7) days.  ezetimibe (ZETIA) 10 mg tablet Take 1 Tab by mouth daily.  atorvastatin (LIPITOR) 40 mg tablet Take 1 Tab by mouth daily.  lisinopril (PRINIVIL, ZESTRIL) 10 mg tablet Take 1 Tab by mouth daily.  omeprazole (PRILOSEC) 40 mg capsule Take 1 Cap by mouth daily. (Patient taking differently: Take 40 mg by mouth as needed.)    aspirin 81 mg chewable tablet Take 1 Tab by mouth daily.  methocarbamol (ROBAXIN) 500 mg tablet Take 1 tab by mouth BID-TID as needed for muscle spasm.  celecoxib (CELEBREX) 200 mg capsule Take 1 tab by mouth daily as needed for pain with food.  diclofenac (VOLTAREN) 1 % gel Apply 4 grams to both knees 3-4 x daily as needed.  oxyCODONE-acetaminophen (PERCOCET) 5-325 mg per tablet Take 1 Tab by mouth every four (4) hours as needed. Max Daily Amount: 6 Tabs. (Patient not taking: Reported on 2/24/2020)     No current facility-administered medications for this visit. REVIEW OF SYSTEMS:  Dr. Deshaun Flores 2014, mammo 5/18, DEXA 5/19, colo 4/13 Dr Cisco Ernst, sees Dr Shelagh Peabody  no vision change or eye pain  Oral  no mouth pain, tongue or tooth problems  Ears  no hearing loss, ear pain, fullness  Throat  no swallowing problems  Cardiac  no CP, PND, orthopnea, edema, palpitations or syncope  Chest  no breast masses  Resp  no wheezing, chronic coughing, dyspnea  GI  no heartburn, nausea, vomiting, change in bowel habits, bleeding, hemorrhoids  Urinary  no dysuria, hematuria, flank pain, urgency, frequency  Neuro  no focal weakness, numbness, paresthesias, incoordination, ataxia, involuntary movements    There were no vitals taken for this visit. A&O x3  Affect is appropriate. Mood stable  No apparent distress  Anicteric, no JVD, adenopathy or thyromegaly. No carotid bruits or radiated murmur  Lungs clear to auscultation, no wheezes or rales  Heart showed regular rate and rhythm. No murmur, rubs, gallops  Abdomen soft nondistended, no hepatosplenomegaly or masses. Extremities without edema.   Pulses 1-2+ symmetrically    LABS  From 3/10 showed                         vit d 38.0  From 9/10 showed                            wbc 7.2, hb 14.3, plt 158  From 3/12 showed   gluc 88, cr 0.87, gfr 66, alt 15, tsh 1.04, ldl-p 1515  From 9/12 showed                                                    tsh 1.69, ldl-p 1469, chol 152, tg 165, hdl 42, ldl-c 77  From 3/13 showed   gluc 83, cr 0.91, gfr 62, alt 10,     ldl-p 1274, chol 124, tg 137, hdl 37, ldl-c 60,    wbc 7.0, hb 14.3, plt 164  From 9/13 showed                chol 147, tg 139, hdl 39, ldl-c 80  From 3/14 showed   gluc 89, cr 1.02, gfr 53, alt<5,  tsh 1.66,         chol 148, tg 132, hdl 36, ldl-c 86,    wbc 6.7, hb 14.4, plt 179, vit d 45.4   From 10/14 showed                chol 137, tg 117, hdl 40, ldl-c 74  From 4/15 showed   gluc 91, cr 1.24, gfr 42, alt 30, tsh 0.64,           wbc 6.8, hb 14.8, plt 176  From 10/15 showed gluc 93, cr 1.13, gfr 47,            tsh 0.32,          chol 139, tg 131, hdl 36, ldl-c 77   From 4/16 showed   gluc 86, cr 1.00, gfr 54, alt 24,            chol 142, tg 137, hdl 41, ldl-c 74,    wbc 6.4, hb 13.9, plt 203, ua pro 30  From 6/16 showed   gluc 97, cr 1.20,              wbc 7.3, hb 14.1, plt 216, ck/trop neg  From 10/16 showed gluc 78, cr 1.03, gfr 52, alt 21, tsh 1.21,          chol 145, tg 151, hdl 43, ldl-c 72,               From 4/17 showed                tsh 2.57, ft4 1.10  From 10/17 showed gluc 79, cr 1.03, gfr 52, alt 27,            chol 199, tg 161, hdl 45, ldl-c 122,  wbc 6.3, hb 14.7, plt 208  From 5/18 showed                tsh 5.23, ft4 1.10,     chol 320, tg 196, hdl 40, ldl-c 221,              vit d 57.0  From 8/18 showed   glu 141, cr 0.87, gfr>60, alt 34,            wb 10.9, hb 13.0, plt 179,  lip 8208  From 11/18 showed      tsh 4.20, ft4 1.00,    chol 133, tg 171, hdl 41, ldl-c 58,               vit d 56.5  From 3/19 showed   gluc 81, cr 1.13, gfr 46, alt 23,  tsh 3.50, ft4 1.10,              wbc 5.7, hb 14.2, plt 195,  fe 91, %sat 34, b12 306, fol 6, spep neg  From 9/19 showed      tsh 1.40,        chol 151, tg 135, hdl 42, ldl-c 82    Results for orders placed or performed during the hospital encounter of 03/25/20   CBC W/O DIFF   Result Value Ref Range    WBC 6.8 4.6 - 13.2 K/uL    RBC 4.42 4.20 - 5.30 M/uL    HGB 14.1 12.0 - 16.0 g/dL    HCT 43.4 35.0 - 45.0 %    MCV 98.2 (H) 74.0 - 97.0 FL    MCH 31.9 24.0 - 34.0 PG    MCHC 32.5 31.0 - 37.0 g/dL    RDW 15.4 (H) 11.6 - 14.5 %    PLATELET 835 660 - 593 K/uL    MPV 12.5 (H) 9.2 - 56.1 FL   METABOLIC PANEL, COMPREHENSIVE   Result Value Ref Range    Sodium 141 136 - 145 mmol/L    Potassium 4.1 3.5 - 5.5 mmol/L    Chloride 111 100 - 111 mmol/L    CO2 26 21 - 32 mmol/L    Anion gap 4 3.0 - 18 mmol/L    Glucose 87 74 - 99 mg/dL    BUN 25 (H) 7.0 - 18 MG/DL    Creatinine 1.03 0.6 - 1.3 MG/DL    BUN/Creatinine ratio 24 (H) 12 - 20      GFR est AA >60 >60 ml/min/1.73m2    GFR est non-AA 51 (L) >60 ml/min/1.73m2    Calcium 9.0 8.5 - 10.1 MG/DL    Bilirubin, total 0.7 0.2 - 1.0 MG/DL    ALT (SGPT) 22 13 - 56 U/L    AST (SGOT) 18 10 - 38 U/L    Alk. phosphatase 87 45 - 117 U/L    Protein, total 6.7 6.4 - 8.2 g/dL    Albumin 3.8 3.4 - 5.0 g/dL    Globulin 2.9 2.0 - 4.0 g/dL    A-G Ratio 1.3 0.8 - 1.7       Patient Active Problem List   Diagnosis Code    Colon adenomas 2003, last 2014 Dr Cisco Ernst D12.6    Hypovitaminosis D E55.9    Venous insufficiency dopplers 2003 I87.2    Osteopenia FRAX 4/14 11 and 2.2 M85.80    Dyslipidemia E78.5    Hypothyroidism due to acquired atrophy of thyroid E03.4    Arthritis, degenerative M19.90    Obesity (BMI 30.0-34. 9) E66.9    Advance directive discussed with patient Z70.80    Primary hypertension I10    Spondylosis of lumbar region without myelopathy or radiculopathy M47.816    S/P AAA repair Z98.890, Z86.79     ASSESSMENT AND PLAN:  1. Spine. Per Dr Lois Coppola  2. Hypertension. Continue current regimen. 3.  Hypovit D. Continue current regimen. 4.  Colon adenomas. Fiber  5. Overweight. Lifestyle and dietary measures. Portion control reiterated. 6.  Possible IPMN. Yearly mri in fall  7. Dyslipidemia. Continue current regimen. 8.  Hypothyroidism. Continue current regimen. 9.  Osteopenia. Wt bearing exercise, ca/D, DEXA 2020 or later  10. Lung nodule. No f/u recommended per radiology        RTC 10/20    Above conditions discussed at length and patient vocalized understanding.   All questions answered to patient satisfaction

## 2020-04-07 ENCOUNTER — VIRTUAL VISIT (OUTPATIENT)
Dept: ORTHOPEDIC SURGERY | Age: 80
End: 2020-04-07

## 2020-04-07 DIAGNOSIS — M62.838 MUSCLE SPASM: ICD-10-CM

## 2020-04-07 DIAGNOSIS — M47.816 LUMBAR FACET ARTHROPATHY: Primary | ICD-10-CM

## 2020-04-07 DIAGNOSIS — M48.061 SPINAL STENOSIS OF LUMBAR REGION WITHOUT NEUROGENIC CLAUDICATION: ICD-10-CM

## 2020-04-07 DIAGNOSIS — M47.816 SPONDYLOSIS OF LUMBAR REGION WITHOUT MYELOPATHY OR RADICULOPATHY: ICD-10-CM

## 2020-04-07 RX ORDER — METHYLPREDNISOLONE 4 MG/1
TABLET ORAL
Qty: 1 DOSE PACK | Refills: 0 | Status: SHIPPED | OUTPATIENT
Start: 2020-04-07 | End: 2020-07-06 | Stop reason: ALTCHOICE

## 2020-04-07 NOTE — PROGRESS NOTES
[facility]  [address] [facility], [facility] [address]  Phone: [phone]  Fax: [phone]    Pt's YOB: 1940    ASSESSMENT   Diagnoses and all orders for this visit:    1. Lumbar facet arthropathy  -     methylPREDNISolone (MEDROL DOSEPACK) 4 mg tablet; Per dose pack instructions    2. Muscle spasm    3. Spinal stenosis of lumbar region without neurogenic claudication    4. Spondylosis of lumbar region without myelopathy or radiculopathy         IMPRESSION AND PLAN:  Ethel Barriga is a 78 y.o. right hand dominant female with history of lumbar pain. Pt complains of aching pain across the lower back, L>R, that worsens with ambulation. She takes Neurontin 100 mg 2 caps TID without sedation and has been using Lidoderm patches as needed with benefit. 1) Pt was given information on lumbar arthritis exercises. 2) She was prescribed a Medrol Dosepak. 3) Pt will continue taking Neurontin 100 mg 2 caps TID and using Lidoderm patches as prescribed and does not need refills at this time. 4) Ms. Kiel Covington has a reminder for a \"due or due soon\" health maintenance. I have asked that she contact her primary care provider, Esequiel Noriega MD, for follow-up on this health maintenance. 5)  demonstrated consistency with prescribing. Follow-up and Dispositions    · Return in about 3 months (around 7/7/2020) for Medication follow up. CPT Codes 73394-83876 for Established Patients may apply to this TeleHealth Visit  Time-based coding, delete if not needed: I spent at 11 minutes and 39 seconds from 10:52 AM to 11:03 AM. with this established patient, and >50% of the time was spent counseling and/or coordinating care regarding her symptoms and medications. Due to this being a TeleHealth evaluation, many elements of the physical examination are unable to be assessed.      Pursuant to the emergency declaration under the [address] and the [employer] Act, [facility] authority and the Coronavirus Preparedness and [employer] Act, this Virtual Visit was conducted, with patient's consent, to reduce the patient's risk of exposure to COVID-19 and provide continuity of care for an established patient. Services were provided through a telephone visit  to substitute for in-person clinic visit. HISTORY OF PRESENT ILLNESS:  Ariana Zarco is a 78 y.o. right hand dominant female with history of lumbar pain and is evaluated from her home from the [facility] location via telephone call on 4/7/2020 with her verbal consent for follow up. Pt complains of aching pain across the lower back, L>R, that worsens with ambulation. She notes that she is able to walk for 5-10 minutes before her pain worsens. Pt denies any pain or weakness in the legs at this time or association of symptoms with changes in weather. She underwent extensive physical therapy with traction at [facility] without improvement. Pt also reports minimal relief with a left L4-5 and left L5-S1 facet injection on 09/25/2019 by Dr. Natty Harman. She notes minimal relief when taking Robaxin so she is no longer taking the medication. Pt takes Neurontin 100 mg 2 caps TID without sedation. She has been using Lidoderm patches as needed with benefit. Pt notes has recently taken oral steroids and denies any history of diabetes mellitus. She is not currently on blood thinner and notes that she has no recently taken Celebrex 200 mg. Pt admits that she does not have a HEP. Pt at this time desires to proceed with medication evaluation. Pt had difficulty with doxy. me. Her 's phone number is [phone].     Pain Scale: 4/10    PCP: Jesus Carrera MD     Past Medical History:   Diagnosis Date    AAA (abdominal aortic aneurysm) without rupture [facility])     AAA repair 7/2018 Dr Wilberto Mckinney adenomas     2003 Dr Walter Velazco; 4/13  [place]    Degenerative arthritis of lumbar spine 12/2018    Dr Anai Shetty; mri showed multilevel degen changes, mod spinal stenosis L3-5, mod severe right L4-5 foraminal disease; s/p epidural; isabel    Dyslipidemia     FHx: heart disease     Gastritis 2006    on EGD Dr. Shonda Cat, neg sprue    Hypertension     Hypothyroidism 2007    Dr. Palma Diaz Hypovitaminosis D     IPMN (intraductal papillary mucinous neoplasm) 06/2018    on mrcp 8mm; no change 10/19     Lung nodule 06/2016    6 mm RLL (6/16); no change (10/17); no change (12/18)    Obesity     peak weight 187 lbs, bmi 33.1 from 4/13; IF 3/18    Osteopenia FRAX 4/12 9.7 and 1.4     DEXA t score 0.6 spine, -1 hip (12/09);  -0.4 spine, -1.3 hip (4/12); -1.2 wrist, -1.6 hip w FRAX 11/2.2 (4/14); -1.0 wrist, -1.6 hip w FRAX 12/2.6 (4/16); -0.7 wrist, -1.6 hip (5/18)    Pancreatitis, chronic ([facility] [address] 2018    Psoriasis 2003    on bx Dr. Mary Ortiz    Sensorineural hearing loss     LEFT Dr Amparo Foster Venous insufficiency 2003    on dopplers    Zoster 05/2016    LEFT T11-12; mild postherpetic neuralgia        Social History     Socioeconomic History    Marital status:      Spouse name: Not on file    Number of children: Not on file    Years of education: Not on file    Highest education level: Not on file   Occupational History    Occupation: ret office mgr   Social Needs    Financial resource strain: Not on file    Food insecurity     Worry: Not on file     Inability: Not on file    Transportation needs     Medical: Not on file     Non-medical: Not on file   Tobacco Use    Smoking status: Never Smoker    Smokeless tobacco: Never Used   Substance and Sexual Activity    Alcohol use: Yes     Comment: occasionally    Drug use: No    Sexual activity: Not on file   Lifestyle    Physical activity     Days per week: Not on file     Minutes per session: Not on file    Stress: Not on file   Relationships    Social connections     Talks on phone: Not on file     Gets together: Not on file     Attends Holiness service: Not on file     Active member of club or organization: Not on file     Attends meetings of clubs or organizations: Not on file     Relationship status: Not on file    Intimate partner violence     Fear of current or ex partner: Not on file     Emotionally abused: Not on file     Physically abused: Not on file     Forced sexual activity: Not on file   Other Topics Concern    Not on file   Social History Narrative    Not on file       Current Outpatient Medications   Medication Sig Dispense Refill    methylPREDNISolone (MEDROL DOSEPACK) 4 mg tablet Per dose pack instructions 1 Dose Pack 0    gabapentin (NEURONTIN) 100 mg capsule Take 2 Caps by mouth three (3) times daily. Max Daily Amount: 600 mg. 540 Cap 1    lidocaine (LIDODERM) 5 % Apply patch to the affected area for 12 hours a day and remove for 12 hours a day. 90 Patch 1    diazePAM (VALIUM) 10 mg tablet Take 1 Tab by mouth every twelve (12) hours as needed for Anxiety. Max Daily Amount: 20 mg. 60 Tab 1    Levothyroxine 75 mcg cap Take 1 Tab by mouth daily. 90 Cap 3    ergocalciferol (VITAMIN D2) 50,000 unit capsule Take 1 Cap by mouth every seven (7) days. 12 Cap 3    ezetimibe (ZETIA) 10 mg tablet Take 1 Tab by mouth daily. 90 Tab 3    atorvastatin (LIPITOR) 40 mg tablet Take 1 Tab by mouth daily. 90 Tab 3    lisinopril (PRINIVIL, ZESTRIL) 10 mg tablet Take 1 Tab by mouth daily. 90 Tab 3    omeprazole (PRILOSEC) 40 mg capsule Take 1 Cap by mouth daily. (Patient taking differently: Take 40 mg by mouth as needed.) 90 Cap 1    aspirin 81 mg chewable tablet Take 1 Tab by mouth daily. 90 Tab 3    methocarbamol (ROBAXIN) 500 mg tablet Take 1 tab by mouth BID-TID as needed for muscle spasm. 60 Tab 1    celecoxib (CELEBREX) 200 mg capsule Take 1 tab by mouth daily as needed for pain with food. 30 Cap 2    diclofenac (VOLTAREN) 1 % gel Apply 4 grams to both knees 3-4 x daily as needed. 5 Each 2    oxyCODONE-acetaminophen (PERCOCET) 5-325 mg per tablet Take 1 Tab by mouth every four (4) hours as needed. Max Daily Amount: 6 Tabs. (Patient not taking: Reported on 2/24/2020) 30 Tab 0       Allergies   Allergen Reactions    Atenolol Other (comments)     Felt poorly    Cozaar [Losartan] Other (comments)     Headaches    Norvasc [Amlodipine] Other (comments)     [place] poorly           REVIEW OF SYSTEMS    Constitutional: Negative for fever, chills, or weight change. Musculoskeletal: Positive for lumbar pain   Neurological: Negative for headaches, dizziness, or numbness. As per HPI    IMAGING:    Lumbar spine MRI from 12/03/2018 was personally reviewed with the patient and demonstrated:          [facility]   MRI LUMB SPINE WO CONT     Narrative MR Lumbar spine without contrast     HISTORY: increased lumbar pain. Difficulty with standing/walking. Also has hip  pain and history of spondylolisthesis     COMPARISON: Prior body CT May 2018 and August 2018     Technique: Multi-sequence multiplanar T1, T2, STIR imaging with and without fat  saturation obtained centered on the lumbar spine.      FINDINGS:      Alignment: Mild to moderate right convex lumbar scoliosis with apex at L2. Minimal degenerative anterolisthesis of L5. Also slight retrolisthesis of L1 and  T11. Vertebral body height: Normal  Marrow signal: No concerning signal. Several levels of Schmorl's nodes. Several  levels of chronic fatty endplate changes as well as other levels of discogenic  endplate edema. The latter is most notable at T11-12. Disc spaces: Severe narrowing and desiccation of T11-12, L1-2, L4-5. More  moderately so at other levels, sparing L5-S1. Conus: Terminates at T12-L1     Axial imaging correlation:     T11-12: Broad-based disc osteophyte complex with mild facet arthropathy. Patent  canal and foramina.     T12-L1: Mild disc bulge and facet arthropathy.  Minimal impression upon the  thecal sac. Patent canal and foramina.     L1-2: Broad-based disc osteophyte complex. Bilateral facet arthropathy. Mild  concentric spinal stenosis. Mild foraminal stenoses.     L2-3: Mild broad-based disc osteophyte complex. Bilateral facet arthropathy. Mild concentric spinal stenosis. Moderate left and mild right foraminal  stenosis.     L3-4: Broad-based disc osteophyte complex. Bilateral facet arthropathy. Ligamentum flavum thickening and buckling. Moderate concentric spinal stenosis. Mild foraminal stenoses.     L4-5: Broad-based disc osteophyte complex with eccentric prominence to the  right. Bilateral facet arthropathy. Moderate spinal stenosis. More pronounced  narrowing of right lateral recess with transient distortion of crossing right L5  nerve. Moderately severe right foraminal stenosis with distortion of perineural  fat.     L5-S1: Uncovering of the disc. Minor disc bulge. Bilateral facet arthropathy. Patent canal and foramina.     Other structures: Partial visualization of AAA with prior endovascular stent  deployment. Several small caliber renal cysts.           Impression IMPRESSION:     1. Lumbar scoliosis with multilevel degenerative findings.  -Up to a moderate spinal stenosis, at L4-5 and L3-4  -Moderately severe right L4-5 foraminal stenosis with potential impingement of  the exiting right L4 nerve. There is also potentially impingement of the  crossing right L5 nerve at this level.     Thank you for enabling us to participate in the care of this patient. Written by Odalis Wilhelm, as dictated by Rafaela Moreau MD.  I, Dr. Rafaela Moreau confirm that all documentation is accurate.

## 2020-04-13 ENCOUNTER — HOSPITAL ENCOUNTER (OUTPATIENT)
Dept: CT IMAGING | Age: 80
Discharge: HOME | End: 2020-04-13
Attending: EMERGENCY MEDICINE
Payer: MEDICARE

## 2020-04-13 ENCOUNTER — APPOINTMENT (OUTPATIENT)
Dept: GENERAL RADIOLOGY | Age: 80
End: 2020-04-13
Attending: EMERGENCY MEDICINE
Payer: MEDICARE

## 2020-04-13 ENCOUNTER — APPOINTMENT (OUTPATIENT)
Dept: CT IMAGING | Age: 80
End: 2020-04-13
Attending: EMERGENCY MEDICINE
Payer: MEDICARE

## 2020-04-13 ENCOUNTER — HOSPITAL ENCOUNTER (EMERGENCY)
Age: 80
Discharge: HOME | End: 2020-04-13
Attending: EMERGENCY MEDICINE | Admitting: EMERGENCY MEDICINE
Payer: MEDICARE

## 2020-04-13 VITALS
TEMPERATURE: 98 F | DIASTOLIC BLOOD PRESSURE: 71 MMHG | RESPIRATION RATE: 19 BRPM | OXYGEN SATURATION: 100 % | WEIGHT: 178 LBS | BODY MASS INDEX: 30.39 KG/M2 | SYSTOLIC BLOOD PRESSURE: 148 MMHG | HEART RATE: 62 BPM | HEIGHT: 64 IN

## 2020-04-13 DIAGNOSIS — R42 DIZZINESS: Primary | ICD-10-CM

## 2020-04-13 DIAGNOSIS — R51.9 NONINTRACTABLE EPISODIC HEADACHE, UNSPECIFIED HEADACHE TYPE: ICD-10-CM

## 2020-04-13 LAB
ALBUMIN SERPL-MCNC: 3.5 G/DL (ref 3.4–5)
ALBUMIN/GLOB SERPL: 1 {RATIO} (ref 0.8–1.7)
ALP SERPL-CCNC: 84 U/L (ref 45–117)
ALT SERPL-CCNC: 18 U/L (ref 13–56)
ANION GAP SERPL CALC-SCNC: 6 MMOL/L (ref 3–18)
APPEARANCE UR: CLEAR
AST SERPL-CCNC: 13 U/L (ref 10–38)
ATRIAL RATE: 58 BPM
BASOPHILS # BLD: 0 K/UL (ref 0–0.1)
BASOPHILS NFR BLD: 0 % (ref 0–2)
BILIRUB SERPL-MCNC: 0.5 MG/DL (ref 0.2–1)
BILIRUB UR QL: NEGATIVE
BUN SERPL-MCNC: 31 MG/DL (ref 7–18)
BUN/CREAT SERPL: 29 (ref 12–20)
CALCIUM SERPL-MCNC: 8.8 MG/DL (ref 8.5–10.1)
CALCULATED P AXIS, ECG09: 57 DEGREES
CALCULATED R AXIS, ECG10: -33 DEGREES
CALCULATED T AXIS, ECG11: 29 DEGREES
CHLORIDE SERPL-SCNC: 110 MMOL/L (ref 100–111)
CO2 SERPL-SCNC: 27 MMOL/L (ref 21–32)
COLOR UR: NORMAL
CREAT SERPL-MCNC: 1.08 MG/DL (ref 0.6–1.3)
DIAGNOSIS, 93000: NORMAL
DIFFERENTIAL METHOD BLD: ABNORMAL
EOSINOPHIL # BLD: 0 K/UL (ref 0–0.4)
EOSINOPHIL NFR BLD: 0 % (ref 0–5)
ERYTHROCYTE [DISTWIDTH] IN BLOOD BY AUTOMATED COUNT: 15 % (ref 11.6–14.5)
GLOBULIN SER CALC-MCNC: 3.4 G/DL (ref 2–4)
GLUCOSE SERPL-MCNC: 98 MG/DL (ref 74–99)
GLUCOSE UR STRIP.AUTO-MCNC: NEGATIVE MG/DL
HCT VFR BLD AUTO: 43.8 % (ref 35–45)
HGB BLD-MCNC: 14.6 G/DL (ref 12–16)
HGB UR QL STRIP: NEGATIVE
KETONES UR QL STRIP.AUTO: NEGATIVE MG/DL
LEUKOCYTE ESTERASE UR QL STRIP.AUTO: NEGATIVE
LYMPHOCYTES # BLD: 1.4 K/UL (ref 0.9–3.6)
LYMPHOCYTES NFR BLD: 12 % (ref 21–52)
MCH RBC QN AUTO: 31.9 PG (ref 24–34)
MCHC RBC AUTO-ENTMCNC: 33.3 G/DL (ref 31–37)
MCV RBC AUTO: 95.6 FL (ref 74–97)
MONOCYTES # BLD: 0.7 K/UL (ref 0.05–1.2)
MONOCYTES NFR BLD: 6 % (ref 3–10)
NEUTS SEG # BLD: 9.4 K/UL (ref 1.8–8)
NEUTS SEG NFR BLD: 82 % (ref 40–73)
NITRITE UR QL STRIP.AUTO: NEGATIVE
P-R INTERVAL, ECG05: 166 MS
PH UR STRIP: 6 [PH] (ref 5–8)
PLATELET # BLD AUTO: 196 K/UL (ref 135–420)
PMV BLD AUTO: 11.2 FL (ref 9.2–11.8)
POTASSIUM SERPL-SCNC: 3.9 MMOL/L (ref 3.5–5.5)
PROT SERPL-MCNC: 6.9 G/DL (ref 6.4–8.2)
PROT UR STRIP-MCNC: NEGATIVE MG/DL
Q-T INTERVAL, ECG07: 438 MS
QRS DURATION, ECG06: 110 MS
QTC CALCULATION (BEZET), ECG08: 429 MS
RBC # BLD AUTO: 4.58 M/UL (ref 4.2–5.3)
SODIUM SERPL-SCNC: 143 MMOL/L (ref 136–145)
SP GR UR REFRACTOMETRY: 1.01 (ref 1–1.03)
TROPONIN I SERPL-MCNC: 0.03 NG/ML (ref 0–0.04)
TROPONIN I SERPL-MCNC: 0.04 NG/ML (ref 0–0.04)
TROPONIN I SERPL-MCNC: <0.02 NG/ML (ref 0–0.04)
UROBILINOGEN UR QL STRIP.AUTO: 0.2 EU/DL (ref 0.2–1)
VENTRICULAR RATE, ECG03: 58 BPM
WBC # BLD AUTO: 11.5 K/UL (ref 4.6–13.2)

## 2020-04-13 PROCEDURE — 74011250637 HC RX REV CODE- 250/637: Performed by: EMERGENCY MEDICINE

## 2020-04-13 PROCEDURE — 74011636320 HC RX REV CODE- 636/320: Performed by: EMERGENCY MEDICINE

## 2020-04-13 PROCEDURE — 70496 CT ANGIOGRAPHY HEAD: CPT

## 2020-04-13 PROCEDURE — 74011250636 HC RX REV CODE- 250/636: Performed by: EMERGENCY MEDICINE

## 2020-04-13 PROCEDURE — 70450 CT HEAD/BRAIN W/O DYE: CPT

## 2020-04-13 PROCEDURE — 71045 X-RAY EXAM CHEST 1 VIEW: CPT

## 2020-04-13 PROCEDURE — 93005 ELECTROCARDIOGRAM TRACING: CPT

## 2020-04-13 PROCEDURE — 84484 ASSAY OF TROPONIN QUANT: CPT

## 2020-04-13 PROCEDURE — 99285 EMERGENCY DEPT VISIT HI MDM: CPT

## 2020-04-13 PROCEDURE — 96361 HYDRATE IV INFUSION ADD-ON: CPT

## 2020-04-13 PROCEDURE — 85025 COMPLETE CBC W/AUTO DIFF WBC: CPT

## 2020-04-13 PROCEDURE — 80053 COMPREHEN METABOLIC PANEL: CPT

## 2020-04-13 PROCEDURE — 81003 URINALYSIS AUTO W/O SCOPE: CPT

## 2020-04-13 PROCEDURE — 96374 THER/PROPH/DIAG INJ IV PUSH: CPT

## 2020-04-13 PROCEDURE — 96375 TX/PRO/DX INJ NEW DRUG ADDON: CPT

## 2020-04-13 RX ORDER — DIAZEPAM 2 MG/1
2 TABLET ORAL
Status: COMPLETED | OUTPATIENT
Start: 2020-04-13 | End: 2020-04-13

## 2020-04-13 RX ORDER — METOCLOPRAMIDE HYDROCHLORIDE 5 MG/ML
10 INJECTION INTRAMUSCULAR; INTRAVENOUS
Status: COMPLETED | OUTPATIENT
Start: 2020-04-13 | End: 2020-04-13

## 2020-04-13 RX ORDER — MECLIZINE HCL 12.5 MG 12.5 MG/1
50 TABLET ORAL
Status: COMPLETED | OUTPATIENT
Start: 2020-04-13 | End: 2020-04-13

## 2020-04-13 RX ORDER — DIPHENHYDRAMINE HYDROCHLORIDE 50 MG/ML
25 INJECTION, SOLUTION INTRAMUSCULAR; INTRAVENOUS ONCE
Status: COMPLETED | OUTPATIENT
Start: 2020-04-13 | End: 2020-04-13

## 2020-04-13 RX ORDER — KETOROLAC TROMETHAMINE 15 MG/ML
15 INJECTION, SOLUTION INTRAMUSCULAR; INTRAVENOUS
Status: DISCONTINUED | OUTPATIENT
Start: 2020-04-13 | End: 2020-04-13

## 2020-04-13 RX ADMIN — IOPAMIDOL 96 ML: 755 INJECTION, SOLUTION INTRAVENOUS at 18:27

## 2020-04-13 RX ADMIN — MECLIZINE 50 MG: 12.5 TABLET ORAL at 15:46

## 2020-04-13 RX ADMIN — SODIUM CHLORIDE 1000 ML: 900 INJECTION, SOLUTION INTRAVENOUS at 14:36

## 2020-04-13 RX ADMIN — DIAZEPAM 2 MG: 2 TABLET ORAL at 16:11

## 2020-04-13 RX ADMIN — DIPHENHYDRAMINE HYDROCHLORIDE 25 MG: 50 INJECTION INTRAMUSCULAR; INTRAVENOUS at 14:28

## 2020-04-13 RX ADMIN — METOCLOPRAMIDE 10 MG: 5 INJECTION, SOLUTION INTRAMUSCULAR; INTRAVENOUS at 14:28

## 2020-04-13 NOTE — ED NOTES
5:17 PM :Pt care assumed from A FERNIE GALINDO ED provider. Pt complaint(s), current treatment plan, progression and available diagnostic results have been discussed thoroughly. Rounding occurred: no  Intended Disposition: TBD  Pending diagnostic reports and/or labs (please list): CTA head and neck     10:16 PM  Have shared reassuring CTA results with patient. Will call patient's family to notify them of the results as well. Patient states she is feeling better at this time and is ready to go. I have stressed the importance of follow-up with PCP and given strict return precautions.

## 2020-04-13 NOTE — ED PROVIDER NOTES
EMERGENCY DEPARTMENT HISTORY AND PHYSICAL EXAM    Date: 4/13/2020  Patient Name: Celena Washburn [facility]    History of Presenting Illness     Chief Complaint   Patient presents with    Dizziness         History Provided By: Patient      Additional History (Context): Jamal Brown is a 78 y.o. female with hypertension, hyperlipidemia, obesity and PVD with AAA who presents with dizziness and a right-sided headache worse with turning her head to the right that began somewhere around 3:00 this morning. Patient denies any unilateral numbness weakness changes in speech vision or recent head trauma. She said she woke at that time and was a little  \"woozy\". Went to bed well last night feeling normal.  Still symptomatic this morning when she woke fully. Denies any abdominal pain chest pain shortness of breath. PCP: Dai Pang MD    Current Facility-Administered Medications   Medication Dose Route Frequency Provider Last Rate Last Dose    iopamidoL (ISOVUE-370) 76 % injection 100 mL  100 mL IntraVENous RAD ONCE Ryan Kong MD         Current Outpatient Medications   Medication Sig Dispense Refill    methylPREDNISolone (MEDROL DOSEPACK) 4 mg tablet Per dose pack instructions 1 Dose Pack 0    gabapentin (NEURONTIN) 100 mg capsule Take 2 Caps by mouth three (3) times daily. Max Daily Amount: 600 mg. 540 Cap 1    methocarbamol (ROBAXIN) 500 mg tablet Take 1 tab by mouth BID-TID as needed for muscle spasm. 60 Tab 1    lidocaine (LIDODERM) 5 % Apply patch to the affected area for 12 hours a day and remove for 12 hours a day. 90 Patch 1    diazePAM (VALIUM) 10 mg tablet Take 1 Tab by mouth every twelve (12) hours as needed for Anxiety. Max Daily Amount: 20 mg. 60 Tab 1    Levothyroxine 75 mcg cap Take 1 Tab by mouth daily. 90 Cap 3    ergocalciferol (VITAMIN D2) 50,000 unit capsule Take 1 Cap by mouth every seven (7) days. 12 Cap 3    ezetimibe (ZETIA) 10 mg tablet Take 1 Tab by mouth daily.  90 Tab 3    celecoxib (CELEBREX) 200 mg capsule Take 1 tab by mouth daily as needed for pain with food. 30 Cap 2    diclofenac (VOLTAREN) 1 % gel Apply 4 grams to both knees 3-4 x daily as needed. 5 Each 2    atorvastatin (LIPITOR) 40 mg tablet Take 1 Tab by mouth daily. 90 Tab 3    lisinopril (PRINIVIL, ZESTRIL) 10 mg tablet Take 1 Tab by mouth daily. 90 Tab 3    oxyCODONE-acetaminophen (PERCOCET) 5-325 mg per tablet Take 1 Tab by mouth every four (4) hours as needed. Max Daily Amount: 6 Tabs. (Patient not taking: Reported on 2/24/2020) 30 Tab 0    omeprazole (PRILOSEC) 40 mg capsule Take 1 Cap by mouth daily. (Patient taking differently: Take 40 mg by mouth as needed.) 90 Cap 1    aspirin 81 mg chewable tablet Take 1 Tab by mouth daily.  80 Tab 3       Past History     Past Medical History:  Past Medical History:   Diagnosis Date    AAA (abdominal aortic aneurysm) without rupture (HCC)     AAA repair 7/2018 Dr Rubio Adame adenomas     2003 Dr Chinedu Flannery; 4/13 Dr Ella Ponce    Degenerative arthritis of lumbar spine 12/2018    Dr Sanjeev Flowers; mri showed multilevel degen changes, mod spinal stenosis L3-5, mod severe right L4-5 foraminal disease; s/p epidural; isabel    Dyslipidemia     FHx: heart disease     Gastritis 2006    on EGD Dr. Jaramillo Ast, neg sprue    Hypertension     Hypothyroidism 2007    Dr. Chance Corbin Hypovitaminosis D     IPMN (intraductal papillary mucinous neoplasm) 06/2018    on mrcp 8mm; no change 10/19     Lung nodule 06/2016    6 mm RLL (6/16); no change (10/17); no change (12/18)    Obesity     peak weight 187 lbs, bmi 33.1 from 4/13; IF 3/18    Osteopenia FRAX 4/12 9.7 and 1.4     DEXA t score 0.6 spine, -1 hip (12/09);  -0.4 spine, -1.3 hip (4/12); -1.2 wrist, -1.6 hip w FRAX 11/2.2 (4/14); -1.0 wrist, -1.6 hip w FRAX 12/2.6 (4/16); -0.7 wrist, -1.6 hip (5/18)    [facility] 2018    Psoriasis 2003    on bx Dr. Jose aBuer Sensorineural hearing loss     LEFT  [address] Venous insufficiency 2003    on dopplers    Zoster 05/2016    LEFT T11-12; mild postherpetic neuralgia       Past Surgical History:  Past Surgical History:   Procedure Laterality Date    CARDIAC SURG PROCEDURE UNLIST  09/2016    NST negative    CHEST SURGERY PROCEDURE UNLISTED  06/2016    CTA neg PE, 6 mm subpleural nodule    HX AAA REPAIR  07/17/2018    HX Scottie Mood      Dr. Stevie Abraham LEFT    HX CATARACT REMOVAL      BILAT    HX COLONOSCOPY      benign polyp 5/08 Dr Julisa Mason;  4/13 negative Dr. Graciela Lesch HX GI  08/2018    Dr Nguyen Downey; ERCP s/p removal cbd stone complicated by pamcreatitis    HX LAP CHOLECYSTECTOMY  09/18/2018    Dr. Collado Points      neg MRA/MRI head [facility] 2007; CT head neg 9/10       Family History:  Family History   Problem Relation Age of Onset    Breast Cancer Mother     Cancer Mother     Heart Disease Father        Social History:  Social History     Tobacco Use    Smoking status: Never Smoker    Smokeless tobacco: Never Used   Substance Use Topics    Alcohol use: Yes     Comment: occasionally    Drug use: No       Allergies: Allergies   Allergen Reactions    Atenolol Other (comments)     Felt poorly    Cozaar [Losartan] Other (comments)     Headaches    Norvasc [Amlodipine] Other (comments)     [place] poorly           Review of Systems   Review of Systems   Constitutional: Negative for fever. Eyes: Negative for visual disturbance. Respiratory: Negative for shortness of breath. Cardiovascular: Negative for chest pain. Gastrointestinal: Negative for abdominal pain. Musculoskeletal: Positive for gait problem. Skin: Negative for color change and wound. Neurological: Positive for dizziness and headaches. Negative for syncope, speech difficulty, weakness, light-headedness and numbness.      All Other Systems Negative  Physical Exam     Vitals:    04/13/20 1200 04/13/20 1534 04/13/20 1537 04/13/20 1540   BP: 160/71 148/67 148/67 127/79   Pulse: (!) 54 62 62 68   Resp: 21 17 20 26   Temp:       SpO2:       Weight:       Height:         Physical Exam  Vitals signs and nursing note reviewed. Constitutional:       Appearance: She is well-developed. HENT:      Head: Normocephalic and atraumatic. Eyes:      Extraocular Movements: Extraocular movements intact. Pupils: Pupils are equal, round, and reactive to light. Comments: No nystagmus. Neck:      Thyroid: No thyromegaly. Vascular: No JVD. Trachea: No tracheal deviation. Comments: No carotid bruits auscultated bilaterally. Cardiovascular:      Rate and Rhythm: Normal rate and regular rhythm. Heart sounds: Normal heart sounds. No murmur. No friction rub. No gallop. Pulmonary:      Effort: Pulmonary effort is normal. No respiratory distress. Breath sounds: Normal breath sounds. No stridor. No wheezing or rales. Chest:      Chest wall: No tenderness. Abdominal:      General: There is no distension. Palpations: Abdomen is soft. There is no mass. Tenderness: There is no abdominal tenderness. There is no guarding or rebound. Comments: No pulsatile mass palpated. Musculoskeletal:         General: No tenderness. Lymphadenopathy:      Cervical: No cervical adenopathy. Skin:     General: Skin is warm and dry. Coloration: Skin is not pale. Findings: No erythema or rash. Neurological:      Mental Status: She is alert and oriented to person, place, and time. Cranial Nerves: No cranial nerve deficit. Coordination: Coordination normal.      Comments: Negative Romberg. No dysdiadochokinesis, past-pointing, or tremor. Normal heel shin. Psychiatric:         Behavior: Behavior normal.         Thought Content:  Thought content normal.        Diagnostic Study Results     Labs -     Recent Results (from the past 12 hour(s))   CBC WITH AUTOMATED DIFF    Collection Time: 04/13/20 11:06 AM   Result Value Ref Range    WBC 11.5 4.6 - 13.2 K/uL    RBC 4.58 4.20 - 5.30 M/uL    HGB 14.6 12.0 - 16.0 g/dL    HCT 43.8 35.0 - 45.0 %    MCV 95.6 74.0 - 97.0 FL    MCH 31.9 24.0 - 34.0 PG    MCHC 33.3 31.0 - 37.0 g/dL    RDW 15.0 (H) 11.6 - 14.5 %    PLATELET 310 330 - 284 K/uL    MPV 11.2 9.2 - 11.8 FL    NEUTROPHILS 82 (H) 40 - 73 %    LYMPHOCYTES 12 (L) 21 - 52 %    MONOCYTES 6 3 - 10 %    EOSINOPHILS 0 0 - 5 %    BASOPHILS 0 0 - 2 %    ABS. NEUTROPHILS 9.4 (H) 1.8 - 8.0 K/UL    ABS. LYMPHOCYTES 1.4 0.9 - 3.6 K/UL    ABS. MONOCYTES 0.7 0.05 - 1.2 K/UL    ABS. EOSINOPHILS 0.0 0.0 - 0.4 K/UL    ABS. BASOPHILS 0.0 0.0 - 0.1 K/UL    DF AUTOMATED     METABOLIC PANEL, COMPREHENSIVE    Collection Time: 04/13/20 11:06 AM   Result Value Ref Range    Sodium 143 136 - 145 mmol/L    Potassium 3.9 3.5 - 5.5 mmol/L    Chloride 110 100 - 111 mmol/L    CO2 27 21 - 32 mmol/L    Anion gap 6 3.0 - 18 mmol/L    Glucose 98 74 - 99 mg/dL    BUN 31 (H) 7.0 - 18 MG/DL    Creatinine 1.08 0.6 - 1.3 MG/DL    BUN/Creatinine ratio 29 (H) 12 - 20      GFR est AA 59 (L) >60 ml/min/1.73m2    GFR est non-AA 49 (L) >60 ml/min/1.73m2    Calcium 8.8 8.5 - 10.1 MG/DL    Bilirubin, total 0.5 0.2 - 1.0 MG/DL    ALT (SGPT) 18 13 - 56 U/L    AST (SGOT) 13 10 - 38 U/L    Alk.  phosphatase 84 45 - 117 U/L    Protein, total 6.9 6.4 - 8.2 g/dL    Albumin 3.5 3.4 - 5.0 g/dL    Globulin 3.4 2.0 - 4.0 g/dL    A-G Ratio 1.0 0.8 - 1.7     TROPONIN I    Collection Time: 04/13/20 11:06 AM   Result Value Ref Range    Troponin-I, QT <0.02 0.0 - 0.045 NG/ML   EKG, 12 LEAD, INITIAL    Collection Time: 04/13/20 11:08 AM   Result Value Ref Range    Ventricular Rate 58 BPM    Atrial Rate 58 BPM    P-R Interval 166 ms    QRS Duration 110 ms    Q-T Interval 438 ms    QTC Calculation (Bezet) 429 ms    Calculated P Axis 57 degrees    Calculated R Axis -33 degrees    Calculated T Axis 29 degrees    Diagnosis       Sinus bradycardia  Left axis deviation  Abnormal ECG  When compared with ECG of 05-JUL-2018 15:04,  No significant change was found  Confirmed by Emil Jones MD, ----- ([id number]) on 4/13/2020 2:41:49 PM     TROPONIN I    Collection Time: 04/13/20  3:31 PM   Result Value Ref Range    Troponin-I, QT 0.04 0.0 - 0.045 NG/ML   URINALYSIS W/ RFLX MICROSCOPIC    Collection Time: 04/13/20  4:12 PM   Result Value Ref Range    Color STRAW      Appearance CLEAR      Specific gravity 1.010 1.003 - 1.030      pH (UA) 6.0 5.0 - 8.0      Protein Negative NEG mg/dL    Glucose Negative NEG mg/dL    Ketone Negative NEG mg/dL    Bilirubin Negative NEG      Blood Negative NEG      Urobilinogen 0.2 0.2 - 1.0 EU/dL    Nitrites Negative NEG      Leukocyte Esterase Negative NEG         Radiologic Studies -   CT HEAD WO CONT   Final Result   IMPRESSION:   1. No acute intracranial process identified. XR CHEST PORT   Final Result   IMPRESSION:      No acute cardiopulmonary process      CTA HEAD NECK W WO CONT    (Results Pending)     CT Results  (Last 48 hours)               04/13/20 1226  CT HEAD WO CONT Final result    Impression:  IMPRESSION:   1. No acute intracranial process identified. Narrative:  EXAM: CT of the Head without contrast       INDICATION:  dizziness       TECHNIQUE: CT of the head from the vertex to the skull base performed. No IV   contrast administered. All CT scans at this facility are performed using dose optimization technique as   appropriate to a performed exam, to include automated exposure control,   adjustment of the mA and/or kV according to patient size (including appropriate   matching for site specific examination) or use of iterative reconstruction   technique. COMPARISON: 9/29/2010       FINDINGS: No evidence of acute intra-axial or extra-axial hemorrhage. The   ventricles and sulci are symmetric. Mild periventricular hypodensities are   noted. This is unchanged. No midline shift, mass effect or mass lesion   appreciated. The gray-white junction is preserved. No evidence of an acute   infarct identified. The mastoid air cells are well aerated. The paranasal   sinuses are unremarkable. The orbits are normal. The scalp and skull are   unremarkable. CXR Results  (Last 48 hours)               04/13/20 1110  XR CHEST PORT Final result    Impression:  IMPRESSION:       No acute cardiopulmonary process       Narrative:  PORTABLE CHEST RADIOGRAPH        INDICATION: Weakness. COMPARISON: 7/5/2018. FINDINGS:       EKG leads overlie the chest. The cardiac silhouette is within normal limits. Thoracic aorta is tortuous with calcifications. The pulmonary vascular markings   are unremarkable. There is no focal consolidation, pleural effusion or   pneumothorax. Osseous structures are grossly intact. Medical Decision Making   I am the first provider for this patient. I reviewed the vital signs, available nursing notes, past medical history, past surgical history, family history and social history. Vital Signs-Reviewed the patient's vital signs. Records Reviewed: Nursing Notes    Procedures:  Procedures    Provider Notes (Medical Decision Making): Head CT chest x-ray initial labs stable. Patient afebrile has a very reassuring normal neuro exam.  She was slightly orthostatic and was given IV fluids. She is also been given Benadryl Reglan for her headache. She also was given Valium and Antivert. She was then ambulated. She feels like she is still slightly woozy especially when she is turning her head to the right and only has a headache when she is turning her head to the right. Have talked to my attending and will order a CTA head and neck at this point and patient and her  are updated with this plan and are agreeable. 5:20 PM : Pt care transferred to [address] [place] [address] provider.  History of patient complaint(s), available diagnostic reports and current treatment plan has been discussed thoroughly. Bedside rounding on patient occured : no . Intended disposition of patient :TBD  Pending diagnostics reports and/or labs (please list): CTA head and neck; consider Rx for Antivert. MED RECONCILIATION:  Current Facility-Administered Medications   Medication Dose Route Frequency    iopamidoL (ISOVUE-370) 76 % injection 100 mL  100 mL IntraVENous RAD ONCE     Current Outpatient Medications   Medication Sig    methylPREDNISolone (MEDROL DOSEPACK) 4 mg tablet Per dose pack instructions    gabapentin (NEURONTIN) 100 mg capsule Take 2 Caps by mouth three (3) times daily. Max Daily Amount: 600 mg.    methocarbamol (ROBAXIN) 500 mg tablet Take 1 tab by mouth BID-TID as needed for muscle spasm.  lidocaine (LIDODERM) 5 % Apply patch to the affected area for 12 hours a day and remove for 12 hours a day.  diazePAM (VALIUM) 10 mg tablet Take 1 Tab by mouth every twelve (12) hours as needed for Anxiety. Max Daily Amount: 20 mg.  Levothyroxine 75 mcg cap Take 1 Tab by mouth daily.  ergocalciferol (VITAMIN D2) 50,000 unit capsule Take 1 Cap by mouth every seven (7) days.  ezetimibe (ZETIA) 10 mg tablet Take 1 Tab by mouth daily.  celecoxib (CELEBREX) 200 mg capsule Take 1 tab by mouth daily as needed for pain with food.  diclofenac (VOLTAREN) 1 % gel Apply 4 grams to both knees 3-4 x daily as needed.  atorvastatin (LIPITOR) 40 mg tablet Take 1 Tab by mouth daily.  lisinopril (PRINIVIL, ZESTRIL) 10 mg tablet Take 1 Tab by mouth daily.  oxyCODONE-acetaminophen (PERCOCET) 5-325 mg per tablet Take 1 Tab by mouth every four (4) hours as needed. Max Daily Amount: 6 Tabs. (Patient not taking: Reported on 2/24/2020)    omeprazole (PRILOSEC) 40 mg capsule Take 1 Cap by mouth daily. (Patient taking differently: Take 40 mg by mouth as needed.)    aspirin 81 mg chewable tablet Take 1 Tab by mouth daily.        Disposition:  TBD      Follow-up Information    None         Current Discharge Medication List          Diagnosis     Clinical Impression:   1. Dizziness    2.  Nonintractable episodic headache, unspecified headache type

## 2020-04-14 NOTE — CALL BACK NOTE
6:26 PM 
Spoke with patient. She is doing much better today. 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 Patient understands that her primary care provider was going to be forwarded a copy of the CTA report and to call his office tomorrow if she has not heard anything by approximately noon.  - silvestre, shayy

## 2020-04-14 NOTE — DISCHARGE INSTRUCTIONS

## 2020-06-09 DIAGNOSIS — I10 ESSENTIAL HYPERTENSION WITH GOAL BLOOD PRESSURE LESS THAN 140/90: ICD-10-CM

## 2020-06-09 RX ORDER — LISINOPRIL 10 MG/1
10 TABLET ORAL DAILY
Qty: 90 TAB | Refills: 3 | Status: SHIPPED | OUTPATIENT
Start: 2020-06-09 | End: 2021-07-22 | Stop reason: SDUPTHER

## 2020-06-09 RX ORDER — ATORVASTATIN CALCIUM 40 MG/1
40 TABLET, FILM COATED ORAL DAILY
Qty: 90 TAB | Refills: 3 | Status: SHIPPED | OUTPATIENT
Start: 2020-06-09 | End: 2020-06-23 | Stop reason: SDUPTHER

## 2020-06-09 NOTE — TELEPHONE ENCOUNTER
Last Visit: 4/2/20 with MD Paul Enriquez  Next Appointment: 10/8/20 with MD Paul Enriquez  Previous Refill Encounter(s): 5/8/19 #90 with 3 refills    Requested Prescriptions     Pending Prescriptions Disp Refills    lisinopriL (PRINIVIL, ZESTRIL) 10 mg tablet 90 Tab 3     Sig: Take 1 Tab by mouth daily.  atorvastatin (LIPITOR) 40 mg tablet 90 Tab 3     Sig: Take 1 Tab by mouth daily.

## 2020-06-11 RX ORDER — LEVOTHYROXINE SODIUM 75 UG/1
1 CAPSULE ORAL DAILY
Qty: 90 CAP | Refills: 3 | Status: SHIPPED | OUTPATIENT
Start: 2020-06-11 | End: 2021-07-22 | Stop reason: SDUPTHER

## 2020-06-16 RX ORDER — EZETIMIBE 10 MG/1
10 TABLET ORAL DAILY
Qty: 90 TAB | Refills: 3 | Status: SHIPPED | OUTPATIENT
Start: 2020-06-16 | End: 2021-07-22 | Stop reason: SDUPTHER

## 2020-06-16 NOTE — TELEPHONE ENCOUNTER
PCP: Danial Jose MD    Last appt: 4/2/2020  Future Appointments   Date Time Provider Mariana Gray   7/6/2020  9:30 AM Irwin De La Vega  [address]   9/28/2020  8:00 AM [facility] 2[facility] MARY SCHED   10/1/2020  7:45 AM IO NURSE VISIT [facility] MARY SCHED   10/6/2020  9:30 AM GARRETT Navarro 2VV MARY SCHED   10/8/2020  9:00 AM Danial Jose MD [facility] MARY SCHED       Requested Prescriptions     Pending Prescriptions Disp Refills    ezetimibe (ZETIA) 10 mg tablet 90 Tab 3     Sig: Take 1 Tab by mouth daily.

## 2020-06-23 RX ORDER — ATORVASTATIN CALCIUM 40 MG/1
40 TABLET, FILM COATED ORAL DAILY
Qty: 90 TAB | Refills: 3 | Status: SHIPPED | OUTPATIENT
Start: 2020-06-23 | End: 2021-07-22 | Stop reason: SDUPTHER

## 2020-06-23 NOTE — TELEPHONE ENCOUNTER
Last Visit: 04/02/2020 with MD Jeannine Dickson  Next Appointment: 10/08/2020 with MD Jeannine Dickson    Requested Prescriptions     Pending Prescriptions Disp Refills    atorvastatin (LIPITOR) 40 mg tablet 90 Tab 3     Sig: Take 1 Tab by mouth daily.

## 2020-07-06 ENCOUNTER — VIRTUAL VISIT (OUTPATIENT)
Dept: ORTHOPEDIC SURGERY | Age: 80
End: 2020-07-06

## 2020-07-06 DIAGNOSIS — M62.838 MUSCLE SPASM: ICD-10-CM

## 2020-07-06 DIAGNOSIS — M48.061 SPINAL STENOSIS OF LUMBAR REGION WITHOUT NEUROGENIC CLAUDICATION: ICD-10-CM

## 2020-07-06 DIAGNOSIS — M47.816 LUMBAR FACET ARTHROPATHY: Primary | ICD-10-CM

## 2020-07-06 DIAGNOSIS — R94.4 DECREASED GFR: ICD-10-CM

## 2020-07-06 DIAGNOSIS — M47.816 SPONDYLOSIS OF LUMBAR REGION WITHOUT MYELOPATHY OR RADICULOPATHY: ICD-10-CM

## 2020-07-06 RX ORDER — METHYLPREDNISOLONE 4 MG/1
TABLET ORAL
Qty: 1 DOSE PACK | Refills: 0 | Status: SHIPPED | OUTPATIENT
Start: 2020-07-06 | End: 2020-12-14 | Stop reason: ALTCHOICE

## 2020-07-06 NOTE — PROGRESS NOTES
[facility]  [address]opher Rodriguez[address], [facility] [address]  Phone: [phone]  Fax: [phone]    Pt's YOB: 1940    ASSESSMENT   Diagnoses and all orders for this visit:    1. Lumbar facet arthropathy  -     methylPREDNISolone (MEDROL DOSEPACK) 4 mg tablet; Per dose pack instructions    2. Muscle spasm    3. Spinal stenosis of lumbar region without neurogenic claudication    4. Spondylosis of lumbar region without myelopathy or radiculopathy    5. Decreased GFR         IMPRESSION AND PLAN:  Darci Tena is a 78 y.o. right hand dominant female. She complains of pain in the lower back that occasionally extends into the left buttock with ambulation. Pt reports minimal relief with an ultrasound guided left L4-5 and left L5-S1 facet injection on 09/25/2019 by Dr. Sin Coleman. She takes Neurontin mg and takes 2 caps TID as prescribed by her PCP, Marisela Blake MD. She also notes relief when taking a Medrol Dosepak since her last office visit. 1) Pt was given information on lumbar arthritis exercises. 2) She will continue taking Neurontin 100 mg 2 caps TID as prescribed by her PCP, Marisela Blake MD.  3) Pt was prescribed a Medrol Dosepak. 4) Ms. Dai Hua has a reminder for a \"due or due soon\" health maintenance. I have asked that she contact her primary care provider, Marisela Blake MD, for follow-up on this health maintenance. 5)  demonstrated consistency with prescribing. 6) Recommend using acetaminophen in place of NSAIDs due to decreased est GFR  Follow-up and Dispositions    · Return in about 6 months (around 1/6/2021) for Medication follow up. CPT Codes 80164-55609 for Established Patients may apply to this TeleHealth Visit.  Time-based coding, delete if not needed: I spent 12 minutes and 42 seconds from 9:53 AM to 10:05 AM with this established patient, and >50% of the time was spent counseling and/or coordinating care regarding her symptoms, medications, and previous treatments. Due to this being a TeleHealth evaluation, many elements of the physical examination are unable to be assessed. Pursuant to the emergency declaration under the [facility], [facility] waiver authority and the [employer] Act, this Virtual Visit was conducted, with patient's consent, to reduce the patient's risk of exposure to COVID-19 and provide continuity of care for an established patient. Services were provided through a telephone discussion virtually to substitute for in-person clinic visit. HISTORY OF PRESENT ILLNESS:  Shayy Davis is a 78 y.o. right hand dominant female with history of lumbar pain and was evaluated from her home by telephone at the [facility] location on 7/6/2020 with her verbal consent for follow up. She complains of pain in the lower back that occasionally extends into the left buttock with ambulation. She denies any pain radiating down the left leg, changes with weather, or weakness in the legs at this time. Pt reports improvement when sitting and laying down and notes that her pain generally worsens with prolonged ambulation. She denies any falls or injuries since her last office visit. Pt reports minimal relief with an ultrasound guided left L4-5 and left L5-S1 facet injection on 09/25/2019 by Dr. Johnathon Chin. She has been prescribed Neurontin mg and takes 2 caps TID as prescribed by her PCP, Angie Park MD. Pt denies any lightheadedness, dizziness, or floatiness when taking the Neurontin. Pt also uses Lidoderm patches intermittently as needed which helps with sleep. She reports relief when taking a Medrol Dosepak since her last office visit. Pt notes that she has not recently taken Celebrex. She denies any history of kidney disease and is not currently on anticoagulants.  Labs from 4/13/2020 were reviewed and demonstrated a creatinine of 1.08 and a GFR of 49. Labs from 2/6/2020 were reviewed and demonstrated a creatinine of 1.38 and a GFR of 37. Pt at this time desires to continue with current care.     Pain Scale: /10    PCP: Martina Flynn MD     Past Medical History:   Diagnosis Date    AAA (abdominal aortic aneurysm) without rupture ([facility] [address]     AAA repair 7/2018 Dr Asha March adenomas     2003 Dr Sherin Sal; 4/13 Dr Maple Gilford    Degenerative arthritis of lumbar spine 12/2018    Dr Mauricio Beltran; mri showed multilevel degen changes, mod spinal stenosis L3-5, mod severe right L4-5 foraminal disease; s/p epidural; isabel    Dyslipidemia     FHx: heart disease     Gastritis 2006    on EGD Dr. Bobby Vasquez, neg sprue    Hypertension     Hypothyroidism 2007    Dr. Luna Heart Hypovitaminosis D     IPMN (intraductal papillary mucinous neoplasm) 06/2018    on mrcp 8mm; no change 10/19     Lung nodule 06/2016    6 mm RLL (6/16); no change (10/17); no change (12/18)    Obesity     peak weight 187 lbs, bmi 33.1 from 4/13; IF 3/18    Osteopenia FRAX 4/12 9.7 and 1.4     DEXA t score 0.6 spine, -1 hip (12/09);  -0.4 spine, -1.3 hip (4/12); -1.2 wrist, -1.6 hip w FRAX 11/2.2 (4/14); -1.0 wrist, -1.6 hip w FRAX 12/2.6 (4/16); -0.7 wrist, -1.6 hip (5/18)    Pancreatitis, chronic ([facility] [address] 2018    Psoriasis 2003    on bx Dr. Kayleigh Mcconnell    Sensorineural hearing loss     LEFT Dr Carlos Gage Venous insufficiency 2003    on dopplers    Zoster 05/2016    LEFT T11-12; mild postherpetic neuralgia        Social History     Socioeconomic History    Marital status:      Spouse name: Not on file    Number of children: Not on file    Years of education: Not on file    Highest education level: Not on file   Occupational History    Occupation: ret office mgr   Social Needs    Financial resource strain: Not on file    Food insecurity     Worry: Not on file     Inability: Not on file   [employer] needs Medical: Not on file     Non-medical: Not on file   Tobacco Use    Smoking status: Never Smoker    Smokeless tobacco: Never Used   Substance and Sexual Activity    Alcohol use: Yes     Comment: occasionally    Drug use: No    Sexual activity: Not on file   Lifestyle    Physical activity     Days per week: Not on file     Minutes per session: Not on file    Stress: Not on file   Relationships    Social connections     Talks on phone: Not on file     Gets together: Not on file     Attends Islam service: Not on file     Active member of club or organization: Not on file     Attends meetings of clubs or organizations: Not on file     Relationship status: Not on file    Intimate partner violence     Fear of current or ex partner: Not on file     Emotionally abused: Not on file     Physically abused: Not on file     Forced sexual activity: Not on file   Other Topics Concern    Not on file   Social History Narrative    Not on file       Current Outpatient Medications   Medication Sig Dispense Refill    methylPREDNISolone (MEDROL DOSEPACK) 4 mg tablet Per dose pack instructions 1 Dose Pack 0    atorvastatin (LIPITOR) 40 mg tablet Take 1 Tab by mouth daily. 90 Tab 3    ezetimibe (ZETIA) 10 mg tablet Take 1 Tab by mouth daily. 90 Tab 3    Levothyroxine 75 mcg cap Take 1 Tab by mouth daily. 90 Cap 3    lisinopriL (PRINIVIL, ZESTRIL) 10 mg tablet Take 1 Tab by mouth daily. 90 Tab 3    lidocaine (LIDODERM) 5 % Apply patch to the affected area for 12 hours a day and remove for 12 hours a day. 90 Patch 1    diazePAM (VALIUM) 10 mg tablet Take 1 Tab by mouth every twelve (12) hours as needed for Anxiety. Max Daily Amount: 20 mg. 60 Tab 1    ergocalciferol (VITAMIN D2) 50,000 unit capsule Take 1 Cap by mouth every seven (7) days. 12 Cap 3    omeprazole (PRILOSEC) 40 mg capsule Take 1 Cap by mouth daily.  (Patient taking differently: Take 40 mg by mouth as needed.) 90 Cap 1    aspirin 81 mg chewable tablet Take 1 Tab by mouth daily. 90 Tab 3    gabapentin (NEURONTIN) 100 mg capsule Take 2 Caps by mouth three (3) times daily. Max Daily Amount: 600 mg. 540 Cap 1    methocarbamol (ROBAXIN) 500 mg tablet Take 1 tab by mouth BID-TID as needed for muscle spasm. 60 Tab 1    celecoxib (CELEBREX) 200 mg capsule Take 1 tab by mouth daily as needed for pain with food. 30 Cap 2    diclofenac (VOLTAREN) 1 % gel Apply 4 grams to both knees 3-4 x daily as needed. 5 Each 2    oxyCODONE-acetaminophen (PERCOCET) 5-325 mg per tablet Take 1 Tab by mouth every four (4) hours as needed. Max Daily Amount: 6 Tabs. (Patient not taking: Reported on 2/24/2020) 30 Tab 0       Allergies   Allergen Reactions    Atenolol Other (comments)     Felt poorly    Cozaar [Losartan] Other (comments)     Headaches    Norvasc [Amlodipine] Other (comments)     Taylor poorly           REVIEW OF SYSTEMS    Constitutional: Negative for fever, chills, or weight change. Respiratory: Negative for cough or shortness of breath. Cardiovascular: Negative for chest pain or palpitations. Gastrointestinal: Negative for acid reflux, change in bowel habits, or constipation. Genitourinary: Negative for dysuria and flank pain. Musculoskeletal: Positive for lumbar pain. Neurological: Negative for headaches, dizziness, or numbness. Endo/Heme/Allergies: Negative for increased bruising. Psychiatric/Behavioral: Positive for difficulty with sleep. As per HPI    IMAGING:    Lumbar spine MRI from 12/03/2018 was personally reviewed with the patient and demonstrated:          Results from [facility] on 12/03/18   MRI LUMB SPINE WO CONT     Narrative MR Lumbar spine without contrast     HISTORY: increased lumbar pain. Difficulty with standing/walking.  Also has hip  pain and history of spondylolisthesis     COMPARISON: Prior body CT May 2018 and August 2018     Technique: Multi-sequence multiplanar T1, T2, STIR imaging with and without fat  saturation obtained centered on the lumbar spine.      FINDINGS:      Alignment: Mild to moderate right convex lumbar scoliosis with apex at L2. Minimal degenerative anterolisthesis of L5. Also slight retrolisthesis of L1 and  T11. Vertebral body height: Normal  Marrow signal: No concerning signal. Several levels of Schmorl's nodes. Several  levels of chronic fatty endplate changes as well as other levels of discogenic  endplate edema. The latter is most notable at T11-12. Disc spaces: Severe narrowing and desiccation of T11-12, L1-2, L4-5. More  moderately so at other levels, sparing L5-S1. Conus: Terminates at T12-L1     Axial imaging correlation:     T11-12: Broad-based disc osteophyte complex with mild facet arthropathy. Patent  canal and foramina.     T12-L1: Mild disc bulge and facet arthropathy. Minimal impression upon the  thecal sac. Patent canal and foramina.     L1-2: Broad-based disc osteophyte complex. Bilateral facet arthropathy. Mild  concentric spinal stenosis. Mild foraminal stenoses.     L2-3: Mild broad-based disc osteophyte complex. Bilateral facet arthropathy. Mild concentric spinal stenosis. Moderate left and mild right foraminal  stenosis.     L3-4: Broad-based disc osteophyte complex. Bilateral facet arthropathy. Ligamentum flavum thickening and buckling. Moderate concentric spinal stenosis. Mild foraminal stenoses.     L4-5: Broad-based disc osteophyte complex with eccentric prominence to the  right. Bilateral facet arthropathy. Moderate spinal stenosis. More pronounced  narrowing of right lateral recess with transient distortion of crossing right L5  nerve. Moderately severe right foraminal stenosis with distortion of perineural  fat.     L5-S1: Uncovering of the disc. Minor disc bulge. Bilateral facet arthropathy. Patent canal and foramina.     Other structures: Partial visualization of AAA with prior endovascular stent  deployment.  Several small caliber renal cysts.           Impression IMPRESSION:     1. Lumbar scoliosis with multilevel degenerative findings.  -Up to a moderate spinal stenosis, at L4-5 and L3-4  -Moderately severe right L4-5 foraminal stenosis with potential impingement of  the exiting right L4 nerve. There is also potentially impingement of the  crossing right L5 nerve at this level.     Thank you for enabling us to participate in the care of this patient. Written by Pat Meade, as dictated by Cady Leiva MD.  I, Dr. Cady Leiva confirm that all documentation is accurate.

## 2020-07-06 NOTE — PATIENT INSTRUCTIONS
Low Back Arthritis: Exercises Introduction Here are some examples of typical rehabilitation exercises for your condition. Start each exercise slowly. Ease off the exercise if you start to have pain. Your doctor or physical therapist will tell you when you can start these exercises and which ones will work best for you. When you are not being active, find a comfortable position for rest. Some people are comfortable on the floor or a medium-firm bed with a small pillow under their head and another under their knees. Some people prefer to lie on their side with a pillow between their knees. Don't stay in one position for too long. Take short walks (10 to 20 minutes) every 2 to 3 hours. Avoid slopes, hills, and stairs until you feel better. Walk only distances you can manage without pain, especially leg pain. How to do the exercises Pelvic tilt 1. Lie on your back with your knees bent. 2. \"Brace\" your stomachtighten your muscles by pulling in and imagining your belly button moving toward your spine. 3. Press your lower back into the floor. You should feel your hips and pelvis rock back. 4. Hold for 6 seconds while breathing smoothly. 5. Relax and allow your pelvis and hips to rock forward. 6. Repeat 8 to 12 times. Back stretches 1. Get down on your hands and knees on the floor. 2. Relax your head and allow it to droop. Round your back up toward the ceiling until you feel a nice stretch in your upper, middle, and lower back. Hold this stretch for as long as it feels comfortable, or about 15 to 30 seconds. 3. Return to the starting position with a flat back while you are on your hands and knees. 4. Let your back sway by pressing your stomach toward the floor. Lift your buttocks toward the ceiling. 5. Hold this position for 15 to 30 seconds. 6. Repeat 2 to 4 times. Follow-up care is a key part of your treatment and safety.  Be sure to make and go to all appointments, and call your doctor if you are having problems. It's also a good idea to know your test results and keep a list of the medicines you take. Where can you learn more? Go to http://tal-chava.info/ Enter D279 in the search box to learn more about \"Low Back Arthritis: Exercises. \" Current as of: March 2, 2020               Content Version: 12.5 © 2006-2020 [employer]. Care instructions adapted under license by [employer] (which disclaims liability or warranty for this information). If you have questions about a medical condition or this instruction, always ask your healthcare professional. [address] any warranty or liability for your use of this information. Low Back Arthritis: Exercises Introduction Here are some examples of typical rehabilitation exercises for your condition. Start each exercise slowly. Ease off the exercise if you start to have pain. Your doctor or physical therapist will tell you when you can start these exercises and which ones will work best for you. When you are not being active, find a comfortable position for rest. Some people are comfortable on the floor or a medium-firm bed with a small pillow under their head and another under their knees. Some people prefer to lie on their side with a pillow between their knees. Don't stay in one position for too long. Take short walks (10 to 20 minutes) every 2 to 3 hours. Avoid slopes, hills, and stairs until you feel better. Walk only distances you can manage without pain, especially leg pain. How to do the exercises Pelvic tilt 7. Lie on your back with your knees bent. 8. \"Brace\" your stomachtighten your muscles by pulling in and imagining your belly button moving toward your spine. 9. Press your lower back into the floor. You should feel your hips and pelvis rock back. 10. Hold for 6 seconds while breathing smoothly. 11. Relax and allow your pelvis and hips to rock forward. 12. Repeat 8 to 12 times. Back stretches 7. Get down on your hands and knees on the floor. 8. Relax your head and allow it to droop. Round your back up toward the ceiling until you feel a nice stretch in your upper, middle, and lower back. Hold this stretch for as long as it feels comfortable, or about 15 to 30 seconds. 9. Return to the starting position with a flat back while you are on your hands and knees. 10. Let your back sway by pressing your stomach toward the floor. Lift your buttocks toward the ceiling. 11. Hold this position for 15 to 30 seconds. 12. Repeat 2 to 4 times. Follow-up care is a key part of your treatment and safety. Be sure to make and go to all appointments, and call your doctor if you are having problems. It's also a good idea to know your test results and keep a list of the medicines you take. Where can you learn more? Go to http://tal-chava.info/ Enter H344 in the search box to learn more about \"Low Back Arthritis: Exercises. \" Current as of: March 2, 2020               Content Version: 12.5 © 9858-5027 [employer]. Care instructions adapted under license by [employer] (which disclaims liability or warranty for this information). If you have questions about a medical condition or this instruction, always ask your healthcare professional. [address] any warranty or liability for your use of this information.

## 2020-10-01 ENCOUNTER — HOSPITAL ENCOUNTER (OUTPATIENT)
Dept: LAB | Age: 80
Discharge: HOME | End: 2020-10-01
Payer: MEDICARE

## 2020-10-01 ENCOUNTER — APPOINTMENT (OUTPATIENT)
Dept: INTERNAL MEDICINE CLINIC | Age: 80
End: 2020-10-01

## 2020-10-01 DIAGNOSIS — E03.4 HYPOTHYROIDISM DUE TO ACQUIRED ATROPHY OF THYROID: ICD-10-CM

## 2020-10-01 DIAGNOSIS — E78.5 DYSLIPIDEMIA: ICD-10-CM

## 2020-10-01 LAB
CHOLEST SERPL-MCNC: 150 MG/DL
HDLC SERPL-MCNC: 46 MG/DL (ref 40–60)
HDLC SERPL: 3.3 {RATIO} (ref 0–5)
LDLC SERPL CALC-MCNC: 75 MG/DL (ref 0–100)
LIPID PROFILE,FLP: NORMAL
TRIGL SERPL-MCNC: 145 MG/DL (ref ?–150)
TSH SERPL DL<=0.05 MIU/L-ACNC: 2.44 UIU/ML (ref 0.36–3.74)
VLDLC SERPL CALC-MCNC: 29 MG/DL

## 2020-10-01 PROCEDURE — 36415 COLL VENOUS BLD VENIPUNCTURE: CPT

## 2020-10-01 PROCEDURE — 84443 ASSAY THYROID STIM HORMONE: CPT

## 2020-10-01 PROCEDURE — 80061 LIPID PANEL: CPT

## 2020-10-05 NOTE — PROGRESS NOTES
This is a Subsequent Medicare Annual Wellness Visit    I have reviewed the patient's medical history in detail and updated the computerized patient record.      History     Past Medical History:   Diagnosis Date    AAA (abdominal aortic aneurysm) without rupture (HCC)     AAA repair 7/2018 Dr Khalif Alanis adenomas     2003 Dr Madhavi Abarca; 4/13 Dr Patrice Irving    Degenerative arthritis of lumbar spine 12/2018    Dr Aidee Fang; mri showed multilevel degen changes, mod spinal stenosis L3-5, mod severe right L4-5 foraminal disease; s/p epidural; isabel    Dyslipidemia     FHx: heart disease     Gastritis 2006    on EGD Dr. Nai Burciaga, neg sprue    Hypertension     Hypothyroidism 2007    Dr. Job Nunez Hypovitaminosis D     IPMN (intraductal papillary mucinous neoplasm) 06/2018    on mrcp 8mm; no change 10/19     Lung nodule 06/2016    6 mm RLL (6/16); no change (10/17); no change (12/18)    Obesity     peak weight 187 lbs, bmi 33.1 from 4/13; IF 3/18    Osteopenia FRAX 4/12 9.7 and 1.4     DEXA t score 0.6 spine, -1 hip (12/09);  -0.4 spine, -1.3 hip (4/12); -1.2 wrist, -1.6 hip w FRAX 11/2.2 (4/14); -1.0 wrist, -1.6 hip w FRAX 12/2.6 (4/16); -0.7 wrist, -1.6 hip (5/18)    Pancreatitis, chronic ([address] 2018    Psoriasis 2003    on bx Dr. Arlene Damico    Sensorineural hearing loss     LEFT Dr Dimple Dean Venous insufficiency 2003    on dopplers    Zoster 05/2016    LEFT T11-12; mild postherpetic neuralgia      Past Surgical History:   Procedure Laterality Date    CARDIAC SURG PROCEDURE UNLIST  09/2016    NST negative    CHEST SURGERY PROCEDURE UNLISTED  06/2016    CTA neg PE, 6 mm subpleural nodule    HX AAA REPAIR  07/17/2018    HX [address]      Dr. Maksim Shah LEFT    HX CATARACT REMOVAL      BILAT    HX COLONOSCOPY      benign polyp 5/08 Dr Madhavi Abarca;  4/13 negative Dr. Tessa Hartman HX GI  08/2018    Dr Akil Cazares; ERCP s/p removal cbd stone complicated by pamcreatitis    HX LAP CHOLECYSTECTOMY 09/18/2018    Dr. Angel Cardona      [facility]; CT head neg 9/10     Current Outpatient Medications   Medication Sig Dispense Refill    methylPREDNISolone (MEDROL DOSEPACK) 4 mg tablet Per dose pack instructions 1 Dose Pack 0    atorvastatin (LIPITOR) 40 mg tablet Take 1 Tab by mouth daily. 90 Tab 3    ezetimibe (ZETIA) 10 mg tablet Take 1 Tab by mouth daily. 90 Tab 3    Levothyroxine 75 mcg cap Take 1 Tab by mouth daily. 90 Cap 3    lisinopriL (PRINIVIL, ZESTRIL) 10 mg tablet Take 1 Tab by mouth daily. 90 Tab 3    gabapentin (NEURONTIN) 100 mg capsule Take 2 Caps by mouth three (3) times daily. Max Daily Amount: 600 mg. 540 Cap 1    lidocaine (LIDODERM) 5 % Apply patch to the affected area for 12 hours a day and remove for 12 hours a day. 90 Patch 1    diazePAM (VALIUM) 10 mg tablet Take 1 Tab by mouth every twelve (12) hours as needed for Anxiety. Max Daily Amount: 20 mg. 60 Tab 1    ergocalciferol (VITAMIN D2) 50,000 unit capsule Take 1 Cap by mouth every seven (7) days. 12 Cap 3    omeprazole (PRILOSEC) 40 mg capsule Take 1 Cap by mouth daily. (Patient taking differently: Take 40 mg by mouth as needed.) 90 Cap 1    aspirin 81 mg chewable tablet Take 1 Tab by mouth daily.  90 Tab 3     Allergies   Allergen Reactions    Atenolol Other (comments)     Felt poorly    Cozaar [Losartan] Other (comments)     Headaches    Norvasc [Amlodipine] Other (comments)     [place] poorly       Family History   Problem Relation Age of Onset    Breast Cancer Mother     Cancer Mother     Heart Disease Father      Social History     Tobacco Use    Smoking status: Never Smoker    Smokeless tobacco: Never Used   Substance Use Topics    Alcohol use: Yes     Comment: occasionally     Depression Risk Factor Screening:     3 most recent PHQ Screens 10/6/2020   Little interest or pleasure in doing things Not at all   Feeling down, depressed, irritable, or hopeless Not at all   Total Score PHQ 2 0     SCREENINGS  Colonoscopy last done 4/13 Dr Sher Cousin last done 5/18  DEXA last done 5/18  Gyn last done Dr Angelic Malone 1/14    Immunization History   Administered Date(s) Administered    (RETIRED) Pneumococcal Vaccine (Unspecified Type) 09/01/2008    Hep A Vaccine 09/09/2008, 05/07/2009    Hepatitis A Vaccine 09/01/2008    Influenza High Dose Vaccine PF 10/17/2016, 11/13/2018    Influenza Vaccine (Tri) Adjuvanted (>65 Yrs FLUAD TRI 13855) 10/02/2019    Pneumococcal Conjugate (PCV-13) 05/10/2017    TD Vaccine 09/01/2008    Tdap 05/10/2017    Typhoid Vaccine 09/01/2008    Yellow Fever Vaccine 09/01/2008    Zoster Recombinant 09/18/2018    Zoster Vaccine, Live 01/01/2008     Alcohol Risk Factor Screening: On any occasion during the past 3 months, have you had more than 3 drinks containing alcohol? No    Do you average more than 7 drinks per week? No      Functional Ability and Level of Safety:     Hearing Loss   normal-to-mild    Vision - no acute complaints and is followed by Dr. Sakina Sanchez [facility]   Self-care. Requires assistance with: no ADLs    Fall Risk     Fall Risk Assessment, last 12 mths 10/6/2020   Able to walk? Yes   Fall in past 12 months? No   Fall with injury? -   Number of falls in past 12 months -   Fall Risk Score -     Abuse Screen   Patient is not abused    Review of Systems   A comprehensive review of systems was negative except for that written in the HPI.     Physical Examination     Visit Vitals  /68   Pulse 88   Temp 97 °F (36.1 °C) (Temporal)   Resp 14   Ht 5' 4\" (1.626 m)   Wt 182 lb (82.6 kg)   SpO2 95%   BMI 31.24 kg/m²       Evaluation of Cognitive Function:  Mood/affect:  happy  Appearance: age appropriate, overweight, well dressed and within normal Limits  Family member/caregiver input: na    Patient Care Team:  Davidson Correa MD as PCP - General (Internal Medicine)  [place] M[place] Angelique Perry MD as PCP - [facility] Provider  Carolyn Rasmussen MD (Surgery)  [place], [facility] [address] [place], [place] (Physician Assistant)  Clara Woodruff MD as Surgeon (Surgery)  GARRETT Casillas (Physician Assistant)    Advice/Referrals/Counseling   Education and counseling provided:  Are appropriate based on today's review and evaluation  End-of-Life planning (with patient's consent)  Pneumococcal Vaccine  Screening Mammography  Cardiovascular screening blood test  Bone mass measurement (DEXA)  Diabetes screening test    Assessment/Plan       ICD-10-CM ICD-9-CM    1. Medicare annual wellness visit, subsequent  Z00.00 V70.0    2. Needs flu shot  Z23 V04.81 FLU (FLUAD QUAD INFLUENZA VACCINE,QUAD,ADJUVANTED)   3. Primary hypertension  I10 401.9    4. Colon adenomas 2003, last 2014 Dr Dustin Demarco  D12.6 211.3    5. Hypothyroidism due to acquired atrophy of thyroid  E03.4 244.8      246.8    6. Spondylosis of lumbar region without myelopathy or radiculopathy  M47.816 721.3    7. S/P AAA repair  Z98.890 V45.89     Z86.79     8. Advance directive discussed with patient  Z71.89 V65.49    9. Obesity (BMI 30.0-34. 9)  E66.9 278.00    10. Dyslipidemia  V09.7 465.7 METABOLIC PANEL, COMPREHENSIVE      CBC W/O DIFF   11. IPMN (intraductal papillary mucinous neoplasm)  D49.0 239.0 MRI ABD W MRCP W CONT   12. Advanced directives, counseling/discussion  Z71.89 V65.49 ADVANCE CARE PLANNING FIRST 30 MINS   13. Screening for depression  Z13.31 V79.0 [facility] 68   14. Screening for diabetes mellitus  Z13.1 V77.1    15. Encounter for screening mammogram for malignant neoplasm of breast  Z12.31 V76.12 SARWAT MAMMO BI SCREENING INCL CAD   12. Body mass index 31.0-31.9, adult  Z68.31 V85.31 [place] [facility]IOR  OBESITY 15M     current treatment plan is effective, no change in therapy  lab results and schedule of future lab studies reviewed with patient  reviewed diet, exercise and weight control. End of life discussion undertaken.   Will work on amd  Colonoscopy beyond age  [de-identified] to be scheduled  DEXA to be scheduled 2020 or later    Cognitive Screening   Has your family/caregiver stated any concerns about your memory: no

## 2020-10-05 NOTE — PROGRESS NOTES
78 y.o. WHITE OR  female who presents for evaluation.     No cardiovascular complaints.   No set exercise outside of adls and walking periodically, the spine issues are limiting her    She f/u w Dr Ko Olson and no new recs, remains on isabel    She continues to f/u w vascular for the aaa repair     No new gi or gu complaints     LAST MEDICARE WELLNESS EXAM: 4/1/14, 4/8/14, 4/14/16, 4/25/17, 5/7/18, 10/2/19, 10/8/20    Past Medical History:   Diagnosis Date    AAA (abdominal aortic aneurysm) without rupture ([facility] [address]     AAA repair 7/2018 Dr Lisa Hernandez adenomas     2003 Dr Crump Knee; 4/13 Dr Jesús Rose    Degenerative arthritis of lumbar spine 12/2018    Dr Ko Olson; mri showed multilevel degen changes, mod spinal stenosis L3-5, mod severe right L4-5 foraminal disease; s/p epidural; isabel    Dyslipidemia     FHx: heart disease     Gastritis 2006    on EGD Dr. Daniella Diez, neg sprue    Hypertension     Hypothyroidism 2007    Dr. Nir Wang Hypovitaminosis D     IPMN (intraductal papillary mucinous neoplasm) 06/2018    on mrcp 8mm; no change 10/19     Lung nodule 06/2016    6 mm RLL (6/16); no change (10/17); no change (12/18)    Obesity     peak weight 187 lbs, bmi 33.1 from 4/13; IF 3/18    Osteopenia FRAX 4/12 9.7 and 1.4     DEXA t score 0.6 spine, -1 hip (12/09);  -0.4 spine, -1.3 hip (4/12); -1.2 wrist, -1.6 hip w FRAX 11/2.2 (4/14); -1.0 wrist, -1.6 hip w FRAX 12/2.6 (4/16); -0.7 wrist, -1.6 hip (5/18)    Pancreatitis, chronic ([facility] [address] 2018    Psoriasis 2003    on bx Dr. Jonathan Souza    Sensorineural hearing loss     LEFT Dr Metz Lower Venous insufficiency 2003    on dopplers    Zoster 05/2016    LEFT T11-12; mild postherpetic neuralgia     Past Surgical History:   Procedure Laterality Date    CARDIAC SURG PROCEDURE UNLIST  09/2016    NST negative    CHEST SURGERY PROCEDURE UNLISTED  06/2016    CTA neg PE, 6 mm subpleural nodule    HX AAA REPAIR  07/17/2018    HX CARPAL TUNNEL RELEASE Dr. Vania Robison HX CATARACT REMOVAL      BILAT    HX COLONOSCOPY      benign polyp 5/08 Dr Madhavi Abarca;  4/13 negative Dr. Tessa Hartman HX GI  08/2018    Dr Akil Cazares; ERCP s/p removal cbd stone complicated by pamcreatitis    HX LAP CHOLECYSTECTOMY  09/18/2018    Dr. Dyson Faster      [facility]; CT head neg 9/10     Social History     Socioeconomic History    Marital status:      Spouse name: Not on file    Number of children: Not on file    Years of education: Not on file    Highest education level: Not on file   Occupational History    Occupation: ret office mgr   Social Needs    Financial resource strain: Not on file    Food insecurity     Worry: Not on file     Inability: Not on file    Transportation needs     Medical: Not on file     Non-medical: Not on file   Tobacco Use    Smoking status: Never Smoker    Smokeless tobacco: Never Used   Substance and Sexual Activity    Alcohol use: Yes     Comment: occasionally    Drug use: No    Sexual activity: Not on file   Lifestyle    Physical activity     Days per week: Not on file     Minutes per session: Not on file    Stress: Not on file   Relationships    Social connections     Talks on phone: Not on file     Gets together: Not on file     Attends [employer] service: Not on file     Active member of club or organization: Not on file     Attends meetings of clubs or organizations: Not on file     Relationship status: Not on file    Intimate partner violence     Fear of current or ex partner: Not on file     Emotionally abused: Not on file     Physically abused: Not on file     Forced sexual activity: Not on file   Other Topics Concern    Not on file   Social History Narrative    Not on file     Allergies   Allergen Reactions    Atenolol Other (comments)     Felt poorly    Cozaar [Losartan] Other (comments)     Headaches    Norvasc [Amlodipine] Other (comments)     Felt poorly       Current Outpatient Medications   Medication Sig    methylPREDNISolone (MEDROL DOSEPACK) 4 mg tablet Per dose pack instructions    atorvastatin (LIPITOR) 40 mg tablet Take 1 Tab by mouth daily.  ezetimibe (ZETIA) 10 mg tablet Take 1 Tab by mouth daily.  Levothyroxine 75 mcg cap Take 1 Tab by mouth daily.  lisinopriL (PRINIVIL, ZESTRIL) 10 mg tablet Take 1 Tab by mouth daily.  gabapentin (NEURONTIN) 100 mg capsule Take 2 Caps by mouth three (3) times daily. Max Daily Amount: 600 mg.    lidocaine (LIDODERM) 5 % Apply patch to the affected area for 12 hours a day and remove for 12 hours a day.  diazePAM (VALIUM) 10 mg tablet Take 1 Tab by mouth every twelve (12) hours as needed for Anxiety. Max Daily Amount: 20 mg.    ergocalciferol (VITAMIN D2) 50,000 unit capsule Take 1 Cap by mouth every seven (7) days.  omeprazole (PRILOSEC) 40 mg capsule Take 1 Cap by mouth daily. (Patient taking differently: Take 40 mg by mouth as needed.)    aspirin 81 mg chewable tablet Take 1 Tab by mouth daily. No current facility-administered medications for this visit.       REVIEW OF SYSTEMS:  Dr. Lindsey Padilla 2014, mammo 5/18, DEXA 5/18, colo 4/13 Dr Barb Clayton, sees Dr Issa [place]  no vision change or eye pain  Oral  no mouth pain, tongue or tooth problems  Ears  no hearing loss, ear pain, fullness  Throat  no swallowing problems  Cardiac  no CP, PND, orthopnea, edema, palpitations or syncope  Chest  no breast masses  Resp  no wheezing, chronic coughing, dyspnea  GI  no heartburn, nausea, vomiting, change in bowel habits, bleeding, hemorrhoids  Urinary  no dysuria, hematuria, flank pain, urgency, frequency  Neuro  no focal weakness, numbness, paresthesias, incoordination, ataxia, involuntary movements    Visit Vitals  /68   Pulse 88   Temp 97 °F (36.1 °C) (Temporal)   Resp 14   Ht 5' 4\" (1.626 m)   Wt 182 lb (82.6 kg)   SpO2 95%   BMI 31.24 kg/m²   A&O x3  Affect is appropriate. Mood stable  No apparent distress  Anicteric, no JVD, adenopathy or thyromegaly. No carotid bruits or radiated murmur  Lungs clear to auscultation, no wheezes or rales  Heart showed regular rate and rhythm. No murmur, rubs, gallops  Abdomen soft nondistended, no hepatosplenomegaly or masses. Extremities without edema.   Pulses 1-2+ symmetrically    LABS  From 3/10 showed                         vit d 38.0  From 9/10 showed                           wbc 7.2, hb 14.3, plt 158  From 3/12 showed   gluc 88, cr 0.87, gfr 66, alt 15, tsh 1.04, ldl-p 1515  From 9/12 showed                                                    tsh 1.69, ldl-p 1469, chol 152, tg 165, hdl 42, ldl-c 77  From 3/13 showed   gluc 83, cr 0.91, gfr 62, alt 10,     ldl-p 1274, chol 124, tg 137, hdl 37, ldl-c 60,   wbc 7.0, hb 14.3, plt 164  From 9/13 showed                chol 147, tg 139, hdl 39, ldl-c 80  From 3/14 showed   gluc 89, cr 1.02, gfr 53, alt<5,  tsh 1.66,         chol 148, tg 132, hdl 36, ldl-c 86,   wbc 6.7, hb 14.4, plt 179, vit d 45.4   From 10/14 showed                chol 137, tg 117, hdl 40, ldl-c 74  From 4/15 showed   gluc 91, cr 1.24, gfr 42, alt 30, tsh 0.64,          wbc 6.8, hb 14.8, plt 176  From 10/15 showed gluc 93, cr 1.13, gfr 47,            tsh 0.32,         chol 139, tg 131, hdl 36, ldl-c 77   From 4/16 showed   gluc 86, cr 1.00, gfr 54, alt 24,           chol 142, tg 137, hdl 41, ldl-c 74,   wbc 6.4, hb 13.9, plt 203, ua pro 30  From 6/16 showed   gluc 97, cr 1.20,             wbc 7.3, hb 14.1, plt 216, ck/trop neg  From 10/16 showed gluc 78, cr 1.03, gfr 52, alt 21, tsh 1.21,         chol 145, tg 151, hdl 43, ldl-c 72,               From 4/17 showed                tsh 2.57, ft4 1.10  From 10/17 showed gluc 79, cr 1.03, gfr 52, alt 27,           chol 199, tg 161, hdl 45, ldl-c 122, wbc 6.3, hb 14.7, plt 208  From 5/18 showed                tsh 5.23, ft4 1.10,    chol 320, tg 196, hdl 40, ldl-c 221, vit d 57.0  From 8/18 showed   glu 141, cr 0.87, gfr>60,alt 34,                      wb 10.9, hb 13.0, plt 179,  lip 8208  From 11/18 showed                tsh 4.20, ft4 1.00,    chol 133, tg 171, hdl 41, ldl-c 58,              vit d 56.5  From 3/19 showed   gluc 81, cr 1.13, gfr 46, alt 23, tsh 3.50, ft4 1.10,            wbc 5.7, hb 14.2, plt 195,  fe 91, %sat 34, b12 306, fol 6, spep neg  From 9/19 showed                tsh 1.40,         chol 151, tg 135, hdl 42, ldl-c 82  From 4/20 showed   gluc 87, cr 1.03, gfr 51, alt 22,           wbc 6.8, hb 14.1, plt 184    Results for orders placed or performed during the hospital encounter of 10/01/20   LIPID PANEL   Result Value Ref Range    LIPID PROFILE          Cholesterol, total 150 <200 MG/DL    Triglyceride 145 <150 MG/DL    HDL Cholesterol 46 40 - 60 MG/DL    LDL, calculated 75 0 - 100 MG/DL    VLDL, calculated 29 MG/DL    CHOL/HDL Ratio 3.3 0 - 5.0     TSH 3RD GENERATION   Result Value Ref Range    TSH 2.44 0.36 - 3.74 uIU/mL     We reviewed the patient's labs from the last several visits to point out trends in the numbers        Patient Active Problem List   Diagnosis Code    Colon adenomas 2003, last 2014 Dr Margie Trotter D12.6    Hypovitaminosis D E55.9    Venous insufficiency dopplers 2003 I87.2    Osteopenia FRAX 4/14 11 and 2.2 M85.80    Dyslipidemia E78.5    Hypothyroidism due to acquired atrophy of thyroid E03.4    Arthritis, degenerative M19.90    Obesity (BMI 30.0-34. 9) E66.9    Advance directive discussed with patient Z70.80    Primary hypertension I10    Spondylosis of lumbar region without myelopathy or radiculopathy M47.816    S/P AAA repair Z98.890, Z86.79     ASSESSMENT AND PLAN:  1. Spine. Per Dr Nancy Fatima  2. Hypertension. Continue current regimen. 3.  Hypovit D. Continue current regimen. 4.  Colon adenomas. Fiber  5. Obesity. Lifestyle and dietary measures. Portion control reiterated. 6.  Possible IPMN.   F/U mri ordered  7. Dyslipidemia. Continue current regimen. 8.  Hypothyroidism. Continue current regimen. 9.  Osteopenia. Wt bearing exercise, ca/D, declined dexa for now        RTC 4/21    Above conditions discussed at length and patient vocalized understanding.   All questions answered to patient satisfaction

## 2020-10-06 ENCOUNTER — OFFICE VISIT (OUTPATIENT)
Dept: VASCULAR SURGERY | Age: 80
End: 2020-10-06
Payer: MEDICARE

## 2020-10-06 VITALS — SYSTOLIC BLOOD PRESSURE: 140 MMHG | DIASTOLIC BLOOD PRESSURE: 60 MMHG | HEART RATE: 88 BPM

## 2020-10-06 DIAGNOSIS — Z95.828 HISTORY OF ENDOVASCULAR STENT GRAFT FOR ABDOMINAL AORTIC ANEURYSM: Primary | ICD-10-CM

## 2020-10-06 DIAGNOSIS — I71.40 AAA (ABDOMINAL AORTIC ANEURYSM) WITHOUT RUPTURE: ICD-10-CM

## 2020-10-06 PROCEDURE — 99213 OFFICE O/P EST LOW 20 MIN: CPT | Performed by: PHYSICIAN ASSISTANT

## 2020-10-06 PROCEDURE — G8536 NO DOC ELDER MAL SCRN: HCPCS | Performed by: PHYSICIAN ASSISTANT

## 2020-10-06 PROCEDURE — 1101F PT FALLS ASSESS-DOCD LE1/YR: CPT | Performed by: PHYSICIAN ASSISTANT

## 2020-10-06 PROCEDURE — G8754 DIAS BP LESS 90: HCPCS | Performed by: PHYSICIAN ASSISTANT

## 2020-10-06 PROCEDURE — G8428 CUR MEDS NOT DOCUMENT: HCPCS | Performed by: PHYSICIAN ASSISTANT

## 2020-10-06 PROCEDURE — G8419 CALC BMI OUT NRM PARAM NOF/U: HCPCS | Performed by: PHYSICIAN ASSISTANT

## 2020-10-06 PROCEDURE — G8432 DEP SCR NOT DOC, RNG: HCPCS | Performed by: PHYSICIAN ASSISTANT

## 2020-10-06 PROCEDURE — G8399 PT W/DXA RESULTS DOCUMENT: HCPCS | Performed by: PHYSICIAN ASSISTANT

## 2020-10-06 PROCEDURE — 1090F PRES/ABSN URINE INCON ASSESS: CPT | Performed by: PHYSICIAN ASSISTANT

## 2020-10-06 PROCEDURE — G8753 SYS BP > OR = 140: HCPCS | Performed by: PHYSICIAN ASSISTANT

## 2020-10-06 NOTE — PROGRESS NOTES
1. Have you been to the ER, urgent care clinic since your last visit? Hospitalized since your last visit? Yes Reason for visit: [facility] emergency dept for dizziness    2. Have you seen or consulted any other health care providers outside of the [address] [place] [address] since your last visit? Include any pap smears or colon screening.  Yes Reason for visit: pcp           3 most recent PHQ Screens 10/6/2020   Little interest or pleasure in doing things Not at all   Feeling down, depressed, irritable, or hopeless Not at all   Total Score PHQ 2 0

## 2020-10-06 NOTE — PROGRESS NOTES
[facility]    Chief Complaint   Patient presents with    Abdominal Aortic Aneurysm       History and Physical    Chucky Cabral is U 5 F. [age]. female with history of AAA now s/p EVAR on 7/17/18. She did very well postoperatively.  She denies any abdominal pain. She has spinal disease issues and continues to follow up with her spine doctor. She denies any symptoms of claudication and no rest pain.  She is here for yearly ultrasound surveillance     Past Medical History:   Diagnosis Date    AAA (abdominal aortic aneurysm) without rupture ([address]     AAA repair 7/2018 Dr Kirby Ket adenomas     2003 Dr Dale Nails; 4/13 Dr Alexa Bolivar    Degenerative arthritis of lumbar spine 12/2018    Dr Keo Arroyo; mri showed multilevel degen changes, mod spinal stenosis L3-5, mod severe right L4-5 foraminal disease; s/p epidural; isabel    Dyslipidemia     FHx: heart disease     Gastritis 2006    on EGD Dr. Angie Rodriguez, neg sprue    Hypertension     Hypothyroidism 2007    Dr. Alyssia Bailey Hypovitaminosis D     IPMN (intraductal papillary mucinous neoplasm) 06/2018    on mrcp 8mm; no change 10/19     Lung nodule 06/2016    6 mm RLL (6/16); no change (10/17); no change (12/18)    Obesity     peak weight 187 lbs, bmi 33.1 from 4/13; IF 3/18    Osteopenia FRAX 4/12 9.7 and 1.4     DEXA t score 0.6 spine, -1 hip (12/09);  -0.4 spine, -1.3 hip (4/12); -1.2 wrist, -1.6 hip w FRAX 11/2.2 (4/14); -1.0 wrist, -1.6 hip w FRAX 12/2.6 (4/16); -0.7 wrist, -1.6 hip (5/18)    Pancreatitis, chronic ([address] 2018    Psoriasis 2003    on bx Dr. Zeyad Mullins hearing loss     LEFT Dr Comfort Merrill Venous insufficiency 2003    on dopplers    Zoster 05/2016    LEFT T11-12; mild postherpetic neuralgia     Patient Active Problem List   Diagnosis Code    Colon adenomas 2003, last 2014 Dr Alexa Bolivar D12.6    Hypovitaminosis D E55.9    Venous insufficiency dopplers 2003 I87.2    Osteopenia FRAX 4/14 11 and 2.2 M85.80    Dyslipidemia E78.5    Hypothyroidism due to acquired atrophy of thyroid E03.4    Arthritis, degenerative M19.90    Obesity (BMI 30.0-34. 9) E66.9    Advance directive discussed with patient Z70.80    Primary hypertension I10    Spondylosis of lumbar region without myelopathy or radiculopathy M47.816    S/P AAA repair Z98.890, Z86.79     Past Surgical History:   Procedure Laterality Date    CARDIAC SURG PROCEDURE UNLIST  09/2016    NST negative    CHEST SURGERY PROCEDURE UNLISTED  06/2016    CTA neg PE, 6 mm subpleural nodule    HX AAA REPAIR  07/17/2018    HX Sophia Head Flow LEFT    HX CATARACT REMOVAL      BILAT    HX COLONOSCOPY      benign polyp 5/08 Dr Teri Echols;  4/13 negative Dr. Reginaldo Goddrad HX GI  08/2018    Dr Yvrose Diez; ERCP s/p removal cbd stone complicated by pamcreatitis    HX LAP CHOLECYSTECTOMY  09/18/2018    Dr. Og Cancino      neg MRA/[facility]; CT head neg 9/10     Current Outpatient Medications   Medication Sig Dispense Refill    atorvastatin (LIPITOR) 40 mg tablet Take 1 Tab by mouth daily. 90 Tab 3    ezetimibe (ZETIA) 10 mg tablet Take 1 Tab by mouth daily. 90 Tab 3    Levothyroxine 75 mcg cap Take 1 Tab by mouth daily. 90 Cap 3    lisinopriL (PRINIVIL, ZESTRIL) 10 mg tablet Take 1 Tab by mouth daily. 90 Tab 3    gabapentin (NEURONTIN) 100 mg capsule Take 2 Caps by mouth three (3) times daily. Max Daily Amount: 600 mg. 540 Cap 1    lidocaine (LIDODERM) 5 % Apply patch to the affected area for 12 hours a day and remove for 12 hours a day. 90 Patch 1    diazePAM (VALIUM) 10 mg tablet Take 1 Tab by mouth every twelve (12) hours as needed for Anxiety. Max Daily Amount: 20 mg. 60 Tab 1    ergocalciferol (VITAMIN D2) 50,000 unit capsule Take 1 Cap by mouth every seven (7) days. 12 Cap 3    celecoxib (CELEBREX) 200 mg capsule Take 1 tab by mouth daily as needed for pain with food.  30 Cap 2  diclofenac (VOLTAREN) 1 % gel Apply 4 grams to both knees 3-4 x daily as needed. 5 Each 2    aspirin 81 mg chewable tablet Take 1 Tab by mouth daily. 90 Tab 3    methylPREDNISolone (MEDROL DOSEPACK) 4 mg tablet Per dose pack instructions 1 Dose Pack 0    methocarbamol (ROBAXIN) 500 mg tablet Take 1 tab by mouth BID-TID as needed for muscle spasm. 60 Tab 1    oxyCODONE-acetaminophen (PERCOCET) 5-325 mg per tablet Take 1 Tab by mouth every four (4) hours as needed. Max Daily Amount: 6 Tabs. (Patient not taking: Reported on 2/24/2020) 30 Tab 0    omeprazole (PRILOSEC) 40 mg capsule Take 1 Cap by mouth daily. (Patient taking differently: Take 40 mg by mouth as needed.) 90 Cap 1     Allergies   Allergen Reactions    Atenolol Other (comments)     Felt poorly    Cozaar [Losartan] Other (comments)     Headaches    Norvasc [Amlodipine] Other (comments)     Tate poorly         Physical   Visit Vitals  BP (!) 140/60 (BP 1 Location: Right arm, BP Patient Position: Sitting)   Pulse 88   Resp (!) 0       General:  Alert, cooperative, no distress. Wearing a mask   Head:  Normocephalic, without obvious abnormality, atraumatic. Eyes:    Conjunctivae/corneas clear. Pupils equal, round, reactive to light. Extraocular movements intact. Neck:         No jvd   Lungs:   No increased respiratory effort   Extremities: Extremities normal, atraumatic, no cyanosis or edema. Pulses: 2+ and symmetric all extremities. Skin: Skin color, texture, turgor normal. No rashes or lesions       Vascular studies: The aortoiliac endograft is patent without evidence of an endoleak. The maximum aneurysm sac diameter measures 3.92 x 4.1 cm. There is no evidence of stenosis within the bilateral iliac limbs. There is no evidence of renal artery stenosis bilaterally. There is no evidence of a celiac or superior mesenteric artery stenosis. Impression/Plan:     ICD-10-CM ICD-9-CM    1.  History of endovascular stent graft for abdominal aortic aneurysm  Z95.828 V43.4 DUPLEX AORTA ILIAC GRAFT COMPLETE   2. AAA (abdominal aortic aneurysm) without rupture (H[facility])  I71.4 441.4 DUPLEX AORTA ILIAC GRAFT COMPLETE     No orders of the defined types were placed in this encounter. Stable AAA repair  Follow up yearly      GARRETT Flores    Portions of this note have been entered using voice recognition software.

## 2020-10-08 ENCOUNTER — OFFICE VISIT (OUTPATIENT)
Dept: INTERNAL MEDICINE CLINIC | Age: 80
End: 2020-10-08
Payer: MEDICARE

## 2020-10-08 VITALS
SYSTOLIC BLOOD PRESSURE: 120 MMHG | OXYGEN SATURATION: 95 % | TEMPERATURE: 97 F | WEIGHT: 182 LBS | HEART RATE: 88 BPM | BODY MASS INDEX: 31.07 KG/M2 | DIASTOLIC BLOOD PRESSURE: 68 MMHG | HEIGHT: 64 IN | RESPIRATION RATE: 14 BRPM

## 2020-10-08 DIAGNOSIS — E66.9 OBESITY (BMI 30.0-34.9): ICD-10-CM

## 2020-10-08 DIAGNOSIS — Z12.31 ENCOUNTER FOR SCREENING MAMMOGRAM FOR MALIGNANT NEOPLASM OF BREAST: ICD-10-CM

## 2020-10-08 DIAGNOSIS — D49.0 IPMN (INTRADUCTAL PAPILLARY MUCINOUS NEOPLASM): ICD-10-CM

## 2020-10-08 DIAGNOSIS — Z13.31 SCREENING FOR DEPRESSION: ICD-10-CM

## 2020-10-08 DIAGNOSIS — Z23 NEEDS FLU SHOT: ICD-10-CM

## 2020-10-08 DIAGNOSIS — Z00.00 MEDICARE ANNUAL WELLNESS VISIT, SUBSEQUENT: Primary | ICD-10-CM

## 2020-10-08 DIAGNOSIS — Z13.1 SCREENING FOR DIABETES MELLITUS: ICD-10-CM

## 2020-10-08 DIAGNOSIS — E78.5 DYSLIPIDEMIA: ICD-10-CM

## 2020-10-08 DIAGNOSIS — E03.4 HYPOTHYROIDISM DUE TO ACQUIRED ATROPHY OF THYROID: ICD-10-CM

## 2020-10-08 DIAGNOSIS — M47.816 SPONDYLOSIS OF LUMBAR REGION WITHOUT MYELOPATHY OR RADICULOPATHY: ICD-10-CM

## 2020-10-08 DIAGNOSIS — I10 ESSENTIAL HYPERTENSION WITH GOAL BLOOD PRESSURE LESS THAN 140/90: ICD-10-CM

## 2020-10-08 DIAGNOSIS — Z98.890 S/P AAA REPAIR: ICD-10-CM

## 2020-10-08 DIAGNOSIS — D12.6 COLON ADENOMAS: ICD-10-CM

## 2020-10-08 DIAGNOSIS — Z71.89 ADVANCED DIRECTIVES, COUNSELING/DISCUSSION: ICD-10-CM

## 2020-10-08 DIAGNOSIS — Z86.79 S/P AAA REPAIR: ICD-10-CM

## 2020-10-08 DIAGNOSIS — Z71.89 ADVANCE DIRECTIVE DISCUSSED WITH PATIENT: ICD-10-CM

## 2020-10-08 PROCEDURE — G8399 PT W/DXA RESULTS DOCUMENT: HCPCS | Performed by: INTERNAL MEDICINE

## 2020-10-08 PROCEDURE — G8432 DEP SCR NOT DOC, RNG: HCPCS | Performed by: INTERNAL MEDICINE

## 2020-10-08 PROCEDURE — G8754 DIAS BP LESS 90: HCPCS | Performed by: INTERNAL MEDICINE

## 2020-10-08 PROCEDURE — G0463 HOSPITAL OUTPT CLINIC VISIT: HCPCS | Performed by: INTERNAL MEDICINE

## 2020-10-08 PROCEDURE — 1090F PRES/ABSN URINE INCON ASSESS: CPT | Performed by: INTERNAL MEDICINE

## 2020-10-08 PROCEDURE — G8536 NO DOC ELDER MAL SCRN: HCPCS | Performed by: INTERNAL MEDICINE

## 2020-10-08 PROCEDURE — 99497 ADVNCD CARE PLAN 30 MIN: CPT | Performed by: INTERNAL MEDICINE

## 2020-10-08 PROCEDURE — 99214 OFFICE O/P EST MOD 30 MIN: CPT | Performed by: INTERNAL MEDICINE

## 2020-10-08 PROCEDURE — G8427 DOCREV CUR MEDS BY ELIG CLIN: HCPCS | Performed by: INTERNAL MEDICINE

## 2020-10-08 PROCEDURE — G0439 PPPS, SUBSEQ VISIT: HCPCS | Performed by: INTERNAL MEDICINE

## 2020-10-08 PROCEDURE — G8752 SYS BP LESS 140: HCPCS | Performed by: INTERNAL MEDICINE

## 2020-10-08 PROCEDURE — 1101F PT FALLS ASSESS-DOCD LE1/YR: CPT | Performed by: INTERNAL MEDICINE

## 2020-10-08 PROCEDURE — G8417 CALC BMI ABV UP PARAM F/U: HCPCS | Performed by: INTERNAL MEDICINE

## 2020-10-08 PROCEDURE — 90694 VACC AIIV4 NO PRSRV 0.5ML IM: CPT

## 2020-10-08 NOTE — PROGRESS NOTES
Discussion about weight loss    Body mass index is 31.24 kg/m². Vitals 4/13/2020 2/24/2020 2/6/2020   Weight 178 lb 181 lb 3.2 oz 178 lb   SpO2 98 99 97   BSA 1.91 m2 1.93 m2 1.92 m2   BMI 30.55 kg/m2 31.1 kg/m2 30.08 kg/m2     Discussion about modifying behaviors regarding diet and exercise undertaken    Increase activity as tolerated   - she is limited by her ortho/spine issues and also the pandemic    Cut back carbs and fatty foods.     Calorie counting would be ideal   - encouraged inc fresh fruit and veggies, cut back carbs    Option of appetite suppressants discussed briefly and declined   - due to concerns about sfx and cost    Discussed sending to nutritionist for further teaching declined    Intermittent fasting discussed at length, concepts explained, risks and benefits elaborated upon     - she was unable to do this    We reiterated target of up to 5% wt loss as being realistic over the next 6-12 months and possibly aiming for higher than that in the future     Will come back for reevaluation and further management at subsequent visits which will be determined by patient response    Total time 15 min

## 2020-10-08 NOTE — PROGRESS NOTES
Jewels Esqueda [facility] 1940 female who presents for routine immunizations. Patient denies any symptoms , reactions or allergies that would exclude them from being immunized today. Risks and adverse reactions were discussed and the VIS was given to them. All questions were addressed. Order placed for influenza,  per Verbal Order from [address] with read back. Patient was observed for 15 min post injection. There were no reactions observed.     Ja Arenas LPN

## 2020-10-08 NOTE — PATIENT INSTRUCTIONS
Vaccine Information Statement Influenza (Flu) Vaccine (Inactivated or Recombinant): What You Need to Know Many Vaccine Information Statements are available in [place] and other languages. See www.immunize.org/vis Hojas de información sobre vacunas están disponibles en español y en muchos otros idiomas. Visite www.immunize.org/vis 1. Why get vaccinated? Influenza vaccine can prevent influenza (flu). Flu is a contagious disease that spreads around the [facility] every year, usually between October and May. Anyone can get the flu, but it is more dangerous for some people. Infants and young children, people 72years of age and older, pregnant women, and people with certain health conditions or a weakened immune system are at greatest risk of flu complications. Pneumonia, bronchitis, sinus infections and ear infections are examples of flu-related complications. If you have a medical condition, such as heart disease, cancer or diabetes, flu can make it worse. Flu can cause fever and chills, sore throat, muscle aches, fatigue, cough, headache, and runny or stuffy nose. Some people may have vomiting and diarrhea, though this is more common in children than adults. Each year thousands of people in the [facility] die from flu, and many more are hospitalized. Flu vaccine prevents millions of illnesses and flu-related visits to the doctor each year. 2. Influenza vaccines CDC recommends everyone 10months of age and older get vaccinated every flu season. Children 6 months through 6years of age may need 2 doses during a single flu season. Everyone else needs only 1 dose each flu season. It takes about 2 weeks for protection to develop after vaccination. There are many flu viruses, and they are always changing. Each year a new flu vaccine is made to protect against three or four viruses that are likely to cause disease in the upcoming flu season.  Even when the vaccine doesnt exactly match these viruses, it may still provide some protection. Influenza vaccine does not cause flu. Influenza vaccine may be given at the same time as other vaccines. 3. Talk with your health care provider Tell your vaccine provider if the person getting the vaccine: 
 Has had an allergic reaction after a previous dose of influenza vaccine, or has any severe, life-threatening allergies.  Has ever had Guillain-Barré Syndrome (also called GBS). In some cases, your health care provider may decide to postpone influenza vaccination to a future visit. People with minor illnesses, such as a cold, may be vaccinated. People who are moderately or severely ill should usually wait until they recover before getting influenza vaccine. Your health care provider can give you more information. 4. Risks of a reaction  Soreness, redness, and swelling where shot is given, fever, muscle aches, and headache can happen after influenza vaccine.  There may be a very small increased risk of Guillain-Barré Syndrome (GBS) after inactivated influenza vaccine (the flu shot). Reuben Bellamy children who get the flu shot along with pneumococcal vaccine (PCV13), and/or DTaP vaccine at the same time might be slightly more likely to have a seizure caused by fever. Tell your health care provider if a child who is getting flu vaccine has ever had a seizure. People sometimes faint after medical procedures, including vaccination. Tell your provider if you feel dizzy or have vision changes or ringing in the ears. As with any medicine, there is a very remote chance of a vaccine causing a severe allergic reaction, other serious injury, or death. 5. What if there is a serious problem? An allergic reaction could occur after the vaccinated person leaves the clinic.  If you see signs of a severe allergic reaction (hives, swelling of the face and throat, difficulty breathing, a fast heartbeat, dizziness, or weakness), call 9-1-1 and get the person to the nearest hospital. 
 
For other signs that concern you, call your health care provider. Adverse reactions should be reported to the Vaccine Adverse Event Reporting System (VAERS). Your health care provider will usually file this report, or you can do it yourself. Visit the VAERS website at www.vaers. hhs.gov or call 8-190.925.8305. VAERS is only for reporting reactions, and VAERS staff do not give medical advice. 6. The National Vaccine Injury Compensation Program 
 
The [facility] Vaccine Injury Compensation Program (VICP) is a federal program that was created to compensate people who may have been injured by certain vaccines. Visit the VICP website at www.hrsa.gov/vaccinecompensation or call 4-395.696.4456 to learn about the program and about filing a claim. There is a time limit to file a claim for compensation. 7. How can I learn more?  Ask your health care provider.  Call your local or state health department.  Contact the Centers for Disease Control and Prevention (CDC): 
- Call 6-366.130.8624 (2-372-FVF-INFO) or 
- Visit CDCs influenza website at www.cdc.gov/flu Vaccine Information Statement (Interim) Inactivated Influenza Vaccine 8/15/2019 
42 BIB Wei [id number]-[id number] Department of [facility] Centers for Disease Control and Prevention Office Use Only Medicare Wellness Visit, Female The best way to live healthy is to have a lifestyle where you eat a well-balanced diet, exercise regularly, limit alcohol use, and quit all forms of tobacco/nicotine, if applicable. Regular preventive services are another way to keep healthy. Preventive services (vaccines, screening tests, monitoring & exams) can help personalize your care plan, which helps you manage your own care. Screening tests can find health problems at the earliest stages, when they are easiest to treat. [facility] follows the current, evidence-based guidelines published by the [facility] Danial Benoit ([facility]) when recommending preventive services for our patients. Because we follow these guidelines, sometimes recommendations change over time as research supports it. (For example, mammograms used to be recommended annually. Even though Medicare will still pay for an annual mammogram, the newer guidelines recommend a mammogram every two years for women of average risk). Of course, you and your doctor may decide to screen more often for some diseases, based on your risk and your co-morbidities (chronic disease you are already diagnosed with). Preventive services for you include: - Medicare offers their members a free annual wellness visit, which is time for you and your primary care provider to discuss and plan for your preventive service needs. Take advantage of this benefit every year! 
-All adults over the age of 72 should receive the recommended pneumonia vaccines. Current USPSTF guidelines recommend a series of two vaccines for the best pneumonia protection.  
-All adults should have a flu vaccine yearly and a tetanus vaccine every 10 years.  
-All adults age 48 and older should receive the shingles vaccines (series of two vaccines).      
-All adults age 38-68 who are overweight should have a diabetes screening test once every three years.  
-All adults born between [birth year] and 1967 should be screened once for Hepatitis C. 
-Other screening tests and preventive services for persons with diabetes include: an eye exam to screen for diabetic retinopathy, a kidney function test, a foot exam, and stricter control over your cholesterol.  
-Cardiovascular screening for adults with routine risk involves an electrocardiogram (ECG) at intervals determined by your doctor.  
-Colorectal cancer screenings should be done for adults age 54-65 with no increased risk factors for colorectal cancer. There are a number of acceptable methods of screening for this type of cancer. Each test has its own benefits and drawbacks. Discuss with your doctor what is most appropriate for you during your annual wellness visit. The different tests include: colonoscopy (considered the best screening method), a fecal occult blood test, a fecal DNA test, and sigmoidoscopy. 
 
-A bone mass density test is recommended when a woman turns 65 to screen for osteoporosis. This test is only recommended one time, as a screening. Some providers will use this same test as a disease monitoring tool if you already have osteoporosis. -Breast cancer screenings are recommended every other year for women of normal risk, age 54-69. 
-Cervical cancer screenings for women over age 72 are only recommended with certain risk factors. Here is a list of your current Health Maintenance items (your personalized list of preventive services) with a due date: 
Health Maintenance Due Topic Date Due  Yearly Flu Vaccine (1) 09/01/2020 Momo Currie Annual Well Visit  10/02/2020

## 2020-10-08 NOTE — ACP (ADVANCE CARE PLANNING)
Advance Care Planning       Advance Care Planning (ACP) Physician/NP/PA (Provider) Conversation        Date of ACP Conversation: 10/8/2020    Conversation Conducted with:   Patient with Decision Making Donal Mejia Maker:    Current Designated Health Care Decision Maker:   (If there is a valid [place] named in the [address]" box in the ACP activity, but it is not visible above, be sure to open that field and then select the health care decision maker relationship (ie \"primary\") in the blank space to the right of the name.)    Note: Assess and validate information in current ACP documents, as indicated. If no Authorized Decision Maker has previously been identified, then patient chooses [place]:  \"Who would you like to name as your primary health care decision-maker? \"    Name: Cindy Quevedo   Relationship:  Phone number:   Mary Mcguire this person be reached easily? \" YES  \"Who would you like to name as your back-up decision maker? \"   Name:   Relationship:  Phone number:   \"Can this person be reached easily? \" NO    Note: If the relationship of these Decision-Makers to the patient does NOT follow your state's Next of Kin hierarchy, recommend that patient complete ACP document that meets state-specific requirements to allow them to act on the patient's behalf when appropriate. Care Preferences:    Hospitalization: \"If your health worsens and it becomes clear that your chance of recovery is unlikely, what would your preference be regarding hospitalization? \"  If the patient would want hospitalization, answer \"yes\". If the patient would prefer comfort-focused treatment without hospitalization, answer \"no\". yes      Ventilation:   \"If you were in your present state of health and suddenly became very ill and were unable to breathe on your own, what would your preference be about the use of a ventilator (breathing machine) if it was available to you?\"    If patient would desire the use of a ventilator (breathing machine), answer \"yes\", if not answer \"no\":yes    \"If your health worsens and it becomes clear that your chance of recovery is unlikely, what would your preference be about the use of a ventilator (breathing machine) if it was available to you? \"   yes      Resuscitation:  \"CPR works best to restart the heart when there is a sudden event, like a heart attack, in someone who is otherwise healthy. Unfortunately, CPR does not typically restart the heart for people who have serious health conditions or who are very sick. \"    \"In the event your heart stopped as a result of an underlying serious health condition, would you want attempts to be made to restart your heart (answer \"yes\" for attempt to resuscitate) or would you prefer a natural death (answer \"no\" for do not attempt to resuscitate)? \"   yes    NOTE: If the patient has a valid advance directive AND provides care preference(s) that are inconsistent with that prior directive, advise the patient to consider either: creating a new advance directive that complies with state-specific requirements; or, if that is not possible, orally revoking that prior directive in accordance with state-specific requirements, which must be documented in the EHR.     Conversation Outcomes / Follow-Up Plan:   Recommended completion of Advance Directive      Length of Voluntary ACP Conversation in minutes:  16 minutes      Nawaf Almanza MD

## 2020-10-11 DIAGNOSIS — Z12.31 ENCOUNTER FOR SCREENING MAMMOGRAM FOR MALIGNANT NEOPLASM OF BREAST: ICD-10-CM

## 2020-10-22 DIAGNOSIS — D49.0 IPMN (INTRADUCTAL PAPILLARY MUCINOUS NEOPLASM): ICD-10-CM

## 2020-12-10 NOTE — PROGRESS NOTES
[facility]  [address]opher Rodriguez[address] [place], [address]  Phone: [phone]  Fax: [phone]    Pt's YOB: 1940    ASSESSMENT   Diagnoses and all orders for this visit:    1. Spondylosis of lumbar region without myelopathy or radiculopathy  -     REFERRAL TO PAIN MANAGEMENT  -     methylPREDNISolone (MEDROL DOSEPACK) 4 mg tablet; Per dose pack instructions    2. Muscle spasm  -     REFERRAL TO PAIN MANAGEMENT    3. Spinal stenosis of lumbar region without neurogenic claudication  -     REFERRAL TO PAIN MANAGEMENT    4. Decreased GFR    5. S/P AAA repair         IMPRESSION AND PLAN:  Iván Roche is a 78 y.o. right hand dominant female with history of lumbar pain. She complains of pain in the lower back, L>R, that worsens with activity, unchanged since her last office visit. Pt continues to take Neurontin mg 2 caps TID as prescribed by her PCP, Bessy Starks MD.    1) Pt was given information on lumbar arthritis exercises and a RFA. 2) She was referred to Dr. Rosaura Cr for a lumbar RFA evaluation. 3) Pt was prescribed a Medrol Dosepak with 1 refill. 4) She is not a candidate for NSAID's due to a decreased GFR. 5) Ms. Shay Mejia has a reminder for a \"due or due soon\" health maintenance. I have asked that she contact her primary care provider, Bessy Starks MD, for follow-up on this health maintenance. 6)  demonstrated consistency with prescribing. Follow-up and Dispositions    · Return in about 3 months (around 3/14/2021) for Medication follow up, RFA follow up. HISTORY OF PRESENT ILLNESS:  Iván Roche is a 78 y.o. right hand dominant female with history of lumbar pain and presents to the office today for follow up. She complains of pain in the lower back, L>R, that worsens with activity, unchanged since her last office visit. She denies any pain or weakness in the legs at this time.  Pt notes minimal relief with a left L4-5 and left L5-S1 facet injection on 9/25/2019 and wishes to proceed with a lumbar RFA at this time. She takes Neurontin mg and takes 2 caps TID as prescribed by her PCP, Yani Hale MD. She reports temporary relief jb taking Medrol Dosepak a since her last office visit. Pt's recent labs from 12/11/2020 a creatinine of 1.1 and eGFR of 48 (decreased). Pt at this time desires to proceed with a referral to Dr. Alma Medrano for lumbar RFA evaluation.     Pain Scale: 7/10    PCP: Yani Hale MD     Past Medical History:   Diagnosis Date    AAA (abdominal aortic aneurysm) without rupture ([facility] [address]     AAA repair 7/2018 Dr Maxime Sierra adenomas     2003 Dr Maxwell Alcala; 4/13 Dr Rachel Del Angel    Degenerative arthritis of lumbar spine 12/2018    Dr Brad Sullivan; mri showed multilevel degen changes, mod spinal stenosis L3-5, mod severe right L4-5 foraminal disease; s/p epidural; isabel    Dyslipidemia     FHx: heart disease     Gastritis 2006    on EGD Dr. Gross Memory, neg sprue    Hypertension     Hypothyroidism 2007    Dr. Wilfredo Padilla Hypovitaminosis D     IPMN (intraductal papillary mucinous neoplasm) 06/2018    on mrcp 8mm; no change 10/19     Lung nodule 06/2016    6 mm RLL (6/16); no change (10/17); no change (12/18)    Obesity     peak weight 187 lbs, bmi 33.1 from 4/13; IF 3/18    Osteopenia FRAX 4/12 9.7 and 1.4     DEXA t score 0.6 spine, -1 hip (12/09);  -0.4 spine, -1.3 hip (4/12); -1.2 wrist, -1.6 hip w FRAX 11/2.2 (4/14); -1.0 wrist, -1.6 hip w FRAX 12/2.6 (4/16); -0.7 wrist, -1.6 hip (5/18)    Pancreatitis, chronic ([address] 2018    Psoriasis 2003    on bx Dr. Whitney Sanchez    Sensorineural hearing loss     LEFT Dr Emmanuel Brown Venous insufficiency 2003    on dopplers    Zoster 05/2016    LEFT T11-12; mild postherpetic neuralgia        Social History     Socioeconomic History    Marital status:      Spouse name: Not on file    Number of children: Not on file    Years of education: Not on file    Highest education level: Not on file   Occupational History    Occupation: ret office mgr   Social Needs    Financial resource strain: Not on file    Food insecurity     Worry: Not on file     Inability: Not on file    Transportation needs     Medical: Not on file     Non-medical: Not on file   Tobacco Use    Smoking status: Never Smoker    Smokeless tobacco: Never Used   Substance and Sexual Activity    Alcohol use: Yes     Comment: occasionally    Drug use: No    Sexual activity: Not on file   Lifestyle    Physical activity     Days per week: Not on file     Minutes per session: Not on file    Stress: Not on file   Relationships    Social connections     Talks on phone: Not on file     Gets together: Not on file     Attends Evangelical service: Not on file     Active member of club or organization: Not on file     Attends meetings of clubs or organizations: Not on file     Relationship status: Not on file    Intimate partner violence     Fear of current or ex partner: Not on file     Emotionally abused: Not on file     Physically abused: Not on file     Forced sexual activity: Not on file   Other Topics Concern    Not on file   Social History Narrative    Not on file       Current Outpatient Medications   Medication Sig Dispense Refill    methylPREDNISolone (MEDROL DOSEPACK) 4 mg tablet Per dose pack instructions 1 Dose Pack 1    atorvastatin (LIPITOR) 40 mg tablet Take 1 Tab by mouth daily. 90 Tab 3    ezetimibe (ZETIA) 10 mg tablet Take 1 Tab by mouth daily. 90 Tab 3    Levothyroxine 75 mcg cap Take 1 Tab by mouth daily. 90 Cap 3    lisinopriL (PRINIVIL, ZESTRIL) 10 mg tablet Take 1 Tab by mouth daily. 90 Tab 3    lidocaine (LIDODERM) 5 % Apply patch to the affected area for 12 hours a day and remove for 12 hours a day. 90 Patch 1    diazePAM (VALIUM) 10 mg tablet Take 1 Tab by mouth every twelve (12) hours as needed for Anxiety.  Max Daily Amount: 20 mg. 60 Tab 1  ergocalciferol (VITAMIN D2) 50,000 unit capsule Take 1 Cap by mouth every seven (7) days. 12 Cap 3    omeprazole (PRILOSEC) 40 mg capsule Take 1 Cap by mouth daily. (Patient taking differently: Take 40 mg by mouth as needed.) 90 Cap 1    aspirin 81 mg chewable tablet Take 1 Tab by mouth daily. 90 Tab 3    gabapentin (NEURONTIN) 100 mg capsule Take 2 Caps by mouth three (3) times daily. Max Daily Amount: 600 mg. (Patient not taking: Reported on 12/14/2020) 540 Cap 1       Allergies   Allergen Reactions    Atenolol Other (comments)     Felt poorly    Cozaar [Losartan] Other (comments)     Headaches    Norvasc [Amlodipine] Other (comments)     [place] poorly           REVIEW OF SYSTEMS    Constitutional: Negative for fever, chills, or weight change. Respiratory: Negative for cough or shortness of breath. Cardiovascular: Negative for chest pain or palpitations. Gastrointestinal: Negative for acid reflux, change in bowel habits, or constipation. Genitourinary: Negative for dysuria and flank pain. Musculoskeletal: Positive for lumbar pain. Neurological: Negative for headaches, dizziness, or numbness  Psychiatric/Behavioral: Negative for difficulty with sleep. As per HPI    PHYSICAL EXAMINATION  Visit Vitals  /74   Pulse 84   Temp 97.2 °F (36.2 °C) (Temporal)   Resp 16   Ht 5' 4\" (1.626 m)   Wt 182 lb 3.2 oz (82.6 kg)   SpO2 96%   BMI 31.27 kg/m²       Constitutional: Awake, alert, and in no acute distress. Neurological: 1+ symmetrical DTRs in the upper extremities. 1+ symmetrical DTRs in the lower extremities. Sensation to light touch is intact. Negative Adkins's sign bilaterally. Skin: warm, dry, and intact. Musculoskeletal: Tenderness to palpation in the lower lumbar region. Moderate pain with extension and axial loading, Improvement with forward flexion. No pain with internal or external rotation of her hips. Negative straight leg raise bilaterally.      Hip Flex  Quads Hamstrings Ankle DF EHL Ankle PF   Right +4/5 +4/5 +4/5 +4/5 +4/5 +4/5   Left +4/5 +4/5 +4/5 +4/5 +4/5 +4/5     IMAGING:    Lumbar spine MRI from 12/03/2018 was personally reviewed with the patient and demonstrated:          Results from [facility] on 12/03/18   MRI LUMB SPINE WO CONT     Narrative MR Lumbar spine without contrast     HISTORY: increased lumbar pain. Difficulty with standing/walking. Also has hip  pain and history of spondylolisthesis     COMPARISON: Prior body CT May 2018 and August 2018     Technique: Multi-sequence multiplanar T1, T2, STIR imaging with and without fat  saturation obtained centered on the lumbar spine.      FINDINGS:      Alignment: Mild to moderate right convex lumbar scoliosis with apex at L2. Minimal degenerative anterolisthesis of L5. Also slight retrolisthesis of L1 and  T11. Vertebral body height: Normal  Marrow signal: No concerning signal. Several levels of Schmorl's nodes. Several  levels of chronic fatty endplate changes as well as other levels of discogenic  endplate edema. The latter is most notable at T11-12. Disc spaces: Severe narrowing and desiccation of T11-12, L1-2, L4-5. More  moderately so at other levels, sparing L5-S1. Conus: Terminates at T12-L1     Axial imaging correlation:     T11-12: Broad-based disc osteophyte complex with mild facet arthropathy. Patent  canal and foramina.     T12-L1: Mild disc bulge and facet arthropathy. Minimal impression upon the  thecal sac. Patent canal and foramina.     L1-2: Broad-based disc osteophyte complex. Bilateral facet arthropathy. Mild  concentric spinal stenosis. Mild foraminal stenoses.     L2-3: Mild broad-based disc osteophyte complex. Bilateral facet arthropathy. Mild concentric spinal stenosis. Moderate left and mild right foraminal  stenosis.     L3-4: Broad-based disc osteophyte complex. Bilateral facet arthropathy. Ligamentum flavum thickening and buckling. Moderate concentric spinal stenosis.   Mild foraminal stenoses.     L4-5: Broad-based disc osteophyte complex with eccentric prominence to the  right. Bilateral facet arthropathy. Moderate spinal stenosis. More pronounced  narrowing of right lateral recess with transient distortion of crossing right L5  nerve. Moderately severe right foraminal stenosis with distortion of perineural  fat.     L5-S1: Uncovering of the disc. Minor disc bulge. Bilateral facet arthropathy. Patent canal and foramina.     Other structures: Partial visualization of AAA with prior endovascular stent  deployment. Several small caliber renal cysts.           Impression IMPRESSION:     1. Lumbar scoliosis with multilevel degenerative findings.  -Up to a moderate spinal stenosis, at L4-5 and L3-4  -Moderately severe right L4-5 foraminal stenosis with potential impingement of  the exiting right L4 nerve. There is also potentially impingement of the  crossing right L5 nerve at this level.     Thank you for enabling us to participate in the care of this patient.      Written by Lien Haddad, as dictated by Cristina Auguste MD.  I, Dr. Cristina Auguste confirm that all documentation is accurate.

## 2020-12-11 ENCOUNTER — HOSPITAL ENCOUNTER (OUTPATIENT)
Age: 80
Discharge: HOME | End: 2020-12-11
Attending: INTERNAL MEDICINE
Payer: MEDICARE

## 2020-12-11 ENCOUNTER — HOSPITAL ENCOUNTER (OUTPATIENT)
Dept: MAMMOGRAPHY | Age: 80
Discharge: HOME | End: 2020-12-11
Attending: INTERNAL MEDICINE
Payer: MEDICARE

## 2020-12-11 DIAGNOSIS — Z12.31 ENCOUNTER FOR SCREENING MAMMOGRAM FOR MALIGNANT NEOPLASM OF BREAST: ICD-10-CM

## 2020-12-11 DIAGNOSIS — D49.0 IPMN (INTRADUCTAL PAPILLARY MUCINOUS NEOPLASM): ICD-10-CM

## 2020-12-11 LAB — CREAT UR-MCNC: 1.1 MG/DL (ref 0.6–1.3)

## 2020-12-11 PROCEDURE — A9577 INJ MULTIHANCE: HCPCS | Performed by: INTERNAL MEDICINE

## 2020-12-11 PROCEDURE — 77063 BREAST TOMOSYNTHESIS BI: CPT

## 2020-12-11 PROCEDURE — 74011250636 HC RX REV CODE- 250/636: Performed by: INTERNAL MEDICINE

## 2020-12-11 PROCEDURE — 74183 MRI ABD W/O CNTR FLWD CNTR: CPT

## 2020-12-11 PROCEDURE — 82565 ASSAY OF CREATININE: CPT

## 2020-12-11 RX ADMIN — GADOBENATE DIMEGLUMINE 20 ML: 529 INJECTION, SOLUTION INTRAVENOUS at 12:00

## 2020-12-14 ENCOUNTER — OFFICE VISIT (OUTPATIENT)
Dept: ORTHOPEDIC SURGERY | Age: 80
End: 2020-12-14
Payer: MEDICARE

## 2020-12-14 VITALS
TEMPERATURE: 97.2 F | HEIGHT: 64 IN | RESPIRATION RATE: 16 BRPM | HEART RATE: 84 BPM | SYSTOLIC BLOOD PRESSURE: 110 MMHG | BODY MASS INDEX: 31.1 KG/M2 | WEIGHT: 182.2 LBS | OXYGEN SATURATION: 96 % | DIASTOLIC BLOOD PRESSURE: 74 MMHG

## 2020-12-14 DIAGNOSIS — M62.838 MUSCLE SPASM: ICD-10-CM

## 2020-12-14 DIAGNOSIS — Z86.79 S/P AAA REPAIR: ICD-10-CM

## 2020-12-14 DIAGNOSIS — M47.816 SPONDYLOSIS OF LUMBAR REGION WITHOUT MYELOPATHY OR RADICULOPATHY: Primary | ICD-10-CM

## 2020-12-14 DIAGNOSIS — Z98.890 S/P AAA REPAIR: ICD-10-CM

## 2020-12-14 DIAGNOSIS — R94.4 DECREASED GFR: ICD-10-CM

## 2020-12-14 DIAGNOSIS — M48.061 SPINAL STENOSIS OF LUMBAR REGION WITHOUT NEUROGENIC CLAUDICATION: ICD-10-CM

## 2020-12-14 PROCEDURE — 1101F PT FALLS ASSESS-DOCD LE1/YR: CPT | Performed by: PHYSICAL MEDICINE & REHABILITATION

## 2020-12-14 PROCEDURE — G8427 DOCREV CUR MEDS BY ELIG CLIN: HCPCS | Performed by: PHYSICAL MEDICINE & REHABILITATION

## 2020-12-14 PROCEDURE — 1090F PRES/ABSN URINE INCON ASSESS: CPT | Performed by: PHYSICAL MEDICINE & REHABILITATION

## 2020-12-14 PROCEDURE — G8536 NO DOC ELDER MAL SCRN: HCPCS | Performed by: PHYSICAL MEDICINE & REHABILITATION

## 2020-12-14 PROCEDURE — G8417 CALC BMI ABV UP PARAM F/U: HCPCS | Performed by: PHYSICAL MEDICINE & REHABILITATION

## 2020-12-14 PROCEDURE — G8754 DIAS BP LESS 90: HCPCS | Performed by: PHYSICAL MEDICINE & REHABILITATION

## 2020-12-14 PROCEDURE — G8510 SCR DEP NEG, NO PLAN REQD: HCPCS | Performed by: PHYSICAL MEDICINE & REHABILITATION

## 2020-12-14 PROCEDURE — G8752 SYS BP LESS 140: HCPCS | Performed by: PHYSICAL MEDICINE & REHABILITATION

## 2020-12-14 PROCEDURE — G8399 PT W/DXA RESULTS DOCUMENT: HCPCS | Performed by: PHYSICAL MEDICINE & REHABILITATION

## 2020-12-14 PROCEDURE — 99214 OFFICE O/P EST MOD 30 MIN: CPT | Performed by: PHYSICAL MEDICINE & REHABILITATION

## 2020-12-14 RX ORDER — METHYLPREDNISOLONE 4 MG/1
TABLET ORAL
Qty: 1 DOSE PACK | Refills: 1 | Status: SHIPPED | OUTPATIENT
Start: 2020-12-14 | End: 2021-03-24 | Stop reason: ALTCHOICE

## 2020-12-14 NOTE — LETTER
12/14/20 Patient: Harlan Medina YOB: 1940 Date of Visit: 12/14/2020 Ammon Asher MD 
[address] [place] [address] VIA In Basket Dear Ammon Asher MD, Thank you for referring Ms. Ade Dejesus to [facility] for evaluation. My notes for this consultation are attached. If you have questions, please do not hesitate to call me. I look forward to following your patient along with you. Sincerely, Cristina Pearson MD

## 2020-12-14 NOTE — PROGRESS NOTES
Mehnaz Aleman presents today for   Chief Complaint   Patient presents with    LOW BACK PAIN    Buttocks pain     left    Follow-up       Is someone accompanying this pt? no    Is the patient using any DME equipment during OV? no    Depression Screening:  3 most recent PHQ Screens 12/14/2020   Little interest or pleasure in doing things Not at all   Feeling down, depressed, irritable, or hopeless Not at all   Total Score PHQ 2 0       Learning Assessment:  Learning Assessment 3/14/2019   PRIMARY LEARNER Patient   HIGHEST LEVEL OF EDUCATION - PRIMARY LEARNER  -   BARRIERS PRIMARY LEARNER -   CO-LEARNER CAREGIVER -   PRIMARY LANGUAGE ENGLISH   LEARNER PREFERENCE PRIMARY LISTENING   ANSWERED BY patient   RELATIONSHIP SELF       Abuse Screening:  Abuse Screening Questionnaire 2/6/2020   Do you ever feel afraid of your partner? N   Are you in a relationship with someone who physically or mentally threatens you? N   Is it safe for you to go home? Y       Fall Risk  Fall Risk Assessment, last 12 mths 12/14/2020   Able to walk? Yes   Fall in past 12 months? No   Fall with injury? -   Number of falls in past 12 months -   Fall Risk Score -       OPIOID RISK TOOL  No flowsheet data found. Pt currently taking Antiplatelet therapy? no    Coordination of Care:  1. Have you been to the ER, urgent care clinic since your last visit? no  Hospitalized since your last visit? no    2. Have you seen or consulted any other health care providers outside of the [address] [place] [address] since your last visit? no Include any pap smears or colon screening.  no

## 2020-12-21 ENCOUNTER — TELEPHONE (OUTPATIENT)
Dept: INTERNAL MEDICINE CLINIC | Age: 80
End: 2020-12-21

## 2020-12-21 NOTE — TELEPHONE ENCOUNTER
pls call    IMPRESSION[de-identified]     Right set There is a stable intraductal papillary mucinous neoplasm in the  pancreas     This is unchanged from 15 months prior exam.     Changes of prior cholecystectomy and aortic aneurysm repair noted stable    No change.  Recheck in 12-18 mos

## 2021-01-25 ENCOUNTER — TELEPHONE (OUTPATIENT)
Dept: INTERNAL MEDICINE CLINIC | Age: 81
End: 2021-01-25

## 2021-01-25 NOTE — TELEPHONE ENCOUNTER
Pt wants to know if she can get a handicap sticker form filled out since she is having back problems. Also wants to know if it would be o.k. for her to take TURMERIC CURCUMIN 500 mg, a dietary supplement that a neighbor told her about that would help her back. Also she got her 1st Covid vaccine shot at [facility].       Please advise her at [phone]

## 2021-01-29 ENCOUNTER — TELEPHONE (OUTPATIENT)
Dept: INTERNAL MEDICINE CLINIC | Age: 81
End: 2021-01-29

## 2021-01-29 DIAGNOSIS — M47.816 SPONDYLOSIS OF LUMBAR REGION WITHOUT MYELOPATHY OR RADICULOPATHY: ICD-10-CM

## 2021-01-29 RX ORDER — GABAPENTIN 100 MG/1
200 CAPSULE ORAL 3 TIMES DAILY
Qty: 540 CAP | Refills: 1 | Status: SHIPPED | OUTPATIENT
Start: 2021-01-29 | End: 2021-04-08

## 2021-01-29 NOTE — TELEPHONE ENCOUNTER
Pt's  here, waiting in the lobby.     Hopeful to  the printed prescription     Stated wife is in need of something, pain has worsen

## 2021-01-29 NOTE — TELEPHONE ENCOUNTER
Pt calling, says at last visit she was offered Lyrica or Neurontin for her back. Says she had turned it down then but is now thinking she needs it.     [facility] to  at [facility] in [facility]

## 2021-01-29 NOTE — TELEPHONE ENCOUNTER
Chart says she's taking isabel  I have not said anything about changing her to lyrica in my last 2 notes and multiple phone encounters  Dr Jerline Ahumada last note says she was referred to pain mgmt  Not sure what she's talking about justin Arciniega  If she wants to change to that, the specialist needs to make the call

## 2021-02-25 ENCOUNTER — TELEPHONE (OUTPATIENT)
Dept: INTERNAL MEDICINE CLINIC | Age: 81
End: 2021-02-25

## 2021-02-25 NOTE — TELEPHONE ENCOUNTER
Pt stated she saw Larisa Littlejohn and she referred to some doctor in [place] [address]( she don't remember name) and was given injections that did not help. No follow up scheduled with  at this time. She will try to increase medication and see how she does.

## 2021-02-25 NOTE — TELEPHONE ENCOUNTER
Pt states that the pain pilll she is currently taking is not effective she is in a lot of pain please advise

## 2021-02-25 NOTE — TELEPHONE ENCOUNTER
pls have pt clarify    She was referred to pain mgmt by Dr Gisela Powell - has she seen them? Does she have appt?     She is on low dose isabel (200 tid)  Inc to 300 tid; call w update in 1 week

## 2021-03-16 ENCOUNTER — TELEPHONE (OUTPATIENT)
Dept: INTERNAL MEDICINE CLINIC | Age: 81
End: 2021-03-16

## 2021-03-16 NOTE — TELEPHONE ENCOUNTER
Pt states she was told by you to increase her Gabapentin for her leg pain, she said she did  That and it hasn't helped , she said when she turns her leg to the left it hurts even more . 6

## 2021-03-22 ENCOUNTER — TELEPHONE (OUTPATIENT)
Dept: INTERNAL MEDICINE CLINIC | Age: 81
End: 2021-03-22

## 2021-03-22 NOTE — TELEPHONE ENCOUNTER
Patient says that she was taking the gabapentin for both, her hip and back. I informed the patient that per Dr. Pancho Huffman she should use isabel med's for her back. The patient stated that she's had pain since Dr. Shayla Spencer gave her an injection in her hip and shes not sure what to do about it now. Please advise?

## 2021-03-22 NOTE — TELEPHONE ENCOUNTER
Pt states the increase of Gabapentin RD advised her about isn't helping her hip pain at all. Said she has to use a cane now because she feels like she will tip over.   Please advise her at [phone]

## 2021-03-24 ENCOUNTER — HOSPITAL ENCOUNTER (OUTPATIENT)
Dept: GENERAL RADIOLOGY | Age: 81
Discharge: HOME | End: 2021-03-24
Payer: MEDICARE

## 2021-03-24 ENCOUNTER — OFFICE VISIT (OUTPATIENT)
Dept: INTERNAL MEDICINE CLINIC | Age: 81
End: 2021-03-24
Payer: MEDICARE

## 2021-03-24 VITALS
OXYGEN SATURATION: 98 % | TEMPERATURE: 97.5 F | DIASTOLIC BLOOD PRESSURE: 87 MMHG | WEIGHT: 180 LBS | SYSTOLIC BLOOD PRESSURE: 152 MMHG | RESPIRATION RATE: 14 BRPM | HEART RATE: 75 BPM | HEIGHT: 64 IN | BODY MASS INDEX: 30.73 KG/M2

## 2021-03-24 DIAGNOSIS — G89.29 CHRONIC LEFT HIP PAIN: Primary | ICD-10-CM

## 2021-03-24 DIAGNOSIS — M47.816 SPONDYLOSIS OF LUMBAR REGION WITHOUT MYELOPATHY OR RADICULOPATHY: ICD-10-CM

## 2021-03-24 DIAGNOSIS — M25.552 CHRONIC LEFT HIP PAIN: Primary | ICD-10-CM

## 2021-03-24 DIAGNOSIS — M25.552 CHRONIC LEFT HIP PAIN: ICD-10-CM

## 2021-03-24 DIAGNOSIS — G89.29 CHRONIC LEFT HIP PAIN: ICD-10-CM

## 2021-03-24 PROCEDURE — G8754 DIAS BP LESS 90: HCPCS | Performed by: INTERNAL MEDICINE

## 2021-03-24 PROCEDURE — 1101F PT FALLS ASSESS-DOCD LE1/YR: CPT | Performed by: INTERNAL MEDICINE

## 2021-03-24 PROCEDURE — G8427 DOCREV CUR MEDS BY ELIG CLIN: HCPCS | Performed by: INTERNAL MEDICINE

## 2021-03-24 PROCEDURE — 1090F PRES/ABSN URINE INCON ASSESS: CPT | Performed by: INTERNAL MEDICINE

## 2021-03-24 PROCEDURE — G0463 HOSPITAL OUTPT CLINIC VISIT: HCPCS | Performed by: INTERNAL MEDICINE

## 2021-03-24 PROCEDURE — G8510 SCR DEP NEG, NO PLAN REQD: HCPCS | Performed by: INTERNAL MEDICINE

## 2021-03-24 PROCEDURE — 73502 X-RAY EXAM HIP UNI 2-3 VIEWS: CPT

## 2021-03-24 PROCEDURE — G8753 SYS BP > OR = 140: HCPCS | Performed by: INTERNAL MEDICINE

## 2021-03-24 PROCEDURE — G8536 NO DOC ELDER MAL SCRN: HCPCS | Performed by: INTERNAL MEDICINE

## 2021-03-24 PROCEDURE — 99213 OFFICE O/P EST LOW 20 MIN: CPT | Performed by: INTERNAL MEDICINE

## 2021-03-24 PROCEDURE — G8399 PT W/DXA RESULTS DOCUMENT: HCPCS | Performed by: INTERNAL MEDICINE

## 2021-03-24 PROCEDURE — G8417 CALC BMI ABV UP PARAM F/U: HCPCS | Performed by: INTERNAL MEDICINE

## 2021-03-24 RX ORDER — TRAMADOL HYDROCHLORIDE 50 MG/1
50 TABLET ORAL
Qty: 60 TAB | Refills: 0 | Status: SHIPPED | OUTPATIENT
Start: 2021-03-24 | End: 2021-04-13

## 2021-03-24 NOTE — PROGRESS NOTES
Connor Gage presents today for   Chief Complaint   Patient presents with    Hip Pain     hip and back paim              Depression Screening:  3 most recent PHQ Screens 3/24/2021   Little interest or pleasure in doing things Not at all   Feeling down, depressed, irritable, or hopeless Not at all   Total Score PHQ 2 0       Learning Assessment:  Learning Assessment 3/14/2019   PRIMARY LEARNER Patient   HIGHEST LEVEL OF EDUCATION - PRIMARY LEARNER  -   BARRIERS PRIMARY LEARNER -   CO-LEARNER CAREGIVER -   PRIMARY LANGUAGE ENGLISH   LEARNER PREFERENCE PRIMARY LISTENING   ANSWERED BY patient   RELATIONSHIP SELF       Abuse Screening:  Abuse Screening Questionnaire 3/24/2021   Do you ever feel afraid of your partner? N   Are you in a relationship with someone who physically or mentally threatens you? N   Is it safe for you to go home? Y       Fall Risk  Fall Risk Assessment, last 12 mths 3/24/2021   Able to walk? Yes   Fall in past 12 months? 0   Do you feel unsteady? 0   Are you worried about falling 0   Number of falls in past 12 months -   Fall with injury? -           Coordination of Care:  1. Have you been to the ER, urgent care clinic since your last visit? Hospitalized since your last visit? no    2. Have you seen or consulted any other health care providers outside of the [address] [place] [address] since your last visit? Include any pap smears or colon screening.  no

## 2021-03-24 NOTE — PROGRESS NOTES
78 y.o. WHITE female who presents for evaluation. I asked her to come in to try to clarify her back/hip issue. She has been seeing Dr. Christianna Osler for lumbosacral disease, has gotten steroids, gabapentin in the past.  She was actually referred by her to Dr. Sendy Castellon pain management for consideration of RFA. He saw her January and then February, ended up giving her L3-L5 injections. She has had improvement of her back pain but she is now complaining of hip pain. This had not been an issue as best as I can tell from the notes. She is not known is having hip problems, no prior x-rays in the chart. Describes pain in the lateral aspect of the hip. Sometimes she can palpate but most of the time she cannot. She has difficulty rotating the hip joint with reproduction of pain.     Past Medical History:   Diagnosis Date    AAA (abdominal aortic aneurysm) without rupture ([facility])     AAA repair 7/2018 Dr Pond Prudent adenomas     2003 Dr Gregory Ahmadi; 4/13 Dr Patric Resendiz    Degenerative arthritis of lumbar spine 12/2018    Dr Christianna Osler; mri showed multilevel degen changes, mod spinal stenosis L3-5, mod severe right L4-5 foraminal disease; s/p epidural; isabel    Dyslipidemia     FHx: heart disease     Gastritis 2006    on EGD Dr. Jess Moreland, neg sprue    Hypertension     Hypothyroidism 2007    Dr. Cody Bobo Hypovitaminosis D     IPMN (intraductal papillary mucinous neoplasm) 06/2018    on mrcp 8mm; no change 10/19, no change 12/20    Lung nodule 06/2016    6 mm RLL (6/16); no change (10/17); no change (12/18)    Obesity     peak weight 187 lbs, bmi 33.1 from 4/13; IF 3/18    Osteopenia FRAX 4/12 9.7 and 1.4     DEXA t score 0.6 spine, -1 hip (12/09);  -0.4 spine, -1.3 hip (4/12); -1.2 wrist, -1.6 hip w FRAX 11/2.2 (4/14); -1.0 wrist, -1.6 hip w FRAX 12/2.6 (4/16); -0.7 wrist, -1.6 hip (5/18)    Pancreatitis, chronic ([address] 2018    Psoriasis 2003    on bx Dr. Christiane Watt Sensorineural hearing loss     LEFT  [address] Venous insufficiency 2003    on dopplers    Zoster 05/2016    LEFT T11-12; mild postherpetic neuralgia     Past Surgical History:   Procedure Laterality Date    HX AAA REPAIR  07/17/2018    HX Racquel Castro LEFT    HX CATARACT REMOVAL      BILAT    HX COLONOSCOPY      benign polyp 5/08 Dr Macrina Bradford;  4/13 negative Dr. Mamie Odom HX GI  08/2018    Dr Chauncey Abbott; ERCP s/p removal cbd stone complicated by pamcreatitis    HX LAP CHOLECYSTECTOMY  09/18/2018    Dr. Rashid Tavares      neg M[facility]; CT head neg 9/10    NM CARDIAC SURG PROCEDURE UNLIST  09/2016    NST negative    NM CHEST SURGERY PROCEDURE UNLISTED  06/2016    CTA neg PE, 6 mm subpleural nodule     Current Outpatient Medications   Medication Sig    traMADoL (ULTRAM) 50 mg tablet Take 1 Tab by mouth every eight (8) hours as needed for Pain for up to 20 days. Max Daily Amount: 150 mg.    gabapentin (NEURONTIN) 100 mg capsule Take 2 Caps by mouth three (3) times daily. Max Daily Amount: 600 mg.    atorvastatin (LIPITOR) 40 mg tablet Take 1 Tab by mouth daily.  ezetimibe (ZETIA) 10 mg tablet Take 1 Tab by mouth daily.  Levothyroxine 75 mcg cap Take 1 Tab by mouth daily.  lisinopriL (PRINIVIL, ZESTRIL) 10 mg tablet Take 1 Tab by mouth daily.  lidocaine (LIDODERM) 5 % Apply patch to the affected area for 12 hours a day and remove for 12 hours a day.  diazePAM (VALIUM) 10 mg tablet Take 1 Tab by mouth every twelve (12) hours as needed for Anxiety. Max Daily Amount: 20 mg.    ergocalciferol (VITAMIN D2) 50,000 unit capsule Take 1 Cap by mouth every seven (7) days.  omeprazole (PRILOSEC) 40 mg capsule Take 1 Cap by mouth daily. (Patient taking differently: Take 40 mg by mouth as needed.)    aspirin 81 mg chewable tablet Take 1 Tab by mouth daily. No current facility-administered medications for this visit.       Allergies Allergen Reactions    Atenolol Other (comments)     Felt poorly    Cozaar [Losartan] Other (comments)     Headaches    Norvasc [Amlodipine] Other (comments)     [place] poorly       Visit Vitals  BP (!) 152/87   Pulse 75   Temp 97.5 °F (36.4 °C) (Temporal)   Resp 14   Ht 5' 4\" (1.626 m)   Wt 180 lb (81.6 kg)   SpO2 98%   BMI 30.90 kg/m²   Back showed no CVA tenderness. Equivocal straight leg on the left which reproduced pain in the thighs but not in the hip area she was complaining off. Mild tenderness on palpation of the left trochanter area. There was reproduction of her pain on rotation of the hip joint. Assessment and plan:  1. Probable hip pain. We will get x-rays, I referred her to Dr. Gabi Castellon. Tramadol given, not a great candidate for NSAID with reduced renal function. 2.  Back pain and lumbar radiculitis. Continue gabapentin. Above conditions discussed at length and patient vocalized understanding.   All questions answered to patient satisfaction

## 2021-03-25 ENCOUNTER — TELEPHONE (OUTPATIENT)
Dept: INTERNAL MEDICINE CLINIC | Age: 81
End: 2021-03-25

## 2021-03-25 NOTE — TELEPHONE ENCOUNTER
Patient reached and given results. Patient also given contact info to DR. David to schedule an appointment.
pls call    IMPRESSION  Advanced degenerative joint disease in the left hip.     Severe arthritis as expected  F/u dr Lalla Boas as scheduled
English

## 2021-03-26 ENCOUNTER — TELEPHONE (OUTPATIENT)
Dept: INTERNAL MEDICINE CLINIC | Age: 81
End: 2021-03-26

## 2021-04-01 ENCOUNTER — OFFICE VISIT (OUTPATIENT)
Dept: ORTHOPEDIC SURGERY | Age: 81
End: 2021-04-01
Payer: MEDICARE

## 2021-04-01 VITALS
HEIGHT: 64 IN | HEART RATE: 73 BPM | BODY MASS INDEX: 29.94 KG/M2 | WEIGHT: 175.4 LBS | OXYGEN SATURATION: 99 % | TEMPERATURE: 96.9 F

## 2021-04-01 DIAGNOSIS — Z78.0 OSTEOPENIA AFTER MENOPAUSE: ICD-10-CM

## 2021-04-01 DIAGNOSIS — M25.552 LEFT HIP PAIN: Primary | ICD-10-CM

## 2021-04-01 DIAGNOSIS — M70.62 GREATER TROCHANTERIC BURSITIS OF LEFT HIP: ICD-10-CM

## 2021-04-01 DIAGNOSIS — M85.80 OSTEOPENIA AFTER MENOPAUSE: ICD-10-CM

## 2021-04-01 DIAGNOSIS — I73.9 PERIPHERAL VASCULAR DISEASE (HCC): ICD-10-CM

## 2021-04-01 DIAGNOSIS — M16.12 PRIMARY OSTEOARTHRITIS OF LEFT HIP: ICD-10-CM

## 2021-04-01 PROCEDURE — G8417 CALC BMI ABV UP PARAM F/U: HCPCS | Performed by: ORTHOPAEDIC SURGERY

## 2021-04-01 PROCEDURE — 73502 X-RAY EXAM HIP UNI 2-3 VIEWS: CPT | Performed by: ORTHOPAEDIC SURGERY

## 2021-04-01 PROCEDURE — G8510 SCR DEP NEG, NO PLAN REQD: HCPCS | Performed by: ORTHOPAEDIC SURGERY

## 2021-04-01 PROCEDURE — G8756 NO BP MEASURE DOC: HCPCS | Performed by: ORTHOPAEDIC SURGERY

## 2021-04-01 PROCEDURE — G8427 DOCREV CUR MEDS BY ELIG CLIN: HCPCS | Performed by: ORTHOPAEDIC SURGERY

## 2021-04-01 PROCEDURE — G8399 PT W/DXA RESULTS DOCUMENT: HCPCS | Performed by: ORTHOPAEDIC SURGERY

## 2021-04-01 PROCEDURE — 1090F PRES/ABSN URINE INCON ASSESS: CPT | Performed by: ORTHOPAEDIC SURGERY

## 2021-04-01 PROCEDURE — G8536 NO DOC ELDER MAL SCRN: HCPCS | Performed by: ORTHOPAEDIC SURGERY

## 2021-04-01 PROCEDURE — 1101F PT FALLS ASSESS-DOCD LE1/YR: CPT | Performed by: ORTHOPAEDIC SURGERY

## 2021-04-01 PROCEDURE — 99204 OFFICE O/P NEW MOD 45 MIN: CPT | Performed by: ORTHOPAEDIC SURGERY

## 2021-04-01 NOTE — PROGRESS NOTES
Patient: Ashley Nash                MRN: [id number]       SSN: [id number]  YOB: 1940        AGE: 78 y.o. SEX: female  Body mass index is 30.11 kg/m². PCP: Lucina Funes MD  04/01/21    HISTORY:  I had the pleasure of reviewing the [facility] family for evaluation of left hip pain. She has been having some ongoing back problems and they finally got a hip x-ray, which confirmed relatively advanced to severe arthritis involving the left hip. It does hurt her when she walks for longer distances, pain at night, difficulty putting shoes and socks on, getting in and out of the car. She is a touch of a scant historian. Her  helps to fill in the missing pieces. She has had heart surgery in the past, but no heart attack. Denies stroke. No respiratory problems for her. PHYSICAL EXAMINATION:  She is a mildly fragile looking lady, no acute distress. Her right hip rotates normally. The left hip is fairly stiff, which reproduces her groin discomfort. She has about 10-12 degrees of internal rotation. Calf non tender. Seretha Flower' sign negative. Mild evidence of neuropathy. No significant swelling. Her skin looks a little on the fragile side, but no evidence for infection or DVT. RADIOGRAPHS:  I did review her x-rays. It appears that she she has osteoporosis. X-rays done today on April 1st, 2021, AP pelvis, AP and lateral of the left hip, confirm advanced to almost severe arthritis. PLAN:  I would gently recommend trying nonoperative measures for the time being. I think she should see vascular. She does have some increased risk. She has significant peripheral vascular disease, already has had a distal bypass and I can see the graft on the AP pelvis. Foot is warm and well perfused, although I cannot feel a DP pulse. I am very suspicious of significant osteoporosis.   So I would like to get a bone density test, rolling walker with a seat, intraarticular injection, and see how she gets along with this. We could consider total hip replacement, surgery was discussed today, and it was decided and recommended that we will pursue nonoperative measures for the time being, and certainly if she fails that we can consider surgery for her.   It has been a pleasure to share in her care and I will see her back after she has seen Dr. Farrell Both, intraarticular injection and bone density test.    C:   MD Niko Jarquin Mal[place],         REVIEW OF SYSTEMS:      CON: negative  EYE: negative   ENT: negative  RESP: negative  GI:    negative   :  negative  MSK: Positive  A twelve point review of systems was completed, positives noted and all other systems were reviewed and are negative          Past Medical History:   Diagnosis Date    AAA (abdominal aortic aneurysm) without rupture ([facility] [address]     AAA repair 7/2018 Dr Mary Kay Nazario adenomas     2003 Dr Ana M Ralph; 4/13 Dr Nuris Arnold    Degenerative arthritis of lumbar spine 12/2018    Dr Bri Berry; mri showed multilevel degen changes, mod spinal stenosis L3-5, mod severe right L4-5 foraminal disease; s/p epidural; isabel    Dyslipidemia     FHx: heart disease     Gastritis 2006    on EGD Dr. Karin Padilla, neg sprue    Hypertension     Hypothyroidism 2007    Dr. Martha Hartman Hypovitaminosis D     IPMN (intraductal papillary mucinous neoplasm) 06/2018    on mrcp 8mm; no change 10/19, no change 12/20    Lung nodule 06/2016    6 mm RLL (6/16); no change (10/17); no change (12/18)    Obesity     peak weight 187 lbs, bmi 33.1 from 4/13; IF 3/18    Osteopenia FRAX 4/12 9.7 and 1.4     DEXA t score 0.6 spine, -1 hip (12/09);  -0.4 spine, -1.3 hip (4/12); -1.2 wrist, -1.6 hip w FRAX 11/2.2 (4/14); -1.0 wrist, -1.6 hip w FRAX 12/2.6 (4/16); -0.7 wrist, -1.6 hip (5/18)    Pancreatitis, chronic ([address] 2018    Psoriasis 2003    on bx Dr. Huddleston [place] Sensorineural hearing loss     LEFT Dr Giorgio Dietz Venous insufficiency 2003    on dopplers    Zoster 05/2016    LEFT T11-12; mild postherpetic neuralgia       Family History   Problem Relation Age of Onset    Breast Cancer Mother     Cancer Mother     Heart Disease Father        Current Outpatient Medications   Medication Sig Dispense Refill    traMADoL (ULTRAM) 50 mg tablet Take 1 Tab by mouth every eight (8) hours as needed for Pain for up to 20 days. Max Daily Amount: 150 mg. 60 Tab 0    atorvastatin (LIPITOR) 40 mg tablet Take 1 Tab by mouth daily. 90 Tab 3    ezetimibe (ZETIA) 10 mg tablet Take 1 Tab by mouth daily. 90 Tab 3    Levothyroxine 75 mcg cap Take 1 Tab by mouth daily. 90 Cap 3    lisinopriL (PRINIVIL, ZESTRIL) 10 mg tablet Take 1 Tab by mouth daily. 90 Tab 3    lidocaine (LIDODERM) 5 % Apply patch to the affected area for 12 hours a day and remove for 12 hours a day. 90 Patch 1    diazePAM (VALIUM) 10 mg tablet Take 1 Tab by mouth every twelve (12) hours as needed for Anxiety. Max Daily Amount: 20 mg. 60 Tab 1    ergocalciferol (VITAMIN D2) 50,000 unit capsule Take 1 Cap by mouth every seven (7) days. 12 Cap 3    aspirin 81 mg chewable tablet Take 1 Tab by mouth daily. 90 Tab 3    gabapentin (NEURONTIN) 100 mg capsule Take 2 Caps by mouth three (3) times daily. Max Daily Amount: 600 mg. 540 Cap 1    omeprazole (PRILOSEC) 40 mg capsule Take 1 Cap by mouth daily.  (Patient taking differently: Take 40 mg by mouth as needed.) 90 Cap 1       Allergies   Allergen Reactions    Atenolol Other (comments)     Felt poorly    Cozaar [Losartan] Other (comments)     Headaches    Norvasc [Amlodipine] Other (comments)     [place] poorly         Past Surgical History:   Procedure Laterality Date    HX AAA REPAIR  07/17/2018    HX Nita Sera      Dr. Junior Jackman LEFT    HX CATARACT REMOVAL      BILAT    HX COLONOSCOPY      benign polyp 5/08 Dr Jed Ladd;  4/13 negative Dr. Kalli Montemayor HX GI  08/2018    Dr Isidro Silva; ERCP s/p removal cbd stone complicated by pamcreatitis    HX LAP CHOLECYSTECTOMY  09/18/2018    Dr. Varma Sensor      neg MRA[facility] 2007; CT head neg 9/10    [place] CARDIAC SURG PROCEDURE UNLIST  09/2016    NST negative    [place] CHEST SURGERY PROCEDURE UNLISTED  06/2016    CTA neg PE, 6 mm subpleural nodule       Social History     Socioeconomic History    Marital status:      Spouse name: Not on file    Number of children: Not on file    Years of education: Not on file    Highest education level: Not on file   Occupational History    Occupation: ret office mgr   Social Needs    Financial resource strain: Not on file    Food insecurity     Worry: Not on file     Inability: Not on file    Transportation needs     Medical: Not on file     Non-medical: Not on file   Tobacco Use    Smoking status: Never Smoker    Smokeless tobacco: Never Used   Substance and Sexual Activity    Alcohol use: Yes     Comment: occasionally    Drug use: No    Sexual activity: Not on file   Lifestyle    Physical activity     Days per week: Not on file     Minutes per session: Not on file    Stress: Not on file   Relationships    Social connections     Talks on phone: Not on file     Gets together: Not on file     Attends Faith service: Not on file     Active member of club or organization: Not on file     Attends meetings of clubs or organizations: Not on file     Relationship status: Not on file    Intimate partner violence     Fear of current or ex partner: Not on file     Emotionally abused: Not on file     Physically abused: Not on file     Forced sexual activity: Not on file   Other Topics Concern    Not on file   Social History Narrative    Not on file       Visit Vitals  Pulse 73   Temp 96.9 °F (36.1 °C) (Temporal)   Ht 5' 4\" (1.626 m)   Wt 79.6 kg (175 lb 6.4 oz)   SpO2 99%   BMI 30.11 kg/m²         PHYSICAL EXAMINATION:  GENERAL: Alert and oriented x3, in no acute distress, well-developed, well-nourished, afebrile. HEART: No JVD. EYES: No scleral icterus   NECK: No significant lymphadenopathy   LUNGS: No respiratory compromise or indrawing  ABDOMEN: Soft, non-tender, non-distended. Electronically signed by:  Jose Aquino MD

## 2021-04-06 ENCOUNTER — HOSPITAL ENCOUNTER (OUTPATIENT)
Dept: BONE DENSITY | Age: 81
Discharge: HOME | End: 2021-04-06
Attending: ORTHOPAEDIC SURGERY
Payer: MEDICARE

## 2021-04-06 ENCOUNTER — APPOINTMENT (OUTPATIENT)
Dept: INTERNAL MEDICINE CLINIC | Age: 81
End: 2021-04-06

## 2021-04-06 ENCOUNTER — HOSPITAL ENCOUNTER (OUTPATIENT)
Dept: LAB | Age: 81
Discharge: HOME | End: 2021-04-06
Payer: MEDICARE

## 2021-04-06 DIAGNOSIS — E78.5 DYSLIPIDEMIA: ICD-10-CM

## 2021-04-06 LAB
ALBUMIN SERPL-MCNC: 4 G/DL (ref 3.4–5)
ALBUMIN/GLOB SERPL: 1.3 {RATIO} (ref 0.8–1.7)
ALP SERPL-CCNC: 101 U/L (ref 45–117)
ALT SERPL-CCNC: 21 U/L (ref 13–56)
ANION GAP SERPL CALC-SCNC: 8 MMOL/L (ref 3–18)
AST SERPL-CCNC: 17 U/L (ref 10–38)
BILIRUB SERPL-MCNC: 0.9 MG/DL (ref 0.2–1)
BUN SERPL-MCNC: 17 MG/DL (ref 7–18)
BUN/CREAT SERPL: 16 (ref 12–20)
CALCIUM SERPL-MCNC: 9.8 MG/DL (ref 8.5–10.1)
CHLORIDE SERPL-SCNC: 109 MMOL/L (ref 100–111)
CO2 SERPL-SCNC: 27 MMOL/L (ref 21–32)
CREAT SERPL-MCNC: 1.09 MG/DL (ref 0.6–1.3)
ERYTHROCYTE [DISTWIDTH] IN BLOOD BY AUTOMATED COUNT: 14.7 % (ref 11.6–14.5)
GLOBULIN SER CALC-MCNC: 3.1 G/DL (ref 2–4)
GLUCOSE SERPL-MCNC: 89 MG/DL (ref 74–99)
HCT VFR BLD AUTO: 46.6 % (ref 35–45)
HGB BLD-MCNC: 14.8 G/DL (ref 12–16)
MCH RBC QN AUTO: 30.9 PG (ref 24–34)
MCHC RBC AUTO-ENTMCNC: 31.8 G/DL (ref 31–37)
MCV RBC AUTO: 97.3 FL (ref 74–97)
PLATELET # BLD AUTO: 214 K/UL (ref 135–420)
PMV BLD AUTO: 11.7 FL (ref 9.2–11.8)
POTASSIUM SERPL-SCNC: 4.2 MMOL/L (ref 3.5–5.5)
PROT SERPL-MCNC: 7.1 G/DL (ref 6.4–8.2)
RBC # BLD AUTO: 4.79 M/UL (ref 4.2–5.3)
SODIUM SERPL-SCNC: 144 MMOL/L (ref 136–145)
WBC # BLD AUTO: 8.5 K/UL (ref 4.6–13.2)

## 2021-04-06 PROCEDURE — 77080 DXA BONE DENSITY AXIAL: CPT

## 2021-04-06 PROCEDURE — 85027 COMPLETE CBC AUTOMATED: CPT

## 2021-04-06 PROCEDURE — 80053 COMPREHEN METABOLIC PANEL: CPT

## 2021-04-08 DIAGNOSIS — M85.80 OSTEOPENIA AFTER MENOPAUSE: ICD-10-CM

## 2021-04-08 DIAGNOSIS — M16.12 PRIMARY OSTEOARTHRITIS OF LEFT HIP: ICD-10-CM

## 2021-04-08 DIAGNOSIS — Z78.0 OSTEOPENIA AFTER MENOPAUSE: ICD-10-CM

## 2021-04-08 DIAGNOSIS — M25.552 LEFT HIP PAIN: ICD-10-CM

## 2021-04-08 NOTE — PROGRESS NOTES
78 y.o. WHITE female who presents for evaluation. No cardiovascular complaints. No set exercise outside of adls mainly due to then hip issue below, the spine issues are actually doing ok and she sees Dr Duane Mason    We referred her to Dr Kristie Diaz for advance left hip arthritis and they are doing non surgical approach for now.   She will get cortisone shot later this week and has vasc eval scheduled    She will be seeing Dr Kaci Gamino     No new gi or gu complaints     LAST MEDICARE WELLNESS EXAM: 4/1/14, 4/8/14, 4/14/16, 4/25/17, 5/7/18, 10/2/19, 10/8/20    Past Medical History:   Diagnosis Date    AAA (abdominal aortic aneurysm) without rupture ([facility] [address]     AAA repair 7/2018 Dr Hilda Wick adenomas     2003 Dr Teddy Velasco; 4/13 Dr Deepak Godwin    Degenerative arthritis of lumbar spine 12/2018    Dr Duane Mason; mri showed multilevel degen changes, mod spinal stenosis L3-5, mod severe right L4-5 foraminal disease; s/p epidural; isabel    Dyslipidemia     FHx: heart disease     Gastritis 2006    on EGD Dr. Mary Gracia, neg sprue    Hypertension     Hypothyroidism 2007    Dr. Allison Bacon Hypovitaminosis D     IPMN (intraductal papillary mucinous neoplasm) 06/2018    on mrcp 8mm; no change 10/19, no change 12/20    Lung nodule 06/2016    6 mm RLL (6/16); no change (10/17); no change (12/18)    Obesity     peak weight 187 lbs, bmi 33.1 from 4/13; IF 3/18    Osteoarthrosis, hip 03/2021    Dr Nataly Latham Osteopenia FRAX 4/12 9.7 and 1.4     DEXA t score 0.6 spine, -1 hip (12/09);  -0.4 spine, -1.3 hip (4/12); -1.2 wrist, -1.6 hip w FRAX 11/2.2 (4/14); -1.0 wrist, -1.6 hip w FRAX 12/2.6 (4/16); -0.7 wrist, -1.6 hip (5/18); -1.1 wrist, -1.8 hip (4/21)    Pancreatitis, chronic ([facility] [address] 2018    Psoriasis 2003    on bx Dr. Иван Coe    Sensorineural hearing loss     LEFT Dr Nereyda Taylor Venous insufficiency 2003    on dopplers    Zoster 05/2016    LEFT T11-12; mild postherpetic neuralgia     Past Surgical History: Procedure Laterality Date    HX AAA REPAIR  07/17/2018    HX [address] Left     Dr. Junior Jackman     HX CATARACT REMOVAL Bilateral     HX COLONOSCOPY      benign polyp 5/08 Dr Jed Ladd;  4/13 negative Dr. Kalli Montemayor HX GI  08/2018    Dr Isidro Silva; ERCP s/p removal cbd stone complicated by pamcreatitis    HX LAP CHOLECYSTECTOMY  09/18/2018    Dr. Varma Sensor      neg [facility]RA[facility]MRI head [facility] 2007; CT head neg 9/10    IN CARDIAC SURG PROCEDURE UNLIST  09/2016    NST negative    IN CHEST SURGERY PROCEDURE UNLISTED  06/2016    CTA neg PE, 6 mm subpleural nodule     Social History     Socioeconomic History    Marital status:      Spouse name: Not on file    Number of children: Not on file    Years of education: Not on file    Highest education level: Not on file   Occupational History    Occupation: ret office mgr   Social Needs    Financial resource strain: Not on file    Food insecurity     Worry: Not on file     Inability: Not on file    Transportation needs     Medical: Not on file     Non-medical: Not on file   Tobacco Use    Smoking status: Never Smoker    Smokeless tobacco: Never Used   Substance and Sexual Activity    Alcohol use: Yes     Comment: occasionally    Drug use: No    Sexual activity: Not on file   Lifestyle    Physical activity     Days per week: Not on file     Minutes per session: Not on file    Stress: Not on file   Relationships    Social connections     Talks on phone: Not on file     Gets together: Not on file     Attends Buddhist service: Not on file     Active member of club or organization: Not on file     Attends meetings of clubs or organizations: Not on file     Relationship status: Not on file    Intimate partner violence     Fear of current or ex partner: Not on file     Emotionally abused: Not on file     Physically abused: Not on file     Forced sexual activity: Not on file   Other Topics Concern    Not on file   Social History Narrative    Not on file     Allergies   Allergen Reactions    Atenolol Other (comments)     Felt poorly    Cozaar [Losartan] Other (comments)     Headaches    Norvasc [Amlodipine] Other (comments)     Felt poorly       Current Outpatient Medications   Medication Sig    traMADoL (ULTRAM) 50 mg tablet Take 1 Tab by mouth every eight (8) hours as needed for Pain for up to 20 days. Max Daily Amount: 150 mg.    atorvastatin (LIPITOR) 40 mg tablet Take 1 Tab by mouth daily.  ezetimibe (ZETIA) 10 mg tablet Take 1 Tab by mouth daily.  Levothyroxine 75 mcg cap Take 1 Tab by mouth daily.  lisinopriL (PRINIVIL, ZESTRIL) 10 mg tablet Take 1 Tab by mouth daily.  lidocaine (LIDODERM) 5 % Apply patch to the affected area for 12 hours a day and remove for 12 hours a day.  diazePAM (VALIUM) 10 mg tablet Take 1 Tab by mouth every twelve (12) hours as needed for Anxiety. Max Daily Amount: 20 mg.    ergocalciferol (VITAMIN D2) 50,000 unit capsule Take 1 Cap by mouth every seven (7) days.  omeprazole (PRILOSEC) 40 mg capsule Take 1 Cap by mouth daily. (Patient taking differently: Take 40 mg by mouth as needed.)    aspirin 81 mg chewable tablet Take 1 Tab by mouth daily. No current facility-administered medications for this visit.       REVIEW OF SYSTEMS:  Dr. Beatriz Carrillo 2014, mammo 5/18, DEXA 4/21, colo 4/13 Dr Deepak Godwin, sees Dr Diogo Bermeo  no vision change or eye pain  Oral  no mouth pain, tongue or tooth problems  Ears  no hearing loss, ear pain, fullness  Throat  no swallowing problems  Cardiac  no CP, PND, orthopnea, edema, palpitations or syncope  Chest  no breast masses  Resp  no wheezing, chronic coughing, dyspnea  GI  no heartburn, nausea, vomiting, change in bowel habits, bleeding, hemorrhoids  Urinary  no dysuria, hematuria, flank pain, urgency, frequency  Neuro  no focal weakness, numbness, paresthesias, incoordination, ataxia, involuntary movements    Visit Vitals  /62   Pulse 83   Temp 97 °F (36.1 °C) (Temporal)   Resp 14   Ht 5' 4\" (1.626 m)   Wt 173 lb (78.5 kg)   SpO2 96%   BMI 29.70 kg/m²   A&O x3  Affect is appropriate. Mood stable  No apparent distress  Anicteric, no JVD, adenopathy or thyromegaly. No carotid bruits or radiated murmur  Lungs clear to auscultation, no wheezes or rales  Heart showed regular rate and rhythm. No murmur, rubs, gallops  Abdomen soft nondistended, no hepatosplenomegaly or masses. Extremities without edema.   Pulses 1-2+ symmetrically    LABS  From 3/10 showed                         vit d 38.0  From 9/10 showed                           wbc 7.2, hb 14.3, plt 158  From 3/12 showed   gluc 88, cr 0.87, gfr 66, alt 15, tsh 1.04, ldl-p 1515  From 9/12 showed                                                    tsh 1.69, ldl-p 1469, chol 152, tg 165, hdl 42, ldl-c 77  From 3/13 showed   gluc 83, cr 0.91, gfr 62, alt 10,     ldl-p 1274, chol 124, tg 137, hdl 37, ldl-c 60,   wbc 7.0, hb 14.3, plt 164  From 9/13 showed                chol 147, tg 139, hdl 39, ldl-c 80  From 3/14 showed   gluc 89, cr 1.02, gfr 53, alt<5,  tsh 1.66,         chol 148, tg 132, hdl 36, ldl-c 86,   wbc 6.7, hb 14.4, plt 179, vit d 45.4   From 10/14 showed                chol 137, tg 117, hdl 40, ldl-c 74  From 4/15 showed   gluc 91, cr 1.24, gfr 42, alt 30, tsh 0.64,          wbc 6.8, hb 14.8, plt 176  From 10/15 showed gluc 93, cr 1.13, gfr 47,            tsh 0.32,         chol 139, tg 131, hdl 36, ldl-c 77   From 4/16 showed   gluc 86, cr 1.00, gfr 54, alt 24,           chol 142, tg 137, hdl 41, ldl-c 74,   wbc 6.4, hb 13.9, plt 203, ua pro 30  From 6/16 showed   gluc 97, cr 1.20,             wbc 7.3, hb 14.1, plt 216, ck/trop neg  From 10/16 showed gluc 78, cr 1.03, gfr 52, alt 21, tsh 1.21,         chol 145, tg 151, hdl 43, ldl-c 72,               From 4/17 showed                tsh 2.57, ft4 1.10  From 10/17 showed gluc 79, cr 1.03, gfr 52, alt 27,           chol 199, tg 161, hdl 45, ldl-c 122, wbc 6.3, hb 14.7, plt 208  From 5/18 showed                tsh 5.23, ft4 1.10,    chol 320, tg 196, hdl 40, ldl-c 221,             vit d 57.0  From 8/18 showed   glu 141, cr 0.87, gfr>60,alt 34,                      wb 10.9, hb 13.0, plt 179,  lip 8208  From 11/18 showed                tsh 4.20, ft4 1.00,    chol 133, tg 171, hdl 41, ldl-c 58,              vit d 56.5  From 3/19 showed   gluc 81, cr 1.13, gfr 46, alt 23, tsh 3.50, ft4 1.10,            wbc 5.7, hb 14.2, plt 195,  fe 91, %sat 34, b12 306, fol 6, spep neg  From 9/19 showed                tsh 1.40,         chol 151, tg 135, hdl 42, ldl-c 82  From 4/20 showed   gluc 87, cr 1.03, gfr 51, alt 22,           wbc 6.8, hb 14.1, plt 184  From 10/20 showed                tsh 2.44        chol 150, tg 145, hdl 46, ldl-c 75,      Results for orders placed or performed during the hospital encounter of 34/91/12   METABOLIC PANEL, COMPREHENSIVE   Result Value Ref Range    Sodium 144 136 - 145 mmol/L    Potassium 4.2 3.5 - 5.5 mmol/L    Chloride 109 100 - 111 mmol/L    CO2 27 21 - 32 mmol/L    Anion gap 8 3.0 - 18 mmol/L    Glucose 89 74 - 99 mg/dL    BUN 17 7.0 - 18 MG/DL    Creatinine 1.09 0.6 - 1.3 MG/DL    BUN/Creatinine ratio 16 12 - 20      GFR est AA 58 (L) >60 ml/min/1.73m2    GFR est non-AA 48 (L) >60 ml/min/1.73m2    Calcium 9.8 8.5 - 10.1 MG/DL    Bilirubin, total 0.9 0.2 - 1.0 MG/DL    ALT (SGPT) 21 13 - 56 U/L    AST (SGOT) 17 10 - 38 U/L    Alk.  phosphatase 101 45 - 117 U/L    Protein, total 7.1 6.4 - 8.2 g/dL    Albumin 4.0 3.4 - 5.0 g/dL    Globulin 3.1 2.0 - 4.0 g/dL    A-G Ratio 1.3 0.8 - 1.7     CBC W/O DIFF   Result Value Ref Range    WBC 8.5 4.6 - 13.2 K/uL    RBC 4.79 4.20 - 5.30 M/uL    HGB 14.8 12.0 - 16.0 g/dL    HCT 46.6 (H) 35.0 - 45.0 %    MCV 97.3 (H) 74.0 - 97.0 FL    MCH 30.9 24.0 - 34.0 PG    MCHC 31.8 31.0 - 37.0 g/dL    RDW 14.7 (H) 11.6 - 14.5 %    PLATELET 034 542 - 624 K/uL    MPV 11.7 9.2 - 11.8 FL     We reviewed the patient's labs from the last several visits to point out trends in the numbers          Patient Active Problem List   Diagnosis Code    Colon adenomas 2003, last 2014 Dr Agustín Zavala D12.6    Hypovitaminosis D E55.9    Venous insufficiency dopplers 2003 I87.2    Osteopenia FRAX 4/14 11 and 2.2 M85.80    Dyslipidemia E78.5    Hypothyroidism due to acquired atrophy of thyroid E03.4    Arthritis, degenerative M19.90    Obesity (BMI 30.0-34. 9) E66.9    Advance directive discussed with patient Z70.80    Primary hypertension I10    Spondylosis of lumbar region without myelopathy or radiculopathy M47.816    S/P AAA repair Z98.890, Z86.79     ASSESSMENT AND PLAN:  1. Spine. Per Dr Jules Thompson  2. Hypertension. Continue current regimen. 3.  Hypovit D. Continue current regimen. 4.  Colon adenomas. Fiber  5. Obesity. Lifestyle and dietary measures. Portion control reiterated. 6.  Possible IPMN. Serial f/u in 6/22  7. Dyslipidemia. Continue current regimen. 8.  Hypothyroidism. Continue current regimen. 9.  Osteopenia. Wt bearing exercise, ca/D  10. Ortho. Per Dr Priyanka Tsang  11. Vascular. Per Dr Sujata Ramos      RTC 4/21    Above conditions discussed at length and patient vocalized understanding.   All questions answered to patient satisfaction

## 2021-04-13 ENCOUNTER — OFFICE VISIT (OUTPATIENT)
Dept: INTERNAL MEDICINE CLINIC | Age: 81
End: 2021-04-13
Payer: MEDICARE

## 2021-04-13 VITALS
HEIGHT: 64 IN | HEART RATE: 83 BPM | BODY MASS INDEX: 29.53 KG/M2 | DIASTOLIC BLOOD PRESSURE: 62 MMHG | SYSTOLIC BLOOD PRESSURE: 110 MMHG | WEIGHT: 173 LBS | OXYGEN SATURATION: 96 % | TEMPERATURE: 97 F | RESPIRATION RATE: 14 BRPM

## 2021-04-13 DIAGNOSIS — Z86.79 S/P AAA REPAIR: ICD-10-CM

## 2021-04-13 DIAGNOSIS — E03.4 HYPOTHYROIDISM DUE TO ACQUIRED ATROPHY OF THYROID: ICD-10-CM

## 2021-04-13 DIAGNOSIS — Z98.890 S/P AAA REPAIR: ICD-10-CM

## 2021-04-13 DIAGNOSIS — I10 ESSENTIAL HYPERTENSION WITH GOAL BLOOD PRESSURE LESS THAN 140/90: ICD-10-CM

## 2021-04-13 DIAGNOSIS — I87.2 VENOUS INSUFFICIENCY: ICD-10-CM

## 2021-04-13 DIAGNOSIS — M19.90 OSTEOARTHRITIS, UNSPECIFIED OSTEOARTHRITIS TYPE, UNSPECIFIED SITE: Primary | ICD-10-CM

## 2021-04-13 DIAGNOSIS — E78.5 DYSLIPIDEMIA: ICD-10-CM

## 2021-04-13 DIAGNOSIS — D12.6 COLON ADENOMAS: ICD-10-CM

## 2021-04-13 PROCEDURE — G8399 PT W/DXA RESULTS DOCUMENT: HCPCS | Performed by: INTERNAL MEDICINE

## 2021-04-13 PROCEDURE — 1101F PT FALLS ASSESS-DOCD LE1/YR: CPT | Performed by: INTERNAL MEDICINE

## 2021-04-13 PROCEDURE — G8417 CALC BMI ABV UP PARAM F/U: HCPCS | Performed by: INTERNAL MEDICINE

## 2021-04-13 PROCEDURE — 99214 OFFICE O/P EST MOD 30 MIN: CPT | Performed by: INTERNAL MEDICINE

## 2021-04-13 PROCEDURE — G8754 DIAS BP LESS 90: HCPCS | Performed by: INTERNAL MEDICINE

## 2021-04-13 PROCEDURE — 1090F PRES/ABSN URINE INCON ASSESS: CPT | Performed by: INTERNAL MEDICINE

## 2021-04-13 PROCEDURE — G8536 NO DOC ELDER MAL SCRN: HCPCS | Performed by: INTERNAL MEDICINE

## 2021-04-13 PROCEDURE — G8432 DEP SCR NOT DOC, RNG: HCPCS | Performed by: INTERNAL MEDICINE

## 2021-04-13 PROCEDURE — G8427 DOCREV CUR MEDS BY ELIG CLIN: HCPCS | Performed by: INTERNAL MEDICINE

## 2021-04-13 PROCEDURE — G0463 HOSPITAL OUTPT CLINIC VISIT: HCPCS | Performed by: INTERNAL MEDICINE

## 2021-04-13 PROCEDURE — G8752 SYS BP LESS 140: HCPCS | Performed by: INTERNAL MEDICINE

## 2021-04-13 NOTE — PROGRESS NOTES
Georgette Sanchez presents today for   Chief Complaint   Patient presents with    Hypertension     6 month f/u with labs              Depression Screening:  3 most recent PHQ Screens 4/1/2021   Little interest or pleasure in doing things Not at all   Feeling down, depressed, irritable, or hopeless Not at all   Total Score PHQ 2 0       Learning Assessment:  Learning Assessment 3/14/2019   PRIMARY LEARNER Patient   HIGHEST LEVEL OF EDUCATION - PRIMARY LEARNER  -   BARRIERS PRIMARY LEARNER -   CO-LEARNER CAREGIVER -   PRIMARY LANGUAGE ENGLISH   LEARNER PREFERENCE PRIMARY LISTENING   ANSWERED BY patient   RELATIONSHIP SELF       Abuse Screening:  Abuse Screening Questionnaire 3/24/2021   Do you ever feel afraid of your partner? N   Are you in a relationship with someone who physically or mentally threatens you? N   Is it safe for you to go home? Y       Fall Risk  Fall Risk Assessment, last 12 mths 4/1/2021   Able to walk? Yes   Fall in past 12 months? 0   Do you feel unsteady? 1   Are you worried about falling 1   Is TUG test greater than 12 seconds? 0   Is the gait abnormal? 0   Number of falls in past 12 months -   Fall with injury? -           Coordination of Care:  1. Have you been to the ER, urgent care clinic since your last visit? Hospitalized since your last visit? no    2. Have you seen or consulted any other health care providers outside of the [address] [place] [address] since your last visit? Include any pap smears or colon screening.  no

## 2021-04-16 ENCOUNTER — HOSPITAL ENCOUNTER (OUTPATIENT)
Dept: GENERAL RADIOLOGY | Age: 81
Discharge: HOME | End: 2021-04-16
Attending: ORTHOPAEDIC SURGERY
Payer: MEDICARE

## 2021-04-16 PROCEDURE — 74011000636 HC RX REV CODE- 636: Performed by: ORTHOPAEDIC SURGERY

## 2021-04-16 PROCEDURE — 74011000250 HC RX REV CODE- 250: Performed by: ORTHOPAEDIC SURGERY

## 2021-04-16 PROCEDURE — 74011250636 HC RX REV CODE- 250/636: Performed by: ORTHOPAEDIC SURGERY

## 2021-04-16 PROCEDURE — 77030029970 XR FLUORO GUIDE ASP/BX/INJ/LOC

## 2021-04-16 RX ORDER — METHYLPREDNISOLONE ACETATE 40 MG/ML
40 INJECTION, SUSPENSION INTRA-ARTICULAR; INTRALESIONAL; INTRAMUSCULAR; SOFT TISSUE
Status: COMPLETED | OUTPATIENT
Start: 2021-04-16 | End: 2021-04-16

## 2021-04-16 RX ORDER — LIDOCAINE HYDROCHLORIDE 10 MG/ML
30 INJECTION, SOLUTION EPIDURAL; INFILTRATION; INTRACAUDAL; PERINEURAL
Status: COMPLETED | OUTPATIENT
Start: 2021-04-16 | End: 2021-04-16

## 2021-04-16 RX ADMIN — IOPAMIDOL 1 ML: 408 INJECTION, SOLUTION INTRATHECAL at 13:19

## 2021-04-16 RX ADMIN — LIDOCAINE HYDROCHLORIDE 12 ML: 10 INJECTION, SOLUTION EPIDURAL; INFILTRATION; INTRACAUDAL; PERINEURAL at 13:19

## 2021-04-16 RX ADMIN — METHYLPREDNISOLONE ACETATE 40 MG: 40 INJECTION, SUSPENSION INTRA-ARTICULAR; INTRALESIONAL; INTRAMUSCULAR; SOFT TISSUE at 13:19

## 2021-05-04 ENCOUNTER — OFFICE VISIT (OUTPATIENT)
Dept: VASCULAR SURGERY | Age: 81
End: 2021-05-04
Payer: MEDICARE

## 2021-05-04 VITALS
OXYGEN SATURATION: 96 % | SYSTOLIC BLOOD PRESSURE: 114 MMHG | RESPIRATION RATE: 20 BRPM | DIASTOLIC BLOOD PRESSURE: 80 MMHG | HEART RATE: 83 BPM

## 2021-05-04 DIAGNOSIS — I71.40 AAA (ABDOMINAL AORTIC ANEURYSM) WITHOUT RUPTURE: Primary | ICD-10-CM

## 2021-05-04 PROCEDURE — 99213 OFFICE O/P EST LOW 20 MIN: CPT | Performed by: SURGERY

## 2021-05-04 NOTE — PROGRESS NOTES
Dana Hendrix [facility]    Chief Complaint   Patient presents with    Abdominal Aortic Aneurysm       History and Physical    Eloy Fernandez is a 78 y.o. female with history of AAA status post EVAR in 2018. She returns today in follow-up. Patient states she is doing well. She is waiting to undergo left hip replacement. This is currently on hold until the results of her aortic duplex are known. The most recent PVL performed on 5/3/2021 was reviewed and discussed with the patient:   Interpretation Summary    · The endovascular aorta stent is patent. · No endoleak is noted. · Celiac artery is patent without evidence of stenosis  · Superior mesenteric artery is patent without evidence of stenosis. · Inferior mesenteric artery is occluded      Abdominal Findings    Abdominal Aorta    Limited visualization due to bowel gas and body habitus. Invasive Procedure History:   The patient has an invasive procedure history of endovascular aortic stent. The endovascular aorta stent is patent. A fusiform infrarenal aneurysm was visualized with dimensions 3.95 cm AP x 4.09 cm TR. The renal origin is not involved. Proximal aorta could not be measured due to overlying bowel gas. Patent with color and spectral doppler   EVAR    Limited visualization due to bowel gas and body habitus. The following EVAR segments are patent: superior attachment, graft body, right limb, left limb, right distal attachment and left distal attachment. No endoleak is noted. Biphasic waveforms in the superior attachment, graft body, right limb, left limb, right distal attachment and left distal attachment. Renal    Right renal findings: Patent right origin renal artery without evidence of a significant stenosis. Left renal findings: Patent left proximal renal artery without evidence of a significant stenosis. Mesenteric    Celiac artery is patent without evidence of stenosis.  Superior mesenteric artery is patent without evidence of stenosis. 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 ЕЛЕНА origin not well visualized, appears occluded.  Collateral flow patent 43 cm/sec         Past Medical History:   Diagnosis Date    AAA (abdominal aortic aneurysm) without rupture (HCC)     AAA repair 7/2018 Dr Katherina Bosworth adenomas     2003 Dr Noris Duran; 4/13 Dr Doug Lala    Degenerative arthritis of lumbar spine 12/2018    Dr Lucita Cortés; mri showed multilevel degen changes, mod spinal stenosis L3-5, mod severe right L4-5 foraminal disease; s/p epidural; isabel    Dyslipidemia     FHx: heart disease     Gastritis 2006    on EGD Dr. Jose Raul Machado, neg sprue    Hypertension     Hypothyroidism 2007    Dr. Ros Solis Hypovitaminosis D     IPMN (intraductal papillary mucinous neoplasm) 06/2018    on mrcp 8mm; no change 10/19, no change 12/20    Lung nodule 06/2016    6 mm RLL (6/16); no change (10/17); no change (12/18)    Obesity     peak weight 187 lbs, bmi 33.1 from 4/13; IF 3/18    Osteoarthrosis, hip 03/2021    Dr Munoz Reliadrianna Osteopenia FRAX 4/12 9.7 and 1.4     DEXA t score 0.6 spine, -1 hip (12/09);  -0.4 spine, -1.3 hip (4/12); -1.2 wrist, -1.6 hip w FRAX 11/2.2 (4/14); -1.0 wrist, -1.6 hip w FRAX 12/2.6 (4/16); -0.7 wrist, -1.6 hip (5/18); -1.1 wrist, -1.8 hip (4/21)    Pancreatitis, chronic ([facility] [address] 2018    Psoriasis 2003    on bx Dr. Leryo Pozo    Sensorineural hearing loss     LEFT Dr Randy Penaloza Venous insufficiency 2003    on dopplers    Zoster 05/2016    LEFT T11-12; mild postherpetic neuralgia     Past Surgical History:   Procedure Laterality Date    HX AAA REPAIR  07/17/2018    HX [place] Left     Dr. Kami Banks     HX CATARACT REMOVAL Bilateral     HX COLONOSCOPY      benign polyp 5/08 Dr Noris Duran;  4/13 negative Dr. Darrion Yeboah HX GI  08/2018    Dr Guerline Westbrook; ERCP s/p removal cbd stone complicated by pamcreatitis    HX LAP CHOLECYSTECTOMY  09/18/2018    Dr. Sloan Mackay neg MRA/[facility]; CT head neg 9/10    CA CARDIAC SURG PROCEDURE UNLIST  09/2016    NST negative    CA CHEST SURGERY PROCEDURE UNLISTED  06/2016    CTA neg PE, 6 mm subpleural nodule     Patient Active Problem List   Diagnosis Code    Colon adenomas 2003, last 2014 Dr Daniels Sides D12.6    Hypovitaminosis D E55.9    Venous insufficiency dopplers 2003 I87.2    Osteopenia FRAX 4/14 11 and 2.2 M85.80    Dyslipidemia E78.5    Hypothyroidism due to acquired atrophy of thyroid E03.4    Arthritis, degenerative M19.90    Obesity (BMI 30.0-34. 9) E66.9    Advance directive discussed with patient Z70.80    Primary hypertension I10    Spondylosis of lumbar region without myelopathy or radiculopathy M47.816    S/P AAA repair Z98.890, Z86.79     Current Outpatient Medications   Medication Sig Dispense Refill    atorvastatin (LIPITOR) 40 mg tablet Take 1 Tab by mouth daily. 90 Tab 3    ezetimibe (ZETIA) 10 mg tablet Take 1 Tab by mouth daily. 90 Tab 3    Levothyroxine 75 mcg cap Take 1 Tab by mouth daily. 90 Cap 3    lisinopriL (PRINIVIL, ZESTRIL) 10 mg tablet Take 1 Tab by mouth daily. 90 Tab 3    lidocaine (LIDODERM) 5 % Apply patch to the affected area for 12 hours a day and remove for 12 hours a day. 90 Patch 1    diazePAM (VALIUM) 10 mg tablet Take 1 Tab by mouth every twelve (12) hours as needed for Anxiety. Max Daily Amount: 20 mg. 60 Tab 1    ergocalciferol (VITAMIN D2) 50,000 unit capsule Take 1 Cap by mouth every seven (7) days. 12 Cap 3    omeprazole (PRILOSEC) 40 mg capsule Take 1 Cap by mouth daily. (Patient taking differently: Take 40 mg by mouth as needed.) 90 Cap 1    aspirin 81 mg chewable tablet Take 1 Tab by mouth daily.  90 Tab 3     Allergies   Allergen Reactions    Atenolol Other (comments)     Felt poorly    Cozaar [Losartan] Other (comments)     Headaches    Norvasc [Amlodipine] Other (comments)     Felt poorly       Social History     Socioeconomic History    Marital status:      Spouse name: Not on file    Number of children: Not on file    Years of education: Not on file    Highest education level: Not on file   Occupational History    Occupation: ret office mgr   Social Needs    Financial resource strain: Not on file    Food insecurity     Worry: Not on file     Inability: Not on file    Transportation needs     Medical: Not on file     Non-medical: Not on file   Tobacco Use    Smoking status: Never Smoker    Smokeless tobacco: Never Used   Substance and Sexual Activity    Alcohol use: Yes     Comment: occasionally    Drug use: No    Sexual activity: Not on file   Lifestyle    Physical activity     Days per week: Not on file     Minutes per session: Not on file    Stress: Not on file   Relationships    Social connections     Talks on phone: Not on file     Gets together: Not on file     Attends Taoism service: Not on file     Active member of club or organization: Not on file     Attends meetings of clubs or organizations: Not on file     Relationship status: Not on file    Intimate partner violence     Fear of current or ex partner: Not on file     Emotionally abused: Not on file     Physically abused: Not on file     Forced sexual activity: Not on file   Other Topics Concern    Not on file   Social History Narrative    Not on file      Family History   Problem Relation Age of Onset    Breast Cancer Mother     Cancer Mother     Heart Disease Father        Physical Exam:    Visit Vitals  /80 (BP 1 Location: Right arm, BP Patient Position: Sitting, BP Cuff Size: Adult)   Pulse 83   Resp 20   SpO2 96%        Constitutional:  Patient is well developed, well nourished, and not distressed. HEENT: atraumatic, normocephalic, wearing a mask. Eyes:   Cunjunctivae clear, no scleral icterus  Neck:   No JVD present. Cardiovascular:  Normal rate, regular rhythm, normal heart sounds. No murmur heard.   Pulses:       Right:      Radial 2+    Dorsalis      2+    Post Tib      2+ Left:        Radial         2+    Dorsalis      2+    Post Tib      2+       Pulmonary/Chest: Effort normal and breath sounds normal.  she has no wheezes or no rales. Abdominal:  Bowel sounds are present. Soft. No tenderness to palpation. Extremities: Normal range of motion. No edema. Digits no cyanosis or clubbing  Neurological:  she  is alert and oriented x3 . Gait abnormal, walks with a walker. Motor & sensory grossly intact in all 4 limbs. Psych: Appropriate mood and affect. Skin:  Skin is warm and dry. No rash noted. No erythema. No ulcers. Impression and Plan:  Ashley Nash is a 78 y.o. female with AAA status post EVAR. Most recent aortoiliac duplex shows no endoleak with a residual sac size of approximately 4 cm. This is unchanged from the study performed the year prior. Patient was concerned about the finding of an occluded ЕЛЕНА. I did explain to her that it is expected after EVAR or aneurysm repair. She is cleared to proceed with left hip replacement from a vascular perspective. She is visiting with her surgeon this week. I am including Dr. Mannie Hashimoto in these communications. Should any further vascular clearance be necessary I would be happy to provide that documentation. She will follow up in 1 year with a repeat ultrasound. In 2023 she will be due for her 5-year CTA follow-up scan. We reviewed the plan with the patient and the patient understands. Michael Martinez MD    PLEASE NOTE:  This document has been produced using voice recognition software. Unrecognized errors in transcription may be present.

## 2021-05-04 NOTE — PROGRESS NOTES
1. Have you been to an emergency room or urgent care clinic since your last visit? No     Hospitalized since your last visit? If yes, where, when, and reason for visit? No     2. Have you seen or consulted any other health care providers outside of the [facility] since your last visit including any procedures, health maintenance items. If yes, where, when and reason for visit?  Yes ; pcp         3 most recent PHQ Screens 5/4/2021   Little interest or pleasure in doing things Not at all   Feeling down, depressed, irritable, or hopeless Not at all   Total Score PHQ 2 0

## 2021-05-06 ENCOUNTER — OFFICE VISIT (OUTPATIENT)
Dept: ORTHOPEDIC SURGERY | Age: 81
End: 2021-05-06
Payer: MEDICARE

## 2021-05-06 VITALS — BODY MASS INDEX: 29.19 KG/M2 | HEIGHT: 64 IN | WEIGHT: 171 LBS | TEMPERATURE: 97.2 F

## 2021-05-06 DIAGNOSIS — Z01.818 PREOPERATIVE TESTING: Primary | ICD-10-CM

## 2021-05-06 DIAGNOSIS — M16.12 PRIMARY OSTEOARTHRITIS OF LEFT HIP: Primary | ICD-10-CM

## 2021-05-06 DIAGNOSIS — M25.552 LEFT HIP PAIN: ICD-10-CM

## 2021-05-06 DIAGNOSIS — M16.12 PRIMARY OSTEOARTHRITIS OF LEFT HIP: ICD-10-CM

## 2021-05-06 PROCEDURE — 1101F PT FALLS ASSESS-DOCD LE1/YR: CPT | Performed by: ORTHOPAEDIC SURGERY

## 2021-05-06 PROCEDURE — 1090F PRES/ABSN URINE INCON ASSESS: CPT | Performed by: ORTHOPAEDIC SURGERY

## 2021-05-06 PROCEDURE — 99214 OFFICE O/P EST MOD 30 MIN: CPT | Performed by: ORTHOPAEDIC SURGERY

## 2021-05-06 PROCEDURE — G8756 NO BP MEASURE DOC: HCPCS | Performed by: ORTHOPAEDIC SURGERY

## 2021-05-06 PROCEDURE — G8432 DEP SCR NOT DOC, RNG: HCPCS | Performed by: ORTHOPAEDIC SURGERY

## 2021-05-06 PROCEDURE — G8536 NO DOC ELDER MAL SCRN: HCPCS | Performed by: ORTHOPAEDIC SURGERY

## 2021-05-06 PROCEDURE — G8427 DOCREV CUR MEDS BY ELIG CLIN: HCPCS | Performed by: ORTHOPAEDIC SURGERY

## 2021-05-06 PROCEDURE — G8417 CALC BMI ABV UP PARAM F/U: HCPCS | Performed by: ORTHOPAEDIC SURGERY

## 2021-05-06 PROCEDURE — G8399 PT W/DXA RESULTS DOCUMENT: HCPCS | Performed by: ORTHOPAEDIC SURGERY

## 2021-05-06 RX ORDER — TRAMADOL HYDROCHLORIDE 50 MG/1
50 TABLET ORAL
COMMUNITY
End: 2021-05-19

## 2021-05-06 NOTE — PROGRESS NOTES
Patient: Kristen Rivera                MRN: [id number]       SSN: [id number]  YOB: 1940        AGE: 78 y.o. SEX: female  Body mass index is 29.35 kg/m². PCP: Evi De La Rosa MD  05/06/21    Returns in follow-up with regards to her  and reevaluation of left hip pain which is moderate some days more severe than others. She went to see vascular and they cleared her for surgery and she is very interested in getting the hip replaced. They had a few questions as well the pain is moderate usually nonradicular her back is reasonably quiescent currently and denies numbness or tingling going down the legs. The examination today she is using a walker to ambulate she walks with a antalgic and Trendelenburg gait owing to the affected extremity on the left side the right hip rotates well in the left hip is quite stiff she does have some evidence of neuropathy with sharp testing although no foot drop in both feet are warm no cyanosis or clubbing noted. Previous x-rays confirm some mild osteoporosis as well as end-stage arthritis involving the left hip slight protrusio deformity    Risk and benefits involving total hip replacement were described including but not limited to infection DVT pulmonary embolism anesthetic complications blood loss requiring transfusion dislocation leg length discrepancy limp fracture chronic pain bursitis.     REVIEW OF SYSTEMS:      CON: negative  EYE: negative   ENT: negative  RESP: negative  GI:    negative   :  negative  MSK: Positive  A twelve point review of systems was completed, positives noted and all other systems were reviewed and are negative          Past Medical History:   Diagnosis Date    AAA (abdominal aortic aneurysm) without rupture [facility])     AAA repair 7/2018 Dr Courtney Chowdhury adenomas     2003 Dr Isaac Barnes; 4/13 Dr Johan Gautam    Degenerative arthritis of lumbar spine 12/2018    Dr Shadia Mehta; mri showed multilevel degen changes, mod spinal stenosis L3-5, mod severe right L4-5 foraminal disease; s/p epidural; isabel    Dyslipidemia     FHx: heart disease     Gastritis 2006    on EGD Dr. Violeta Amaya, neg sprue    Hypertension     Hypothyroidism 2007    Dr. Lm Lawson Hypovitaminosis D     IPMN (intraductal papillary mucinous neoplasm) 06/2018    on mrcp 8mm; no change 10/19, no change 12/20    Lung nodule 06/2016    6 mm RLL (6/16); no change (10/17); no change (12/18)    Obesity     peak weight 187 lbs, bmi 33.1 from 4/13; IF 3/18    Osteoarthrosis, hip 03/2021    Dr [place]on Osteopenia FRAX 4/12 9.7 and 1.4     DEXA t score 0.6 spine, -1 hip (12/09);  -0.4 spine, -1.3 hip (4/12); -1.2 wrist, -1.6 hip w FRAX 11/2.2 (4/14); -1.0 wrist, -1.6 hip w FRAX 12/2.6 (4/16); -0.7 wrist, -1.6 hip (5/18); -1.1 wrist, -1.8 hip (4/21)    Pancreatitis, chronic ([facility] [address] 2018    Psoriasis 2003    on bx Dr. Jeffrey Del Toro    Sensorineural hearing loss     LEFT Dr Pilar Brown Venous insufficiency 2003    on dopplers    Zoster 05/2016    LEFT T11-12; mild postherpetic neuralgia       Family History   Problem Relation Age of Onset    Breast Cancer Mother     Cancer Mother     Heart Disease Father        Current Outpatient Medications   Medication Sig Dispense Refill    traMADoL (ULTRAM) 50 mg tablet Take 50 mg by mouth nightly as needed for Pain.  atorvastatin (LIPITOR) 40 mg tablet Take 1 Tab by mouth daily. 90 Tab 3    ezetimibe (ZETIA) 10 mg tablet Take 1 Tab by mouth daily. 90 Tab 3    Levothyroxine 75 mcg cap Take 1 Tab by mouth daily. 90 Cap 3    lisinopriL (PRINIVIL, ZESTRIL) 10 mg tablet Take 1 Tab by mouth daily. 90 Tab 3    lidocaine (LIDODERM) 5 % Apply patch to the affected area for 12 hours a day and remove for 12 hours a day. 90 Patch 1    diazePAM (VALIUM) 10 mg tablet Take 1 Tab by mouth every twelve (12) hours as needed for Anxiety.  Max Daily Amount: 20 mg. 60 Tab 1    ergocalciferol (VITAMIN D2) 50,000 unit capsule Take 1 Cap by mouth every seven (7) days. 12 Cap 3    omeprazole (PRILOSEC) 40 mg capsule Take 1 Cap by mouth daily. (Patient taking differently: Take 40 mg by mouth as needed.) 90 Cap 1    aspirin 81 mg chewable tablet Take 1 Tab by mouth daily.  90 Tab 3       Allergies   Allergen Reactions    Atenolol Other (comments)     Felt poorly    Cozaar [Losartan] Other (comments)     Headaches    Norvasc [Amlodipine] Other (comments)     Protivin poorly         Past Surgical History:   Procedure Laterality Date    HX AAA REPAIR  07/17/2018    HX [place] Left     Dr. Fiorella Ovalles     HX CATARACT REMOVAL Bilateral     HX COLONOSCOPY      benign polyp 5/08 Dr Erika Glez;  4/13 negative Dr. Stan Walter HX GI  08/2018    Dr Bryanna Moody; ERCP s/p removal cbd stone complicated by pamcreatitis    HX LAP CHOLECYSTECTOMY  09/18/2018    Dr. Cait Vargas      neg MRA/MRI head [facility] 2007; CT head neg 9/10    PA CARDIAC SURG PROCEDURE UNLIST  09/2016    NST negative    PA CHEST SURGERY PROCEDURE UNLISTED  06/2016    CTA neg PE, 6 mm subpleural nodule       Social History     Socioeconomic History    Marital status:      Spouse name: Not on file    Number of children: Not on file    Years of education: Not on file    Highest education level: Not on file   Occupational History    Occupation: ret office mgr   Social Needs    Financial resource strain: Not on file    Food insecurity     Worry: Not on file     Inability: Not on file    Transportation needs     Medical: Not on file     Non-medical: Not on file   Tobacco Use    Smoking status: Never Smoker    Smokeless tobacco: Never Used   Substance and Sexual Activity    Alcohol use: Yes     Comment: occasionally    Drug use: No    Sexual activity: Not on file   Lifestyle    Physical activity     Days per week: Not on file     Minutes per session: Not on file    Stress: Not on file Relationships    Social connections     Talks on phone: Not on file     Gets together: Not on file     Attends Yazidism service: Not on file     Active member of club or organization: Not on file     Attends meetings of clubs or organizations: Not on file     Relationship status: Not on file    Intimate partner violence     Fear of current or ex partner: Not on file     Emotionally abused: Not on file     Physically abused: Not on file     Forced sexual activity: Not on file   Other Topics Concern    Not on file   Social History Narrative    Not on file       Visit Vitals  Temp 97.2 °F (36.2 °C) (Skin)   Ht 5' 4\" (1.626 m)   Wt 171 lb (77.6 kg)   BMI 29.35 kg/m²         PHYSICAL EXAMINATION:  GENERAL: Alert and oriented x3, in no acute distress, well-developed, well-nourished, afebrile. HEART: No JVD. EYES: No scleral icterus   NECK: No significant lymphadenopathy   LUNGS: No respiratory compromise or indrawing  ABDOMEN: Soft, non-tender, non-distended. Electronically signed by:  Maura Kennedy MD

## 2021-05-07 ENCOUNTER — HOSPITAL ENCOUNTER (OUTPATIENT)
Dept: GENERAL RADIOLOGY | Age: 81
Discharge: HOME | End: 2021-05-07
Payer: MEDICARE

## 2021-05-07 ENCOUNTER — HOSPITAL ENCOUNTER (OUTPATIENT)
Dept: PREADMISSION TESTING | Age: 81
Discharge: HOME | End: 2021-05-07
Payer: MEDICARE

## 2021-05-07 DIAGNOSIS — M16.12 PRIMARY OSTEOARTHRITIS OF LEFT HIP: ICD-10-CM

## 2021-05-07 DIAGNOSIS — Z01.818 PREOPERATIVE TESTING: ICD-10-CM

## 2021-05-07 DIAGNOSIS — Z01.818 PREOPERATIVE TESTING: Primary | ICD-10-CM

## 2021-05-07 LAB
ABO + RH BLD: NORMAL
ALBUMIN SERPL-MCNC: 4 G/DL (ref 3.4–5)
ANION GAP SERPL CALC-SCNC: 3 MMOL/L (ref 3–18)
APPEARANCE UR: ABNORMAL
APTT PPP: 25.8 SEC (ref 23–36.4)
ATRIAL RATE: 72 BPM
BACTERIA URNS QL MICRO: ABNORMAL /HPF
BASOPHILS # BLD: 0.1 K/UL (ref 0–0.1)
BASOPHILS NFR BLD: 1 % (ref 0–2)
BILIRUB UR QL: NEGATIVE
BLOOD GROUP ANTIBODIES SERPL: NORMAL
BUN SERPL-MCNC: 23 MG/DL (ref 7–18)
BUN/CREAT SERPL: 22 (ref 12–20)
CALCIUM SERPL-MCNC: 9.5 MG/DL (ref 8.5–10.1)
CALCULATED P AXIS, ECG09: 40 DEGREES
CALCULATED R AXIS, ECG10: -36 DEGREES
CALCULATED T AXIS, ECG11: 40 DEGREES
CHLORIDE SERPL-SCNC: 108 MMOL/L (ref 100–111)
CO2 SERPL-SCNC: 29 MMOL/L (ref 21–32)
COLOR UR: YELLOW
CREAT SERPL-MCNC: 1.05 MG/DL (ref 0.6–1.3)
DIAGNOSIS, 93000: NORMAL
DIFFERENTIAL METHOD BLD: ABNORMAL
EOSINOPHIL # BLD: 0.2 K/UL (ref 0–0.4)
EOSINOPHIL NFR BLD: 3 % (ref 0–5)
EPITH CASTS URNS QL MICRO: ABNORMAL /LPF (ref 0–5)
ERYTHROCYTE [DISTWIDTH] IN BLOOD BY AUTOMATED COUNT: 13.9 % (ref 11.6–14.5)
EST. AVERAGE GLUCOSE BLD GHB EST-MCNC: 114 MG/DL
GLUCOSE SERPL-MCNC: 89 MG/DL (ref 74–99)
GLUCOSE UR STRIP.AUTO-MCNC: NEGATIVE MG/DL
HBA1C MFR BLD: 5.6 % (ref 4.2–5.6)
HCT VFR BLD AUTO: 45.5 % (ref 35–45)
HGB BLD-MCNC: 14.7 G/DL (ref 12–16)
HGB UR QL STRIP: NEGATIVE
HYALINE CASTS URNS QL MICRO: ABNORMAL /LPF (ref 0–2)
INR PPP: 1 (ref 0.8–1.2)
KETONES UR QL STRIP.AUTO: ABNORMAL MG/DL
LEUKOCYTE ESTERASE UR QL STRIP.AUTO: ABNORMAL
LYMPHOCYTES # BLD: 1.4 K/UL (ref 0.9–3.6)
LYMPHOCYTES NFR BLD: 24 % (ref 21–52)
MCH RBC QN AUTO: 30.8 PG (ref 24–34)
MCHC RBC AUTO-ENTMCNC: 32.3 G/DL (ref 31–37)
MCV RBC AUTO: 95.2 FL (ref 74–97)
MONOCYTES # BLD: 0.6 K/UL (ref 0.05–1.2)
MONOCYTES NFR BLD: 10 % (ref 3–10)
MUCOUS THREADS URNS QL MICRO: ABNORMAL /LPF
NEUTS SEG # BLD: 3.6 K/UL (ref 1.8–8)
NEUTS SEG NFR BLD: 62 % (ref 40–73)
NITRITE UR QL STRIP.AUTO: NEGATIVE
P-R INTERVAL, ECG05: 142 MS
PH UR STRIP: 5 [PH] (ref 5–8)
PLATELET # BLD AUTO: 203 K/UL (ref 135–420)
PMV BLD AUTO: 10.8 FL (ref 9.2–11.8)
POTASSIUM SERPL-SCNC: 4.2 MMOL/L (ref 3.5–5.5)
PROT UR STRIP-MCNC: ABNORMAL MG/DL
PROTHROMBIN TIME: 13.4 SEC (ref 11.5–15.2)
Q-T INTERVAL, ECG07: 400 MS
QRS DURATION, ECG06: 90 MS
QTC CALCULATION (BEZET), ECG08: 438 MS
RBC # BLD AUTO: 4.78 M/UL (ref 4.2–5.3)
SODIUM SERPL-SCNC: 140 MMOL/L (ref 136–145)
SP GR UR REFRACTOMETRY: 1.02 (ref 1–1.03)
SPECIMEN EXP DATE BLD: NORMAL
UROBILINOGEN UR QL STRIP.AUTO: 0.2 EU/DL (ref 0.2–1)
VENTRICULAR RATE, ECG03: 72 BPM
WBC # BLD AUTO: 5.8 K/UL (ref 4.6–13.2)
WBC URNS QL MICRO: ABNORMAL /HPF (ref 0–4)

## 2021-05-07 PROCEDURE — 93005 ELECTROCARDIOGRAM TRACING: CPT

## 2021-05-07 PROCEDURE — 87086 URINE CULTURE/COLONY COUNT: CPT

## 2021-05-07 PROCEDURE — 81001 URINALYSIS AUTO W/SCOPE: CPT

## 2021-05-07 PROCEDURE — 85610 PROTHROMBIN TIME: CPT

## 2021-05-07 PROCEDURE — 83036 HEMOGLOBIN GLYCOSYLATED A1C: CPT

## 2021-05-07 PROCEDURE — 36415 COLL VENOUS BLD VENIPUNCTURE: CPT

## 2021-05-07 PROCEDURE — 71046 X-RAY EXAM CHEST 2 VIEWS: CPT

## 2021-05-07 PROCEDURE — 86901 BLOOD TYPING SEROLOGIC RH(D): CPT

## 2021-05-07 PROCEDURE — 82040 ASSAY OF SERUM ALBUMIN: CPT

## 2021-05-07 PROCEDURE — 85025 COMPLETE CBC W/AUTO DIFF WBC: CPT

## 2021-05-07 PROCEDURE — 80048 BASIC METABOLIC PNL TOTAL CA: CPT

## 2021-05-07 PROCEDURE — 85730 THROMBOPLASTIN TIME PARTIAL: CPT

## 2021-05-07 RX ORDER — NITROFURANTOIN 25; 75 MG/1; MG/1
100 CAPSULE ORAL 2 TIMES DAILY
Qty: 10 CAP | Refills: 0 | Status: SHIPPED | OUTPATIENT
Start: 2021-05-07 | End: 2021-05-19

## 2021-05-07 NOTE — PROGRESS NOTES
78 y.o. WHITE female who presents for med clearance    She will be undergoing LEFT hip replacement by Dr Karlos Santana    No cardiovascular complaints.   No set exercise outside of adls mainly due to then hip issue below, the spine issues are actually doing ok and she sees Dr Sedrick Kelsey    No new gi or gu complaints     Recent duplex aorto/iliac studies were normal and no claudication complaints    LAST MEDICARE WELLNESS EXAM: 4/1/14, 4/8/14, 4/14/16, 4/25/17, 5/7/18, 10/2/19, 10/8/20    Past Medical History:   Diagnosis Date    AAA (abdominal aortic aneurysm) without rupture ([facility] [address]     AAA repair 7/2018 Dr Alvarez Dense adenomas     2003 Dr Reuben Alcala; 4/13 Dr Sharif Melchor    Degenerative arthritis of lumbar spine 12/2018    Dr Sedrick Kelsey; mri showed multilevel degen changes, mod spinal stenosis L3-5, mod severe right L4-5 foraminal disease; s/p epidural; isabel    Dyslipidemia     FHx: heart disease     Gastritis 2006    on EGD Dr. Justin Neil, neg sprue    Hypertension     Hypothyroidism 2007    Dr. Bagley Hypovitaminosis D     IPMN (intraductal papillary mucinous neoplasm) 06/2018    on mrcp 8mm; no change 10/19, no change 12/20    Lung nodule 06/2016    6 mm RLL (6/16); no change (10/17); no change (12/18)    Obesity     peak weight 187 lbs, bmi 33.1 from 4/13; IF 3/18    Osteoarthrosis, hip 03/2021    Dr Jud Bang Osteopenia FRAX 4/12 9.7 and 1.4     DEXA t score 0.6 spine, -1 hip (12/09);  -0.4 spine, -1.3 hip (4/12); -1.2 wrist, -1.6 hip w FRAX 11/2.2 (4/14); -1.0 wrist, -1.6 hip w FRAX 12/2.6 (4/16); -0.7 wrist, -1.6 hip (5/18); -1.1 wrist, -1.8 hip (4/21)    Pancreatitis, chronic ([facility] [address] 2018    Psoriasis 2003    on bx Dr. Crissy Alanis    Sensorineural hearing loss     LEFT Dr Darylene Riggs Venous insufficiency 2003    on dopplers    Zoster 05/2016    LEFT T11-12; mild postherpetic neuralgia     Past Surgical History:   Procedure Laterality Date    HX AAA REPAIR  07/17/2018    HX CARPAL TUNNEL RELEASE Left     Dr. Rob Oswald     HX CATARACT REMOVAL Bilateral     HX COLONOSCOPY      benign polyp 5/08 Dr Lillie Willis;  4/13 negative Dr. Warden Shah HX GI  08/2018    Dr Corinne Brought; ERCP s/p removal cbd stone complicated by pamcreatitis    HX LAP CHOLECYSTECTOMY  09/18/2018    Dr. Claudio Cancino      neg MRA/MRI head [facility] 2007; CT head neg 9/10    AL CARDIAC SURG PROCEDURE UNLIST  09/2016    NST negative    AL CHEST SURGERY PROCEDURE UNLISTED  06/2016    CTA neg PE, 6 mm subpleural nodule     Social History     Socioeconomic History    Marital status:      Spouse name: Not on file    Number of children: Not on file    Years of education: Not on file    Highest education level: Not on file   Occupational History    Occupation: ret office mgr   Social Needs    Financial resource strain: Not on file    Food insecurity     Worry: Not on file     Inability: Not on file    Transportation needs     Medical: Not on file     Non-medical: Not on file   Tobacco Use    Smoking status: Never Smoker    Smokeless tobacco: Never Used   Substance and Sexual Activity    Alcohol use: Yes     Comment: occasionally    Drug use: No    Sexual activity: Not on file   Lifestyle    Physical activity     Days per week: Not on file     Minutes per session: Not on file    Stress: Not on file   Relationships    Social connections     Talks on phone: Not on file     Gets together: Not on file     Attends Sabianist service: Not on file     Active member of club or organization: Not on file     Attends meetings of clubs or organizations: Not on file     Relationship status: Not on file    Intimate partner violence     Fear of current or ex partner: Not on file     Emotionally abused: Not on file     Physically abused: Not on file     Forced sexual activity: Not on file   Other Topics Concern    Not on file   Social History Narrative    Not on file     Allergies Allergen Reactions    Atenolol Other (comments)     Felt poorly    Cozaar [Losartan] Other (comments)     Headaches    Norvasc [Amlodipine] Other (comments)     Felt poorly       Current Outpatient Medications   Medication Sig    nitrofurantoin, macrocrystal-monohydrate, (MACROBID) 100 mg capsule Take 1 Cap by mouth two (2) times a day.  traMADoL (ULTRAM) 50 mg tablet Take 50 mg by mouth nightly as needed for Pain.  atorvastatin (LIPITOR) 40 mg tablet Take 1 Tab by mouth daily.  ezetimibe (ZETIA) 10 mg tablet Take 1 Tab by mouth daily.  Levothyroxine 75 mcg cap Take 1 Tab by mouth daily.  lisinopriL (PRINIVIL, ZESTRIL) 10 mg tablet Take 1 Tab by mouth daily.  lidocaine (LIDODERM) 5 % Apply patch to the affected area for 12 hours a day and remove for 12 hours a day.  diazePAM (VALIUM) 10 mg tablet Take 1 Tab by mouth every twelve (12) hours as needed for Anxiety. Max Daily Amount: 20 mg.    ergocalciferol (VITAMIN D2) 50,000 unit capsule Take 1 Cap by mouth every seven (7) days.  aspirin 81 mg chewable tablet Take 1 Tab by mouth daily.  omeprazole (PRILOSEC) 40 mg capsule Take 1 Cap by mouth daily. (Patient taking differently: Take 40 mg by mouth as needed.)     No current facility-administered medications for this visit.       REVIEW OF SYSTEMS:  Dr. Mis Zhou 2014, mammo 5/18, DEXA 4/21, colo 4/13 Dr Johnathon Willis, sees Dr Hernandez Peak  no vision change or eye pain  Oral  no mouth pain, tongue or tooth problems  Ears  no hearing loss, ear pain, fullness  Throat  no swallowing problems  Cardiac  no CP, PND, orthopnea, edema, palpitations or syncope  Chest  no breast masses  Resp  no wheezing, chronic coughing, dyspnea  GI  no heartburn, nausea, vomiting, change in bowel habits, bleeding, hemorrhoids  Urinary  no dysuria, hematuria, flank pain, urgency, frequency    Visit Vitals  /84   Pulse 82   Temp 96.8 °F (36 °C) (Temporal)   Resp 14   Ht 5' 4\" (1.626 m)   Wt 177 lb (80.3 kg)   SpO2 95%   BMI 30.38 kg/m²   A&O x3  Affect is appropriate. Mood stable  No apparent distress  Anicteric, no JVD, adenopathy or thyromegaly. No carotid bruits or radiated murmur  Lungs clear to auscultation, no wheezes or rales  Heart showed regular rate and rhythm. No murmur, rubs, gallops  Abdomen soft nondistended, no hepatosplenomegaly or masses. Extremities without edema.   Pulses 1-2+ symmetrically    LABS  From 3/10 showed                         vit d 38.0  From 9/10 showed                           wbc 7.2, hb 14.3, plt 158  From 3/12 showed   gluc 88, cr 0.87, gfr 66, alt 15, tsh 1.04, ldl-p 1515  From 9/12 showed                                                    tsh 1.69, ldl-p 1469, chol 152, tg 165, hdl 42, ldl-c 77  From 3/13 showed   gluc 83, cr 0.91, gfr 62, alt 10,     ldl-p 1274, chol 124, tg 137, hdl 37, ldl-c 60,   wbc 7.0, hb 14.3, plt 164  From 9/13 showed                chol 147, tg 139, hdl 39, ldl-c 80  From 3/14 showed   gluc 89, cr 1.02, gfr 53, alt<5,  tsh 1.66,         chol 148, tg 132, hdl 36, ldl-c 86,   wbc 6.7, hb 14.4, plt 179, vit d 45.4   From 10/14 showed                chol 137, tg 117, hdl 40, ldl-c 74  From 4/15 showed   gluc 91, cr 1.24, gfr 42, alt 30, tsh 0.64,          wbc 6.8, hb 14.8, plt 176  From 10/15 showed gluc 93, cr 1.13, gfr 47,            tsh 0.32,         chol 139, tg 131, hdl 36, ldl-c 77   From 4/16 showed   gluc 86, cr 1.00, gfr 54, alt 24,           chol 142, tg 137, hdl 41, ldl-c 74,   wbc 6.4, hb 13.9, plt 203, ua pro 30  From 6/16 showed   gluc 97, cr 1.20,             wbc 7.3, hb 14.1, plt 216, ck/trop neg  From 10/16 showed gluc 78, cr 1.03, gfr 52, alt 21, tsh 1.21,         chol 145, tg 151, hdl 43, ldl-c 72,               From 4/17 showed                tsh 2.57, ft4 1.10  From 10/17 showed gluc 79, cr 1.03, gfr 52, alt 27,           chol 199, tg 161, hdl 45, ldl-c 122, wbc 6.3, hb 14.7, plt 208  From 5/18 showed                tsh 5.23, ft4 1.10,    chol 320, tg 196, hdl 40, ldl-c 221,             vit d 57.0  From 8/18 showed   glu 141, cr 0.87, gfr>60,alt 34,                      wb 10.9, hb 13.0, plt 179,  lip 8208  From 11/18 showed                tsh 4.20, ft4 1.00,    chol 133, tg 171, hdl 41, ldl-c 58,              vit d 56.5  From 3/19 showed   gluc 81, cr 1.13, gfr 46, alt 23, tsh 3.50, ft4 1.10,             wbc 5.7, hb 14.2, plt 195,  fe 91, %sat 34, b12 306, fol 6, spep neg  From 9/19 showed                tsh 1.40,         chol 151, tg 135, hdl 42, ldl-c 82  From 4/20 showed   gluc 87, cr 1.03, gfr 51, alt 22,            wbc 6.8, hb 14.1, plt 184  From 10/20 showed                tsh 2.44        chol 150, tg 145, hdl 46, ldl-c 75,      From 4/21 showed   glu 109, cr 1.03, gfr 48, alt 21,            wbc 8.5, hb 14.8, plt 214    We reviewed the patient's labs from the last several visits to point out trends in the numbers    Results for orders placed or performed during the hospital encounter of 05/07/21   CULTURE, URINE    Specimen: Urine   Result Value Ref Range    Special Requests: NO SPECIAL REQUESTS      [place] Count [id number]  COLONIES/mL        Culture result: MIXED UROGENITAL FRED ISOLATED     CBC WITH AUTOMATED DIFF   Result Value Ref Range    WBC 5.8 4.6 - 13.2 K/uL    RBC 4.78 4.20 - 5.30 M/uL    HGB 14.7 12.0 - 16.0 g/dL    HCT 45.5 (H) 35.0 - 45.0 %    MCV 95.2 74.0 - 97.0 FL    MCH 30.8 24.0 - 34.0 PG    MCHC 32.3 31.0 - 37.0 g/dL    RDW 13.9 11.6 - 14.5 %    PLATELET 480 133 - 239 K/uL    MPV 10.8 9.2 - 11.8 FL    NEUTROPHILS 62 40 - 73 %    LYMPHOCYTES 24 21 - 52 %    MONOCYTES 10 3 - 10 %    EOSINOPHILS 3 0 - 5 %    BASOPHILS 1 0 - 2 %    ABS. NEUTROPHILS 3.6 1.8 - 8.0 K/UL    ABS. LYMPHOCYTES 1.4 0.9 - 3.6 K/UL    ABS. MONOCYTES 0.6 0.05 - 1.2 K/UL    ABS. EOSINOPHILS 0.2 0.0 - 0.4 K/UL    ABS.  BASOPHILS 0.1 0.0 - 0.1 K/UL    DF AUTOMATED     PROTHROMBIN TIME + INR   Result Value Ref Range    Prothrombin time 13.4 11.5 - 15.2 sec    INR 1.0 0.8 - 1.2     PTT   Result Value Ref Range    aPTT 25.8 23.0 - 96.8 SEC   METABOLIC PANEL, BASIC   Result Value Ref Range    Sodium 140 136 - 145 mmol/L    Potassium 4.2 3.5 - 5.5 mmol/L    Chloride 108 100 - 111 mmol/L    CO2 29 21 - 32 mmol/L    Anion gap 3 3.0 - 18 mmol/L    Glucose 89 74 - 99 mg/dL    BUN 23 (H) 7.0 - 18 MG/DL    Creatinine 1.05 0.6 - 1.3 MG/DL    BUN/Creatinine ratio 22 (H) 12 - 20      GFR est AA >60 >60 ml/min/1.73m2    GFR est non-AA 50 (L) >60 ml/min/1.73m2    Calcium 9.5 8.5 - 10.1 MG/DL   ALBUMIN   Result Value Ref Range    Albumin 4.0 3.4 - 5.0 g/dL   HEMOGLOBIN A1C WITH EAG   Result Value Ref Range    Hemoglobin A1c 5.6 4.2 - 5.6 %    Est. average glucose 114 mg/dL   URINALYSIS W/ RFLX MICROSCOPIC   Result Value Ref Range    Color YELLOW      Appearance CLOUDY      Specific gravity 1.024 1.005 - 1.030      pH (UA) 5.0 5.0 - 8.0      Protein TRACE (A) NEG mg/dL    Glucose Negative NEG mg/dL    Ketone TRACE (A) NEG mg/dL    Bilirubin Negative NEG      Blood Negative NEG      Urobilinogen 0.2 0.2 - 1.0 EU/dL    Nitrites Negative NEG      Leukocyte Esterase MODERATE (A) NEG     URINE MICROSCOPIC ONLY   Result Value Ref Range    WBC 4 to 9 0 - 4 /hpf    Epithelial cells 1+ 0 - 5 /lpf    Bacteria 1+ (A) NEG /hpf    Mucus 1+ (A) NEG /lpf    Hyaline cast 0 to 1 0 - 2 /lpf   EKG, 12 LEAD, INITIAL   Result Value Ref Range    Ventricular Rate 72 BPM    Atrial Rate 72 BPM    P-R Interval 142 ms    QRS Duration 90 ms    Q-T Interval 400 ms    QTC Calculation (Bezet) 438 ms    Calculated P Axis 40 degrees    Calculated R Axis -36 degrees    Calculated T Axis 40 degrees    Diagnosis       Normal sinus rhythm  Left axis deviation  Abnormal ECG  When compared with ECG of 13-APR-2020 11:08,  No significant change was found  Confirmed by Yisel Hawthorne MD, Og Morel ([id number]) on 5/7/2021 12:40:27 PM     TYPE & SCREEN   Result Value Ref Range    Crossmatch Expiration 05/21/2021,9212     ABO/Rh(D) A POSITIVE     Antibody screen NEG            Patient Active Problem List   Diagnosis Code    Colon adenomas 2003, last 2014 Dr Claudio Haney D12.6    Hypovitaminosis D E55.9    Venous insufficiency dopplers 2003 I87.2    Osteopenia FRAX 4/14 11 and 2.2 M85.80    Dyslipidemia E78.5    Hypothyroidism due to acquired atrophy of thyroid E03.4    Arthritis, degenerative M19.90    Obesity (BMI 30.0-34. 9) E66.9    Advance directive discussed with patient Z70.80    Primary hypertension I10    Spondylosis of lumbar region without myelopathy or radiculopathy M47.816    S/P AAA repair Z98.890, Z86.79     ASSESSMENT AND PLAN:  1. Spine. Per Dr Laveta Gosselin  2. Hypertension. Continue current regimen. 3.  Hypovit D. Continue current regimen. 4.  Colon adenomas. Fiber  5. Obesity. Lifestyle and dietary measures. Portion control reiterated. 6.  Possible IPMN. Serial f/u in 6/22  7. Dyslipidemia. Continue current regimen. 8.  Hypothyroidism. Continue current regimen. 9.  Osteopenia. Wt bearing exercise, ca/D  10. Ortho. She is felt to be average risk for the planned procedure, no contraindications noted. Routine postop prophylaxis per protocol. 11.  Vascular. Per Dr Jay Narayanan      RTC 10/21    Above conditions discussed at length and patient vocalized understanding.   All questions answered to patient satisfaction

## 2021-05-09 LAB
BACTERIA SPEC CULT: NORMAL
CC UR VC: NORMAL
SERVICE CMNT-IMP: NORMAL

## 2021-05-11 ENCOUNTER — OFFICE VISIT (OUTPATIENT)
Dept: INTERNAL MEDICINE CLINIC | Age: 81
End: 2021-05-11
Payer: MEDICARE

## 2021-05-11 VITALS
RESPIRATION RATE: 14 BRPM | HEART RATE: 82 BPM | OXYGEN SATURATION: 95 % | TEMPERATURE: 96.8 F | SYSTOLIC BLOOD PRESSURE: 124 MMHG | HEIGHT: 64 IN | WEIGHT: 177 LBS | BODY MASS INDEX: 30.22 KG/M2 | DIASTOLIC BLOOD PRESSURE: 84 MMHG

## 2021-05-11 DIAGNOSIS — Z01.818 PREOP EXAM FOR INTERNAL MEDICINE: Primary | ICD-10-CM

## 2021-05-11 DIAGNOSIS — I10 ESSENTIAL HYPERTENSION WITH GOAL BLOOD PRESSURE LESS THAN 140/90: ICD-10-CM

## 2021-05-11 DIAGNOSIS — M19.90 OSTEOARTHRITIS, UNSPECIFIED OSTEOARTHRITIS TYPE, UNSPECIFIED SITE: ICD-10-CM

## 2021-05-11 PROCEDURE — G8427 DOCREV CUR MEDS BY ELIG CLIN: HCPCS | Performed by: INTERNAL MEDICINE

## 2021-05-11 PROCEDURE — 1090F PRES/ABSN URINE INCON ASSESS: CPT | Performed by: INTERNAL MEDICINE

## 2021-05-11 PROCEDURE — G8754 DIAS BP LESS 90: HCPCS | Performed by: INTERNAL MEDICINE

## 2021-05-11 PROCEDURE — G8752 SYS BP LESS 140: HCPCS | Performed by: INTERNAL MEDICINE

## 2021-05-11 PROCEDURE — 99214 OFFICE O/P EST MOD 30 MIN: CPT | Performed by: INTERNAL MEDICINE

## 2021-05-11 PROCEDURE — G8399 PT W/DXA RESULTS DOCUMENT: HCPCS | Performed by: INTERNAL MEDICINE

## 2021-05-11 PROCEDURE — G8432 DEP SCR NOT DOC, RNG: HCPCS | Performed by: INTERNAL MEDICINE

## 2021-05-11 PROCEDURE — G8536 NO DOC ELDER MAL SCRN: HCPCS | Performed by: INTERNAL MEDICINE

## 2021-05-11 PROCEDURE — 1101F PT FALLS ASSESS-DOCD LE1/YR: CPT | Performed by: INTERNAL MEDICINE

## 2021-05-11 PROCEDURE — G8417 CALC BMI ABV UP PARAM F/U: HCPCS | Performed by: INTERNAL MEDICINE

## 2021-05-11 PROCEDURE — G0463 HOSPITAL OUTPT CLINIC VISIT: HCPCS | Performed by: INTERNAL MEDICINE

## 2021-05-12 ENCOUNTER — TELEPHONE (OUTPATIENT)
Dept: ORTHOPEDIC SURGERY | Age: 81
End: 2021-05-12

## 2021-05-12 ENCOUNTER — OFFICE VISIT (OUTPATIENT)
Dept: ORTHOPEDIC SURGERY | Age: 81
End: 2021-05-12
Payer: MEDICARE

## 2021-05-12 VITALS
OXYGEN SATURATION: 98 % | HEIGHT: 64 IN | BODY MASS INDEX: 29.19 KG/M2 | DIASTOLIC BLOOD PRESSURE: 55 MMHG | WEIGHT: 171 LBS | TEMPERATURE: 97 F | SYSTOLIC BLOOD PRESSURE: 97 MMHG | HEART RATE: 85 BPM

## 2021-05-12 DIAGNOSIS — M16.12 PRIMARY OSTEOARTHRITIS OF LEFT HIP: Primary | ICD-10-CM

## 2021-05-12 DIAGNOSIS — Z01.818 PRE-OPERATIVE EXAM: ICD-10-CM

## 2021-05-12 PROCEDURE — G8417 CALC BMI ABV UP PARAM F/U: HCPCS | Performed by: PHYSICIAN ASSISTANT

## 2021-05-12 PROCEDURE — 1101F PT FALLS ASSESS-DOCD LE1/YR: CPT | Performed by: PHYSICIAN ASSISTANT

## 2021-05-12 PROCEDURE — G8427 DOCREV CUR MEDS BY ELIG CLIN: HCPCS | Performed by: PHYSICIAN ASSISTANT

## 2021-05-12 PROCEDURE — 73502 X-RAY EXAM HIP UNI 2-3 VIEWS: CPT | Performed by: PHYSICIAN ASSISTANT

## 2021-05-12 PROCEDURE — G8399 PT W/DXA RESULTS DOCUMENT: HCPCS | Performed by: PHYSICIAN ASSISTANT

## 2021-05-12 PROCEDURE — G8754 DIAS BP LESS 90: HCPCS | Performed by: PHYSICIAN ASSISTANT

## 2021-05-12 PROCEDURE — G8752 SYS BP LESS 140: HCPCS | Performed by: PHYSICIAN ASSISTANT

## 2021-05-12 PROCEDURE — G8432 DEP SCR NOT DOC, RNG: HCPCS | Performed by: PHYSICIAN ASSISTANT

## 2021-05-12 PROCEDURE — G8536 NO DOC ELDER MAL SCRN: HCPCS | Performed by: PHYSICIAN ASSISTANT

## 2021-05-12 PROCEDURE — 99214 OFFICE O/P EST MOD 30 MIN: CPT | Performed by: PHYSICIAN ASSISTANT

## 2021-05-12 PROCEDURE — 1090F PRES/ABSN URINE INCON ASSESS: CPT | Performed by: PHYSICIAN ASSISTANT

## 2021-05-12 RX ORDER — ACETAMINOPHEN 325 MG/1
1000 TABLET ORAL ONCE
Status: CANCELLED | OUTPATIENT
Start: 2021-05-12 | End: 2021-05-12

## 2021-05-12 RX ORDER — CELECOXIB 100 MG/1
400 CAPSULE ORAL ONCE
Status: CANCELLED | OUTPATIENT
Start: 2021-05-12 | End: 2021-05-12

## 2021-05-12 RX ORDER — PREGABALIN 25 MG/1
50 CAPSULE ORAL ONCE
Status: CANCELLED | OUTPATIENT
Start: 2021-05-12 | End: 2021-05-12

## 2021-05-12 NOTE — H&P
[address] [facility], [place], [address] [facility]  [phone]           HISTORY & PHYSICAL      Patient: Rowan Gillis                MRN: [id number]       SSN: [id number]  YOB: 1940        AGE: 78 y.o. SEX: female  Body mass index is 29.35 kg/m². PCP: Edelmira Dominguez MD  05/12/21      CC: left hip end stage OA  Problem List Items Addressed This Visit        Skeletal    Arthritis, degenerative - Primary    Relevant Orders    AMB POC X-RAY RADEX HIP UNI WITH PELVIS 2-3 VIEWS (Completed)      Other Visit Diagnoses     Pre-operative exam        Relevant Orders    AMB POC X-RAY RADEX HIP UNI WITH PELVIS 2-3 VIEWS (Completed)            HPI:  The patient is a pleasant 78 y.o. whom has end stage OA of their Left hip and has failed conservative treatment including but not limited to NSAIDS, cortisone injections, viscosupplementation, PT, and pain medicine. Due to the current findings and affected activities of daily living, surgical intervention is indicated. The alternatives, risks, complications, as well as expected outcome were discussed. These include but are not limited to infection, blood loss, need for blood transfusion, neurovascular damage, DVT, PE,  post-op stiffness and pain, leg length discrepancy, dislocation, anesthetic complications, prothesis longevity, need for more surgery, MI, stroke, and even death. The patient understands and wishes to proceed with surgery.       Past Medical History:   Diagnosis Date    AAA (abdominal aortic aneurysm) without rupture [facility])     AAA repair 7/2018 Dr Hilda Wick adenomas     2003 Dr Teddy Velasco; 4/13 Dr Deepak Godwin    Degenerative arthritis of lumbar spine 12/2018    Dr Duane Mason; [facility] showed multilevel degen changes, mod spinal stenosis L3-5, mod severe right L4-5 foraminal disease; s/p epidural; isabel    Dyslipidemia     FHx: heart disease     Gastritis 2006 on EGD Dr. Jacques Gal, neg sprue    Hypertension     Hypothyroidism 2007    Dr. Alfonzo Mcdonough    Hypovitaminosis D     IPMN (intraductal papillary mucinous neoplasm) 06/2018    on mrcp 8mm; no change 10/19, no change 12/20    Lung nodule 06/2016    6 mm RLL (6/16); no change (10/17); no change (12/18)    Obesity     peak weight 187 lbs, bmi 33.1 from 4/13; IF 3/18    Osteoarthrosis, hip 03/2021    Dr Rachelle Mclain Osteopenia FRAX 4/12 9.7 and 1.4     DEXA t score 0.6 spine, -1 hip (12/09);  -0.4 spine, -1.3 hip (4/12); -1.2 wrist, -1.6 hip w FRAX 11/2.2 (4/14); -1.0 wrist, -1.6 hip w FRAX 12/2.6 (4/16); -0.7 wrist, -1.6 hip (5/18); -1.1 wrist, -1.8 hip (4/21)    Pancreatitis, chronic ([facility] [address] 2018    Psoriasis 2003    on bx Dr. Jagjit Forbes    Sensorineural hearing loss     LEFT Dr Gumaro Benítez Venous insufficiency 2003    on dopplers    Zoster 05/2016    LEFT T11-12; mild postherpetic neuralgia         Current Outpatient Medications:     nitrofurantoin, macrocrystal-monohydrate, (MACROBID) 100 mg capsule, Take 1 Cap by mouth two (2) times a day., Disp: 10 Cap, Rfl: 0    traMADoL (ULTRAM) 50 mg tablet, Take 50 mg by mouth nightly as needed for Pain., Disp: , Rfl:     atorvastatin (LIPITOR) 40 mg tablet, Take 1 Tab by mouth daily. , Disp: 90 Tab, Rfl: 3    ezetimibe (ZETIA) 10 mg tablet, Take 1 Tab by mouth daily. , Disp: 90 Tab, Rfl: 3    Levothyroxine 75 mcg cap, Take 1 Tab by mouth daily. , Disp: 90 Cap, Rfl: 3    lisinopriL (PRINIVIL, ZESTRIL) 10 mg tablet, Take 1 Tab by mouth daily. , Disp: 90 Tab, Rfl: 3    lidocaine (LIDODERM) 5 %, Apply patch to the affected area for 12 hours a day and remove for 12 hours a day., Disp: 90 Patch, Rfl: 1    diazePAM (VALIUM) 10 mg tablet, Take 1 Tab by mouth every twelve (12) hours as needed for Anxiety. Max Daily Amount: 20 mg., Disp: 60 Tab, Rfl: 1    ergocalciferol (VITAMIN D2) 50,000 unit capsule, Take 1 Cap by mouth every seven (7) days. , Disp: 12 Cap, Rfl: 3    omeprazole (PRILOSEC) 40 mg capsule, Take 1 Cap by mouth daily. (Patient taking differently: Take 40 mg by mouth as needed.), Disp: 90 Cap, Rfl: 1    aspirin 81 mg chewable tablet, Take 1 Tab by mouth daily. , Disp: 90 Tab, Rfl: 3    Allergies   Allergen Reactions    Atenolol Other (comments)     Felt poorly    Cozaar [Losartan] Other (comments)     Headaches    Norvasc [Amlodipine] Other (comments)     Felt poorly         Social History     Socioeconomic History    Marital status:      Spouse name: Not on file    Number of children: Not on file    Years of education: Not on file    Highest education level: Not on file   Occupational History    Occupation: ret office mgr   Social Needs    Financial resource strain: Not on file    Food insecurity     Worry: Not on file     Inability: Not on file    Transportation needs     Medical: Not on file     Non-medical: Not on file   Tobacco Use    Smoking status: Never Smoker    Smokeless tobacco: Never Used   Substance and Sexual Activity    Alcohol use: Yes     Comment: occasionally    Drug use: No    Sexual activity: Not on file   Lifestyle    Physical activity     Days per week: Not on file     Minutes per session: Not on file    Stress: Not on file   Relationships    Social connections     Talks on phone: Not on file     Gets together: Not on file     Attends Latter day service: Not on file     Active member of club or organization: Not on file     Attends meetings of clubs or organizations: Not on file     Relationship status: Not on file    Intimate partner violence     Fear of current or ex partner: Not on file     Emotionally abused: Not on file     Physically abused: Not on file     Forced sexual activity: Not on file   Other Topics Concern    Not on file   Social History Narrative    Not on file       Past Surgical History:   Procedure Laterality Date    HX AAA REPAIR  07/17/2018    [address] Left     Dr. Bhakti Rush     [id number] [facility] REMOVAL Bilateral     HX COLONOSCOPY      benign polyp 5/08 Dr Adrianne Barreto;  4/13 negative Dr. Flash Alvarez HX GI  08/2018    Dr Damien Potter; ERCP s/p removal cbd stone complicated by pamcreatitis    HX LAP CHOLECYSTECTOMY  09/18/2018    Dr. Jhonny Fernandez      neg MRA[facility]MRI [facility]; CT head neg 9/10    TN CARDIAC SURG PROCEDURE UNLIST  09/2016    NST negative    TN CHEST SURGERY PROCEDURE UNLISTED  06/2016    CTA neg PE, 6 mm subpleural nodule       Family History:  Non-contributory. PE:  Visit Vitals  BP (!) 97/55 (BP 1 Location: Left upper arm, BP Patient Position: Sitting, BP Cuff Size: Adult)   Pulse 85   Temp 97 °F (36.1 °C) (Temporal)   Ht 5' 4\" (1.626 m)   Wt 171 lb (77.6 kg)   SpO2 98%   BMI 29.35 kg/m²     A&O X3, NAD, well develop, well nourished  Heart: S1-S2, rrr  Lungs: CTA bilat  Abd: soft, nt, nt, + bs in all quadrants  Ext:  Pos distal pulses to DP, PT  Leg lengths sow the left LE to be slightly shorter grossly sitting in the chair    X-ray: Radiographs obtained in office today 5/12/2021 at the [facility] location include AP pelvis, AP and crosstable lateral of the left hip as well as an AP of the contralateral hip does confirm end-stage arthritis of bone-on-bone eburnation. Labs: labs were reviewed and wnl.  ua pos, treated with macrobid    A:  Left  hip end stage OA    P:  At this point we will move forward with surgery. Again, the alternatives, risks, complications, as well as expected outcome were discussed and the patient wishes to proceed with surgery. Pt has been instructed to stop aspirin, nsaids, rheumatologic medications and blood thinners. They have also been instructed to continue on any heart and bp meds and to take them the morning of surgery with sips of water. Lateral approach  The patient will require in-patient admission.   Admission as an in-patient is reasonable and necessary due to increased risk of surgery due to the factors indicated as well as the possible need for prolonged in-hospital or skilled post-acute care in order to improve this patient's functional ability. The patient was counseled at length about the risks of matt Covid-19 during their perioperative period and any recovery window from their procedure. The patient was made aware that matt Covid-19  may worsen their prognosis for recovering from their procedure and lend to a higher morbidity and/or mortality risk. All material risks, benefits, and reasonable alternatives including postponing the procedure were discussed. The patient does  wish to proceed with the procedure at this time.           hCip Escobedo

## 2021-05-12 NOTE — H&P (VIEW-ONLY)
[address], [address] [facility]  [phone]           HISTORY & PHYSICAL      Patient: Yceenia Tracey                MRN: [id number]       SSN: [id number]  YOB: 1940        AGE: 78 y.o. SEX: female  Body mass index is 29.35 kg/m². PCP: Marin Pruitt MD  05/12/21      CC: left hip end stage OA  Problem List Items Addressed This Visit        Skeletal    Arthritis, degenerative - Primary    Relevant Orders    AMB POC X-RAY RADEX HIP UNI WITH PELVIS 2-3 VIEWS (Completed)      Other Visit Diagnoses     Pre-operative exam        Relevant Orders    AMB POC X-RAY RADEX HIP UNI WITH PELVIS 2-3 VIEWS (Completed)            HPI:  The patient is a pleasant 78 y.o. whom has end stage OA of their Left hip and has failed conservative treatment including but not limited to NSAIDS, cortisone injections, viscosupplementation, PT, and pain medicine. Due to the current findings and affected activities of daily living, surgical intervention is indicated. The alternatives, risks, complications, as well as expected outcome were discussed. These include but are not limited to infection, blood loss, need for blood transfusion, neurovascular damage, DVT, PE,  post-op stiffness and pain, leg length discrepancy, dislocation, anesthetic complications, prothesis longevity, need for more surgery, MI, stroke, and even death. The patient understands and wishes to proceed with surgery.       Past Medical History:   Diagnosis Date    AAA (abdominal aortic aneurysm) without rupture [facility])     AAA repair 7/2018 Dr Mary Kay Nazario adenomas     2003 Dr Ana M Ralph; 4/13 Dr Nuris Arnold    Degenerative arthritis of lumbar spine 12/2018    Dr Bri Berry; Raeann Cummins showed multilevel degen changes, mod spinal stenosis L3-5, mod severe right L4-5 foraminal disease; s/p epidural; isabel    Dyslipidemia     FHx: heart disease     Gastritis 2006 on EGD Dr. Alba Cancer, neg sprue    Hypertension     Hypothyroidism 2007    Dr. Ella Crouch    Hypovitaminosis D     IPMN (intraductal papillary mucinous neoplasm) 06/2018    on mrcp 8mm; no change 10/19, no change 12/20    Lung nodule 06/2016    6 mm RLL (6/16); no change (10/17); no change (12/18)    Obesity     peak weight 187 lbs, bmi 33.1 from 4/13; IF 3/18    Osteoarthrosis, hip 03/2021    Dr Jolly Saleem Osteopenia FRAX 4/12 9.7 and 1.4     DEXA t score 0.6 spine, -1 hip (12/09);  -0.4 spine, -1.3 hip (4/12); -1.2 wrist, -1.6 hip w FRAX 11/2.2 (4/14); -1.0 wrist, -1.6 hip w FRAX 12/2.6 (4/16); -0.7 wrist, -1.6 hip (5/18); -1.1 wrist, -1.8 hip (4/21)    Pancreatitis, chronic ([facility] [address] 2018    Psoriasis 2003    on bx Dr. Malcolm Cast    Sensorineural hearing loss     LEFT Dr Nicola Mcconnell Venous insufficiency 2003    on dopplers    Zoster 05/2016    LEFT T11-12; mild postherpetic neuralgia         Current Outpatient Medications:     nitrofurantoin, macrocrystal-monohydrate, (MACROBID) 100 mg capsule, Take 1 Cap by mouth two (2) times a day., Disp: 10 Cap, Rfl: 0    traMADoL (ULTRAM) 50 mg tablet, Take 50 mg by mouth nightly as needed for Pain., Disp: , Rfl:     atorvastatin (LIPITOR) 40 mg tablet, Take 1 Tab by mouth daily. , Disp: 90 Tab, Rfl: 3    ezetimibe (ZETIA) 10 mg tablet, Take 1 Tab by mouth daily. , Disp: 90 Tab, Rfl: 3    Levothyroxine 75 mcg cap, Take 1 Tab by mouth daily. , Disp: 90 Cap, Rfl: 3    lisinopriL (PRINIVIL, ZESTRIL) 10 mg tablet, Take 1 Tab by mouth daily. , Disp: 90 Tab, Rfl: 3    lidocaine (LIDODERM) 5 %, Apply patch to the affected area for 12 hours a day and remove for 12 hours a day., Disp: 90 Patch, Rfl: 1    diazePAM (VALIUM) 10 mg tablet, Take 1 Tab by mouth every twelve (12) hours as needed for Anxiety. Max Daily Amount: 20 mg., Disp: 60 Tab, Rfl: 1    ergocalciferol (VITAMIN D2) 50,000 unit capsule, Take 1 Cap by mouth every seven (7) days. , Disp: 12 Cap, Rfl: 3    omeprazole (PRILOSEC) 40 mg capsule, Take 1 Cap by mouth daily. (Patient taking differently: Take 40 mg by mouth as needed.), Disp: 90 Cap, Rfl: 1    aspirin 81 mg chewable tablet, Take 1 Tab by mouth daily. , Disp: 90 Tab, Rfl: 3    Allergies   Allergen Reactions    Atenolol Other (comments)     Felt poorly    Cozaar [Losartan] Other (comments)     Headaches    Norvasc [Amlodipine] Other (comments)     Felt poorly         Social History     Socioeconomic History    Marital status:      Spouse name: Not on file    Number of children: Not on file    Years of education: Not on file    Highest education level: Not on file   Occupational History    Occupation: ret office mgr   Social Needs    Financial resource strain: Not on file    Food insecurity     Worry: Not on file     Inability: Not on file    Transportation needs     Medical: Not on file     Non-medical: Not on file   Tobacco Use    Smoking status: Never Smoker    Smokeless tobacco: Never Used   Substance and Sexual Activity    Alcohol use: Yes     Comment: occasionally    Drug use: No    Sexual activity: Not on file   Lifestyle    Physical activity     Days per week: Not on file     Minutes per session: Not on file    Stress: Not on file   Relationships    Social connections     Talks on phone: Not on file     Gets together: Not on file     Attends Muslim service: Not on file     Active member of club or organization: Not on file     Attends meetings of clubs or organizations: Not on file     Relationship status: Not on file    Intimate partner violence     Fear of current or ex partner: Not on file     Emotionally abused: Not on file     Physically abused: Not on file     Forced sexual activity: Not on file   Other Topics Concern    Not on file   Social History Narrative    Not on file       Past Surgical History:   Procedure Laterality Date    HX AAA REPAIR  07/17/2018    [facility]     Dr. Fiorella Ovalles     [address] [facility] REMOVAL Bilateral     HX COLONOSCOPY      benign polyp 5/08 Dr Zenia Ramirez;  4/13 negative Dr. Win Carroll HX GI  08/2018    Dr Kobe Thompson; ERCP s/p removal cbd stone complicated by pamcreatitis    HX LAP CHOLECYSTECTOMY  09/18/2018    Dr. Jim Fitch      neg MRA/[facility] head [facility] 2007; CT head neg 9/10    TN CARDIAC SURG PROCEDURE UNLIST  09/2016    NST negative    TN CHEST SURGERY PROCEDURE UNLISTED  06/2016    CTA neg PE, 6 mm subpleural nodule       Family History:  Non-contributory. PE:  Visit Vitals  BP (!) 97/55 (BP 1 Location: Left upper arm, BP Patient Position: Sitting, BP Cuff Size: Adult)   Pulse 85   Temp 97 °F (36.1 °C) (Temporal)   Ht 5' 4\" (1.626 m)   Wt 171 lb (77.6 kg)   SpO2 98%   BMI 29.35 kg/m²     A&O X3, NAD, well develop, well nourished  Heart: S1-S2, rrr  Lungs: CTA bilat  Abd: soft, nt, nt, + bs in all quadrants  Ext:  Pos distal pulses to DP, PT  Leg lengths sow the left LE to be slightly shorter grossly sitting in the chair    X-ray: Radiographs obtained in office today 5/12/2021 at the [facility] location include AP pelvis, AP and crosstable lateral of the left hip as well as an AP of the contralateral hip does confirm end-stage arthritis of bone-on-bone eburnation. Labs: labs were reviewed and wnl.  ua pos, treated with macrobid    A:  Left  hip end stage OA    P:  At this point we will move forward with surgery. Again, the alternatives, risks, complications, as well as expected outcome were discussed and the patient wishes to proceed with surgery. Pt has been instructed to stop aspirin, nsaids, rheumatologic medications and blood thinners. They have also been instructed to continue on any heart and bp meds and to take them the morning of surgery with sips of water. Lateral approach  The patient will require in-patient admission.   Admission as an in-patient is reasonable and necessary due to increased risk of surgery due to the factors indicated as well as the possible need for prolonged in-hospital or skilled post-acute care in order to improve this patient's functional ability. The patient was counseled at length about the risks of matt Covid-19 during their perioperative period and any recovery window from their procedure. The patient was made aware that matt Covid-19  may worsen their prognosis for recovering from their procedure and lend to a higher morbidity and/or mortality risk. All material risks, benefits, and reasonable alternatives including postponing the procedure were discussed. The patient does  wish to proceed with the procedure at this time.           Devante Carrillo Ax

## 2021-05-12 NOTE — TELEPHONE ENCOUNTER
Contacted pt at her home number informed pt that note has been modified at this time.   Pt thanked writer for call and information

## 2021-05-12 NOTE — PROGRESS NOTES
See H&P note    Clearance obtained from PCP, Vascular    A hospital bed has been ordered to assist in mobility post-surgery. She will need to be able to make adjustments to body position in which a normal bed will not allow. She will also need the ability to move the bed higher and lower to accommodate in the entrance and exit from the bed.

## 2021-05-12 NOTE — TELEPHONE ENCOUNTER
Per insurance, order for hospital bed will not be approved unless office note can demonstrate medical necessity. Notes will need to state that patient has a medical condition that requires positioning of the body in ways not feasible with an ordinary bed OR the patient requires the head of the bed to be elevated more than 30 degrees most of the time due to medical condition. Are we able to amend office note to accommodate this order? Please advise patient at [phone].

## 2021-05-12 NOTE — TELEPHONE ENCOUNTER
[employer] called to find out what type of compression socks will the patient need.      Please advise at [phone]

## 2021-05-13 ENCOUNTER — HOSPITAL ENCOUNTER (OUTPATIENT)
Dept: PREADMISSION TESTING | Age: 81
Discharge: HOME | End: 2021-05-13
Payer: MEDICARE

## 2021-05-13 ENCOUNTER — TELEPHONE (OUTPATIENT)
Dept: INTERNAL MEDICINE CLINIC | Age: 81
End: 2021-05-13

## 2021-05-13 DIAGNOSIS — Z01.818 PREOPERATIVE TESTING: ICD-10-CM

## 2021-05-13 DIAGNOSIS — M25.559 HIP PAIN: Primary | ICD-10-CM

## 2021-05-13 PROCEDURE — U0003 INFECTIOUS AGENT DETECTION BY NUCLEIC ACID (DNA OR RNA); SEVERE ACUTE RESPIRATORY SYNDROME CORONAVIRUS 2 (SARS-COV-2) (CORONAVIRUS DISEASE [COVID-19]), AMPLIFIED PROBE TECHNIQUE, MAKING USE OF HIGH THROUGHPUT TECHNOLOGIES AS DESCRIBED BY CMS-2020-01-R: HCPCS

## 2021-05-13 RX ORDER — HYDROCODONE BITARTRATE AND ACETAMINOPHEN 5; 325 MG/1; MG/1
1 TABLET ORAL
Qty: 40 TAB | Refills: 0 | Status: SHIPPED | OUTPATIENT
Start: 2021-05-13 | End: 2021-05-19

## 2021-05-13 NOTE — TELEPHONE ENCOUNTER
Pt out of pain med and surgery not until 5/18 wants to know if she can get something stronger than tramadol. Says it is not working that great.  If not refill tramadol    [facility] [address]

## 2021-05-14 LAB — SARS-COV-2, COV2NT: NOT DETECTED

## 2021-05-17 ENCOUNTER — ANESTHESIA EVENT (OUTPATIENT)
Dept: SURGERY | Age: 81
End: 2021-05-17
Payer: MEDICARE

## 2021-05-18 ENCOUNTER — ANESTHESIA (OUTPATIENT)
Dept: SURGERY | Age: 81
End: 2021-05-18
Payer: MEDICARE

## 2021-05-18 ENCOUNTER — APPOINTMENT (OUTPATIENT)
Dept: GENERAL RADIOLOGY | Age: 81
End: 2021-05-18
Attending: ORTHOPAEDIC SURGERY
Payer: MEDICARE

## 2021-05-18 ENCOUNTER — HOSPITAL ENCOUNTER (OUTPATIENT)
Age: 81
Discharge: HOME HEALTH | End: 2021-05-19
Attending: ORTHOPAEDIC SURGERY | Admitting: ORTHOPAEDIC SURGERY
Payer: MEDICARE

## 2021-05-18 DIAGNOSIS — M16.10 HIP ARTHRITIS: Primary | ICD-10-CM

## 2021-05-18 PROCEDURE — 27130 TOTAL HIP ARTHROPLASTY: CPT | Performed by: ORTHOPAEDIC SURGERY

## 2021-05-18 PROCEDURE — C1776 JOINT DEVICE (IMPLANTABLE): HCPCS | Performed by: ORTHOPAEDIC SURGERY

## 2021-05-18 PROCEDURE — 74011250636 HC RX REV CODE- 250/636: Performed by: ORTHOPAEDIC SURGERY

## 2021-05-18 PROCEDURE — 77030002933 HC SUT MCRYL J&J -A: Performed by: ORTHOPAEDIC SURGERY

## 2021-05-18 PROCEDURE — 74011250636 HC RX REV CODE- 250/636: Performed by: NURSE ANESTHETIST, CERTIFIED REGISTERED

## 2021-05-18 PROCEDURE — 97530 THERAPEUTIC ACTIVITIES: CPT

## 2021-05-18 PROCEDURE — 65270000029 HC RM PRIVATE

## 2021-05-18 PROCEDURE — 74011250637 HC RX REV CODE- 250/637: Performed by: NURSE ANESTHETIST, CERTIFIED REGISTERED

## 2021-05-18 PROCEDURE — 76210000016 HC OR PH I REC 1 TO 1.5 HR: Performed by: ORTHOPAEDIC SURGERY

## 2021-05-18 PROCEDURE — 77030013079 HC BLNKT BAIR HGGR 3M -A: Performed by: ANESTHESIOLOGY

## 2021-05-18 PROCEDURE — 27130 TOTAL HIP ARTHROPLASTY: CPT | Performed by: PHYSICIAN ASSISTANT

## 2021-05-18 PROCEDURE — 77030031139 HC SUT VCRL2 J&J -A: Performed by: ORTHOPAEDIC SURGERY

## 2021-05-18 PROCEDURE — 64520 N BLOCK LUMBAR/THORACIC: CPT | Performed by: ANESTHESIOLOGY

## 2021-05-18 PROCEDURE — 77030040361 HC SLV COMPR DVT MDII -B: Performed by: ORTHOPAEDIC SURGERY

## 2021-05-18 PROCEDURE — 2709999900 HC NON-CHARGEABLE SUPPLY

## 2021-05-18 PROCEDURE — 74011000250 HC RX REV CODE- 250: Performed by: PHYSICIAN ASSISTANT

## 2021-05-18 PROCEDURE — 77030013708 HC HNDPC SUC IRR PULS STRY –B: Performed by: ORTHOPAEDIC SURGERY

## 2021-05-18 PROCEDURE — 73502 X-RAY EXAM HIP UNI 2-3 VIEWS: CPT

## 2021-05-18 PROCEDURE — 74011000258 HC RX REV CODE- 258: Performed by: ORTHOPAEDIC SURGERY

## 2021-05-18 PROCEDURE — C9290 INJ, BUPIVACAINE LIPOSOME: HCPCS | Performed by: ORTHOPAEDIC SURGERY

## 2021-05-18 PROCEDURE — 99100 ANES PT EXTEME AGE<1 YR&>70: CPT | Performed by: NURSE ANESTHETIST, CERTIFIED REGISTERED

## 2021-05-18 PROCEDURE — 97162 PT EVAL MOD COMPLEX 30 MIN: CPT

## 2021-05-18 PROCEDURE — 74011250636 HC RX REV CODE- 250/636: Performed by: PHYSICIAN ASSISTANT

## 2021-05-18 PROCEDURE — 77030008462 HC STPLR SKN PROX J&J -A: Performed by: ORTHOPAEDIC SURGERY

## 2021-05-18 PROCEDURE — 74011250637 HC RX REV CODE- 250/637: Performed by: ORTHOPAEDIC SURGERY

## 2021-05-18 PROCEDURE — 76942 ECHO GUIDE FOR BIOPSY: CPT | Performed by: ANESTHESIOLOGY

## 2021-05-18 PROCEDURE — 01214 ANES OPEN PX TOT HIP ARTHRP: CPT | Performed by: NURSE ANESTHETIST, CERTIFIED REGISTERED

## 2021-05-18 PROCEDURE — 74011000250 HC RX REV CODE- 250: Performed by: NURSE ANESTHETIST, CERTIFIED REGISTERED

## 2021-05-18 PROCEDURE — 77030008683 HC TU ET CUF COVD -A: Performed by: ANESTHESIOLOGY

## 2021-05-18 PROCEDURE — 74011000250 HC RX REV CODE- 250: Performed by: ORTHOPAEDIC SURGERY

## 2021-05-18 PROCEDURE — 74011250636 HC RX REV CODE- 250/636: Performed by: ANESTHESIOLOGY

## 2021-05-18 PROCEDURE — 99100 ANES PT EXTEME AGE<1 YR&>70: CPT | Performed by: ANESTHESIOLOGY

## 2021-05-18 PROCEDURE — 77030040922 HC BLNKT HYPOTHRM STRY -A: Performed by: ORTHOPAEDIC SURGERY

## 2021-05-18 PROCEDURE — 88311 DECALCIFY TISSUE: CPT

## 2021-05-18 PROCEDURE — 77030003028 HC SUT VCRL J&J -A: Performed by: ORTHOPAEDIC SURGERY

## 2021-05-18 PROCEDURE — 74011000258 HC RX REV CODE- 258: Performed by: PHYSICIAN ASSISTANT

## 2021-05-18 PROCEDURE — 76010000153 HC OR TIME 1.5 TO 2 HR: Performed by: ORTHOPAEDIC SURGERY

## 2021-05-18 PROCEDURE — 01214 ANES OPEN PX TOT HIP ARTHRP: CPT | Performed by: ANESTHESIOLOGY

## 2021-05-18 PROCEDURE — 76060000034 HC ANESTHESIA 1.5 TO 2 HR: Performed by: ORTHOPAEDIC SURGERY

## 2021-05-18 PROCEDURE — 77030012890: Performed by: ORTHOPAEDIC SURGERY

## 2021-05-18 PROCEDURE — 77030026438 HC STYL ET INTUB CARD -A: Performed by: ANESTHESIOLOGY

## 2021-05-18 PROCEDURE — 77030012935 HC DRSG AQUACEL BMS -B: Performed by: ORTHOPAEDIC SURGERY

## 2021-05-18 PROCEDURE — 2709999900 HC NON-CHARGEABLE SUPPLY: Performed by: ORTHOPAEDIC SURGERY

## 2021-05-18 PROCEDURE — 88304 TISSUE EXAM BY PATHOLOGIST: CPT

## 2021-05-18 PROCEDURE — 77030019557 HC ELECTRD VES SEAL MEDT -F: Performed by: ORTHOPAEDIC SURGERY

## 2021-05-18 PROCEDURE — 77030027138 HC INCENT SPIROMETER -A: Performed by: ORTHOPAEDIC SURGERY

## 2021-05-18 PROCEDURE — 77030020294 HC ABD PLLW HIP DERY -B: Performed by: ORTHOPAEDIC SURGERY

## 2021-05-18 PROCEDURE — 77030003666 HC NDL SPINAL BD -A: Performed by: ORTHOPAEDIC SURGERY

## 2021-05-18 PROCEDURE — 77030006835 HC BLD SAW SAG STRY -B: Performed by: ORTHOPAEDIC SURGERY

## 2021-05-18 PROCEDURE — 77030020061 HC IV BLD WRMR ADMIN SET 3M -B: Performed by: ANESTHESIOLOGY

## 2021-05-18 PROCEDURE — 74011250637 HC RX REV CODE- 250/637: Performed by: PHYSICIAN ASSISTANT

## 2021-05-18 DEVICE — SHELL ACET CLUS H 52E TRTANIUM -- TRIDENT II: Type: IMPLANTABLE DEVICE | Site: HIP | Status: FUNCTIONAL

## 2021-05-18 DEVICE — SCR HEX 6.5X25MM -- TRIDENT II: Type: IMPLANTABLE DEVICE | Site: HIP | Status: FUNCTIONAL

## 2021-05-18 DEVICE — STEM FEM SZ 3 127D 30X102MM -- ACCOLADE II V40: Type: IMPLANTABLE DEVICE | Site: HIP | Status: FUNCTIONAL

## 2021-05-18 DEVICE — COMPONENT TOT HIP CAPPED LNR POLYETH [H2STRYKER] [STRYKER CORP]: Type: IMPLANTABLE DEVICE | Site: HIP | Status: FUNCTIONAL

## 2021-05-18 DEVICE — SCREW BNE L20MM DIA65MM LO PROF HEX TRIDENT LL: Type: IMPLANTABLE DEVICE | Site: HIP | Status: FUNCTIONAL

## 2021-05-18 DEVICE — HEAD FEM DELT V40 +0MM NK 36MM -- V40 BIOLOX: Type: IMPLANTABLE DEVICE | Site: HIP | Status: FUNCTIONAL

## 2021-05-18 DEVICE — INSERT ACET SZ E DIA36MM 0DEG X3 MTL ON POLY PRSS FIT PRI: Type: IMPLANTABLE DEVICE | Site: HIP | Status: FUNCTIONAL

## 2021-05-18 RX ORDER — CEFAZOLIN SODIUM 2 G/50ML
2 SOLUTION INTRAVENOUS EVERY 8 HOURS
Status: DISCONTINUED | OUTPATIENT
Start: 2021-05-18 | End: 2021-05-19 | Stop reason: HOSPADM

## 2021-05-18 RX ORDER — OXYCODONE AND ACETAMINOPHEN 5; 325 MG/1; MG/1
1 TABLET ORAL AS NEEDED
Status: DISCONTINUED | OUTPATIENT
Start: 2021-05-18 | End: 2021-05-18 | Stop reason: HOSPADM

## 2021-05-18 RX ORDER — DIPHENHYDRAMINE HYDROCHLORIDE 50 MG/ML
12.5 INJECTION, SOLUTION INTRAMUSCULAR; INTRAVENOUS
Status: DISCONTINUED | OUTPATIENT
Start: 2021-05-18 | End: 2021-05-18 | Stop reason: HOSPADM

## 2021-05-18 RX ORDER — FLUMAZENIL 0.1 MG/ML
0.2 INJECTION INTRAVENOUS AS NEEDED
Status: DISCONTINUED | OUTPATIENT
Start: 2021-05-18 | End: 2021-05-19 | Stop reason: HOSPADM

## 2021-05-18 RX ORDER — FENTANYL CITRATE 50 UG/ML
INJECTION, SOLUTION INTRAMUSCULAR; INTRAVENOUS AS NEEDED
Status: DISCONTINUED | OUTPATIENT
Start: 2021-05-18 | End: 2021-05-18 | Stop reason: HOSPADM

## 2021-05-18 RX ORDER — DIAZEPAM 2 MG/1
2 TABLET ORAL
Status: DISCONTINUED | OUTPATIENT
Start: 2021-05-18 | End: 2021-05-19 | Stop reason: HOSPADM

## 2021-05-18 RX ORDER — SODIUM CHLORIDE 0.9 % (FLUSH) 0.9 %
5-40 SYRINGE (ML) INJECTION AS NEEDED
Status: DISCONTINUED | OUTPATIENT
Start: 2021-05-18 | End: 2021-05-18 | Stop reason: HOSPADM

## 2021-05-18 RX ORDER — OXYCODONE AND ACETAMINOPHEN 5; 325 MG/1; MG/1
1-2 TABLET ORAL
Qty: 56 TAB | Refills: 0 | Status: SHIPPED | OUTPATIENT
Start: 2021-05-18 | End: 2021-05-25

## 2021-05-18 RX ORDER — OXYCODONE HYDROCHLORIDE 5 MG/1
5-10 TABLET ORAL
Status: DISCONTINUED | OUTPATIENT
Start: 2021-05-18 | End: 2021-05-19 | Stop reason: HOSPADM

## 2021-05-18 RX ORDER — EPHEDRINE SULFATE/0.9% NACL/PF 25 MG/5 ML
SYRINGE (ML) INTRAVENOUS AS NEEDED
Status: DISCONTINUED | OUTPATIENT
Start: 2021-05-18 | End: 2021-05-18 | Stop reason: HOSPADM

## 2021-05-18 RX ORDER — OXYCODONE HYDROCHLORIDE 15 MG/1
15 TABLET ORAL
Status: DISCONTINUED | OUTPATIENT
Start: 2021-05-18 | End: 2021-05-19 | Stop reason: HOSPADM

## 2021-05-18 RX ORDER — SODIUM CHLORIDE 900 MG/100ML
INJECTION INTRAVENOUS
Status: DISPENSED
Start: 2021-05-18 | End: 2021-05-18

## 2021-05-18 RX ORDER — SODIUM CHLORIDE 9 MG/ML
75 INJECTION, SOLUTION INTRAVENOUS CONTINUOUS
Status: DISCONTINUED | OUTPATIENT
Start: 2021-05-18 | End: 2021-05-19 | Stop reason: HOSPADM

## 2021-05-18 RX ORDER — FENTANYL CITRATE 50 UG/ML
INJECTION, SOLUTION INTRAMUSCULAR; INTRAVENOUS
Status: COMPLETED | OUTPATIENT
Start: 2021-05-18 | End: 2021-05-18

## 2021-05-18 RX ORDER — CELECOXIB 400 MG/1
400 CAPSULE ORAL ONCE
Status: COMPLETED | OUTPATIENT
Start: 2021-05-18 | End: 2021-05-18

## 2021-05-18 RX ORDER — ROCURONIUM BROMIDE 10 MG/ML
INJECTION, SOLUTION INTRAVENOUS AS NEEDED
Status: DISCONTINUED | OUTPATIENT
Start: 2021-05-18 | End: 2021-05-18 | Stop reason: HOSPADM

## 2021-05-18 RX ORDER — CEPHALEXIN 500 MG/1
500 CAPSULE ORAL 4 TIMES DAILY
Qty: 8 CAP | Refills: 0 | Status: SHIPPED | OUTPATIENT
Start: 2021-05-18 | End: 2021-05-25

## 2021-05-18 RX ORDER — HYDROMORPHONE HYDROCHLORIDE 2 MG/ML
0.2 INJECTION, SOLUTION INTRAMUSCULAR; INTRAVENOUS; SUBCUTANEOUS
Status: DISCONTINUED | OUTPATIENT
Start: 2021-05-18 | End: 2021-05-18 | Stop reason: HOSPADM

## 2021-05-18 RX ORDER — MIDAZOLAM HYDROCHLORIDE 1 MG/ML
INJECTION, SOLUTION INTRAMUSCULAR; INTRAVENOUS
Status: COMPLETED | OUTPATIENT
Start: 2021-05-18 | End: 2021-05-18

## 2021-05-18 RX ORDER — VANCOMYCIN HYDROCHLORIDE 1 G/20ML
INJECTION, POWDER, LYOPHILIZED, FOR SOLUTION INTRAVENOUS AS NEEDED
Status: DISCONTINUED | OUTPATIENT
Start: 2021-05-18 | End: 2021-05-18 | Stop reason: HOSPADM

## 2021-05-18 RX ORDER — CEFAZOLIN SODIUM 2 G/50ML
2 SOLUTION INTRAVENOUS
Status: COMPLETED | OUTPATIENT
Start: 2021-05-18 | End: 2021-05-18

## 2021-05-18 RX ORDER — PROPOFOL 10 MG/ML
INJECTION, EMULSION INTRAVENOUS AS NEEDED
Status: DISCONTINUED | OUTPATIENT
Start: 2021-05-18 | End: 2021-05-18 | Stop reason: HOSPADM

## 2021-05-18 RX ORDER — ONDANSETRON 2 MG/ML
4 INJECTION INTRAMUSCULAR; INTRAVENOUS ONCE
Status: COMPLETED | OUTPATIENT
Start: 2021-05-18 | End: 2021-05-18

## 2021-05-18 RX ORDER — KETOROLAC TROMETHAMINE 15 MG/ML
15 INJECTION, SOLUTION INTRAMUSCULAR; INTRAVENOUS EVERY 6 HOURS
Status: DISCONTINUED | OUTPATIENT
Start: 2021-05-18 | End: 2021-05-19 | Stop reason: HOSPADM

## 2021-05-18 RX ORDER — FENTANYL CITRATE 50 UG/ML
25 INJECTION, SOLUTION INTRAMUSCULAR; INTRAVENOUS AS NEEDED
Status: DISCONTINUED | OUTPATIENT
Start: 2021-05-18 | End: 2021-05-18 | Stop reason: HOSPADM

## 2021-05-18 RX ORDER — SODIUM CHLORIDE 0.9 % (FLUSH) 0.9 %
5-40 SYRINGE (ML) INJECTION AS NEEDED
Status: DISCONTINUED | OUTPATIENT
Start: 2021-05-18 | End: 2021-05-19 | Stop reason: HOSPADM

## 2021-05-18 RX ORDER — CELECOXIB 200 MG/1
200 CAPSULE ORAL 2 TIMES DAILY
Qty: 60 CAP | Refills: 2 | Status: SHIPPED | OUTPATIENT
Start: 2021-05-18 | End: 2021-05-25

## 2021-05-18 RX ORDER — NALOXONE HYDROCHLORIDE 0.4 MG/ML
0.4 INJECTION, SOLUTION INTRAMUSCULAR; INTRAVENOUS; SUBCUTANEOUS AS NEEDED
Status: DISCONTINUED | OUTPATIENT
Start: 2021-05-18 | End: 2021-05-19 | Stop reason: HOSPADM

## 2021-05-18 RX ORDER — LIDOCAINE HYDROCHLORIDE 20 MG/ML
INJECTION, SOLUTION EPIDURAL; INFILTRATION; INTRACAUDAL; PERINEURAL AS NEEDED
Status: DISCONTINUED | OUTPATIENT
Start: 2021-05-18 | End: 2021-05-18 | Stop reason: HOSPADM

## 2021-05-18 RX ORDER — SODIUM CHLORIDE, SODIUM LACTATE, POTASSIUM CHLORIDE, CALCIUM CHLORIDE 600; 310; 30; 20 MG/100ML; MG/100ML; MG/100ML; MG/100ML
50 INJECTION, SOLUTION INTRAVENOUS CONTINUOUS
Status: DISCONTINUED | OUTPATIENT
Start: 2021-05-18 | End: 2021-05-18 | Stop reason: HOSPADM

## 2021-05-18 RX ORDER — ASPIRIN 325 MG
325 TABLET ORAL 2 TIMES DAILY
Qty: 60 TAB | Refills: 0 | Status: SHIPPED | OUTPATIENT
Start: 2021-05-18 | End: 2021-05-25

## 2021-05-18 RX ORDER — PREGABALIN 50 MG/1
50 CAPSULE ORAL ONCE
Status: COMPLETED | OUTPATIENT
Start: 2021-05-18 | End: 2021-05-18

## 2021-05-18 RX ORDER — LIDOCAINE HYDROCHLORIDE 10 MG/ML
0.1 INJECTION, SOLUTION EPIDURAL; INFILTRATION; INTRACAUDAL; PERINEURAL AS NEEDED
Status: DISCONTINUED | OUTPATIENT
Start: 2021-05-18 | End: 2021-05-18 | Stop reason: HOSPADM

## 2021-05-18 RX ORDER — SODIUM CHLORIDE 0.9 % (FLUSH) 0.9 %
5-40 SYRINGE (ML) INJECTION EVERY 8 HOURS
Status: DISCONTINUED | OUTPATIENT
Start: 2021-05-18 | End: 2021-05-19 | Stop reason: HOSPADM

## 2021-05-18 RX ORDER — LANOLIN ALCOHOL/MO/W.PET/CERES
1 CREAM (GRAM) TOPICAL 2 TIMES DAILY WITH MEALS
Status: DISCONTINUED | OUTPATIENT
Start: 2021-05-18 | End: 2021-05-19 | Stop reason: HOSPADM

## 2021-05-18 RX ORDER — ROPIVACAINE HYDROCHLORIDE 2 MG/ML
30 INJECTION, SOLUTION EPIDURAL; INFILTRATION; PERINEURAL
Status: COMPLETED | OUTPATIENT
Start: 2021-05-18 | End: 2021-05-18

## 2021-05-18 RX ORDER — ASPIRIN 81 MG/1
243 TABLET ORAL 2 TIMES DAILY
Status: DISCONTINUED | OUTPATIENT
Start: 2021-05-19 | End: 2021-05-19 | Stop reason: HOSPADM

## 2021-05-18 RX ORDER — FENTANYL CITRATE 50 UG/ML
100 INJECTION, SOLUTION INTRAMUSCULAR; INTRAVENOUS
Status: DISCONTINUED | OUTPATIENT
Start: 2021-05-18 | End: 2021-05-18 | Stop reason: HOSPADM

## 2021-05-18 RX ORDER — ONDANSETRON 2 MG/ML
INJECTION INTRAMUSCULAR; INTRAVENOUS AS NEEDED
Status: DISCONTINUED | OUTPATIENT
Start: 2021-05-18 | End: 2021-05-18 | Stop reason: HOSPADM

## 2021-05-18 RX ORDER — FAMOTIDINE 20 MG/1
20 TABLET, FILM COATED ORAL ONCE
Status: COMPLETED | OUTPATIENT
Start: 2021-05-18 | End: 2021-05-18

## 2021-05-18 RX ORDER — DOCUSATE SODIUM 100 MG/1
100 CAPSULE, LIQUID FILLED ORAL 2 TIMES DAILY
Qty: 60 CAP | Refills: 2 | Status: SHIPPED | OUTPATIENT
Start: 2021-05-18 | End: 2021-08-16

## 2021-05-18 RX ORDER — SUCCINYLCHOLINE CHLORIDE 20 MG/ML
INJECTION INTRAMUSCULAR; INTRAVENOUS AS NEEDED
Status: DISCONTINUED | OUTPATIENT
Start: 2021-05-18 | End: 2021-05-18 | Stop reason: HOSPADM

## 2021-05-18 RX ORDER — POVIDONE-IODINE 10 %
SOLUTION, NON-ORAL TOPICAL AS NEEDED
Status: DISCONTINUED | OUTPATIENT
Start: 2021-05-18 | End: 2021-05-18 | Stop reason: HOSPADM

## 2021-05-18 RX ORDER — CELECOXIB 100 MG/1
200 CAPSULE ORAL 2 TIMES DAILY
Status: DISCONTINUED | OUTPATIENT
Start: 2021-05-18 | End: 2021-05-19 | Stop reason: HOSPADM

## 2021-05-18 RX ORDER — ONDANSETRON 2 MG/ML
4 INJECTION INTRAMUSCULAR; INTRAVENOUS
Status: DISCONTINUED | OUTPATIENT
Start: 2021-05-18 | End: 2021-05-19 | Stop reason: HOSPADM

## 2021-05-18 RX ORDER — ACETAMINOPHEN 500 MG
1000 TABLET ORAL ONCE
Status: COMPLETED | OUTPATIENT
Start: 2021-05-18 | End: 2021-05-18

## 2021-05-18 RX ORDER — LISINOPRIL 10 MG/1
10 TABLET ORAL DAILY
Status: DISCONTINUED | OUTPATIENT
Start: 2021-05-19 | End: 2021-05-19 | Stop reason: HOSPADM

## 2021-05-18 RX ORDER — MIDAZOLAM HYDROCHLORIDE 1 MG/ML
2 INJECTION, SOLUTION INTRAMUSCULAR; INTRAVENOUS
Status: DISCONTINUED | OUTPATIENT
Start: 2021-05-18 | End: 2021-05-18 | Stop reason: HOSPADM

## 2021-05-18 RX ORDER — AMOXICILLIN 250 MG
1 CAPSULE ORAL 2 TIMES DAILY
Status: DISCONTINUED | OUTPATIENT
Start: 2021-05-18 | End: 2021-05-19 | Stop reason: HOSPADM

## 2021-05-18 RX ORDER — ROPIVACAINE HYDROCHLORIDE 2 MG/ML
INJECTION, SOLUTION EPIDURAL; INFILTRATION; PERINEURAL
Status: COMPLETED | OUTPATIENT
Start: 2021-05-18 | End: 2021-05-18

## 2021-05-18 RX ORDER — PREGABALIN 25 MG/1
25 CAPSULE ORAL 2 TIMES DAILY
Status: DISCONTINUED | OUTPATIENT
Start: 2021-05-18 | End: 2021-05-19 | Stop reason: HOSPADM

## 2021-05-18 RX ORDER — POLYMYXIN B 500000 [USP'U]/1
INJECTION, POWDER, LYOPHILIZED, FOR SOLUTION INTRAMUSCULAR; INTRATHECAL; INTRAVENOUS; OPHTHALMIC AS NEEDED
Status: DISCONTINUED | OUTPATIENT
Start: 2021-05-18 | End: 2021-05-18 | Stop reason: HOSPADM

## 2021-05-18 RX ORDER — SODIUM CHLORIDE 0.9 % (FLUSH) 0.9 %
5-40 SYRINGE (ML) INJECTION EVERY 8 HOURS
Status: DISCONTINUED | OUTPATIENT
Start: 2021-05-18 | End: 2021-05-18 | Stop reason: HOSPADM

## 2021-05-18 RX ORDER — ACETAMINOPHEN 500 MG
1000 TABLET ORAL EVERY 6 HOURS
Status: DISCONTINUED | OUTPATIENT
Start: 2021-05-18 | End: 2021-05-19 | Stop reason: HOSPADM

## 2021-05-18 RX ADMIN — CEFAZOLIN SODIUM 2 G: 2 SOLUTION INTRAVENOUS at 20:02

## 2021-05-18 RX ADMIN — CEFAZOLIN SODIUM 2 G: 2 SOLUTION INTRAVENOUS at 11:05

## 2021-05-18 RX ADMIN — Medication 5 MG: at 11:34

## 2021-05-18 RX ADMIN — SUCCINYLCHOLINE CHLORIDE 100 MG: 20 INJECTION, SOLUTION INTRAMUSCULAR; INTRAVENOUS at 11:02

## 2021-05-18 RX ADMIN — FERROUS SULFATE TAB 325 MG (65 MG ELEMENTAL FE) 325 MG: 325 (65 FE) TAB at 16:30

## 2021-05-18 RX ADMIN — FAMOTIDINE 20 MG: 20 TABLET, FILM COATED ORAL at 08:54

## 2021-05-18 RX ADMIN — Medication 10 ML: at 22:05

## 2021-05-18 RX ADMIN — PREGABALIN 25 MG: 25 CAPSULE ORAL at 18:10

## 2021-05-18 RX ADMIN — Medication 5 MG: at 12:19

## 2021-05-18 RX ADMIN — LIDOCAINE HYDROCHLORIDE 2 ML: 10 INJECTION, SOLUTION EPIDURAL; INFILTRATION; INTRACAUDAL; PERINEURAL at 09:41

## 2021-05-18 RX ADMIN — ACETAMINOPHEN 1000 MG: 500 TABLET ORAL at 08:54

## 2021-05-18 RX ADMIN — DOCUSATE SODIUM 50MG AND SENNOSIDES 8.6MG 1 TABLET: 8.6; 5 TABLET, FILM COATED ORAL at 18:10

## 2021-05-18 RX ADMIN — ROCURONIUM BROMIDE 10 MG: 50 INJECTION INTRAVENOUS at 11:22

## 2021-05-18 RX ADMIN — CELECOXIB 200 MG: 100 CAPSULE ORAL at 18:10

## 2021-05-18 RX ADMIN — Medication 2.5 MG: at 11:46

## 2021-05-18 RX ADMIN — ROPIVACAINE HYDROCHLORIDE 60 MG: 2 INJECTION, SOLUTION EPIDURAL; INFILTRATION at 09:41

## 2021-05-18 RX ADMIN — ROPIVACAINE HYDROCHLORIDE 30 ML: 2 INJECTION, SOLUTION EPIDURAL; INFILTRATION at 09:44

## 2021-05-18 RX ADMIN — ONDANSETRON 4 MG: 2 INJECTION INTRAMUSCULAR; INTRAVENOUS at 13:23

## 2021-05-18 RX ADMIN — MIDAZOLAM 2 MG: 1 INJECTION INTRAMUSCULAR; INTRAVENOUS at 09:36

## 2021-05-18 RX ADMIN — ACETAMINOPHEN 1000 MG: 500 TABLET, FILM COATED ORAL at 18:10

## 2021-05-18 RX ADMIN — PREGABALIN 50 MG: 50 CAPSULE ORAL at 08:54

## 2021-05-18 RX ADMIN — SODIUM CHLORIDE 75 ML/HR: 900 INJECTION, SOLUTION INTRAVENOUS at 15:45

## 2021-05-18 RX ADMIN — Medication 2.5 MG: at 11:51

## 2021-05-18 RX ADMIN — HYDROMORPHONE HYDROCHLORIDE 0.2 MG: 2 INJECTION, SOLUTION INTRAMUSCULAR; INTRAVENOUS; SUBCUTANEOUS at 13:23

## 2021-05-18 RX ADMIN — FENTANYL CITRATE 50 MCG: 50 INJECTION, SOLUTION INTRAMUSCULAR; INTRAVENOUS at 11:42

## 2021-05-18 RX ADMIN — TRANEXAMIC ACID 1 G: 100 INJECTION, SOLUTION INTRAVENOUS at 12:21

## 2021-05-18 RX ADMIN — Medication 5 MG: at 11:10

## 2021-05-18 RX ADMIN — SODIUM CHLORIDE, SODIUM LACTATE, POTASSIUM CHLORIDE, AND CALCIUM CHLORIDE 50 ML/HR: 600; 310; 30; 20 INJECTION, SOLUTION INTRAVENOUS at 08:40

## 2021-05-18 RX ADMIN — FENTANYL CITRATE 50 MCG: 50 INJECTION, SOLUTION INTRAMUSCULAR; INTRAVENOUS at 09:36

## 2021-05-18 RX ADMIN — HYDROMORPHONE HYDROCHLORIDE 0.2 MG: 2 INJECTION, SOLUTION INTRAMUSCULAR; INTRAVENOUS; SUBCUTANEOUS at 13:33

## 2021-05-18 RX ADMIN — MIDAZOLAM HYDROCHLORIDE 2 MG: 2 INJECTION, SOLUTION INTRAMUSCULAR; INTRAVENOUS at 09:41

## 2021-05-18 RX ADMIN — FENTANYL CITRATE 50 MCG: 50 INJECTION, SOLUTION INTRAMUSCULAR; INTRAVENOUS at 11:02

## 2021-05-18 RX ADMIN — OXYCODONE HYDROCHLORIDE 10 MG: 5 TABLET ORAL at 20:24

## 2021-05-18 RX ADMIN — CELECOXIB 400 MG: 400 CAPSULE ORAL at 08:54

## 2021-05-18 RX ADMIN — LIDOCAINE HYDROCHLORIDE 80 MG: 20 INJECTION, SOLUTION EPIDURAL; INFILTRATION; INTRACAUDAL; PERINEURAL at 11:02

## 2021-05-18 RX ADMIN — KETOROLAC TROMETHAMINE 15 MG: 15 INJECTION, SOLUTION INTRAMUSCULAR; INTRAVENOUS at 18:10

## 2021-05-18 RX ADMIN — ONDANSETRON 4 MG: 2 INJECTION INTRAMUSCULAR; INTRAVENOUS at 16:41

## 2021-05-18 RX ADMIN — PROPOFOL 80 MG: 10 INJECTION, EMULSION INTRAVENOUS at 11:02

## 2021-05-18 RX ADMIN — ROCURONIUM BROMIDE 5 MG: 50 INJECTION INTRAVENOUS at 11:02

## 2021-05-18 RX ADMIN — Medication 10 ML: at 15:00

## 2021-05-18 RX ADMIN — TRANEXAMIC ACID 1 G: 100 INJECTION, SOLUTION INTRAVENOUS at 11:11

## 2021-05-18 RX ADMIN — ROCURONIUM BROMIDE 25 MG: 50 INJECTION INTRAVENOUS at 11:10

## 2021-05-18 RX ADMIN — FENTANYL CITRATE 50 MCG: 50 INJECTION, SOLUTION INTRAMUSCULAR; INTRAVENOUS at 09:41

## 2021-05-18 RX ADMIN — ONDANSETRON 4 MG: 2 INJECTION INTRAMUSCULAR; INTRAVENOUS at 12:22

## 2021-05-18 RX ADMIN — SUGAMMADEX 154 MG: 100 INJECTION, SOLUTION INTRAVENOUS at 12:37

## 2021-05-18 NOTE — ANESTHESIA POSTPROCEDURE EVALUATION
Procedure(s):  LEFT TOTAL HIP ARTHROPLASTY LATERAL APPROACH. general, regional    Anesthesia Post Evaluation      Multimodal analgesia: multimodal analgesia used between 6 hours prior to anesthesia start to PACU discharge  Patient location during evaluation: bedside  Patient participation: complete - patient participated  Level of consciousness: awake  Pain management: adequate  Airway patency: patent  Anesthetic complications: no  Cardiovascular status: stable  Respiratory status: acceptable  Hydration status: acceptable  Post anesthesia nausea and vomiting:  controlled      INITIAL Post-op Vital signs:   Vitals Value Taken Time   /53 05/18/21 1405   Temp 36.7 °C (98 °F) 05/18/21 1355   Pulse 79 05/18/21 1410   Resp 24 05/18/21 1410   SpO2 98 % 05/18/21 1409   Vitals shown include unvalidated device data.

## 2021-05-18 NOTE — PROGRESS NOTES
1425 patient received via bed, alert, o2 3liters, dressing cdi  oob to chair by physio  Family at bedside  Patient  Nauseated Zofran given  Assisted back to bed  Nausea improved  Bedside shift report given to [facility] with sbar  Discharged via wc to home

## 2021-05-18 NOTE — PERIOP NOTES
TRANSFER - OUT REPORT:    Verbal report given to Margaux Sepulveda RN(name) on Lola Kanner  being transferred to 2 Surgical(unit) for routine post - op       Report consisted of patients Situation, Background, Assessment and   Recommendations(SBAR). Information from the following report(s) SBAR, OR Summary, Procedure Summary, Intake/Output and MAR was reviewed with the receiving nurse. Lines:   Peripheral IV 05/18/21 Right Arm (Active)   Site Assessment Clean, dry, & intact 05/18/21 1245   Phlebitis Assessment 0 05/18/21 1245   Infiltration Assessment 0 05/18/21 1245   Dressing Status Clean, dry, & intact 05/18/21 1245   Dressing Type Transparent;Tape 05/18/21 1245   Hub Color/Line Status Pink; Infusing 05/18/21 1245        Opportunity for questions and clarification was provided.       Patient transported with:   O2 @ 3 liters  Tech

## 2021-05-18 NOTE — OP NOTES
[facility]  OPERATIVE REPORT    Name:  El Morillo  MR#:   [id number]  :  1940  ACCOUNT #:  [de-identified]  DATE OF SERVICE:  2021    PREOPERATIVE DIAGNOSIS:  End-stage arthritis of the left hip with leg length discrepancy starting longer on the left leg. POSTOPERATIVE DIAGNOSIS:  End-stage arthritis of the left hip with leg length discrepancy starting longer on the left leg. PROCEDURES PERFORMED:  Left total hip replacement using the Nicho Accolade II system with a 52 mm Trident II Tritanium acetabular shell; a lateralized neutral 36 liner; a size 3, 127 high offset femoral component with a 36, +0 ceramic femoral head. SURGEON:  Ranjith Washington MD.    FIRST ASSISTANT:  Frank De Souza. SECOND ASSISTANT:  Corinne Rico. ANESTHESIOLOGIST:  Dr. Aye Terry. ANESTHESIA:  Preoperative nerve block with light general.    COMPLICATIONS:  None. SPECIMENS REMOVED:  Femoral head. IMPLANTS:  As above mentioned. ESTIMATED BLOOD LOSS:  300.    Frank De Souza was the first assistant who assisted with all phases of the surgery commencing with patient positioning, patient prep, patient drape, leg positioning during surgery, retracting, assisting with the surgery itself, closure, dressing placement, and transfer. We did check her leg lengths preoperatively and she was starting longer on the left leg by a few millimeters. We referred back to our preop AP pelvis and confirmed this as well. Her other hip is going to need replacing eventually as well. Having said this, we would use the pin in the superior iliac crest for offset and leg length measurement and pay strict attention to leg lengths. DESCRIPTION OF PROCEDURE:  The patient was placed in the lateral decubitus position, taking great care to pad all sensitive areas. We were going to be doing an anterolateral approach to avoid getting near her aortobifemoral grafts as well. Timeout was performed.   Antibiotics confirmed given.  Standard anterolateral approach. Good hemostasis. The IT band delineated and incised sharply. She had a small chronic abductor tear, which would be repaired at the closure of the case. Minimus and capsule divided directly over the femoral neck carrying back to the tip of the trochanter, anteriorly along down the vastus lateralis. Whole cuff of tissue was taken in one contiguous layer and the lesser trochanter identified. She had a fair bit of pericapsular scarring, which was taken down and freed up. A pin was placed in the superior iliac crest for offset and leg length measurement, which were recorded. The hip dislocated uneventfully. Using appropriate fingerbreadth above the lesser trochanter, the femoral neck was divided. Posterior capsule reflected with a Retana elevator, and we instrumented around the acetabulum, protecting the neurovascular structures. We divided the transverse acetabular ligament and got excellent exposure into the cup. We identified the medial wall to the foveal notch, medialized it appropriately, and at appropriate inclination and anteversion, reamed it up to accommodate the cup. We went for a line-to-line ream and trialed this. I was very pleased with it. We implanted the definitive shell at appropriate inclination and anteversion, double checked it, and then augmented it with a couple of screws. It seated very nicely. We removed some posterior osteophyte and used the Aquamantys and Exparel cocktail. Careful templating revealed coxa vara with a higher offset lateral liner indicated. It was placed. We made sure it was fully seated and protected it with a Ray-Otis gauze later to be accounted for. Leg in a sterile leg bag. We lateralized. She did have some subtrochanteric narrowing for which we started a little extra posteriorly and broached our way up to accommodate the size 3.   Calcar planar, reduced the hip with a 0 ball, gave us anatomic restoration of offset and leg lengths without any undue lengthening whatsoever and neutral lengths. combined anteversion was excellent. No impingement. Excellent stability. We implanted the definitive components and re-trialed. I was happiest with the 0, cleaned the trunnion and impacted it on again reducing the hip. Extremely stable. We again double checked the leg lengths and offset. They were excellent. Routine closure. Clips for the skin. At the end of the case, instrument, sponge, and needle counts were correct. No complications. The patient tolerated the procedure well. At the end of the case, the patient was brought to the recovery room in stable condition.       Catherine Hicks MD [id number]  D:  05/18/2021 12:40  T:  05/18/2021 19:05  JOB #:  [id number]

## 2021-05-18 NOTE — BRIEF OP NOTE
Brief Postoperative Note    Patient: David Tan  YOB: 1940  MRN: [id number]    Date of Procedure: 5/18/2021     Pre-Op Diagnosis: Osteoarthritis of left hip, unspecified osteoarthritis type [M16.12]    Post-Op Diagnosis: Same as preoperative diagnosis. Procedure(s):  LEFT TOTAL HIP ARTHROPLASTY LATERAL APPROACH    Surgeon(s):  Anne Hernández MD    Surgical Assistant: Physician Assistant: Carroll Prader, PA-C  Surg Asst-1: Raphael Leonard    Anesthesia: General     Estimated Blood Loss (mL): 859     Complications: None    Specimens:   ID Type Source Tests Collected by Time Destination   1 : Left Femoral Head Preservative Hip, left  Anne Hernández MD 5/18/2021 1601 Pathology        Implants:   Implant Name Type Inv.  Item Serial No.  Lot No. LRB No. Used Action   HEX DOME HOLE PLUG    [employer] [id number] Left 1 Implanted   SCR HEX 6.5X25MM -- TRIDENT II - [id number]  SCR HEX 6.5X25MM -- TRIDENT II  [employer] [facility] [id number] Left 1 Implanted   SCREW BNE L20MM [id number] LO PROF HEX TRIDENT LL - [id number]  SCREW BNE L20MM [id number] LO PROF HEX TRIDENT LL  [facility] [id number] Left 1 Implanted   SHELL ACET CLUS H 52E TRTANIUM -- TRIDENT II - [id number]  SHELL ACET CLUS H 52E TRTANIUM -- TRIDENT II  [employer] [facility] [id number] Left 1 Implanted   INSERT ACET SZ E KYN49UM 0DEG X3 MTL ON POLY PRSS FIT MAKENZIE - [id number]  INSERT ACET SZ E TBU62XM 0DEG X3 MTL ON POLY PRSS FIT MAKENZIE  [employer] [facility] [id number] Left 1 Implanted   STEM FEM SZ 3 127D [id number] -- ACCOLADE II V40 - [id number]  STEM FEM SZ 3 127D [id number] -- ACCOLADE II V40  [employer] [facility] [id number] Left 1 Implanted   HEAD FEM DELT V40 +0MM NK 36MM -- V40 BIOLOX - [id number]  HEAD FEM DELT V40 +0MM NK 36MM -- V40 BIOLOX  [employer] [id number] Left 1 Implanted       Drains: * No LDAs found *    Findings: same    Electronically Signed by Liss Soria MD on 5/18/2021 at 12:23 PM

## 2021-05-18 NOTE — DISCHARGE SUMMARY
5/18/2021  7:27 AM    5/19/2021, 12:47 PM    Primary Dx:left Orthopedic / Rheumatologic: Total Hip Replacement  Secondary Dx: Etiological Diagnoses: none    HPI:  Pt has end stage OA left hip and had failed conservative treatment. Due to the current findings and affected activity of daily living surgical intervention is indicated.   The alternatives, risks, complications as well as expected outcome were discussed, the patient understands and wishes to proceed with surgery    Past Medical History:   Diagnosis Date    AAA (abdominal aortic aneurysm) without rupture ([address]     AAA repair 7/2018 Dr Saranya Gallagher adenomas     2003 Dr Adrianne Barreto; 4/13 Dr Abdon Del Rio    Degenerative arthritis of lumbar spine 12/2018    Dr Christiano Ramsey; mri showed multilevel degen changes, mod spinal stenosis L3-5, mod severe right L4-5 foraminal disease; s/p epidural; isabel    Dyslipidemia     FHx: heart disease     Gastritis 2006    on EGD Dr. Hailey Delgado, neg sprue    Hypertension     Hypothyroidism 2007    Dr. Juan Crowder Hypovitaminosis D     IPMN (intraductal papillary mucinous neoplasm) 06/2018    on mrcp 8mm; no change 10/19, no change 12/20    Lung nodule 06/2016    6 mm RLL (6/16); no change (10/17); no change (12/18)    Obesity     peak weight 187 lbs, bmi 33.1 from 4/13; IF 3/18    Osteoarthrosis, hip 03/2021    Dr Freddy Bella Osteopenia FRAX 4/12 9.7 and 1.4     DEXA t score 0.6 spine, -1 hip (12/09);  -0.4 spine, -1.3 hip (4/12); -1.2 wrist, -1.6 hip w FRAX 11/2.2 (4/14); -1.0 wrist, -1.6 hip w FRAX 12/2.6 (4/16); -0.7 wrist, -1.6 hip (5/18); -1.1 wrist, -1.8 hip (4/21)    Pancreatitis, chronic ([address] 2018    Psoriasis 2003    on bx Dr. Graciela Herr Sensorineural hearing loss     LEFT Dr Sanabria Sandra Venous insufficiency 2003    on dopplers    Zoster 05/2016    LEFT T11-12; mild postherpetic neuralgia         Current Facility-Administered Medications:     0.9% sodium chloride (MBP/ADV) infusion, , , ,    lactated Ringers infusion, 50 mL/hr, IntraVENous, CONTINUOUS, Ariana Harrell, CRNA    sodium chloride (NS) flush 5-40 mL, 5-40 mL, IntraVENous, Q8H, Ariana Li, CRNA    sodium chloride (NS) flush 5-40 mL, 5-40 mL, IntraVENous, PRN, Comfort Quijano, CRNA    oxyCODONE-acetaminophen (PERCOCET) 5-325 mg per tablet 1 Tab, 1 Tab, Oral, PRN, Comfort Quijano CRNA    diphenhydrAMINE (BENADRYL) injection 12.5 mg, 12.5 mg, IntraVENous, Multiple, Ariana Li CRNA    ondansetron [facility]) injection 4 mg, 4 mg, IntraVENous, ONCE, Ariana Li CRNA    fentaNYL citrate (PF) injection 25 mcg, 25 mcg, IntraVENous, PRN, Comfort Quijano, MONICA    HYDROmorphone (DILAUDID) injection 0.2 mg, 0.2 mg, IntraVENous, Multiple, Ariana Harrell, MONICA    Facility-Administered Medications Ordered in Other Encounters:     ePHEDrine in NS (PF) injection, , IntraVENous, PRN, Ariana Harrell, CRNA, 5 mg at 05/18/21 1219    fentaNYL citrate (PF) injection, , IntraVENous, PRN, Sravani Marcano E, CRNA, 50 mcg at 05/18/21 1142    lidocaine (PF) (XYLOCAINE) 20 mg/mL (2 %) injection, , IntraVENous, PRN, Comfort Quijano, CRNA, 80 mg at 05/18/21 1102    rocuronium injection, , IntraVENous, PRN, Ariana Harrell, CRNA, 10 mg at 05/18/21 1122    succinylcholine (ANECTINE) injection, , IntraVENous, PRN, Molinaynnetcarlton Notice E, CRNA, 100 mg at 05/18/21 1102    propofoL (DIPRIVAN) 10 mg/mL injection, , IntraVENous, PRN, Comfort Baumanr, CRNA, 80 mg at 05/18/21 1102    ondansetron (ZOFRAN) injection, , IntraVENous, PRN, Gwynnetcarlton Notice E, CRNA, 4 mg at 05/18/21 1222    sugammadex (BRIDION) 100 mg/mL injection, , IntraVENous, PRN, Comfort Mitulr, CRNA, 154 mg at 05/18/21 1237    Atenolol, Cozaar [losartan], and Norvasc [amlodipine]    Physical Exam:  General A&O x3 NAD, well developed, well nourished, normal affect  Heart: S1-S2, RRR  Lungs: CTA Bilat  Abd: soft NT, ND  Ext: n/v intact    Hospital Course:    Pt.  Had leftOrthopedic / Rheumatologic: Total Hip Replacement    Post -op Course: The patient tolerated the procedure well. They were followed by internal medicine for help with medical management. Pt. Was place on Abx pre and post-op for prophylaxis against infection as well as coumadin pre and post-op for prophylaxis against DVT. Vitals signs remained stable, remained af. The wound wasclean, dry, no drainage. Pain was well controlled. Pt. Had negative calf tenderness or swelling, no evidence for DVT. Patient had PT/OT consult for evaluation and treatment. CBC  Lab Results   Component Value Date/Time    WBC 5.8 05/07/2021 09:04 AM    RBC 4.78 05/07/2021 09:04 AM    HCT 45.5 (H) 05/07/2021 09:04 AM    MCV 95.2 05/07/2021 09:04 AM    MCH 30.8 05/07/2021 09:04 AM    MCHC 32.3 05/07/2021 09:04 AM    RDW 13.9 05/07/2021 09:04 AM     Coagulation  Lab Results   Component Value Date    INR 1.0 05/07/2021    APTT 25.8 05/07/2021      Basic Metabolic Profile  Lab Results   Component Value Date     05/07/2021    CO2 29 05/07/2021    BUN 23 (H) 05/07/2021       Discharge Meds:  Current Discharge Medication List      START taking these medications    Details   oxyCODONE-acetaminophen (Percocet) 5-325 mg per tablet Take 1-2 Tabs by mouth every six (6) hours as needed for Pain for up to 7 days. Max Daily Amount: 8 Tabs. Qty: 56 Tab, Refills: 0    Associated Diagnoses: Hip arthritis      docusate sodium (Colace) 100 mg capsule Take 1 Cap by mouth two (2) times a day for 90 days. Qty: 60 Cap, Refills: 2    Associated Diagnoses: Hip arthritis      aspirin (ASPIRIN) 325 mg tablet Take 1 Tab by mouth two (2) times a day. Qty: 60 Tab, Refills: 0    Associated Diagnoses: Hip arthritis      celecoxib (CeleBREX) 200 mg capsule Take 1 Cap by mouth two (2) times a day for 90 days. Qty: 60 Cap, Refills: 2    Associated Diagnoses: Hip arthritis      cephALEXin (Keflex) 500 mg capsule Take 1 Cap by mouth four (4) times daily.   Qty: 8 Cap, Refills: 0    Associated Diagnoses: Hip arthritis         CONTINUE these medications which have NOT CHANGED    Details   atorvastatin (LIPITOR) 40 mg tablet Take 1 Tab by mouth daily. Qty: 90 Tab, Refills: 3      ezetimibe (ZETIA) 10 mg tablet Take 1 Tab by mouth daily. Qty: 90 Tab, Refills: 3      Levothyroxine 75 mcg cap Take 1 Tab by mouth daily. Qty: 90 Cap, Refills: 3      lisinopriL (PRINIVIL, ZESTRIL) 10 mg tablet Take 1 Tab by mouth daily. Qty: 90 Tab, Refills: 3    Associated Diagnoses: Essential hypertension with goal blood pressure less than 140/90      lidocaine (LIDODERM) 5 % Apply patch to the affected area for 12 hours a day and remove for 12 hours a day. Qty: 90 Patch, Refills: 1    Associated Diagnoses: Postherpetic neuralgia      diazePAM (VALIUM) 10 mg tablet Take 1 Tab by mouth every twelve (12) hours as needed for Anxiety. Max Daily Amount: 20 mg.  Qty: 60 Tab, Refills: 1    Associated Diagnoses: Postherpetic neuralgia      ergocalciferol (VITAMIN D2) 50,000 unit capsule Take 1 Cap by mouth every seven (7) days. Qty: 12 Cap, Refills: 3         STOP taking these medications       HYDROcodone-acetaminophen (NORCO) 5-325 mg per tablet Comments:   Reason for Stopping:         nitrofurantoin, macrocrystal-monohydrate, (MACROBID) 100 mg capsule Comments:   Reason for Stopping:         traMADoL (ULTRAM) 50 mg tablet Comments:   Reason for Stopping:         aspirin 81 mg chewable tablet Comments:   Reason for Stopping:               Discharge Plan:  The patient will be d/c'd to home, total hip protocol, WBAT. [facility]. Total joint protocol. Pt safe for homebound transfer, sp Total joint replacement. A walker, bedside commode, and shower chair will be utilized for ADL's. Follow up with Dr. Eunice Rodriguez in 10-12 days. Call with any questions or concerns.

## 2021-05-18 NOTE — ANESTHESIA PREPROCEDURE EVALUATION
Relevant Problems   No relevant active problems       Anesthetic History   No history of anesthetic complications            Review of Systems / Medical History  Patient summary reviewed and pertinent labs reviewed    Pulmonary  Within defined limits                 Neuro/Psych   Within defined limits           Cardiovascular    Hypertension: well controlled          PAD    Exercise tolerance: >4 METS     GI/Hepatic/Renal     GERD: well controlled           Endo/Other      Hypothyroidism: well controlled  Morbid obesity and arthritis     Other Findings              Physical Exam    Airway  Mallampati: III  TM Distance: 4 - 6 cm  Neck ROM: decreased range of motion   Mouth opening: Diminished (comment)     Cardiovascular    Rhythm: regular  Rate: normal         Dental    Dentition: Poor dentition and Upper partial plate     Pulmonary  Breath sounds clear to auscultation               Abdominal  GI exam deferred       Other Findings            Anesthetic Plan    ASA: 3  Anesthesia type: general and regional - lumbar plexus block          Induction: Intravenous  Anesthetic plan and risks discussed with: Patient

## 2021-05-18 NOTE — PROGRESS NOTES
Problem: Mobility Impaired (Adult and Pediatric)  Goal: *Acute Goals and Plan of Care (Insert Text)  Description: Physical Therapy Goals  Initiated 5/18/2021 and to be accomplished within 7 day(s)  1. Patient will move from supine to sit and sit to supine  in bed with modified independence. 2.  Patient will transfer from bed to chair and chair to bed with modified independence using the least restrictive device. 3.  Patient will perform sit to stand with modified independence. 4.  Patient will ambulate with modified independence for 250 feet with the least restrictive device. 5.  Patient will ascend/descend 5 stairs with handrail(s) with modified independence. PLOF: Mod I with rollator    Outcome: Progressing Towards Goal   PHYSICAL THERAPY EVALUATION    Patient: Rowan Gillis (15 y.o. female)  Date: 5/18/2021  Primary Diagnosis: Osteoarthritis of left hip, unspecified osteoarthritis type [M16.12]  Hip arthritis [M16.10]  Procedure(s) (LRB):  LEFT TOTAL HIP ARTHROPLASTY LATERAL APPROACH (Left) Day of Surgery   Precautions: Fall  ASSESSMENT :  WBAT LLE d/t left [place] 5/18/2021. Reports numbness in left thigh. Able to contract left thigh musculature to initiate supine to sit tranfer. Min A for supine to sit. Seated EOB with good balance. Reports nausea. Provided ginger ale, crackers, and cool compress to alleviate symptoms. Symptoms improved with 3 minutes EOB and belching; no emesis. Demonstrates AROM on LLE in seated 50% of full; RLE WFL. Min A for sit to stand. Amb 5ft with personal rollator and min A; LLE buckling and antalgic gait. Seated in recliner with BLE elevated. Educated on need for RN assistance with mobility; verbalized understanding. Call bell in reach. Lives in 2 story home with 6 steps to enter with ; planning for hospital bed delivery for 1st floor set-up. Has personal rollator for amb.      Patient is NOT cleared by PT service for discharge to home d/t safety concerns and increased physical assist required at time of evaluation. Patient will benefit from skilled intervention to address the above impairments. Patient's rehabilitation potential is considered to be Good  Factors which may influence rehabilitation potential include:   []         None noted  []         Mental ability/status  [x]         Medical condition  []         Home/family situation and support systems  []         Safety awareness  []         Pain tolerance/management  []         Other:      PLAN :  Recommendations and Planned Interventions:   [x]           Bed Mobility Training             [x]    Neuromuscular Re-Education  [x]           Transfer Training                   []    Orthotic/Prosthetic Training  [x]           Gait Training                          []    Modalities  [x]           Therapeutic Exercises           []    Edema Management/Control  [x]           Therapeutic Activities            [x]    Family Training/Education  [x]           Patient Education  []           Other (comment):    Frequency/Duration: Patient will be followed by physical therapy 1-2 times per day/4-7 days per week to address goals. Discharge Recommendations: Home Health  Further Equipment Recommendations for Discharge: rollator; has     SUBJECTIVE:   Patient stated I feel better.     OBJECTIVE DATA SUMMARY:     Past Medical History:   Diagnosis Date    AAA (abdominal aortic aneurysm) without rupture ([facility])     AAA repair 7/2018 Dr Juan Quiroga adenomas     2003 Dr Dai Evans; 4/13 Dr Agustín Zavala    Degenerative arthritis of lumbar spine 12/2018    Dr Jules Thompson; mri showed multilevel degen changes, mod spinal stenosis L3-5, mod severe right L4-5 foraminal disease; s/p epidural; isabel    Dyslipidemia     FHx: heart disease     Gastritis 2006    on EGD Dr. Caitie Pearson, neg sprue    Hypertension     Hypothyroidism 2007    Dr. Christopher Austin Hypovitaminosis D     IPMN (intraductal papillary mucinous neoplasm) 06/2018 on mrcp 8mm; no change 10/19, no change 12/20    Lung nodule 06/2016    6 mm RLL (6/16); no change (10/17); no change (12/18)    Obesity     peak weight 187 lbs, bmi 33.1 from 4/13; IF 3/18    Osteoarthrosis, hip 03/2021    Dr Juvenal Baumgarten Osteopenia FRAX 4/12 9.7 and 1.4     DEXA t score 0.6 spine, -1 hip (12/09);  -0.4 spine, -1.3 hip (4/12); -1.2 wrist, -1.6 hip w FRAX 11/2.2 (4/14); -1.0 wrist, -1.6 hip w FRAX 12/2.6 (4/16); -0.7 wrist, -1.6 hip (5/18); -1.1 wrist, -1.8 hip (4/21)    Pancreatitis, chronic ([address] 2018    Psoriasis 2003    on bx Dr. Mary Ellen Roe    Sensorineural hearing loss     LEFT Dr Nicole Juan Venous insufficiency 2003    on dopplers    Zoster 05/2016    LEFT T11-12; mild postherpetic neuralgia     Past Surgical History:   Procedure Laterality Date    HX AAA REPAIR  07/17/2018    HX [address] Left     Dr. Kelvin Gustafson     HX CATARACT REMOVAL Bilateral     HX COLONOSCOPY      benign polyp 5/08 Dr Bibi Tran;  4/13 negative Dr. Myesha Perkins HX GI  08/2018    Dr Emilia Be; ERCP s/p removal cbd stone complicated by pamcreatitis    HX LAP CHOLECYSTECTOMY  09/18/2018    Dr. Pollo Castañeda      neg MRA/[facility]; CT head neg 9/10    CO CARDIAC SURG PROCEDURE UNLIST  09/2016    NST negative    CO CHEST SURGERY PROCEDURE UNLISTED  06/2016    CTA neg PE, 6 mm subpleural nodule     Barriers to Learning/Limitations: None  Compensate with: Visual Cues, Verbal Cues, Tactile Cues and Kinesthetic Cues    Home Situation:  Home Situation  Home Environment: Private residence  # Steps to Enter: 5  Rails to Enter: Yes  One/Two Story Residence: Two story  Living Alone: No  Support Systems: Family member(s)  Patient Expects to be Discharged to[de-identified] Private residence  Current DME Used/Available at Home: None    Critical Behavior:  Neurologic State: Alert  Orientation Level: Oriented X4  Cognition: Follows commands     Psychosocial  Patient Behaviors: Cooperative    Strength:    Manual Muscle Testing (LE)         R     L    Hip Flexion:   5/5  3+/5  Knee EXT:   5/5  3+/5  Knee FLEX:   5/5  3+/5  Ankle DF:   5/5  3+/5  _________________________________________________   Range Of Motion:  BLE AROM WFL  Functional Mobility:  Bed Mobility:  Supine to Sit: Minimum assistance  Transfers:  Sit to Stand: Minimum assistance  Stand to Sit: Minimum assistance  Bed to Chair: Minimum assistance  Balance:   Sitting: Impaired  Sitting - Static: Good (unsupported)  Sitting - Dynamic: Good (unsupported)  Standing: Impaired  Standing - Static: Good  Standing - Dynamic : Fair  Ambulation/Gait Training:  Distance (ft): 5 Feet (ft)   Assistive Device: Walker, rollator  Ambulation - Level of Assistance: Minimal assistance  Neuro Re-education:  Seated EOB 5 minutes  Pain:  Pain level pre-treatment: 0/10   Pain level post-treatment: 0/10     Activity Tolerance:   Good    After treatment:   [x]         Patient left in no apparent distress sitting up in chair  []         Patient left in no apparent distress in bed  [x]         Call bell left within reach  [x]         Nursing notified  []         Caregiver present  []         Bed alarm activated  []         SCDs applied    COMMUNICATION/EDUCATION:   [x]         Role of physical therapy and plan of care in the acute care setting. [x]         Fall prevention education was provided and the patient/caregiver indicated understanding. [x]         Patient/family have participated as able in goal setting and plan of care. []         Patient/family agree to work toward stated goals and plan of care. []         Patient understands intent and goals of therapy, but is neutral about his/her participation. []         Patient is unable to participate in goal setting/plan of care: ongoing with therapy staff.     Thank you for this referral.  Shani Cabral, PT   Time Calculation: 18 mins    Eval Complexity: History: MEDIUM  Complexity : 1-2 comorbidities / personal factors will impact the outcome/ POC Exam:MEDIUM Complexity : 3 Standardized tests and measures addressing body structure, function, activity limitation and / or participation in recreation  Presentation: MEDIUM Complexity : Evolving with changing characteristics  Clinical Decision Making:Medium Complexity   Clinical judgement; ROM, MMT, functional mobility Overall Complexity:MEDIUM

## 2021-05-18 NOTE — INTERVAL H&P NOTE
Update History & Physical    The Patient's History and Physical of May 18,   it was reviewed with the patient and I examined the patient. There was no change. The surgical site was confirmed by the patient and me. Plan:  The risk, benefits, expected outcome, and alternative to the recommended procedure have been discussed with the patient. Patient understands and wants to proceed with the procedure.     Electronically signed by Coco Orozco MD on 5/18/2021 at 10:05 AM

## 2021-05-18 NOTE — ANESTHESIA PROCEDURE NOTES
Peripheral Block    Start time: 5/18/2021 9:35 AM  End time: 5/18/2021 9:48 AM  Performed by: Lm Araya MD  Authorized by: Lm Araya MD       Pre-procedure:    Indications: at surgeon's request and post-op pain management    Preanesthetic Checklist: patient identified, risks and benefits discussed, site marked, timeout performed, anesthesia consent given and patient being monitored    Timeout Time: 09:35          Block Type:   Block Type:  Lumbar plexus  Laterality:  Left  Monitoring:  Standard ASA monitoring, responsive to questions, oxygen, continuous pulse ox, frequent vital sign checks and heart rate  Injection Technique:  Single shot  Procedures: nerve stimulator    Patient Position: right lateral decubitus  Prep: chlorhexidine    Needle Type:  Stimuplex  Needle Gauge:  21 G  Needle Localization:  Nerve stimulator  Motor Response comment:   Motor Response: minimal motor response >0.4 mA   Medication Injected:  FentaNYL citrate (PF) injection sedation initial, 50 mcg  midazolam (VERSED) injection, 2 mg  ropivacaine (NAROPIN) 2 mg/mL (0.2 %) injection, 60 mg  Med Admin Time: 5/18/2021 9:41 AM    Assessment:  Number of attempts:  1  Injection Assessment:  Incremental injection every 5 mL, negative aspiration for CSF, no paresthesia, ultrasound image on chart, low pressure verified by pressure monitor, no intravascular symptoms, negative aspiration for blood and local visualized surrounding nerve on ultrasound  Patient tolerance:  Patient tolerated the procedure well with no immediate complications

## 2021-05-19 ENCOUNTER — HOME HEALTH ADMISSION (OUTPATIENT)
Dept: HOME HEALTH SERVICES | Facility: HOME HEALTH | Age: 81
End: 2021-05-19
Payer: MEDICARE

## 2021-05-19 VITALS
TEMPERATURE: 98.3 F | SYSTOLIC BLOOD PRESSURE: 121 MMHG | HEIGHT: 64 IN | WEIGHT: 170 LBS | BODY MASS INDEX: 29.02 KG/M2 | OXYGEN SATURATION: 93 % | RESPIRATION RATE: 16 BRPM | DIASTOLIC BLOOD PRESSURE: 70 MMHG | HEART RATE: 76 BPM

## 2021-05-19 PROCEDURE — 97535 SELF CARE MNGMENT TRAINING: CPT

## 2021-05-19 PROCEDURE — 74011250636 HC RX REV CODE- 250/636: Performed by: ORTHOPAEDIC SURGERY

## 2021-05-19 PROCEDURE — 97116 GAIT TRAINING THERAPY: CPT

## 2021-05-19 PROCEDURE — 74011250637 HC RX REV CODE- 250/637: Performed by: ORTHOPAEDIC SURGERY

## 2021-05-19 PROCEDURE — 97165 OT EVAL LOW COMPLEX 30 MIN: CPT

## 2021-05-19 RX ADMIN — SODIUM CHLORIDE 75 ML/HR: 900 INJECTION, SOLUTION INTRAVENOUS at 05:35

## 2021-05-19 RX ADMIN — KETOROLAC TROMETHAMINE 15 MG: 15 INJECTION, SOLUTION INTRAMUSCULAR; INTRAVENOUS at 05:35

## 2021-05-19 RX ADMIN — FERROUS SULFATE TAB 325 MG (65 MG ELEMENTAL FE) 325 MG: 325 (65 FE) TAB at 08:04

## 2021-05-19 RX ADMIN — KETOROLAC TROMETHAMINE 15 MG: 15 INJECTION, SOLUTION INTRAMUSCULAR; INTRAVENOUS at 00:18

## 2021-05-19 RX ADMIN — LEVOTHYROXINE SODIUM 75 MCG: 25 TABLET ORAL at 05:35

## 2021-05-19 RX ADMIN — DOCUSATE SODIUM 50MG AND SENNOSIDES 8.6MG 1 TABLET: 8.6; 5 TABLET, FILM COATED ORAL at 08:58

## 2021-05-19 RX ADMIN — ACETAMINOPHEN 1000 MG: 500 TABLET, FILM COATED ORAL at 00:18

## 2021-05-19 RX ADMIN — ACETAMINOPHEN 1000 MG: 500 TABLET, FILM COATED ORAL at 05:35

## 2021-05-19 RX ADMIN — CEFAZOLIN SODIUM 2 G: 2 SOLUTION INTRAVENOUS at 04:00

## 2021-05-19 RX ADMIN — CELECOXIB 200 MG: 100 CAPSULE ORAL at 08:56

## 2021-05-19 RX ADMIN — LISINOPRIL 10 MG: 10 TABLET ORAL at 08:56

## 2021-05-19 RX ADMIN — ASPIRIN 243 MG: 81 TABLET ORAL at 08:58

## 2021-05-19 RX ADMIN — PREGABALIN 25 MG: 25 CAPSULE ORAL at 08:56

## 2021-05-19 NOTE — PROGRESS NOTES
Problem: Self Care Deficits Care Plan (Adult)  Goal: *Acute Goals and Plan of Care (Insert Text)  Outcome: Resolved/Met       OCCUPATIONAL THERAPY EVALUATION/DISCHARGE    Patient: Yecenia Tracey (40 y.o. female)  Date: 5/19/2021  Primary Diagnosis: Osteoarthritis of left hip, unspecified osteoarthritis type [M16.12]  Hip arthritis [M16.10]  Procedure(s) (LRB):  LEFT TOTAL HIP ARTHROPLASTY LATERAL APPROACH (Left) 1 Day Post-Op   Precautions:  Fall, WBAT (LLE)  PLOF: Patient was independent with self-care and used a rollator for functional mobility PTA. ASSESSMENT AND RECOMMENDATIONS:  Patient cleared to participate in skilled OT evaluation by RN. Upon entering the room, the patient was semi-reclined in bed, alert, and agreeable to participate in OT evaluation. Patient educated on the role of OT, WB status, hip precautions, adaptive equipment (reacher, sock aid, long handled sponge, long handled shoe horn, raised toilet seat) with patient demonstrating good understanding. Patient issued adaptive equipment to utilize while following hip precautions, and used during evaluation to get dressed. Patient educated on sleeping with pillow between knees to maintain post-op precautions and provided with a handout of precautions along with techniques/ strategies to follow in order to perform ADL/IADL tasks safely and efficiently. Based on the objective data described below, the patient is able to perform basic self care tasks independently, using AE given after demonstration while seated and with stand by assist standing. The pt presents with good static standing and fair dynamic standing balance, however will defer to PT for functional balance and functional mobility tasks. Pt is motivated to return home and has a supportive spouse at home to assist prn. No further skilled OT needed at this level of care. OT to d/c from caseload. Skilled occupational therapy is not indicated at this time.   Discharge Recommendations: Home Health  Further Equipment Recommendations for Discharge: Patient has all DME; spoke with patient regarding use of rolling walker not rollator     SUBJECTIVE:   Patient stated my son had his hip replaced at a young age but I think its from playing baseketball    OBJECTIVE DATA SUMMARY:     Past Medical History:   Diagnosis Date    AAA (abdominal aortic aneurysm) without rupture ([facility]     AAA repair 7/2018 Dr Valdivia Pry adenomas     2003 Dr Luis E Ulloa; 4/13 Dr Beryle Flavin    Degenerative arthritis of lumbar spine 12/2018    Dr Gina Zaragoza; Shandra Patel showed multilevel degen changes, mod spinal stenosis L3-5, mod severe right L4-5 foraminal disease; s/p epidural; isabel    Dyslipidemia     FHx: heart disease     Gastritis 2006    on EGD Dr. Alba Cancer, neg sprue    Hypertension     Hypothyroidism 2007    Dr. Ella Crouch    Hypovitaminosis D     IPMN (intraductal papillary mucinous neoplasm) 06/2018    on mrcp 8mm; no change 10/19, no change 12/20    Lung nodule 06/2016    6 mm RLL (6/16); no change (10/17); no change (12/18)    Obesity     peak weight 187 lbs, bmi 33.1 from 4/13; IF 3/18    Osteoarthrosis, hip 03/2021    Dr Junaid Kumar    Osteopenia FRAX 4/12 9.7 and 1.4     DEXA t score 0.6 spine, -1 hip (12/09);  -0.4 spine, -1.3 hip (4/12); -1.2 wrist, -1.6 hip w FRAX 11/2.2 (4/14); -1.0 wrist, -1.6 hip w FRAX 12/2.6 (4/16); -0.7 wrist, -1.6 hip (5/18); -1.1 wrist, -1.8 hip (4/21)    Pancreatitis, chronic ([facility] 2018    Psoriasis 2003    on bx Dr. Malcolm Cast    Sensorineural hearing loss     LEFT Dr Ramses Bustillo    Venous insufficiency 2003    on dopplers    Zoster 05/2016    LEFT T11-12; mild postherpetic neuralgia     Past Surgical History:   Procedure Laterality Date    HX AAA REPAIR  07/17/2018    HX Francisca Guido Left     Dr. Stephan Lala CATARACT REMOVAL Bilateral     HX COLONOSCOPY      benign polyp 5/08 Dr Luis E Ulloa;  4/13 negative Dr. Marta Araya GI  08/2018    Dr Carole Vera; ERCP s/p removal cbd stone complicated by pamcreatitis    HX LAP CHOLECYSTECTOMY  09/18/2018    Dr. Itzel Cordova      [facility]; CT head neg 9/10    MD CARDIAC SURG PROCEDURE UNLIST  09/2016    NST negative    MD CHEST SURGERY PROCEDURE UNLISTED  06/2016    CTA neg PE, 6 mm subpleural nodule     Barriers to Learning/Limitations: None  Compensate with: visual, verbal, tactile, kinesthetic cues/model    Home Situation:   Home Situation  Home Environment: Private residence  # Steps to Enter: 5  Rails to Enter: Yes  One/Two Story Residence: Two story  Living Alone: No  Support Systems: Family member(s)  Patient Expects to be Discharged to[de-identified] Private residence  Current DME Used/Available at Home: None  [x]     Right hand dominant   []     Left hand dominant    Cognitive/Behavioral Status:  Neurologic State: Alert  Orientation Level: Oriented X4  Cognition: Follows commands  Safety/Judgement: Fall prevention    Skin: Intact  Edema: None noted    Vision/Perceptual:    Acuity: Within Defined Limits    Corrective Lenses: Glasses    Coordination: BUE     Fine Motor Skills-Upper: Left Intact; Right Intact    Gross Motor Skills-Upper: Left Intact; Right Intact    Balance:  Sitting: Intact  Sitting - Static: Good (unsupported)  Sitting - Dynamic: Good (unsupported)  Standing: Impaired; With support  Standing - Static: Good  Standing - Dynamic : Fair    Strength: BUE  Strength: Generally decreased, functional        Tone & Sensation: BUE    Tone: Normal  Sensation: Intact          Range of Motion: BUE    AROM: Within functional limits            Functional Mobility and Transfers for ADLs:  Bed Mobility:  Supine to Sit: Stand-by assistance; Additional time;Bed Modified  Scooting: Stand-by assistance; Additional time    Transfers:  Sit to Stand: Stand-by assistance  Stand to Sit: Stand-by assistance   Toilet Transfer : Stand-by assistance      ADL Assessment:  Feeding: Independent    Oral Facial Hygiene/Grooming: Independent    Bathing: Modified independent; Adaptive equipment (seated)    Upper Body Dressing: Modified independent    Lower Body Dressing: Stand-by assistance;Modified independent; Adaptive equipment; Additional time (mod I seated; SBA standing)    Toileting: Stand by assistance         ADL Intervention:       Grooming  Grooming Assistance: Independent  Position Performed: Standing  Washing Face: Independent  Washing Hands: Independent  Brushing Teeth: Independent    Upper Body Bathing  Bathing Assistance: Independent  Position Performed: Seated edge of bed    Lower Body Bathing  Bathing Assistance: Modified independent  Position Performed: Seated edge of bed    Upper Body Dressing Assistance  Dressing Assistance: Modified independent  Bra: Modified independent  Pullover Shirt: Modified independent    Lower Body Dressing Assistance  Dressing Assistance: Stand-by assistance;Modified independent  Underpants: Stand-by assistance  Pants With Elastic Waist: Stand-by assistance  Socks: Modified independent  Leg Crossed Method Used: No  Position Performed: Seated edge of bed  Adaptive Equipment Used: Long handled shoe horn;Sock aid;Reacher      Cognitive Retraining  Safety/Judgement: Fall prevention    Pain:  Pain level pre-treatment: 0/10 , at rest  Pain level post-treatment: 3-4/10 , with functional activity  Pain Intervention(s): Medication (see MAR); Response to intervention: Nurse notified, See doc flow    Activity Tolerance:   Patient demonstrated good activity tolerance during OT evaluation. Please refer to the flowsheet for vital signs taken during this treatment.   After treatment:   [x]  Patient left in no apparent distress sitting up in chair  []  Patient left in no apparent distress in bed  [x]  Call bell left within reach  [x]  Nursing notified  []  Caregiver present  []  Bed alarm activated    COMMUNICATION/EDUCATION:   [x]      Role of Occupational Therapy in the acute care setting  [x]      Home safety education was provided and the patient/caregiver indicated understanding. [x]      Patient/family have participated as able and agree with findings and recommendations. []      Patient is unable to participate in plan of care at this time. Thank you for this referral.  Lauren Mclain OTR/L  Time Calculation: 38 mins      Eval Complexity: History: LOW Complexity : Brief history review ; Examination: LOW Complexity : 1-3 performance deficits relating to physical, cognitive , or psychosocial skils that result in activity limitations and / or participation restrictions ;    Decision Making:LOW Complexity : No comorbidities that affect functional and no verbal or physical assistance needed to complete eval tasks

## 2021-05-19 NOTE — PROGRESS NOTES
Observation notice provided in writing to patient and/or caregiver as well as verbal explanation of the policy. Patients who are in outpatient status also receive the Observation notice. Signed copy placed on chart.      ANDREW Harding, RN  Pager # [phone]  Care Manager

## 2021-05-19 NOTE — DISCHARGE INSTRUCTIONS
DISCHARGE SUMMARY from Nurse    PATIENT INSTRUCTIONS:    After general anesthesia or intravenous sedation, for 24 hours or while taking prescription Narcotics:  · Limit your activities  · Do not drive and operate hazardous machinery  · Do not make important personal or business decisions  · Do  not drink alcoholic beverages  · If you have not urinated within 8 hours after discharge, please contact your surgeon on call. Report the following to your surgeon:  · Excessive pain, swelling, redness or odor of or around the surgical area  · Temperature over 100.5  · Nausea and vomiting lasting longer than 4 hours or if unable to take medications  · Any signs of decreased circulation or nerve impairment to extremity: change in color, persistent  numbness, tingling, coldness or increase pain  · Any questions    What to do at Home:  Recommended activity: {discharge activity:43363},as tolerated, no heavy lifting    Patient {ARMBANDS:20124}  Patient {ARMBANDS:20124}    DISCHARGE SUMMARY from Nurse    PATIENT INSTRUCTIONS:    After general anesthesia or intravenous sedation, for 24 hours or while taking prescription Narcotics:  Limit your activities  Do not drive and operate hazardous machinery  Do not make important personal or business decisions  Do  not drink alcoholic beverages  If you have not urinated within 8 hours after discharge, please contact your surgeon on call.     Report the following to your surgeon:  Excessive pain, swelling, redness or odor of or around the surgical area  Temperature over 100.5  Nausea and vomiting lasting longer than 4 hours or if unable to take medications  Any signs of decreased circulation or nerve impairment to extremity: change in color, persistent  numbness, tingling, coldness or increase pain  Any questions    What to do at Home:  Recommended activity: Activity as tolerated,     If you experience any of the following symptoms elevated temperatue notify doctorr    *  Please give a list of your current medications to your Primary Care Provider. *  Please update this list whenever your medications are discontinued, doses are      changed, or new medications (including over-the-counter products) are added. *  Please carry medication information at all times in case of emergency situations. These are general instructions for a healthy lifestyle:    No smoking/ No tobacco products/ Avoid exposure to second hand smoke  Surgeon General's Warning:  Quitting smoking now greatly reduces serious risk to your health. Obesity, smoking, and sedentary lifestyle greatly increases your risk for illness    A healthy diet, regular physical exercise & weight monitoring are important for maintaining a healthy lifestyle    You may be retaining fluid if you have a history of heart failure or if you experience any of the following symptoms:  Weight gain of 3 pounds or more overnight or 5 pounds in a week, increased swelling in our hands or feet or shortness of breath while lying flat in bed. Please call your doctor as soon as you notice any of these symptoms; do not wait until your next office visit. The discharge information has been reviewed with the patient. The patient verbalized understanding. Discharge medications reviewed with the patient and appropriate educational materials and side effects teaching were provided. ___________________________________________________________________________________________________________________________________  If you experience any of the following symptoms elevated temperaure notify doctor    *  Please give a list of your current medications to your Primary Care Provider. *  Please update this list whenever your medications are discontinued, doses are      changed, or new medications (including over-the-counter products) are added. *  Please carry medication information at all times in case of emergency situations.     These are general instructions for a healthy lifestyle:    No smoking/ No tobacco products/ Avoid exposure to second hand smoke  Surgeon General's Warning:  Quitting smoking now greatly reduces serious risk to your health. Obesity, smoking, and sedentary lifestyle greatly increases your risk for illness    A healthy diet, regular physical exercise & weight monitoring are important for maintaining a healthy lifestyle    You may be retaining fluid if you have a history of heart failure or if you experience any of the following symptoms:  Weight gain of 3 pounds or more overnight or 5 pounds in a week, increased swelling in our hands or feet or shortness of breath while lying flat in bed. Please call your doctor as soon as you notice any of these symptoms; do not wait until your next office visit. The discharge information has been reviewed with the patient. The patient verbalized understanding. Discharge medications reviewed with the patient and appropriate educational materials and side effects teaching were provided.   ___________________________________________________________________________________________________________________________________

## 2021-05-19 NOTE — PROGRESS NOTES
Problem: Mobility Impaired (Adult and Pediatric)  Goal: *Acute Goals and Plan of Care (Insert Text)  Description: Physical Therapy Goals  Initiated 5/18/2021 and to be accomplished within 7 day(s)  1. Patient will move from supine to sit and sit to supine  in bed with modified independence. 2.  Patient will transfer from bed to chair and chair to bed with modified independence using the least restrictive device. 3.  Patient will perform sit to stand with modified independence. 4.  Patient will ambulate with modified independence for 250 feet with the least restrictive device. 5.  Patient will ascend/descend 5 stairs with handrail(s) with modified independence. PLOF: Mod I with rollator    Outcome: Progressing Towards Goal   PHYSICAL THERAPY TREATMENT    Patient: Chung Multani (49 y.o. female)  Date: 5/19/2021  Diagnosis: Osteoarthritis of left hip, unspecified osteoarthritis type [M16.12]  Hip arthritis [M16.10]   Procedure(s) (LRB):  LEFT TOTAL HIP ARTHROPLASTY LATERAL APPROACH (Left) 1 Day Post-Op  Precautions: Fall, WBAT (LLE)  ASSESSMENT:  Seated in recliner, fully dressed. Supervision for sit to stand. Amb 200ft with personal rollator and supervision. Uses appropriate safety with rollator and brakes. Completed 4 steps with supervision, non-reciprocal with bilateral handrails. Returned to seated in recliner. Educated on car transfers, mobility at home, and role of home health PT; verbalized understanding. Educated on need for RN assistance with mobility; verbalized understanding. Call bell in reach. Patient is cleared by PT service for discharge to home with home health services.      Progression toward goals:   [x]      Improving appropriately and progressing toward goals  []      Improving slowly and progressing toward goals  []      Not making progress toward goals and plan of care will be adjusted     PLAN:  Patient continues to benefit from skilled intervention to address the above impairments. Continue treatment per established plan of care. Discharge Recommendations:  Home Health  Further Equipment Recommendations for Discharge:  rollator; has     SUBJECTIVE:   Patient stated I wanted to ask you about the car.     OBJECTIVE DATA SUMMARY:   Critical Behavior:  Neurologic State: Alert  Orientation Level: Oriented X4  Cognition: Follows commands  Safety/Judgement: Fall prevention  Psychosocial  Patient Behaviors: Calm; Cooperative    Functional Mobility:  Transfers:  Sit to Stand: Supervision  Stand to Sit: Supervision  Balance:   Sitting: Intact  Sitting - Static: Good (unsupported)  Sitting - Dynamic: Good (unsupported)  Standing: Impaired  Standing - Static: Good  Standing - Dynamic : Good  Ambulation/Gait Training:  Distance (ft): 200 Feet (ft)   Assistive Device: Walker, rollator  Ambulation - Level of Assistance: Supervision  Stairs:   Level of Assistance: Supervision  Rail Use: both  Number of Stairs: 4    Pain:  Pain level pre-treatment: 0/10  Pain level post-treatment: 0/10     Activity Tolerance:   Good    After treatment:   [x] Patient left in no apparent distress sitting up in chair  [] Patient left in no apparent distress in bed  [x] Call bell left within reach  [x] Nursing notified  [] Caregiver present  [] Bed alarm activated  [] SCDs applied      COMMUNICATION/EDUCATION:   [x]         Role of physical therapy in the acute care setting. [x]         Fall prevention education was provided and the patient/caregiver indicated understanding. [x]         Patient/family have participated as able in working toward goals and plan of care. [x]         Patient/family agree to work toward stated goals and plan of care. []         Patient understands intent and goals of therapy, but is neutral about his/her participation. []         Patient is unable to participate in stated goals/plan of care: ongoing with therapy staff.       Nat Howard PT   Time Calculation: 10 mins

## 2021-05-19 NOTE — PROGRESS NOTES
Virtual reviewer communicated change to CM, which reflect outpatient observation order written prior to Written discharge order. Code 44 delivered and given to patient. CM met with the patient and provided to the patient the printed information about her outpatient observation status. The patient was given the flyer entitled, \"Medicare Outpatient Observation: Information & notification. \" All questions were answered, no additional discharge needs identified at this time and patient expects to return to their after discharge today. Copy provided to patient.     NIDIA VitalN, RN  Pager # [phone]  Care Manager

## 2021-05-19 NOTE — PROGRESS NOTES
Discharge order noted for today. Referral sent to [facility] agency and spoke with Thai. . Met with patient and agreeable to the transition plan today. Transport has been arranged with spouse Patient's discharge summary and home health  orders have been forwarded to [id number] via [employer]. Updated bedside RN, Blanche Resendiz,  to the transition plan. Discharge information has been documented on the AVS.     Reason for Admission:  Osteoarthritis of left hip, unspecified osteoarthritis type [M16.12]  Hip arthritis [M16.10]                 RUR Score:  6%           Plan for utilizing home health: [facility]                      Likelihood of Readmission:   LOW                         Transition of Care Plan:              Initial assessment completed with patient. Cognitive status of patient: oriented to time, place, person and situation. Face sheet information confirmed:  yes. The patient designates Rolan Hudson to participate in her discharge plan and to receive any needed information. This patient lives in a single family home with spouse. Patient was able to navigate steps as needed with assistive device. Prior to hospitalization, patient was considered to be independent with ADLs/IADLS : yes . Patient has a current ACP document on file: no      Healthcare Decision Maker:  no    The patient and spouse will be available to transport patient home upon discharge. The patient already has Barney Bernardino, Shower chair, and raised toilet seat medical equipment available in the home. Patient is not currently active with home health. Patient has not stayed in a skilled nursing facility or rehab. This patient is on dialysis :no    List of available Home Health agencies were provided and reviewed with the patient prior to discharge. Freedom of choice signed: yes, for verbal consent for [facility] and referral placed in [facility].      Currently, the discharge plan is Home with Home Health. The patient states that she can obtain her medications from the pharmacy, and take her medications as directed.     Patient's current insurance is Medicare Part A & B and        Care Management Interventions  PCP Verified by CM: No  Mode of Transport at Discharge: Self  Transition of Care Consult (CM Consult): Discharge Planning, [address]: Yes  Discharge Durable Medical Equipment: No  Physical Therapy Consult: Yes  Occupational Therapy Consult: Yes  Speech Therapy Consult: No  Current Support Network: Lives with Spouse  Confirm Follow Up Transport: Self  The Plan for Transition of Care is Related to the Following Treatment Goals : home with home health  The Patient and/or Patient Representative was Provided with a Choice of Provider and Agrees with the Discharge Plan?: Yes  Freedom of Choice List was Provided with Basic Dialogue that Supports the Patient's Individualized Plan of Care/Goals, Treatment Preferences and Shares the Quality Data Associated with the Providers?: Yes  Discharge Location  Discharge Placement: Home with home health    ANDREW Johnson, RN  Pager # [phone]  Care Manager

## 2021-05-19 NOTE — HOME CARE
Received  referral for SN,PT, Frankie Hip protocol; Discharge order noted for today ; Verified demographics ,explained [facility] services and answered all questions ; patient states her  Bri Vasquez) will be assisting her at home after discharge;pt states she has DME: has RW,BSC,shower bench,cane and a Rollator ; [facility] referral processed and arranged through [facility] central intake. OLEG YOUSIF.

## 2021-05-19 NOTE — ROUTINE PROCESS
Bedside and Verbal shift change report given to Hilda Gandhi RN (oncoming nurse) by Tory Borrego RN (offgoing nurse). Report included the following information Kardex, OR Summary and MAR.     0700    End of Shift Note     Bedside and verbal shift change report given to Tory Borrego RN (On coming nurse) by Hilda Gandhi RN (Off going nurse).   Report included the following information:      --Procedure Summary     --MAR,     --Recent Results     --Med Rec Status    SBAR Recommendations: none    Issues for Provider to address none          Activity This Shift     [] Bed Rest Order   [] Refused   [] Dangled    [] TDWB         Ambulating:     [x] Bathroom     [] BSC     [] Room/Hallway      Up in Chair for meals    [x]Yes [] No   Voiding       [x] Yes  [] No  Ivy          [] Yes  [x] No  Incontinent [] Yes  [x] No    DUE TO VOID done POUR        [] Yes [x] No  Purewick    [] Yes [x] No  New Onset [] Yes [x] No Straight Cath   []Yes  [x] No  Condom Cath  [] Yes [x] No  MD Called      [] Yes  [x] No   Blood Sugars Managed []Yes [x] No    Bowels Moved [] Yes [x] No    Incontinent     [] Yes [] No Passed Gas []Yes [x] No    New Onset  []Yes [x] No        MD Called []Yes  [x] No     CHG Bath Done     Before Surgery     After Surgery      [x] Yes  [] No  [x] Yes  [] No       Drain Removed [] Yes  [] No [x] N/A    Dressing Changed [] Yes   [x] No [] N/A      Nausea/Vomiting [] Yes   [x] No     Ice Packs Changed [x] Yes   [] No  [] N/A    Incentive Spirometer  [x] Yes  [] No      SCD Pumps On     Ankle Pumping  [x] Yes   [] No      [] Yes   [x] No        Telemetry Monitoring [] Yes   [x] No   Rhythm N/A

## 2021-05-19 NOTE — PROGRESS NOTES
Ortho    Pt. Seen and evaluated. Doing well, pain well controlled, progressing well with PT  Denies cp, sob, abd pain    Visit Vitals  /70 (BP 1 Location: Left upper arm)   Pulse 76   Temp 98.3 °F (36.8 °C)   Resp 16   Ht 5' 4\" (1.626 m)   Wt 170 lb (77.1 kg)   SpO2 93%   BMI 29.18 kg/m²       left total hip replacement  left hip Woundclean, dry, no drainage  Sensory intact to LT  Motor intact  nv intact  Neg calf tenderness. Labs.   CBC  @  CBC:   Lab Results   Component Value Date/Time    WBC 5.8 05/07/2021 09:04 AM    RBC 4.78 05/07/2021 09:04 AM    HGB 14.7 05/07/2021 09:04 AM    HCT 45.5 (H) 05/07/2021 09:04 AM    PLATELET 276 54/90/4096 09:04 AM    and BMP:   Lab Results   Component Value Date/Time    Glucose 89 05/07/2021 09:04 AM    Sodium 140 05/07/2021 09:04 AM    Potassium 4.2 05/07/2021 09:04 AM    Chloride 108 05/07/2021 09:04 AM    CO2 29 05/07/2021 09:04 AM    BUN 23 (H) 05/07/2021 09:04 AM    Creatinine 1.05 05/07/2021 09:04 AM    Calcium 9.5 05/07/2021 09:04 AM   @  Coagulation  Lab Results   Component Value Date    INR 1.0 05/07/2021    APTT 25.8 05/07/2021      Basic Metabolic Profile  Lab Results   Component Value Date     05/07/2021    CO2 29 05/07/2021    BUN 23 (H) 05/07/2021         Assesment:leftOrthopedic / Rheumatologic: Total Hip Replacement  Past Medical History:   Diagnosis Date    AAA (abdominal aortic aneurysm) without rupture [facility])     AAA repair 7/2018 Dr Remy Huffman adenomas     2003 Dr Benny Osgood; 4/13 Dr Seth Cea    Degenerative arthritis of lumbar spine 12/2018    Dr Sukumar Kelsey; mri showed multilevel degen changes, mod spinal stenosis L3-5, mod severe right L4-5 foraminal disease; s/p epidural; isabel    Dyslipidemia     FHx: heart disease     Gastritis 2006    on EGD Dr. Leanna Beltran, neg sprue    Hypertension     Hypothyroidism 2007    Dr. Priscila Dahl Hypovitaminosis D     IPMN (intraductal papillary mucinous neoplasm) 06/2018    on mrcp 8mm; no change 10/19, no change 12/20    Lung nodule 06/2016    6 mm RLL (6/16); no change (10/17); no change (12/18)    Obesity     peak weight 187 lbs, bmi 33.1 from 4/13; IF 3/18    Osteoarthrosis, hip 03/2021    Dr Marie Shoulder Osteopenia FRAX 4/12 9.7 and 1.4     DEXA t score 0.6 spine, -1 hip (12/09);  -0.4 spine, -1.3 hip (4/12); -1.2 wrist, -1.6 hip w FRAX 11/2.2 (4/14); -1.0 wrist, -1.6 hip w FRAX 12/2.6 (4/16); -0.7 wrist, -1.6 hip (5/18); -1.1 wrist, -1.8 hip (4/21)    Pancreatitis, chronic ([facility] [place] [address] 2018    Psoriasis 2003    on bx Dr. Gaspar Large Sensorineural hearing loss     LEFT Dr Valentino Riding Venous insufficiency 2003    on dopplers    Zoster 05/2016    LEFT T11-12; mild postherpetic neuralgia     ASA: 3    Plan:  aspirin, PT, DC to home if cleared by PT and ok with medicine.

## 2021-05-19 NOTE — PROGRESS NOTES
conducted an initial consultation and Spiritual Assessment for David Tan, who is a 78 y.o.,female. Patients Primary Language is: [place]. According to the patients EMR Episcopalian Affiliation is: Yazidi. The reason the Patient came to the hospital is:   Patient Active Problem List    Diagnosis Date Noted    Hip arthritis 05/18/2021    S/P AAA repair 08/23/2018    Spondylosis of lumbar region without myelopathy or radiculopathy 04/16/2018    Primary hypertension 09/07/2016    Advance directive discussed with patient 04/14/2016    Obesity (BMI 30.0-34.9) 04/11/2016    Hypothyroidism due to acquired atrophy of thyroid 10/07/2015    Arthritis, degenerative 10/07/2015    Dyslipidemia 10/01/2013    Osteopenia FRAX 4/14 11 and 2.2 04/02/2013    Hypovitaminosis D 10/24/2011    Venous insufficiency dopplers 2003 10/24/2011    Colon adenomas 2003, last 2014 Dr Tavia Hood provided the following Interventions:  Initiated a relationship of care and support. Explored issues of michael, belief, spirituality and Samaritan/ritual needs while hospitalized. Listened empathically. Provided chaplaincy education. Provided information about Spiritual Care Services. Offered prayer and assurance of continued prayers on patient's behalf. Patient received holy communion. Chart reviewed. Patient has no AMD and was not interested at this time. The following outcomes where achieved:  Patient shared limited information about both her medical narrative and spiritual journey/beliefs.  confirmed Patient's Episcopalian Affiliation with [facility]. Patient processed feeling about current hospitalization. Patient expressed gratitude for 's visit. Assessment:  Patient does not have any Samaritan/cultural needs that will affect patients preferences in health care. There are no spiritual or Samaritan issues which require intervention at this time. Plan:  Chaplains will continue to follow and will provide pastoral care on an as needed/requested basis.  recommends bedside caregivers page  on duty if patient shows signs of acute spiritual or emotional distress.     [address] [facility]   [phone]

## 2021-05-19 NOTE — ACP (ADVANCE CARE PLANNING)
Advance Care Planning     General Advance Care Planning (ACP) Conversation      Date of Conversation: 5/19/2021  Conducted with: Patient with Decision Making Capacity    Healthcare Decision Maker: no      Today we discussed Healthcare Decision Makers. The patient is considering options. Content/Action Overview:   DECLINED ACP conversation - will revisit periodically      ANDREW Montoya, RN  Pager # [phone]  Care Manager

## 2021-05-20 ENCOUNTER — HOME CARE VISIT (OUTPATIENT)
Dept: SCHEDULING | Facility: HOME HEALTH | Age: 81
End: 2021-05-20
Payer: MEDICARE

## 2021-05-20 ENCOUNTER — VIRTUAL VISIT (OUTPATIENT)
Dept: INTERNAL MEDICINE CLINIC | Age: 81
End: 2021-05-20
Payer: MEDICARE

## 2021-05-20 ENCOUNTER — HOME CARE VISIT (OUTPATIENT)
Dept: HOME HEALTH SERVICES | Facility: HOME HEALTH | Age: 81
End: 2021-05-20
Payer: MEDICARE

## 2021-05-20 VITALS
RESPIRATION RATE: 17 BRPM | HEART RATE: 77 BPM | SYSTOLIC BLOOD PRESSURE: 130 MMHG | TEMPERATURE: 99 F | OXYGEN SATURATION: 93 % | DIASTOLIC BLOOD PRESSURE: 70 MMHG

## 2021-05-20 DIAGNOSIS — R19.5 DARK STOOLS: ICD-10-CM

## 2021-05-20 DIAGNOSIS — M19.90 OSTEOARTHRITIS, UNSPECIFIED OSTEOARTHRITIS TYPE, UNSPECIFIED SITE: ICD-10-CM

## 2021-05-20 DIAGNOSIS — Z96.649 STATUS POST HIP REPLACEMENT, UNSPECIFIED LATERALITY: Primary | ICD-10-CM

## 2021-05-20 DIAGNOSIS — R19.7 DIARRHEA, UNSPECIFIED TYPE: ICD-10-CM

## 2021-05-20 DIAGNOSIS — R10.13 DYSPEPSIA: ICD-10-CM

## 2021-05-20 PROCEDURE — 400018 HH-NO PAY CLAIM PROCEDURE

## 2021-05-20 PROCEDURE — 3331090001 HH PPS REVENUE CREDIT

## 2021-05-20 PROCEDURE — G8427 DOCREV CUR MEDS BY ELIG CLIN: HCPCS | Performed by: INTERNAL MEDICINE

## 2021-05-20 PROCEDURE — 99496 TRANSJ CARE MGMT HIGH F2F 7D: CPT | Performed by: INTERNAL MEDICINE

## 2021-05-20 PROCEDURE — 3331090002 HH PPS REVENUE DEBIT

## 2021-05-20 PROCEDURE — 400013 HH SOC

## 2021-05-20 PROCEDURE — G0151 HHCP-SERV OF PT,EA 15 MIN: HCPCS

## 2021-05-20 PROCEDURE — A6212 FOAM DRG <=16 SQ IN W/BORDER: HCPCS

## 2021-05-20 PROCEDURE — G0299 HHS/HOSPICE OF RN EA 15 MIN: HCPCS

## 2021-05-20 RX ORDER — OMEPRAZOLE 40 MG/1
40 CAPSULE, DELAYED RELEASE ORAL DAILY
Qty: 90 CAPSULE | Refills: 3 | Status: SHIPPED | OUTPATIENT
Start: 2021-05-20 | End: 2021-10-13

## 2021-05-20 RX ORDER — OXYCODONE AND ACETAMINOPHEN 5; 325 MG/1; MG/1
1 TABLET ORAL
Qty: 28 TABLET | Refills: 0 | Status: SHIPPED | OUTPATIENT
Start: 2021-05-20 | End: 2021-05-27

## 2021-05-20 RX ORDER — ONDANSETRON 4 MG/1
4 TABLET, FILM COATED ORAL
Qty: 30 TABLET | Refills: 2 | Status: SHIPPED | OUTPATIENT
Start: 2021-05-20 | End: 2021-10-13

## 2021-05-20 NOTE — PROGRESS NOTES
Sadie Dakins is a 78 y.o. female who was seen by synchronous (real-time) audio-video technology on 5/20/2021 for Transitions Of Care and Diarrhea        Assessment & Plan:   Diagnoses and all orders for this visit:    1. Status post hip replacement, unspecified laterality    2. Osteoarthritis, unspecified osteoarthritis type, unspecified site    3. Dark stools  -     AMB POC FECAL BLOOD, OCCULT, QL 1 CARD    4. Diarrhea, unspecified type        I spent at least 15 minutes on this visit with this established patient. 712  Subjective:     Objective:   No flowsheet data found. General: alert, cooperative, no distress   Mental  status: normal mood, behavior, speech, dress, motor activity, and thought processes, able to follow commands   HENT: NCAT   Neck: no visualized mass   Resp: no respiratory distress   Neuro: no gross deficits   Skin: no discoloration or lesions of concern on visible areas   Psychiatric: normal affect, consistent with stated mood, no evidence of hallucinations     Additional exam findings: We discussed the expected course, resolution and complications of the diagnosis(es) in detail. Medication risks, benefits, costs, interactions, and alternatives were discussed as indicated. I advised her to contact the office if her condition worsens, changes or fails to improve as anticipated. She expressed understanding with the diagnosis(es) and plan. Sadie Dakins, was evaluated through a synchronous (real-time) audio-video encounter. The patient (or guardian if applicable) is aware that this is a billable service. Verbal consent to proceed has been obtained within the past 12 months. The visit was conducted pursuant to the emergency declaration under the [address] [place] [address] authority and the [employer] [place] Act.   Patient identification was verified, and a caregiver was present when appropriate. The patient was located in a state where the provider was credentialed to provide care. Wilfrid Roberto MD    Patient being seen today for transitional care management    Patient was admitted to [address] [place] [address] from 5/18-19/21                I thoroughly reviewed the discharge summary, notes, consults, labs and imaging studies in the electronic record. Pertinent details are summarized below and the record has been updated to reflect recent events    She successfully underwent left total hip replacement lateral approach by Dr. Niesha Galvan without complications. Since discharge, she has been having loose stools and apparently passing dark to black stool. She has had some stomach upset but denied any actual abdominal pain per se, nausea, vomiting. She has a history of gastritis, has been taking aspirin and Celebrex. They talked to GARRETT Ventura this morning who told him to stop the aspirin and Celebrex. Currently not taking PPI.     Past Medical History:   Diagnosis Date    AAA (abdominal aortic aneurysm) without rupture (HCC)     AAA repair 7/2018 Dr Bryon edge     2003 Dr Edelmira Ledesma; 4/13 Dr Veto Genao    Degenerative arthritis of lumbar spine 12/2018    Dr Lissy Cortes; mri showed multilevel degen changes, mod spinal stenosis L3-5, mod severe right L4-5 foraminal disease; s/p epidural; isabel    Dyslipidemia     FHx: heart disease     Gastritis 2006    on EGD Dr. Avani Merino, neg sprue    Hypertension     Hypothyroidism 2007    Dr. Kassie Hernández Hypovitaminosis D     IPMN (intraductal papillary mucinous neoplasm) 06/2018    on mrcp 8mm; no change 10/19, no change 12/20    Lung nodule 06/2016    6 mm RLL (6/16); no change (10/17); no change (12/18)    Obesity     peak weight 187 lbs, bmi 33.1 from 4/13; IF 3/18    Osteopenia FRAX 4/12 9.7 and 1.4     DEXA t score 0.6 spine, -1 hip (12/09);  -0.4 spine, -1.3 hip (4/12); -1.2 wrist, -1.6 hip w FRAX 11/2.2 (4/14); -1.0 wrist, -1.6 hip w FRAX 12/2.6 (4/16); -0.7 wrist, -1.6 hip (5/18); -1.1 wrist, -1.8 hip (4/21)    Pancreatitis, chronic ([facility] [address] 2018    Psoriasis 2003    on bx Dr. Shreya Kelly hearing loss     LEFT Dr Kelly Bueno Venous insufficiency 2003    on dopplers    Zoster 05/2016    LEFT T11-12; mild postherpetic neuralgia     Past Surgical History:   Procedure Laterality Date    HX AAA REPAIR  07/17/2018    HX [place] Left     Dr. Fiorella Ovalles     HX CATARACT REMOVAL Bilateral     HX COLONOSCOPY      benign polyp 5/08 Dr Erika Glez;  4/13 negative Dr. Stan Walter HX GI  08/2018    Dr Bryanna Moody; ERCP s/p removal cbd stone complicated by pamcreatitis    HX LAP CHOLECYSTECTOMY  09/18/2018    Dr. Cait Vargas      neg MRA/MRI head [facility] 2007; CT head neg 9/10    [place] CARDIAC SURG PROCEDURE UNLIST  09/2016    NST negative    [place] CHEST SURGERY PROCEDURE UNLISTED  06/2016    CTA neg PE, 6 mm subpleural nodule     Social History     Socioeconomic History    Marital status:      Spouse name: Not on file    Number of children: Not on file    Years of education: Not on file    Highest education level: Not on file   Occupational History    Occupation: ret office mgr   Tobacco Use    Smoking status: Never Smoker    Smokeless tobacco: Never Used   Vaping Use    Vaping Use: Never used   Substance and Sexual Activity    Alcohol use: Yes     Comment: occasionally    Drug use: No    Sexual activity: Not on file   Other Topics Concern    Not on file   Social History Narrative    Not on file     Social Determinants of Health     Financial Resource Strain:     Difficulty of Paying Living Expenses:    Food Insecurity:     Worried About Running Out of Food in the Last Year:     Ran Out of Food in the Last Year:    Transportation Needs:     Lack of Transportation (Medical):      Lack of Transportation (Non-Medical):    Physical Activity:     Days of Exercise per Week:     Minutes of Exercise per Session:    Stress:     Feeling of Stress :    Social Connections:     Frequency of Communication with Friends and Family:     Frequency of Social Gatherings with Friends and Family:     Attends Congregation Services:     Active Member of Clubs or Organizations:     Attends Club or Organization Meetings:     Marital Status:    Intimate Partner Violence:     Fear of Current or Ex-Partner:     Emotionally Abused:     Physically Abused:     Sexually Abused:      Current Outpatient Medications   Medication Sig    omeprazole (PRILOSEC) 40 mg capsule Take 1 Capsule by mouth daily.  oxyCODONE-acetaminophen (Percocet) 5-325 mg per tablet Take 1 Tablet by mouth every six (6) hours as needed for Pain for up to 7 days. Max Daily Amount: 4 Tablets.  ondansetron hcl (Zofran) 4 mg tablet Take 1 Tablet by mouth every eight (8) hours as needed for Nausea or Vomiting.  oxyCODONE-acetaminophen (Percocet) 5-325 mg per tablet Take 1-2 Tabs by mouth every six (6) hours as needed for Pain for up to 7 days. Max Daily Amount: 8 Tabs.  docusate sodium (Colace) 100 mg capsule Take 1 Cap by mouth two (2) times a day for 90 days.  aspirin (ASPIRIN) 325 mg tablet Take 1 Tab by mouth two (2) times a day.  celecoxib (CeleBREX) 200 mg capsule Take 1 Cap by mouth two (2) times a day for 90 days.  cephALEXin (Keflex) 500 mg capsule Take 1 Cap by mouth four (4) times daily.  atorvastatin (LIPITOR) 40 mg tablet Take 1 Tab by mouth daily.  ezetimibe (ZETIA) 10 mg tablet Take 1 Tab by mouth daily.  Levothyroxine 75 mcg cap Take 1 Tab by mouth daily.  lisinopriL (PRINIVIL, ZESTRIL) 10 mg tablet Take 1 Tab by mouth daily.  lidocaine (LIDODERM) 5 % Apply patch to the affected area for 12 hours a day and remove for 12 hours a day.  diazePAM (VALIUM) 10 mg tablet Take 1 Tab by mouth every twelve (12) hours as needed for Anxiety.  Max Daily Amount: 20 mg.    ergocalciferol (VITAMIN D2) 50,000 unit capsule Take 1 Cap by mouth every seven (7) days. No current facility-administered medications for this visit. Allergies   Allergen Reactions    Atenolol Other (comments)     Felt poorly    Cozaar [Losartan] Other (comments)     Headaches    Norvasc [Amlodipine] Other (comments)     [place] poorly       Assessment and plan:  1. Status post left total hip replacement. Follow-up orthopedics  2. Diarrhea and loose stools. Agree with stopping Celebrex and aspirin. Start empiric PPI. Check stool cards and if positive, will get CBC and refer to GI. Further recommendations pending outcomes,  is seeing pt. They are to go to the ER if acutely worsening sx        Above conditions discussed at length and patient vocalized understanding.   All questions answered to patient satisfaction

## 2021-05-21 ENCOUNTER — HOME CARE VISIT (OUTPATIENT)
Dept: HOME HEALTH SERVICES | Facility: HOME HEALTH | Age: 81
End: 2021-05-21
Payer: MEDICARE

## 2021-05-21 VITALS
TEMPERATURE: 98.6 F | HEART RATE: 94 BPM | SYSTOLIC BLOOD PRESSURE: 118 MMHG | DIASTOLIC BLOOD PRESSURE: 68 MMHG | RESPIRATION RATE: 18 BRPM | OXYGEN SATURATION: 96 %

## 2021-05-21 PROCEDURE — 3331090001 HH PPS REVENUE CREDIT

## 2021-05-21 PROCEDURE — G0157 HHC PT ASSISTANT EA 15: HCPCS

## 2021-05-21 PROCEDURE — 3331090002 HH PPS REVENUE DEBIT

## 2021-05-22 ENCOUNTER — HOME CARE VISIT (OUTPATIENT)
Dept: SCHEDULING | Facility: HOME HEALTH | Age: 81
End: 2021-05-22
Payer: MEDICARE

## 2021-05-22 VITALS
TEMPERATURE: 97.5 F | DIASTOLIC BLOOD PRESSURE: 64 MMHG | RESPIRATION RATE: 14 BRPM | OXYGEN SATURATION: 95 % | SYSTOLIC BLOOD PRESSURE: 102 MMHG | HEART RATE: 81 BPM

## 2021-05-22 VITALS
HEART RATE: 89 BPM | DIASTOLIC BLOOD PRESSURE: 62 MMHG | OXYGEN SATURATION: 95 % | TEMPERATURE: 98 F | SYSTOLIC BLOOD PRESSURE: 104 MMHG | RESPIRATION RATE: 17 BRPM

## 2021-05-22 PROCEDURE — 3331090001 HH PPS REVENUE CREDIT

## 2021-05-22 PROCEDURE — 3331090002 HH PPS REVENUE DEBIT

## 2021-05-22 PROCEDURE — G0157 HHC PT ASSISTANT EA 15: HCPCS

## 2021-05-23 ENCOUNTER — HOME CARE VISIT (OUTPATIENT)
Dept: SCHEDULING | Facility: HOME HEALTH | Age: 81
End: 2021-05-23
Payer: MEDICARE

## 2021-05-23 PROCEDURE — G0157 HHC PT ASSISTANT EA 15: HCPCS

## 2021-05-23 PROCEDURE — 3331090002 HH PPS REVENUE DEBIT

## 2021-05-23 PROCEDURE — 3331090001 HH PPS REVENUE CREDIT

## 2021-05-24 ENCOUNTER — HOME CARE VISIT (OUTPATIENT)
Dept: SCHEDULING | Facility: HOME HEALTH | Age: 81
End: 2021-05-24
Payer: MEDICARE

## 2021-05-24 ENCOUNTER — TELEPHONE (OUTPATIENT)
Dept: INTERNAL MEDICINE CLINIC | Age: 81
End: 2021-05-24

## 2021-05-24 VITALS
OXYGEN SATURATION: 93 % | SYSTOLIC BLOOD PRESSURE: 118 MMHG | RESPIRATION RATE: 14 BRPM | DIASTOLIC BLOOD PRESSURE: 70 MMHG | HEART RATE: 72 BPM | TEMPERATURE: 98.7 F

## 2021-05-24 PROCEDURE — 3331090001 HH PPS REVENUE CREDIT

## 2021-05-24 PROCEDURE — 3331090002 HH PPS REVENUE DEBIT

## 2021-05-24 PROCEDURE — G0157 HHC PT ASSISTANT EA 15: HCPCS

## 2021-05-24 NOTE — TELEPHONE ENCOUNTER
. Ruel Elliscarlton is calling stating he picked up a stool kit last week and she has not be able to go. She has been taking Colace but that isn't helping. Any other recommendations?

## 2021-05-24 NOTE — TELEPHONE ENCOUNTER
Pts  aware of message below and verbalized understanding. No further questions or concerns from pts  at this time.

## 2021-05-24 NOTE — TELEPHONE ENCOUNTER
miralax to effect  Ok to hold off on checking stool card as unlikely she's bleeding internally when she's constipated (blood makes the stools go)

## 2021-05-25 ENCOUNTER — APPOINTMENT (OUTPATIENT)
Dept: GENERAL RADIOLOGY | Age: 81
End: 2021-05-25
Attending: EMERGENCY MEDICINE
Payer: MEDICARE

## 2021-05-25 ENCOUNTER — APPOINTMENT (OUTPATIENT)
Dept: CT IMAGING | Age: 81
End: 2021-05-25
Attending: EMERGENCY MEDICINE
Payer: MEDICARE

## 2021-05-25 ENCOUNTER — HOSPITAL ENCOUNTER (EMERGENCY)
Age: 81
Discharge: HOME | End: 2021-05-26
Attending: EMERGENCY MEDICINE
Payer: MEDICARE

## 2021-05-25 ENCOUNTER — HOME CARE VISIT (OUTPATIENT)
Dept: HOME HEALTH SERVICES | Facility: HOME HEALTH | Age: 81
End: 2021-05-25
Payer: MEDICARE

## 2021-05-25 ENCOUNTER — HOME CARE VISIT (OUTPATIENT)
Dept: SCHEDULING | Facility: HOME HEALTH | Age: 81
End: 2021-05-25
Payer: MEDICARE

## 2021-05-25 ENCOUNTER — TELEPHONE (OUTPATIENT)
Dept: INTERNAL MEDICINE CLINIC | Age: 81
End: 2021-05-25

## 2021-05-25 DIAGNOSIS — K59.00 CONSTIPATION, UNSPECIFIED CONSTIPATION TYPE: Primary | ICD-10-CM

## 2021-05-25 DIAGNOSIS — N39.0 URINARY TRACT INFECTION WITHOUT HEMATURIA, SITE UNSPECIFIED: ICD-10-CM

## 2021-05-25 LAB
ALBUMIN SERPL-MCNC: 3 G/DL (ref 3.4–5)
ALBUMIN/GLOB SERPL: 0.8 {RATIO} (ref 0.8–1.7)
ALP SERPL-CCNC: 190 U/L (ref 45–117)
ALT SERPL-CCNC: 48 U/L (ref 13–56)
ANION GAP SERPL CALC-SCNC: 7 MMOL/L (ref 3–18)
AST SERPL-CCNC: 52 U/L (ref 10–38)
BASOPHILS # BLD: 0 K/UL (ref 0–0.1)
BASOPHILS NFR BLD: 0 % (ref 0–2)
BILIRUB SERPL-MCNC: 0.6 MG/DL (ref 0.2–1)
BUN SERPL-MCNC: 22 MG/DL (ref 7–18)
BUN/CREAT SERPL: 18 (ref 12–20)
CALCIUM SERPL-MCNC: 8.8 MG/DL (ref 8.5–10.1)
CHLORIDE SERPL-SCNC: 104 MMOL/L (ref 100–111)
CO2 SERPL-SCNC: 26 MMOL/L (ref 21–32)
CREAT SERPL-MCNC: 1.19 MG/DL (ref 0.6–1.3)
DIFFERENTIAL METHOD BLD: ABNORMAL
EOSINOPHIL # BLD: 0 K/UL (ref 0–0.4)
EOSINOPHIL NFR BLD: 0 % (ref 0–5)
ERYTHROCYTE [DISTWIDTH] IN BLOOD BY AUTOMATED COUNT: 13.9 % (ref 11.6–14.5)
GLOBULIN SER CALC-MCNC: 3.9 G/DL (ref 2–4)
GLUCOSE SERPL-MCNC: 111 MG/DL (ref 74–99)
HCT VFR BLD AUTO: 36.1 % (ref 35–45)
HGB BLD-MCNC: 12 G/DL (ref 12–16)
LIPASE SERPL-CCNC: 76 U/L (ref 73–393)
LYMPHOCYTES # BLD: 1.7 K/UL (ref 0.9–3.6)
LYMPHOCYTES NFR BLD: 15 % (ref 21–52)
MCH RBC QN AUTO: 31 PG (ref 24–34)
MCHC RBC AUTO-ENTMCNC: 33.2 G/DL (ref 31–37)
MCV RBC AUTO: 93.3 FL (ref 74–97)
MONOCYTES # BLD: 1 K/UL (ref 0.05–1.2)
MONOCYTES NFR BLD: 8 % (ref 3–10)
NEUTS SEG # BLD: 9 K/UL (ref 1.8–8)
NEUTS SEG NFR BLD: 76 % (ref 40–73)
PLATELET # BLD AUTO: 286 K/UL (ref 135–420)
PMV BLD AUTO: 9.8 FL (ref 9.2–11.8)
POTASSIUM SERPL-SCNC: 4 MMOL/L (ref 3.5–5.5)
PROT SERPL-MCNC: 6.9 G/DL (ref 6.4–8.2)
RBC # BLD AUTO: 3.87 M/UL (ref 4.2–5.3)
SODIUM SERPL-SCNC: 137 MMOL/L (ref 136–145)
WBC # BLD AUTO: 11.9 K/UL (ref 4.6–13.2)

## 2021-05-25 PROCEDURE — G0300 HHS/HOSPICE OF LPN EA 15 MIN: HCPCS

## 2021-05-25 PROCEDURE — 80053 COMPREHEN METABOLIC PANEL: CPT

## 2021-05-25 PROCEDURE — 85025 COMPLETE CBC W/AUTO DIFF WBC: CPT

## 2021-05-25 PROCEDURE — 96375 TX/PRO/DX INJ NEW DRUG ADDON: CPT

## 2021-05-25 PROCEDURE — 74177 CT ABD & PELVIS W/CONTRAST: CPT

## 2021-05-25 PROCEDURE — 96374 THER/PROPH/DIAG INJ IV PUSH: CPT

## 2021-05-25 PROCEDURE — 74011000636 HC RX REV CODE- 636: Performed by: EMERGENCY MEDICINE

## 2021-05-25 PROCEDURE — 74011250636 HC RX REV CODE- 250/636: Performed by: EMERGENCY MEDICINE

## 2021-05-25 PROCEDURE — 3331090002 HH PPS REVENUE DEBIT

## 2021-05-25 PROCEDURE — 83690 ASSAY OF LIPASE: CPT

## 2021-05-25 PROCEDURE — 3331090001 HH PPS REVENUE CREDIT

## 2021-05-25 PROCEDURE — 99283 EMERGENCY DEPT VISIT LOW MDM: CPT

## 2021-05-25 RX ORDER — ONDANSETRON 2 MG/ML
4 INJECTION INTRAMUSCULAR; INTRAVENOUS
Status: COMPLETED | OUTPATIENT
Start: 2021-05-25 | End: 2021-05-25

## 2021-05-25 RX ORDER — MORPHINE SULFATE 2 MG/ML
2 INJECTION, SOLUTION INTRAMUSCULAR; INTRAVENOUS
Status: COMPLETED | OUTPATIENT
Start: 2021-05-25 | End: 2021-05-25

## 2021-05-25 RX ADMIN — IOPAMIDOL 65 ML: 612 INJECTION, SOLUTION INTRAVENOUS at 22:07

## 2021-05-25 RX ADMIN — ONDANSETRON 4 MG: 2 INJECTION INTRAMUSCULAR; INTRAVENOUS at 22:48

## 2021-05-25 RX ADMIN — MORPHINE SULFATE 2 MG: 2 INJECTION, SOLUTION INTRAMUSCULAR; INTRAVENOUS at 22:48

## 2021-05-25 NOTE — TELEPHONE ENCOUNTER
Pt is still having a lot of pain from her surgery. Surgeon told her to stop the med because it is causing her severe constipation. Wants to know what you would recommend? Should he take her to ER to get a shot for pain? Would shots cause constipation?

## 2021-05-26 ENCOUNTER — HOME CARE VISIT (OUTPATIENT)
Dept: SCHEDULING | Facility: HOME HEALTH | Age: 81
End: 2021-05-26
Payer: MEDICARE

## 2021-05-26 VITALS
HEART RATE: 75 BPM | DIASTOLIC BLOOD PRESSURE: 67 MMHG | WEIGHT: 170 LBS | BODY MASS INDEX: 29.18 KG/M2 | RESPIRATION RATE: 20 BRPM | SYSTOLIC BLOOD PRESSURE: 151 MMHG | TEMPERATURE: 98.5 F | OXYGEN SATURATION: 97 %

## 2021-05-26 LAB
APPEARANCE UR: ABNORMAL
BACTERIA URNS QL MICRO: ABNORMAL /HPF
BILIRUB UR QL: NEGATIVE
COLOR UR: YELLOW
EPITH CASTS URNS QL MICRO: ABNORMAL /LPF (ref 0–5)
GLUCOSE UR STRIP.AUTO-MCNC: NEGATIVE MG/DL
HGB UR QL STRIP: ABNORMAL
KETONES UR QL STRIP.AUTO: 15 MG/DL
LEUKOCYTE ESTERASE UR QL STRIP.AUTO: ABNORMAL
NITRITE UR QL STRIP.AUTO: NEGATIVE
PH UR STRIP: 6.5 [PH] (ref 5–8)
PROT UR STRIP-MCNC: 100 MG/DL
RBC #/AREA URNS HPF: ABNORMAL /HPF (ref 0–5)
SP GR UR REFRACTOMETRY: >1.03 (ref 1–1.03)
UROBILINOGEN UR QL STRIP.AUTO: 1 EU/DL (ref 0.2–1)
WBC URNS QL MICRO: ABNORMAL /HPF (ref 0–4)

## 2021-05-26 PROCEDURE — 74011250637 HC RX REV CODE- 250/637: Performed by: EMERGENCY MEDICINE

## 2021-05-26 PROCEDURE — 3331090001 HH PPS REVENUE CREDIT

## 2021-05-26 PROCEDURE — 81001 URINALYSIS AUTO W/SCOPE: CPT

## 2021-05-26 PROCEDURE — 3331090002 HH PPS REVENUE DEBIT

## 2021-05-26 PROCEDURE — G0157 HHC PT ASSISTANT EA 15: HCPCS

## 2021-05-26 RX ORDER — NITROFURANTOIN 25; 75 MG/1; MG/1
100 CAPSULE ORAL ONCE
Status: COMPLETED | OUTPATIENT
Start: 2021-05-26 | End: 2021-05-26

## 2021-05-26 RX ORDER — NITROFURANTOIN 25; 75 MG/1; MG/1
100 CAPSULE ORAL 2 TIMES DAILY
Status: DISCONTINUED | OUTPATIENT
Start: 2021-05-26 | End: 2021-05-26

## 2021-05-26 RX ORDER — NITROFURANTOIN MACROCRYSTALS 50 MG/1
100 CAPSULE ORAL ONCE
Status: DISCONTINUED | OUTPATIENT
Start: 2021-05-26 | End: 2021-05-26 | Stop reason: ALTCHOICE

## 2021-05-26 RX ORDER — NITROFURANTOIN (MACROCRYSTALS) 100 MG/1
100 CAPSULE ORAL 2 TIMES DAILY
Qty: 14 CAPSULE | Refills: 0 | Status: SHIPPED | OUTPATIENT
Start: 2021-05-26 | End: 2021-06-02

## 2021-05-26 RX ADMIN — NITROFURANTOIN MONOHYDRATE/MACROCRYSTALLINE 100 MG: 25; 75 CAPSULE ORAL at 00:59

## 2021-05-26 NOTE — ED NOTES
Written and verbal discharge instructions given. Patient verbalizes understanding of same. Patient denies  further questions about treatment and discharge instructions. Left ED with patent airway and steady gait via W/C. Arm band removed shredded.  driving patient home.

## 2021-05-26 NOTE — TELEPHONE ENCOUNTER
Would suggest miralax for the constipation  Dulcolax if needed  All narc/ pain meds can cause constipation

## 2021-05-26 NOTE — ED NOTES
Called to room by . Wife feels urge to defecate. Upon entering room, pt inc urine and stool. Up to [facility] for very large formed BM.  Cleansed and back to bed reporting pain in abd better, now \"4-5\"

## 2021-05-26 NOTE — ED TRIAGE NOTES
Total hip replacement Tues May 18; states LLQ abdominal pain with decreased BMs and sense of rectal fullness/pressure. States presence of stool in rectal area; no relief with prune juice, miralax, fleets enema. States going small amounts.   States some stool dark but pt states no visible bleeding

## 2021-05-26 NOTE — TELEPHONE ENCOUNTER
Jagdeep Ramirez Dyson wanted to let Dr. Angélica Burns know he took pt to ED yesterday where they completed a CT scan and it showed she had lots of blockage. States pt is home and has been having frequent BMs but is inquiring if she can take something for diarrhea or should she let it take its course. Please advise.

## 2021-05-26 NOTE — ED PROVIDER NOTES
HPI patient is a 18-year-old female who is post right hip replacement. She presents to ER with complaint having had vomited 3 days and is constipated. She also complained to have mild pain with movement of the right hip. No other complaints. She states she is severe constipated and have moderate abdominal pain.     Past Medical History:   Diagnosis Date    AAA (abdominal aortic aneurysm) without rupture (HCC)     AAA repair 7/2018 Dr Tristin Walker adenomas     2003 Dr Nickolas Michael; 4/13 Dr Lazaro Mcmahon    Degenerative arthritis of lumbar spine 12/2018    Dr Josue Garcia; mri showed multilevel degen changes, mod spinal stenosis L3-5, mod severe right L4-5 foraminal disease; s/p epidural; isabel    Dyslipidemia     FHx: heart disease     Gastritis 2006    on EGD Dr. Radha Dorsey, neg sprue    Hypertension     Hypothyroidism 2007    Dr. Ludivina Rush Hypovitaminosis D     IPMN (intraductal papillary mucinous neoplasm) 06/2018    on mrcp 8mm; no change 10/19, no change 12/20    Lung nodule 06/2016    6 mm RLL (6/16); no change (10/17); no change (12/18)    Obesity     peak weight 187 lbs, bmi 33.1 from 4/13; IF 3/18    Osteopenia FRAX 4/12 9.7 and 1.4     DEXA t score 0.6 spine, -1 hip (12/09);  -0.4 spine, -1.3 hip (4/12); -1.2 wrist, -1.6 hip w FRAX 11/2.2 (4/14); -1.0 wrist, -1.6 hip w FRAX 12/2.6 (4/16); -0.7 wrist, -1.6 hip (5/18); -1.1 wrist, -1.8 hip (4/21)    Pancreatitis, chronic ([address] 2018    Psoriasis 2003    on bx Dr. Tyler Ruiz    Sensorineural hearing loss     LEFT Dr Arvella Rubinstein Venous insufficiency 2003    on dopplers    Zoster 05/2016    LEFT T11-12; mild postherpetic neuralgia       Past Surgical History:   Procedure Laterality Date    HX AAA REPAIR  07/17/2018    HX [place] Left     Dr. Evans Ryan     HX CATARACT REMOVAL Bilateral     HX COLONOSCOPY      benign polyp 5/08 Dr Nickolas Michael;  4/13 negative Dr. Trisha Bledsoe HX GI  08/2018    Dr Ronny Parson; ERCP s/p removal cbd stone complicated by pamcreatitis    HX HIP REPLACEMENT Left 05/2021    Dr Gara Severs  09/18/2018    Dr. Imani Nice      neg [facility]RA[facility]; CT head neg 9/10    [place] CARDIAC SURG PROCEDURE UNLIST  09/2016    NST negative    [place] CHEST SURGERY PROCEDURE UNLISTED  06/2016    CTA neg PE, 6 mm subpleural nodule         Family History:   Problem Relation Age of Onset    Breast Cancer Mother     Cancer Mother     Heart Disease Father        Social History     Socioeconomic History    Marital status:      Spouse name: Not on file    Number of children: Not on file    Years of education: Not on file    Highest education level: Not on file   Occupational History    Occupation: ret office mgr   Tobacco Use    Smoking status: Never Smoker    Smokeless tobacco: Never Used   Vaping Use    Vaping Use: Never used   Substance and Sexual Activity    Alcohol use: Yes     Comment: occasionally    Drug use: No    Sexual activity: Not on file   Other Topics Concern    Not on file   Social History Narrative    Not on file     Social Determinants of Health     Financial Resource Strain:     Difficulty of Paying Living Expenses:    Food Insecurity:     Worried About Running Out of Food in the Last Year:     Ran Out of Food in the Last Year:    Transportation Needs:     Lack of Transportation (Medical):      Lack of Transportation (Non-Medical):    Physical Activity:     Days of Exercise per Week:     Minutes of Exercise per Session:    Stress:     Feeling of Stress :    Social Connections:     Frequency of Communication with Friends and Family:     Frequency of Social Gatherings with Friends and Family:     Attends Church Services:     Active Member of Clubs or Organizations:     Attends Club or Organization Meetings:     Marital Status:    Intimate Partner Violence:     Fear of Current or Ex-Partner:     Emotionally Abused:     Physically Abused:     Sexually Abused: ALLERGIES: Atenolol, Cozaar [losartan], and Norvasc [amlodipine]    Review of Systems   Constitutional: Negative. HENT: Negative. Eyes: Negative. Respiratory: Negative. Cardiovascular: Negative. Gastrointestinal: Positive for abdominal pain and constipation. Endocrine: Negative. Genitourinary: Negative. Musculoskeletal: Negative. LEFT HIP: PAIN   Skin: Negative. Allergic/Immunologic: Negative. Neurological: Negative. Hematological: Negative. Psychiatric/Behavioral: Negative. All other systems reviewed and are negative. Vitals:    05/25/21 2027 05/26/21 0048   BP: 121/65 (!) 151/67   Pulse: 84 75   Resp: 28 20   Temp: 99.1 °F (37.3 °C) 98.5 °F (36.9 °C)   SpO2: 97% 97%   Weight: 77.1 kg (170 lb)             Physical Exam  Vitals and nursing note reviewed. Constitutional:       General: She is not in acute distress. Appearance: She is well-developed. She is not diaphoretic. HENT:      Head: Normocephalic. Right Ear: External ear normal.      Left Ear: External ear normal.      Mouth/Throat:      Pharynx: No oropharyngeal exudate. Eyes:      General: No scleral icterus. Right eye: No discharge. Left eye: No discharge. Conjunctiva/sclera: Conjunctivae normal.      Pupils: Pupils are equal, round, and reactive to light. Neck:      Thyroid: No thyromegaly. Vascular: No JVD. Trachea: No tracheal deviation. Cardiovascular:      Rate and Rhythm: Normal rate and regular rhythm. Heart sounds: Normal heart sounds. No murmur heard. No friction rub. No gallop. Pulmonary:      Effort: Pulmonary effort is normal. No respiratory distress. Breath sounds: Normal breath sounds. No stridor. No wheezing or rales. Chest:      Chest wall: No tenderness. Abdominal:      General: Bowel sounds are normal. There is no distension.       Palpations: Abdomen is soft. There is no mass. Tenderness: There is abdominal tenderness (minimal). There is no guarding or rebound. Hernia: No hernia is present. Genitourinary:     Comments: Rectal exam: (+) hard stool  Musculoskeletal:         General: No tenderness. Normal range of motion. Cervical back: Normal range of motion and neck supple. Lymphadenopathy:      Cervical: No cervical adenopathy. Skin:     General: Skin is warm and dry. Coloration: Skin is not pale. Findings: No erythema or rash. Neurological:      Mental Status: She is alert and oriented to person, place, and time. Cranial Nerves: No cranial nerve deficit. Motor: No abnormal muscle tone. Coordination: Coordination normal.      Deep Tendon Reflexes: Reflexes normal.        Left hip: dressing intact. Minimal discomfort with leg movement. [facility]  ED Course as of May 26 0212   Tue May 25, 2021   2356 URINALYSIS W/ RFLX MICROSCOPIC [KB]      ED Course User Index  [KB] Mary Ann Garcia MD     Lab tests: Interpreted by me    CT scan of the abdomen and pelvis: Interpreted by me  (+) constipation      Procedures    Differential diagnosis: Small bowel obstruction, constipation, infected surgical wound    Hospital course: Patient was worked up for constipation/bowel obstruction. CT scan showed patient had severe constipation. After having a digital examination 30 minutes later patient thought she had to go to the bathroom. Patient had a big bowel movement and bedside commode x2. Abdominal pain resolved. .    Reassess patient prior to discharge: Patient asymptomatic    Dx: constipation, post left total hip replacement     Disp: D/C home. F/U PCP in 2 days. Continue current medication (colace and fibers). Return to ER prn.

## 2021-05-26 NOTE — TELEPHONE ENCOUNTER
She did not have 'blockages\" - she was FOS per the CT  Need to clear it all out so nothing for 'diarrhea'

## 2021-05-27 ENCOUNTER — HOME CARE VISIT (OUTPATIENT)
Dept: SCHEDULING | Facility: HOME HEALTH | Age: 81
End: 2021-05-27
Payer: MEDICARE

## 2021-05-27 VITALS
TEMPERATURE: 98.1 F | DIASTOLIC BLOOD PRESSURE: 58 MMHG | OXYGEN SATURATION: 96 % | HEART RATE: 75 BPM | SYSTOLIC BLOOD PRESSURE: 97 MMHG

## 2021-05-27 VITALS
HEART RATE: 67 BPM | DIASTOLIC BLOOD PRESSURE: 64 MMHG | OXYGEN SATURATION: 96 % | SYSTOLIC BLOOD PRESSURE: 106 MMHG | TEMPERATURE: 98.2 F

## 2021-05-27 PROCEDURE — 3331090001 HH PPS REVENUE CREDIT

## 2021-05-27 PROCEDURE — 3331090002 HH PPS REVENUE DEBIT

## 2021-05-27 PROCEDURE — G0151 HHCP-SERV OF PT,EA 15 MIN: HCPCS

## 2021-05-28 ENCOUNTER — HOME CARE VISIT (OUTPATIENT)
Dept: SCHEDULING | Facility: HOME HEALTH | Age: 81
End: 2021-05-28
Payer: MEDICARE

## 2021-05-28 PROCEDURE — 3331090002 HH PPS REVENUE DEBIT

## 2021-05-28 PROCEDURE — 3331090001 HH PPS REVENUE CREDIT

## 2021-05-28 PROCEDURE — G0157 HHC PT ASSISTANT EA 15: HCPCS

## 2021-05-29 ENCOUNTER — HOME CARE VISIT (OUTPATIENT)
Dept: SCHEDULING | Facility: HOME HEALTH | Age: 81
End: 2021-05-29
Payer: MEDICARE

## 2021-05-29 PROCEDURE — 3331090001 HH PPS REVENUE CREDIT

## 2021-05-29 PROCEDURE — G0157 HHC PT ASSISTANT EA 15: HCPCS

## 2021-05-29 PROCEDURE — 3331090002 HH PPS REVENUE DEBIT

## 2021-05-30 ENCOUNTER — HOME CARE VISIT (OUTPATIENT)
Dept: SCHEDULING | Facility: HOME HEALTH | Age: 81
End: 2021-05-30
Payer: MEDICARE

## 2021-05-30 PROCEDURE — 3331090001 HH PPS REVENUE CREDIT

## 2021-05-30 PROCEDURE — 3331090002 HH PPS REVENUE DEBIT

## 2021-05-30 PROCEDURE — G0157 HHC PT ASSISTANT EA 15: HCPCS

## 2021-05-31 ENCOUNTER — HOME CARE VISIT (OUTPATIENT)
Dept: SCHEDULING | Facility: HOME HEALTH | Age: 81
End: 2021-05-31
Payer: MEDICARE

## 2021-05-31 VITALS
SYSTOLIC BLOOD PRESSURE: 124 MMHG | HEART RATE: 67 BPM | DIASTOLIC BLOOD PRESSURE: 73 MMHG | RESPIRATION RATE: 13 BRPM | HEART RATE: 64 BPM | DIASTOLIC BLOOD PRESSURE: 66 MMHG | RESPIRATION RATE: 14 BRPM | OXYGEN SATURATION: 96 % | TEMPERATURE: 98.4 F | SYSTOLIC BLOOD PRESSURE: 126 MMHG | TEMPERATURE: 98.1 F | OXYGEN SATURATION: 95 %

## 2021-05-31 VITALS
TEMPERATURE: 98.3 F | SYSTOLIC BLOOD PRESSURE: 132 MMHG | OXYGEN SATURATION: 97 % | HEART RATE: 88 BPM | RESPIRATION RATE: 17 BRPM | DIASTOLIC BLOOD PRESSURE: 78 MMHG

## 2021-05-31 PROCEDURE — G0157 HHC PT ASSISTANT EA 15: HCPCS

## 2021-05-31 PROCEDURE — 3331090002 HH PPS REVENUE DEBIT

## 2021-05-31 PROCEDURE — 3331090001 HH PPS REVENUE CREDIT

## 2021-06-01 ENCOUNTER — TELEPHONE (OUTPATIENT)
Dept: ORTHOPEDIC SURGERY | Age: 81
End: 2021-06-01

## 2021-06-01 ENCOUNTER — HOME CARE VISIT (OUTPATIENT)
Dept: SCHEDULING | Facility: HOME HEALTH | Age: 81
End: 2021-06-01
Payer: MEDICARE

## 2021-06-01 ENCOUNTER — HOME CARE VISIT (OUTPATIENT)
Dept: HOME HEALTH SERVICES | Facility: HOME HEALTH | Age: 81
End: 2021-06-01
Payer: MEDICARE

## 2021-06-01 VITALS
OXYGEN SATURATION: 97 % | SYSTOLIC BLOOD PRESSURE: 119 MMHG | HEART RATE: 75 BPM | RESPIRATION RATE: 18 BRPM | DIASTOLIC BLOOD PRESSURE: 83 MMHG | TEMPERATURE: 97 F

## 2021-06-01 DIAGNOSIS — M16.12 PRIMARY OSTEOARTHRITIS OF LEFT HIP: Primary | ICD-10-CM

## 2021-06-01 PROCEDURE — 3331090002 HH PPS REVENUE DEBIT

## 2021-06-01 PROCEDURE — G0300 HHS/HOSPICE OF LPN EA 15 MIN: HCPCS

## 2021-06-01 PROCEDURE — 3331090001 HH PPS REVENUE CREDIT

## 2021-06-01 PROCEDURE — G0157 HHC PT ASSISTANT EA 15: HCPCS

## 2021-06-01 RX ORDER — OXYCODONE AND ACETAMINOPHEN 5; 325 MG/1; MG/1
1 TABLET ORAL
Qty: 42 TABLET | Refills: 0 | Status: SHIPPED | OUTPATIENT
Start: 2021-06-01 | End: 2021-06-08

## 2021-06-01 NOTE — TELEPHONE ENCOUNTER
Patient called in requesting a refill on oxyCODONE-acetaminophen (Percocet) 5-325 mg per tablet. Patient confirmed [address] on file.     Patient's contact is [phone]

## 2021-06-02 ENCOUNTER — HOME CARE VISIT (OUTPATIENT)
Dept: SCHEDULING | Facility: HOME HEALTH | Age: 81
End: 2021-06-02
Payer: MEDICARE

## 2021-06-02 VITALS
SYSTOLIC BLOOD PRESSURE: 116 MMHG | HEART RATE: 68 BPM | DIASTOLIC BLOOD PRESSURE: 80 MMHG | TEMPERATURE: 97.5 F | OXYGEN SATURATION: 95 %

## 2021-06-02 PROCEDURE — 3331090002 HH PPS REVENUE DEBIT

## 2021-06-02 PROCEDURE — 3331090001 HH PPS REVENUE CREDIT

## 2021-06-02 PROCEDURE — G0157 HHC PT ASSISTANT EA 15: HCPCS

## 2021-06-03 PROCEDURE — 3331090002 HH PPS REVENUE DEBIT

## 2021-06-03 PROCEDURE — 3331090001 HH PPS REVENUE CREDIT

## 2021-06-04 ENCOUNTER — OFFICE VISIT (OUTPATIENT)
Dept: ORTHOPEDIC SURGERY | Age: 81
End: 2021-06-04
Payer: MEDICARE

## 2021-06-04 ENCOUNTER — HOME CARE VISIT (OUTPATIENT)
Dept: SCHEDULING | Facility: HOME HEALTH | Age: 81
End: 2021-06-04
Payer: MEDICARE

## 2021-06-04 VITALS — BODY MASS INDEX: 29.02 KG/M2 | HEIGHT: 64 IN | WEIGHT: 170 LBS

## 2021-06-04 VITALS
DIASTOLIC BLOOD PRESSURE: 65 MMHG | TEMPERATURE: 98.2 F | SYSTOLIC BLOOD PRESSURE: 120 MMHG | HEART RATE: 76 BPM | OXYGEN SATURATION: 95 %

## 2021-06-04 DIAGNOSIS — Z96.642 STATUS POST LEFT HIP REPLACEMENT: Primary | ICD-10-CM

## 2021-06-04 DIAGNOSIS — M25.552 LEFT HIP PAIN: ICD-10-CM

## 2021-06-04 PROCEDURE — 99024 POSTOP FOLLOW-UP VISIT: CPT | Performed by: PHYSICIAN ASSISTANT

## 2021-06-04 PROCEDURE — G0151 HHCP-SERV OF PT,EA 15 MIN: HCPCS

## 2021-06-04 PROCEDURE — 3331090001 HH PPS REVENUE CREDIT

## 2021-06-04 PROCEDURE — 3331090002 HH PPS REVENUE DEBIT

## 2021-06-04 NOTE — PROGRESS NOTES
[address] [place] [address]  [phone]           Patient: Idalia Robbins                MRN: [id number]       SSN: [id number]  YOB: 1940        AGE: 78 y.o. SEX: female  Body mass index is 29.18 kg/m². PCP: Deborah Leigh MD  06/04/21      This office note has been dictated. REVIEW OF SYSTEMS:  Constitutional: Negative for fever, chills, weight loss and malaise/fatigue. HENT: Negative. Eyes: Negative. Respiratory: Negative. Cardiovascular: Negative. Gastrointestinal: No bowel incontinence or constipation. Genitourinary: No bladder incontinence or saddle anesthesia. Skin: Negative. Neurological: Negative. Endo/Heme/Allergies: Negative. Psychiatric/Behavioral: Negative. Musculoskeletal: As per HPI above.      Past Medical History:   Diagnosis Date    AAA (abdominal aortic aneurysm) without rupture (HCC)     AAA repair 7/2018 Dr Isidoro Infante adenomas     2003 Dr Lazarus Peterson; 4/13 Dr Johnathon Willis    Degenerative arthritis of lumbar spine 12/2018    Dr Vivi Molina; mri showed multilevel degen changes, mod spinal stenosis L3-5, mod severe right L4-5 foraminal disease; s/p epidural; isabel    Dyslipidemia     FHx: heart disease     Gastritis 2006    on EGD Dr. Lilly Dumas, neg sprue    Hypertension     Hypothyroidism 2007    Dr. Israel Tidwell Hypovitaminosis D     IPMN (intraductal papillary mucinous neoplasm) 06/2018    on mrcp 8mm; no change 10/19, no change 12/20    Lung nodule 06/2016    6 mm RLL (6/16); no change (10/17); no change (12/18)    Obesity     peak weight 187 lbs, bmi 33.1 from 4/13; IF 3/18    Osteopenia FRAX 4/12 9.7 and 1.4     DEXA t score 0.6 spine, -1 hip (12/09);  -0.4 spine, -1.3 hip (4/12); -1.2 wrist, -1.6 hip w FRAX 11/2.2 (4/14); -1.0 wrist, -1.6 hip w FRAX 12/2.6 (4/16); -0.7 wrist, -1.6 hip (5/18); -1.1 wrist, -1.8 hip (4/21)    Pancreatitis, chronic ([address] 2018    Psoriasis 2003    on bx Dr. Dina Rosales hearing loss     LEFT Dr Jennifer Chatterjee Venous insufficiency 2003    on dopplers    Zoster 05/2016    LEFT T11-12; mild postherpetic neuralgia         Current Outpatient Medications:     oxyCODONE-acetaminophen (Percocet) 5-325 mg per tablet, Take 1 Tablet by mouth every four (4) hours as needed for Pain for up to 7 days. Max Daily Amount: 6 Tablets. , Disp: 42 Tablet, Rfl: 0    ondansetron hcl (Zofran) 4 mg tablet, Take 1 Tablet by mouth every eight (8) hours as needed for Nausea or Vomiting., Disp: 30 Tablet, Rfl: 2    omeprazole (PRILOSEC) 40 mg capsule, Take 1 Capsule by mouth daily. , Disp: 90 Capsule, Rfl: 3    docusate sodium (Colace) 100 mg capsule, Take 1 Cap by mouth two (2) times a day for 90 days. , Disp: 60 Cap, Rfl: 2    atorvastatin (LIPITOR) 40 mg tablet, Take 1 Tab by mouth daily. , Disp: 90 Tab, Rfl: 3    ezetimibe (ZETIA) 10 mg tablet, Take 1 Tab by mouth daily. , Disp: 90 Tab, Rfl: 3    Levothyroxine 75 mcg cap, Take 1 Tab by mouth daily. , Disp: 90 Cap, Rfl: 3    lisinopriL (PRINIVIL, ZESTRIL) 10 mg tablet, Take 1 Tab by mouth daily. , Disp: 90 Tab, Rfl: 3    lidocaine (LIDODERM) 5 %, Apply patch to the affected area for 12 hours a day and remove for 12 hours a day., Disp: 90 Patch, Rfl: 1    diazePAM (VALIUM) 10 mg tablet, Take 1 Tab by mouth every twelve (12) hours as needed for Anxiety. Max Daily Amount: 20 mg., Disp: 60 Tab, Rfl: 1    ergocalciferol (VITAMIN D2) 50,000 unit capsule, Take 1 Cap by mouth every seven (7) days. , Disp: 12 Cap, Rfl: 3    Allergies   Allergen Reactions    Atenolol Other (comments)     Felt poorly    Cozaar [Losartan] Other (comments)     Headaches    Norvasc [Amlodipine] Other (comments)     Felt poorly         Social History     Socioeconomic History    Marital status:      Spouse name: Not on file    Number of children: Not on file    Years of education: Not on file    Highest education level: Not on file   Occupational History    Occupation: ret office mgr   Tobacco Use    Smoking status: Never Smoker    Smokeless tobacco: Never Used   Vaping Use    Vaping Use: Never used   Substance and Sexual Activity    Alcohol use: Yes     Comment: occasionally    Drug use: No    Sexual activity: Not on file   Other Topics Concern    Not on file   Social History Narrative    Not on file     Social Determinants of Health     Financial Resource Strain:     Difficulty of Paying Living Expenses:    Food Insecurity:     Worried About Running Out of Food in the Last Year:     [address] in the Last Year:    Transportation Needs:     Lack of Transportation (Medical):      Lack of Transportation (Non-Medical):    Physical Activity:     Days of Exercise per Week:     Minutes of Exercise per Session:    Stress:     Feeling of Stress :    Social Connections:     Frequency of Communication with Friends and Family:     Frequency of Social Gatherings with Friends and Family:     Attends [employer] Services:     Active Member of Clubs or Organizations:     Attends Club or Organization Meetings:     Marital Status:    Intimate Partner Violence:     Fear of Current or Ex-Partner:     Emotionally Abused:     Physically Abused:     Sexually Abused:        Past Surgical History:   Procedure Laterality Date    HX AAA REPAIR  07/17/2018    HX Amanda Lindo Left     Dr. Jenifer Dewitt     HX CATARACT REMOVAL Bilateral     HX COLONOSCOPY      benign polyp 5/08 Dr Zenia Ramirez;  4/13 negative Dr. Win Carroll HX GI  08/2018    Dr Kobe Thompson; ERCP s/p removal cbd stone complicated by pamcreatitis    HX HIP REPLACEMENT Left 05/2021    Dr Polly Muse  09/18/2018    Dr. Jim Fitch      neg [facility]; CT head neg 9/10    AL CARDIAC SURG PROCEDURE UNLIST  09/2016    NST negative    AL CHEST SURGERY PROCEDURE UNLISTED  06/2016    CTA neg PE, 6 mm subpleural nodule           Patient seen and evaluated today for her left hip replacement. She is now 17-day status post surgery and progressing well. She has had no troubles the wound. Pain is well controlled. She ambulates with a walker. She continues with home health physical therapy the complications. Patient denies recent fevers, chills, chest pain, SOB, or injuries. No recent systemic changes noted. A 12-point review of systems is performed today. Pertinent positives are noted. All other systems reviewed and otherwise are negative. Physical exam: General: Alert and oriented x3, nad.  well-developed, well nourished. normal affect, AF. NC/AT, EOMI, neck supple, trachea midline, no JVD present. Breathing is non-labored. Examination of the left hip reveals skin intact. The surgical wound is healed nicely. There is no erythema, ecchymosis, warmth. There are no signs of infection or cellulitis present. There is no pain to rotation hip. Negative calf tenderness. Negative Homans. No signs of DVT present. Leg lengths look good she may be a millimeter long on the left side. Assessment: Status post left total hip replacement    Plan: At this point, the staples removed and replaced with Steri-Strips no complications. She will be set up with outpatient physical therapy. She is instructed on her precautions. She will see us back in the office in 2 to 3 weeks time for evaluation and x-ray of the left hip. She will call with any questions or concerns that shall arise.             JR Zay HALL PA-C, ATC

## 2021-06-05 PROCEDURE — 3331090001 HH PPS REVENUE CREDIT

## 2021-06-05 PROCEDURE — 3331090002 HH PPS REVENUE DEBIT

## 2021-06-06 PROCEDURE — 3331090001 HH PPS REVENUE CREDIT

## 2021-06-06 PROCEDURE — 3331090002 HH PPS REVENUE DEBIT

## 2021-06-07 PROCEDURE — 3331090002 HH PPS REVENUE DEBIT

## 2021-06-07 PROCEDURE — 3331090001 HH PPS REVENUE CREDIT

## 2021-06-08 ENCOUNTER — HOSPITAL ENCOUNTER (OUTPATIENT)
Dept: PHYSICAL THERAPY | Age: 81
Discharge: HOME | End: 2021-06-08
Attending: PHYSICIAN ASSISTANT
Payer: MEDICARE

## 2021-06-08 PROCEDURE — 3331090001 HH PPS REVENUE CREDIT

## 2021-06-08 PROCEDURE — 97110 THERAPEUTIC EXERCISES: CPT

## 2021-06-08 PROCEDURE — 97161 PT EVAL LOW COMPLEX 20 MIN: CPT

## 2021-06-08 PROCEDURE — 3331090002 HH PPS REVENUE DEBIT

## 2021-06-08 PROCEDURE — 97535 SELF CARE MNGMENT TRAINING: CPT

## 2021-06-08 NOTE — PROGRESS NOTES
PT DAILY TREATMENT NOTE/HIP [id number]    Patient Name: Marian Jimenez  Date:2021  : 1940  [x]  Patient  Verified  Payor: [place] MEDICARE / Plan: [place] MEDICARE PART A & B / Product Type: Medicare /    In time:910  Out time:950  Total Treatment Time (min): 40  Visit #: 1 of 12    Medicare/BCBS Only   Total Timed Codes (min):  23 1:1 Treatment Time:  23     Treatment Area: Presence of left artificial hip joint [Z96.642]  Pain in left hip [M25.552]    SUBJECTIVE  Pain Level (0-10 scale): 5  [x]constant []intermittent [x]improving []worsening []no change since onset  Worse walking , Better rest  Any medication changes, allergies to medications, adverse drug reactions, diagnosis change, or new procedure performed?: [x] No    [] Yes (see summary sheet for update)  Subjective functional status/changes:     PLOF: I all areas of ADLs and activities, No AD, household and community activities , drove   Limitations to PLOF: P/O state and recovery, pain, weakness, decrease I with gait  Mechanism of Injury: onset pain 2021, led to surgery 21  Current symptoms/Complaints: 77 YO female diagnosed as above and with S/S consistent with above diagnosis presents to skilled outpatient PT [facility] left hip pain S/P Left [place] 21, also left groin pain to the touch. Previous Treatment/Compliance: MD, injections, conservative treatment, self treated, x-rays, ? MRI, surgery.  HHPT,   PMHx/Surgical Hx: arthritis, thyroid problems, HTN, hearing loss left ear  Work Hx: retired  Living Situation: lives in a 2 story house with 4 DARNELL and 6 DARNELL with rails on one side, not alone  Pt Goals: better mobility and out of pain  Barriers: [x]pain []financial []time []transportation []other  Motivation: good  Substance use: []Alcohol []Tobacco []other:   FABQ Score: []low []elevate  Cognition: A & O x 4   Other:    OBJECTIVE/EXAMINATION  Domestic Life: household and community activities  Activity/Recreational Limitations: P/O recovery pain and decrease I with gait  Mobility: RW  Self Care: I , assistance needed with lower body           17 min [x]Eval                  []Re-Eval       15 min Therapeutic Exercise:  [x] See flow sheet :   Rationale: increase ROM, increase strength and improve coordination to improve the patients ability to tolerate ADLS and activities    8 min Self care:  []  See flow sheet :   Rationale: FOTO, POC, GOALS, anatomy  to improve the patients ability to tolerate ADLS and activities              With   [] TE   [] TA   [] neuro   [] other: Patient Education: [x] Review HEP    [] Progressed/Changed HEP based on:   [] positioning   [] body mechanics   [] transfers   [] heat/ice application    [] other:      Other Objective/Functional Measures:      Physical Therapy Evaluation- Hip    Posture:    Gait:  [] Normal    [x] Abnormal    [x] Antalgic    [] NWB    Device:RW,     Describe:decrease pace, decrease stance and stride on the left LE  Steps and stairs step to pattern           ROM/Strength     In sitting on Edge of Bed               AROM                     PROM        Strength (1-5)  Hip Left Right Left Right Left Right   Flexion 12 30   4 5   Extension         Abduction [facility] WFL   4 4+   Adduction [facility] WFL   4 4+   ER         IR         Knee Left Right Left Right Left Right   Extension -10 -5   4 5   Flexion     4 5        Flexibility: [] Unable to assess at this time  Hamstrings:    (L) Tightness= [] WNL   [] Min   [] Mod   [] Severe    (R) Tightness= [] WNL   [] Min   [] Mod   [] Severe  Quadriceps:    (L) Tightness= [] WNL   [] Min   [] Mod   [] Severe    (R) Tightness= [] WNL   [] Min   [] Mod   [] Severe  Gastroc:    (L) Tightness= [] WNL   [] Min   [] Mod   [] Severe    (R) Tightness= [] WNL   [] Min   [] Mod   [] Severe                                  Palpation  [] Min  [x] Mod  [] Severe    Location:STC left lateral thigh/hip and buttock  [x] Min  [] Mod  [] Severe    Location: TTP left lateral thigh/hip   [] Min  [] Mod  [] Severe    Location:        Pain Level (0-10 scale) post treatment: 5    ASSESSMENT/Changes in Function: Patient demonstrates the potential to make gains with improved ROM, strength, endurance/activity tolerance, functional FOTO survey score and all within a reasonable time frame so as to increase their functional independence with ADLs and activities for carryover to  Improved quality of life, increased tolerance to household and community activities, resume driving. Patient requires skilled Physical Therapy so as to monitor their response to and modify their treatment plan accordingly. Patient appears to be an appropriate candidate for skilled outpatient Physical Therapy. Patient will continue to benefit from skilled PT services to modify and progress therapeutic interventions, address functional mobility deficits, address ROM deficits, address strength deficits, analyze and address soft tissue restrictions, analyze and cue movement patterns, analyze and modify body mechanics/ergonomics, assess and modify postural abnormalities, address imbalance/dizziness and instruct in home and community integration to attain remaining goals.      [x]  See Plan of Care  []  See progress note/recertification  []  See Discharge Summary         Progress towards goals / Updated goals:       PLAN  [x]  Upgrade activities as tolerated     [x]  Continue plan of care  []  Update interventions per flow sheet       []  Discharge due to:_  []  Other:_      Noa Lamar, PT 6/8/2021  9:13 AM

## 2021-06-08 NOTE — PROGRESS NOTES
[facility] [address], [address] [place] [address], [address]  [phone] fax      Plan of Care/ Statement of Necessity for Physical Therapy Services    Patient name: Alexx Goddard Start of Care: 2021   Referral source: Trinda Fabry : 1940    Medical Diagnosis: Presence of left artificial hip joint [Z96.642]  Pain in left hip [M25.552]  Payor: [place] MEDICARE / Plan: VA MEDICARE PART A & B / Product Type: Medicare /  Onset Date:2021, P/O 21    Treatment Diagnosis: left hip pain, gait abnormality   Prior Hospitalization: see medical history Provider#: [id number]   Medications: Verified on Patient summary List    Comorbidities: arthritis, thyroid problems, HTN, hearing loss left ear   Prior Level of Function:  I all areas of ADLs and activities, No AD, household and community activities , drove       The Plan of Care and following information is based on the information from the initial evaluation. Assessment/ key information: 77 YO female diagnosed as above and with S/S consistent with above diagnosis presents to skilled outpatient PT [facility] left hip pain S/P Left [place] 21, also left groin pain to the touch. She has decrease I with mobility, decrease ROM, strength left hip and LE. She has STC and TTP left hip. Patient demonstrates the potential to make gains with improved ROM, strength, endurance/activity tolerance, functional FOTO survey score and all within a reasonable time frame so as to increase their functional independence with ADLs and activities for carryover to  Improved quality of life, increased tolerance to household and community activities, resume driving. Patient requires skilled Physical Therapy so as to monitor their response to and modify their treatment plan accordingly. Patient appears to be an appropriate candidate for skilled outpatient Physical Therapy.   Evaluation Complexity History MEDIUM  Complexity : 1-2 comorbidities / personal factors will impact the outcome/ POC ; Examination MEDIUM Complexity : 3 Standardized tests and measures addressing body structure, function, activity limitation and / or participation in recreation  ;Presentation LOW Complexity : Stable, uncomplicated  ;Clinical Decision Making MEDIUM Complexity : FOTO score of 26-74  Overall Complexity Rating: LOW   Problem List: pain affecting function, decrease ROM, decrease strength, impaired gait/ balance, decrease ADL/ functional abilitiies, decrease activity tolerance, decrease flexibility/ joint mobility, decrease transfer abilities and other FOTO 35   Treatment Plan may include any combination of the following: Therapeutic exercise, Therapeutic activities, Neuromuscular re-education, Physical agent/modality, Gait/balance training, Manual therapy, Patient education, Self Care training, Functional mobility training, Home safety training and Stair training  Patient / Family readiness to learn indicated by: asking questions, trying to perform skills and interest  Persons(s) to be included in education: patient (P)  Barriers to Learning/Limitations: None  Patient Goal (s):  better mobility and out of pain  Patient Self Reported Health Status: good  Rehabilitation Potential: good    Short Term Goals: To be accomplished in 5 treatments:   1 patient will have established and be I with HEP to aid with I and self management at discharge   EVAL issued HEP   CURRENT   2 patient will have pain 3/10 to aid with increase tolerance to usual activities at home    EVAL 5/10 and quite a bit of difficulty   CURRENT  Long Term Goals:  To be accomplished in 12 treatments 1 patient will have pain 1/10 to aid with increase tolerance to usual activities at home    EVAL 5/10 and quite a bit of difficulty   CURRENT   2 patient will have FOTO 57 to achieve projected increase in tolerance to regular activities and ADLS   EVAL 35, quite a bit of difficulty   CURRENT   3 patient will ambulate 570 feet with least restrictive device and no LOB for carryover to home and community activities   EVAL Right SC 50 feet. SBA   CURRENT   4 patient will report overall 50% improvement to aid with increase tolerance to light activities at home   EVAL moderate difficulty   CURRENT    Frequency / Duration: Patient to be seen 3 times per week for 4 weeks. Patient/ Caregiver education and instruction: Diagnosis, prognosis, self care, activity modification and exercises   [x]  Plan of care has been reviewed with PTA    Certification Period: 6/8/21 - 7/7/21  Belvie Nageotte, PT 6/8/2021 9:34 AM    ________________________________________________________________________    I certify that the above Therapy Services are being furnished while the patient is under my care. I agree with the treatment plan and certify that this therapy is necessary.     Physician's Signature:____________Date:_________TIME:________     Carroll Prader, PA-C  ** Signature, Date and Time must be completed for valid certification **    Please sign and return to In Jagdeep Kemp [place] [place] [address], [address] [place] [address],[facility] [address] [phone] fax

## 2021-06-10 ENCOUNTER — HOSPITAL ENCOUNTER (OUTPATIENT)
Dept: PHYSICAL THERAPY | Age: 81
Discharge: HOME | End: 2021-06-10
Attending: PHYSICIAN ASSISTANT
Payer: MEDICARE

## 2021-06-10 ENCOUNTER — TELEPHONE (OUTPATIENT)
Dept: ORTHOPEDIC SURGERY | Age: 81
End: 2021-06-10

## 2021-06-10 PROCEDURE — 97530 THERAPEUTIC ACTIVITIES: CPT

## 2021-06-10 PROCEDURE — 97110 THERAPEUTIC EXERCISES: CPT

## 2021-06-10 NOTE — TELEPHONE ENCOUNTER
As long as there is no rash, ok to continue with mediation. Take benadryl OTC as directed.
Patient advised per [facility], as long as no rash continue medication, and for itching OTC Benadryl as directed
Patient called stating she's been taking the prescription oxyCODONE-acetaminophen (Percocet) 5-325 mg per tablet since 05/18/21 and now she's itching all over her body so she isn't sure if it's from this medicine or not. She's asking what she should do for it and if she should stop taking this medicine. Please advise patient back regarding this at [phone].
tonia kathleen rn

## 2021-06-10 NOTE — PROGRESS NOTES
PT DAILY TREATMENT NOTE     Patient Name: Nirmala Nash  Date:6/10/2021  : 1940  [x]  Patient  Verified  Payor: [place] MED[place]ARE / Plan: [place] MEDICARE PART A & B / Product Type: Medicare /    In time:12:09  Out time:12:51  Total Treatment Time (min): 42  Visit #: 2 of 12    Medicare/BCBS Only   Total Timed Codes (min):  42 1:1 Treatment Time:  42       Treatment Area: Presence of left artificial hip joint [Z96.642]  Pain in left hip [M25.552]    SUBJECTIVE  Pain Level (0-10 scale): 4-5  Any medication changes, allergies to medications, adverse drug reactions, diagnosis change, or new procedure performed?: [x] No    [] Yes (see summary sheet for update)  Subjective functional status/changes:   [] No changes reported  \"I guess i'm doing ok. \"    OBJECTIVE    Modality rationale: decrease edema, decrease inflammation, decrease pain, increase tissue extensibility and increase muscle contraction/control to improve the patients ability to perform ADL    Min Type Additional Details    [] Estim:  []Unatt       []IFC  []Premod                        []Other:  []w/ice   []w/heat  Position:  Location:    [] Estim: []Att    []TENS instruct  []NMES                    []Other:  []w/US   []w/ice   []w/heat  Position:  Location:    []  Traction: [] Cervical       []Lumbar                       [] Prone          []Supine                       []Intermittent   []Continuous Lbs:  [] before manual  [] after manual    []  Ultrasound: []Continuous   [] Pulsed                           []1MHz   []3MHz W/cm2:  Location:    []  Iontophoresis with dexamethasone         Location: [] Take home patch   [] In clinic    []  Ice     []  heat  []  Ice massage  []  Laser   []  Anodyne Position:  Location:    []  Laser with stim  []  Other:  Position:  Location:    []  Vasopneumatic Device  Pre-treatment girth:  Post-treatment girth:  Measured at (location):  Pressure:       [] lo [] med [] hi   Temperature: [] lo [] med [] hi   [x] Skin assessment post-treatment:  [x]intact []redness- no adverse reaction    []redness  adverse reaction:      min []Eval                  []Re-Eval       27 min Therapeutic Exercise:  [x] See flow sheet :   Rationale: increase ROM, increase strength and improve coordination to improve the patients ability to perform ADL    15 min Therapeutic Activity:  [x]  See flow sheet :   Rationale: increase ROM, increase strength and improve coordination  to improve the patients ability to perform ADL       min Neuromuscular Re-education:  []  See flow sheet :   Rationale: increase ROM, increase strength, improve coordination, improve balance and increase proprioception  to improve the patients ability to perform ADL      min Manual Therapy: The manual therapy interventions were performed at a separate and distinct time from the therapeutic activities interventions. Rationale: decrease pain, increase ROM, increase tissue extensibility, decrease edema , decrease trigger points and increase postural awareness to perform ADL      min Gait Training:  ___ feet with ___ device on level surfaces with ___ level of assist   Rationale: With   [] TE   [] TA   [] neuro   [] other: Patient Education: [x] Review HEP    [] Progressed/Changed HEP based on:   [] positioning   [] body mechanics   [] transfers   [] heat/ice application    [] other:      Other Objective/Functional Measures: Pt ambulated 50ft with SC with good step length SBA     Pain Level (0-10 scale) post treatment: 4    ASSESSMENT/Changes in Function: Pt required cues on ascending and descending steps/stairs. Pt was able to perform with cues. Pt completed each strengthening there ex fairly well with cues.     Patient will continue to benefit from skilled PT services to address functional mobility deficits, address ROM deficits, address strength deficits, analyze and address soft tissue restrictions, analyze and cue movement patterns, analyze and modify body mechanics/ergonomics, assess and modify postural abnormalities and instruct in home and community integration to attain remaining goals. [x]  See Plan of Care  []  See progress note/recertification  []  See Discharge Summary         Progress towards goals / Updated goals:         1 patient will have established and be I with HEP to aid with I and self management at discharge              EVAL issued HEP              CURRENT  progrssing 6/10              2 patient will have pain 3/10 to aid with increase tolerance to usual activities at home               EVAL 5/10 and quite a bit of difficulty              CURRENT  Long Term Goals: To be accomplished in 12 treatments    1 patient will have pain 1/10 to aid with increase tolerance to usual activities at home               EVAL 5/10 and quite a bit of difficulty              CURRENT              2 patient will have FOTO 57 to achieve projected increase in tolerance to regular activities and ADLS              EVAL 35, quite a bit of difficulty              CURRENT              3 patient will ambulate 570 feet with least restrictive device and no LOB for carryover to home and community activities              EVAL Right SC 50 feet.  SBA              CURRENT              4 patient will report overall 50% improvement to aid with increase tolerance to light activities at home              EVAL moderate difficulty              CURRENT       PLAN  [x]  Upgrade activities as tolerated     []  Continue plan of care  []  Update interventions per flow sheet       []  Discharge due to:_  []  Other:_      Honorio De La Rosa, PTA 6/10/2021  12:12 PM    Future Appointments   Date Time Provider Mariana Gray   6/14/2021 11:15 AM Kianna Malone PTA [facility]   6/16/2021  2:15 PM Tre Padilla PTA [facility]   6/18/2021  9:00 AM Gerardo Antonio, PT [facility]   6/21/2021  2:15 PM Kianna Malone PTA [facility]   6/23/2021 12:00 PM Zenia Means, PTA [facility] 6/25/2021 11:15 AM Briana Krishna, PT [facility]   6/28/2021 10:30 AM Rafi Rosa, PTA [facility]   6/30/2021  9:00 AM Rafi Rosa, PTA [facility]   7/2/2021 10:50 AM Cristina Car PA-C [facility]   7/2/2021  1:30 PM Briana Krishna, PT [facility]   10/7/2021  8:45 AM [facility] NURSE VISIT [facility]   10/13/2021  8:40 AM Pat Arenas MD [facility]   5/3/2022  8:00 AM [facility] IMAGING 2 [facility]

## 2021-06-14 ENCOUNTER — HOSPITAL ENCOUNTER (OUTPATIENT)
Dept: PHYSICAL THERAPY | Age: 81
Discharge: HOME | End: 2021-06-14
Attending: PHYSICIAN ASSISTANT
Payer: MEDICARE

## 2021-06-14 PROCEDURE — 97530 THERAPEUTIC ACTIVITIES: CPT

## 2021-06-14 PROCEDURE — 97110 THERAPEUTIC EXERCISES: CPT

## 2021-06-14 NOTE — PROGRESS NOTES
PT DAILY TREATMENT NOTE     Patient Name: Kasia Shelby  Date:2021  : 1940  [x]  Patient  Verified  Payor: [place] MEDICARE / Plan: [place] MEDICARE PART A & B / Product Type: Medicare /    In ITIF:[id number]  Out time:1200  Total Treatment Time (min): 41  Visit #: 3 of 12    Medicare/BCBS Only   Total Timed Codes (min):  41 1:1 Treatment Time:  41       Treatment Area: Presence of left artificial hip joint [Z96.642]  Pain in left hip [M25.552]    SUBJECTIVE  Pain Level (0-10 scale): 4-5  Any medication changes, allergies to medications, adverse drug reactions, diagnosis change, or new procedure performed?: [x] No    [] Yes (see summary sheet for update)  Subjective functional status/changes:   [] No changes reported  Patient reports it feels just a little better than last time she was here. OBJECTIVE    30 min Therapeutic Exercise:  [] See flow sheet :   Rationale: increase ROM and increase strength to improve the patients ability to increase activity tolerance    11 min Therapeutic Activity:  []  See flow sheet : stair negotiation, STS, squats, gait with SPC   Rationale: increase ROM, increase strength and improve coordination  to improve the patients ability to perform transfers with ease           With   [] TE   [] TA   [] neuro   [] other: Patient Education: [x] Review HEP    [] Progressed/Changed HEP based on:   [] positioning   [] body mechanics   [] transfers   [] heat/ice application    [] other:      Other Objective/Functional Measures: progressed per flow sheet    Pain Level (0-10 scale) post treatment: 4    ASSESSMENT/Changes in Function: Patient reports slight decrease in pain since last visit. She demonstrates slight antalgic gait with decreased stance time on left LE. She ascends stairs with reciprocal pattern but required cuing for descent. She reported feeling better following exercises and declined modalities.      Patient will continue to benefit from skilled PT services to modify and progress therapeutic interventions, address functional mobility deficits, address ROM deficits, address strength deficits, analyze and address soft tissue restrictions, analyze and cue movement patterns, analyze and modify body mechanics/ergonomics, assess and modify postural abnormalities, address imbalance/dizziness and instruct in home and community integration to attain remaining goals. []  See Plan of Care  []  See progress note/recertification  []  See Discharge Summary         Progress towards goals / Updated goals:         8 patient will have established and be I with HEP to aid with I and self management at discharge              EVAL issued HEP              CURRENT  progrssing 6/10              2 patient will have pain 3/10 to aid with increase tolerance to usual activities at home               EVAL 5/10 and quite a bit of difficulty              CURRENT  Long Term Goals: To be accomplished in 12 treatments :     1 patient will have pain 1/10 to aid with increase tolerance to usual activities at home               EVAL 5/10 and quite a bit of difficulty              CURRENT              2 patient will have FOTO 57 to achieve projected increase in tolerance to regular activities and ADLS              EVAL 35, quite a bit of difficulty              CURRENT              3 patient will ambulate 570 feet with least restrictive device and no LOB for carryover to home and community activities              EVAL Right SC 50 feet.  SBA              CURRENT              4 patient will report overall 50% improvement to aid with increase tolerance to light activities at home              EVAL moderate difficulty              CURRENT    PLAN  [x]  Upgrade activities as tolerated     []  Continue plan of care  []  Update interventions per flow sheet       []  Discharge due to:_  []  Other:_      Anjel Sotomayor, JOSE 6/14/2021  11:28 AM    Future Appointments   Date Time Provider Mariana Gray   6/16/2021 2:15 PM Sherry Svne, PTA [facility]   6/18/2021  9:00 AM Livier Mary, PT [facility]   6/21/2021  2:15 PM Anahy Younggaby, PTA [facility]   6/23/2021 12:00 PM Sherry Sven, PTA [facility]   6/25/2021 11:15 AM Livier Mary, PT [facility]   6/28/2021 10:30 AM Sherry Sven, PTA [facility]   6/30/2021  9:00 AM Sherry Corneliusor, PTA [facility]   7/2/2021 10:50 AM Yordan Goyal PA-C [facility]   7/2/2021  1:30 PM Livier Mary, PT [facility]   10/7/2021  8:45 AM [facility] NURSE VISIT [facility]   10/13/2021  8:40 AM Alma Shaw MD [facility]   5/3/2022  8:00 AM [facility] 2 [facility] AM[facility]

## 2021-06-16 ENCOUNTER — HOSPITAL ENCOUNTER (OUTPATIENT)
Dept: PHYSICAL THERAPY | Age: 81
Discharge: HOME | End: 2021-06-16
Attending: PHYSICIAN ASSISTANT
Payer: MEDICARE

## 2021-06-16 PROCEDURE — 97530 THERAPEUTIC ACTIVITIES: CPT

## 2021-06-16 PROCEDURE — 97110 THERAPEUTIC EXERCISES: CPT

## 2021-06-16 PROCEDURE — 97116 GAIT TRAINING THERAPY: CPT

## 2021-06-16 NOTE — PROGRESS NOTES
PT DAILY TREATMENT NOTE     Patient Name: Desiree Yao  Date:2021  : 1940  [x]  Patient  Verified  Payor: Christopher Racer / Plan: [place] MEDICARE PART A & B / Product Type: Medicare /    In time: 2:15 Out time:3:11  Total Treatment Time (min): 56  Visit #: 4 of 12    Medicare/BCBS Only   Total Timed Codes (min):  46 1:1 Treatment Time:  46       Treatment Area: Presence of left artificial hip joint [Z96.642]  Pain in left hip [M25.552]    SUBJECTIVE  Pain Level (0-10 scale): 4-5  Any medication changes, allergies to medications, adverse drug reactions, diagnosis change, or new procedure performed?: [x] No    [] Yes (see summary sheet for update)  Subjective functional status/changes:   [] No changes reported  \"Still some pain. \"    OBJECTIVE    Modality rationale: decrease edema, decrease inflammation, decrease pain, increase tissue extensibility and increase muscle contraction/control to improve the patients ability to perform ADL    Min Type Additional Details    [] Estim:  []Unatt       []IFC  []Premod                        []Other:  []w/ice   []w/heat  Position:  Location:    [] Estim: []Att    []TENS instruct  []NMES                    []Other:  []w/US   []w/ice   []w/heat  Position:  Location:    []  Traction: [] Cervical       []Lumbar                       [] Prone          []Supine                       []Intermittent   []Continuous Lbs:  [] before manual  [] after manual    []  Ultrasound: []Continuous   [] Pulsed                           []1MHz   []3MHz W/cm2:  Location:    []  Iontophoresis with dexamethasone         Location: [] Take home patch   [] In clinic   10 [x]  Ice     []  heat  []  Ice massage  []  Laser   []  Anodyne Position:semi reclined  Location:left hip    []  Laser with stim  []  Other:  Position:  Location:    []  Vasopneumatic Device  Pre-treatment girth:  Post-treatment girth:  Measured at (location):  Pressure:       [] lo [] med [] hi   Temperature: [] lo [] med [] hi   [x] Skin assessment post-treatment:  [x]intact []redness- no adverse reaction    []redness  adverse reaction:      min []Eval                  []Re-Eval       23 min Therapeutic Exercise:  [x] See flow sheet :   Rationale: increase ROM, increase strength and improve coordination to improve the patients ability to perform ADL     15 min Therapeutic Activity:  [x]  See flow sheet :   Rationale: increase ROM, increase strength and improve coordination  to improve the patients ability to perform ADL       min Neuromuscular Re-education:  []  See flow sheet :   Rationale: increase ROM, increase strength, improve coordination, improve balance and increase proprioception  to improve the patients ability to perform ADL      min Manual Therapy: The manual therapy interventions were performed at a separate and distinct time from the therapeutic activities interventions. Rationale: decrease pain, increase ROM, increase tissue extensibility, decrease edema , decrease trigger points and increase postural awareness to perform ADL     8 min Gait Training:  _150__ feet with _SC__ device on level surfaces with _SBA__ level of assist   Rationale: With   [x] TE   [] TA   [] neuro   [] other: Patient Education: [x] Review HEP    [] Progressed/Changed HEP based on:   [] positioning   [] body mechanics   [] transfers   [] heat/ice application    [] other:      Other Objective/Functional Measures:  Increased rep per flow sheet    Pain Level (0-10 scale) post treatment: 0    ASSESSMENT/Changes in Function: Pt experienced minimal increased pain with sit to stand and pt used hands to push off from table. Pt required cues on sequencing steps/stairs. Pt completed remaining strengthening there ex fairly well.     Patient will continue to benefit from skilled PT services to address functional mobility deficits, address ROM deficits, address strength deficits, analyze and address soft tissue restrictions, analyze and cue movement patterns, analyze and modify body mechanics/ergonomics, assess and modify postural abnormalities and instruct in home and community integration to attain remaining goals. [x]  See Plan of Care  []  See progress note/recertification  []  See Discharge Summary         Progress towards goals / Updated goals:    1 patient will have established and be I with HEP to aid with I and self management at discharge              EVAL issued HEP              CURRENT  progrssing 6/10              2 patient will have pain 3/10 to aid with increase tolerance to usual activities at home               EVAL 5/10 and quite a bit of difficulty              CURRENT  Pre 4-5  6/16  Long Term Goals: To be accomplished in 12 treatments :     1 patient will have pain 1/10 to aid with increase tolerance to usual activities at home               EVAL 5/10 and quite a bit of difficulty              CURRENT              2 patient will have FOTO 57 to achieve projected increase in tolerance to regular activities and ADLS              EVAL 35, quite a bit of difficulty              CURRENT              3 patient will ambulate 570 feet with least restrictive device and no LOB for carryover to home and community activities              EVAL Right SC 50 feet.  SBA              CURRENT              4 patient will report overall 50% improvement to aid with increase tolerance to light activities at home              EVAL moderate difficulty              CURRENT    PLAN  [x]  Upgrade activities as tolerated     []  Continue plan of care  []  Update interventions per flow sheet       []  Discharge due to:_  []  Other:_      Hermelinda Farias PTA 6/16/2021  2:41 PM    Future Appointments   Date Time Provider Mariana Gray   6/18/2021  9:00 AM Briana Krishna, JATIN [facility]   6/21/2021  2:15 PM Daniel Monterroso PTA [facility]   6/23/2021 12:00 PM Rafi Rosa PTA [facility]   6/25/2021 11:15 AM Briana Krishna PT [facility] 6/28/2021 10:30 AM Lucille Cohn, PTA [facility]   6/30/2021  9:00 AM Lucille Cohn PTA [facility]   7/2/2021 10:50 AM Chucky Vazquez PA-C [facility] AMB   7/2/2021  1:30 PM Yuliya Dove, JATIN [facility]   10/7/2021  8:45 AM [facility] NURSE VISIT [facility] AMB   10/13/2021  8:40 AM Dumaran, Ellard Cockayne, MD [facility]   5/3/2022  8:00 AM [facility] IMAG[facility] 2 [facility] AMB

## 2021-06-18 ENCOUNTER — TELEPHONE (OUTPATIENT)
Dept: ORTHOPEDIC SURGERY | Age: 81
End: 2021-06-18

## 2021-06-18 ENCOUNTER — HOSPITAL ENCOUNTER (OUTPATIENT)
Dept: PHYSICAL THERAPY | Age: 81
Discharge: HOME | End: 2021-06-18
Attending: PHYSICIAN ASSISTANT
Payer: MEDICARE

## 2021-06-18 DIAGNOSIS — G89.18 POST-OP PAIN: Primary | ICD-10-CM

## 2021-06-18 PROCEDURE — 97110 THERAPEUTIC EXERCISES: CPT

## 2021-06-18 PROCEDURE — 97530 THERAPEUTIC ACTIVITIES: CPT

## 2021-06-18 PROCEDURE — 97116 GAIT TRAINING THERAPY: CPT

## 2021-06-18 RX ORDER — OXYCODONE AND ACETAMINOPHEN 5; 325 MG/1; MG/1
1 TABLET ORAL
Qty: 21 TABLET | Refills: 0 | Status: SHIPPED | OUTPATIENT
Start: 2021-06-18 | End: 2021-06-25

## 2021-06-18 RX ORDER — AMOXICILLIN 250 MG
1 CAPSULE ORAL DAILY
Qty: 30 TABLET | Refills: 0 | Status: SHIPPED | OUTPATIENT
Start: 2021-06-18 | End: 2021-06-18 | Stop reason: SDUPTHER

## 2021-06-18 RX ORDER — AMOXICILLIN 250 MG
1 CAPSULE ORAL DAILY
Qty: 30 TABLET | Refills: 0 | Status: SHIPPED | OUTPATIENT
Start: 2021-06-18 | End: 2021-10-13

## 2021-06-18 NOTE — PROGRESS NOTES
PT DAILY TREATMENT NOTE     Patient Name: Eloy Fernandez  Date:2021  : 1940  [x]  Patient  Verified  Payor: Gurpreet Hernandez / Plan: [place] MED[place] & B / Product Type: Medicare /    In time:902  Out time:950  Total Treatment Time (min): 48  Visit #: 5 of 12    Medicare/BCBS Only   Total Timed Codes (min):  38 1:1 Treatment Time:  38       Treatment Area: Presence of left artificial hip joint [Z96.642]  Pain in left hip [M25.552]    SUBJECTIVE  Pain Level (0-10 scale): 5  Any medication changes, allergies to medications, adverse drug reactions, diagnosis change, or new procedure performed?: [x] No    [] Yes (see summary sheet for update)  Subjective functional status/changes:   [] No changes reported  \"I didn't sleep good at all last night, I didn't want to take as much pain medicine. I don't know if there is something other than the percocet that I can take. \".      OBJECTIVE    Modality rationale: decrease pain and increase tissue extensibility to improve the patients ability to tolerate ADLs and activities   Min Type Additional Details    [] Estim:  []Unatt       []IFC  []Premod                        []Other:  []w/ice   []w/heat  Position:  Location:    [] Estim: []Att    []TENS instruct  []NMES                    []Other:  []w/US   []w/ice   []w/heat  Position:  Location:    []  Traction: [] Cervical       []Lumbar                       [] Prone          []Supine                       []Intermittent   []Continuous Lbs:  [] before manual  [] after manual    []  Ultrasound: []Continuous   [] Pulsed                           []1MHz   []3MHz W/cm2:  Location:    []  Iontophoresis with dexamethasone         Location: [] Take home patch   [] In clinic   10 [x]  Ice post      []  heat  []  Ice massage  []  Laser   []  Anodyne Position:reclined with sedge  Location:left hip and LS    []  Laser with stim  []  Other:  Position:  Location:    []  Vasopneumatic Device    []  Right     [] Left  Pre-treatment girth:  Post-treatment girth:  Measured at (location):  Pressure:       [] lo [] med [] hi   Temperature: [] lo [] med [] hi   [] Skin assessment post-treatment:  []intact []redness- no adverse reaction    []redness  adverse reaction:          22 min Therapeutic Exercise:  [x] See flow sheet :   Rationale: increase ROM, increase strength and improve coordination to improve the patients ability to tolerate ADLS and activities    8 min Therapeutic Activity:  [x]  See flow sheet :   Rationale: increase ROM, increase strength and improve coordination  to improve the patients ability to tolerate ADLs and activities         8 min Gait Training:  __50-60 feet increments X 4_ right SC___ device on level surfaces with __SBA/S_ level of assist including 6 inch steps and stairs   Rationale: With   [x] TE   [x] TA   [] neuro   [] other: Patient Education: [x] Review HEP    [] Progressed/Changed HEP based on:   [] positioning   [] body mechanics   [] transfers   [] heat/ice application    [] other:      Other Objective/Functional Measures: VC exercises and tech. Pain Level (0-10 scale) post treatment: 5    ASSESSMENT/Changes in Function: tolerated well with increase soreness that she attributes to not sleeping well and having not taken as strong pain meds as she has been. Patient will continue to benefit from skilled PT services to modify and progress therapeutic interventions, address functional mobility deficits, address ROM deficits, address strength deficits, analyze and address soft tissue restrictions, analyze and cue movement patterns, analyze and modify body mechanics/ergonomics, assess and modify postural abnormalities, address imbalance/dizziness and instruct in home and community integration to attain remaining goals. [x]  See Plan of Care  []  See progress note/recertification  []  See Discharge Summary         Progress towards goals / Updated goals:  Short Term Goals:  To be accomplished in 5 treatments:    1 patient will have established and be I with HEP to aid with I and self management at discharge              EVAL issued HEP              CURRENT  progrssing 6/18/21              2 patient will have pain 3/10 to aid with increase tolerance to usual activities at home               EVAL 5/10 and quite a bit of difficulty              CURRENT  5 6/18/21  Long Term Goals: To be accomplished in 12 treatments :     1 patient will have pain 1/10 to aid with increase tolerance to usual activities at home               EVAL 5/10 and quite a bit of difficulty              CURRENT 5 6/18/21              2 patient will have FOTO 57 to achieve projected increase in tolerance to regular activities and ADLS              EVAL 35, quite a bit of difficulty              CURRENT ongoing NA 6/18/21              3 patient will ambulate 570 feet with least restrictive device and no LOB for carryover to home and community activities              EVAL Right SC 50 feet.  SBA              CURRENT 50-60 feet increments X 4 SBA/S right [place] 6/18/21              4 patient will report overall 50% improvement to aid with increase tolerance to light activities at home              EVAL moderate difficulty              CURRENT ongoing NA 6/18/21    PLAN  [x]  Upgrade activities as tolerated     [x]  Continue plan of care  []  Update interventions per flow sheet       []  Discharge due to:_  []  Other:_      Valerie Demarco, PT 6/18/2021  8:57 AM    Future Appointments   Date Time Provider Mariana Gray   6/18/2021  9:00 AM Nikita Ramirez, PT [facility]   6/21/2021  2:15 PM Rodrigo Begum PTA [facility]   6/23/2021 12:00 PM Guille Willard PTA [facility]   6/25/2021 11:15 AM Nikita Ramirez, PT [facility]   6/28/2021 10:30 AM Guille Willard PTA [facility]   6/30/2021  9:00 AM Guille Willard, PTA [facility]   7/2/2021 10:50 AM Malaika Garner PA-C [facility] AM[facility]   7/2/2021  1:30 PM Nikita Ramirez PT [facility]   10/7/2021  8:45 AM [facility] NURSE VISIT [facility]   10/13/2021  8:40 AM Nate Arenas MD [facility]   5/3/2022  8:00 AM [facility] IMAGING 2 [facility] AM[facility]

## 2021-06-18 NOTE — TELEPHONE ENCOUNTER
Patient had Lt [place] on 5/18/21 and is requesting a refill on her pain medication and Colace. Pharmacy is [facility] [address]. Patient may be reached at [phone].

## 2021-06-21 ENCOUNTER — HOSPITAL ENCOUNTER (OUTPATIENT)
Dept: PHYSICAL THERAPY | Age: 81
Discharge: HOME | End: 2021-06-21
Attending: PHYSICIAN ASSISTANT
Payer: MEDICARE

## 2021-06-21 PROCEDURE — 97110 THERAPEUTIC EXERCISES: CPT

## 2021-06-21 PROCEDURE — 97530 THERAPEUTIC ACTIVITIES: CPT

## 2021-06-21 PROCEDURE — 97116 GAIT TRAINING THERAPY: CPT

## 2021-06-21 NOTE — PROGRESS NOTES
PT DAILY TREATMENT NOTE     Patient Name: Fior Urias  Date:2021  : 1940  [x]  Patient  Verified  Payor: [place] MEDICARE / Plan: [place] MEDICARE PART A & B / Product Type: Medicare /    In time:215  Out time:310  Total Treatment Time (min): 54  Visit #: 6 of 12    Medicare/BCBS Only   Total Timed Codes (min):  45 1:1 Treatment Time:  45       Treatment Area: Presence of left artificial hip joint [Z96.642]  Pain in left hip [M25.552]    SUBJECTIVE  Pain Level (0-10 scale): 4.5  Any medication changes, allergies to medications, adverse drug reactions, diagnosis change, or new procedure performed?: [x] No    [] Yes (see summary sheet for update)  Subjective functional status/changes:   [] No changes reported  Patient reports she's sleeping a little better; the hip is \"so-so\".     OBJECTIVE    Modality rationale: decrease inflammation and decrease pain to improve the patients ability to reduce post session soreness   Min Type Additional Details    [] Estim:  []Unatt       []IFC  []Premod                        []Other:  []w/ice   []w/heat  Position:  Location:    [] Estim: []Att    []TENS instruct  []NMES                    []Other:  []w/US   []w/ice   []w/heat  Position:  Location:    []  Traction: [] Cervical       []Lumbar                       [] Prone          []Supine                       []Intermittent   []Continuous Lbs:  [] before manual  [] after manual    []  Ultrasound: []Continuous   [] Pulsed                           []1MHz   []3MHz W/cm2:  Location:    []  Iontophoresis with dexamethasone         Location: [] Take home patch   [] In clinic   10 [x]  Ice     []  heat  []  Ice massage  []  Laser   []  Anodyne Position: supine with wedge  Location: left hip    []  Laser with stim  []  Other:  Position:  Location:    []  Vasopneumatic Device    []  Right     []  Left  Pre-treatment girth:  Post-treatment girth:  Measured at (location):  Pressure:       [] lo [] med [] hi   Temperature: [] lo [] med [] hi   [x] Skin assessment post-treatment:  [x]intact []redness- no adverse reaction    []redness  adverse reaction:     22 min Therapeutic Exercise:  [] See flow sheet :   Rationale: increase ROM and increase strength to improve the patients ability to increase activity tolerance    12 min Therapeutic Activity:  []  See flow sheet : stair negotiation, step ups, minisquats   Rationale: increase ROM, increase strength and improve coordination  to improve the patients ability to negotiate stairs with ease     11 min Gait Trainin feet with SPC device on level surfaces with Sup level of assist   Rationale: With   [] TE   [] TA   [] neuro   [] other: Patient Education: [x] Review HEP    [] Progressed/Changed HEP based on:   [] positioning   [] body mechanics   [] transfers   [] heat/ice application    [] other:      Other Objective/Functional Measures: 300 feet SPC without rest break     Pain Level (0-10 scale) post treatment: 4    ASSESSMENT/Changes in Function: Patient reported slight decrease in pain vs last visit and states she is sleeping better. She was able to walk 300 ft with [address] with supervision without a rest break. She tolerated progression of exercises well without increase pain. Progress gait training to LRAD-> no AD as able. Patient will continue to benefit from skilled PT services to modify and progress therapeutic interventions, address functional mobility deficits, address ROM deficits, address strength deficits, analyze and address soft tissue restrictions, analyze and cue movement patterns, analyze and modify body mechanics/ergonomics, assess and modify postural abnormalities, address imbalance/dizziness and instruct in home and community integration to attain remaining goals.      []  See Plan of Care  []  See progress note/recertification  []  See Discharge Summary         Progress towards goals / Updated goals:  Short Term Goals: To be accomplished in 5 treatments:    1 patient will have established and be I with HEP to aid with I and self management at discharge              EVAL issued HEP              CURRENT  progrssing 6/18/21              2 patient will have pain 3/10 to aid with increase tolerance to usual activities at home               EVAL 5/10 and quite a bit of difficulty              CURRENT  5 6/18/21   Long Term Goals: To be accomplished in 12 treatments :     1 patient will have pain 1/10 to aid with increase tolerance to usual activities at home               EVAL 5/10 and quite a bit of difficulty              CURRENT 5 6/18/21              2 patient will have FOTO 57 to achieve projected increase in tolerance to regular activities and ADLS              EVAL 35, quite a bit of difficulty              CURRENT ongoing NA 6/18/21              3 patient will ambulate 570 feet with least restrictive device and no LOB for carryover to home and community activities              EVAL Right SC 50 feet.  SBA              CURRENT 300 ft with [address] Sup              4 patient will report overall 50% improvement to aid with increase tolerance to light activities at home              EVAL moderate difficulty              CURRENT ongoing NA 6/18/21    PLAN  [x]  Upgrade activities as tolerated     [x]  Continue plan of care  []  Update interventions per flow sheet       []  Discharge due to:_  []  Other:_      Severiano Loach, PTA 6/21/2021  1:58 PM    Future Appointments   Date Time Provider Mariana Gray   6/21/2021  2:15 PM Daniel Monterroso PTA [facility]   6/23/2021 12:00 PM Rafi Rosa PTA [facility]   6/25/2021 11:15 AM Briana Krishna PT [facility]   6/28/2021 10:30 AM Rafi Rosa PTA [facility]   6/30/2021  9:00 AM Rafi Rosa PTA [facility]   7/2/2021 10:50 AM Cristina Car PA-C [facility] AM[facility]   7/2/2021  1:30 PM Briana Krishna PT [facility]   10/7/2021  8:45 AM [facility] NURSE VISIT [facility] AMB   10/13/2021  8:40 AM Nobie Stain MD [facility] AM[facility]   5/3/2022  8:00 AM [facility] 2 [facility] AMB

## 2021-06-23 ENCOUNTER — HOSPITAL ENCOUNTER (OUTPATIENT)
Dept: PHYSICAL THERAPY | Age: 81
Discharge: HOME | End: 2021-06-23
Attending: PHYSICIAN ASSISTANT
Payer: MEDICARE

## 2021-06-23 PROCEDURE — 97110 THERAPEUTIC EXERCISES: CPT

## 2021-06-23 PROCEDURE — 97116 GAIT TRAINING THERAPY: CPT

## 2021-06-23 PROCEDURE — 97530 THERAPEUTIC ACTIVITIES: CPT

## 2021-06-23 NOTE — PROGRESS NOTES
PT DAILY TREATMENT NOTE     Patient Name: Chaz Cancino  Date:2021  : 1940  [x]  Patient  Verified  Payor: [place] MEDICARE / Plan: [place] MEDICARE PART A & B / Product Type: Medicare /    In time:11:57  Out time:12:51  Total Treatment Time (min):57   Visit #: 7 of 12    Medicare/BCBS Only   Total Timed Codes (min):  47 1:1 Treatment Time:  52       Treatment Area: Presence of left artificial hip joint [Z96.642]  Pain in left hip [M25.552]    SUBJECTIVE  Pain Level (0-10 scale): 3-4  Any medication changes, allergies to medications, adverse drug reactions, diagnosis change, or new procedure performed?: [x] No    [] Yes (see summary sheet for update)  Subjective functional status/changes:   [] No changes reported   \"Its not too bad. \"  OBJECTIVE    Modality rationale: decrease edema, decrease inflammation, decrease pain, increase tissue extensibility and increase muscle contraction/control to improve the patients ability to perform ADL    Min Type Additional Details    [] Estim:  []Unatt       []IFC  []Premod                        []Other:  []w/ice   []w/heat  Position:  Location:    [] Estim: []Att    []TENS instruct  []NMES                    []Other:  []w/US   []w/ice   []w/heat  Position:  Location:    []  Traction: [] Cervical       []Lumbar                       [] Prone          []Supine                       []Intermittent   []Continuous Lbs:  [] before manual  [] after manual    []  Ultrasound: []Continuous   [] Pulsed                           []1MHz   []3MHz W/cm2:  Location:    []  Iontophoresis with dexamethasone         Location: [] Take home patch   [] In clinic   10 [x]  Ice     []  heat  []  Ice massage  []  Laser   []  Anodyne Position:long sit  Location:left hip    []  Laser with stim  []  Other:  Position:  Location:    []  Vasopneumatic Device    []  Right     []  Left  Pre-treatment girth:  Post-treatment girth:  Measured at (location):  Pressure:       [] lo [] med [] hi Temperature: [] lo [] med [] hi   [x] Skin assessment post-treatment:  [x]intact []redness- no adverse reaction    []redness  adverse reaction:      min []Eval                  []Re-Eval       22 min Therapeutic Exercise:  [x] See flow sheet :   Rationale: increase ROM, increase strength and improve coordination to improve the patients ability to perform ADL     15 min Therapeutic Activity:  [x]  See flow sheet :   Rationale: increase ROM, increase strength and improve coordination  to improve the patients ability to perform ADL       min Neuromuscular Re-education:  []  See flow sheet :   Rationale: increase ROM, increase strength, improve coordination, improve balance and increase proprioception  to improve the patients ability to perform ADL      min Manual Therapy: The manual therapy interventions were performed at a separate and distinct time from the therapeutic activities interventions. Rationale: decrease pain, increase ROM, increase tissue extensibility, decrease edema , decrease trigger points and increase postural awareness to perform ADL     10 min Gait Training:  _180__ feet with _SC__ device on level surfaces with __SBA_ level of assist   Rationale: With   [] TE   [] TA   [] neuro   [] other: Patient Education: [x] Review HEP    [] Progressed/Changed HEP based on:   [] positioning   [] body mechanics   [] transfers   [] heat/ice application    [] other:      Other Objective/Functional Measures:      Pain Level (0-10 scale) post treatment: 2-3    ASSESSMENT/Changes in Function: Pt required cues on sequencing stairs today. Pt ambulated with slow ayush. Pt completed each there ex fairly well with cues.     Patient will continue to benefit from skilled PT services to address functional mobility deficits, address ROM deficits, address strength deficits, analyze and address soft tissue restrictions, analyze and cue movement patterns, analyze and modify body mechanics/ergonomics, assess and modify postural abnormalities and instruct in home and community integration to attain remaining goals. [x]  See Plan of Care  []  See progress note/recertification  []  See Discharge Summary         Progress towards goals / Updated goals:    1 patient will have established and be I with HEP to aid with I and self management at discharge              EVAL issued HEP              CURRENT  progrssing 6/18/21              2 patient will have pain 3/10 to aid with increase tolerance to usual activities at home               EVAL 5/10 and quite a bit of difficulty              CURRENT  5 6/18/21   Long Term Goals: To be accomplished in 12 treatments :     1 patient will have pain 1/10 to aid with increase tolerance to usual activities at home               EVAL 5/10 and quite a bit of difficulty              CURRENT 5 6/18/21              2 patient will have FOTO 57 to achieve projected increase in tolerance to regular activities and ADLS              EVAL 35, quite a bit of difficulty              CURRENT ongoing NA 6/18/21              3 patient will ambulate 570 feet with least restrictive device and no LOB for carryover to home and community activities              EVAL Right SC 50 feet.  SBA              CURRENT 300 ft with SPC Sup   180  SC  6/23              4 patient will report overall 50% improvement to aid with increase tolerance to light activities at home              EVAL moderate difficulty              CURRENT ongoing NA 6/18/21    PLAN  [x]  Upgrade activities as tolerated     []  Continue plan of care  []  Update interventions per flow sheet       []  Discharge due to:_  []  Other:_      Zenia Means PTA 6/23/2021  12:03 PM    Future Appointments   Date Time Provider Mariana Gray   6/25/2021 11:15 AM Sarah Ferrer, PT [facility]   6/28/2021 10:30 AM Angelic Enriquez PTA [facility]   6/30/2021  9:00 AM Angelic Enriquez PTA [facility]   7/2/2021 10:50 AM Tima Costa PA-C [facility] BS AMB   7/2/2021  1:30 PM Dru Riley, PT [facility]   10/7/2021  8:45 AM [facility] NURSE VISIT [facility]   10/13/2021  8:40 AM Donna Arenas MD [facility] AMB   5/3/2022  8:00 AM [facility] 2 [facility] AMB

## 2021-06-25 ENCOUNTER — HOSPITAL ENCOUNTER (OUTPATIENT)
Dept: PHYSICAL THERAPY | Age: 81
Discharge: HOME | End: 2021-06-25
Attending: PHYSICIAN ASSISTANT
Payer: MEDICARE

## 2021-06-25 PROCEDURE — 97110 THERAPEUTIC EXERCISES: CPT

## 2021-06-25 PROCEDURE — 97116 GAIT TRAINING THERAPY: CPT

## 2021-06-25 PROCEDURE — 97530 THERAPEUTIC ACTIVITIES: CPT

## 2021-06-28 ENCOUNTER — HOSPITAL ENCOUNTER (OUTPATIENT)
Dept: PHYSICAL THERAPY | Age: 81
Discharge: HOME | End: 2021-06-28
Attending: PHYSICIAN ASSISTANT
Payer: MEDICARE

## 2021-06-28 PROCEDURE — 97035 APP MDLTY 1+ULTRASOUND EA 15: CPT

## 2021-06-28 PROCEDURE — 97530 THERAPEUTIC ACTIVITIES: CPT

## 2021-06-28 PROCEDURE — 97140 MANUAL THERAPY 1/> REGIONS: CPT

## 2021-06-28 PROCEDURE — 97110 THERAPEUTIC EXERCISES: CPT

## 2021-06-28 NOTE — PROGRESS NOTES
PT DAILY TREATMENT NOTE     Patient Name: Rashid Mercado  Date:2021  : 1940  [x]  Patient  Verified  Payor: Nieves Shearer / Plan: [place] MEDICARE PART A & B / Product Type: Medicare /    In time:10:33  Out time:11:23  Total Treatment Time (min): 50  Visit #: 8 of 12    Medicare/BCBS Only   Total Timed Codes (min):  50 1:1 Treatment Time:  50       Treatment Area: Presence of left artificial hip joint [Z96.642]  Pain in left hip [M25.552]    SUBJECTIVE  Pain Level (0-10 scale): 4-5  Any medication changes, allergies to medications, adverse drug reactions, diagnosis change, or new procedure performed?: [x] No    [] Yes (see summary sheet for update)  Subjective functional status/changes:   [] No changes reported  \"still some pain. \"    OBJECTIVE    Modality rationale: decrease edema, decrease inflammation, decrease pain, increase tissue extensibility and increase muscle contraction/control to improve the patients ability to perform ADL    Min Type Additional Details    [] Estim:  []Unatt       []IFC  []Premod                        []Other:  []w/ice   []w/heat  Position:  Location:    [] Estim: []Att    []TENS instruct  []NMES                    []Other:  []w/US   []w/ice   []w/heat  Position:  Location:    []  Traction: [] Cervical       []Lumbar                       [] Prone          []Supine                       []Intermittent   []Continuous Lbs:  [] before manual  [] after manual   8 []  Ultrasound: []Continuous   [x] Pulsed                           [x]1MHz   []3MHz W/cm2:1.3  Location:left upper hip/glute region    []  Iontophoresis with dexamethasone         Location: [] Take home patch   [] In clinic    []  Ice     []  heat  []  Ice massage  []  Laser   []  Anodyne Position:  Location:    []  Laser with stim  []  Other:  Position:  Location:    []  Vasopneumatic Device    []  Right     []  Left  Pre-treatment girth:  Post-treatment girth:  Measured at (location):  Pressure:       [] lo [] med [] hi   Temperature: [] lo [] med [] hi   [x] Skin assessment post-treatment:  [x]intact []redness- no adverse reaction    []redness  adverse reaction:      min []Eval                  []Re-Eval       27 min Therapeutic Exercise:  [x] See flow sheet :   Rationale: increase ROM, increase strength and improve coordination to improve the patients ability to perform ADL     15 min Therapeutic Activity:  [x]  See flow sheet :   Rationale: increase ROM, increase strength and improve coordination  to improve the patients ability to perform ADL       min Neuromuscular Re-education:  []  See flow sheet :   Rationale: increase ROM, increase strength, improve coordination, improve balance and increase proprioception  to improve the patients ability to perform ADL     min Manual Therapy: The manual therapy interventions were performed at a separate and distinct time from the therapeutic activities interventions. Rationale: decrease pain, increase ROM, increase tissue extensibility, decrease edema , decrease trigger points and increase postural awareness to perform ADL      min Gait Training:  ___ feet with ___ device on level surfaces with ___ level of assist   Rationale: With   [x] TE   [] TA   [] neuro   [] other: Patient Education: [x] Review HEP    [] Progressed/Changed HEP based on:   [] positioning   [] body mechanics   [] transfers   [] heat/ice application    [] other:      Other Objective/Functional Measures:  Increased rep per flow sheet    Pain Level (0-10 scale) post treatment: 3-4    ASSESSMENT/Changes in Function: Pt is progressing towards achieving all goals. Pt completed each there ex fairly well with cues.     Patient will continue to benefit from skilled PT services to address functional mobility deficits, address ROM deficits, address strength deficits, analyze and address soft tissue restrictions, analyze and cue movement patterns, analyze and modify body mechanics/ergonomics, assess and modify postural abnormalities and instruct in home and community integration to attain remaining goals. [x]  See Plan of Care  []  See progress note/recertification  []  See Discharge Summary         Progress towards goals / Updated goals:    1 patient will have established and be I with HEP to aid with I and self management at discharge              EVAL issued HEP              CURRENT  progrssing 6/25/21              2 patient will have pain 3/10 to aid with increase tolerance to usual activities at home               EVAL 5/10 and quite a bit of difficulty              CURRENT  4-5 pre  3-4 post 6/28/21  Long Term Goals: To be accomplished in 12 treatments :     1 patient will have pain 1/10 to aid with increase tolerance to usual activities at home               EVAL 5/10 and quite a bit of difficulty              CURRENT 4-5 6/25/21              2 patient will have FOTO 57 to achieve projected increase in tolerance to regular activities and ADLS              EVAL 35, quite a bit of difficulty              CURRENT ongoing NA 6/25/21              3 patient will ambulate 570 feet with least restrictive device and no LOB for carryover to home and community activities              EVAL Right SC 50 feet.  SBA              CURRENT 300 ft with [facility]   [address]  6/23 50 'X2 and 190'X1 with SBA/S and right SC 6/25/21              4 patient will report overall 50% improvement to aid with increase tolerance to light activities at home              EVAL moderate difficulty              CURRENT ongoing NA for % 6/25/21       PLAN  []  Upgrade activities as tolerated     []  Continue plan of care  []  Update interventions per flow sheet       []  Discharge due to:_  [x]  Other:_  Ambulation NV    Zenia Means PTA 6/28/2021  10:42 AM    Future Appointments   Date Time Provider [place]   6/30/2021  9:00 AM Montserrat Malagon PTA [facility]   7/2/2021 10:50 AM Telly Gagnon PA-C [facility] AMB 7/2/2021  1:30 PM Demetrice Alarcon, PT [facility]   10/7/2021  8:45 AM [facility] NURSE VISIT [facility] AMB   10/13/2021  8:40 AM Jolly Sarkar MD [facility] AMB   5/3/2022  8:00 AM [facility]ING 2 [facility] AMB

## 2021-06-29 ENCOUNTER — TELEPHONE (OUTPATIENT)
Dept: ORTHOPEDIC SURGERY | Age: 81
End: 2021-06-29

## 2021-06-29 DIAGNOSIS — Z96.642 STATUS POST LEFT HIP REPLACEMENT: ICD-10-CM

## 2021-06-29 DIAGNOSIS — G89.18 POST-OP PAIN: Primary | ICD-10-CM

## 2021-06-29 RX ORDER — OXYCODONE AND ACETAMINOPHEN 5; 325 MG/1; MG/1
1 TABLET ORAL
Qty: 21 TABLET | Refills: 0 | Status: CANCELLED | OUTPATIENT
Start: 2021-06-29 | End: 2021-07-06

## 2021-06-29 RX ORDER — HYDROCODONE BITARTRATE AND ACETAMINOPHEN 7.5; 325 MG/1; MG/1
1-2 TABLET ORAL
Qty: 42 TABLET | Refills: 0 | Status: SHIPPED | OUTPATIENT
Start: 2021-06-29 | End: 2021-07-06

## 2021-06-29 NOTE — TELEPHONE ENCOUNTER
Ok to change sig to 1-2 po q8h prn pain    Max 6 per day. Notify pharmacy Dr. Kofi Moralez is supervising physician.

## 2021-06-29 NOTE — TELEPHONE ENCOUNTER
Last Visit: 6/4/21 with GARRETT Carpenter  Next Appointment: 7/2/21 with GARRETT Carpenter  Previous Refill Encounter(s): 6/18/21 #21    Requested Prescriptions     Pending Prescriptions Disp Refills    oxyCODONE-acetaminophen (PERCOCET) 5-325 mg per tablet 21 Tablet 0     Sig: Take 1 Tablet by mouth every eight (8) hours as needed for Pain for up to 7 days. Max Daily Amount: 3 Tablets.

## 2021-06-29 NOTE — TELEPHONE ENCOUNTER
Ynes Farley from [address] called for Armando Disla. She said that the patient was prescribed Hydrocodone medication today. That 8 tablets daily of the Hydrocodone is to much. That they only allow 6 tablets per day. Ynes Farley is requesting a call back in regard to the amount of tablets and to know the provider Armando Disla works with. [facility] pharmacy   Tel [phone].

## 2021-06-29 NOTE — TELEPHONE ENCOUNTER
Spoke to the patient and let her know that the medication was sent to the [address] [place] [address] per GARRETT Sutton.

## 2021-06-30 ENCOUNTER — HOSPITAL ENCOUNTER (OUTPATIENT)
Dept: PHYSICAL THERAPY | Age: 81
Discharge: HOME | End: 2021-06-30
Attending: PHYSICIAN ASSISTANT
Payer: MEDICARE

## 2021-06-30 PROCEDURE — 97530 THERAPEUTIC ACTIVITIES: CPT

## 2021-06-30 PROCEDURE — 97110 THERAPEUTIC EXERCISES: CPT

## 2021-06-30 PROCEDURE — 97035 APP MDLTY 1+ULTRASOUND EA 15: CPT

## 2021-06-30 NOTE — PROGRESS NOTES
PT DAILY TREATMENT NOTE     Patient Name: Phillip Resendiz  Date:2021  : 1940  [x]  Patient  Verified  Payor: VA MEDICARE / Plan: VA MEDICARE PART A & B / Product Type: Medicare /    In time:9:00  Out time:9:53  Total Treatment Time (min): 48  Visit #: 9 of 12    Medicare/BCBS Only   Total Timed Codes (min):  53 1:1 Treatment Time:  48       Treatment Area: Presence of left artificial hip joint [Z96.642]  Pain in left hip [M25.552]    SUBJECTIVE  Pain Level (0-10 scale): 4  Any medication changes, allergies to medications, adverse drug reactions, diagnosis change, or new procedure performed?: [x] No    [] Yes (see summary sheet for update)  Subjective functional status/changes:   [] No changes reported  \"Im feeling better. \"  OBJECTIVE    Modality rationale: decrease edema, decrease inflammation, decrease pain, increase tissue extensibility and increase muscle contraction/control to improve the patients ability to perform ADL    Min Type Additional Details    [] Estim:  []Unatt       []IFC  []Premod                        []Other:  []w/ice   []w/heat  Position:  Location:    [] Estim: []Att    []TENS instruct  []NMES                    []Other:  []w/US   []w/ice   []w/heat  Position:  Location:    []  Traction: [] Cervical       []Lumbar                       [] Prone          []Supine                       []Intermittent   []Continuous Lbs:  [] before manual  [] after manual   8 [x]  Ultrasound: []Continuous   [x] Pulsed                           [x]1MHz   []3MHz W/cm2:1.3  Location:left hip    []  Iontophoresis with dexamethasone         Location: [] Take home patch   [] In clinic    []  Ice     []  heat  []  Ice massage  []  Laser   []  Anodyne Position:  Location:    []  Laser with stim  []  Other:  Position:  Location:    []  Vasopneumatic Device    []  Right     []  Left  Pre-treatment girth:  Post-treatment girth:  Measured at (location):  Pressure:       [] lo [] med [] hi Temperature: [] lo [] med [] hi   [x] Skin assessment post-treatment:  [x]intact []redness- no adverse reaction    []redness  adverse reaction:      min []Eval                  []Re-Eval       27 min Therapeutic Exercise:  [x] See flow sheet :   Rationale: increase ROM, increase strength and improve coordination to improve the patients ability to perform ADL     13 min Therapeutic Activity:  [x]  See flow sheet :   Rationale: increase ROM, increase strength and improve coordination  to improve the patients ability to perform ADL       min Neuromuscular Re-education:  []  See flow sheet :   Rationale: increase ROM, increase strength, improve coordination, improve balance and increase proprioception  to improve the patients ability to perform ADL      min Manual Therapy: The manual therapy interventions were performed at a separate and distinct time from the therapeutic activities interventions. Rationale: decrease pain, increase ROM, increase tissue extensibility, decrease edema , decrease trigger points and increase postural awareness to perform ADL     5 min Gait Training:  _150__ feet with ___ device on level surfaces with __(S)_ level of assist   Rationale: With   [x] TE   [x] TA   [] neuro   [] other: Patient Education: [x] Review HEP    [] Progressed/Changed HEP based on:   [] positioning   [] body mechanics   [] transfers   [] heat/ice application    [] other: REASSESS GOALS     Other Objective/Functional Measures:  FOTO 49    Pain Level (0-10 scale) post treatment: 3-4    ASSESSMENT/Changes in Function: Mrs. Cabral reports 75% improvement. Pain level on average 3-4/10. Pt reports difficulty sitting for long period of time due to back pain and standing.  FOTO has improved from 35 to 49 since initial eval.    Patient will continue to benefit from skilled PT services to address functional mobility deficits, address ROM deficits, address strength deficits, analyze and address soft tissue restrictions, analyze and cue movement patterns, analyze and modify body mechanics/ergonomics, assess and modify postural abnormalities and instruct in home and community integration to attain remaining goals. [x]  See Plan of Care  []  See progress note/recertification  []  See Discharge Summary         Progress towards goals / Updated goals:   1 patient will have established and be I with HEP to aid with I and self management at discharge              EVAL issued HEP              CURRENT  sometimes 6/30/21              2 patient will have pain 3/10 to aid with increase tolerance to usual activities at home               EVAL 5/10 and quite a bit of difficulty              CURRENT    3-4  6/30/21  Long Term Goals: To be accomplished in 12 treatments :     1 patient will have pain 1/10 to aid with increase tolerance to usual activities at home               EVAL 5/10 and quite a bit of difficulty              CURRENT 3-4 6/30/21              2 patient will have FOTO 57 to achieve projected increase in tolerance to regular activities and ADLS              EVAL 35, quite a bit of difficulty              CURRENT ongoing NA 6/25/21              3 patient will ambulate 570 feet with least restrictive device and no LOB for carryover to home and community activities              EVAL Right SC 50 feet.  SBA              CURRENT 300 ft with [facility]   [address]  6[address] 50 'X2 and 190'X1 with SBA/S and right [place] 6/25/21              4 patient will report overall 50% improvement to aid with increase tolerance to light activities at home              EVAL moderate difficulty              CURRENT 75 % 6/30/21       PLAN  []  Upgrade activities as tolerated     []  Continue plan of care  []  Update interventions per flow sheet       []  Discharge due to:_  []  Other:_  Larry Li PTA 6/30/2021  9:04 AM    Future Appointments   Date Time Provider [place]   7/2/2021 10:50 AM Lauren Valiente PA-C [facility] BS AMB   7/2/2021  1:30 PM Jamie President, PT [facility]   10/7/2021  8:45 AM [facility] NURSE VISIT [facility] BS AMB   10/13/2021  8:40 AM Bri Arenas MD [facility] BS AMB   5/3/2022  8:00 AM [facility] 2 [facility] BS AMB

## 2021-07-02 ENCOUNTER — OFFICE VISIT (OUTPATIENT)
Dept: ORTHOPEDIC SURGERY | Age: 81
End: 2021-07-02
Payer: MEDICARE

## 2021-07-02 ENCOUNTER — HOSPITAL ENCOUNTER (OUTPATIENT)
Dept: PHYSICAL THERAPY | Age: 81
Discharge: HOME | End: 2021-07-02
Attending: PHYSICIAN ASSISTANT
Payer: MEDICARE

## 2021-07-02 VITALS
TEMPERATURE: 96.9 F | OXYGEN SATURATION: 97 % | HEART RATE: 74 BPM | BODY MASS INDEX: 28.85 KG/M2 | WEIGHT: 169 LBS | HEIGHT: 64 IN

## 2021-07-02 DIAGNOSIS — G89.18 POST-OP PAIN: ICD-10-CM

## 2021-07-02 DIAGNOSIS — Z96.642 STATUS POST LEFT HIP REPLACEMENT: Primary | ICD-10-CM

## 2021-07-02 PROCEDURE — 73502 X-RAY EXAM HIP UNI 2-3 VIEWS: CPT | Performed by: PHYSICIAN ASSISTANT

## 2021-07-02 PROCEDURE — 97110 THERAPEUTIC EXERCISES: CPT

## 2021-07-02 PROCEDURE — 99024 POSTOP FOLLOW-UP VISIT: CPT | Performed by: PHYSICIAN ASSISTANT

## 2021-07-02 PROCEDURE — 97116 GAIT TRAINING THERAPY: CPT

## 2021-07-02 NOTE — PROGRESS NOTES
[facility] [address], [address] [place] [address], [address],[place] [address]  [phone] fax      Continued Plan of Care/ Re-certification for Physical Therapy Services    Patient name: Gilberto Sauer Start of Care: 21   Referral source: Candace Dickson : 1940   Medical/Treatment Diagnosis: Presence of left artificial hip joint [Z96.642]  Pain in left hip [M25.552]  Payor: VA MEDICARE / Plan: VA MEDICARE PART A & B / Product Type: Medicare /  Onset Date:2021, P/O 21         Prior Hospitalization: see medical history Provider#: [id number]   Medications: Verified on Patient Summary List    Comorbidities: arthritis, thyroid problems, HTN, hearing loss left ear   Prior Level of Function:  I all areas of ADLs and activities, No AD, household and community activities , drove                             Visits from [place] of Care: 10    Missed Visits: 0    The Plan of Care and following information is based on the patient's current status:  Paradise Chávez will have established and be I with HEP to aid with I and self management at discharge  Status at last note/certification:issued HEP  Current Status: not met, progressing \"sometimes\" 21    Goal:patient will have pain 3/10 to aid with increase tolerance to usual activities at home   Status at last note/certification:5/10 and quite a bit of difficulty  Current Status: not met, progressing 4 21    Goal:patient will have FOTO 57 to achieve projected increase in tolerance to regular activities and ADLS  Status at last LWMY/OSKEJUEHUPZTM:94, quite a bit of difficulty  Current Status: not met, progressing 52    Goal:patient will ambulate 570 feet with least restrictive device and no LOB for carryover to home and community activities  Status at last note/certification:Right SC 50 feet.  SBA  Current Status: not met, progressing with max 300 ft with [facility] Sup  and   [facility]      Key functional changes: decrease pain, increase tolerance to exercises, increase I with functional mobility      Problems/ barriers to goal attainment: residual weakness      Problem List: pain affecting function, decrease ROM, decrease strength, impaired gait/ balance, decrease ADL/ functional abilitiies, decrease activity tolerance, decrease flexibility/ joint mobility and decrease transfer abilities    Treatment Plan: Therapeutic exercise, Therapeutic activities, Neuromuscular re-education, Physical agent/modality, Gait/balance training, Manual therapy, Patient education, Self Care training, Functional mobility training, Home safety training and Stair training     Patient Goal (s) has been updated and includes: get a little stronger and walk better     Goals for this certification period to be accomplished in 4 weeks:  1 patient will have pain 1-2/10 to aid with increase tolerance to usual activities at home              PN 4 7/2/21              2 patient will have FOTO 57 to achieve projected increase in tolerance to regular activities and ADLS             PN  49 6/30/21              3 patient will ambulate 570 feet with least restrictive device and no LOB for carryover to home and community activities            PN Max 300 ft with [facility]   [address] [facility]  6/23 50 'X2 and 190'X1 with SBA/S and right SC 6/25/21      Right SC use SBA/S 190  7/2/21          4 patient will have MMT Left hip abd and F 4,4+ to aid with increase stability with ADLS and activities at home and in the community    PN F and abd 4-       Frequency / Duration: Patient to be seen 2 times per week for 4 weeks:    Assessment / Recommendations:Patient will continue to benefit from skilled PT services to modify and progress therapeutic interventions, address functional mobility deficits, address ROM deficits, address strength deficits, analyze and address soft tissue restrictions, analyze and cue movement patterns, analyze and modify body mechanics/ergonomics, assess and modify postural abnormalities, address imbalance/dizziness and instruct in home and community integration to attain remaining goals    Certification Period: 7/2/21 - 7/31/21    Barb Hoskins, PT 7/2/2021 1:48 PM    ________________________________________________________________________  I certify that the above Therapy Services are being furnished while the patient is under my care. I agree with the treatment plan and certify that this therapy is necessary. [] I have read the above and request that my patient continue as recommended.   [] I have read the above report and request that my patient continue therapy with the following changes/special instructions: _____________________________________________  [] I have read the above report and request that my patient be discharged from therapy    Physician's Signature:____________Date:_________TIME:________     Radha Lyon PA-C  ** Signature, Date and Time must be completed for valid certification **    Please sign and return to In . [facility] [address] [place] [address],[facility] [address] [phone] fax

## 2021-07-02 NOTE — PROGRESS NOTES
PT DAILY TREATMENT NOTE     Patient Name: Tim Gonzales  Date:2021  : 1940  [x]  Patient  Verified  Payor: VA MEDICARE / Plan: VA MEDICARE PART A & B / Product Type: Medicare /    In time:142  Out time:230  Total Treatment Time (min): 48  Visit #: 1 of 8    Medicare/BCBS Only   Total Timed Codes (min):  38 1:1 Treatment Time:  38       Treatment Area: Presence of left artificial hip joint [Z96.642]  Pain in left hip [M25.552]    SUBJECTIVE  Pain Level (0-10 scale): 4  Any medication changes, allergies to medications, adverse drug reactions, diagnosis change, or new procedure performed?: [x] No    [] Yes (see summary sheet for update)  Subjective functional status/changes:   [] No changes reported  \"I saw the doctor today. \"    OBJECTIVE    Modality rationale: decrease pain to improve the patients ability to tolerate ADLs and activities   Min Type Additional Details    [] Estim:  []Unatt       []IFC  []Premod                        []Other:  []w/ice   []w/heat  Position:  Location:    [] Estim: []Att    []TENS instruct  []NMES                    []Other:  []w/US   []w/ice   []w/heat  Position:  Location:    []  Traction: [] Cervical       []Lumbar                       [] Prone          []Supine                       []Intermittent   []Continuous Lbs:  [] before manual  [] after manual    []  Ultrasound: []Continuous   [] Pulsed                           []1MHz   []3MHz W/cm2:  Location:    []  Iontophoresis with dexamethasone         Location: [] Take home patch   [] In clinic   10 [x]  Ice post     []  heat  []  Ice massage  []  Laser   []  Anodyne Position:reclined  Location:left hip     []  Laser with stim  []  Other:  Position:  Location:    []  Vasopneumatic Device    []  Right     []  Left  Pre-treatment girth:  Post-treatment girth:  Measured at (location):  Pressure:       [] lo [] med [] hi   Temperature: [] lo [] med [] hi   [] Skin assessment post-treatment:  []intact []redness- no adverse reaction    []redness  adverse reaction:          30 min Therapeutic Exercise:  [x] See flow sheet :   Rationale: increase ROM, increase strength and improve coordination to improve the patients ability to tolerate ADLS and activities        8 min Gait Trainin, 50X2 ___ feet with Right SC___ device on level surfaces with SBA/S___ level of assist   Rationale: With   [] TE   [] TA   [] neuro   [] other: Patient Education: [x] Review HEP    [x] Progressed/Changed HEP based on:   [] positioning   [] body mechanics   [] transfers   [] heat/ice application    [x] other: POC, FOTO, GOALS check     Other Objective/Functional Measures: VC exercises and tech  Increased bike to level 2.5 X 5 minutes     Pain Level (0-10 scale) post treatment: <4    ASSESSMENT/Changes in Function: tolerated well . Patient will continue to benefit from skilled PT services to modify and progress therapeutic interventions, address functional mobility deficits, address ROM deficits, address strength deficits, analyze and address soft tissue restrictions, analyze and cue movement patterns, analyze and modify body mechanics/ergonomics, assess and modify postural abnormalities, address imbalance/dizziness and instruct in home and community integration to attain remaining goals.      [x]  See Plan of Care  [x]  See progress note/recertification  []  See Discharge Summary         Progress towards goals / Updated goals:  1 patient will have established and be I with HEP to aid with I and self management at discharge              EVAL issued HEP              CURRENT  sometimes 21              2 patient will have pain 3/10 to aid with increase tolerance to usual activities at home               EVAL 5/10 and quite a bit of difficulty              CURRENT  4 21  Long Term Goals: To be accomplished in 12 treatments :     1 patient will have pain 1/10 to aid with increase tolerance to usual activities at home             EVAL 5/10 and quite a bit of difficulty              CURRENT 4 7/2/21              2 patient will have FOTO 57 to achieve projected increase in tolerance to regular activities and ADLS              EVAL 35, quite a bit of difficulty              CURRENT 49 6/30/21              3 patient will ambulate 570 feet with least restrictive device and no LOB for carryover to home and community activities              EVAL Right SC 50 feet.  SBA              CURRENT 300 ft with [facility]   [address]  6/23 50 'X2 and 190'X1 with SBA/S and right SC 6/25/21      Right SC use SBA/S 190  7/2/21              4 patient will report overall 50% improvement to aid with increase tolerance to light activities at home              EVAL moderate difficulty              CURRENT 75 % 6/30/21    PLAN  [x]  Upgrade activities as tolerated     [x]  Continue plan of care  []  Update interventions per flow sheet       []  Discharge due to:_  [x]  Other:_ send 30 day note      Moshe Clayton, PT 7/2/2021  1:43 PM    Future Appointments   Date Time Provider [place]   7/29/2021  2:00 PM Tate Garner PA-C [facility] BS AMB   10/7/2021  8:45 AM [facility] NURSE VISIT [facility] BS AMB   10/13/2021  8:40 AM Lyndia Dandy, MD [facility] AMB   5/3/2022  8:00 AM [facility] 2 [facility] BS AMB

## 2021-07-02 NOTE — PROGRESS NOTES
[address] [place] [address]  [phone]           Patient: Ryan Yarbrough                MRN: [id number]       SSN: [id number]  YOB: 1940        AGE: 78 y.o. SEX: female  Body mass index is 29.01 kg/m². PCP: Teri Kirk MD  07/02/21      This office note has been dictated. REVIEW OF SYSTEMS:  Constitutional: Negative for fever, chills, weight loss and malaise/fatigue. HENT: Negative. Eyes: Negative. Respiratory: Negative. Cardiovascular: Negative. Gastrointestinal: No bowel incontinence or constipation. Genitourinary: No bladder incontinence or saddle anesthesia. Skin: Negative. Neurological: Negative. Endo/Heme/Allergies: Negative. Psychiatric/Behavioral: Negative. Musculoskeletal: As per HPI above.      Past Medical History:   Diagnosis Date    AAA (abdominal aortic aneurysm) without rupture (HCC)     AAA repair 7/2018 Dr Reinier Brennan adenomas     2003 Dr [place]; 4/13 Dr Rosetta Abarca    Degenerative arthritis of lumbar spine 12/2018    Dr Evelyn Foster; mri showed multilevel degen changes, mod spinal stenosis L3-5, mod severe right L4-5 foraminal disease; s/p epidural; isabel    Dyslipidemia     FHx: heart disease     Gastritis 2006    on EGD Dr. Nicole Ramon, neg sprue    Hypertension     Hypothyroidism 2007    Dr. Austin Engel Hypovitaminosis D     IPMN (intraductal papillary mucinous neoplasm) 06/2018    on mrcp 8mm; no change 10/19, no change 12/20    Lung nodule 06/2016    6 mm RLL (6/16); no change (10/17); no change (12/18)    Obesity     peak weight 187 lbs, bmi 33.1 from 4/13; IF 3/18    Osteopenia FRAX 4/12 9.7 and 1.4     DEXA t score 0.6 spine, -1 hip (12/09);  -0.4 spine, -1.3 hip (4/12); -1.2 wrist, -1.6 hip w FRAX 11/2.2 (4/14); -1.0 wrist, -1.6 hip w FRAX 12/2.6 (4/16); -0.7 wrist, -1.6 hip (5/18); -1.1 wrist, -1.8 hip (4/21)    [facility]    Psoriasis 2003    on bx Dr. Tierra Bush hearing loss     LEFT Dr Jose Hsieh Venous insufficiency 2003    on dopplers    Zoster 05/2016    LEFT T11-12; mild postherpetic neuralgia         Current Outpatient Medications:     HYDROcodone-acetaminophen (Norco) 7.5-325 mg per tablet, Take 1-2 Tablets by mouth every six (6) hours as needed for Pain for up to 7 days. Max Daily Amount: 8 Tablets. , Disp: 42 Tablet, Rfl: 0    senna-docusate (Colace 2-In-1) 8.6-50 mg per tablet, Take 1 Tablet by mouth daily. Indications: constipation, Disp: 30 Tablet, Rfl: 0    ondansetron hcl (Zofran) 4 mg tablet, Take 1 Tablet by mouth every eight (8) hours as needed for Nausea or Vomiting., Disp: 30 Tablet, Rfl: 2    omeprazole (PRILOSEC) 40 mg capsule, Take 1 Capsule by mouth daily. , Disp: 90 Capsule, Rfl: 3    docusate sodium (Colace) 100 mg capsule, Take 1 Cap by mouth two (2) times a day for 90 days. , Disp: 60 Cap, Rfl: 2    atorvastatin (LIPITOR) 40 mg tablet, Take 1 Tab by mouth daily. , Disp: 90 Tab, Rfl: 3    ezetimibe (ZETIA) 10 mg tablet, Take 1 Tab by mouth daily. , Disp: 90 Tab, Rfl: 3    Levothyroxine 75 mcg cap, Take 1 Tab by mouth daily. , Disp: 90 Cap, Rfl: 3    lisinopriL (PRINIVIL, ZESTRIL) 10 mg tablet, Take 1 Tab by mouth daily. , Disp: 90 Tab, Rfl: 3    lidocaine (LIDODERM) 5 %, Apply patch to the affected area for 12 hours a day and remove for 12 hours a day., Disp: 90 Patch, Rfl: 1    diazePAM (VALIUM) 10 mg tablet, Take 1 Tab by mouth every twelve (12) hours as needed for Anxiety. Max Daily Amount: 20 mg., Disp: 60 Tab, Rfl: 1    ergocalciferol (VITAMIN D2) 50,000 unit capsule, Take 1 Cap by mouth every seven (7) days. , Disp: 12 Cap, Rfl: 3    Allergies   Allergen Reactions    Atenolol Other (comments)     Felt poorly    Cozaar [Losartan] Other (comments)     Headaches    Norvasc [Amlodipine] Other (comments)     Felt poorly         Social History     Socioeconomic History    Marital status:      Spouse name: Not on file    Number of children: Not on file    Years of education: Not on file    Highest education level: Not on file   Occupational History    Occupation: ret office mgr   Tobacco Use    Smoking status: Never Smoker    Smokeless tobacco: Never Used   Vaping Use    Vaping Use: Never used   Substance and Sexual Activity    Alcohol use: Yes     Comment: occasionally    Drug use: No    Sexual activity: Not on file   Other Topics Concern    Not on file   Social History Narrative    Not on file     Social Determinants of Health     Financial Resource Strain:     Difficulty of Paying Living Expenses:    Food Insecurity:     Worried About [address] [employer] in the Last Year:     [address] in the Last Year:    Transportation Needs:     Lack of Transportation (Medical):      Lack of Transportation (Non-Medical):    Physical Activity:     Days of Exercise per Week:     Minutes of Exercise per Session:    Stress:     Feeling of Stress :    Social Connections:     Frequency of Communication with Friends and Family:     Frequency of Social Gatherings with Friends and Family:     Attends [employer] Services:     Active Member of Clubs or Organizations:     Attends Club or Organization Meetings:     Marital Status:    Intimate Partner Violence:     Fear of Current or Ex-Partner:     Emotionally Abused:     Physically Abused:     Sexually Abused:        Past Surgical History:   Procedure Laterality Date    HX AAA REPAIR  07/17/2018    HX [place] Left     Dr. Henny Phan     HX CATARACT REMOVAL Bilateral     HX COLONOSCOPY      benign polyp 5/08 Dr Zaida Gramajo;  4/13 negative Dr. Lolita Park HX GI  08/2018    Dr Marcelino Zendejas; ERCP s/p removal cbd stone complicated by pamcreatitis    HX HIP REPLACEMENT Left 05/2021    Dr Catie Davidson  09/18/2018    Dr. Damaris Coy neg MRA/MRI head [facility] 2007; CT head neg 9/10    AL CARDIAC SURG PROCEDURE UNLIST  09/2016    NST negative    AL CHEST SURGERY PROCEDURE UNLISTED  06/2016    CTA neg PE, 6 mm subpleural nodule               Patient seen and evaluated today for her left total hip replacement. She is now proximate 6-week status post surgery and progressing quite nicely. She is in outpatient physical therapy and doing well with this. She has had no troubles the wound. She does have a little bit of soreness to the outside part of the hip and leg. This is improving. Patient denies recent fevers, chills, chest pain, SOB, or injuries. No recent systemic changes noted. A 12-point review of systems is performed today. Pertinent positives are noted. All other systems reviewed and otherwise are negative. Physical exam: General: Alert and oriented x3, nad.  well-developed, well nourished. normal affect, AF. NC/AT, EOMI, neck supple, trachea midline, no JVD present. Breathing is non-labored. Examination of the left hip reveals skin intact. The surgical wound healed nicely. There is no erythema, ecchymosis, warmth. There are no signs of infection or cellulitis present. There is no pain with rotation of the hip. She does have some slight tenderness to palpation the trochanter bursal region. Leg lengths are perfect. Abduction strength is 4+ on the left and 5 out of 5 on right. Negative calf tenderness. Negative Homans. No signs of DVT present. Radiographs obtained the office today 7/2/2021 at the [facility] location include AP pelvis, AP and crosstable lateral of the left hip shows the total hip components to be well fixed without evidence of loosening or fracture noted. Assessment: Status post left total hip replacement    Plan: At this point, she will continue and finish up her physical therapy. She will continue the home exercise program.  She will continue activities as tolerated.   We will see her back in the office in 4 weeks time for evaluation of strength check. She will call with any questions or concerns that shall arise.             JR Zay HALL, PA-C, ATC

## 2021-07-23 ENCOUNTER — TELEPHONE (OUTPATIENT)
Dept: INTERNAL MEDICINE CLINIC | Age: 81
End: 2021-07-23

## 2021-07-23 DIAGNOSIS — E78.5 DYSLIPIDEMIA: ICD-10-CM

## 2021-07-23 DIAGNOSIS — E03.9 ACQUIRED HYPOTHYROIDISM: Primary | ICD-10-CM

## 2021-07-23 DIAGNOSIS — I10 ESSENTIAL HYPERTENSION WITH GOAL BLOOD PRESSURE LESS THAN 140/90: ICD-10-CM

## 2021-07-25 RX ORDER — ATORVASTATIN CALCIUM 40 MG/1
40 TABLET, FILM COATED ORAL DAILY
Qty: 90 TABLET | Refills: 0 | Status: SHIPPED | OUTPATIENT
Start: 2021-07-25 | End: 2021-10-11 | Stop reason: SDUPTHER

## 2021-07-25 RX ORDER — LEVOTHYROXINE SODIUM 75 UG/1
1 CAPSULE ORAL DAILY
Qty: 90 CAPSULE | Refills: 0 | Status: SHIPPED | OUTPATIENT
Start: 2021-07-25 | End: 2021-10-11 | Stop reason: SDUPTHER

## 2021-07-25 RX ORDER — EZETIMIBE 10 MG/1
10 TABLET ORAL DAILY
Qty: 90 TABLET | Refills: 0 | Status: SHIPPED | OUTPATIENT
Start: 2021-07-25 | End: 2021-10-11 | Stop reason: SDUPTHER

## 2021-07-25 RX ORDER — LISINOPRIL 10 MG/1
10 TABLET ORAL DAILY
Qty: 90 TABLET | Refills: 0 | Status: SHIPPED | OUTPATIENT
Start: 2021-07-25 | End: 2021-10-11 | Stop reason: SDUPTHER

## 2021-07-28 ENCOUNTER — TELEPHONE (OUTPATIENT)
Dept: ORTHOPEDIC SURGERY | Age: 81
End: 2021-07-28

## 2021-07-28 DIAGNOSIS — G89.18 POST-OP PAIN: Primary | ICD-10-CM

## 2021-07-28 DIAGNOSIS — Z96.642 STATUS POST LEFT HIP REPLACEMENT: ICD-10-CM

## 2021-07-28 RX ORDER — HYDROCODONE BITARTRATE AND ACETAMINOPHEN 7.5; 325 MG/1; MG/1
1-2 TABLET ORAL
Qty: 42 TABLET | Refills: 0 | Status: SHIPPED | OUTPATIENT
Start: 2021-07-28 | End: 2021-08-04

## 2021-07-28 NOTE — TELEPHONE ENCOUNTER
[facility] Pharmacist called in requesting for the patient's medication adjusted from 8 to 6 tablets for the medication Hydrocodone-acetaminophen (Norco)       Please contact Joseph Foreman at [facility] with the verbal request at [phone]

## 2021-07-28 NOTE — TELEPHONE ENCOUNTER
Spoke with pharmacist, notified her per GARRETT ellington to adjust sig as needed to max 6 tabs per day. She verbalized understanding.

## 2021-07-28 NOTE — TELEPHONE ENCOUNTER
Last Visit: 7/2/21 with GARRETT Correa  Next Appointment: 7/29/21 with GARRETT Correa  Previous Refill Encounter(s): 6/29/21 #42    Requested Prescriptions     Pending Prescriptions Disp Refills    HYDROcodone-acetaminophen (NORCO) 7.5-325 mg per tablet 42 Tablet 0     Sig: Take 1-2 Tablets by mouth every six (6) hours as needed for Pain for up to 7 days. Max Daily Amount: 8 Tablets.

## 2021-07-29 ENCOUNTER — OFFICE VISIT (OUTPATIENT)
Dept: ORTHOPEDIC SURGERY | Age: 81
End: 2021-07-29
Payer: MEDICARE

## 2021-07-29 VITALS
HEART RATE: 74 BPM | HEIGHT: 64 IN | WEIGHT: 169 LBS | BODY MASS INDEX: 28.85 KG/M2 | OXYGEN SATURATION: 99 % | TEMPERATURE: 97.4 F

## 2021-07-29 DIAGNOSIS — M25.552 LEFT HIP PAIN: ICD-10-CM

## 2021-07-29 DIAGNOSIS — Z96.642 STATUS POST LEFT HIP REPLACEMENT: Primary | ICD-10-CM

## 2021-07-29 PROCEDURE — 99024 POSTOP FOLLOW-UP VISIT: CPT | Performed by: PHYSICIAN ASSISTANT

## 2021-07-29 NOTE — PROGRESS NOTES
[address] [place] [address]  [phone]           Patient: Jewel Pavon                MRN: [id number]       SSN: [id number]  YOB: 1940        AGE: 78 y.o. SEX: female  Body mass index is 29.01 kg/m². PCP: Juany Mtz MD  07/29/21      This office note has been dictated. REVIEW OF SYSTEMS:  Constitutional: Negative for fever, chills, weight loss and malaise/fatigue. HENT: Negative. Eyes: Negative. Respiratory: Negative. Cardiovascular: Negative. Gastrointestinal: No bowel incontinence or constipation. Genitourinary: No bladder incontinence or saddle anesthesia. Skin: Negative. Neurological: Negative. Endo/Heme/Allergies: Negative. Psychiatric/Behavioral: Negative. Musculoskeletal: As per HPI above.      Past Medical History:   Diagnosis Date    AAA (abdominal aortic aneurysm) without rupture (HCC)     AAA repair 7/2018 Dr Vickers Folds adenomas     2003 Dr Shima Ibanez; 4/13 Dr Emiliano Corbin    Degenerative arthritis of lumbar spine 12/2018    Dr Argueta April; mri showed multilevel degen changes, mod spinal stenosis L3-5, mod severe right L4-5 foraminal disease; s/p epidural; isabel    Dyslipidemia     FHx: heart disease     Gastritis 2006    on EGD Dr. Janene Corbin, neg sprue    Hypertension     Hypothyroidism 2007    Dr. Nandini Martin Hypovitaminosis D     IPMN (intraductal papillary mucinous neoplasm) 06/2018    on mrcp 8mm; no change 10/19, no change 12/20    Lung nodule 06/2016    6 mm RLL (6/16); no change (10/17); no change (12/18)    Obesity     peak weight 187 lbs, bmi 33.1 from 4/13; IF 3/18    Osteopenia FRAX 4/12 9.7 and 1.4     DEXA t score 0.6 spine, -1 hip (12/09);  -0.4 spine, -1.3 hip (4/12); -1.2 wrist, -1.6 hip w FRAX 11/2.2 (4/14); -1.0 wrist, -1.6 hip w FRAX 12/2.6 (4/16); -0.7 wrist, -1.6 hip (5/18); -1.1 wrist, -1.8 hip (4/21)    Pancreatitis, chronic ([address] 2018    Psoriasis 2003    on bx Dr. Nai Mcdonnell hearing loss     LEFT Dr Ruel Corral Venous insufficiency 2003    on dopplers    Zoster 05/2016    LEFT T11-12; mild postherpetic neuralgia         Current Outpatient Medications:     HYDROcodone-acetaminophen (NORCO) 7.5-325 mg per tablet, Take 1-2 Tablets by mouth every six (6) hours as needed for Pain for up to 7 days. Max Daily Amount: 8 Tablets. , Disp: 42 Tablet, Rfl: 0    lisinopriL (PRINIVIL, ZESTRIL) 10 mg tablet, Take 1 Tablet by mouth daily. , Disp: 90 Tablet, Rfl: 0    Levothyroxine 75 mcg cap, Take 1 Tablet by mouth daily. , Disp: 90 Capsule, Rfl: 0    ezetimibe (ZETIA) 10 mg tablet, Take 1 Tablet by mouth daily. , Disp: 90 Tablet, Rfl: 0    atorvastatin (LIPITOR) 40 mg tablet, Take 1 Tablet by mouth daily. , Disp: 90 Tablet, Rfl: 0    senna-docusate (Colace 2-In-1) 8.6-50 mg per tablet, Take 1 Tablet by mouth daily. Indications: constipation, Disp: 30 Tablet, Rfl: 0    ondansetron hcl (Zofran) 4 mg tablet, Take 1 Tablet by mouth every eight (8) hours as needed for Nausea or Vomiting., Disp: 30 Tablet, Rfl: 2    omeprazole (PRILOSEC) 40 mg capsule, Take 1 Capsule by mouth daily. , Disp: 90 Capsule, Rfl: 3    docusate sodium (Colace) 100 mg capsule, Take 1 Cap by mouth two (2) times a day for 90 days. , Disp: 60 Cap, Rfl: 2    lidocaine (LIDODERM) 5 %, Apply patch to the affected area for 12 hours a day and remove for 12 hours a day., Disp: 90 Patch, Rfl: 1    diazePAM (VALIUM) 10 mg tablet, Take 1 Tab by mouth every twelve (12) hours as needed for Anxiety. Max Daily Amount: 20 mg., Disp: 60 Tab, Rfl: 1    ergocalciferol (VITAMIN D2) 50,000 unit capsule, Take 1 Cap by mouth every seven (7) days. , Disp: 12 Cap, Rfl: 3    Allergies   Allergen Reactions    Atenolol Other (comments)     Felt poorly    Cozaar [Losartan] Other (comments)     Headaches    Norvasc [Amlodipine] Other (comments)     Felt poorly         Social History     Socioeconomic History    Marital status:      Spouse name: Not on file    Number of children: Not on file    Years of education: Not on file    Highest education level: Not on file   Occupational History    Occupation: ret office mgr   Tobacco Use    Smoking status: Never Smoker    Smokeless tobacco: Never Used   Vaping Use    Vaping Use: Never used   Substance and Sexual Activity    Alcohol use: Yes     Comment: occasionally    Drug use: No    Sexual activity: Not on file   Other Topics Concern    Not on file   Social History Narrative    Not on file     Social Determinants of Health     Financial Resource Strain:     Difficulty of Paying Living Expenses:    Food Insecurity:     Worried About [address] [employer] in the Last Year:     [address] in the Last Year:    Transportation Needs:     Lack of Transportation (Medical):      Lack of Transportation (Non-Medical):    Physical Activity:     Days of Exercise per Week:     Minutes of Exercise per Session:    Stress:     Feeling of Stress :    Social Connections:     Frequency of Communication with Friends and Family:     Frequency of Social Gatherings with Friends and Family:     Attends [employer] Services:     Active Member of Clubs or Organizations:     Attends Club or Organization Meetings:     Marital Status:    Intimate Partner Violence:     Fear of Current or Ex-Partner:     Emotionally Abused:     Physically Abused:     Sexually Abused:        Past Surgical History:   Procedure Laterality Date    HX AAA REPAIR  07/17/2018    HX Samson Gather Left     Dr. Dale Reaves     HX CATARACT REMOVAL Bilateral     HX COLONOSCOPY      benign polyp 5/08 Dr Dru Peralta;  4/13 negative Dr. Natty Hung HX GI  08/2018    Dr Caron Pickett; ERCP s/p removal cbd stone complicated by pamcreatitis    HX HIP REPLACEMENT Left 05/2021    Dr Iraheta Fear  09/18/2018    Dr. Clay Jerez NEUROLOGICAL PROCEDURE UNLISTED      neg [facility]; CT head neg 9/10    IN CARDIAC SURG PROCEDURE UNLIST  09/2016    NST negative    IN CHEST SURGERY PROCEDURE UNLISTED  06/2016    CTA neg PE, 6 mm subpleural nodule           Patient seen and evaluated today for her left hip. She is status post left total hip replacement surgery. She is now approximate 9 weeks out of surgery. She is progressing well. She is doing physical therapy on her own. She is ambulate with a cane. She reports that most of her problems within her back. She has no radiating pain down the lower extremities. No change in bowel bladder habits. She does still take an occasional pain pill especially at night. Review of systems is negative for chest pain or shortness of breath. No fevers or chills. Physical exam: Alert and x3 no acute distress, well-developed well-nourished. NCAT/EOMI, neck supple, trachea midline, no JVD present. Examination of the lower extremities reveals pain-free range of motion the hips. There is no pain to palpation trochanter bursa. Neck straight leg raise. Negative calf tenderness. Negative Homans. No signs of DVT present. Leg lengths are perfect. Abduction strength is 5 - on the left and 5-5 on right. Assessment: Status post left total hip replacement    Plan: At this point, the patient will continue the home exercise program and abduction exercises. She will continue activities as tolerated. We will see her back in 3 months time for evaluation. I did offer her referral to the spine center which she declines.             JR Zay HALL, JESSICA, ATC

## 2021-07-30 NOTE — PROGRESS NOTES
[facility]      [address] [facility], [address] [place] [address], [facility] [address]  [phone] fax    Discharge Summary  Patient name: Mariela Alvarez Start of Care: 21   Referral source: Alta Haines : 1940   Medical/Treatment Diagnosis: Presence of left artificial hip joint [Z96.642]  Pain in left hip [M25.552]  Payor: [facility] MEDICARE / Plan: VA MEDICARE PART A & B / Product Type: Medicare /  Onset Date:2021, P/O 21          Prior Hospitalization: see medical history Provider#: [id number]   Medications: Verified on Patient Summary List     Comorbidities: arthritis, thyroid problems, HTN, hearing loss left ear   Prior Level of Function:  I all areas of ADLs and activities, No AD, household and community activities , drove   Visits from [place] of Care: 11    Missed Visits: 0  Reporting Period : 2021 to 2021    Summary of Care:  Goal: patient will have pain 1-2/10 to aid with increase tolerance to usual activities at home   Status at last note/certification: unable to reassess   Status at discharge: not met    Goal: patient will have FOTO 57 to achieve projected increase in tolerance to regular activities and ADLS  Status at last note/certification: unable to reassess   Status at discharge: not met    Goal: patient will ambulate 570 feet with least restrictive device and no LOB for carryover to home and community activities  Status at last note/certification: unable to reassess   Status at discharge: not met    Goal: patient will have MMT Left hip abd and F 4,4+ to aid with increase stability with ADLS and activities at home and in the community   Status at last note/certification: unable to reassess   Status at discharge: not met    ASSESSMENT/RECOMMENDATIONS:  Pt was seen for 11 therapy sessions. Per telephone log, the pt is ready for d/c. Pt is therefore d/c'ed from therapy and unable to reassess goals at this time.    []Discontinue therapy progressing towards or have reached established goals  []Discontinue therapy due to lack of appreciable progress towards goals  [x]Discontinue therapy due to lack of attendance or compliance  []Other:     Thank you for this referral.     Noris Morales, PT 7/30/2021 2:22 PM

## 2021-08-19 ENCOUNTER — PATIENT MESSAGE (OUTPATIENT)
Dept: INTERNAL MEDICINE CLINIC | Age: 81
End: 2021-08-19

## 2021-08-19 DIAGNOSIS — Z96.649 STATUS POST HIP REPLACEMENT, UNSPECIFIED LATERALITY: Primary | ICD-10-CM

## 2021-08-20 NOTE — TELEPHONE ENCOUNTER
Kylah Peterson was last prescribed by [address] [place] [address]. physician, GARRETT Bazan. Please sign if appropriate. [facility] Norco [facility]21 for a 7 d/s. Last Visit: 5/20/21 with MD Troy Peña  Next Appointment: 10/13/21 with MD Troy Peña  Previous Refill Encounter(s): 7/28/21 #42    Requested Prescriptions     Pending Prescriptions Disp Refills    HYDROcodone-acetaminophen (NORCO) 7.5-325 mg per tablet 42 Tablet 0     Sig: Take 1-2 Tablets by mouth every six (6) hours as needed for Pain for up to 7 days. Max Daily Amount: 8 Tablets.

## 2021-08-20 NOTE — TELEPHONE ENCOUNTER
From: Raul Cabral  To: Nola Ellison MD  Sent: 8/19/2021 9:54 PM EDT  Subject: Prescription Question    Need refill of hydrocodone to [facility]

## 2021-08-23 RX ORDER — HYDROCODONE BITARTRATE AND ACETAMINOPHEN 7.5; 325 MG/1; MG/1
1-2 TABLET ORAL
Qty: 42 TABLET | Refills: 0 | Status: SHIPPED | OUTPATIENT
Start: 2021-08-23 | End: 2021-08-25

## 2021-08-24 ENCOUNTER — TELEPHONE (OUTPATIENT)
Dept: INTERNAL MEDICINE CLINIC | Age: 81
End: 2021-08-24

## 2021-08-24 NOTE — TELEPHONE ENCOUNTER
[address] called and said since it is acute pain, Norco 7.25 max daily needs to be changed to 6 pills daily.   Any questions, call them at [phone]

## 2021-09-16 NOTE — TELEPHONE ENCOUNTER
Patient is requesting a refill of HYDROcodone-acetaminophen (Norco) 7.5-325 mg per tablet. She confirmed her pharmacy is [facility]. Patient can be reached at [phone].

## 2021-10-06 ENCOUNTER — HOSPITAL ENCOUNTER (OUTPATIENT)
Dept: LAB | Age: 81
Discharge: HOME | End: 2021-10-06
Payer: MEDICARE

## 2021-10-06 ENCOUNTER — OFFICE VISIT (OUTPATIENT)
Dept: INTERNAL MEDICINE CLINIC | Age: 81
End: 2021-10-06
Payer: MEDICARE

## 2021-10-06 VITALS
OXYGEN SATURATION: 97 % | RESPIRATION RATE: 18 BRPM | SYSTOLIC BLOOD PRESSURE: 135 MMHG | HEART RATE: 82 BPM | WEIGHT: 167 LBS | BODY MASS INDEX: 28.51 KG/M2 | DIASTOLIC BLOOD PRESSURE: 87 MMHG | HEIGHT: 64 IN | TEMPERATURE: 98.4 F

## 2021-10-06 DIAGNOSIS — R35.0 URINARY FREQUENCY: Primary | ICD-10-CM

## 2021-10-06 DIAGNOSIS — E78.5 DYSLIPIDEMIA: ICD-10-CM

## 2021-10-06 DIAGNOSIS — E03.4 HYPOTHYROIDISM DUE TO ACQUIRED ATROPHY OF THYROID: ICD-10-CM

## 2021-10-06 DIAGNOSIS — R35.0 URINARY FREQUENCY: ICD-10-CM

## 2021-10-06 DIAGNOSIS — N30.00 ACUTE CYSTITIS WITHOUT HEMATURIA: ICD-10-CM

## 2021-10-06 LAB
BILIRUB UR QL STRIP: NEGATIVE
CHOLEST SERPL-MCNC: 162 MG/DL
GLUCOSE UR-MCNC: NEGATIVE MG/DL
HDLC SERPL-MCNC: 50 MG/DL (ref 40–60)
HDLC SERPL: 3.2 {RATIO} (ref 0–5)
KETONES P FAST UR STRIP-MCNC: NEGATIVE MG/DL
LDLC SERPL CALC-MCNC: 90.6 MG/DL (ref 0–100)
LIPID PROFILE,FLP: NORMAL
PH UR STRIP: 5.5 [PH] (ref 4.6–8)
PROT UR QL STRIP: NEGATIVE
SP GR UR STRIP: 1.03 (ref 1–1.03)
T4 FREE SERPL-MCNC: 1.2 NG/DL (ref 0.7–1.5)
TRIGL SERPL-MCNC: 107 MG/DL (ref ?–150)
TSH SERPL DL<=0.05 MIU/L-ACNC: 0.43 UIU/ML (ref 0.36–3.74)
UA UROBILINOGEN AMB POC: NORMAL (ref 0.2–1)
URINALYSIS CLARITY POC: CLEAR
URINALYSIS COLOR POC: YELLOW
URINE BLOOD POC: NEGATIVE
URINE LEUKOCYTES POC: NORMAL
URINE NITRITES POC: NEGATIVE
VLDLC SERPL CALC-MCNC: 21.4 MG/DL

## 2021-10-06 PROCEDURE — G8536 NO DOC ELDER MAL SCRN: HCPCS | Performed by: INTERNAL MEDICINE

## 2021-10-06 PROCEDURE — G8427 DOCREV CUR MEDS BY ELIG CLIN: HCPCS | Performed by: INTERNAL MEDICINE

## 2021-10-06 PROCEDURE — 87086 URINE CULTURE/COLONY COUNT: CPT

## 2021-10-06 PROCEDURE — 1090F PRES/ABSN URINE INCON ASSESS: CPT | Performed by: INTERNAL MEDICINE

## 2021-10-06 PROCEDURE — G8510 SCR DEP NEG, NO PLAN REQD: HCPCS | Performed by: INTERNAL MEDICINE

## 2021-10-06 PROCEDURE — 87077 CULTURE AEROBIC IDENTIFY: CPT

## 2021-10-06 PROCEDURE — 87186 SC STD MICRODIL/AGAR DIL: CPT

## 2021-10-06 PROCEDURE — 84443 ASSAY THYROID STIM HORMONE: CPT

## 2021-10-06 PROCEDURE — 99213 OFFICE O/P EST LOW 20 MIN: CPT | Performed by: INTERNAL MEDICINE

## 2021-10-06 PROCEDURE — G8754 DIAS BP LESS 90: HCPCS | Performed by: INTERNAL MEDICINE

## 2021-10-06 PROCEDURE — 80061 LIPID PANEL: CPT

## 2021-10-06 PROCEDURE — 81003 URINALYSIS AUTO W/O SCOPE: CPT | Performed by: INTERNAL MEDICINE

## 2021-10-06 PROCEDURE — 36415 COLL VENOUS BLD VENIPUNCTURE: CPT

## 2021-10-06 PROCEDURE — G0463 HOSPITAL OUTPT CLINIC VISIT: HCPCS | Performed by: INTERNAL MEDICINE

## 2021-10-06 PROCEDURE — G8399 PT W/DXA RESULTS DOCUMENT: HCPCS | Performed by: INTERNAL MEDICINE

## 2021-10-06 PROCEDURE — G8417 CALC BMI ABV UP PARAM F/U: HCPCS | Performed by: INTERNAL MEDICINE

## 2021-10-06 PROCEDURE — 84439 ASSAY OF FREE THYROXINE: CPT

## 2021-10-06 PROCEDURE — G8752 SYS BP LESS 140: HCPCS | Performed by: INTERNAL MEDICINE

## 2021-10-06 PROCEDURE — 1101F PT FALLS ASSESS-DOCD LE1/YR: CPT | Performed by: INTERNAL MEDICINE

## 2021-10-06 RX ORDER — CEPHALEXIN 500 MG/1
500 CAPSULE ORAL 3 TIMES DAILY
Qty: 21 CAPSULE | Refills: 0 | Status: SHIPPED | OUTPATIENT
Start: 2021-10-06 | End: 2021-10-13 | Stop reason: ALTCHOICE

## 2021-10-06 NOTE — PROGRESS NOTES
78 y.o. WHITE/NON- female who presents for evaluation. She has been having urinary frequency for over a week now. Denies any fevers, nausea, vomiting, hematuria, flank pain although she does have low back pain. No incontinence or dysuria    Past Medical History:   Diagnosis Date    AAA (abdominal aortic aneurysm) without rupture (HCC)     AAA repair 7/2018 Dr Rubio Adame adenomas     2003 Dr Chinedu Flannery; 4/13 Dr Ella Ponce    Degenerative arthritis of lumbar spine 12/2018    Dr Sanjeev Flowers; mri showed multilevel degen changes, mod spinal stenosis L3-5, mod severe right L4-5 foraminal disease; s/p epidural; isabel    Dyslipidemia     FHx: heart disease     Gastritis 2006    on EGD Dr. Jaramillo Ast, neg sprue    Hypertension     Hypothyroidism 2007    Dr. Chance Corbin Hypovitaminosis D     IPMN (intraductal papillary mucinous neoplasm) 06/2018    on mrcp 8mm; no change 10/19, no change 12/20    Lung nodule 06/2016    6 mm RLL (6/16); no change (10/17); no change (12/18)    Obesity     peak weight 187 lbs, bmi 33.1 from 4/13; IF 3/18    Osteopenia FRAX 4/12 9.7 and 1.4     DEXA t score 0.6 spine, -1 hip (12/09);  -0.4 spine, -1.3 hip (4/12); -1.2 wrist, -1.6 hip w FRAX 11/2.2 (4/14); -1.0 wrist, -1.6 hip w FRAX 12/2.6 (4/16); -0.7 wrist, -1.6 hip (5/18); -1.1 wrist, -1.8 hip (4/21)    Pancreatitis, chronic ([address] 2018    Psoriasis 2003    on bx Dr. Jose Bauer Sensorineural hearing loss     LEFT Dr Asia Haynes Venous insufficiency 2003    on dopplers    Zoster 05/2016    LEFT T11-12; mild postherpetic neuralgia     Current Outpatient Medications   Medication Sig    cephALEXin (KEFLEX) 500 mg capsule Take 1 Capsule by mouth three (3) times daily for 7 days.  lisinopriL (PRINIVIL, ZESTRIL) 10 mg tablet Take 1 Tablet by mouth daily.  Levothyroxine 75 mcg cap Take 1 Tablet by mouth daily.  ezetimibe (ZETIA) 10 mg tablet Take 1 Tablet by mouth daily.     atorvastatin (LIPITOR) 40 mg tablet Take 1 Tablet by mouth daily.  lidocaine (LIDODERM) 5 % Apply patch to the affected area for 12 hours a day and remove for 12 hours a day.  diazePAM (VALIUM) 10 mg tablet Take 1 Tab by mouth every twelve (12) hours as needed for Anxiety. Max Daily Amount: 20 mg.    ergocalciferol (VITAMIN D2) 50,000 unit capsule Take 1 Cap by mouth every seven (7) days.  senna-docusate (Colace 2-In-1) 8.6-50 mg per tablet Take 1 Tablet by mouth daily. Indications: constipation (Patient not taking: Reported on 10/6/2021)    ondansetron hcl (Zofran) 4 mg tablet Take 1 Tablet by mouth every eight (8) hours as needed for Nausea or Vomiting. (Patient not taking: Reported on 10/6/2021)    omeprazole (PRILOSEC) 40 mg capsule Take 1 Capsule by mouth daily. (Patient not taking: Reported on 10/6/2021)     No current facility-administered medications for this visit. Allergies   Allergen Reactions    Atenolol Other (comments)     Felt poorly    Cozaar [Losartan] Other (comments)     Headaches    Norvasc [Amlodipine] Other (comments)     [place] poorly       Visit Vitals  /87 (BP 1 Location: Left arm, BP Patient Position: Sitting, BP Cuff Size: Adult)   Pulse 82   Temp 98.4 °F (36.9 °C) (Temporal)   Resp 18   Ht 5' 4\" (1.626 m)   Wt 167 lb (75.8 kg)   SpO2 97%   BMI 28.67 kg/m²   Back showed no CVA tenderness.   Abdomen was soft and nontender    Results for orders placed or performed in visit on 10/06/21   AMB POC URINALYSIS DIP STICK AUTO W/O MICRO   Result Value Ref Range    Color (UA POC) Yellow     Clarity (UA POC) Clear     Glucose (UA POC) Negative Negative    Bilirubin (UA POC) Negative Negative    Ketones (UA POC) Negative Negative    Specific gravity (UA POC) 1.030 1.001 - 1.035    Blood (UA POC) Negative Negative    pH (UA POC) 5.5 4.6 - 8.0    Protein (UA POC) Negative Negative    Urobilinogen (UA POC) 0.2 mg/dL 0.2 - 1    Nitrites (UA POC) Negative Negative    Leukocyte esterase (UA POC) Trace Negative Assessment and plan:  1. Rule out UTI. Gave her Keflex empirically, urine culture pending. She has routine follow-up in a week. Call if no improvement or worsening symptoms otherwise        Above conditions discussed at length and patient vocalized understanding. All questions answered to patient satisfaction          ICD-10-CM ICD-9-CM    1. Urinary frequency  R35.0 788.41 AMB POC URINALYSIS DIP STICK AUTO W/O MICRO      CULTURE, URINE      cephALEXin (KEFLEX) 500 mg capsule   2.  Acute cystitis without hematuria  N30.00 595.0 cephALEXin (KEFLEX) 500 mg capsule

## 2021-10-06 NOTE — PROGRESS NOTES
Patient is in the office today for frequent urination, and mid back pain. 1. Have you been to the ER, urgent care clinic since your last visit? Hospitalized since your last visit? No    2. Have you seen or consulted any other health care providers outside of the [address] [place] [address] since your last visit? Include any pap smears or colon screening. yes, Dr. Dru Horne.

## 2021-10-06 NOTE — PATIENT INSTRUCTIONS

## 2021-10-07 NOTE — PROGRESS NOTES
This is a Subsequent Medicare Annual Wellness Visit    I have reviewed the patient's medical history in detail and updated the computerized patient record. Assessment/Plan   Education and counseling provided:  Are appropriate based on today's review and evaluation  End-of-Life planning (with patient's consent)  Influenza Vaccine  Screening Mammography  Cardiovascular screening blood test  Diabetes screening test    1. Medicare annual wellness visit, subsequent  2. Needs flu shot  -     FLU (FLUAD QUAD INFLUENZA VACCINE,QUAD,ADJUVANTED)  3. Venous insufficiency dopplers 2003  4. Primary hypertension  5. Hypothyroidism due to acquired atrophy of thyroid  6. Osteoarthritis, unspecified osteoarthritis type, unspecified site  7. S/P AAA repair  8. Dyslipidemia  -     CBC W/O DIFF; Future  -     METABOLIC PANEL, COMPREHENSIVE; Future  9. Advanced directives, counseling/discussion  -     ADVANCE CARE PLANNING FIRST 30 MINS       Depression Risk Factor Screening     3 most recent PHQ Screens 10/13/2021   Little interest or pleasure in doing things Not at all   Feeling down, depressed, irritable, or hopeless Not at all   Total Score PHQ 2 0       Alcohol Risk Screen    Do you average more than 1 drink per night or more than 7 drinks a week:  No    On any one occasion in the past three months have you have had more than 3 drinks containing alcohol:  No        Functional Ability and Level of Safety    Hearing: Hearing is good. Activities of Daily Living: The home contains: no safety equipment. Patient does total self care      Ambulation: with no difficulty     Fall Risk:  Fall Risk Assessment, last 12 mths 10/13/2021   Able to walk? Yes   Fall in past 12 months? 0   Do you feel unsteady? 0   Are you worried about falling 0   Is TUG test greater than 12 seconds? -   Is the gait abnormal? -   Number of falls in past 12 months -   Fall with injury?  -      Abuse Screen:  Patient is not abused       Cognitive Screening    Has your family/caregiver stated any concerns about your memory: no     Cognitive Screening: Normal - Mini Cog Test    Health Maintenance Due   There are no preventive care reminders to display for this patient. Patient Care Team   Patient Care Team:  Robbie Boo MD as PCP - General (Internal Medicine)  Robbie Boo MD as P[facility] Empaneled Provider    History     Patient Active Problem List   Diagnosis Code    Colon adenomas 2003, last 2014 Dr Marlyn Lucio D12.6    Hypovitaminosis D E55.9    Venous insufficiency dopplers 2003 I87.2    Osteopenia FRAX 4/14 11 and 2.2 M85.80    Dyslipidemia E78.5    Hypothyroidism due to acquired atrophy of thyroid E03.4    Arthritis, degenerative M19.90    Obesity (BMI 30.0-34. 9) E66.9    Advance directive discussed with patient Z70.80    Primary hypertension I10    Spondylosis of lumbar region without myelopathy or radiculopathy M47.816    S/P AAA repair Z98.890, Z86.79    Hip arthritis M16.10     Past Medical History:   Diagnosis Date    AAA (abdominal aortic aneurysm) without rupture (HCC)     AAA repair 7/2018 Dr Corcoran End adenomas     2003 Dr Jose Raul Moore; 4/13 Dr Marlyn Lucio    Degenerative arthritis of lumbar spine 12/2018    Dr Cindy Thomas; mri showed multilevel degen changes, mod spinal stenosis L3-5, mod severe right L4-5 foraminal disease; s/p epidural; isabel    Dyslipidemia     FHx: heart disease     Gastritis 2006    on EGD Dr. Pebbles Hoyt, neg sprue    Hypertension     Hypothyroidism 2007    Dr. Natasha Gilbert Hypovitaminosis D     IPMN (intraductal papillary mucinous neoplasm) 06/2018    on mrcp 8mm; no change 10/19, no change 12/20    Lung nodule 06/2016    6 mm RLL (6/16); no change (10/17); no change (12/18)    Obesity     peak weight 187 lbs, bmi 33.1 from 4/13; IF 3/18    Osteopenia FRAX 4/12 9.7 and 1.4     DEXA t score 0.6 spine, -1 hip (12/09);  -0.4 spine, -1.3 hip (4/12); -1.2 wrist, -1.6 hip w FRAX 11/2.2 (4/14); -1.0 wrist, -1.6 hip w FRAX 12/2.6 (4/16); -0.7 wrist, -1.6 hip (5/18); -1.1 wrist, -1.8 hip (4/21)    Pancreatitis, chronic ([facility] 2018    Psoriasis 2003    on bx Dr. Oni Lin    Sensorineural hearing loss     LEFT Dr Fuad Keane Venous insufficiency 2003    on dopplers    Zoster 05/2016    LEFT T11-12; mild postherpetic neuralgia      Past Surgical History:   Procedure Laterality Date    HX AAA REPAIR  07/17/2018    HX Yuliana Mcqueen Left     Dr. Mccauley Overall     HX CATARACT REMOVAL Bilateral     HX COLONOSCOPY      benign polyp 5/08 Dr Andrea Guerra;  4/13 negative Dr. Josh Causey HX GI  08/2018    Dr Ki Nguyen; ERCP s/p removal cbd stone complicated by pamcreatitis    HX HIP REPLACEMENT Left 05/2021    Dr Chase Grey  09/18/2018    Dr. Enrique Cruz      neg [facility]; CT head neg 9/10    [place] CARDIAC SURG PROCEDURE UNLIST  09/2016    NST negative    [place] CHEST SURGERY PROCEDURE UNLISTED  06/2016    CTA neg PE, 6 mm subpleural nodule     Current Outpatient Medications   Medication Sig Dispense Refill    lisinopriL (PRINIVIL, ZESTRIL) 10 mg tablet Take 1 Tablet by mouth daily. 90 Tablet 3    Levothyroxine 75 mcg cap Take 1 Tablet by mouth daily. 90 Capsule 3    ezetimibe (ZETIA) 10 mg tablet Take 1 Tablet by mouth daily. 90 Tablet 3    atorvastatin (LIPITOR) 40 mg tablet Take 1 Tablet by mouth daily. 90 Tablet 3    lidocaine (LIDODERM) 5 % Apply patch to the affected area for 12 hours a day and remove for 12 hours a day. 90 Patch 1    diazePAM (VALIUM) 10 mg tablet Take 1 Tab by mouth every twelve (12) hours as needed for Anxiety. Max Daily Amount: 20 mg. 60 Tab 1    ergocalciferol (VITAMIN D2) 50,000 unit capsule Take 1 Cap by mouth every seven (7) days.  12 Cap 3     Allergies   Allergen Reactions    Atenolol Other (comments)     Felt poorly    Cozaar [Losartan] Other (comments)     Headaches    Norvasc [Amlodipine] Other (comments)     [place] poorly         Family History   Problem Relation Age of Onset    Breast Cancer Mother     Cancer Mother     Heart Disease Father      Social History     Tobacco Use    Smoking status: Never Smoker    Smokeless tobacco: Never Used   Substance Use Topics    Alcohol use: Yes     Comment: occasionally         Сергей Kidd MD

## 2021-10-07 NOTE — PROGRESS NOTES
78 y.o. WHITE/NON- female who presents for evaluation    She is happy with the results of the hip replacement. Ambulating well and doing mostly adls and minimal exercise. No cardiovascular complaints. No new gi or gu complaints.   We had seen her for presumed uti last week and empirically treated, sx resolved and cx showed streptococcus 20K    LAST MEDICARE WELLNESS EXAM: 4/1/14, 4/8/14, 4/14/16, 4/25/17, 5/7/18, 10/2/19, 10/8/20, 10/13/21    Past Medical History:   Diagnosis Date    AAA (abdominal aortic [address]) without rupture ([facility] [address]     AAA repair 7/2018 Dr Bob Dyson adenomas     2003 Dr Tai Bob; 4/13 Dr Zulay Zaman    Degenerative arthritis of lumbar spine 12/2018    Dr Ginny Elizondo; mri showed multilevel degen changes, mod spinal stenosis L3-5, mod severe right L4-5 foraminal disease; s/p epidural; isabel    Dyslipidemia     FHx: heart disease     Gastritis 2006    on EGD Dr. Charlene Morales, neg sprue    Hypertension     Hypothyroidism 2007    Dr. David Carlos Hypovitaminosis D     IPMN (intraductal papillary mucinous neoplasm) 06/2018    on mrcp 8mm; no change 10/19, no change 12/20    Lung nodule 06/2016    6 mm RLL (6/16); no change (10/17); no change (12/18)    Obesity     peak weight 187 lbs, bmi 33.1 from 4/13; IF 3/18    Osteopenia FRAX 4/12 9.7 and 1.4     DEXA t score 0.6 spine, -1 hip (12/09);  -0.4 spine, -1.3 hip (4/12); -1.2 wrist, -1.6 hip w FRAX 11/2.2 (4/14); -1.0 wrist, -1.6 hip w FRAX 12/2.6 (4/16); -0.7 wrist, -1.6 hip (5/18); -1.1 wrist, -1.8 hip (4/21)    Pancreatitis, chronic ([facility] 2018    Psoriasis 2003    on bx Dr. Tracy Lamas    Sensorineural hearing loss     LEFT Dr Kip Montiel Venous insufficiency 2003    on dopplers    Zoster 05/2016    LEFT T11-12; mild postherpetic neuralgia     Past Surgical History:   Procedure Laterality Date    HX AAA REPAIR  07/17/2018    HX [address] Left     Dr. Slime Linda     HX CATARACT REMOVAL Bilateral     HX COLONOSCOPY      benign polyp 5/08 Dr Savannah Ramirez;  4/13 negative Dr. Sukumar Holder HX GI  08/2018    Dr Augustus Manuel; ERCP s/p removal cbd stone complicated by pamcreatitis    HX HIP REPLACEMENT Left 05/2021    Dr Nicol De La Vega  09/18/2018    Dr. Lux Chiu      neg MRA[facility] head [facility] 2007; CT head neg 9/10    [place] CARDIAC SURG PROCEDURE UNLIST  09/2016    NST negative    [place] CHEST SURGERY PROCEDURE UNLISTED  06/2016    CTA neg PE, 6 mm subpleural nodule     Social History     Socioeconomic History    Marital status:      Spouse name: Not on file    Number of children: Not on file    Years of education: Not on file    Highest education level: Not on file   Occupational History    Occupation: ret office mgr   Tobacco Use    Smoking status: Never Smoker    Smokeless tobacco: Never Used   Vaping Use    Vaping Use: Never used   Substance and Sexual Activity    Alcohol use: Yes     Comment: occasionally    Drug use: No    Sexual activity: Not on file   Other Topics Concern    Not on file   Social History Narrative    Not on file     Social Determinants of Health     Financial Resource Strain:     Difficulty of Paying Living Expenses:    Food Insecurity:     Worried About Running Out of Food in the Last Year:     Ran Out of Food in the Last Year:    Transportation Needs:     Lack of Transportation (Medical):      Lack of Transportation (Non-Medical):    Physical Activity:     Days of Exercise per Week:     Minutes of Exercise per Session:    Stress:     Feeling of Stress :    Social Connections:     Frequency of Communication with Friends and Family:     Frequency of Social Gatherings with Friends and Family:     Attends [employer] Services:     Active Member of Clubs or Organizations:     Attends Club or Organization Meetings:     Marital Status:    Intimate Partner Violence:     Fear of Current or Ex-Partner:     Emotionally Abused:     Physically Abused:     Sexually Abused: Allergies   Allergen Reactions    Atenolol Other (comments)     Felt poorly    Cozaar [Losartan] Other (comments)     Headaches    Norvasc [Amlodipine] Other (comments)     Felt poorly       Current Outpatient Medications   Medication Sig    lisinopriL (PRINIVIL, ZESTRIL) 10 mg tablet Take 1 Tablet by mouth daily.  Levothyroxine 75 mcg cap Take 1 Tablet by mouth daily.  ezetimibe (ZETIA) 10 mg tablet Take 1 Tablet by mouth daily.  atorvastatin (LIPITOR) 40 mg tablet Take 1 Tablet by mouth daily.  lidocaine (LIDODERM) 5 % Apply patch to the affected area for 12 hours a day and remove for 12 hours a day.  diazePAM (VALIUM) 10 mg tablet Take 1 Tab by mouth every twelve (12) hours as needed for Anxiety. Max Daily Amount: 20 mg.    ergocalciferol (VITAMIN D2) 50,000 unit capsule Take 1 Cap by mouth every seven (7) days. No current facility-administered medications for this visit. REVIEW OF SYSTEMS:  mammo 12/20, DEXA 4/21, colo 4/13 Dr Pily Gillis, sees Dr Almon Closs  Ophtho  no vision change or eye pain  Oral  no mouth pain, tongue or tooth problems  Ears  no hearing loss, ear pain, fullness  Throat  no swallowing problems  Cardiac  no CP, PND, orthopnea, edema, palpitations or syncope  Chest  no breast masses  Resp  no wheezing, chronic coughing, dyspnea  GI  no heartburn, nausea, vomiting, change in bowel habits, bleeding, hemorrhoids  Urinary  no dysuria, hematuria, flank pain, urgency, frequency    Visit Vitals  /67   Pulse 74   Temp 97.7 °F (36.5 °C) (Temporal)   Resp 16   Ht 5' 4\" (1.626 m)   Wt 166 lb (75.3 kg)   SpO2 97%   BMI 28.49 kg/m²   A&O x3  Affect is appropriate. Mood stable  No apparent distress  Anicteric, no JVD, adenopathy or thyromegaly. No carotid bruits or radiated murmur  Lungs clear to auscultation, no wheezes or rales  Heart showed regular rate and rhythm.  No murmur, rubs, gallops  Abdomen soft nondistended, no hepatosplenomegaly or masses. Extremities without edema.   Pulses 1-2+ symmetrically    LABS  From 3/10 showed                         vit d 38.0  From 9/10 showed                           wbc 7.2, hb 14.3, plt 158  From 3/12 showed   gluc 88, cr 0.87, gfr 66, alt 15, tsh 1.04, ldl-p 1515  From 9/12 showed                                                    tsh 1.69, ldl-p 1469, chol 152, tg 165, hdl 42, ldl-c 77  From 3/13 showed   gluc 83, cr 0.91, gfr 62, alt 10,     ldl-p 1274, chol 124, tg 137, hdl 37, ldl-c 60,   wbc 7.0, hb 14.3, plt 164  From 9/13 showed                chol 147, tg 139, hdl 39, ldl-c 80  From 3/14 showed   gluc 89, cr 1.02, gfr 53, alt<5,  tsh 1.66,         chol 148, tg 132, hdl 36, ldl-c 86,   wbc 6.7, hb 14.4, plt 179, vit d 45.4   From 10/14 showed                chol 137, tg 117, hdl 40, ldl-c 74  From 4/15 showed   gluc 91, cr 1.24, gfr 42, alt 30, tsh 0.64,          wbc 6.8, hb 14.8, plt 176  From 10/15 showed gluc 93, cr 1.13, gfr 47,            tsh 0.32,         chol 139, tg 131, hdl 36, ldl-c 77   From 4/16 showed   gluc 86, cr 1.00, gfr 54, alt 24,           chol 142, tg 137, hdl 41, ldl-c 74,   wbc 6.4, hb 13.9, plt 203, ua pro 30  From 6/16 showed   gluc 97, cr 1.20,             wbc 7.3, hb 14.1, plt 216, ck/trop neg  From 10/16 showed gluc 78, cr 1.03, gfr 52, alt 21, tsh 1.21,         chol 145, tg 151, hdl 43, ldl-c 72,               From 4/17 showed                tsh 2.57, ft4 1.10  From 10/17 showed gluc 79, cr 1.03, gfr 52, alt 27,           chol 199, tg 161, hdl 45, ldl-c 122, wbc 6.3, hb 14.7, plt 208  From 5/18 showed                tsh 5.23, ft4 1.10,    chol 320, tg 196, hdl 40, ldl-c 221,             vit d 57.0  From 8/18 showed   glu 141, cr 0.87, gfr>60,alt 34,                      wb 10.9, hb 13.0, plt 179,  lip 8208  From 11/18 showed                tsh 4.20, ft4 1.00,    chol 133, tg 171, hdl 41, ldl-c 58,              vit d 56.5  From 3/19 showed   gluc 81, cr 1.13, gfr 46, alt 23, tsh 3.50, ft4 1.10,             wbc 5.7, hb 14.2, plt 195,  fe 91, %sat 34, b12 306, fol 6, spep neg  From 9/19 showed                tsh 1.40,         chol 151, tg 135, hdl 42, ldl-c 82  From 4/20 showed   gluc 87, cr 1.03, gfr 51, alt 22,            wbc 6.8, hb 14.1, plt 184  From 10/20 showed                tsh 2.44        chol 150, tg 145, hdl 46, ldl-c 75,      From 4/21 showed   glu 109, cr 1.03, gfr 48, alt 21,            wbc 8.5, hb 14.8, plt 214    We reviewed the patient's labs from the last several visits to point out trends in the numbers    Results for orders placed or performed during the hospital encounter of 10/06/21   CULTURE, URINE    Specimen: Urine   Result Value Ref Range    Special Requests: NO SPECIAL REQUESTS      [place] Count [id number]  COLONIES/mL        Culture result: (A)       Streptococcus salivarius ssp salivarius (PREDOMINATING)    Culture result: MIXED UROGENITAL FRED ISOLATED         Susceptibility    Streptococcus salivarius ssp salivarius - AURELIO     Ampicillin ($) <=0.25 Susceptible ug/mL     Cefotaxime <=0.12 Susceptible ug/mL     Penicillin G ($$) 0.12 Susceptible ug/mL     Vancomycin ($) 0.5 Susceptible ug/mL     Ceftriaxone ($) <=0.12 Susceptible ug/mL   LIPID PANEL   Result Value Ref Range    LIPID PROFILE          Cholesterol, total 162 <200 MG/DL    Triglyceride 107 <150 MG/DL    HDL Cholesterol 50 40 - 60 MG/DL    LDL, calculated 90.6 0 - 100 MG/DL    VLDL, calculated 21.4 MG/DL    CHOL/HDL Ratio 3.2 0 - 5.0     TSH 3RD GENERATION   Result Value Ref Range    TSH 0.43 0.36 - 3.74 uIU/mL   T4, FREE   Result Value Ref Range    T4, Free 1.2 0.7 - 1.5 NG/DL           Patient Active Problem List   Diagnosis Code    Colon adenomas 2003, last 2014 Dr Harjit Herr D12.6    Hypovitaminosis D E55.9    Venous insufficiency dopplers 2003 I87.2    Osteopenia FRAX 4/14 11 and 2.2 M85.80    Dyslipidemia E78.5    Hypothyroidism due to acquired atrophy of thyroid E03.4    Arthritis, degenerative M19.90    Obesity (BMI 30.0-34. 9) E66.9    Advance directive discussed with patient Z70.80    Primary hypertension I10    Spondylosis of lumbar region without myelopathy or radiculopathy M47.816    S/P AAA repair Z98.890, Z86.79    Hip arthritis M16.10     ASSESSMENT AND PLAN:  1. Spine. Per Dr Snyder  2. Hypertension. Controlled on alia  3. Hypovit D.  supplementation   4. Colon adenomas. Fiber  5. Obesity. Lifestyle and dietary measures. Portion control reiterated. 6.  Possible IPMN. Serial f/u in 6/22  7. Dyslipidemia. at target on lipitor/zetia  8. Hypothyroidism. Therapeutic dosing  9. Osteopenia. Wt bearing exercise, ca/D  10. Vascular. Per Dr Olivier Billings      RTC 4/22    Above conditions discussed at length and patient vocalized understanding. All questions answered to patient satisfaction        ICD-10-CM ICD-9-CM    1. Medicare annual wellness visit, subsequent  Z00.00 V70.0    2. Needs flu shot  Z23 V04.81 FLU (FLUAD QUAD INFLUENZA VACCINE,QUAD,ADJUVANTED)   3. Venous insufficiency dopplers 2003  I87.2 459.81    4. Primary hypertension  I10 401.9    5. Hypothyroidism due to acquired atrophy of thyroid  E03.4 244.8      246.8    6. Osteoarthritis, unspecified osteoarthritis type, unspecified site  M19.90 715.90    7. S/P AAA repair  Z98.890 V45.89     Z86.79     8. Dyslipidemia  E78.5 272.4 CBC W/O DIFF      METABOLIC PANEL, COMPREHENSIVE   9.  Advanced directives, counseling/discussion  Z71.89 V65.49 ADVANCE CARE PLANNING FIRST 30 MINS

## 2021-10-09 LAB
BACTERIA SPEC CULT: ABNORMAL
BACTERIA SPEC CULT: ABNORMAL
CC UR VC: ABNORMAL
SERVICE CMNT-IMP: ABNORMAL

## 2021-10-11 DIAGNOSIS — E03.9 ACQUIRED HYPOTHYROIDISM: ICD-10-CM

## 2021-10-11 DIAGNOSIS — E78.5 DYSLIPIDEMIA: ICD-10-CM

## 2021-10-11 DIAGNOSIS — I10 ESSENTIAL HYPERTENSION WITH GOAL BLOOD PRESSURE LESS THAN 140/90: ICD-10-CM

## 2021-10-12 RX ORDER — LEVOTHYROXINE SODIUM 75 UG/1
1 CAPSULE ORAL DAILY
Qty: 90 CAPSULE | Refills: 3 | Status: SHIPPED | OUTPATIENT
Start: 2021-10-12 | End: 2022-10-26 | Stop reason: SDUPTHER

## 2021-10-12 RX ORDER — ATORVASTATIN CALCIUM 40 MG/1
40 TABLET, FILM COATED ORAL DAILY
Qty: 90 TABLET | Refills: 3 | Status: SHIPPED | OUTPATIENT
Start: 2021-10-12 | End: 2022-10-26 | Stop reason: SDUPTHER

## 2021-10-12 RX ORDER — EZETIMIBE 10 MG/1
10 TABLET ORAL DAILY
Qty: 90 TABLET | Refills: 3 | Status: SHIPPED | OUTPATIENT
Start: 2021-10-12 | End: 2022-10-26 | Stop reason: SDUPTHER

## 2021-10-12 RX ORDER — LISINOPRIL 10 MG/1
10 TABLET ORAL DAILY
Qty: 90 TABLET | Refills: 3 | Status: SHIPPED | OUTPATIENT
Start: 2021-10-12 | End: 2022-10-26 | Stop reason: SDUPTHER

## 2021-10-12 NOTE — TELEPHONE ENCOUNTER
Last Visit: 10/6/21 with MD Tata Wylie  Next Appointment: 10/13/21 with MD Tata Wylie  Previous Refill Encounter(s): 7/25/21 #90    Requested Prescriptions     Pending Prescriptions Disp Refills    lisinopriL (PRINIVIL, ZESTRIL) 10 mg tablet 90 Tablet 3     Sig: Take 1 Tablet by mouth daily.  Levothyroxine 75 mcg cap 90 Capsule 3     Sig: Take 1 Tablet by mouth daily.  ezetimibe (ZETIA) 10 mg tablet 90 Tablet 3     Sig: Take 1 Tablet by mouth daily.  atorvastatin (LIPITOR) 40 mg tablet 90 Tablet 3     Sig: Take 1 Tablet by mouth daily.

## 2021-10-13 ENCOUNTER — OFFICE VISIT (OUTPATIENT)
Dept: INTERNAL MEDICINE CLINIC | Age: 81
End: 2021-10-13
Payer: MEDICARE

## 2021-10-13 VITALS
HEIGHT: 64 IN | TEMPERATURE: 97.7 F | DIASTOLIC BLOOD PRESSURE: 67 MMHG | OXYGEN SATURATION: 97 % | RESPIRATION RATE: 16 BRPM | WEIGHT: 166 LBS | HEART RATE: 74 BPM | BODY MASS INDEX: 28.34 KG/M2 | SYSTOLIC BLOOD PRESSURE: 119 MMHG

## 2021-10-13 DIAGNOSIS — E03.4 HYPOTHYROIDISM DUE TO ACQUIRED ATROPHY OF THYROID: ICD-10-CM

## 2021-10-13 DIAGNOSIS — Z23 NEEDS FLU SHOT: ICD-10-CM

## 2021-10-13 DIAGNOSIS — Z71.89 ADVANCED DIRECTIVES, COUNSELING/DISCUSSION: ICD-10-CM

## 2021-10-13 DIAGNOSIS — I10 ESSENTIAL HYPERTENSION WITH GOAL BLOOD PRESSURE LESS THAN 140/90: ICD-10-CM

## 2021-10-13 DIAGNOSIS — Z00.00 MEDICARE ANNUAL WELLNESS VISIT, SUBSEQUENT: Primary | ICD-10-CM

## 2021-10-13 DIAGNOSIS — E78.5 DYSLIPIDEMIA: ICD-10-CM

## 2021-10-13 DIAGNOSIS — I87.2 VENOUS INSUFFICIENCY: ICD-10-CM

## 2021-10-13 DIAGNOSIS — M19.90 OSTEOARTHRITIS, UNSPECIFIED OSTEOARTHRITIS TYPE, UNSPECIFIED SITE: ICD-10-CM

## 2021-10-13 DIAGNOSIS — Z86.79 S/P AAA REPAIR: ICD-10-CM

## 2021-10-13 DIAGNOSIS — Z98.890 S/P AAA REPAIR: ICD-10-CM

## 2021-10-13 PROCEDURE — 1101F PT FALLS ASSESS-DOCD LE1/YR: CPT | Performed by: INTERNAL MEDICINE

## 2021-10-13 PROCEDURE — G0463 HOSPITAL OUTPT CLINIC VISIT: HCPCS | Performed by: INTERNAL MEDICINE

## 2021-10-13 PROCEDURE — G8399 PT W/DXA RESULTS DOCUMENT: HCPCS | Performed by: INTERNAL MEDICINE

## 2021-10-13 PROCEDURE — G8510 SCR DEP NEG, NO PLAN REQD: HCPCS | Performed by: INTERNAL MEDICINE

## 2021-10-13 PROCEDURE — G8536 NO DOC ELDER MAL SCRN: HCPCS | Performed by: INTERNAL MEDICINE

## 2021-10-13 PROCEDURE — G8752 SYS BP LESS 140: HCPCS | Performed by: INTERNAL MEDICINE

## 2021-10-13 PROCEDURE — G8754 DIAS BP LESS 90: HCPCS | Performed by: INTERNAL MEDICINE

## 2021-10-13 PROCEDURE — 99497 ADVNCD CARE PLAN 30 MIN: CPT | Performed by: INTERNAL MEDICINE

## 2021-10-13 PROCEDURE — G0439 PPPS, SUBSEQ VISIT: HCPCS | Performed by: INTERNAL MEDICINE

## 2021-10-13 PROCEDURE — 1090F PRES/ABSN URINE INCON ASSESS: CPT | Performed by: INTERNAL MEDICINE

## 2021-10-13 PROCEDURE — G8427 DOCREV CUR MEDS BY ELIG CLIN: HCPCS | Performed by: INTERNAL MEDICINE

## 2021-10-13 PROCEDURE — 99214 OFFICE O/P EST MOD 30 MIN: CPT | Performed by: INTERNAL MEDICINE

## 2021-10-13 PROCEDURE — G8417 CALC BMI ABV UP PARAM F/U: HCPCS | Performed by: INTERNAL MEDICINE

## 2021-10-13 PROCEDURE — 90694 VACC AIIV4 NO PRSRV 0.5ML IM: CPT | Performed by: INTERNAL MEDICINE

## 2021-10-13 NOTE — ACP (ADVANCE CARE PLANNING)
Advance Care Planning     Advance Care Planning (ACP) Physician/NP/PA Conversation      Date of Conversation: 10/13/2021  Conducted with: Patient with Decision Making Capacity    Healthcare Decision Maker:   No healthcare decision makers have been documented. Click here to complete [address] including selection of the Healthcare Decision Maker Relationship (ie \"Primary\")      Today we documented Decision Maker(s) consistent with Legal Next of Kin hierarchy. Care Preferences:    Hospitalization: \"If your health worsens and it becomes clear that your chance of recovery is unlikely, what would be your preference regarding hospitalization? \"  The patient would prefer hospitalization. Ventilation: \"If you were unable to breathe on your own and your chance of recovery was unlikely, what would be your preference about the use of a ventilator (breathing machine) if it was available to you? \"   The patient would desire the use of a ventilator. Resuscitation: \"In the event your heart stopped as a result of an underlying serious health condition, would you want attempts to be made to restart your heart, or would you prefer a natural death? \"   Yes, attempt to resuscitate.     Additional topics discussed: ventilation preferences, hospitalization preferences and resuscitation preferences    Conversation Outcomes / Follow-Up Plan:   ACP in process - information provided, considering goals and options  Reviewed DNR/DNI and patient elects Full Code (Attempt Resuscitation)     Length of Voluntary ACP Conversation in minutes:  16 minutes    Rema Gann MD

## 2021-10-13 NOTE — PROGRESS NOTES
Katrina Souza presents today for   Chief Complaint   Patient presents with   [address] [facility] Annual Wellness Visit    Cholesterol Problem    Thyroid Problem    Labs     10-6-21       Coordination of Care:  1. Have you been to the ER, urgent care clinic since your last visit? Hospitalized since your last visit? YES    2. Have you seen or consulted any other health care providers outside of the [address] [place] [address] since your last visit? Include any pap smears or colon screening.  YES

## 2021-10-13 NOTE — PATIENT INSTRUCTIONS
Vaccine Information Statement    Influenza (Flu) Vaccine (Inactivated or Recombinant): What You Need to Know    Many vaccine information statements are available in Arabic and other languages. See www.immunize.org/vis. Hojas de información sobre vacunas están disponibles en español y en muchos otros idiomas. Visite www.immunize.org/vis. 1. Why get vaccinated? Influenza vaccine can prevent influenza (flu). Flu is a contagious disease that spreads around the [facility] every year, usually between October and May. Anyone can get the flu, but it is more dangerous for some people. Infants and young children, people 72 years and older, pregnant people, and people with certain health conditions or a weakened immune system are at greatest risk of flu complications. Pneumonia, bronchitis, sinus infections, and ear infections are examples of flu-related complications. If you have a medical condition, such as heart disease, cancer, or diabetes, flu can make it worse. Flu can cause fever and chills, sore throat, muscle aches, fatigue, cough, headache, and runny or stuffy nose. Some people may have vomiting and diarrhea, though this is more common in children than adults. In an average year, thousands of people in the [facility] die from flu, and many more are hospitalized. Flu vaccine prevents millions of illnesses and flu-related visits to the doctor each year. 2. Influenza vaccines     CDC recommends everyone 6 months and older get vaccinated every flu season. Children 6 months through 6years of age may need 2 doses during a single flu season. Everyone else needs only 1 dose each flu season. It takes about 2 weeks for protection to develop after vaccination. There are many flu viruses, and they are always changing. Each year a new flu vaccine is made to protect against the influenza viruses believed to be likely to cause disease in the upcoming flu season.  Even when the vaccine doesnt exactly match these viruses, it may still provide some protection. Influenza vaccine does not cause flu. Influenza vaccine may be given at the same time as other vaccines. 3. Talk with your health care provider    Tell your vaccination provider if the person getting the vaccine:   Has had an allergic reaction after a previous dose of influenza vaccine, or has any severe, life-threatening allergies    Has ever had Guillain-Barré Syndrome (also called GBS)    In some cases, your health care provider may decide to postpone influenza vaccination until a future visit. Influenza vaccine can be administered at any time during pregnancy. People who are or will be pregnant during influenza season should receive inactivated influenza vaccine. People with minor illnesses, such as a cold, may be vaccinated. People who are moderately or severely ill should usually wait until they recover before getting influenza vaccine. Your health care provider can give you more information. 4. Risks of a vaccine reaction     Soreness, redness, and swelling where the shot is given, fever, muscle aches, and headache can happen after influenza vaccination.  There may be a very small increased risk of Guillain-Barré Syndrome (GBS) after inactivated influenza vaccine (the flu shot). [facility] children who get the flu shot along with pneumococcal vaccine (PCV13) and/or DTaP vaccine at the same time might be slightly more likely to have a seizure caused by fever. Tell your health care provider if a child who is getting flu vaccine has ever had a seizure. People sometimes faint after medical procedures, including vaccination. Tell your provider if you feel dizzy or have vision changes or ringing in the ears. As with any medicine, there is a very remote chance of a vaccine causing a severe allergic reaction, other serious injury, or death. 5. What if there is a serious problem?     An allergic reaction could occur after the vaccinated person leaves the clinic. If you see signs of a severe allergic reaction (hives, swelling of the face and throat, difficulty breathing, a fast heartbeat, dizziness, or weakness), call 9-1-1 and get the person to the nearest hospital.    For other signs that concern you, call your health care provider. Adverse reactions should be reported to the Vaccine Adverse Event Reporting System (VAERS). Your health care provider will usually file this report, or you can do it yourself. Visit the VAERS website at www.vaers. [facility].gov or call 6-107.796.2261. VAERS is only for reporting reactions, and VAERS staff members do not give medical advice. 6. The National Vaccine Injury Compensation Program    The [facility] Vaccine Injury Compensation Program (VICP) is a federal program that was created to compensate people who may have been injured by certain vaccines. Claims regarding alleged injury or death due to vaccination have a time limit for filing, which may be as short as two years. Visit the VICP website at www.[facility].gov/vaccinecompensation or call 4-532.984.8751 to learn about the program and about filing a claim. 7. How can I learn more?  Ask your health care provider.  Call your local or state health department.  Visit the website of the Food and Drug Administration (FDA) for vaccine package inserts and additional information at www.fda.gov/vaccines-blood-biologics/vaccines.  Contact the Centers for Disease Control and Prevention (CDC):  - Call 4-617.896.1932 (1-800-CDC-INFO) or  - Visit CDCs influenza website at www.cdc.gov/flu. Vaccine Information Statement   Inactivated Influenza Vaccine   8/6/2021  42 BIB Wilson [id number]-[id number]   Department of Health and Human Services  Centers for Disease Control and Prevention    Office Use Only      Medicare Wellness Visit, Female     The best way to live healthy is to have a lifestyle where you eat a well-balanced diet, exercise regularly, limit alcohol use, and quit all forms of tobacco/nicotine, if applicable. Regular preventive services are another way to keep healthy. Preventive services (vaccines, screening tests, monitoring & exams) can help personalize your care plan, which helps you manage your own care. Screening tests can find health problems at the earliest stages, when they are easiest to treat. [facility] follows the current, evidence-based guidelines published by the [facility] ([facility]) when recommending preventive services for our patients. Because we follow these guidelines, sometimes recommendations change over time as research supports it. (For example, mammograms used to be recommended annually. Even though Medicare will still pay for an annual mammogram, the newer guidelines recommend a mammogram every two years for women of average risk). Of course, you and your doctor may decide to screen more often for some diseases, based on your risk and your co-morbidities (chronic disease you are already diagnosed with). Preventive services for you include:  - Medicare offers their members a free annual wellness visit, which is time for you and your primary care provider to discuss and plan for your preventive service needs. Take advantage of this benefit every year!  -All adults over the age of 72 should receive the recommended pneumonia vaccines. Current USPSTF guidelines recommend a series of two vaccines for the best pneumonia protection.   -All adults should have a flu vaccine yearly and a tetanus vaccine every 10 years.   -All adults age 48 and older should receive the shingles vaccines (series of two vaccines).       -All adults age 38-68 who are overweight should have a diabetes screening test once every three years.   -All adults born between [birth year] and 1965 should be screened once for Hepatitis C.  -Other screening tests and preventive services for persons with diabetes include: an eye exam to screen for diabetic retinopathy, a kidney function test, a foot exam, and stricter control over your cholesterol.   -Cardiovascular screening for adults with routine risk involves an electrocardiogram (ECG) at intervals determined by your doctor.   -Colorectal cancer screenings should be done for adults age 54-65 with no increased risk factors for colorectal cancer. There are a number of acceptable methods of screening for this type of cancer. Each test has its own benefits and drawbacks. Discuss with your doctor what is most appropriate for you during your annual wellness visit. The different tests include: colonoscopy (considered the best screening method), a fecal occult blood test, a fecal DNA test, and sigmoidoscopy.    -A bone mass density test is recommended when a woman turns 65 to screen for osteoporosis. This test is only recommended one time, as a screening. Some providers will use this same test as a disease monitoring tool if you already have osteoporosis. -Breast cancer screenings are recommended every other year for women of normal risk, age 54-69.  -Cervical cancer screenings for women over age 72 are only recommended with certain risk factors. Here is a list of your current Health Maintenance items (your personalized list of preventive services) with a due date: There are no preventive care reminders to display for this patient.

## 2021-11-04 ENCOUNTER — OFFICE VISIT (OUTPATIENT)
Dept: ORTHOPEDIC SURGERY | Age: 81
End: 2021-11-04
Payer: MEDICARE

## 2021-11-04 VITALS
HEIGHT: 65 IN | BODY MASS INDEX: 27.99 KG/M2 | TEMPERATURE: 97.1 F | OXYGEN SATURATION: 99 % | WEIGHT: 168 LBS | HEART RATE: 81 BPM

## 2021-11-04 DIAGNOSIS — Z96.642 HISTORY OF TOTAL HIP REPLACEMENT, LEFT: Primary | ICD-10-CM

## 2021-11-04 DIAGNOSIS — M25.552 LEFT HIP PAIN: ICD-10-CM

## 2021-11-04 PROCEDURE — 99214 OFFICE O/P EST MOD 30 MIN: CPT | Performed by: PHYSICIAN ASSISTANT

## 2021-11-04 PROCEDURE — 1090F PRES/ABSN URINE INCON ASSESS: CPT | Performed by: PHYSICIAN ASSISTANT

## 2021-11-04 PROCEDURE — G8756 NO BP MEASURE DOC: HCPCS | Performed by: PHYSICIAN ASSISTANT

## 2021-11-04 PROCEDURE — 73502 X-RAY EXAM HIP UNI 2-3 VIEWS: CPT | Performed by: PHYSICIAN ASSISTANT

## 2021-11-04 PROCEDURE — G8432 DEP SCR NOT DOC, RNG: HCPCS | Performed by: PHYSICIAN ASSISTANT

## 2021-11-04 PROCEDURE — G8427 DOCREV CUR MEDS BY ELIG CLIN: HCPCS | Performed by: PHYSICIAN ASSISTANT

## 2021-11-04 PROCEDURE — G8399 PT W/DXA RESULTS DOCUMENT: HCPCS | Performed by: PHYSICIAN ASSISTANT

## 2021-11-04 PROCEDURE — 1101F PT FALLS ASSESS-DOCD LE1/YR: CPT | Performed by: PHYSICIAN ASSISTANT

## 2021-11-04 PROCEDURE — G8417 CALC BMI ABV UP PARAM F/U: HCPCS | Performed by: PHYSICIAN ASSISTANT

## 2021-11-04 PROCEDURE — G8536 NO DOC ELDER MAL SCRN: HCPCS | Performed by: PHYSICIAN ASSISTANT

## 2021-11-04 RX ORDER — DICLOFENAC SODIUM 10 MG/G
4 GEL TOPICAL 4 TIMES DAILY
Qty: 100 G | Refills: 4 | Status: SHIPPED | OUTPATIENT
Start: 2021-11-04 | End: 2022-02-20

## 2021-11-04 NOTE — PROGRESS NOTES
[address] [place] [address]  [phone]           Patient: Uma Ruvalcaba                MRN: [id number]       SSN: [id number]  YOB: 1940        AGE: 78 y.o. SEX: female  Body mass index is 28.39 kg/m². PCP: Elle Sierra MD  11/04/21      This office note has been dictated. REVIEW OF SYSTEMS:  Constitutional: Negative for fever, chills, weight loss and malaise/fatigue. HENT: Negative. Eyes: Negative. Respiratory: Negative. Cardiovascular: Negative. Gastrointestinal: No bowel incontinence or constipation. Genitourinary: No bladder incontinence or saddle anesthesia. Skin: Negative. Neurological: Negative. Endo/Heme/Allergies: Negative. Psychiatric/Behavioral: Negative. Musculoskeletal: As per HPI above.      Past Medical History:   Diagnosis Date    AAA (abdominal aortic aneurysm) without rupture (HCC)     AAA repair 7/2018 Dr Phill Lesches adenomas     2003 Dr Carmen Stallworth; 4/13 Dr Saintclair Grimmer    Degenerative arthritis of lumbar spine 12/2018    Dr Lou Parrish; mri showed multilevel degen changes, mod spinal stenosis L3-5, mod severe right L4-5 foraminal disease; s/p epidural; isabel    Dyslipidemia     FHx: heart disease     Gastritis 2006    on EGD Dr. Rene Noonan, neg sprue    Hypertension     Hypothyroidism 2007    Dr. Moe Boyd Hypovitaminosis D     IPMN (intraductal papillary mucinous neoplasm) 06/2018    on mrcp 8mm; no change 10/19, no change 12/20    Lung nodule 06/2016    6 mm RLL (6/16); no change (10/17); no change (12/18)    Obesity     peak weight 187 lbs, bmi 33.1 from 4/13; IF 3/18    Osteopenia FRAX 4/12 9.7 and 1.4     DEXA t score 0.6 spine, -1 hip (12/09);  -0.4 spine, -1.3 hip (4/12); -1.2 wrist, -1.6 hip w FRAX 11/2.2 (4/14); -1.0 wrist, -1.6 hip w FRAX 12/2.6 (4/16); -0.7 wrist, -1.6 hip (5/18); -1.1 wrist, -1.8 hip (4/21)    Pancreatitis, chronic ([facility] 2018    Psoriasis 2003    on bx Dr. Benoit Ellison hearing loss     LEFT Dr Joe Nam Venous insufficiency 2003    on dopplers    Zoster 05/2016    LEFT T11-12; mild postherpetic neuralgia         Current Outpatient Medications:     lisinopriL (PRINIVIL, ZESTRIL) 10 mg tablet, Take 1 Tablet by mouth daily. , Disp: 90 Tablet, Rfl: 3    Levothyroxine 75 mcg cap, Take 1 Tablet by mouth daily. , Disp: 90 Capsule, Rfl: 3    ezetimibe (ZETIA) 10 mg tablet, Take 1 Tablet by mouth daily. , Disp: 90 Tablet, Rfl: 3    atorvastatin (LIPITOR) 40 mg tablet, Take 1 Tablet by mouth daily. , Disp: 90 Tablet, Rfl: 3    lidocaine (LIDODERM) 5 %, Apply patch to the affected area for 12 hours a day and remove for 12 hours a day., Disp: 90 Patch, Rfl: 1    diazePAM (VALIUM) 10 mg tablet, Take 1 Tab by mouth every twelve (12) hours as needed for Anxiety. Max Daily Amount: 20 mg., Disp: 60 Tab, Rfl: 1    ergocalciferol (VITAMIN D2) 50,000 unit capsule, Take 1 Cap by mouth every seven (7) days. , Disp: 12 Cap, Rfl: 3    Allergies   Allergen Reactions    Atenolol Other (comments)     Felt poorly    Cozaar [Losartan] Other (comments)     Headaches    Norvasc [Amlodipine] Other (comments)     Felt poorly         Social History     Socioeconomic History    Marital status:      Spouse name: Not on file    Number of children: Not on file    Years of education: Not on file    Highest education level: Not on file   Occupational History    Occupation: ret office mgr   Tobacco Use    Smoking status: Never Smoker    Smokeless tobacco: Never Used   Vaping Use    Vaping Use: Never used   Substance and Sexual Activity    Alcohol use: Yes     Comment: occasionally    Drug use: No    Sexual activity: Not on file   Other Topics Concern    Not on file   Social History Narrative    Not on file     Social Determinants of Health     Financial Resource Strain:     Difficulty of Paying Living Expenses: Not on file   Food Insecurity:     Worried About Running Out of Food in the Last Year: Not on file    Josias of Food in the Last Year: Not on file   Transportation Needs:     Lack of Transportation (Medical): Not on file    Lack of Transportation (Non-Medical): Not on file   Physical Activity:     Days of Exercise per Week: Not on file    Minutes of Exercise per Session: Not on file   Stress:     Feeling of Stress : Not on file   Social Connections:     Frequency of Communication with Friends and Family: Not on file    Frequency of Social Gatherings with Friends and Family: Not on file    Attends [employer] Services: Not on file    Active Member of [address] [place] [address] [employer] or Organizations: Not on file    Attends Club or Organization Meetings: Not on file    Marital Status: Not on file   Intimate Partner Violence:     Fear of Current or Ex-Partner: Not on file    Emotionally Abused: Not on file    Physically Abused: Not on file    Sexually Abused: Not on file   Housing Stability:     Unable to Pay for Housing in the Last Year: Not on file    Number of [place] in the Last Year: Not on file    Unstable Housing in the Last Year: Not on file       Past Surgical History:   Procedure Laterality Date    HX AAA REPAIR  07/17/2018    Malika Stover Left     Dr. Juanita De La Cruz     HX CATARACT REMOVAL Bilateral     HX COLONOSCOPY      benign polyp 5/08 Dr Loyda Jones;  4/13 negative Dr. Esequiel Prado HX GI  08/2018    Dr Roya Goodman; ERCP s/p removal cbd stone complicated by pamcreatitis    HX HIP REPLACEMENT Left 05/2021    Dr Real Cosme  09/18/2018    Dr. Tessie Chang      neg MRA/MRI head [facility]; CT head neg 9/10    MA CARDIAC SURG PROCEDURE UNLIST  09/2016    NST negative    MA CHEST SURGERY PROCEDURE UNLISTED  06/2016    CTA neg PE, 6 mm subpleural nodule           Patient seen evaluate today for her left hip.   She is now approximately 6-month status post left total hip replacement surgery. She is having some lateral base discomfort as well as little bit of groin discomfort at times. Certain activities make it worse. She has trouble lying her left side at night. She does have a little trouble stairs at times. There is no start of pain. No feelings of instability. Patient denies recent fevers, chills, chest pain, SOB, or injuries. No recent systemic changes noted. A 12-point review of systems is performed today. Pertinent positives are noted. All other systems reviewed and otherwise are negative. Physical exam: General: Alert and oriented x3, nad.  well-developed, well nourished. normal affect, AF. NC/AT, EOMI, neck supple, trachea midline, no JVD present. Breathing is non-labored. Examination of lower extremities reveals pain-free range of motion hips. She does have pain to palpation trochanter bursa. She does have some slight discomfort with resisted hip flexion on the left side. Negative straight leg raise. Negative calf tenderness. Negative Homans. No signs of DVT present. Leg lengths are perfect. Abduction strength is 5 - on the left and 5 out of 5 in the right. Radiographs obtained in the office today 11/4/2021 at the [facility] location include AP pelvis, AP and crosstable lateral left hip shows a total hip components to be well fixed without evidence for loosening or fracture noted. Assessment: #1 status post left total hip replacement, #2 left hip trochanter bursitis, #3 mild psoas tendinitis    Plan: At this point, we discussed treatment options. We discussed cortisone which she declines today. We discussed physical therapy. We will start her on Voltaren gel. She is instructed on use as well as usual precautions. We will see her back in the office in 4 to 6 weeks time for evaluation. She will call if any questions or concerns arise.             JR Zay HALL, JESSICA, ATC

## 2021-12-10 ENCOUNTER — OFFICE VISIT (OUTPATIENT)
Dept: ORTHOPEDIC SURGERY | Age: 81
End: 2021-12-10
Payer: MEDICARE

## 2021-12-10 VITALS
BODY MASS INDEX: 28.32 KG/M2 | HEART RATE: 86 BPM | TEMPERATURE: 98.2 F | HEIGHT: 65 IN | WEIGHT: 170 LBS | OXYGEN SATURATION: 98 %

## 2021-12-10 DIAGNOSIS — Z96.642 HISTORY OF TOTAL HIP REPLACEMENT, LEFT: Primary | ICD-10-CM

## 2021-12-10 DIAGNOSIS — M70.62 GREATER TROCHANTERIC BURSITIS OF LEFT HIP: ICD-10-CM

## 2021-12-10 PROCEDURE — G8536 NO DOC ELDER MAL SCRN: HCPCS | Performed by: PHYSICIAN ASSISTANT

## 2021-12-10 PROCEDURE — 20611 DRAIN/INJ JOINT/BURSA W/US: CPT | Performed by: PHYSICIAN ASSISTANT

## 2021-12-10 PROCEDURE — G8427 DOCREV CUR MEDS BY ELIG CLIN: HCPCS | Performed by: PHYSICIAN ASSISTANT

## 2021-12-10 PROCEDURE — G8399 PT W/DXA RESULTS DOCUMENT: HCPCS | Performed by: PHYSICIAN ASSISTANT

## 2021-12-10 PROCEDURE — 1090F PRES/ABSN URINE INCON ASSESS: CPT | Performed by: PHYSICIAN ASSISTANT

## 2021-12-10 PROCEDURE — 1101F PT FALLS ASSESS-DOCD LE1/YR: CPT | Performed by: PHYSICIAN ASSISTANT

## 2021-12-10 PROCEDURE — 99214 OFFICE O/P EST MOD 30 MIN: CPT | Performed by: PHYSICIAN ASSISTANT

## 2021-12-10 PROCEDURE — G8432 DEP SCR NOT DOC, RNG: HCPCS | Performed by: PHYSICIAN ASSISTANT

## 2021-12-10 PROCEDURE — G8756 NO BP MEASURE DOC: HCPCS | Performed by: PHYSICIAN ASSISTANT

## 2021-12-10 PROCEDURE — G8417 CALC BMI ABV UP PARAM F/U: HCPCS | Performed by: PHYSICIAN ASSISTANT

## 2021-12-10 RX ORDER — BETAMETHASONE SODIUM PHOSPHATE AND BETAMETHASONE ACETATE 3; 3 MG/ML; MG/ML
6 INJECTION, SUSPENSION INTRA-ARTICULAR; INTRALESIONAL; INTRAMUSCULAR; SOFT TISSUE ONCE
Status: COMPLETED | OUTPATIENT
Start: 2021-12-10 | End: 2021-12-10

## 2021-12-10 RX ADMIN — BETAMETHASONE SODIUM PHOSPHATE AND BETAMETHASONE ACETATE 6 MG: 3; 3 INJECTION, SUSPENSION INTRA-ARTICULAR; INTRALESIONAL; INTRAMUSCULAR; SOFT TISSUE at 14:34

## 2021-12-10 NOTE — PROGRESS NOTES
[address] [place] [address]  [phone]           Patient: Henrik Abbott                MRN: [id number]       SSN: [id number]  YOB: 1940        AGE: 78 y.o. SEX: female  Body mass index is 28.73 kg/m². PCP: Lise Arreola MD  12/10/21            REVIEW OF SYSTEMS:  Constitutional: Negative for fever, chills, weight loss and malaise/fatigue. HENT: Negative. Eyes: Negative. Respiratory: Negative. Cardiovascular: Negative. Gastrointestinal: No bowel incontinence or constipation. Genitourinary: No bladder incontinence or saddle anesthesia. Skin: Negative. Neurological: Negative. Endo/Heme/Allergies: Negative. Psychiatric/Behavioral: Negative. Musculoskeletal: As per HPI above.      Past Medical History:   Diagnosis Date    AAA (abdominal aortic aneurysm) without rupture ([facility] [address]     AAA repair 7/2018 Dr Jacques Shelton adenomas     2003 Dr Mary Gamez; 4/13 Dr Teresa Trevino    Degenerative arthritis of lumbar spine 12/2018    Dr Maryan Terry; mri showed multilevel degen changes, mod spinal stenosis L3-5, mod severe right L4-5 foraminal disease; s/p epidural; isabel    Dyslipidemia     FHx: heart disease     Gastritis 2006    on EGD Dr. Tristin Cota, neg sprue    Hypertension     Hypothyroidism 2007    Dr. Day Ion Hypovitaminosis D     IPMN (intraductal papillary mucinous neoplasm) 06/2018    on mrcp 8mm; no change 10/19, no change 12/20    Lung nodule 06/2016    6 mm RLL (6/16); no change (10/17); no change (12/18)    Obesity     peak weight 187 lbs, bmi 33.1 from 4/13; IF 3/18    Osteopenia FRAX 4/12 9.7 and 1.4     DEXA t score 0.6 spine, -1 hip (12/09);  -0.4 spine, -1.3 hip (4/12); -1.2 wrist, -1.6 hip w FRAX 11/2.2 (4/14); -1.0 wrist, -1.6 hip w FRAX 12/2.6 (4/16); -0.7 wrist, -1.6 hip (5/18); -1.1 wrist, -1.8 hip (4/21)    Pancreatitis, chronic ([facility] 2018    Psoriasis 2003 on bx Dr. Shayna Davalos hearing loss     LEFT Dr Josh Mcfarlane Venous insufficiency 2003    on dopplers    Zoster 05/2016    LEFT T11-12; mild postherpetic neuralgia         Current Outpatient Medications:     diclofenac (VOLTAREN) 1 % gel, Apply 4 g to affected area four (4) times daily. , Disp: 100 g, Rfl: 4    lisinopriL (PRINIVIL, ZESTRIL) 10 mg tablet, Take 1 Tablet by mouth daily. , Disp: 90 Tablet, Rfl: 3    Levothyroxine 75 mcg cap, Take 1 Tablet by mouth daily. , Disp: 90 Capsule, Rfl: 3    ezetimibe (ZETIA) 10 mg tablet, Take 1 Tablet by mouth daily. , Disp: 90 Tablet, Rfl: 3    atorvastatin (LIPITOR) 40 mg tablet, Take 1 Tablet by mouth daily. , Disp: 90 Tablet, Rfl: 3    lidocaine (LIDODERM) 5 %, Apply patch to the affected area for 12 hours a day and remove for 12 hours a day., Disp: 90 Patch, Rfl: 1    diazePAM (VALIUM) 10 mg tablet, Take 1 Tab by mouth every twelve (12) hours as needed for Anxiety. Max Daily Amount: 20 mg., Disp: 60 Tab, Rfl: 1    ergocalciferol (VITAMIN D2) 50,000 unit capsule, Take 1 Cap by mouth every seven (7) days. , Disp: 12 Cap, Rfl: 3    Allergies   Allergen Reactions    Atenolol Other (comments)     Felt poorly    Cozaar [Losartan] Other (comments)     Headaches    Norvasc [Amlodipine] Other (comments)     Felt poorly         Social History     Socioeconomic History    Marital status:      Spouse name: Not on file    Number of children: Not on file    Years of education: Not on file    Highest education level: Not on file   Occupational History    Occupation: ret office mgr   Tobacco Use    Smoking status: Never Smoker    Smokeless tobacco: Never Used   Vaping Use    Vaping Use: Never used   Substance and Sexual Activity    Alcohol use: Yes     Comment: occasionally    Drug use: No    Sexual activity: Not on file   Other Topics Concern    Not on file   Social History Narrative    Not on file     Social Determinants of Health     Financial Resource Strain:     Difficulty of Paying Living Expenses: Not on file   Food Insecurity:     Worried About Running Out of Food in the Last Year: Not on file    Josias of Food in the Last Year: Not on file   Transportation Needs:     Lack of Transportation (Medical): Not on file    Lack of Transportation (Non-Medical):  Not on file   Physical Activity:     Days of Exercise per Week: Not on file    Minutes of Exercise per Session: Not on file   Stress:     Feeling of Stress : Not on file   Social Connections:     Frequency of Communication with Friends and Family: Not on file    Frequency of Social Gatherings with Friends and Family: Not on file    Attends Tenriism Services: Not on file    Active Member of [address] [place] [address] [employer] or Organizations: Not on file    Attends Club or Organization Meetings: Not on file    Marital Status: Not on file   Intimate Partner Violence:     Fear of Current or Ex-Partner: Not on file    Emotionally Abused: Not on file    Physically Abused: Not on file    Sexually Abused: Not on file   Housing Stability:     Unable to Pay for Housing in the Last Year: Not on file    Number of [place] in the Last Year: Not on file    Unstable Housing in the Last Year: Not on file       Past Surgical History:   Procedure Laterality Date    HX AAA REPAIR  07/17/2018    Niranjan Farley Left     Dr. Isrrael Zacarias     HX CATARACT REMOVAL Bilateral     HX COLONOSCOPY      benign polyp 5/08 Dr Gregory Moreno;  4/13 negative Dr. Shireen Walker HX GI  08/2018    Dr Radha Larose; ERCP s/p removal cbd stone complicated by pamcreatitis    HX HIP REPLACEMENT Left 05/2021    Dr Palomino Plants  09/18/2018    Dr. Simmons Agent      neg MRA/MRI head [facility] 2007; CT head neg 9/10    [place] CARDIAC SURG PROCEDURE UNLIST  09/2016    NST negative    [place] CHEST SURGERY PROCEDURE UNLISTED  06/2016    CTA neg PE, 6 mm subpleural nodule       Patient seen evaluate today with complaint of left hip pain. She had a previous left total hip replacement. She reports lateral base discomfort which is worse with ambulation as well as on her left side at night. She was recently on a cruise and did a lot of walking which aggravated her hip. She denies any groin pain or thigh pain. She has no radiating pain down the lower extremity. Patient denies recent fevers, chills, chest pain, SOB, or injuries. No recent systemic changes noted. A 12-point review of systems is performed today. Pertinent positives are noted. All other systems reviewed and otherwise are negative. Physical exam: General: Alert and oriented x3, nad.  well-developed, well nourished. normal affect, AF. NC/AT, EOMI, neck supple, trachea midline, no JVD present. Breathing is non-labored. Examination of the lower extremities reveals pain-free range of motion the hips. She does have pain to palpation the trochanter bursa left side. Neck straight leg raise. Negative calf tenderness. Negative Homans. No signs of DVT present. Leg lengths look good. Abduction strength is symmetric bilaterally. Assessment: #1 status post left total hip replacement, #2 left hip trochanter bursitis    Plan: At this point, we discussed treatment options. We will move forward with a cortisone injection for the left hip, trochanteric bursa. After informed consent, under aseptic conditions, with US guided assitance, the left hip was prepped with betadine and a mxiture of 3ml 1% lidocaine and 6mg of celestone was injected without complications. The patient tolerated the injection well. The patient is instructed on post-injection care. Surgical intervention is not indicated. We can add physical therapy for IT band stretching and iontophoresis if the injection does not help. She will continue with Voltaren gel.       Chart reviewed for the following:  Paola MORENO PA-C, have reviewed the History, Physical and updated the Allergic reactions for Teresita Arias [facility]? TIME OUT performed immediately prior to start of procedure:  I, Jean Carlos Valerio PA-C, have performed the following reviews on Brandon Wadeumbly prior to the start of the procedure:  ????????  * Patient was identified by name and date of birth   * Agreement on procedure being performed was verified  * Risks and Benefits explained to the patient  * Procedure site verified and marked as necessary  * Patient was positioned for comfort  * Consent was signed and verified    Time:2:31 PM    Body part: left hip, intra-bursal    Medication & Dose: 3ml 1% lidocaine and 6mg celestone    Date of procedure: 12/10/21    Procedure performed by: Jean Carlos Valerio PA-C    Provider assisted by: none    Patient assisted by: self    How tolerated by patient: tolerated the procedure well with no complications    Post Procedural Pain Scale: 5    Comments:   [address] using a frequency of 10MHz with a 12L-RS transducer head was used to confirm needle placement.   Ultrasound images captured using [address] Ultrasound machine and scanned into patient's chart       Sree Tadeo PA-C, ATC

## 2021-12-16 ENCOUNTER — TELEPHONE (OUTPATIENT)
Dept: INTERNAL MEDICINE CLINIC | Age: 81
End: 2021-12-16

## 2021-12-16 NOTE — TELEPHONE ENCOUNTER
----- Message from Major Juan Carlos sent at 12/16/2021  9:10 AM EST -----  Subject: Appointment Request    Reason for Call: Routine Existing Condition Follow Up    QUESTIONS  Type of Appointment? Established Patient  Reason for appointment request? No appointments available during search  Additional Information for Provider? Patient states that she is having   left side groin pain with no swelling or worsening symptoms. I called   clinica to see if patient could be scheduled sooner than 12/21/2021 and   there was no answer. ---------------------------------------------------------------------------  --------------  Mohan WILCOX  What is the best way for the office to contact you? OK to leave message on   voicemail  Preferred Call Back Phone Number? [phone]  ---------------------------------------------------------------------------  --------------  SCRIPT ANSWERS  Relationship to Patient? Self  Is this follow up request related to routine Diabetes Management? No  Have you been diagnosed with, awaiting test results for, or told that you   are suspected of having COVID-19 (Coronavirus)? (If patient has tested   negative or was tested as a requirement for work, school, or travel and   not based on symptoms, answer no)? No  Within the past two weeks have you developed any of the following symptoms   (answer no if symptoms have been present longer than 2 weeks or began   more than 2 weeks ago)? Fever or Chills, Cough, Shortness of breath or   difficulty breathing, Loss of taste or smell, Sore throat, Nasal   congestion, Sneezing or runny nose, Fatigue or generalized body aches   (answer no if pain is specific to a body part e.g. back pain), Diarrhea,   Headache? No  Have you had close contact with someone with COVID-19 in the last 14 days? No  (Service Expert  click yes below to proceed with GEOL[facility] As Usual   Scheduling)?  Yes

## 2021-12-21 ENCOUNTER — OFFICE VISIT (OUTPATIENT)
Dept: INTERNAL MEDICINE CLINIC | Age: 81
End: 2021-12-21
Payer: MEDICARE

## 2021-12-21 VITALS
OXYGEN SATURATION: 96 % | SYSTOLIC BLOOD PRESSURE: 123 MMHG | BODY MASS INDEX: 27.66 KG/M2 | TEMPERATURE: 97.3 F | DIASTOLIC BLOOD PRESSURE: 64 MMHG | WEIGHT: 166 LBS | HEART RATE: 72 BPM | HEIGHT: 65 IN | RESPIRATION RATE: 16 BRPM

## 2021-12-21 DIAGNOSIS — S76.212A STRAIN OF LEFT GROIN: Primary | ICD-10-CM

## 2021-12-21 PROCEDURE — 1090F PRES/ABSN URINE INCON ASSESS: CPT | Performed by: INTERNAL MEDICINE

## 2021-12-21 PROCEDURE — G8752 SYS BP LESS 140: HCPCS | Performed by: INTERNAL MEDICINE

## 2021-12-21 PROCEDURE — G8536 NO DOC ELDER MAL SCRN: HCPCS | Performed by: INTERNAL MEDICINE

## 2021-12-21 PROCEDURE — 99213 OFFICE O/P EST LOW 20 MIN: CPT | Performed by: INTERNAL MEDICINE

## 2021-12-21 PROCEDURE — G0463 HOSPITAL OUTPT CLINIC VISIT: HCPCS | Performed by: INTERNAL MEDICINE

## 2021-12-21 PROCEDURE — G8754 DIAS BP LESS 90: HCPCS | Performed by: INTERNAL MEDICINE

## 2021-12-21 PROCEDURE — G8427 DOCREV CUR MEDS BY ELIG CLIN: HCPCS | Performed by: INTERNAL MEDICINE

## 2021-12-21 PROCEDURE — G8417 CALC BMI ABV UP PARAM F/U: HCPCS | Performed by: INTERNAL MEDICINE

## 2021-12-21 PROCEDURE — G8399 PT W/DXA RESULTS DOCUMENT: HCPCS | Performed by: INTERNAL MEDICINE

## 2021-12-21 PROCEDURE — 1101F PT FALLS ASSESS-DOCD LE1/YR: CPT | Performed by: INTERNAL MEDICINE

## 2021-12-21 PROCEDURE — G8510 SCR DEP NEG, NO PLAN REQD: HCPCS | Performed by: INTERNAL MEDICINE

## 2021-12-21 NOTE — PROGRESS NOTES
78 y.o. WHITE/NON- female who presents for evaluation. She has been having left-sided \"groin pain\" for several weeks now. She thinks it happened during the cruise which was from end of November to the first week of December. She reported it to [address] [place] [address] on the visit 12/10/2021 at which time she got a trochanteric injection. He thought it was more related to the hip issue. [facility], no x-rays were done, urinalysis was negative, she was told it was probably a muscle issue. Review of the chart shows essentially negative abdominopelvic CT from 5/21 and hip x-rays from 11/4/2021 showed good positioning of the hip joint on the left. She has been trying OTC meds with mild relief of symptoms. No associated GI, , GYN complaints. There is been no bruising or rash. No decreased range of motion to report.     Past Medical History:   Diagnosis Date    AAA (abdominal aortic aneurysm) without rupture ([facility])     AAA repair 7/2018 Dr Jacques Shelton adenomas     2003 Dr Mary Gamez; 4/13 Dr Teresa Trevino    Degenerative arthritis of lumbar spine 12/2018    Dr Maryan Terry; mri showed multilevel degen changes, mod spinal stenosis L3-5, mod severe right L4-5 foraminal disease; s/p epidural; isabel    Dyslipidemia     FHx: heart disease     Gastritis 2006    on EGD Dr. Tristin Cota, neg sprue    Hypertension     Hypothyroidism 2007    Dr. Shay Schumacher Hypovitaminosis D     IPMN (intraductal papillary mucinous neoplasm) 06/2018    on mrcp 8mm; no change 10/19, no change 12/20    Lung nodule 06/2016    6 mm RLL (6/16); no change (10/17); no change (12/18)    Obesity     peak weight 187 lbs, bmi 33.1 from 4/13; IF 3/18    Osteopenia FRAX 4/12 9.7 and 1.4     DEXA t score 0.6 spine, -1 hip (12/09);  -0.4 spine, -1.3 hip (4/12); -1.2 wrist, -1.6 hip w FRAX 11/2.2 (4/14); -1.0 wrist, -1.6 hip w FRAX 12/2.6 (4/16); -0.7 wrist, -1.6 hip (5/18); -1.1 wrist, -1.8 hip (4/21)    Pancreatitis, chronic ([facility] [address] 2018    Psoriasis 2003    on bx Dr. Ramonita Drummond    Sensorineural hearing loss     LEFT Dr Harjinder Sierra Venous insufficiency 2003    on dopplers    Zoster 05/2016    LEFT T11-12; mild postherpetic neuralgia     Past Surgical History:   Procedure Laterality Date    HX AAA REPAIR  07/17/2018    HX Flo Deaner Left     Dr. Farideh White     HX CATARACT REMOVAL Bilateral     HX COLONOSCOPY      benign polyp 5/08 Dr Gregory Ahmadi;  4/13 negative Dr. Veronica Ibarra HX GI  08/2018    Dr Saleem Frias; ERCP s/p removal cbd stone complicated by pamcreatitis    HX HIP REPLACEMENT Left 05/2021    Dr Ericka Sterling  09/18/2018    Dr. Citlaly Augustine      neg MRA[facility]MRI head [facility] 2007; CT head neg 9/10    [place] CARDIAC SURG PROCEDURE UNLIST  09/2016    NST negative    [place] CHEST SURGERY PROCEDURE UNLISTED  06/2016    CTA neg PE, 6 mm subpleural nodule     Social History     Socioeconomic History    Marital status:      Spouse name: Not on file    Number of children: Not on file    Years of education: Not on file    Highest education level: Not on file   Occupational History    Occupation: ret office mgr   Tobacco Use    Smoking status: Never Smoker    Smokeless tobacco: Never Used   Vaping Use    Vaping Use: Never used   Substance and Sexual Activity    Alcohol use: Yes     Comment: occasionally    Drug use: No    Sexual activity: Not on file   Other Topics Concern    Not on file   Social History Narrative    Not on file     Social Determinants of Health     Financial Resource Strain:     Difficulty of Paying Living Expenses: Not on file   Food Insecurity:     Worried About Running Out of Food in the Last Year: Not on file    Josias of Food in the Last Year: Not on file   Transportation Needs:     Lack of Transportation (Medical): Not on file    Lack of Transportation (Non-Medical):  Not on file   Physical Activity:  Days of Exercise per Week: Not on file    Minutes of Exercise per Session: Not on file   Stress:     Feeling of Stress : Not on file   Social Connections:     Frequency of Communication with Friends and Family: Not on file    Frequency of Social Gatherings with Friends and Family: Not on file    Attends Worship Services: Not on file    Active Member of [address] [place] [address] [employer] or Organizations: Not on file    Attends Club or Organization Meetings: Not on file    Marital Status: Not on file   Intimate Partner Violence:     Fear of Current or Ex-Partner: Not on file    Emotionally Abused: Not on file    Physically Abused: Not on file    Sexually Abused: Not on file   Housing Stability:     Unable to Pay for Housing in the Last Year: Not on file    Number of [place] in the Last Year: Not on file    Unstable Housing in the Last Year: Not on file     Current Outpatient Medications   Medication Sig    diclofenac (VOLTAREN) 1 % gel Apply 4 g to affected area four (4) times daily.  lisinopriL (PRINIVIL, ZESTRIL) 10 mg tablet Take 1 Tablet by mouth daily.  Levothyroxine 75 mcg cap Take 1 Tablet by mouth daily.  ezetimibe (ZETIA) 10 mg tablet Take 1 Tablet by mouth daily.  atorvastatin (LIPITOR) 40 mg tablet Take 1 Tablet by mouth daily.  lidocaine (LIDODERM) 5 % Apply patch to the affected area for 12 hours a day and remove for 12 hours a day.  diazePAM (VALIUM) 10 mg tablet Take 1 Tab by mouth every twelve (12) hours as needed for Anxiety. Max Daily Amount: 20 mg.    ergocalciferol (VITAMIN D2) 50,000 unit capsule Take 1 Cap by mouth every seven (7) days. No current facility-administered medications for this visit.      Allergies   Allergen Reactions    Atenolol Other (comments)     Felt poorly    Cozaar [Losartan] Other (comments)     Headaches    Norvasc [Amlodipine] Other (comments)     [place] poorly       Visit Vitals  /64   Pulse 72   Temp 97.3 °F (36.3 °C) (Temporal)   Resp 16   Ht 5' 4.5\" (1.638 m)   Wt 166 lb (75.3 kg)   SpO2 96%   BMI 28.05 kg/m²   Abdomen soft, nontender, no hepatosplenomegaly or masses. No obvious soft tissue tenderness in the hip, groin, abdominal region. No bulging or herniations evident. No pain on palpation of the greater trochanter, pelvic rim, no pain on rotation of the hip joint. No IT band tenderness, I could reproduce her pain on passive extension of the flexor musculature on the left side. Assessment and plan:  1. Probable strain of the hip flexors. She declined physical therapy and will just take OTC treatment for now. Call if she changes her mind        Above conditions discussed at length and patient vocalized understanding.   All questions answered to patient satisfaction

## 2021-12-21 NOTE — PROGRESS NOTES
Ofelia Heart presents with   Chief Complaint   Patient presents with    Groin Pain     X 2 weeks, patient denies injury        1. \"Have you been to the ER, urgent care clinic since your last visit? Hospitalized since your last visit? \" NO    2. \"Have you seen or consulted any other health care providers outside of the [address] [place] [address] since your last visit? \" YES-DOD on chart    3. For patients aged 39-70: Has the patient had a colonoscopy? NA based on age or sex     If the patient is female:    3. For patients aged 41-77: Has the patient had a mammogram within the past 2 years? NA based on age or sex    11. For patients aged 21-65: Has the patient had a pap smear?  NA based on age or sex

## 2022-02-17 NOTE — PROGRESS NOTES
Braulio Thomason presents with   Chief Complaint   Patient presents with    Hearing Loss     X about 3 days, patient states she was having some issues when flying to and from [place] with her ears popping, and now her R ear is muffled, patient has no hearing in L ear        1. \"Have you been to the ER, urgent care clinic since your last visit? Hospitalized since your last visit? \" NO    2. \"Have you seen or consulted any other health care providers outside of the [address] [place] [address] since your last visit? \" NO    3. For patients aged 39-70: Has the patient had a colonoscopy? NA - based on age     If the patient is female:    4. For patients aged 41-77: Has the patient had a mammogram within the past 2 years? NA - based on age    11. For patients aged 21-65: Has the patient had a pap smear?  NA - based on age

## 2022-02-18 ENCOUNTER — OFFICE VISIT (OUTPATIENT)
Dept: INTERNAL MEDICINE CLINIC | Age: 82
End: 2022-02-18
Payer: MEDICARE

## 2022-02-18 VITALS
HEIGHT: 64 IN | HEART RATE: 74 BPM | WEIGHT: 167 LBS | SYSTOLIC BLOOD PRESSURE: 136 MMHG | BODY MASS INDEX: 28.51 KG/M2 | RESPIRATION RATE: 12 BRPM | DIASTOLIC BLOOD PRESSURE: 72 MMHG | OXYGEN SATURATION: 96 % | TEMPERATURE: 97 F

## 2022-02-18 DIAGNOSIS — H91.91 HEARING LOSS OF RIGHT EAR, UNSPECIFIED HEARING LOSS TYPE: Primary | ICD-10-CM

## 2022-02-18 PROCEDURE — G8417 CALC BMI ABV UP PARAM F/U: HCPCS | Performed by: INTERNAL MEDICINE

## 2022-02-18 PROCEDURE — G8536 NO DOC ELDER MAL SCRN: HCPCS | Performed by: INTERNAL MEDICINE

## 2022-02-18 PROCEDURE — 1101F PT FALLS ASSESS-DOCD LE1/YR: CPT | Performed by: INTERNAL MEDICINE

## 2022-02-18 PROCEDURE — G0463 HOSPITAL OUTPT CLINIC VISIT: HCPCS | Performed by: INTERNAL MEDICINE

## 2022-02-18 PROCEDURE — G8427 DOCREV CUR MEDS BY ELIG CLIN: HCPCS | Performed by: INTERNAL MEDICINE

## 2022-02-18 PROCEDURE — G8399 PT W/DXA RESULTS DOCUMENT: HCPCS | Performed by: INTERNAL MEDICINE

## 2022-02-18 PROCEDURE — G8754 DIAS BP LESS 90: HCPCS | Performed by: INTERNAL MEDICINE

## 2022-02-18 PROCEDURE — G8752 SYS BP LESS 140: HCPCS | Performed by: INTERNAL MEDICINE

## 2022-02-18 PROCEDURE — 99213 OFFICE O/P EST LOW 20 MIN: CPT | Performed by: INTERNAL MEDICINE

## 2022-02-18 PROCEDURE — G8510 SCR DEP NEG, NO PLAN REQD: HCPCS | Performed by: INTERNAL MEDICINE

## 2022-02-18 PROCEDURE — 1090F PRES/ABSN URINE INCON ASSESS: CPT | Performed by: INTERNAL MEDICINE

## 2022-02-18 RX ORDER — PREDNISONE 10 MG/1
TABLET ORAL
Qty: 21 TABLET | Refills: 0 | Status: SHIPPED | OUTPATIENT
Start: 2022-02-18 | End: 2022-02-20 | Stop reason: SDUPTHER

## 2022-02-18 NOTE — PROGRESS NOTES
78 y.o. WHITE/NON- female who presents for evaluation. She has known sensorineural hearing loss on the left side diagnosed by Dr. Lilliam Jaquez in the past    She and her  flew to [place] and on the flight back, she noted onset of decreased hearing on the right ear on 2/8/2022. Over the last couple days, it is gotten worse. No ear pain, tinnitus, dizziness, fevers or upper respiratory symptoms. She has a fullness sensation along with muffled hearing. She tried to clean the ears out with minimal success.     Past Medical History:   Diagnosis Date    AAA (abdominal aortic aneurysm) without rupture ([facility] [address]     AAA repair 7/2018 Dr Rochelle Gamino adenomas     2003 Dr Rich Pham; 4/13 Dr Gisele Fontenot    Degenerative arthritis of lumbar spine 12/2018    Dr Irene Dong; mri showed multilevel degen changes, mod spinal stenosis L3-5, mod severe right L4-5 foraminal disease; s/p epidural; isabel    Dyslipidemia     FHx: heart disease     Gastritis 2006    on EGD Dr. Mikala De La Cruz, neg sprue    Hypertension     Hypothyroidism 2007    Dr. Kayleigh hSi Hypovitaminosis D     IPMN (intraductal papillary mucinous neoplasm) 06/2018    on mrcp 8mm; no change 10/19, no change 12/20    Lung nodule 06/2016    6 mm RLL (6/16); no change (10/17); no change (12/18)    Obesity     peak weight 187 lbs, bmi 33.1 from 4/13; IF 3/18    Osteopenia FRAX 4/12 9.7 and 1.4     DEXA t score 0.6 spine, -1 hip (12/09);  -0.4 spine, -1.3 hip (4/12); -1.2 wrist, -1.6 hip w FRAX 11/2.2 (4/14); -1.0 wrist, -1.6 hip w FRAX 12/2.6 (4/16); -0.7 wrist, -1.6 hip (5/18); -1.1 wrist, -1.8 hip (4/21)    Pancreatitis, chronic ([facility] 2018    Psoriasis 2003    on bx Dr. Ever Perez Sensorineural hearing loss     LEFT Dr Anca Wheeler Venous insufficiency 2003    on dopplers    Zoster 05/2016    LEFT T11-12; mild postherpetic neuralgia     Current Outpatient Medications   Medication Sig    lisinopriL (PRINIVIL, ZESTRIL) 10 mg tablet Take 1 Tablet by mouth daily.  Levothyroxine 75 mcg cap Take 1 Tablet by mouth daily.  ezetimibe (ZETIA) 10 mg tablet Take 1 Tablet by mouth daily.  atorvastatin (LIPITOR) 40 mg tablet Take 1 Tablet by mouth daily.  lidocaine (LIDODERM) 5 % Apply patch to the affected area for 12 hours a day and remove for 12 hours a day.  diazePAM (VALIUM) 10 mg tablet Take 1 Tab by mouth every twelve (12) hours as needed for Anxiety. Max Daily Amount: 20 mg.    ergocalciferol (VITAMIN D2) 50,000 unit capsule Take 1 Cap by mouth every seven (7) days.  diclofenac (VOLTAREN) 1 % gel Apply 4 g to affected area four (4) times daily. (Patient not taking: Reported on 2/18/2022)     No current facility-administered medications for this visit. Allergies   Allergen Reactions    Atenolol Other (comments)     Felt poorly    Cozaar [Losartan] Other (comments)     Headaches    Norvasc [Amlodipine] Other (comments)     [place] poorly       Visit Vitals  /72   Pulse 74   Temp 97 °F (36.1 °C) (Temporal)   Resp 12   Ht 5' 4\" (1.626 m)   Wt 167 lb (75.8 kg)   SpO2 96%   BMI 28.67 kg/m²   Left ear canal and tympanic membrane look normal.  Right ear canal showed some wax but not fully blocked. I could not see the tympanic membrane however. Canal otherwise looked okay, no tenderness or pain in the surrounding region or bony tissues. No adenopathy. She had decreased hearing but not complete hearing loss noted. Assessment and plan:  1. Partial hearing loss. Empiric prednisone given will send to ENT ASAP. Above conditions discussed at length and patient vocalized understanding. All questions answered to patient satisfaction        ICD-10-CM ICD-9-CM    1. Hearing loss of right ear, unspecified hearing loss type  H91.91 389. 9 predniSONE (STERAPRED DS) 10 mg dose pack      REFERRAL TO ENT-OTOLARYNGOLOGY

## 2022-02-20 ENCOUNTER — HOSPITAL ENCOUNTER (EMERGENCY)
Age: 82
Discharge: HOME | End: 2022-02-20
Attending: STUDENT IN AN ORGANIZED HEALTH CARE EDUCATION/TRAINING PROGRAM
Payer: MEDICARE

## 2022-02-20 VITALS
DIASTOLIC BLOOD PRESSURE: 66 MMHG | OXYGEN SATURATION: 99 % | RESPIRATION RATE: 16 BRPM | HEART RATE: 71 BPM | HEIGHT: 65 IN | SYSTOLIC BLOOD PRESSURE: 107 MMHG | TEMPERATURE: 97.9 F | BODY MASS INDEX: 27.32 KG/M2 | WEIGHT: 164 LBS

## 2022-02-20 DIAGNOSIS — H91.91 HEARING LOSS OF RIGHT EAR, UNSPECIFIED HEARING LOSS TYPE: ICD-10-CM

## 2022-02-20 DIAGNOSIS — H65.01 RIGHT ACUTE SEROUS OTITIS MEDIA, RECURRENCE NOT SPECIFIED: Primary | ICD-10-CM

## 2022-02-20 PROCEDURE — 99282 EMERGENCY DEPT VISIT SF MDM: CPT

## 2022-02-20 RX ORDER — PREDNISONE 10 MG/1
TABLET ORAL
Qty: 21 TABLET | Refills: 0 | Status: SHIPPED | OUTPATIENT
Start: 2022-02-20 | End: 2022-04-07 | Stop reason: ALTCHOICE

## 2022-02-20 NOTE — ED PROVIDER NOTES
EMERGENCY DEPARTMENT HISTORY AND PHYSICAL EXAM    I have evaluated the patient at 11:13 AM      Date: 2/20/2022  Patient Name: Gisele Moreon [facility]    History of Presenting Illness     Chief Complaint   Patient presents with    Hearing Loss         History Provided By: Patient and Patient's   Location/Duration/Severity/Modifying factors   78-year-old female with left-sided hearing loss due to viral infection presenting to the emergency department for evaluation of right-sided hearing difficulty. She presents with her  who provides most of the history. States that she has been having muffled hearing through her right ear for the past couple of days now. She denies any pain. She was seen in her primary care doctor's office to told her she had a lot of earwax in the canal.  He was unsuccessful in removing the earwax. He made a follow-up appointment with ENT which is scheduled for the 28th. I had also prescribed steroids which the patient was unable to obtain as it went to the wrong pharmacy. She presents today with the same symptoms to see if the earwax could be removed here.  did try Debrox earlier today without effect. Denies fever chills, headache, chest pain, shortness of breath          PCP: Chasity Ley MD    Current Outpatient Medications   Medication Sig Dispense Refill    predniSONE (STERAPRED DS) 10 mg dose pack See administration instruction per 10mg dose pack 21 Tablet 0    lisinopriL (PRINIVIL, ZESTRIL) 10 mg tablet Take 1 Tablet by mouth daily. 90 Tablet 3    Levothyroxine 75 mcg cap Take 1 Tablet by mouth daily. 90 Capsule 3    ezetimibe (ZETIA) 10 mg tablet Take 1 Tablet by mouth daily. 90 Tablet 3    atorvastatin (LIPITOR) 40 mg tablet Take 1 Tablet by mouth daily. 90 Tablet 3    lidocaine (LIDODERM) 5 % Apply patch to the affected area for 12 hours a day and remove for 12 hours a day.  90 Patch 1    ergocalciferol (VITAMIN D2) 50,000 unit capsule Take 1 Cap by mouth every seven (7) days.  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 Per Leiva, neg sprue    Hypertension     Hypothyroidism 2007    Dr. Pancho Sarkar Hypovitaminosis D     IPMN (intraductal papillary mucinous neoplasm) 06/2018    on mrcp 8mm; no change 10/19, no change 12/20    Lung nodule 06/2016    6 mm RLL (6/16); no change (10/17); no change (12/18)    Obesity     peak weight 187 lbs, bmi 33.1 from 4/13; IF 3/18    Osteopenia FRAX 4/12 9.7 and 1.4     DEXA t score 0.6 spine, -1 hip (12/09);  -0.4 spine, -1.3 hip (4/12); -1.2 wrist, -1.6 hip w FRAX 11/2.2 (4/14); -1.0 wrist, -1.6 hip w FRAX 12/2.6 (4/16); -0.7 wrist, -1.6 hip (5/18); -1.1 wrist, -1.8 hip (4/21)    Pancreatitis, chronic ([address] 2018    Psoriasis 2003    on bx Dr. Benji Newman    Sensorineural hearing loss     LEFT Dr Marquita Colmenares Venous insufficiency 2003    on dopplers    Zoster 05/2016    LEFT T11-12; mild postherpetic neuralgia       Past Surgical History:  Past Surgical History:   Procedure Laterality Date    HX AAA REPAIR  07/17/2018    HX Aleda Cho Left     Dr. Keith Nagel     HX CATARACT REMOVAL Bilateral     HX COLONOSCOPY      benign polyp 5/08 Dr Chanel Hampton;  4/13 negative Dr. Phoebe Goncalves HX GI  08/2018    Dr Saray Serrano; ERCP s/p removal cbd stone complicated by pamcreatitis    HX HIP REPLACEMENT Left 05/2021    Dr Staci Salmeron  09/18/2018    Dr. Jennifer Sebastian neg MRA[facility]MRI head [facility] 2007; CT head neg 9/10    OK CARDIAC SURG PROCEDURE UNLIST  09/2016    NST negative    OK CHEST SURGERY PROCEDURE UNLISTED  06/2016    CTA neg PE, 6 mm subpleural nodule       Family History:  Family History   Problem Relation Age of Onset    Breast Cancer Mother     Cancer Mother     Heart Disease Father        Social History:  Social History     Tobacco Use    Smoking status: Never Smoker    Smokeless tobacco: Never Used   Vaping Use    Vaping Use: Never used   Substance Use Topics    Alcohol use: Yes     Comment: occasionally    Drug use: No       Allergies: Allergies   Allergen Reactions    Atenolol Other (comments)     Felt poorly    Cozaar [Losartan] Other (comments)     Headaches    Norvasc [Amlodipine] Other (comments)     Grubbs poorly           Review of Systems       Review of Systems   Constitutional: Negative for activity change, chills, diaphoresis, fatigue and fever. HENT: Positive for hearing loss and sinus pressure. Eyes: Negative for photophobia, pain and visual disturbance. Respiratory: Negative for cough, chest tightness, shortness of breath, wheezing and stridor. Cardiovascular: Negative for chest pain and palpitations. Gastrointestinal: Negative for abdominal distention, abdominal pain, constipation, diarrhea, nausea and vomiting. Genitourinary: Negative for difficulty urinating, dysuria and hematuria. Musculoskeletal: Negative for back pain, joint swelling and myalgias. Skin: Negative for rash and wound. Neurological: Negative for dizziness, tremors, syncope, speech difficulty, weakness, light-headedness and headaches. Psychiatric/Behavioral: Negative for agitation. The patient is not nervous/anxious.           Physical Exam     Visit Vitals  /66 (BP 1 Location: Left upper arm, BP Patient Position: At rest)   Pulse 71   Temp 97.9 °F (36.6 °C)   Resp 16   Ht 5' 5\" (1.651 m)   Wt 74.4 kg (164 lb)   SpO2 99%   BMI 27.29 kg/m² Physical Exam  Constitutional:       General: She is not in acute distress. Appearance: She is not toxic-appearing. HENT:      Head: Normocephalic and atraumatic. Right Ear: Tympanic membrane normal.      Left Ear: Tympanic membrane normal.      Ears:      Comments: Serous fluid behind the right TM, some earwax noted in the canal.  Hearing in the right ear is intact     Mouth/Throat:      Mouth: Mucous membranes are moist.   Eyes:      Extraocular Movements: Extraocular movements intact. Pupils: Pupils are equal, round, and reactive to light. Cardiovascular:      Rate and Rhythm: Normal rate and regular rhythm. Heart sounds: Normal heart sounds. No murmur heard. No friction rub. No gallop. Pulmonary:      Effort: Pulmonary effort is normal.      Breath sounds: Normal breath sounds. Abdominal:      General: There is no distension. Palpations: Abdomen is soft. There is no mass. Tenderness: There is no abdominal tenderness. There is no guarding. Hernia: No hernia is present. Musculoskeletal:         General: No swelling, tenderness or deformity. Cervical back: Normal range of motion and neck supple. Skin:     General: Skin is warm and dry. Capillary Refill: Capillary refill takes less than 2 seconds. Findings: No rash. Neurological:      General: No focal deficit present. Mental Status: She is alert and oriented to person, place, and time. Cranial Nerves: No cranial nerve deficit. Sensory: No sensory deficit. Motor: No weakness. Psychiatric:         Mood and Affect: Mood normal.           Diagnostic Study Results     Labs -  No results found for this or any previous visit (from the past 12 hour(s)). Radiologic Studies -   No orders to display         Medical Decision Making   I am the first provider for this patient.     I reviewed the vital signs, available nursing notes, past medical history, past surgical history, family history and social history. Vital Signs-Reviewed the patient's vital signs. Records Reviewed: Nursing Notes and Old Medical Records (Time of Review: 11:13 AM)    ED Course: Progress Notes, Reevaluation, and Consults:         Provider Notes (Medical Decision Making):   MDM  Number of Diagnoses or Management Options  Right acute serous otitis media, recurrence not specified  Diagnosis management comments: 68-year-old female presenting for right-sided hearing difficulties. She appears no acute distress. Vital signs stable. She does have serous fluid behind the right tympanic membrane. There was some wax that was removed which only marginally improved her hearing. Her symptoms are likely due to serous otitis media. Represcribed her her prednisone to go to the [address] [place] [address]. I have instructed patient and  to follow-up with ENT as originally scheduled. Patient and  both verbalized good understanding and agreement with plan. Diagnosis     Clinical Impression:   1. Right acute serous otitis media, recurrence not specified    2.  Hearing loss of right ear, unspecified hearing loss type        Disposition: home    Follow-up Information     Follow up With Specialties Details [facility] Emergency Medicine  As needed, If symptoms worsen [id number]    Darshan Gandhi MD Internal Medicine Call   [place] [address] [place] [phone]      the ENT doctor   maintain your appointment            Current Discharge Medication List      CONTINUE these medications which have CHANGED    Details   predniSONE (STERAPRED DS) 10 mg dose pack See administration instruction per 10mg dose pack  Qty: 21 Tablet, Refills: 0    Associated Diagnoses: Hearing loss of right ear, unspecified hearing loss type         CONTINUE these medications which have NOT CHANGED    Details   lisinopriL (PRINIVIL, ZESTRIL) 10 mg tablet Take 1 Tablet by mouth daily. Qty: 90 Tablet, Refills: 3    Associated Diagnoses: Essential hypertension with goal blood pressure less than 140/90      Levothyroxine 75 mcg cap Take 1 Tablet by mouth daily. Qty: 90 Capsule, Refills: 3    Associated Diagnoses: Acquired hypothyroidism      ezetimibe (ZETIA) 10 mg tablet Take 1 Tablet by mouth daily. Qty: 90 Tablet, Refills: 3    Associated Diagnoses: Dyslipidemia      atorvastatin (LIPITOR) 40 mg tablet Take 1 Tablet by mouth daily. Qty: 90 Tablet, Refills: 3    Associated Diagnoses: Dyslipidemia      lidocaine (LIDODERM) 5 % Apply patch to the affected area for 12 hours a day and remove for 12 hours a day. Qty: 90 Patch, Refills: 1    Associated Diagnoses: Postherpetic neuralgia      ergocalciferol (VITAMIN D2) 50,000 unit capsule Take 1 Cap by mouth every seven (7) days. Qty: 12 Cap, Refills: 3           Disclaimer: Sections of this note are dictated using utilizing voice recognition software. Minor typographical errors may be present. If questions arise, please do not hesitate to contact me or call our department.

## 2022-02-20 NOTE — ED TRIAGE NOTES
Patient's , Simon Harrison, provides information related to this visit due to patient' hearing loss. He states patient lost the hearing in left ear a few years ago related to a virus. He states that patient began to have difficulty hearing last week and had appointment with Dr. Honorio Taylor related to hearing difficulty. States that Dr. Honorio Taylor was unable to remove ear wax in ear. Spouse states placing ear wax removal drops in ear this morning and was unable to remove any of the wax.   States that she is prescribed prednisone but was unable to fill it at [facility] due to transfer of prescription from [facility].

## 2022-03-19 PROBLEM — M47.816 SPONDYLOSIS OF LUMBAR REGION WITHOUT MYELOPATHY OR RADICULOPATHY: Status: ACTIVE | Noted: 2018-04-16

## 2022-03-19 PROBLEM — Z86.79 S/P AAA REPAIR: Status: ACTIVE | Noted: 2018-08-23

## 2022-03-19 PROBLEM — Z98.890 S/P AAA REPAIR: Status: ACTIVE | Noted: 2018-08-23

## 2022-03-19 PROBLEM — M16.10 HIP ARTHRITIS: Status: ACTIVE | Noted: 2021-05-18

## 2022-04-07 ENCOUNTER — OFFICE VISIT (OUTPATIENT)
Dept: ORTHOPEDIC SURGERY | Age: 82
End: 2022-04-07
Payer: MEDICARE

## 2022-04-07 VITALS
HEIGHT: 64 IN | BODY MASS INDEX: 28.82 KG/M2 | WEIGHT: 168.8 LBS | SYSTOLIC BLOOD PRESSURE: 113 MMHG | HEART RATE: 82 BPM | TEMPERATURE: 97.2 F | DIASTOLIC BLOOD PRESSURE: 74 MMHG | OXYGEN SATURATION: 96 %

## 2022-04-07 DIAGNOSIS — M48.061 SPINAL STENOSIS OF LUMBAR REGION WITHOUT NEUROGENIC CLAUDICATION: ICD-10-CM

## 2022-04-07 DIAGNOSIS — M47.816 SPONDYLOSIS OF LUMBAR REGION WITHOUT MYELOPATHY OR RADICULOPATHY: Primary | ICD-10-CM

## 2022-04-07 DIAGNOSIS — M54.50 ACUTE LEFT-SIDED LOW BACK PAIN WITHOUT SCIATICA: ICD-10-CM

## 2022-04-07 PROCEDURE — G8754 DIAS BP LESS 90: HCPCS | Performed by: NURSE PRACTITIONER

## 2022-04-07 PROCEDURE — G8536 NO DOC ELDER MAL SCRN: HCPCS | Performed by: NURSE PRACTITIONER

## 2022-04-07 PROCEDURE — G8752 SYS BP LESS 140: HCPCS | Performed by: NURSE PRACTITIONER

## 2022-04-07 PROCEDURE — G8427 DOCREV CUR MEDS BY ELIG CLIN: HCPCS | Performed by: NURSE PRACTITIONER

## 2022-04-07 PROCEDURE — 1090F PRES/ABSN URINE INCON ASSESS: CPT | Performed by: NURSE PRACTITIONER

## 2022-04-07 PROCEDURE — 1101F PT FALLS ASSESS-DOCD LE1/YR: CPT | Performed by: NURSE PRACTITIONER

## 2022-04-07 PROCEDURE — G8417 CALC BMI ABV UP PARAM F/U: HCPCS | Performed by: NURSE PRACTITIONER

## 2022-04-07 PROCEDURE — G8399 PT W/DXA RESULTS DOCUMENT: HCPCS | Performed by: NURSE PRACTITIONER

## 2022-04-07 PROCEDURE — 99204 OFFICE O/P NEW MOD 45 MIN: CPT | Performed by: NURSE PRACTITIONER

## 2022-04-07 PROCEDURE — G8432 DEP SCR NOT DOC, RNG: HCPCS | Performed by: NURSE PRACTITIONER

## 2022-04-07 RX ORDER — METHYLPREDNISOLONE 4 MG/1
TABLET ORAL
Qty: 1 DOSE PACK | Refills: 0 | Status: SHIPPED | OUTPATIENT
Start: 2022-04-07 | End: 2022-05-11 | Stop reason: ALTCHOICE

## 2022-04-07 NOTE — PROGRESS NOTES
Chief complaint   Chief Complaint   Patient presents with    Back Pain       History of Present Illness:  Shantal Hunt is a  78 y.o.  female who was last seen by Dr. Kavon Gay on December 14, 2020. She has known spondylosis and spinal stenosis. She states she just came off of the cruise to [facility] and did a lot of walking. She has flared up her back. It is mostly the left side that hurts. She has been using her back brace on which helps. This patient with lumbar spondylosis and stenosis. She does not really have any leg pain. She states she did see Dr. Ness Rich for RFA but it really did not help. Since we last saw her she had a left total hip replacement by Dr. Mary Tom on May 18, 2021 and she states she did recover well from that. She denies fever bowel bladder dysfunction. Physical Exam: Patient is a 78-year-old female well-developed well-nourished who is alert and oriented with a normal mood and affect. She has a full weightbearing antalgic gait. She did use a single-point cane. She has 4 5 strength bilateral lower extremities. Negative straight leg raise. She has some pain with hyperextension lumbar spine. Assessment and Plan: She has a flare of back pain from doing a lot of walking on a cruise ship. We will give her a Medrol Dosepak. She does take with food not take other anti-inflammatories with it. We will see her back as needed. Medications:  Current Outpatient Medications   Medication Sig Dispense Refill    methylPREDNISolone (Medrol, Odin,) 4 mg tablet Per dose pack instructions 1 Dose Pack 0    lisinopriL (PRINIVIL, ZESTRIL) 10 mg tablet Take 1 Tablet by mouth daily. 90 Tablet 3    Levothyroxine 75 mcg cap Take 1 Tablet by mouth daily. 90 Capsule 3    ezetimibe (ZETIA) 10 mg tablet Take 1 Tablet by mouth daily. 90 Tablet 3    atorvastatin (LIPITOR) 40 mg tablet Take 1 Tablet by mouth daily.  90 Tablet 3    lidocaine (LIDODERM) 5 % Apply patch to the affected area for 12 hours a day and remove for 12 hours a day. 90 Patch 1    ergocalciferol (VITAMIN D2) 50,000 unit capsule Take 1 Cap by mouth every seven (7) days.  12 Cap 3    predniSONE (STERAPRED DS) 10 mg dose pack See administration instruction per 10mg dose pack 21 Tablet 0           Review of systems:    Past Medical History:   Diagnosis Date    AAA (abdominal aortic aneurysm) without rupture (HCC)     AAA repair 7/2018 Dr Marie Folds adenomas     2003 Dr Melissa Joseph; 4/13 Dr Lange Niharika    Deafness in left ear     Degenerative arthritis of lumbar spine 12/2018    Dr Gayle Screen; mri showed multilevel degen changes, mod spinal stenosis L3-5, mod severe right L4-5 foraminal disease; s/p epidural; isabel    Dyslipidemia     FHx: heart disease     Gastritis 2006    on EGD Dr. Jessica Escobedo, neg sprue    Hypertension     Hypothyroidism 2007    Dr. Magdy Cortés Hypovitaminosis D     IPMN (intraductal papillary mucinous neoplasm) 06/2018    on mrcp 8mm; no change 10/19, no change 12/20    Lung nodule 06/2016    6 mm RLL (6/16); no change (10/17); no change (12/18)    Obesity     peak weight 187 lbs, bmi 33.1 from 4/13; IF 3/18    Osteopenia FRAX 4/12 9.7 and 1.4     DEXA t score 0.6 spine, -1 hip (12/09);  -0.4 spine, -1.3 hip (4/12); -1.2 wrist, -1.6 hip w FRAX 11/2.2 (4/14); -1.0 wrist, -1.6 hip w FRAX 12/2.6 (4/16); -0.7 wrist, -1.6 hip (5/18); -1.1 wrist, -1.8 hip (4/21)    Pancreatitis, chronic ([address] 2018    Psoriasis 2003    on bx Dr. Taryn Ramirez    Sensorineural hearing loss     LEFT Dr Wong Ards Venous insufficiency 2003    on dopplers    Zoster 05/2016    LEFT T11-12; mild postherpetic neuralgia     Past Surgical History:   Procedure Laterality Date    HX AAA REPAIR  07/17/2018    HX [address] Left     Dr. Vance Denver     HX CATARACT REMOVAL Bilateral     HX COLONOSCOPY      benign polyp 5/08 Dr Melissa Joseph;  4/13 negative Dr. Josiah Garcia HX GI  08/2018    Dr Bird Andersen; ERCP s/p removal cbd stone complicated by pamcreatitis    HX HIP REPLACEMENT Left 05/2021    Dr Connors Older  09/18/2018    Dr. Abundio Sarabia      neg [facility] 2007; CT head neg 9/10    OK CARDIAC SURG PROCEDURE UNLIST  09/2016    NST negative    OK CHEST SURGERY PROCEDURE UNLISTED  06/2016    CTA neg PE, 6 mm subpleural nodule     Social History     Socioeconomic History    Marital status:      Spouse name: Not on file    Number of children: Not on file    Years of education: Not on file    Highest education level: Not on file   Occupational History    Occupation: ret office mgr   Tobacco Use    Smoking status: Never Smoker    Smokeless tobacco: Never Used   Vaping Use    Vaping Use: Never used   Substance and Sexual Activity    Alcohol use: Yes     Comment: occasionally    Drug use: No    Sexual activity: Not on file   Other Topics Concern    Not on file   Social History Narrative    Not on file     Social Determinants of Health     Financial Resource Strain:     Difficulty of Paying Living Expenses: Not on file   Food Insecurity:     Worried About Running Out of Food in the Last Year: Not on file    Josias of Food in the Last Year: Not on file   Transportation Needs:     Lack of Transportation (Medical): Not on file    Lack of Transportation (Non-Medical):  Not on file   Physical Activity:     Days of Exercise per Week: Not on file    Minutes of Exercise per Session: Not on file   Stress:     Feeling of Stress : Not on file   Social Connections:     Frequency of Communication with Friends and Family: Not on file    Frequency of Social Gatherings with Friends and Family: Not on file    Attends Taoist Services: Not on file    Active Member of Clubs or Organizations: Not on file    Attends Club or Organization Meetings: Not on file    Marital Status: Not on file   Intimate Partner Violence:     Fear of Current or Ex-Partner: Not on file    Emotionally Abused: Not on file    Physically Abused: Not on file    Sexually Abused: Not on file   Housing Stability:     Unable to Pay for Housing in the Last Year: Not on file    Number of [place] in the Last Year: Not on file    Unstable Housing in the Last Year: Not on file     Family History   Problem Relation Age of Onset    Breast Cancer Mother     Cancer Mother     Heart Disease Father        Physical Exam:  Visit Vitals  /74 (BP 1 Location: Left upper arm, BP Patient Position: Sitting, BP Cuff Size: Large adult)   Pulse 82   Temp 97.2 °F (36.2 °C) (Skin)   Ht 5' 4\" (1.626 m)   Wt 168 lb 12.8 oz (76.6 kg)   SpO2 96%   BMI 28.97 kg/m²     Pain Scale: 7/10       has been . reviewed and is appropriate          Diagnoses and all orders for this visit:    1. Spondylosis of lumbar region without myelopathy or radiculopathy  -     methylPREDNISolone (Medrol, Odin,) 4 mg tablet; Per dose pack instructions    2. Spinal stenosis of lumbar region without neurogenic claudication  -     methylPREDNISolone (Medrol, Odin,) 4 mg tablet; Per dose pack instructions    3. Acute left-sided low back pain without sciatica  -     methylPREDNISolone (Medrol, Odin,) 4 mg tablet; Per dose pack instructions            Follow-up and Dispositions    · Return if symptoms worsen or fail to improve.              We have informed Dia West to notify us for immediate appointment if she has any worsening neurogical symptoms or if an emergency situation presents, then call 911

## 2022-04-13 ENCOUNTER — APPOINTMENT (OUTPATIENT)
Dept: INTERNAL MEDICINE CLINIC | Age: 82
End: 2022-04-13

## 2022-04-13 ENCOUNTER — HOSPITAL ENCOUNTER (OUTPATIENT)
Dept: LAB | Age: 82
Discharge: HOME | End: 2022-04-13
Payer: MEDICARE

## 2022-04-13 DIAGNOSIS — E78.5 DYSLIPIDEMIA: ICD-10-CM

## 2022-04-13 LAB
ALBUMIN SERPL-MCNC: 4 G/DL (ref 3.4–5)
ALBUMIN/GLOB SERPL: 1.3 {RATIO} (ref 0.8–1.7)
ALP SERPL-CCNC: 94 U/L (ref 45–117)
ALT SERPL-CCNC: 22 U/L (ref 13–56)
ANION GAP SERPL CALC-SCNC: 6 MMOL/L (ref 3–18)
AST SERPL-CCNC: 15 U/L (ref 10–38)
BILIRUB SERPL-MCNC: 0.9 MG/DL (ref 0.2–1)
BUN SERPL-MCNC: 30 MG/DL (ref 7–18)
BUN/CREAT SERPL: 29 (ref 12–20)
CALCIUM SERPL-MCNC: 9.7 MG/DL (ref 8.5–10.1)
CHLORIDE SERPL-SCNC: 105 MMOL/L (ref 100–111)
CO2 SERPL-SCNC: 29 MMOL/L (ref 21–32)
CREAT SERPL-MCNC: 1.03 MG/DL (ref 0.6–1.3)
ERYTHROCYTE [DISTWIDTH] IN BLOOD BY AUTOMATED COUNT: 14.7 % (ref 11.6–14.5)
GLOBULIN SER CALC-MCNC: 3 G/DL (ref 2–4)
GLUCOSE SERPL-MCNC: 80 MG/DL (ref 74–99)
HCT VFR BLD AUTO: 48.6 % (ref 35–45)
HGB BLD-MCNC: 15 G/DL (ref 12–16)
MCH RBC QN AUTO: 30.8 PG (ref 24–34)
MCHC RBC AUTO-ENTMCNC: 30.9 G/DL (ref 31–37)
MCV RBC AUTO: 99.8 FL (ref 78–100)
NRBC # BLD: 0 K/UL (ref 0–0.01)
NRBC BLD-RTO: 0 PER 100 WBC
PLATELET # BLD AUTO: 225 K/UL (ref 135–420)
PMV BLD AUTO: 11.9 FL (ref 9.2–11.8)
POTASSIUM SERPL-SCNC: 4 MMOL/L (ref 3.5–5.5)
PROT SERPL-MCNC: 7 G/DL (ref 6.4–8.2)
RBC # BLD AUTO: 4.87 M/UL (ref 4.2–5.3)
SODIUM SERPL-SCNC: 140 MMOL/L (ref 136–145)
WBC # BLD AUTO: 10.3 K/UL (ref 4.6–13.2)

## 2022-04-13 PROCEDURE — 85027 COMPLETE CBC AUTOMATED: CPT

## 2022-04-13 PROCEDURE — 80053 COMPREHEN METABOLIC PANEL: CPT

## 2022-04-14 NOTE — PROGRESS NOTES
78 y.o. WHITE/NON- female who presents for evaluation    Her back has been flaring and she's gotten some rounds of steroids from NP [place]    This has been limiting her exercise. No cardiovascular complaints. They did have a good time with their  cruise    No new gi or gu complaints outside of loose stools the last couple days. No f, abd pain, bleeding, n/v.   She has not had any exposures, no recent abx usage, has not consumed anything out of the ordinary    Lastly, she has some scalp lesions that are not resolving and interested in seeing derm    LAST MEDICARE WELLNESS EXAM: 4/1/14, 4/8/14, 4/14/16, 4/25/17, 5/7/18, 10/2/19, 10/8/20, 10/13/21    Past Medical History:   Diagnosis Date    AAA (abdominal aortic aneurysm) without rupture ([facility] [address]     AAA repair 7/2018 Dr Ovi Bray adenomas     2003 Dr Brie Martinez; 4/13 Dr Deborah Nunez    Degenerative arthritis of lumbar spine 12/2018     [facility]; mri showed multilevel degen changes, mod spinal stenosis L3-5, mod severe right L4-5 foraminal disease; s/p epidural; isabel    Dyslipidemia     FHx: heart disease     Gastritis 2006    on EGD Dr. Steven Padilla, neg sprue    Hearing loss     LEFT Dr Jewel Stewart; sensorineural    Hypertension     Hypothyroidism 2007    Dr. Slime Flores Hypovitaminosis D     IPMN (intraductal papillary mucinous neoplasm) 06/2018    on mrcp 8mm; no change 10/19, no change 12/20    Lung nodule 06/2016    6 mm RLL (6/16); no change (10/17); no change (12/18), (5/21)    Obesity     peak weight 187 lbs, bmi 33.1 from 4/13; IF 3/18    Osteopenia FRAX 4/12 9.7 and 1.4     DEXA t score 0.6 spine, -1 hip (12/09);  -0.4 spine, -1.3 hip (4/12); -1.2 wrist, -1.6 hip w FRAX 11/2.2 (4/14); -1.0 wrist, -1.6 hip w FRAX 12/2.6 (4/16); -0.7 wrist, -1.6 hip (5/18); -1.1 wrist, -1.8 hip (4/21)    [facility]    Psoriasis 2003    on bx Dr. Glenn Acosta Venous insufficiency 2003    on dopplers    Zoster 05/2016 LEFT T11-12; mild postherpetic neuralgia     Past Surgical History:   Procedure Laterality Date    HX AAA REPAIR  07/17/2018    HX Lendon Keto Left     Dr. Vance Denver     HX CATARACT REMOVAL Bilateral     HX COLONOSCOPY      polyp 5/08 Dr Melissa Joseph;  4/13 negative Dr. Josiah Garcia HX GI  08/2018    Dr Bird Andersen; ERCP s/p removal cbd stone complicated by pamcreatitis    HX HIP REPLACEMENT Left 05/2021    Dr Karishma Peña  09/18/2018    Dr. Parminder Guo      [facility] 2007; CT head neg 9/10    HI CARDIAC SURG PROCEDURE UNLIST  09/2016    NST negative    HI CHEST SURGERY PROCEDURE UNLISTED  06/2016    CTA neg PE, 6 mm subpleural nodule     Social History     Socioeconomic History    Marital status:      Spouse name: Not on file    Number of children: Not on file    Years of education: Not on file    Highest education level: Not on file   Occupational History    Occupation: ret office mgr   Tobacco Use    Smoking status: Never Smoker    Smokeless tobacco: Never Used   Vaping Use    Vaping Use: Never used   Substance and Sexual Activity    Alcohol use: Yes     Comment: occasionally    Drug use: No    Sexual activity: Not on file   Other Topics Concern    Not on file   Social History Narrative    Not on file     Social Determinants of Health     Financial Resource Strain:     Difficulty of Paying Living Expenses: Not on file   Food Insecurity:     Worried About Running Out of Food in the Last Year: Not on file    Josias of Food in the Last Year: Not on file   Transportation Needs:     Lack of Transportation (Medical): Not on file    Lack of Transportation (Non-Medical):  Not on file   Physical Activity:     Days of Exercise per Week: Not on file    Minutes of Exercise per Session: Not on file   Stress:     Feeling of Stress : Not on file   Social Connections:     Frequency of Communication with Friends and Family: Not on file    Frequency of Social Gatherings with Friends and Family: Not on file    Attends Moravian Services: Not on file    Active Member of Clubs or Organizations: Not on file    Attends Club or Organization Meetings: Not on file    Marital Status: Not on file   Intimate Partner Violence:     Fear of Current or Ex-Partner: Not on file    Emotionally Abused: Not on file    Physically Abused: Not on file    Sexually Abused: Not on file   Housing Stability:     Unable to Pay for Housing in the Last Year: Not on file    Number of [place] in the Last Year: Not on file    Unstable Housing in the Last Year: Not on file     Allergies   Allergen Reactions    Atenolol Other (comments)     Felt poorly    Cozaar [Losartan] Other (comments)     Headaches    Norvasc [Amlodipine] Other (comments)     Felt poorly       Current Outpatient Medications   Medication Sig    predniSONE (DELTASONE) 20 mg tablet Take 3 tabs po x 3 days, then 2 tabs po x 3 days, then 1 tabs po x 3 days, then 1/2 tab po x 4 day    lisinopriL (PRINIVIL, ZESTRIL) 10 mg tablet Take 1 Tablet by mouth daily.  Levothyroxine 75 mcg cap Take 1 Tablet by mouth daily.  ezetimibe (ZETIA) 10 mg tablet Take 1 Tablet by mouth daily.  atorvastatin (LIPITOR) 40 mg tablet Take 1 Tablet by mouth daily.  lidocaine (LIDODERM) 5 % Apply patch to the affected area for 12 hours a day and remove for 12 hours a day.  ergocalciferol (VITAMIN D2) 50,000 unit capsule Take 1 Cap by mouth every seven (7) days.  methylPREDNISolone (Medrol, Odin,) 4 mg tablet Per dose pack instructions (Patient not taking: Reported on 4/20/2022)     No current facility-administered medications for this visit.      REVIEW OF SYSTEMS:  mammo 12/20, DEXA 4/21, colo 4/13 Dr Chrystal Moncada, sees Dr Maxime Currie  Ophtho - no vision change or eye pain  Oral - no mouth pain, tongue or tooth problems  Ears - no hearing loss, ear pain, fullness  Throat - no swallowing problems  Cardiac - no CP, PND, orthopnea, edema, palpitations or syncope  Chest - no breast masses  Resp - no wheezing, chronic coughing, dyspnea  GI - no heartburn, nausea, vomiting, change in bowel habits, bleeding, hemorrhoids  Urinary - no dysuria, hematuria, flank pain, urgency, frequency    Visit Vitals  /76   Pulse 75   Temp 97 °F (36.1 °C) (Temporal)   Resp 16   Ht 5' 4.5\" (1.638 m)   Wt 168 lb (76.2 kg)   SpO2 98%   BMI 28.39 kg/m²   A&O x3  Affect is appropriate. Mood stable  No apparent distress  Anicteric, no JVD, adenopathy or thyromegaly. No carotid bruits or radiated murmur  Lungs clear to auscultation, no wheezes or rales  Heart showed regular rate and rhythm. No murmur, rubs, gallops  Abdomen soft nondistended, no hepatosplenomegaly or masses. Extremities without edema.   Pulses 1-2+ symmetrically    LABS  From 3/10 showed                         vit d 38.0  From 9/10 showed                           wbc 7.2, hb 14.3, plt 158  From 3/12 showed   gluc 88, cr 0.87, gfr 66, alt 15, tsh 1.04, ldl-p 1515  From 9/12 showed                                                    tsh 1.69, ldl-p 1469, chol 152, tg 165, hdl 42, ldl-c 77  From 3/13 showed   gluc 83, cr 0.91, gfr 62, alt 10,     ldl-p 1274, chol 124, tg 137, hdl 37, ldl-c 60,   wbc 7.0, hb 14.3, plt 164  From 9/13 showed                chol 147, tg 139, hdl 39, ldl-c 80  From 3/14 showed   gluc 89, cr 1.02, gfr 53, alt<5,  tsh 1.66,         chol 148, tg 132, hdl 36, ldl-c 86,   wbc 6.7, hb 14.4, plt 179, vit d 45.4   From 10/14 showed                chol 137, tg 117, hdl 40, ldl-c 74  From 4/15 showed   gluc 91, cr 1.24, gfr 42, alt 30, tsh 0.64,          wbc 6.8, hb 14.8, plt 176  From 10/15 showed gluc 93, cr 1.13, gfr 47,            tsh 0.32,         chol 139, tg 131, hdl 36, ldl-c 77   From 4/16 showed   gluc 86, cr 1.00, gfr 54, alt 24,           chol 142, tg 137, hdl 41, ldl-c 74,   wbc 6.4, hb 13.9, plt 203, ua pro 30  From 6/16 showed   gluc 97, cr 1.20,             wbc 7.3, hb 14.1, plt 216, ck/trop-  From 10/16 showed gluc 78, cr 1.03, gfr 52, alt 21, tsh 1.21,         chol 145, tg 151, hdl 43, ldl-c 72,               From 4/17 showed                tsh 2.57, ft4 1.10  From 10/17 showed gluc 79, cr 1.03, gfr 52, alt 27,           chol 199, tg 161, hdl 45, ldl-c 122, wbc 6.3, hb 14.7, plt 208  From 5/18 showed                tsh 5.23, ft4 1.10,    chol 320, tg 196, hdl 40, ldl-c 221,            vit d 57.0  From 8/18 showed   glu 141, cr 0.87, gfr>60,alt 34,                      wb 10.9, hb 13.0, plt 179,  lip 8208  From 11/18 showed                tsh 4.20, ft4 1.00,    chol 133, tg 171, hdl 41, ldl-c 58,             vit d 56.5  From 3/19 showed   gluc 81, cr 1.13, gfr 46, alt 23, tsh 3.50, ft4 1.10,            wbc 5.7, hb 14.2, plt 195,  fe 91,      %sat 34, b12 306, fol 6, spep neg  From 9/19 showed                tsh 1.40,         chol 151, tg 135, hdl 42, ldl-c 82  From 4/20 showed   gluc 87, cr 1.03, gfr 51, alt 22,           wbc 6.8, hb 14.1, plt 184  From 10/20 showed                tsh 2.44        chol 150, tg 145, hdl 46, ldl-c 75,      From 4/21 showed   glu 109, cr 1.03, gfr 48, alt 21,           wbc 8.5, hb 14.8, plt 214  Form 10/21 showed              chol 162, tg 107, hdl 50, ldl-c 91,            tsh 0.4,  ft4 1.20    Results for orders placed or performed during the hospital encounter of 04/13/22   CBC W/O DIFF   Result Value Ref Range    WBC 10.3 4.6 - 13.2 K/uL    RBC 4.87 4.20 - 5.30 M/uL    HGB 15.0 12.0 - 16.0 g/dL    HCT 48.6 (H) 35.0 - 45.0 %    MCV 99.8 78.0 - 100.0 FL    MCH 30.8 24.0 - 34.0 PG    MCHC 30.9 (L) 31.0 - 37.0 g/dL    RDW 14.7 (H) 11.6 - 14.5 %    PLATELET 804 819 - 900 K/uL    MPV 11.9 (H) 9.2 - 11.8 FL    NRBC 0.0 0  WBC    ABSOLUTE NRBC 0.00 0.00 - 1.11 K/uL   METABOLIC PANEL, COMPREHENSIVE   Result Value Ref Range    Sodium 140 136 - 145 mmol/L    Potassium 4.0 3.5 - 5.5 mmol/L Chloride 105 100 - 111 mmol/L    CO2 29 21 - 32 mmol/L    Anion gap 6 3.0 - 18 mmol/L    Glucose 80 74 - 99 mg/dL    BUN 30 (H) 7.0 - 18 MG/DL    Creatinine 1.03 0.6 - 1.3 MG/DL    BUN/Creatinine ratio 29 (H) 12 - 20      GFR est AA >60 >60 ml/min/1.73m2    GFR est non-AA 51 (L) >60 ml/min/1.73m2    Calcium 9.7 8.5 - 10.1 MG/DL    Bilirubin, total 0.9 0.2 - 1.0 MG/DL    ALT (SGPT) 22 13 - 56 U/L    AST (SGOT) 15 10 - 38 U/L    Alk. phosphatase 94 45 - 117 U/L    Protein, total 7.0 6.4 - 8.2 g/dL    Albumin 4.0 3.4 - 5.0 g/dL    Globulin 3.0 2.0 - 4.0 g/dL    A-G Ratio 1.3 0.8 - 1.7         We reviewed the patient's labs from the last several visits to point out trends in the numbers        Patient Active Problem List   Diagnosis Code    Colon adenomas 2003, last 2014 Dr Leonidas Berrios D12.6    Hypovitaminosis D E55.9    Venous insufficiency dopplers 2003 I87.2    Osteopenia FRAX 4/14 11 and 2.2 M85.80    Dyslipidemia E78.5    Hypothyroidism due to acquired atrophy of thyroid E03.4    Arthritis, degenerative M19.90    Obesity (BMI 30.0-34. 9) E66.9    Advance directive discussed with patient Z70.80    Primary hypertension I10    Spondylosis of lumbar region without myelopathy or radiculopathy M47.816    S/P AAA repair Z98.890, Z86.79    Hip arthritis M16.10     ASSESSMENT AND PLAN:  1. Spine. Per Dr Arianne Carrington  2. Hypertension. Controlled on alia  3. Hypovit D.  supplementation   4. Colon adenomas. Fiber  5. Obesity. Lifestyle and dietary measures. Portion control reiterated. 6.  Possible IPMN. Serial f/u in 6/22  7. Dyslipidemia. at target on lipitor/zetia  8. Hypothyroidism. Therapeutic dosing  9. Osteopenia. Wt bearing exercise, ca/D  10. Vascular. Per Dr Kasey Ahmadi. Referral sent      RTC 10/22    Above conditions discussed at length and patient vocalized understanding.   All questions answered to patient satisfaction        ICD-10-CM ICD-9-CM    1. Venous insufficiency dopplers 2003  I87.2 459.81    2. Primary hypertension  I10 401.9    3. Colon adenomas 2003, last 2014 Dr Aguilar Nail  D12.6 211.3    4. Hypothyroidism due to acquired atrophy of thyroid  E03.4 244.8 TSH 3RD GENERATION     246.8 T4, FREE   5. Osteoarthritis, unspecified osteoarthritis type, unspecified site  M19.90 715.90    6. Spondylosis of lumbar region without myelopathy or radiculopathy  M47.816 721.3    7. S/P AAA repair  Z98.890 V45.89     Z86.79     8. Dyslipidemia  E78.5 272.4 LIPID PANEL   9.  Hypovitaminosis D  E55.9 268.9 VITAMIN D, 25 HYDROXY   10. Scalp lesion  L98.9 709.9 REFERRAL TO DERMATOLOGY

## 2022-04-15 ENCOUNTER — TELEPHONE (OUTPATIENT)
Dept: ORTHOPEDIC SURGERY | Age: 82
End: 2022-04-15

## 2022-04-15 DIAGNOSIS — M47.816 SPONDYLOSIS OF LUMBAR REGION WITHOUT MYELOPATHY OR RADICULOPATHY: Primary | ICD-10-CM

## 2022-04-15 DIAGNOSIS — M48.061 SPINAL STENOSIS OF LUMBAR REGION WITHOUT NEUROGENIC CLAUDICATION: ICD-10-CM

## 2022-04-15 DIAGNOSIS — M54.50 ACUTE LEFT-SIDED LOW BACK PAIN WITHOUT SCIATICA: ICD-10-CM

## 2022-04-15 RX ORDER — PREDNISONE 20 MG/1
TABLET ORAL
Qty: 20 TABLET | Refills: 0 | Status: SHIPPED | OUTPATIENT
Start: 2022-04-15 | End: 2022-05-11 | Stop reason: ALTCHOICE

## 2022-04-15 NOTE — TELEPHONE ENCOUNTER
I just sent in a stronger taper of prednisione. Be sure to tell her to take it with food and not to take NSAIDS with it. If this does not resolve her pain we will have to see her again.

## 2022-04-15 NOTE — TELEPHONE ENCOUNTER
Patient called to notify recent medication prescribed (methylPREDNISolone (Medrol, Odin,) 4 mg tablet     is doing nothing for her pain and she needs help    Requesting call back to discuss options: [phone]

## 2022-04-15 NOTE — TELEPHONE ENCOUNTER
SANDRA telling patient that a stronger RX has been sent to her pharmacy and that if it did not resolve her pain, then she would need to be seen again.

## 2022-04-20 ENCOUNTER — OFFICE VISIT (OUTPATIENT)
Dept: INTERNAL MEDICINE CLINIC | Age: 82
End: 2022-04-20
Payer: MEDICARE

## 2022-04-20 VITALS
HEART RATE: 75 BPM | OXYGEN SATURATION: 98 % | HEIGHT: 65 IN | RESPIRATION RATE: 16 BRPM | DIASTOLIC BLOOD PRESSURE: 76 MMHG | BODY MASS INDEX: 27.99 KG/M2 | TEMPERATURE: 97 F | WEIGHT: 168 LBS | SYSTOLIC BLOOD PRESSURE: 124 MMHG

## 2022-04-20 DIAGNOSIS — D12.6 COLON ADENOMAS: ICD-10-CM

## 2022-04-20 DIAGNOSIS — Z86.79 S/P AAA REPAIR: ICD-10-CM

## 2022-04-20 DIAGNOSIS — E03.4 HYPOTHYROIDISM DUE TO ACQUIRED ATROPHY OF THYROID: ICD-10-CM

## 2022-04-20 DIAGNOSIS — E55.9 HYPOVITAMINOSIS D: ICD-10-CM

## 2022-04-20 DIAGNOSIS — L98.9 SCALP LESION: ICD-10-CM

## 2022-04-20 DIAGNOSIS — M47.816 SPONDYLOSIS OF LUMBAR REGION WITHOUT MYELOPATHY OR RADICULOPATHY: ICD-10-CM

## 2022-04-20 DIAGNOSIS — I10 ESSENTIAL HYPERTENSION WITH GOAL BLOOD PRESSURE LESS THAN 140/90: ICD-10-CM

## 2022-04-20 DIAGNOSIS — E78.5 DYSLIPIDEMIA: ICD-10-CM

## 2022-04-20 DIAGNOSIS — Z98.890 S/P AAA REPAIR: ICD-10-CM

## 2022-04-20 DIAGNOSIS — M19.90 OSTEOARTHRITIS, UNSPECIFIED OSTEOARTHRITIS TYPE, UNSPECIFIED SITE: ICD-10-CM

## 2022-04-20 DIAGNOSIS — I87.2 VENOUS INSUFFICIENCY: Primary | ICD-10-CM

## 2022-04-20 PROCEDURE — G8754 DIAS BP LESS 90: HCPCS | Performed by: INTERNAL MEDICINE

## 2022-04-20 PROCEDURE — G8752 SYS BP LESS 140: HCPCS | Performed by: INTERNAL MEDICINE

## 2022-04-20 PROCEDURE — 1101F PT FALLS ASSESS-DOCD LE1/YR: CPT | Performed by: INTERNAL MEDICINE

## 2022-04-20 PROCEDURE — 1090F PRES/ABSN URINE INCON ASSESS: CPT | Performed by: INTERNAL MEDICINE

## 2022-04-20 PROCEDURE — G8536 NO DOC ELDER MAL SCRN: HCPCS | Performed by: INTERNAL MEDICINE

## 2022-04-20 PROCEDURE — G0463 HOSPITAL OUTPT CLINIC VISIT: HCPCS | Performed by: INTERNAL MEDICINE

## 2022-04-20 PROCEDURE — G8427 DOCREV CUR MEDS BY ELIG CLIN: HCPCS | Performed by: INTERNAL MEDICINE

## 2022-04-20 PROCEDURE — G8399 PT W/DXA RESULTS DOCUMENT: HCPCS | Performed by: INTERNAL MEDICINE

## 2022-04-20 PROCEDURE — 99214 OFFICE O/P EST MOD 30 MIN: CPT | Performed by: INTERNAL MEDICINE

## 2022-04-20 PROCEDURE — G8417 CALC BMI ABV UP PARAM F/U: HCPCS | Performed by: INTERNAL MEDICINE

## 2022-04-20 PROCEDURE — G8510 SCR DEP NEG, NO PLAN REQD: HCPCS | Performed by: INTERNAL MEDICINE

## 2022-04-20 NOTE — PROGRESS NOTES
Lesli Taylor presents with   Chief Complaint   Patient presents with    Follow-up     6 month    Cholesterol Problem    Hypertension    Thyroid Problem    Labs     4-13-22                1. \"Have you been to the ER, urgent care clinic since your last visit? Hospitalized since your last visit? \" YES, 2-20-22 for ED and 3-10-22 for COVID testing     2. \"Have you seen or consulted any other health care providers outside of the [address] [place] [address] since your last visit? \" YES, [facility] for COVID testing     3. For patients aged 39-70: Has the patient had a colonoscopy / FIT/ Cologuard? NA - based on age      If the patient is female:    4. For patients aged 41-77: Has the patient had a mammogram within the past 2 years? NA - based on age or sex      11. For patients aged 21-65: Has the patient had a pap smear?  NA - based on age or sex

## 2022-05-03 ENCOUNTER — OFFICE VISIT (OUTPATIENT)
Dept: VASCULAR SURGERY | Age: 82
End: 2022-05-03

## 2022-05-03 VITALS
SYSTOLIC BLOOD PRESSURE: 124 MMHG | OXYGEN SATURATION: 97 % | HEIGHT: 64 IN | HEART RATE: 69 BPM | WEIGHT: 167.99 LBS | DIASTOLIC BLOOD PRESSURE: 84 MMHG | BODY MASS INDEX: 28.68 KG/M2

## 2022-05-03 DIAGNOSIS — I10 PRIMARY HYPERTENSION: ICD-10-CM

## 2022-05-03 DIAGNOSIS — I71.40 AAA (ABDOMINAL AORTIC ANEURYSM) WITHOUT RUPTURE: Primary | ICD-10-CM

## 2022-05-03 DIAGNOSIS — E78.5 DYSLIPIDEMIA: ICD-10-CM

## 2022-05-03 DIAGNOSIS — M15.8 OTHER OSTEOARTHRITIS INVOLVING MULTIPLE JOINTS: ICD-10-CM

## 2022-05-03 PROCEDURE — G8536 NO DOC ELDER MAL SCRN: HCPCS | Performed by: SURGERY

## 2022-05-03 PROCEDURE — G8399 PT W/DXA RESULTS DOCUMENT: HCPCS | Performed by: SURGERY

## 2022-05-03 PROCEDURE — G8510 SCR DEP NEG, NO PLAN REQD: HCPCS | Performed by: SURGERY

## 2022-05-03 PROCEDURE — G8427 DOCREV CUR MEDS BY ELIG CLIN: HCPCS | Performed by: SURGERY

## 2022-05-03 PROCEDURE — 1101F PT FALLS ASSESS-DOCD LE1/YR: CPT | Performed by: SURGERY

## 2022-05-03 PROCEDURE — G8752 SYS BP LESS 140: HCPCS | Performed by: SURGERY

## 2022-05-03 PROCEDURE — G8417 CALC BMI ABV UP PARAM F/U: HCPCS | Performed by: SURGERY

## 2022-05-03 PROCEDURE — 1090F PRES/ABSN URINE INCON ASSESS: CPT | Performed by: SURGERY

## 2022-05-03 PROCEDURE — G8754 DIAS BP LESS 90: HCPCS | Performed by: SURGERY

## 2022-05-03 PROCEDURE — 99214 OFFICE O/P EST MOD 30 MIN: CPT | Performed by: SURGERY

## 2022-05-03 NOTE — PROGRESS NOTES
1. Have you been to the ER, urgent care clinic since your last visit? No     Hospitalized since your last visit? No        2. Have you seen or consulted any other health care providers outside of the [address] [place] [address] since your last visit? Include any pap smears or colon screening.   No

## 2022-05-03 NOTE — PROGRESS NOTES
05/03/22        Moshetonyannie Anika [facility]    Chief complaint: 1 year follow-up of abdominal aortic aneurysm, status post EVAR in 2018    History and Physical    Mehnaz Way is a 78 y.o. female, who had EVAR by Dr. Gavin Handley in 2018. She is doing well. She has chronic back pain, due to degenerative lumbar disc disease, for which she uses a back brace and lidocaine patch. She was treated with a short course of steroids-which did not help her. She has never smoked. She lives with her . She was an  for orthopedic surgeon. Her father had abdominal aortic aneurysm for which she did not require intervention. It did not rupture. He was not a candidate for intervention as he had MI and stroke. The patient is independent of daily activities of life. The most recent PVL-aortic duplex performed today-on 5/2/2022: Study reviewed and discussed with the patient:   Aneurysm sac: 3.4 x 4.2 cm-shrinking, no endoleak. Patent SMA and celiac arteries. Occluded ЕЛЕНА. Renal artery origins not well visualized secondary to bowel gas. Review of systems:  Apart from  low back pain, osteoarthritic pain in the legs and the hips, review of all other systems otherwise negative to the best of my knowledge. Physical Exam:    Visit Vitals  /84 (BP 1 Location: Left arm, BP Patient Position: Sitting)   Pulse 69   Ht 5' 4\" (1.626 m)   Wt 167 lb 15.9 oz (76.2 kg)   SpO2 97%   BMI 28.84 kg/m²      Constitutional: Not in distress. Mildly overweight, well-preserved elderly lady  HENT-PERRLA movements intact,  mucous membranes pink and moist, atraumatic, normocephalic  Eyes: No pallor or icterus  Heart-regular rate and rhythm no murmurs, rubs or gallops  Chest-clear to auscultation bilaterally no wheezes, rhonchi or rubs  Abdomen-soft, nontender, no palpable organomegaly or masses, no palpable pulsatile masses  Extremities-no clubbing, cyanosis or edema. No gross deformities.   No wounds or gangrenous changes to the toes or feet. Skin is intact. Feet are pink warm and well-perfused bilaterally  Pulses-carotid, radial, brachial, femorals, popliteal and posterior tibials 2+ bilaterally.   Neurological: Awake alert oriented x3, grossly nonfocal exam, normal gait  Psychological: Cooperative, normal mood and affect      Past Medical History:   Diagnosis Date    AAA (abdominal aortic aneurysm) without rupture ([facility])     AAA repair 7/2018 Dr Hood Garner adenomas     2003 Dr Yanely Ricks; 4/13 Dr Muriel Perkins    Degenerative arthritis of lumbar spine 12/2018    Dr Margaret Hickman; mri showed multilevel degen changes, mod spinal stenosis L3-5, mod severe right L4-5 foraminal disease; s/p epidural; isabel    Dyslipidemia     FHx: heart disease     Gastritis 2006    on EGD Dr. Dorina Mann, neg sprue    Hearing loss     LEFT Dr Ravinder Carlisle; sensorineural    Hypertension     Hypothyroidism 2007    Dr. Tiffanie Perez Hypovitaminosis D     IPMN (intraductal papillary mucinous neoplasm) 06/2018    on mrcp 8mm; no change 10/19, no change 12/20    Lung nodule 06/2016    6 mm RLL (6/16); no change (10/17); no change (12/18), (5/21)    Obesity     peak weight 187 lbs, bmi 33.1 from 4/13; IF 3/18    Osteopenia FRAX 4/12 9.7 and 1.4     DEXA t score 0.6 spine, -1 hip (12/09);  -0.4 spine, -1.3 hip (4/12); -1.2 wrist, -1.6 hip w FRAX 11/2.2 (4/14); -1.0 wrist, -1.6 hip w FRAX 12/2.6 (4/16); -0.7 wrist, -1.6 hip (5/18); -1.1 wrist, -1.8 hip (4/21)    Pancreatitis, chronic ([address] 2018    Psoriasis 2003    on bx Dr. Joycelyn Booker Venous insufficiency 2003    on dopplers    Zoster 05/2016    LEFT T11-12; mild postherpetic neuralgia     Past Surgical History:   Procedure Laterality Date    HX AAA REPAIR  07/17/2018    HX Elisabet Sweeney Left     Dr. Vida Sierra     HX CATARACT REMOVAL Bilateral     HX COLONOSCOPY      polyp 5/08 Dr Yanely Ricks;  4/13 negative Dr. Son Challenger  GI  08/2018    Dr Childress Drivers; ERCP s/p removal cbd stone complicated by pamcreatitis    HX HIP REPLACEMENT Left 05/2021    Dr Tasia Isabel  09/18/2018    Dr. Nayan Salinas      neg [facility]; CT head neg 9/10    [place] CARDIAC SURG PROCEDURE UNLIST  09/2016    NST negative    [place] CHEST SURGERY PROCEDURE UNLISTED  06/2016    CTA neg PE, 6 mm subpleural nodule     Patient Active Problem List   Diagnosis Code    Colon adenomas 2003, last 2014 Dr Melo Squires D12.6    Hypovitaminosis D E55.9    Venous insufficiency dopplers 2003 I87.2    Osteopenia FRAX 4/14 11 and 2.2 M85.80    Dyslipidemia E78.5    Hypothyroidism due to acquired atrophy of thyroid E03.4    Arthritis, degenerative M19.90    Obesity (BMI 30.0-34. 9) E66.9    Advance directive discussed with patient Z70.80    Primary hypertension I10    Spondylosis of lumbar region without myelopathy or radiculopathy M47.816    S/P AAA repair Z98.890, Z86.79    Hip arthritis M16.10     Current Outpatient Medications   Medication Sig Dispense Refill    predniSONE (DELTASONE) 20 mg tablet Take 3 tabs po x 3 days, then 2 tabs po x 3 days, then 1 tabs po x 3 days, then 1/2 tab po x 4 day 20 Tablet 0    methylPREDNISolone (Medrol, Odin,) 4 mg tablet Per dose pack instructions 1 Dose Pack 0    lisinopriL (PRINIVIL, ZESTRIL) 10 mg tablet Take 1 Tablet by mouth daily. 90 Tablet 3    Levothyroxine 75 mcg cap Take 1 Tablet by mouth daily. 90 Capsule 3    ezetimibe (ZETIA) 10 mg tablet Take 1 Tablet by mouth daily. 90 Tablet 3    atorvastatin (LIPITOR) 40 mg tablet Take 1 Tablet by mouth daily. 90 Tablet 3    lidocaine (LIDODERM) 5 % Apply patch to the affected area for 12 hours a day and remove for 12 hours a day. 90 Patch 1    ergocalciferol (VITAMIN D2) 50,000 unit capsule Take 1 Cap by mouth every seven (7) days.  12 Cap 3     Allergies   Allergen Reactions    Atenolol Other (comments)     Felt poorly    Cozaar [Losartan] Other (comments)     Headaches    Norvasc [Amlodipine] Other (comments)     Felt poorly       Social History     Socioeconomic History    Marital status:      Spouse name: Not on file    Number of children: Not on file    Years of education: Not on file    Highest education level: Not on file   Occupational History    Occupation: ret office mgr   Tobacco Use    Smoking status: Never Smoker    Smokeless tobacco: Never Used   Vaping Use    Vaping Use: Never used   Substance and Sexual Activity    Alcohol use: Yes     Comment: occasionally    Drug use: No    Sexual activity: Not on file   Other Topics Concern    Not on file   Social History Narrative    Not on file     Social Determinants of Health     Financial Resource Strain:     Difficulty of Paying Living Expenses: Not on file   Food Insecurity:     Worried About [address] in the Last Year: Not on file    [address] in the Last Year: Not on file   Transportation Needs:     Lack of Transportation (Medical): Not on file    Lack of Transportation (Non-Medical):  Not on file   Physical Activity:     Days of Exercise per Week: Not on file    Minutes of Exercise per Session: Not on file   Stress:     Feeling of Stress : Not on file   Social Connections:     Frequency of Communication with Friends and Family: Not on file    Frequency of Social Gatherings with Friends and Family: Not on file    Attends Yazidism Services: Not on file    Active Member of Clubs or Organizations: Not on file    Attends Club or Organization Meetings: Not on file    Marital Status: Not on file   Intimate Partner Violence:     Fear of Current or Ex-Partner: Not on file    Emotionally Abused: Not on file    Physically Abused: Not on file    Sexually Abused: Not on file   Housing Stability:     Unable to Pay for Housing in the Last Year: Not on file    Number of [place] in the Last Year: Not on file    Unstable Housing in the Last Year: Not on file     Family History   Problem Relation Age of Onset    Breast Cancer Mother     Cancer Mother     Heart Disease Father        Impression and Plan:  Braden Maravilla is a 78 y.o. female s/p endovascular aneurysm repair in 2018. She is doing well. The aneurysm is shrinking on serial duplex ultrasound. There is no endoleak. Plan:  Suggest follow-up in 1 year with noncontrast CT scan of the chest and abdomen(as it will be 5 years from her EVAR, to evaluate the entire aorta for aneurysmal degenerative changes)    Suggest bilateral lower extremity arterial ultrasound in 1 year, to evaluate the femoral and popliteal arteries, need to evaluate for any aneurysmal changes    We reviewed the plan with the patient and the patient understands.         Loc Ly MD

## 2022-05-04 DIAGNOSIS — I71.40 AAA (ABDOMINAL AORTIC ANEURYSM) WITHOUT RUPTURE: ICD-10-CM

## 2022-05-04 DIAGNOSIS — Z95.828 HISTORY OF ENDOVASCULAR STENT GRAFT FOR ABDOMINAL AORTIC ANEURYSM: ICD-10-CM

## 2022-05-04 DIAGNOSIS — I71.40 AAA (ABDOMINAL AORTIC ANEURYSM) WITHOUT RUPTURE: Primary | ICD-10-CM

## 2022-05-05 LAB
ABDOMINAL AP: 3.39 CM
ABDOMINAL PROX AORTA VEL: 93.1 CM/S
ABDOMINAL RT INT ILIAC VEL: 58 CM/S
ABDOMINAL TRANS: 4.13 CM
CELIAC PSV: 162.4 CM/S
DIST AORTIC AP: 3.39 CM
DIST AORTIC TRANS: 4.13 CM
EVAR GRAFT BODY PSV: 83.1 CM/S
EVAR LEFT DIST ATTACH PSV: 97.8 CM/S
EVAR LEFT LIMB DIST PSV: 54.2 CM/S
EVAR LEFT LIMB MID PSV: 61.8 CM/S
EVAR LEFT LIMB PROX PSV: 46.4 CM/S
EVAR LEFT OUTFLOW PSV: 46.4 CM/S
EVAR RIGHT DIST ATTACH PSV: 89.9 CM/S
EVAR RIGHT LIMB DIST PSV: 51.9 CM/S
EVAR RIGHT LIMB MID PSV: 56.3 CM/S
EVAR RIGHT LIMB PROX PSV: 58.5 CM/S
EVAR SUP ATTACH PSV: 93.3 CM/S
IMA PSV: 73.8 CM/S
LEFT EXT ILIAC MID VEL RATIO: 1.32
LEFT EXT ILIAC MID VEL: 160.9 CM/S
LEFT EXT ILIAC PROX VEL: 121.8 CM/S
MID AORTIC AP: 3.3 CM
MID AORTIC TRANS: 4.25 CM
PROX SMA PSV: 157.7 CM/S
RIGHT EXT ILIAC MID VEL RATIO: 1.9
RIGHT EXT ILIAC MID VEL: 140.5 CM/S
RIGHT EXT ILIAC PROX VEL: 74.3 CM/S

## 2022-05-11 ENCOUNTER — OFFICE VISIT (OUTPATIENT)
Dept: ORTHOPEDIC SURGERY | Age: 82
End: 2022-05-11
Payer: MEDICARE

## 2022-05-11 VITALS
WEIGHT: 170 LBS | OXYGEN SATURATION: 98 % | HEART RATE: 88 BPM | HEIGHT: 64 IN | TEMPERATURE: 97.4 F | BODY MASS INDEX: 29.02 KG/M2

## 2022-05-11 DIAGNOSIS — M53.3 SI (SACROILIAC) JOINT DYSFUNCTION: Primary | ICD-10-CM

## 2022-05-11 PROBLEM — N18.30 CHRONIC RENAL DISEASE, STAGE III (HCC): Status: ACTIVE | Noted: 2022-05-11

## 2022-05-11 PROCEDURE — G8417 CALC BMI ABV UP PARAM F/U: HCPCS | Performed by: NURSE PRACTITIONER

## 2022-05-11 PROCEDURE — G8536 NO DOC ELDER MAL SCRN: HCPCS | Performed by: NURSE PRACTITIONER

## 2022-05-11 PROCEDURE — G8432 DEP SCR NOT DOC, RNG: HCPCS | Performed by: NURSE PRACTITIONER

## 2022-05-11 PROCEDURE — G8427 DOCREV CUR MEDS BY ELIG CLIN: HCPCS | Performed by: NURSE PRACTITIONER

## 2022-05-11 PROCEDURE — 1101F PT FALLS ASSESS-DOCD LE1/YR: CPT | Performed by: NURSE PRACTITIONER

## 2022-05-11 PROCEDURE — 99213 OFFICE O/P EST LOW 20 MIN: CPT | Performed by: NURSE PRACTITIONER

## 2022-05-11 PROCEDURE — G8399 PT W/DXA RESULTS DOCUMENT: HCPCS | Performed by: NURSE PRACTITIONER

## 2022-05-11 PROCEDURE — G8756 NO BP MEASURE DOC: HCPCS | Performed by: NURSE PRACTITIONER

## 2022-05-11 PROCEDURE — 1090F PRES/ABSN URINE INCON ASSESS: CPT | Performed by: NURSE PRACTITIONER

## 2022-05-11 NOTE — PROGRESS NOTES
Yenny Bates presents today for   Chief Complaint   Patient presents with    Back Pain       Is someone accompanying this pt? no    Is the patient using any DME equipment during OV? no    Depression Screening:  3 most recent PHQ Screens 5/3/2022   Little interest or pleasure in doing things Not at all   Feeling down, depressed, irritable, or hopeless Not at all   Total Score PHQ 2 0       Learning Assessment:  Learning Assessment 3/14/2019   PRIMARY LEARNER Patient   HIGHEST LEVEL OF EDUCATION - PRIMARY LEARNER  -   BARRIERS PRIMARY LEARNER -   CO-LEARNER CAREGIVER -   PRIMARY LANGUAGE ENGLISH   LEARNER PREFERENCE PRIMARY LISTENING   ANSWERED BY patient   RELATIONSHIP SELF       Abuse Screening:  Abuse Screening Questionnaire 4/20/2022   Do you ever feel afraid of your partner? Y   Are you in a relationship with someone who physically or mentally threatens you? N   Is it safe for you to go home? N       Fall Risk  Fall Risk Assessment, last 12 mths 5/3/2022   Able to walk? Yes   Fall in past 12 months? 0   Do you feel unsteady? 0   Are you worried about falling 0   Is TUG test greater than 12 seconds? -   Is the gait abnormal? -   Number of falls in past 12 months -   Fall with injury? -       OPIOID RISK TOOL  No flowsheet data found. Coordination of Care:  1. Have you been to the ER, urgent care clinic since your last visit? no  Hospitalized since your last visit? no    2. Have you seen or consulted any other health care providers outside of the [address] [place] [address] since your last visit? no Include any pap smears or colon screening.  no

## 2022-05-11 NOTE — PROGRESS NOTES
1/24/22: GIVEN INFERIOR LOCATION, ? TWO LESIONS AND EARLY PVR Recommended VITRECTOMY, endolaser, FLUID/GAS EXCHANGE, and SEGMENTAL INFERIOR SCLERAL BUCKLE. Discussed benefits, alternatives, and risks of surgery including (but not limited to) cataract (if patient has natural lens), glaucoma, infection, bleeding, redetachment, optic neuropathy, loss of vision, blindness, double vision and loss of eye. Patient understands more than one operation may be needed to repair retinal detachment. Chief complaint   Chief Complaint   Patient presents with    Back Pain       History of Present Illness:  Fang Nassar is a  78 y.o.  female comes in today after last being seen on April 7, 2022. At that visit she had been on a cruise to [facility] and did a lot of walking up flared up her back. At that time it was a left side that hurt the most.  She did not have any radiating leg pain. We gave her a Medrol Dosepak and that really did not help and the patient called the office back and we ended up giving her a stronger prednisone taper which she states really did not help that much either. Today she reports she is not having the left-sided pain but she is having more right-sided low back pain. Again it does not radiate into the leg. She has tried using Lidoderm patches over that area but that is not helping that much either. She denies fever bowel bladder dysfunction. Physical Exam: The patient is a 53-year-old female well-developed well-nourished who is alert and oriented with a normal mood and affect. She has a slow but full weightbearing antalgic gait utilizing a single-point cane. She has 4 out of 5 strength bilateral lower extremities. Negative straight leg raise. She did not have any pain with hyperextension lumbar spine. She is tender to the right SI joint area. She is also positive on the distraction test, thigh thrust test, compression test and the Chepe's test.      Assessment and Plan: This is a patient who is having SI joint pain. She has positive provocative testing. We will set her up for a right SI joint injection. Hopefully this will settle down her pain. We will see her back after the block. Medications:  Current Outpatient Medications   Medication Sig Dispense Refill    lisinopriL (PRINIVIL, ZESTRIL) 10 mg tablet Take 1 Tablet by mouth daily. 90 Tablet 3    Levothyroxine 75 mcg cap Take 1 Tablet by mouth daily.  90 Capsule 3    ezetimibe (ZETIA) 10 mg tablet Take 1 Tablet by mouth daily. 90 Tablet 3    atorvastatin (LIPITOR) 40 mg tablet Take 1 Tablet by mouth daily. 90 Tablet 3    lidocaine (LIDODERM) 5 % Apply patch to the affected area for 12 hours a day and remove for 12 hours a day. 90 Patch 1    ergocalciferol (VITAMIN D2) 50,000 unit capsule Take 1 Cap by mouth every seven (7) days.  12 Cap 3           Review of systems:    Past Medical History:   Diagnosis Date    AAA (abdominal aortic aneurysm) without rupture (HCC)     AAA repair 7/2018 Dr Jorge Newark adenomas     2003 Dr Luke Durand; 4/13 Dr Yi Post    Degenerative arthritis of lumbar spine 12/2018    Dr Crystal Rawls; mri showed multilevel degen changes, mod spinal stenosis L3-5, mod severe right L4-5 foraminal disease; s/p epidural; isabel    Dyslipidemia     FHx: heart disease     Gastritis 2006    on EGD Dr. Zafar Rico, neg sprue    Hearing loss     LEFT Dr Mel Graves; sensorineural    Hypertension     Hypothyroidism 2007    Dr. Vinnie Toledo Hypovitaminosis D     IPMN (intraductal papillary mucinous neoplasm) 06/2018    on mrcp 8mm; no change 10/19, no change 12/20    Lung nodule 06/2016    6 mm RLL (6/16); no change (10/17); no change (12/18), (5/21)    Obesity     peak weight 187 lbs, bmi 33.1 from 4/13; IF 3/18    Osteopenia FRAX 4/12 9.7 and 1.4     DEXA t score 0.6 spine, -1 hip (12/09);  -0.4 spine, -1.3 hip (4/12); -1.2 wrist, -1.6 hip w FRAX 11/2.2 (4/14); -1.0 wrist, -1.6 hip w FRAX 12/2.6 (4/16); -0.7 wrist, -1.6 hip (5/18); -1.1 wrist, -1.8 hip (4/21)    Pancreatitis, chronic ([facility] [address] 2018    Psoriasis 2003    on bx Dr. Wen Dykes Venous insufficiency 2003    on dopplers    Zoster 05/2016    LEFT T11-12; mild postherpetic neuralgia     Past Surgical History:   Procedure Laterality Date    HX AAA REPAIR  07/17/2018    HX Se[place] Stakes Left     Dr. Daquan Whitfield     HX CATARACT REMOVAL Bilateral     HX COLONOSCOPY      polyp 5/08 Dr Luke Durand;  4/13 negative Dr. Severiano Garcia GI  08/2018    Dr Sally Vargas; ERCP s/p removal cbd stone complicated by pamcreatitis    HX HIP REPLACEMENT Left 05/2021    Dr Zach Odonnell  09/18/2018    Dr. Adelaida Barrett      neg MRA/MRI head [facility] 2007; CT head neg 9/10    [place] CARDIAC SURG PROCEDURE UNLIST  09/2016    NST negative    [place] CHEST SURGERY PROCEDURE UNLISTED  06/2016    CTA neg PE, 6 mm subpleural nodule     Social History     Socioeconomic History    Marital status:      Spouse name: Not on file    Number of children: Not on file    Years of education: Not on file    Highest education level: Not on file   Occupational History    Occupation: ret office mgr   Tobacco Use    Smoking status: Never Smoker    Smokeless tobacco: Never Used   Vaping Use    Vaping Use: Never used   Substance and Sexual Activity    Alcohol use: Yes     Comment: occasionally    Drug use: No    Sexual activity: Not on file   Other Topics Concern    Not on file   Social History Narrative    Not on file     Social Determinants of Health     Financial Resource Strain:     Difficulty of Paying Living Expenses: Not on file   Food Insecurity:     Worried About Running Out of Food in the Last Year: Not on file    Josias of Food in the Last Year: Not on file   Transportation Needs:     Lack of Transportation (Medical): Not on file    Lack of Transportation (Non-Medical):  Not on file   Physical Activity:     Days of Exercise per Week: Not on file    Minutes of Exercise per Session: Not on file   Stress:     Feeling of Stress : Not on file   Social Connections:     Frequency of Communication with Friends and Family: Not on file    Frequency of Social Gatherings with Friends and Family: Not on file    Attends Latter day Services: Not on file    Active Member of Clubs or Organizations: Not on file    Attends Club or Organization Meetings: Not on file    Marital Status: Not on file Intimate Partner Violence:     Fear of Current or Ex-Partner: Not on file    Emotionally Abused: Not on file    Physically Abused: Not on file    Sexually Abused: Not on file   Housing Stability:     Unable to Pay for Housing in the Last Year: Not on file    Number of [place] in the Last Year: Not on file    Unstable Housing in the Last Year: Not on file     Family History   Problem Relation Age of Onset    Breast Cancer Mother     Cancer Mother     Heart Disease Father        Physical Exam:  Visit Vitals  Pulse 88   Temp 97.4 °F (36.3 °C) (Temporal)   Ht 5' 4\" (1.626 m)   Wt 170 lb (77.1 kg)   SpO2 98%   BMI 29.18 kg/m²     Pain Scale: 6/10       has been . reviewed and is appropriate          Diagnoses and all orders for this visit:    1. SI (sacroiliac) joint dysfunction  -     SCHEDULE SURGERY            Follow-up and Dispositions    · Return for After the block. We have informed Yenny Bates to notify us for immediate appointment if she has any worsening neurogical symptoms or if an emergency situation presents, then call 911    Please note that this dictation was completed with [employer], the computer voice recognition software. Quite often unanticipated grammatical, syntax, homophones, and other interpretive errors are inadvertently transcribed by the computer software. Please disregard these errors. Please excuse any errors that have escaped final proofreading.

## 2022-05-18 ENCOUNTER — APPOINTMENT (OUTPATIENT)
Dept: GENERAL RADIOLOGY | Age: 82
End: 2022-05-18
Attending: PHYSICAL MEDICINE & REHABILITATION
Payer: MEDICARE

## 2022-05-18 ENCOUNTER — HOSPITAL ENCOUNTER (OUTPATIENT)
Age: 82
Setting detail: OUTPATIENT SURGERY
Discharge: HOME | End: 2022-05-18
Attending: PHYSICAL MEDICINE & REHABILITATION | Admitting: PHYSICAL MEDICINE & REHABILITATION
Payer: MEDICARE

## 2022-05-18 VITALS
SYSTOLIC BLOOD PRESSURE: 129 MMHG | OXYGEN SATURATION: 97 % | DIASTOLIC BLOOD PRESSURE: 79 MMHG | HEART RATE: 80 BPM | TEMPERATURE: 98.1 F | RESPIRATION RATE: 16 BRPM

## 2022-05-18 DIAGNOSIS — M53.3 SACROILIAC JOINT PAIN: ICD-10-CM

## 2022-05-18 PROCEDURE — 74011250636 HC RX REV CODE- 250/636: Performed by: PHYSICAL MEDICINE & REHABILITATION

## 2022-05-18 PROCEDURE — 77030039433 HC TY MYLEOGRAM BD -B: Performed by: PHYSICAL MEDICINE & REHABILITATION

## 2022-05-18 PROCEDURE — 27096 INJECT SACROILIAC JOINT: CPT | Performed by: PHYSICAL MEDICINE & REHABILITATION

## 2022-05-18 PROCEDURE — 74011000250 HC RX REV CODE- 250: Performed by: PHYSICAL MEDICINE & REHABILITATION

## 2022-05-18 PROCEDURE — 77030003676 HC NDL SPN MPRI -A: Performed by: PHYSICAL MEDICINE & REHABILITATION

## 2022-05-18 PROCEDURE — 2709999900 HC NON-CHARGEABLE SUPPLY: Performed by: PHYSICAL MEDICINE & REHABILITATION

## 2022-05-18 PROCEDURE — 76010000009 HC PAIN MGT 0 TO 30 MIN PROC: Performed by: PHYSICAL MEDICINE & REHABILITATION

## 2022-05-18 PROCEDURE — 74011000636 HC RX REV CODE- 636: Performed by: PHYSICAL MEDICINE & REHABILITATION

## 2022-05-18 RX ORDER — DIAZEPAM 5 MG/1
5-20 TABLET ORAL ONCE
Status: DISCONTINUED | OUTPATIENT
Start: 2022-05-18 | End: 2022-05-18 | Stop reason: HOSPADM

## 2022-05-18 RX ORDER — DEXAMETHASONE SODIUM PHOSPHATE 100 MG/10ML
INJECTION INTRAMUSCULAR; INTRAVENOUS AS NEEDED
Status: DISCONTINUED | OUTPATIENT
Start: 2022-05-18 | End: 2022-05-18 | Stop reason: HOSPADM

## 2022-05-18 RX ORDER — LIDOCAINE HYDROCHLORIDE 10 MG/ML
INJECTION, SOLUTION EPIDURAL; INFILTRATION; INTRACAUDAL; PERINEURAL AS NEEDED
Status: DISCONTINUED | OUTPATIENT
Start: 2022-05-18 | End: 2022-05-18 | Stop reason: HOSPADM

## 2022-05-18 NOTE — H&P
Date of Surgery Update:  Ginna Javier was seen and examined. History and physical has been reviewed. The patient has been examined. There have been no significant clinical changes since the last office visit. The patient was counseled at length about the risks of matt Covid-19 during their perioperative period and any recovery window from their procedure. The patient was made aware that matt Covid-19  may worsen their prognosis for recovering from their procedure and lend to a higher morbidity and/or mortality risk. All material risks, benefits, and reasonable alternatives including postponing the procedure were discussed. The patient does  wish to proceed with the procedure at this time.     Pre Injection pain level:       7/10  Post Injection pain level:         310      Signed By: Jessica Hargrove MD     May 18, 2022 2:03 PM

## 2022-05-18 NOTE — PROCEDURES
Procedure Note    Patient Name: Tanisha Laureano    Date of Procedure: May 18, 2022    Preoperative Diagnosis: Sacroiliac Joint Dysfunction    Post Operative Diagnosis: same    Procedure: SI Joint Injection right     Consent: Informed consent was obtained prior to the procedure. The patient was given the opportunity to ask questions regarding the procedure and its associated risks. In addition to the potential risks associated with the procedure itself, the patient was informed both verbally and in writing of potential side effects of the use of glucocorticoids. The patient appeared to comprehend the informed consent and desired to have the procedure performed. Procedure: The patient was placed in the prone position on the flouroscopy table and the back was prepped and draped in the usual sterile manner. A #22 gauge spinal needle was then advanced to lie within the SI joint after local Lidocaine 1% injection. 1cc isovue used to confirm the position. A total of 10 mg of preservative free dexamethasone and 5 cc of Lidocaine was introduced into the SI joint. The injection area was cleaned and bandaids applied. No excessive bleeding was noted. Patient dressed and was discharged to home with instructions. Discussion: The patient tolerated the procedure well.      Marcy Williamson MD  May 18, 2022

## 2022-05-18 NOTE — PERIOP NOTES
Patient verbalized understanding of discharge instructions and verbally consented to HIPAA. Signature pad not working.

## 2022-05-19 ENCOUNTER — HOSPITAL ENCOUNTER (EMERGENCY)
Age: 82
Discharge: HOME | End: 2022-05-19
Attending: EMERGENCY MEDICINE
Payer: MEDICARE

## 2022-05-19 ENCOUNTER — OFFICE VISIT (OUTPATIENT)
Dept: INTERNAL MEDICINE CLINIC | Age: 82
End: 2022-05-19
Payer: MEDICARE

## 2022-05-19 VITALS
WEIGHT: 166 LBS | HEART RATE: 80 BPM | RESPIRATION RATE: 20 BRPM | HEIGHT: 64 IN | DIASTOLIC BLOOD PRESSURE: 95 MMHG | SYSTOLIC BLOOD PRESSURE: 171 MMHG | TEMPERATURE: 97.9 F | OXYGEN SATURATION: 95 % | BODY MASS INDEX: 28.34 KG/M2

## 2022-05-19 VITALS
DIASTOLIC BLOOD PRESSURE: 80 MMHG | HEIGHT: 64 IN | HEART RATE: 81 BPM | BODY MASS INDEX: 28.34 KG/M2 | WEIGHT: 166 LBS | OXYGEN SATURATION: 94 % | SYSTOLIC BLOOD PRESSURE: 148 MMHG | RESPIRATION RATE: 20 BRPM | TEMPERATURE: 97 F

## 2022-05-19 DIAGNOSIS — I10 ESSENTIAL HYPERTENSION WITH GOAL BLOOD PRESSURE LESS THAN 140/90: Primary | ICD-10-CM

## 2022-05-19 DIAGNOSIS — I10 ASYMPTOMATIC HYPERTENSION: Primary | ICD-10-CM

## 2022-05-19 PROCEDURE — G8419 CALC BMI OUT NRM PARAM NOF/U: HCPCS | Performed by: INTERNAL MEDICINE

## 2022-05-19 PROCEDURE — 99213 OFFICE O/P EST LOW 20 MIN: CPT | Performed by: INTERNAL MEDICINE

## 2022-05-19 PROCEDURE — G8399 PT W/DXA RESULTS DOCUMENT: HCPCS | Performed by: INTERNAL MEDICINE

## 2022-05-19 PROCEDURE — G8536 NO DOC ELDER MAL SCRN: HCPCS | Performed by: INTERNAL MEDICINE

## 2022-05-19 PROCEDURE — 1090F PRES/ABSN URINE INCON ASSESS: CPT | Performed by: INTERNAL MEDICINE

## 2022-05-19 PROCEDURE — 1101F PT FALLS ASSESS-DOCD LE1/YR: CPT | Performed by: INTERNAL MEDICINE

## 2022-05-19 PROCEDURE — G8432 DEP SCR NOT DOC, RNG: HCPCS | Performed by: INTERNAL MEDICINE

## 2022-05-19 PROCEDURE — G8754 DIAS BP LESS 90: HCPCS | Performed by: INTERNAL MEDICINE

## 2022-05-19 PROCEDURE — G8753 SYS BP > OR = 140: HCPCS | Performed by: INTERNAL MEDICINE

## 2022-05-19 PROCEDURE — 99282 EMERGENCY DEPT VISIT SF MDM: CPT

## 2022-05-19 PROCEDURE — G8427 DOCREV CUR MEDS BY ELIG CLIN: HCPCS | Performed by: INTERNAL MEDICINE

## 2022-05-19 PROCEDURE — G0463 HOSPITAL OUTPT CLINIC VISIT: HCPCS | Performed by: INTERNAL MEDICINE

## 2022-05-19 NOTE — ED NOTES
Discharge instructions reviewed with patient and understanding verbalized. Patient exits through waiting area.

## 2022-05-19 NOTE — ED PROVIDER NOTES
77 yo F with PMHx htn, AAA presents with elevated BP. Pt notes that she had injection to right hip yesterday related to some hip pain. Pt states she did fine with injection. Pt states that tonight she didn't feel quite right and decided to check her BP and noted that it was high in 180's/110's. Pt states she does not regularly check BP but has been taking her meds as prescribed. Pt denies cp/sob, no abdominal pain, no weakness or focal deficits.            Past Medical History:   Diagnosis Date    AAA (abdominal aortic aneurysm) without rupture (HCC)     AAA repair 7/2018 Dr Brunilda Milligan adenomas     2003 Dr Doug Bray; 4/13 Dr Janice Vega    Degenerative arthritis of lumbar spine 12/2018    Dr Tami Aguila; mri showed multilevel degen changes, mod spinal stenosis L3-5, mod severe right L4-5 foraminal disease; s/p epidural; isabel    Dyslipidemia     FHx: heart disease     Gastritis 2006    on EGD Dr. David Bob, neg sprue    Hearing loss     LEFT Dr Ellie Castaneda; sensorineural    Hypertension     Hypothyroidism 2007    Dr. Zaynab Hernandez Hypovitaminosis D     IPMN (intraductal papillary mucinous neoplasm) 06/2018    on mrcp 8mm; no change 10/19, no change 12/20    Lung nodule 06/2016    6 mm RLL (6/16); no change (10/17); no change (12/18), (5/21)    Obesity     peak weight 187 lbs, bmi 33.1 from 4/13; IF 3/18    Osteopenia FRAX 4/12 9.7 and 1.4     DEXA t score 0.6 spine, -1 hip (12/09);  -0.4 spine, -1.3 hip (4/12); -1.2 wrist, -1.6 hip w FRAX 11/2.2 (4/14); -1.0 wrist, -1.6 hip w FRAX 12/2.6 (4/16); -0.7 wrist, -1.6 hip (5/18); -1.1 wrist, -1.8 hip (4/21)    Pancreatitis, chronic ([facility] [address] 2018    Psoriasis 2003    on bx Dr. Yousif Gaming Venous insufficiency 2003    on dopplers    Zoster 05/2016    LEFT T11-12; mild postherpetic neuralgia       Past Surgical History:   Procedure Laterality Date    HX AAA REPAIR  07/17/2018    HX Mary Barnacle Left     Dr. Onofre Castro     HX CATARACT REMOVAL Bilateral  HX COLONOSCOPY      polyp 5/08 Dr Sherron Wade;  4/13 negative Dr. Yumiko Phillips HX GI  08/2018    Dr Yvette Silver; ERCP s/p removal cbd stone complicated by pamcreatitis    HX HIP REPLACEMENT Left 05/2021    Dr Grace Becker  09/18/2018    Dr. Zach Huerta      neg [facility]; CT head neg 9/10    [place] CARDIAC SURG PROCEDURE UNLIST  09/2016    NST negative    DC CHEST SURGERY PROCEDURE UNLISTED  06/2016    CTA neg PE, 6 mm subpleural nodule         Family History:   Problem Relation Age of Onset    Breast Cancer Mother     Cancer Mother     Heart Disease Father        Social History     Socioeconomic History    Marital status:      Spouse name: Not on file    Number of children: Not on file    Years of education: Not on file    Highest education level: Not on file   Occupational History    Occupation: ret office mgr   Tobacco Use    Smoking status: Never Smoker    Smokeless tobacco: Never Used   Vaping Use    Vaping Use: Never used   Substance and Sexual Activity    Alcohol use: Yes     Comment: occasionally    Drug use: No    Sexual activity: Not on file   Other Topics Concern    Not on file   Social History Narrative    Not on file     Social Determinants of Health     Financial Resource Strain:     Difficulty of Paying Living Expenses: Not on file   Food Insecurity:     Worried About Running Out of Food in the Last Year: Not on file    Josias of Food in the Last Year: Not on file   Transportation Needs:     Lack of Transportation (Medical): Not on file    Lack of Transportation (Non-Medical):  Not on file   Physical Activity:     Days of Exercise per Week: Not on file    Minutes of Exercise per Session: Not on file   Stress:     Feeling of Stress : Not on file   Social Connections:     Frequency of Communication with Friends and Family: Not on file    Frequency of Social Gatherings with Friends and Family: Not on file    Attends Sabianist Services: Not on file    Active Member of Clubs or Organizations: Not on file    Attends Club or Organization Meetings: Not on file    Marital Status: Not on file   Intimate Partner Violence:     Fear of Current or Ex-Partner: Not on file    Emotionally Abused: Not on file    Physically Abused: Not on file    Sexually Abused: Not on file   Housing Stability:     Unable to Pay for Housing in the Last Year: Not on file    Number of [place] in the Last Year: Not on file    Unstable Housing in the Last Year: Not on file         ALLERGIES: Atenolol, Cozaar [losartan], and Norvasc [amlodipine]    Review of Systems   Constitutional: Negative for fever. HENT: Negative for trouble swallowing. Respiratory: Negative for shortness of breath. Cardiovascular: Negative for chest pain. Gastrointestinal: Negative for abdominal pain and vomiting. Genitourinary: Negative for difficulty urinating. Musculoskeletal: Positive for arthralgias. Skin: Negative for wound. Neurological: Negative for syncope. Psychiatric/Behavioral: Negative for behavioral problems. All other systems reviewed and are negative. Vitals:    05/19/22 0408   BP: (!) 171/95   Pulse: 80   Resp: 20   Temp: 97.9 °F (36.6 °C)   SpO2: 95%   Weight: 75.3 kg (166 lb)   Height: 5' 4\" (1.626 m)            Physical Exam  Vitals and nursing note reviewed. Constitutional:       General: She is not in acute distress. Appearance: She is well-developed. Comments: elderly-appearing, nad   HENT:      Head: Normocephalic and atraumatic. Cardiovascular:      Rate and Rhythm: Normal rate. Pulses: Normal pulses. Pulmonary:      Effort: Pulmonary effort is normal. No respiratory distress. Breath sounds: Normal breath sounds. Abdominal:      Palpations: Abdomen is soft. Tenderness: There is no abdominal tenderness.    Musculoskeletal:         General: Normal range of motion. Cervical back: Normal range of motion. Comments: Mechanically stable   Neurological:      General: No focal deficit present. Mental Status: She is alert and oriented to person, place, and time. Comments: No focal deficits noted   Psychiatric:         Behavior: Behavior normal.          MDM  Number of Diagnoses or Management Options  Asymptomatic hypertension  Diagnosis management comments: 79 yo F with PMHx htn presents with elevated BP at home. No fever, no cp/sob, no abdominal pain, no weakness or focal deficits. Pt essentially asymptomatic. Examination unremarkable, ambulatory in ED. BP is elevated 170/90's. Per [facility] clinical policy on asymptomatic BP, will not treat. Will monitor in ED and re-evaluate.    5:01 AM  Pt doing ok, resting comfortably, remains asymptomatic, seems stable for home care. Discussed results and poc for dc home, symptom management, follow-up, return precautions. Amount and/or Complexity of Data Reviewed  Review and summarize past medical records: yes    Patient Progress  Patient progress: stable         Procedures      PROGRESS NOTES    4:21 AM:   Chente Richmond MD arrives to the bedside to evaluate the patient. Answered the patient's questions regarding the treatment plan. CONSULTATIONS  None      MEDICATIONS ORDERED  Medications - No data to display    RADIOLOGY INTERPRETATIONS  No orders to display       EKG READINGS/LABORATORY RESULTS  No results found for this or any previous visit (from the past 12 hour(s)). ED DIAGNOSIS & DISPOSITION INFORMATION  Diagnosis:   1.  Asymptomatic hypertension        Disposition: Discharged    Follow-up Information     Follow up With Specialties Details Why Contact Iraj Pereira MD Internal Medicine Physician Schedule an appointment as soon as possible for a visit   [address]      [id number] [place] [address] EMERGENCY DEPT Emergency Medicine  As needed [address] [facility]  [phone]          Patient's Medications   Start Taking    No medications on file   Continue Taking    ATORVASTATIN (LIPITOR) 40 MG TABLET    Take 1 Tablet by mouth daily. ERGOCALCIFEROL (VITAMIN D2) 50,000 UNIT CAPSULE    Take 1 Cap by mouth every seven (7) days. EZETIMIBE (ZETIA) 10 MG TABLET    Take 1 Tablet by mouth daily. LEVOTHYROXINE 75 MCG CAP    Take 1 Tablet by mouth daily. LIDOCAINE (LIDODERM) 5 %    Apply patch to the affected area for 12 hours a day and remove for 12 hours a day. LISINOPRIL (PRINIVIL, ZESTRIL) 10 MG TABLET    Take 1 Tablet by mouth daily.    These Medications have changed    No medications on file   Stop Taking    No medications on file           Treasure Davies MD.

## 2022-05-19 NOTE — PROGRESS NOTES
Alexeyfredia Beams presents with   Chief Complaint   Patient presents with    Hypertension     patient seen at [facility] today for elevated BP, SBP was in the 180s, but she states it was higher than that at home, patient brought in her home bp machine to compare with our readings          1. \"Have you been to the ER, urgent care clinic since your last visit? Hospitalized since your last visit? \" YES, [facility] today 5-19-22 for hypertension and yesterday for pain manegement procedure at [facility]     2. \"Have you seen or consulted any other health care providers outside of the [address] [place] [address] since your last visit? \" No     3. For patients aged 39-70: Has the patient had a colonoscopy / FIT/ Cologuard? NA - based on age      If the patient is female:    4. For patients aged 41-77: Has the patient had a mammogram within the past 2 years? NA - based on age or sex      11. For patients aged 21-65: Has the patient had a pap smear?  NA - based on age or sex

## 2022-05-19 NOTE — ED TRIAGE NOTES
A&O female with c/o high blood pressure. Patient states he received an injection in her back/hip yesterday. This morning awoke and was not feeling well, she checked her pressure and found it to be high. Denies HA, blurred vision, or weakness. Only states she doesn't feel right.

## 2022-05-19 NOTE — PROGRESS NOTES
78 y.o. female who presents for evaluation. She is here to follow-up after her ER visit. We saw her on 4/20/2022 at which time her blood pressure was running in the 120s over 70s. She had not been checking it at home. Yesterday, she had an SI joint injection at Dr. Emory Webster office. Her pressure was actually noted to be low there but they never rechecked it. She got home, woke up this morning and felt funny so she checked her pressures, getting in the 550R systolic. She went to the ER where they got her in the 190s. They apparently sent her home and told her to follow-up with us.   No headaches, chest pain, shortness of breath, etc.  She is on lisinopril 10 only    Past Medical History:   Diagnosis Date    AAA (abdominal aortic aneurysm) without rupture ([facility] [address]     AAA repair 7/2018 Dr Kianna Burrell adenomas     2003 Dr Nicholas West; 4/13 Dr Nabil Winters    Degenerative arthritis of lumbar spine 12/2018    Dr Josie Patricio; mri showed multilevel degen changes, mod spinal stenosis L3-5, mod severe right L4-5 foraminal disease; s/p epidural; isabel    Dyslipidemia     FHx: heart disease     Gastritis 2006    on EGD Dr. Lolly Saunders, neg sprue    Hearing loss     LEFT Dr Woody Ramirez; sensorineural    Hypertension     Hypothyroidism 2007    Dr. Trevor Gramajo Hypovitaminosis D     IPMN (intraductal papillary mucinous neoplasm) 06/2018    on mrcp 8mm; no change 10/19, no change 12/20    Lung nodule 06/2016    6 mm RLL (6/16); no change (10/17); no change (12/18), (5/21)    Obesity     peak weight 187 lbs, bmi 33.1 from 4/13; IF 3/18    Osteopenia FRAX 4/12 9.7 and 1.4     DEXA t score 0.6 spine, -1 hip (12/09);  -0.4 spine, -1.3 hip (4/12); -1.2 wrist, -1.6 hip w FRAX 11/2.2 (4/14); -1.0 wrist, -1.6 hip w FRAX 12/2.6 (4/16); -0.7 wrist, -1.6 hip (5/18); -1.1 wrist, -1.8 hip (4/21)    Panc[facility], chronic ([facility] 2018    Psoriasis 2003    on bx Dr. Jason Pedroza Venous insufficiency 2003    on dopplers    Zoster 05/2016    LEFT T11-12; mild postherpetic neuralgia     Current Outpatient Medications   Medication Sig    lisinopriL (PRINIVIL, ZESTRIL) 10 mg tablet Take 1 Tablet by mouth daily.  Levothyroxine 75 mcg cap Take 1 Tablet by mouth daily.  ezetimibe (ZETIA) 10 mg tablet Take 1 Tablet by mouth daily.  atorvastatin (LIPITOR) 40 mg tablet Take 1 Tablet by mouth daily.  lidocaine (LIDODERM) 5 % Apply patch to the affected area for 12 hours a day and remove for 12 hours a day.  ergocalciferol (VITAMIN D2) 50,000 unit capsule Take 1 Cap by mouth every seven (7) days. No current facility-administered medications for this visit. Allergies   Allergen Reactions    Atenolol Other (comments)     Felt poorly    Cozaar [Losartan] Other (comments)     Headaches    Norvasc [Amlodipine] Other (comments)     [place] poorly       Visit Vitals  BP (!) 148/80   Pulse 81   Temp 97 °F (36.1 °C) (Temporal)   Resp 20   Ht 5' 4\" (1.626 m)   Wt 166 lb (75.3 kg)   SpO2 94%   BMI 28.49 kg/m²   I manually got her at 149/84  A&O x3  Affect is appropriate. Mood stable  No apparent distress  Anicteric, no JVD, adenopathy or thyromegaly. No carotid bruits or radiated murmur  Lungs clear to auscultation, no wheezes or rales  Heart showed regular rate and rhythm. No murmur, rubs, gallops  Abdomen soft nontender, no hepatosplenomegaly or masses. Extremities without edema. Pulses 1-2+ symmetrically    Assessment and plan:  1. Hypertension. Increase lisinopril to 20 daily total, follow readings at home and call us with updates adjust dosing prn. Above conditions discussed at length and patient vocalized understanding.   All questions answered to patient satisfaction

## 2022-06-08 ENCOUNTER — OFFICE VISIT (OUTPATIENT)
Dept: ORTHOPEDIC SURGERY | Age: 82
End: 2022-06-08
Payer: MEDICARE

## 2022-06-08 VITALS
SYSTOLIC BLOOD PRESSURE: 116 MMHG | TEMPERATURE: 97.8 F | HEIGHT: 64 IN | DIASTOLIC BLOOD PRESSURE: 78 MMHG | HEART RATE: 85 BPM | OXYGEN SATURATION: 96 % | RESPIRATION RATE: 20 BRPM | WEIGHT: 169.6 LBS | BODY MASS INDEX: 28.95 KG/M2

## 2022-06-08 DIAGNOSIS — S32.050A CLOSED COMPRESSION FRACTURE OF L5 LUMBAR VERTEBRA, INITIAL ENCOUNTER (HCC): ICD-10-CM

## 2022-06-08 DIAGNOSIS — M54.50 CHRONIC RIGHT-SIDED LOW BACK PAIN WITHOUT SCIATICA: Primary | ICD-10-CM

## 2022-06-08 DIAGNOSIS — G89.29 CHRONIC RIGHT-SIDED LOW BACK PAIN WITHOUT SCIATICA: Primary | ICD-10-CM

## 2022-06-08 PROCEDURE — 1090F PRES/ABSN URINE INCON ASSESS: CPT | Performed by: NURSE PRACTITIONER

## 2022-06-08 PROCEDURE — 99214 OFFICE O/P EST MOD 30 MIN: CPT | Performed by: NURSE PRACTITIONER

## 2022-06-08 PROCEDURE — G8754 DIAS BP LESS 90: HCPCS | Performed by: NURSE PRACTITIONER

## 2022-06-08 PROCEDURE — G8427 DOCREV CUR MEDS BY ELIG CLIN: HCPCS | Performed by: NURSE PRACTITIONER

## 2022-06-08 PROCEDURE — G8752 SYS BP LESS 140: HCPCS | Performed by: NURSE PRACTITIONER

## 2022-06-08 PROCEDURE — 1101F PT FALLS ASSESS-DOCD LE1/YR: CPT | Performed by: NURSE PRACTITIONER

## 2022-06-08 PROCEDURE — G8399 PT W/DXA RESULTS DOCUMENT: HCPCS | Performed by: NURSE PRACTITIONER

## 2022-06-08 PROCEDURE — G8536 NO DOC ELDER MAL SCRN: HCPCS | Performed by: NURSE PRACTITIONER

## 2022-06-08 PROCEDURE — 1123F ACP DISCUSS/DSCN MKR DOCD: CPT | Performed by: NURSE PRACTITIONER

## 2022-06-08 PROCEDURE — G8432 DEP SCR NOT DOC, RNG: HCPCS | Performed by: NURSE PRACTITIONER

## 2022-06-08 PROCEDURE — 72110 X-RAY EXAM L-2 SPINE 4/>VWS: CPT | Performed by: NURSE PRACTITIONER

## 2022-06-08 PROCEDURE — G8417 CALC BMI ABV UP PARAM F/U: HCPCS | Performed by: NURSE PRACTITIONER

## 2022-06-08 RX ORDER — TRAMADOL HYDROCHLORIDE 50 MG/1
50 TABLET ORAL
Qty: 28 TABLET | Refills: 0 | Status: SHIPPED | OUTPATIENT
Start: 2022-06-08 | End: 2022-06-09 | Stop reason: SINTOL

## 2022-06-08 NOTE — PROGRESS NOTES
Chief complaint   Chief Complaint   Patient presents with    Back Pain       History of Present Illness:  Kaye Mcfarlane is a  78 y.o.  female who comes in today for follow-up of her low back pain. She does not have any radiating leg pain. She underwent a right SI joint block on May 18, 2022 with 90% improvement. States that particular area is not really hurting her but she still having very low back pain just to the right of the midline. She is now using a back brace which helps when she walks. She is using Lidoderm Derm patch which only helps a little bit. We have tried Medrol Dosepak and prednisone in the past which did not really help at all. .  She denies fever bowel bladder dysfunction. She denies any new medical issues      Physical Exam: Patient is a 64-year-old female well-developed well-nourished who is alert and oriented with a normal mood and affect. She has a slow but full weightbearing nonantalgic gait utilizing single-point cane. She has 4 out of 5 strength bilateral lower extremities. Negative straight leg raise. She is mildly tender to palpation just to the right of the very low lumbar spine. She has pain with hyperextension lumbar spine. Assessment and Plan: This is a patient who is having low back pain. Her right SI joint pain has resolved. We did a lumbar AP lateral flexion-extension x-ray. It does show a mild compression fracture at L5. We will get a MRI of her lumbar spine since that is the area she is hurting in. She will follow-up with our physicians following the MRI. I will give her an acute prescription of tramadol to use at nighttime. She states she has a hard time sleeping due to the pain. She rates her pain as a 7-8 out of 10. XRAY: 06/08/22   body part: Lumbar  side (rt/lt): bilateral  number of views taken:4  Findings: ? L5 compression fracture    The x-ray will be officially read by Dr. Humera Carrillo and scanned into the chart. Medications:  Current Outpatient Medications   Medication Sig Dispense Refill    traMADoL (Ultram) 50 mg tablet Take 1 Tablet by mouth every six (6) hours as needed for Pain for up to 3 days. Max Daily Amount: 200 mg. Indications: pain 28 Tablet 0    lisinopriL (PRINIVIL, ZESTRIL) 10 mg tablet Take 1 Tablet by mouth daily. 90 Tablet 3    Levothyroxine 75 mcg cap Take 1 Tablet by mouth daily. 90 Capsule 3    ezetimibe (ZETIA) 10 mg tablet Take 1 Tablet by mouth daily. 90 Tablet 3    atorvastatin (LIPITOR) 40 mg tablet Take 1 Tablet by mouth daily. 90 Tablet 3    lidocaine (LIDODERM) 5 % Apply patch to the affected area for 12 hours a day and remove for 12 hours a day. 90 Patch 1    ergocalciferol (VITAMIN D2) 50,000 unit capsule Take 1 Cap by mouth every seven (7) days.  12 Cap 3           Review of systems:    Past Medical History:   Diagnosis Date    AAA (abdominal aortic aneurysm) without rupture ([facility])     AAA repair 7/2018 Dr Hood Garner adenomas     2003 Dr Yanely Ricks; 4/13 Dr Muriel Perkins    Degenerative arthritis of lumbar spine 12/2018    Dr Margaret Hickman; mri showed multilevel degen changes, mod spinal stenosis L3-5, mod severe right L4-5 foraminal disease; s/p epidural; isabel    Dyslipidemia     FHx: heart disease     Gastritis 2006    on EGD Dr. Dorina Mann, neg sprue    Hearing loss     LEFT Dr Ravinder Carlisle; sensorineural    Hypertension     Hypothyroidism 2007    Dr. Tiffanie Perez Hypovitaminosis D     IPMN (intraductal papillary mucinous neoplasm) 06/2018    on mrcp 8mm; no change 10/19, no change 12/20    Lung nodule 06/2016    6 mm RLL (6/16); no change (10/17); no change (12/18), (5/21)    Obesity     peak weight 187 lbs, bmi 33.1 from 4/13; IF 3/18    Osteopenia FRAX 4/12 9.7 and 1.4     DEXA t score 0.6 spine, -1 hip (12/09);  -0.4 spine, -1.3 hip (4/12); -1.2 wrist, -1.6 hip w FRAX 11/2.2 (4/14); -1.0 wrist, -1.6 hip w FRAX 12/2.6 (4/16); -0.7 wrist, -1.6 hip (5/18); -1.1 wrist, -1.8 hip (4/21)    Pancreatitis, chronic ([address] 2018    Psoriasis 2003    on bx Dr. Shana Shelton Venous insufficiency 2003    on dopplers    Zoster 05/2016    LEFT T11-12; mild postherpetic neuralgia     Past Surgical History:   Procedure Laterality Date    HX AAA REPAIR  07/17/2018    HX Chryl Boards Left     Dr. Holly Rivero     HX CATARACT REMOVAL Bilateral     HX COLONOSCOPY      polyp 5/08 Dr Faith Lewis;  4/13 negative Dr. Fly Ramirez HX GI  08/2018    Dr Boateng Ni; ERCP s/p removal cbd stone complicated by pamcreatitis    HX HIP REPLACEMENT Left 05/2021    Dr Kim Yusuf  09/18/2018    Dr. Simpson Comp      [facility]; CT head neg 9/10    IN CARDIAC SURG PROCEDURE UNLIST  09/2016    NST negative    IN CHEST SURGERY PROCEDURE UNLISTED  06/2016    CTA neg PE, 6 mm subpleural nodule     Social History     Socioeconomic History    Marital status:      Spouse name: Not on file    Number of children: Not on file    Years of education: Not on file    Highest education level: Not on file   Occupational History    Occupation: ret office mgr   Tobacco Use    Smoking status: Never Smoker    Smokeless tobacco: Never Used   Vaping Use    Vaping Use: Never used   Substance and Sexual Activity    Alcohol use: Yes     Comment: occasionally    Drug use: No    Sexual activity: Not on file   Other Topics Concern    Not on file   Social History Narrative    Not on file     Social Determinants of Health     Financial Resource Strain:     Difficulty of Paying Living Expenses: Not on file   Food Insecurity:     Worried About Running Out of Food in the Last Year: Not on file    Josias of Food in the Last Year: Not on file   Transportation Needs:     Lack of Transportation (Medical): Not on file    Lack of Transportation (Non-Medical):  Not on file   Physical Activity:     Days of Exercise per Week: Not on file    Minutes of Exercise per Session: Not on file   Stress:     Feeling of Stress : Not on file   Social Connections:     Frequency of Communication with Friends and Family: Not on file    Frequency of Social Gatherings with Friends and Family: Not on file    Attends [employer] Services: Not on file    Active Member of [address] [place] [address] [employer] or Organizations: Not on file    Attends Club or Organization Meetings: Not on file    Marital Status: Not on file   Intimate Partner Violence:     Fear of Current or Ex-Partner: Not on file    Emotionally Abused: Not on file    Physically Abused: Not on file    Sexually Abused: Not on file   Housing Stability:     Unable to Pay for Housing in the Last Year: Not on file    Number of [place] in the Last Year: Not on file    Unstable Housing in the Last Year: Not on file     Family History   Problem Relation Age of Onset    Breast Cancer Mother     Cancer Mother     Heart Disease Father        Physical Exam:  Visit Vitals  /78 (BP 1 Location: Right arm, BP Patient Position: Sitting)   Pulse 85   Temp 97.8 °F (36.6 °C) (Temporal)   Resp 20   Ht 5' 4\" (1.626 m)   Wt 169 lb 9.6 oz (76.9 kg)   SpO2 96%   BMI 29.11 kg/m²     Pain Scale: 8/10       has been . reviewed and is appropriate          Diagnoses and all orders for this visit:    1. Chronic right-sided low back pain without sciatica  -     AMB POC XRAY, SPINE, LUMBOSACRAL; 4+    2. Closed compression fracture of L5 lumbar vertebra, initial encounter ([facility] [address]  -     MRI LUMB SPINE WO CONT; Future  -     traMADoL (Ultram) 50 mg tablet; Take 1 Tablet by mouth every six (6) hours as needed for Pain for up to 3 days. Max Daily Amount: 200 mg. Indications: pain            Follow-up and Dispositions    · Return in about 4 weeks (around 7/6/2022) for .              We have informed Sherice Allan to notify us for immediate appointment if she has any worsening neurogical symptoms or if an emergency situation presents, then call 911    Please note that this dictation was completed with Amara, the computer voice recognition software. Quite often unanticipated grammatical, syntax, homophones, and other interpretive errors are inadvertently transcribed by the computer software. Please disregard these errors. Please excuse any errors that have escaped final proofreading.

## 2022-06-25 ENCOUNTER — HOSPITAL ENCOUNTER (OUTPATIENT)
Age: 82
Discharge: HOME | End: 2022-06-25
Attending: NURSE PRACTITIONER
Payer: MEDICARE

## 2022-06-25 PROCEDURE — 72148 MRI LUMBAR SPINE W/O DYE: CPT

## 2022-06-29 DIAGNOSIS — S32.050A CLOSED COMPRESSION FRACTURE OF L5 LUMBAR VERTEBRA, INITIAL ENCOUNTER (HCC): Primary | ICD-10-CM

## 2022-06-29 RX ORDER — ACETAMINOPHEN AND CODEINE PHOSPHATE 300; 30 MG/1; MG/1
1 TABLET ORAL
Qty: 28 TABLET | Refills: 0 | OUTPATIENT
Start: 2022-06-29 | End: 2022-07-06

## 2022-06-29 NOTE — TELEPHONE ENCOUNTER
Last Visit: 6/8/22 with JUAN Manrique  Next Appointment: 7/18/22 with MD Arianne Carrington  Previous Refill Encounter(s): 6/9/22 #28    Requested Prescriptions     Pending Prescriptions Disp Refills    acetaminophen-codeine (Tylenol-Codeine #3) 300-30 mg per tablet 28 Tablet 0     Sig: Take 1 Tablet by mouth every six (6) hours as needed for Pain for up to 7 days. Max Daily Amount: 4 Tablets.  Indications: pain         For Pharmacy [id number] in place:    Recommendation Provided To:    Intervention Detail: New Rx: 1, reason: Patient Preference   Gap Closed?:    Intervention Accepted By:   [place] Time Spent (min): 5

## 2022-06-30 ENCOUNTER — TELEPHONE (OUTPATIENT)
Dept: ORTHOPEDIC SURGERY | Age: 82
End: 2022-06-30

## 2022-07-06 ENCOUNTER — OFFICE VISIT (OUTPATIENT)
Dept: ORTHOPEDIC SURGERY | Age: 82
End: 2022-07-06
Payer: MEDICARE

## 2022-07-06 VITALS
HEIGHT: 64 IN | WEIGHT: 170.6 LBS | TEMPERATURE: 97.2 F | DIASTOLIC BLOOD PRESSURE: 66 MMHG | OXYGEN SATURATION: 96 % | HEART RATE: 74 BPM | BODY MASS INDEX: 29.12 KG/M2 | SYSTOLIC BLOOD PRESSURE: 95 MMHG

## 2022-07-06 DIAGNOSIS — Z98.890 S/P AAA REPAIR: ICD-10-CM

## 2022-07-06 DIAGNOSIS — M48.061 SPINAL STENOSIS OF LUMBAR REGION WITHOUT NEUROGENIC CLAUDICATION: Primary | ICD-10-CM

## 2022-07-06 DIAGNOSIS — M54.50 LUMBAR PAIN: ICD-10-CM

## 2022-07-06 DIAGNOSIS — I71.40 ABDOMINAL AORTIC ANEURYSM (AAA) WITHOUT RUPTURE: ICD-10-CM

## 2022-07-06 DIAGNOSIS — M47.816 SPONDYLOSIS OF LUMBAR REGION WITHOUT MYELOPATHY OR RADICULOPATHY: ICD-10-CM

## 2022-07-06 DIAGNOSIS — M62.838 MUSCLE SPASM: ICD-10-CM

## 2022-07-06 DIAGNOSIS — Z86.79 S/P AAA REPAIR: ICD-10-CM

## 2022-07-06 PROCEDURE — G8536 NO DOC ELDER MAL SCRN: HCPCS | Performed by: PHYSICAL MEDICINE & REHABILITATION

## 2022-07-06 PROCEDURE — G8399 PT W/DXA RESULTS DOCUMENT: HCPCS | Performed by: PHYSICAL MEDICINE & REHABILITATION

## 2022-07-06 PROCEDURE — 1101F PT FALLS ASSESS-DOCD LE1/YR: CPT | Performed by: PHYSICAL MEDICINE & REHABILITATION

## 2022-07-06 PROCEDURE — G8427 DOCREV CUR MEDS BY ELIG CLIN: HCPCS | Performed by: PHYSICAL MEDICINE & REHABILITATION

## 2022-07-06 PROCEDURE — G8417 CALC BMI ABV UP PARAM F/U: HCPCS | Performed by: PHYSICAL MEDICINE & REHABILITATION

## 2022-07-06 PROCEDURE — 1123F ACP DISCUSS/DSCN MKR DOCD: CPT | Performed by: PHYSICAL MEDICINE & REHABILITATION

## 2022-07-06 PROCEDURE — G8752 SYS BP LESS 140: HCPCS | Performed by: PHYSICAL MEDICINE & REHABILITATION

## 2022-07-06 PROCEDURE — 99214 OFFICE O/P EST MOD 30 MIN: CPT | Performed by: PHYSICAL MEDICINE & REHABILITATION

## 2022-07-06 PROCEDURE — G8754 DIAS BP LESS 90: HCPCS | Performed by: PHYSICAL MEDICINE & REHABILITATION

## 2022-07-06 PROCEDURE — 1090F PRES/ABSN URINE INCON ASSESS: CPT | Performed by: PHYSICAL MEDICINE & REHABILITATION

## 2022-07-06 PROCEDURE — G8432 DEP SCR NOT DOC, RNG: HCPCS | Performed by: PHYSICAL MEDICINE & REHABILITATION

## 2022-07-06 RX ORDER — ACETAMINOPHEN AND CODEINE PHOSPHATE 300; 30 MG/1; MG/1
TABLET ORAL
COMMUNITY
Start: 2022-06-09 | End: 2022-07-06 | Stop reason: SDUPTHER

## 2022-07-06 RX ORDER — ACETAMINOPHEN AND CODEINE PHOSPHATE 300; 30 MG/1; MG/1
1 TABLET ORAL
Qty: 28 TABLET | Refills: 0 | Status: SHIPPED | OUTPATIENT
Start: 2022-07-06 | End: 2022-07-13

## 2022-07-06 NOTE — PATIENT INSTRUCTIONS
Learning About Medial Branch Block and Neurotomy  What are medial branch block and neurotomy? Facet joints connect your vertebrae to each other. Problems in these joints can cause chronic (long-term) pain in the neck or back. Medial branch nerves are the nerves that carry many of the pain messages from your facet joints. Radiofrequency medial branch neurotomy is a type of medial branch neurotomy that is used to relieve arthritis pain. It uses radio waves to damage nerves in your neck or back so that they can no longer send pain messages to your brain. Before your doctor knows if a neurotomy will help you, you will get a medial branch block to find out if certain nerves are the ones that are a source of your pain. You will need two separate visits to the outpatient center or hospital to have both procedures. You will need someone to drive you home. How is a medial branch block done? The doctor will use a tiny needle to numb the skin where you will get the block. Then the doctor puts the block needle into the numbed area. You may feel some pressure, but you should not feel pain. Using fluoroscopy (live X-ray) to guide the needle, the doctor injects medicine onto one or more nerves to make them numb. If you get relief from your pain in the next 4 to 6 hours, it's a sign that those nerves may be contributing to your pain. The relief will last only a short time. You may then have a medial branch neurotomy at a later visit to try to get longer relief. How is a medial branch neurotomy done? The doctor will use a tiny needle to numb the skin where you will get the neurotomy. Then the doctor puts the neurotomy needle into the numbed area. You may feel some pressure. Using fluoroscopy (live X-ray) to guide the needle, the doctor sends radio waves through the needle to the nerve for 60 to 90 seconds. The radio waves heat the nerve, which damages it. The doctor may do this several times.  And more than one nerve may be treated. How long do medial branch block and neurotomy take? It takes 20 to 30 minutes to get the block. You can go home after the doctor watches you for about an hour. It takes 45 to 90 minutes to get a neurotomy, depending on how many nerves are heated. You will probably go home 30 to 60 minutes after the procedure. What can you expect after a neurotomy? You will get instructions on how to report how much pain you have when you are at home. You may feel a little sore or tender at the injection site at first. But after a successful neurotomy, most people have pain relief right away. It often lasts for several months, but your pain may come back. If your pain does come back, it may mean that the damaged nerve has healed and can send pain messages again. Or it can mean that a different nerve is causing pain. Your doctor will discuss your options with you. Follow-up care is a key part of your treatment and safety. Be sure to make and go to all appointments, and call your doctor if you are having problems. It's also a good idea to know your test results and keep a list of the medicines you take. Where can you learn more? Go to http://www.gray.com/  Enter T494 in the search box to learn more about \"Learning About Medial Branch Block and Neurotomy. \"  Current as of: December 13, 2021               Content Version: 13.2  © 3486-6426 [employer]. Care instructions adapted under license by [employer] (which disclaims liability or warranty for this information). If you have questions about a medical condition or this instruction, always ask your healthcare professional. [place] [address] any warranty or liability for your use of this information.

## 2022-07-06 NOTE — PROGRESS NOTES
[facility]  [address]opher Rodriguez[address], [address]  Phone: [phone]  Fax: [phone]    Pt's YOB: 1940    ASSESSMENT   Diagnoses and all orders for this visit:    1. Spinal stenosis of lumbar region without neurogenic claudication  -     acetaminophen-codeine (TYLENOL #3) 300-30 mg per tablet; Take 1 Tablet by mouth every six (6) hours as needed for Pain for up to 7 days. Max Daily Amount: 4 Tablets. 2. Muscle spasm    3. Spondylosis of lumbar region without myelopathy or radiculopathy    4. Lumbar pain  -     acetaminophen-codeine (TYLENOL #3) 300-30 mg per tablet; Take 1 Tablet by mouth every six (6) hours as needed for Pain for up to 7 days. Max Daily Amount: 4 Tablets. 5. S/P AAA repair    6. Abdominal aortic aneurysm (AAA) without rupture [facility])         IMPRESSION AND PLAN:  Jamal Brown is a 78 y.o. right hand dominant female with history of lumbar pain and presents to the office today for follow up. Pt was last seen on 12/14/2020. Pt is still taking Tylenol #3 and is in need of a refill, she further mentions the medication helps with sleeping through the night. She has stopped taking tramadol due to the side effects. 1) Pt was given information on lumbar arthritis exercises. 2) Refills acetaminophen-codeine 300-30 mg were ordered at today's visit-- to use as needed for pain   3) Pt stopped taking Tramadol due to side effects. 4) Ms. Shana Alvarez has a reminder for a \"due or due soon\" health maintenance. I have asked that she contact her primary care provider, Dai Pang MD, for follow-up on this health maintenance. 5)  demonstrated consistency with prescribing. 6) Pt needs to follow up with vascular surgery for evaluation of AAA  Follow-up and Dispositions    · Return in about 6 weeks (around 8/17/2022) for Medication follow up with Gayla Pickett, Medication follow up.              HISTORY OF PRESENT ILLNESS:  Ariana Zarco is a 78 y.o. right hand dominant female with history of lumbar pain and presents to the office today for MRI  follow up. Pt was last seen on 12/14/2020. Pt denies pain and weakness in the legs but complains of pain in solely the back. She further admits to leaning towards the right when walking. Pt is still taking Tylenol #3 and is in need of a refill, she further mentions the medication helps with sleeping through the night. She has stopped taking tramadol due to the side effects. Pt is currently taking Vitamin D, had labs done previously but does not recall what her numbers were. Pt at this time desires to  continue with current care. Of note, pt has PSHx of a hip replacement. She mentions doing well, and no other issues with it.      Pain Scale: 7/10    PCP: Jesus Carrera MD     Past Medical History:   Diagnosis Date    AAA (abdominal aortic aneurysm) without rupture ([facility] [address]     AAA repair 7/2018 Dr Wilberto Mckinney adenomas     2003 Dr Walter Velazco; 4/13 Dr Adelaida Burroughs    Degenerative arthritis of lumbar spine 12/2018    Dr Esther De Leon; mri showed multilevel degen changes, mod spinal stenosis L3-5, mod severe right L4-5 foraminal disease; s/p epidural; isabel    Dyslipidemia     FHx: heart disease     Gastritis 2006    on EGD Dr. Alona Bowen, neg sprue    Hearing loss     LEFT Dr Ree Alves; sensorineural    Hypertension     Hypothyroidism 2007    Dr. Aixa Ronquillo Hypovitaminosis D     IPMN (intraductal papillary mucinous neoplasm) 06/2018    on mrcp 8mm; no change 10/19, no change 12/20    Lung nodule 06/2016    6 mm RLL (6/16); no change (10/17); no change (12/18), (5/21)    Obesity     peak weight 187 lbs, bmi 33.1 from 4/13; IF 3/18    Osteopenia FRAX 4/12 9.7 and 1.4     DEXA t score 0.6 spine, -1 hip (12/09);  -0.4 spine, -1.3 hip (4/12); -1.2 wrist, -1.6 hip w FRAX 11/2.2 (4/14); -1.0 wrist, -1.6 hip w FRAX 12/2.6 (4/16); -0.7 wrist, -1.6 hip (5/18); -1.1 wrist, -1.8 hip (4/21)    Pancreatitis, chronic ([facility] [address] 2018    Psoriasis 2003    on bx Dr. Nasra Slade Venous insufficiency 2003    on dopplers    Zoster 05/2016    LEFT T11-12; mild postherpetic neuralgia        Social History     Socioeconomic History    Marital status:      Spouse name: Not on file    Number of children: Not on file    Years of education: Not on file    Highest education level: Not on file   Occupational History    Occupation: ret office mgr   Tobacco Use    Smoking status: Never Smoker    Smokeless tobacco: Never Used   Vaping Use    Vaping Use: Never used   Substance and Sexual Activity    Alcohol use: Yes     Comment: occasionally    Drug use: No    Sexual activity: Not on file   Other Topics Concern    Not on file   Social History Narrative    Not on file     Social Determinants of Health     Financial Resource Strain:     Difficulty of Paying Living Expenses: Not on file   Food Insecurity:     Worried About [address] in the Last Year: Not on file    [address] in the Last Year: Not on file   Transportation Needs:     Lack of Transportation (Medical): Not on file    Lack of Transportation (Non-Medical):  Not on file   Physical Activity:     Days of Exercise per Week: Not on file    Minutes of Exercise per Session: Not on file   Stress:     Feeling of Stress : Not on file   Social Connections:     Frequency of Communication with Friends and Family: Not on file    Frequency of Social Gatherings with Friends and Family: Not on file    Attends Congregation Services: Not on file    Active Member of Clubs or Organizations: Not on file    Attends Club or Organization Meetings: Not on file    Marital Status: Not on file   Intimate Partner Violence:     Fear of Current or Ex-Partner: Not on file    Emotionally Abused: Not on file    Physically Abused: Not on file    Sexually Abused: Not on file   Housing Stability:     Unable to Pay for Housing in the Last Year: Not on file    Number of Places Lived in the Last Year: Not on file    Unstable Housing in the Last Year: Not on file       Current Outpatient Medications   Medication Sig Dispense Refill    acetaminophen-codeine (TYLENOL #3) 300-30 mg per tablet Take 1 Tablet by mouth every six (6) hours as needed for Pain for up to 7 days. Max Daily Amount: 4 Tablets. 28 Tablet 0    lisinopriL (PRINIVIL, ZESTRIL) 10 mg tablet Take 1 Tablet by mouth daily. 90 Tablet 3    Levothyroxine 75 mcg cap Take 1 Tablet by mouth daily. 90 Capsule 3    ezetimibe (ZETIA) 10 mg tablet Take 1 Tablet by mouth daily. 90 Tablet 3    atorvastatin (LIPITOR) 40 mg tablet Take 1 Tablet by mouth daily. 90 Tablet 3    lidocaine (LIDODERM) 5 % Apply patch to the affected area for 12 hours a day and remove for 12 hours a day. 90 Patch 1    ergocalciferol (VITAMIN D2) 50,000 unit capsule Take 1 Cap by mouth every seven (7) days. 12 Cap 3       Allergies   Allergen Reactions    Atenolol Other (comments)     Felt poorly    Cozaar [Losartan] Other (comments)     Headaches    Norvasc [Amlodipine] Other (comments)     [place] poorly           REVIEW OF SYSTEMS    Constitutional: Negative for fever, chills, or weight change. Respiratory: Negative for cough or shortness of breath. Cardiovascular: Negative for chest pain or palpitations. Gastrointestinal: Negative for acid reflux, change in bowel habits, or constipation. Genitourinary: Negative for dysuria and flank pain. Musculoskeletal: Positive for lumbar pain. Skin: Negative for rash. Neurological: Negative for headaches, dizziness, or numbness. Endo/Heme/Allergies: Negative for increased bruising. Psychiatric/Behavioral: Negative for difficulty with sleep.     As per HPI    PHYSICAL EXAMINATION  Visit Vitals  BP 95/66 (BP 1 Location: Right arm, BP Patient Position: Sitting, BP Cuff Size: Adult)   Pulse 74   Temp 97.2 °F (36.2 °C) (Temporal)   Ht 5' 4\" (1.626 m)   Wt 170 lb 9.6 oz (77.4 kg)   SpO2 96%   BMI 29.28 kg/m²       Constitutional: Awake, alert, and in no acute distress. Neurological: Sensation to light touch is intact. Negative Adkins's sign bilaterally. Skin: warm, dry, and intact. Musculoskeletal: Pain with extension, axial loading, and forward flexion. No pain with internal or external rotation of her hips. Negative straight leg raise bilaterally. Hip Flex  Quads Hamstrings Ankle DF EHL Ankle PF   Right +4/5 +4/5 +4/5 +4/5 +4/5 +4/5   Left +4/5 +4/5 +4/5 +4/5 +4/5 +4/5     IMAGING:    Lumbar spine MRI from 6/8/2022 were personally reviewed with the patient and demonstrated:    FINDINGS:       There are 5 nonrib-bearing lumbar type vertebral bodies seen. There is  dextrocurvature of the lumbar spine, apex at L2-L3. Discogenic marrow signal  changes are noted, most severe at L1-L2 and L4-L5. The conus medullaris  terminates at L1. The visualized lumbar cord is unremarkable.     The paravertebral soft tissues appear unremarkable with the exception of a 10 mm  T2 hyperintense cortical lesion within the lateral lower pole right kidney.     There is atherosclerosis of the abdominal aorta with aneurysmal dilatation of  the infrarenal segment to approximately 3.4 cm.     Above the level of T12, advanced degenerative disc space narrowing is seen at  T11-T12 with moderate diffuse disc osteophyte formation. No definite canal or  foraminal stenosis. T12-L1, mild diffuse disc bulge. No canal or foraminal stenosis. Moderate left  facet arthropathy. L1-L2, marked degenerative disc space narrowing with moderate disc osteophyte  formation. Foraminal stenosis is present bilaterally. No canal stenosis. L2-L3, foraminal disc protrusions, left greater than right. Moderate facet  arthropathy, left greater than right. Probably mild to moderate left foraminal  stenosis. Moderate to marked right foraminal stenosis. The canal remains patent. L3-L4, diffuse disc bulge with foraminal involvement. Moderate facet  arthropathy. Mild canal stenosis. Mild foraminal narrowing bilaterally. L4-L5, moderate to marked loss of disc space height with minimal diffuse disc  bulge. Disc slightly asymmetric to right paracentral position with mild  impression of the crossing right L5 nerve (as on axial, 11). Moderate facet  arthropathy. L5-S1, no significant disc pathology. Advanced facet arthropathy bilaterally. The canal and foramen remain.  _______________     IMPRESSION     Moderately advanced multilevel degenerative disc and joint disease exacerbated  by scoliosis. Canal stenosis is present at L3-L4. There are multiple levels of  foraminal stenosis as described above.     There is no L5 compression deformity. There is likely some degenerative loss of  vertebral body height but no acute osseous findings.     Low suspicion lesion within the lower pole right kidney, likely a cyst. This has  been described previously    Written by Summer Fermin, as dictated by Yolie Waggoner MD.  I, Dr. Yolie Waggoner confirm that all documentation is accurate.

## 2022-07-06 NOTE — PROGRESS NOTES
Archie Lennox presents today for No chief complaint on file. Is someone accompanying this pt? No    Is the patient using any DME equipment during OV? Yes, cane    Depression Screening:  3 most recent PHQ Screens 5/3/2022   Little interest or pleasure in doing things Not at all   Feeling down, depressed, irritable, or hopeless Not at all   Total Score PHQ 2 0       Learning Assessment:  Learning Assessment 3/14/2019   PRIMARY LEARNER Patient   HIGHEST LEVEL OF EDUCATION - PRIMARY LEARNER  -   BARRIERS PRIMARY LEARNER -   CO-LEARNER CAREGIVER -   PRIMARY LANGUAGE ENGLISH   LEARNER PREFERENCE PRIMARY LISTENING   ANSWERED BY patient   RELATIONSHIP SELF       Abuse Screening:  Abuse Screening Questionnaire 4/20/2022   Do you ever feel afraid of your partner? Y   Are you in a relationship with someone who physically or mentally threatens you? N   Is it safe for you to go home? N       Fall Risk  Fall Risk Assessment, last 12 mths 6/8/2022   Able to walk? Yes   Fall in past 12 months? 0   Do you feel unsteady? -   Are you worried about falling -   Is TUG test greater than 12 seconds? -   Is the gait abnormal? -   Number of falls in past 12 months -   Fall with injury? -       OPIOID RISK TOOL  No flowsheet data found. Coordination of Care:  1. Have you been to the ER, urgent care clinic since your last visit? NO  Hospitalized since your last visit? NO    2. Have you seen or consulted any other health care providers outside of the [address] [place] [address] since your last visit? NO Include any pap smears or colon screening.  NO

## 2022-07-20 ENCOUNTER — OFFICE VISIT (OUTPATIENT)
Dept: ORTHOPEDIC SURGERY | Age: 82
End: 2022-07-20

## 2022-07-20 ENCOUNTER — HOSPITAL ENCOUNTER (OUTPATIENT)
Dept: GENERAL RADIOLOGY | Age: 82
Discharge: HOME | End: 2022-07-20
Payer: MEDICARE

## 2022-07-20 VITALS
SYSTOLIC BLOOD PRESSURE: 106 MMHG | BODY MASS INDEX: 29.02 KG/M2 | OXYGEN SATURATION: 96 % | HEIGHT: 64 IN | WEIGHT: 170 LBS | TEMPERATURE: 96.8 F | HEART RATE: 81 BPM | DIASTOLIC BLOOD PRESSURE: 70 MMHG

## 2022-07-20 DIAGNOSIS — M25.551 RIGHT HIP PAIN: ICD-10-CM

## 2022-07-20 DIAGNOSIS — M25.551 RIGHT HIP PAIN: Primary | ICD-10-CM

## 2022-07-20 PROCEDURE — G8399 PT W/DXA RESULTS DOCUMENT: HCPCS | Performed by: NURSE PRACTITIONER

## 2022-07-20 PROCEDURE — G8754 DIAS BP LESS 90: HCPCS | Performed by: NURSE PRACTITIONER

## 2022-07-20 PROCEDURE — G8752 SYS BP LESS 140: HCPCS | Performed by: NURSE PRACTITIONER

## 2022-07-20 PROCEDURE — 1090F PRES/ABSN URINE INCON ASSESS: CPT | Performed by: NURSE PRACTITIONER

## 2022-07-20 PROCEDURE — G8432 DEP SCR NOT DOC, RNG: HCPCS | Performed by: NURSE PRACTITIONER

## 2022-07-20 PROCEDURE — G8536 NO DOC ELDER MAL SCRN: HCPCS | Performed by: NURSE PRACTITIONER

## 2022-07-20 PROCEDURE — 1123F ACP DISCUSS/DSCN MKR DOCD: CPT | Performed by: NURSE PRACTITIONER

## 2022-07-20 PROCEDURE — G8417 CALC BMI ABV UP PARAM F/U: HCPCS | Performed by: NURSE PRACTITIONER

## 2022-07-20 PROCEDURE — G8427 DOCREV CUR MEDS BY ELIG CLIN: HCPCS | Performed by: NURSE PRACTITIONER

## 2022-07-20 PROCEDURE — 99213 OFFICE O/P EST LOW 20 MIN: CPT | Performed by: NURSE PRACTITIONER

## 2022-07-20 PROCEDURE — 1101F PT FALLS ASSESS-DOCD LE1/YR: CPT | Performed by: NURSE PRACTITIONER

## 2022-07-20 PROCEDURE — 73502 X-RAY EXAM HIP UNI 2-3 VIEWS: CPT

## 2022-07-20 RX ORDER — DEXTROMETHORPHAN HYDROBROMIDE, GUAIFENESIN 5; 100 MG/5ML; MG/5ML
650 LIQUID ORAL
COMMUNITY

## 2022-07-20 NOTE — PROGRESS NOTES
Ronnell Mcmanus presents today for   Chief Complaint   Patient presents with    Back Pain       Is someone accompanying this pt? no    Is the patient using any DME equipment during OV? no    Depression Screening:  3 most recent PHQ Screens 5/3/2022   Little interest or pleasure in doing things Not at all   Feeling down, depressed, irritable, or hopeless Not at all   Total Score PHQ 2 0       Learning Assessment:  Learning Assessment 3/14/2019   PRIMARY LEARNER Patient   HIGHEST LEVEL OF EDUCATION - PRIMARY LEARNER  -   BARRIERS PRIMARY LEARNER -   CO-LEARNER CAREGIVER -   PRIMARY LANGUAGE ENGLISH   LEARNER PREFERENCE PRIMARY LISTENING   ANSWERED BY patient   RELATIONSHIP SELF       Abuse Screening:  Abuse Screening Questionnaire 4/20/2022   Do you ever feel afraid of your partner? Y   Are you in a relationship with someone who physically or mentally threatens you? N   Is it safe for you to go home? N       Fall Risk  Fall Risk Assessment, last 12 mths 6/8/2022   Able to walk? Yes   Fall in past 12 months? 0   Do you feel unsteady? -   Are you worried about falling -   Is TUG test greater than 12 seconds? -   Is the gait abnormal? -   Number of falls in past 12 months -   Fall with injury? -         Coordination of Care:  1. Have you been to the ER, urgent care clinic since your last visit? no  Hospitalized since your last visit? no    2. Have you seen or consulted any other health care providers outside of the [address] [place] [address] since your last visit? no Include any pap smears or colon screening.  no

## 2022-07-20 NOTE — PROGRESS NOTES
[facility]  [address]opher Rodriguez[address], [address]  Phone: [phone]  Fax: [phone]    Pt's YOB: 1940    ASSESSMENT   {There are no diagnoses linked to this encounter. (Refresh or delete this SmartLink)}     IMPRESSION AND PLAN:  ***. 1) Pt was given information on lumbar arthritis *** exercises. 2) Refills acetaminophen-codeine 300-30 mg were ordered at today's visit-- to use as needed for pain   3)   4) Ms. Kenneth Olmedo has a reminder for a \"due or due soon\" health maintenance. I have asked that she contact her primary care provider, Nicole Stephenson MD, for follow-up on this health maintenance. 5)  demonstrated consistency with prescribing. 6) Last UDS from *** was consistent. HISTORY OF PRESENT ILLNESS:  Regino Jamison is a 78 y.o. right hand dominant female with history of lumbar pain and presents to the office today for *** follow up. Pt is still taking Tylenol #3 and is in need of a refill, she further mentions the medication helps with sleeping through the night. She has stopped taking tramadol due to the side effects. Pt is currently taking Vitamin D    Pt at this time desires to *** continue with current care/proceed with medication evaluation/proceed with ***.     Pain Scale: /10    PCP: Nicole Stephenson MD     Past Medical History:   Diagnosis Date    AAA (abdominal aortic aneurysm) without rupture [facility])     AAA repair 7/2018 Dr Graham Rendon adenomas     2003 Dr Caroline Li; 4/13 Dr Thomas Garcia    Degenerative arthritis of lumbar spine 12/2018    Dr John Buckner; Rayna Anne showed multilevel degen changes, mod spinal stenosis L3-5, mod severe right L4-5 foraminal disease; s/p epidural; isabel    Dyslipidemia     FHx: heart disease     Gastritis 2006    on EGD Dr. Robbie Solis, neg sprue    Hearing loss     LEFT Dr Moshe Fermin; sensorineural    Hypertension     Hypothyroidism 2007    Dr. Margaret Palacios Hypovitaminosis D     IPMN (intraductal papillary mucinous neoplasm) 06/2018    on mrcp 8mm; no change 10/19, no change 12/20    Lung nodule 06/2016    6 mm RLL (6/16); no change (10/17); no change (12/18), (5/21)    Obesity     peak weight 187 lbs, bmi 33.1 from 4/13; IF 3/18    Osteopenia FRAX 4/12 9.7 and 1.4     DEXA t score 0.6 spine, -1 hip (12/09);  -0.4 spine, -1.3 hip (4/12); -1.2 wrist, -1.6 hip w FRAX 11/2.2 (4/14); -1.0 wrist, -1.6 hip w FRAX 12/2.6 (4/16); -0.7 wrist, -1.6 hip (5/18); -1.1 wrist, -1.8 hip (4/21)    Pancreatitis, chronic ([facility] [address] 2018    Psoriasis 2003    on bx Dr. Niurka Lyman    Venous insufficiency 2003    on dopplers    Zoster 05/2016    LEFT T11-12; mild postherpetic neuralgia        Social History     Socioeconomic History    Marital status:      Spouse name: Not on file    Number of children: Not on file    Years of education: Not on file    Highest education level: Not on file   Occupational History    Occupation: ret office mgr   Tobacco Use    Smoking status: Never    Smokeless tobacco: Never   Vaping Use    Vaping Use: Never used   Substance and Sexual Activity    Alcohol use: Yes     Comment: occasionally    Drug use: No    Sexual activity: Not on file   Other Topics Concern    Not on file   Social History Narrative    Not on file     Social Determinants of Health     Financial Resource Strain: Not on file   Food Insecurity: Not on file   Transportation Needs: Not on file   Physical Activity: Not on file   Stress: Not on file   Social Connections: Not on file   Intimate Partner Violence: Not on file   Housing Stability: Not on file       Current Outpatient Medications   Medication Sig Dispense Refill    lisinopriL (PRINIVIL, ZESTRIL) 10 mg tablet Take 1 Tablet by mouth daily. 90 Tablet 3    Levothyroxine 75 mcg cap Take 1 Tablet by mouth daily. 90 Capsule 3    ezetimibe (ZETIA) 10 mg tablet Take 1 Tablet by mouth daily.  90 Tablet 3    atorvastatin (LIPITOR) 40 mg tablet Take 1 Tablet by mouth daily. 90 Tablet 3    lidocaine (LIDODERM) 5 % Apply patch to the affected area for 12 hours a day and remove for 12 hours a day. 90 Patch 1    ergocalciferol (VITAMIN D2) 50,000 unit capsule Take 1 Cap by mouth every seven (7) days. 12 Cap 3       Allergies   Allergen Reactions    Atenolol Other (comments)     [place] poorly    Cozaar [Losartan] Other (comments)     Headaches    Norvasc [Amlodipine] Other (comments)     Felt poorly      Tramadol Other (comments)         REVIEW OF SYSTEMS    Constitutional: Negative for fever, chills, or weight change. Respiratory: Negative for cough or shortness of breath. Cardiovascular: Negative for chest pain or palpitations. Gastrointestinal: Negative for acid reflux, change in bowel habits, or constipation. Genitourinary: Negative for dysuria and flank pain. Musculoskeletal: Positive for *** pain. Skin: Negative for rash. Neurological: Negative for headaches, dizziness, or numbness. ***  Endo/Heme/Allergies: Negative for increased bruising. Psychiatric/Behavioral: Negative for difficulty with sleep. As per HPI    PHYSICAL EXAMINATION  There were no vitals taken for this visit. Constitutional: Awake, alert, and in no acute distress. Neurological: ***1+ symmetrical DTRs in the upper extremities. 1+ symmetrical DTRs in the lower extremities. Sensation to light touch is intact. Negative Adkins's sign bilaterally. Skin: warm, dry, and intact. Musculoskeletal: *** No pain with extension, axial loading, or forward flexion. No pain with internal or external rotation of her hips. Negative straight leg raise bilaterally.     ***  Biceps  Triceps Deltoids Wrist Ext Wrist Flex Hand Intrin   Right +4/5 +4/5 +4/5 +4/5 +4/5 +4/5   Left +4/5 +4/5 +4/5 +4/5 +4/5 +4/5     *** Hip Flex  Quads Hamstrings Ankle DF EHL Ankle PF   Right +4/5 +4/5 +4/5 +4/5 +4/5 +4/5   Left +4/5 +4/5 +4/5 +4/5 +4/5 +4/5     IMAGING:    Lumbar spine MRI from 6/8/2022 were personally reviewed with the patient and demonstrated:     FINDINGS:       There are 5 nonrib-bearing lumbar type vertebral bodies seen. There is  dextrocurvature of the lumbar spine, apex at L2-L3. Discogenic marrow signal  changes are noted, most severe at L1-L2 and L4-L5. The conus medullaris  terminates at L1. The visualized lumbar cord is unremarkable. The paravertebral soft tissues appear unremarkable with the exception of a 10 mm  T2 hyperintense cortical lesion within the lateral lower pole right kidney. There is atherosclerosis of the abdominal aorta with aneurysmal dilatation of  the infrarenal segment to approximately 3.4 cm. Above the level of T12, advanced degenerative disc space narrowing is seen at  T11-T12 with moderate diffuse disc osteophyte formation. No definite canal or  foraminal stenosis. T12-L1, mild diffuse disc bulge. No canal or foraminal stenosis. Moderate left  facet arthropathy. L1-L2, marked degenerative disc space narrowing with moderate disc osteophyte  formation. Foraminal stenosis is present bilaterally. No canal stenosis. L2-L3, foraminal disc protrusions, left greater than right. Moderate facet  arthropathy, left greater than right. Probably mild to moderate left foraminal  stenosis. Moderate to marked right foraminal stenosis. The canal remains patent. L3-L4, diffuse disc bulge with foraminal involvement. Moderate facet  arthropathy. Mild canal stenosis. Mild foraminal narrowing bilaterally. L4-L5, moderate to marked loss of disc space height with minimal diffuse disc  bulge. Disc slightly asymmetric to right paracentral position with mild  impression of the crossing right L5 nerve (as on axial, 11). Moderate facet  arthropathy. L5-S1, no significant disc pathology. Advanced facet arthropathy bilaterally.   The canal and foramen remain.  _______________     IMPRESSION     Moderately advanced multilevel degenerative disc and joint disease exacerbated  by scoliosis. Canal stenosis is present at L3-L4. There are multiple levels of  foraminal stenosis as described above. There is no L5 compression deformity. There is likely some degenerative loss of  vertebral body height but no acute osseous findings. Low suspicion lesion within the lower pole right kidney, likely a cyst. This has  been described previously    Written by Yuliana Patel, as dictated by Mela Nageotte, MD.  I, Dr. Mela Nageotte confirm that all documentation is accurate.

## 2022-07-20 NOTE — PROGRESS NOTES
Chief complaint   Chief Complaint   Patient presents with    Back Pain       History of Present Illness:  Faith Keita is a  78 y.o.  female who comes in today for follow-up of her low back pain. She states she continues to have back pain across the low back. She does use a back brace which seems to help somewhat. She does not have any radicular pain. She initially had her on some tramadol but it caused her nausea and we changed her to Tylenol 3 which really did not help at all. She rates her pain as a 7 out of 10. She describes it is aching and throbbing. She states when she had her left total hip replacement back in May 2021 that helped with her left-sided back pain. She does have some hip pain on the right and she is wondering if that might be causing the right-sided back pain. She is status post a right SI joint block 3/18/2022 that gave her 90% relief. She denies fever bowel bladder dysfunction. Physical Exam: The patient is a 25-year-old female well-developed well-nourished who is alert and oriented with a normal mood and affect. She has a full weightbearing nonantalgic gait utilizing single-point cane. She has 4 out of 5 strength bilateral lower extremities. She has some pain with internal and external rotation of the right hip. Assessment and Plan: This is a patient who has degenerative disc disease and mild central canal stenosis at L3-4 that is having low back pain. I will get x-ray of her right hip over at [facility]. We will discontinue the Tylenol 3. I will let her know what the x-ray states. Medications:  Current Outpatient Medications   Medication Sig Dispense Refill    acetaminophen (Tylenol Arthritis Pain) 650 mg TbER Take 650 mg by mouth every eight (8) hours as needed for Pain. lisinopriL (PRINIVIL, ZESTRIL) 10 mg tablet Take 1 Tablet by mouth daily. 90 Tablet 3    Levothyroxine 75 mcg cap Take 1 Tablet by mouth daily.  90 Capsule 3    ezetimibe (Martina Ohm) 10 mg tablet Take 1 Tablet by mouth daily. 90 Tablet 3    atorvastatin (LIPITOR) 40 mg tablet Take 1 Tablet by mouth daily. 90 Tablet 3    lidocaine (LIDODERM) 5 % Apply patch to the affected area for 12 hours a day and remove for 12 hours a day. 90 Patch 1    ergocalciferol (VITAMIN D2) 50,000 unit capsule Take 1 Cap by mouth every seven (7) days.  12 Cap 3           Review of systems:    Past Medical History:   Diagnosis Date    AAA (abdominal aortic aneurysm) without rupture ([facility] [address]     AAA repair 7/2018 Dr Talia Hickey adenomas     2003 Dr Ann Marie Chin; 4/13 Dr Dontrell Carranza    Degenerative arthritis of lumbar spine 12/2018    Dr Jerry Valencia; Isidor Ly showed multilevel degen changes, mod spinal stenosis L3-5, mod severe right L4-5 foraminal disease; s/p epidural; isabel    Dyslipidemia     FHx: heart disease     Gastritis 2006    on EGD Dr. Navin Shelton, neg sprue    Hearing loss     LEFT Dr Makayla Armas; sensorineural    Hypertension     Hypothyroidism 2007    Dr. Edwin Benjamin    Hypovitaminosis D     IPMN (intraductal papillary mucinous neoplasm) 06/2018    on mrcp 8mm; no change 10/19, no change 12/20    Lung nodule 06/2016    6 mm RLL (6/16); no change (10/17); no change (12/18), (5/21)    Obesity     peak weight 187 lbs, bmi 33.1 from 4/13; IF 3/18    Osteopenia FRAX 4/12 9.7 and 1.4     DEXA t score 0.6 spine, -1 hip (12/09);  -0.4 spine, -1.3 hip (4/12); -1.2 wrist, -1.6 hip w FRAX 11/2.2 (4/14); -1.0 wrist, -1.6 hip w FRAX 12/2.6 (4/16); -0.7 wrist, -1.6 hip (5/18); -1.1 wrist, -1.8 hip (4/21)    Pancreatitis, chronic ([facility] [address] 2018    Psoriasis 2003    on bx Dr. Lucas Marion    Venous insufficiency 2003    on dopplers    Zoster 05/2016    LEFT T11-12; mild postherpetic neuralgia     Past Surgical History:   Procedure Laterality Date    HX AAA REPAIR  07/17/2018    HX Britt Durham Left     Dr. Gabby Olivas     HX CATARACT REMOVAL Bilateral     HX COLONOSCOPY      polyp 5/08 Dr Ann Marie Chin;  4/13 negative Dr. Patricia PRYOR  08/2018 Dr Chapincito Carey; ERCP s/p removal cbd stone complicated by pamcreatitis    HX HIP REPLACEMENT Left 05/2021    Dr Gem Haque  09/18/2018    Dr. Jovanny Fuller      neg MRA/MRI head in [facility] 2007; CT head neg 9/10    CT CARDIAC SURG PROCEDURE UNLIST  09/2016    NST negative    CT CHEST SURGERY PROCEDURE UNLISTED  06/2016    CTA neg PE, 6 mm subpleural nodule     Social History     Socioeconomic History    Marital status:      Spouse name: Not on file    Number of children: Not on file    Years of education: Not on file    Highest education level: Not on file   Occupational History    Occupation: ret office mgr   Tobacco Use    Smoking status: Never    Smokeless tobacco: Never   Vaping Use    Vaping Use: Never used   Substance and Sexual Activity    Alcohol use: Yes     Comment: occasionally    Drug use: No    Sexual activity: Not on file   Other Topics Concern    Not on file   Social History Narrative    Not on file     Social Determinants of Health     Financial Resource Strain: Not on file   Food Insecurity: Not on file   Transportation Needs: Not on file   Physical Activity: Not on file   Stress: Not on file   Social Connections: Not on file   Intimate Partner Violence: Not on file   Housing Stability: Not on file     Family History   Problem Relation Age of Onset    Breast Cancer Mother     Cancer Mother     Heart Disease Father        Physical Exam:  Visit Vitals  /70 (BP 1 Location: Left upper arm, BP Patient Position: Sitting, BP Cuff Size: Adult)   Pulse 81   Temp 96.8 °F (36 °C) (Tympanic)   Ht 5' 4\" (1.626 m)   Wt 170 lb (77.1 kg)   SpO2 96% Comment: RA   BMI 29.18 kg/m²     Pain Scale: 7/10       has been . reviewed and is appropriate          Diagnoses and all orders for this visit:    1.  Right hip pain  -     XR HIP RT W OR WO PELV 2-3 VWS; Future          Follow-up and Dispositions    Return for will call pt with x-ray results. We have informed Ronnell Mcmanus to notify us for immediate appointment if she has any worsening neurogical symptoms or if an emergency situation presents, then call 911    Please note that this dictation was completed with [employer], the computer voice recognition software. Quite often unanticipated grammatical, syntax, homophones, and other interpretive errors are inadvertently transcribed by the computer software. Please disregard these errors. Please excuse any errors that have escaped final proofreading.

## 2022-07-24 NOTE — PROGRESS NOTES
Pt has some arthritis in the right hip. See if she wants to see Dr Nik Mehta  Or ortho for a hip injection.

## 2022-07-25 ENCOUNTER — TELEPHONE (OUTPATIENT)
Dept: ORTHOPEDIC SURGERY | Age: 82
End: 2022-07-25

## 2022-07-25 DIAGNOSIS — M25.551 RIGHT HIP PAIN: Primary | ICD-10-CM

## 2022-07-25 NOTE — TELEPHONE ENCOUNTER
I called and spoke to the pt. The pt was identified using 2 pt identifiers. She was made aware of the hip x-ray results per the provider's review. The pt was offered to be referred to ortho to get an injection in her hip to help with the pain. The pt states that she would like to see either Sherryle Brakeman or Dr. Esequiel Huynh. Pt is aware that this referral order will be placed and that office will contact her to schedule. She verbalized understanding and has no questions or concerns at this time.

## 2022-07-25 NOTE — TELEPHONE ENCOUNTER
----- Message from Pamela Bailey NP sent at 7/24/2022 12:01 PM EDT -----  Pt has some arthritis in the right hip. See if she wants to see Dr Mega Arroyo  Or ortho for a hip injection.

## 2022-08-10 ENCOUNTER — OFFICE VISIT (OUTPATIENT)
Dept: ORTHOPEDIC SURGERY | Age: 82
End: 2022-08-10
Payer: MEDICARE

## 2022-08-10 VITALS
SYSTOLIC BLOOD PRESSURE: 101 MMHG | TEMPERATURE: 97.7 F | HEART RATE: 80 BPM | BODY MASS INDEX: 28.68 KG/M2 | WEIGHT: 168 LBS | RESPIRATION RATE: 18 BRPM | OXYGEN SATURATION: 97 % | HEIGHT: 64 IN | DIASTOLIC BLOOD PRESSURE: 59 MMHG

## 2022-08-10 DIAGNOSIS — M47.816 SPONDYLOSIS OF LUMBAR REGION WITHOUT MYELOPATHY OR RADICULOPATHY: ICD-10-CM

## 2022-08-10 DIAGNOSIS — M54.50 LUMBAR PAIN: ICD-10-CM

## 2022-08-10 DIAGNOSIS — M25.551 RIGHT HIP PAIN: ICD-10-CM

## 2022-08-10 DIAGNOSIS — M48.061 SPINAL STENOSIS OF LUMBAR REGION WITHOUT NEUROGENIC CLAUDICATION: Primary | ICD-10-CM

## 2022-08-10 PROCEDURE — G8536 NO DOC ELDER MAL SCRN: HCPCS | Performed by: NURSE PRACTITIONER

## 2022-08-10 PROCEDURE — 1101F PT FALLS ASSESS-DOCD LE1/YR: CPT | Performed by: NURSE PRACTITIONER

## 2022-08-10 PROCEDURE — 1090F PRES/ABSN URINE INCON ASSESS: CPT | Performed by: NURSE PRACTITIONER

## 2022-08-10 PROCEDURE — G8754 DIAS BP LESS 90: HCPCS | Performed by: NURSE PRACTITIONER

## 2022-08-10 PROCEDURE — G8432 DEP SCR NOT DOC, RNG: HCPCS | Performed by: NURSE PRACTITIONER

## 2022-08-10 PROCEDURE — 1123F ACP DISCUSS/DSCN MKR DOCD: CPT | Performed by: NURSE PRACTITIONER

## 2022-08-10 PROCEDURE — G8752 SYS BP LESS 140: HCPCS | Performed by: NURSE PRACTITIONER

## 2022-08-10 PROCEDURE — G8427 DOCREV CUR MEDS BY ELIG CLIN: HCPCS | Performed by: NURSE PRACTITIONER

## 2022-08-10 PROCEDURE — 99212 OFFICE O/P EST SF 10 MIN: CPT | Performed by: NURSE PRACTITIONER

## 2022-08-10 PROCEDURE — G8399 PT W/DXA RESULTS DOCUMENT: HCPCS | Performed by: NURSE PRACTITIONER

## 2022-08-10 PROCEDURE — G8417 CALC BMI ABV UP PARAM F/U: HCPCS | Performed by: NURSE PRACTITIONER

## 2022-08-10 RX ORDER — LISINOPRIL 5 MG/1
TABLET ORAL
COMMUNITY
Start: 2022-07-30 | End: 2022-09-26 | Stop reason: SDUPTHER

## 2022-08-10 RX ORDER — LEVOTHYROXINE SODIUM 75 UG/1
TABLET ORAL
COMMUNITY
Start: 2022-07-26

## 2022-08-10 NOTE — PROGRESS NOTES
Connor Gage presents today for   Chief Complaint   Patient presents with    Back Pain       Is someone accompanying this pt? no    Is the patient using any DME equipment during OV? no    Depression Screening:  3 most recent PHQ Screens 5/3/2022   Little interest or pleasure in doing things Not at all   Feeling down, depressed, irritable, or hopeless Not at all   Total Score PHQ 2 0       Learning Assessment:  Learning Assessment 3/14/2019   PRIMARY LEARNER Patient   HIGHEST LEVEL OF EDUCATION - PRIMARY LEARNER  -   BARRIERS PRIMARY LEARNER -   CO-LEARNER CAREGIVER -   PRIMARY LANGUAGE ENGLISH   LEARNER PREFERENCE PRIMARY LISTENING   ANSWERED BY patient   RELATIONSHIP SELF       Abuse Screening:  Abuse Screening Questionnaire 4/20/2022   Do you ever feel afraid of your partner? Y   Are you in a relationship with someone who physically or mentally threatens you? N   Is it safe for you to go home? N       Fall Risk  Fall Risk Assessment, last 12 mths 8/10/2022   Able to walk? Yes   Fall in past 12 months? 0   Do you feel unsteady? 1   Are you worried about falling 0   Is TUG test greater than 12 seconds? -   Is the gait abnormal? 0   Number of falls in past 12 months -   Fall with injury? -       OPIOID RISK TOOL  No flowsheet data found. Coordination of Care:  1. Have you been to the ER, urgent care clinic since your last visit? no  Hospitalized since your last visit? no    2. Have you seen or consulted any other health care providers outside of the [address] [place] [address] since your last visit? no Include any pap smears or colon screening.  no

## 2022-08-10 NOTE — PROGRESS NOTES
Chief complaint   Chief Complaint   Patient presents with    Back Pain       History of Present Illness:  Rajeev Naqvi is a  78 y.o.  female who comes in today for follow-up of her low back pain. She states she continues to have back pain across the low back. It is predominantly on the right side into her buttock and hip. She does use a back brace which seems to help somewhat. She does not have any radicular pain. She rates her pain as a 7 out of 10. She describes it is aching and throbbing. She has previously had a left total hip replacement in May 2021 that helped with her left-sided back pain. She is status post a right SI joint block 3/18/2022 that gave her 90% relief and does not feel like this pain she is having now is SI joint pain. We did do x-ray of the right hip last visit that showed some hip arthritis. We have tried her on tramadol and Tylenol 3 and neither one of them really worked. She denies fever bowel bladder dysfunction. Physical Exam: The patient is a 64-year-old female well-developed well-nourished who is alert and oriented with a normal mood and affect. She has a full weightbearing nonantalgic gait utilizing single-point cane. She has 4 out of 5 strength bilateral lower extremities. She has some pain with external rotation of the right hip. Assessment and Plan: This is a patient who has degenerative disc disease and mild central canal stenosis at L3-4 that is having right-sided low back pain. I will set her up to see Dr Nik Mehta for consideration of a right hip injection. Medications:  Current Outpatient Medications   Medication Sig Dispense Refill    Synthroid 75 mcg tablet       lisinopriL (PRINIVIL, ZESTRIL) 5 mg tablet       DULoxetine (CYMBALTA) 30 mg capsule Take 1 Capsule by mouth in the morning. 30 Capsule 5    lisinopriL (PRINIVIL, ZESTRIL) 10 mg tablet Take 1 Tablet by mouth daily.  90 Tablet 3    Levothyroxine 75 mcg cap Take 1 Tablet by mouth daily. 90 Capsule 3    ezetimibe (ZETIA) 10 mg tablet Take 1 Tablet by mouth daily. 90 Tablet 3    atorvastatin (LIPITOR) 40 mg tablet Take 1 Tablet by mouth daily. 90 Tablet 3    lidocaine (LIDODERM) 5 % Apply patch to the affected area for 12 hours a day and remove for 12 hours a day. 90 Patch 1    ergocalciferol (VITAMIN D2) 50,000 unit capsule Take 1 Cap by mouth every seven (7) days. 12 Cap 3    acetaminophen (TYLENOL) 650 mg TbER Take 650 mg by mouth every eight (8) hours as needed for Pain.  (Patient not taking: Reported on 8/10/2022)             Review of systems:    Past Medical History:   Diagnosis Date    AAA (abdominal aortic aneurysm) without rupture ([facility] [address]     AAA repair 7/2018 Dr Dre Felix adenomas     2003 Dr Reese Kennedy; 4/13 Dr Mi Booker    Degenerative arthritis of lumbar spine 12/2018    Dr Dmitry Leiva; Thornell Milder showed multilevel degen changes, mod spinal stenosis L3-5, mod severe right L4-5 foraminal disease; s/p epidural; isabel    Dyslipidemia     FHx: heart disease     Gastritis 2006    on EGD Dr. Tyron Zhang, neg sprue    Hearing loss     LEFT Dr Rita Perez; sensorineural    Hypertension     Hypothyroidism 2007    Dr. Monica Ariza    Hypovitaminosis D     IPMN (intraductal papillary mucinous neoplasm) 06/2018    on mrcp 8mm; no change 10/19, no change 12/20    Lung nodule 06/2016    6 mm RLL (6/16); no change (10/17); no change (12/18), (5/21)    Obesity     peak weight 187 lbs, bmi 33.1 from 4/13; IF 3/18    Osteopenia FRAX 4/12 9.7 and 1.4     DEXA t score 0.6 spine, -1 hip (12/09);  -0.4 spine, -1.3 hip (4/12); -1.2 wrist, -1.6 hip w FRAX 11/2.2 (4/14); -1.0 wrist, -1.6 hip w FRAX 12/2.6 (4/16); -0.7 wrist, -1.6 hip (5/18); -1.1 wrist, -1.8 hip (4/21)    Pancreatitis, chronic ([address] 2018    Psoriasis 2003    on bx Dr. Nithya Sarabia    Venous insufficiency 2003    on dopplers    Zoster 05/2016    LEFT T11-12; mild postherpetic neuralgia     Past Surgical History:   Procedure Laterality Date    HX AAA REPAIR 07/17/2018    HX CARPAL Elyce Larose Left     Dr. Giorgio Murray CATARACT REMOVAL Bilateral     HX COLONOSCOPY      polyp 5/08 Dr Julisa Mason;  4/13 negative Dr. Miguel Gracia GI  08/2018    Dr Nguyen Downey; ERCP s/p removal cbd stone complicated by pamcreatitis    HX HIP REPLACEMENT Left 05/2021    Dr Everlean Lesch  09/18/2018    Dr. Stacey Rios      neg MRA[facility] 2007; CT head neg 9/10    [place] CARDIAC SURG PROCEDURE UNLIST  09/2016    NST negative    [place] CHEST SURGERY PROCEDURE UNLISTED  06/2016    CTA neg PE, 6 mm subpleural nodule     Social History     Socioeconomic History    Marital status:      Spouse name: Not on file    Number of children: Not on file    Years of education: Not on file    Highest education level: Not on file   Occupational History    Occupation: ret office mgr   Tobacco Use    Smoking status: Never    Smokeless tobacco: Never   Vaping Use    Vaping Use: Never used   Substance and Sexual Activity    Alcohol use: Yes     Comment: occasionally    Drug use: No    Sexual activity: Not on file   Other Topics Concern    Not on file   Social History Narrative    Not on file     Social Determinants of Health     Financial Resource Strain: Not on file   Food Insecurity: Not on file   Transportation Needs: Not on file   Physical Activity: Not on file   Stress: Not on file   Social Connections: Not on file   Intimate Partner Violence: Not on file   Housing Stability: Not on file     Family History   Problem Relation Age of Onset    Breast Cancer Mother     Cancer Mother     Heart Disease Father        Physical Exam:  Visit Vitals  BP (!) 101/59 (BP 1 Location: Right arm, BP Patient Position: Sitting, BP Cuff Size: Adult) Comment: asymptomatic, i feel ok, MD made aware   Pulse 80   Temp 97.7 °F (36.5 °C) (Temporal)   Resp 18   Ht 5' 4\" (1.626 m)   Wt 168 lb (76.2 kg)   SpO2 97% Comment: RA   BMI 28.84 kg/m²     Pain Scale: 7/10       has been . reviewed and is appropriate          Diagnoses and all orders for this visit:    1. Spinal stenosis of lumbar region without neurogenic claudication    2. Spondylosis of lumbar region without myelopathy or radiculopathy    3. Lumbar pain    4. Right hip pain            Follow-up and Dispositions    Return for with Dr. Merlinda Mouton for right hip injection with ultrasound. We have informed Barbi Boston to notify us for immediate appointment if she has any worsening neurogical symptoms or if an emergency situation presents, then call 911    Please note that this dictation was completed with [employer], the computer voice recognition software. Quite often unanticipated grammatical, syntax, homophones, and other interpretive errors are inadvertently transcribed by the computer software. Please disregard these errors. Please excuse any errors that have escaped final proofreading.

## 2022-08-12 ENCOUNTER — OFFICE VISIT (OUTPATIENT)
Dept: ORTHOPEDIC SURGERY | Age: 82
End: 2022-08-12
Payer: MEDICARE

## 2022-08-12 VITALS — BODY MASS INDEX: 28.89 KG/M2 | TEMPERATURE: 96.9 F | HEIGHT: 64 IN | WEIGHT: 169.2 LBS

## 2022-08-12 DIAGNOSIS — M70.71 BURSITIS OF RIGHT HIP, UNSPECIFIED BURSA: ICD-10-CM

## 2022-08-12 DIAGNOSIS — M16.11 ARTHRITIS OF RIGHT HIP: ICD-10-CM

## 2022-08-12 DIAGNOSIS — M25.551 RIGHT HIP PAIN: Primary | ICD-10-CM

## 2022-08-12 PROCEDURE — G8536 NO DOC ELDER MAL SCRN: HCPCS | Performed by: PHYSICIAN ASSISTANT

## 2022-08-12 PROCEDURE — 20611 DRAIN/INJ JOINT/BURSA W/US: CPT | Performed by: PHYSICIAN ASSISTANT

## 2022-08-12 PROCEDURE — G8417 CALC BMI ABV UP PARAM F/U: HCPCS | Performed by: PHYSICIAN ASSISTANT

## 2022-08-12 PROCEDURE — 1101F PT FALLS ASSESS-DOCD LE1/YR: CPT | Performed by: PHYSICIAN ASSISTANT

## 2022-08-12 PROCEDURE — G8427 DOCREV CUR MEDS BY ELIG CLIN: HCPCS | Performed by: PHYSICIAN ASSISTANT

## 2022-08-12 PROCEDURE — 1090F PRES/ABSN URINE INCON ASSESS: CPT | Performed by: PHYSICIAN ASSISTANT

## 2022-08-12 PROCEDURE — 99214 OFFICE O/P EST MOD 30 MIN: CPT | Performed by: PHYSICIAN ASSISTANT

## 2022-08-12 PROCEDURE — G8432 DEP SCR NOT DOC, RNG: HCPCS | Performed by: PHYSICIAN ASSISTANT

## 2022-08-12 PROCEDURE — 1123F ACP DISCUSS/DSCN MKR DOCD: CPT | Performed by: PHYSICIAN ASSISTANT

## 2022-08-12 PROCEDURE — G8756 NO BP MEASURE DOC: HCPCS | Performed by: PHYSICIAN ASSISTANT

## 2022-08-12 PROCEDURE — G8399 PT W/DXA RESULTS DOCUMENT: HCPCS | Performed by: PHYSICIAN ASSISTANT

## 2022-08-12 RX ORDER — BETAMETHASONE SODIUM PHOSPHATE AND BETAMETHASONE ACETATE 3; 3 MG/ML; MG/ML
6 INJECTION, SUSPENSION INTRA-ARTICULAR; INTRALESIONAL; INTRAMUSCULAR; SOFT TISSUE ONCE
Status: COMPLETED | OUTPATIENT
Start: 2022-08-12 | End: 2022-08-12

## 2022-08-12 RX ADMIN — BETAMETHASONE SODIUM PHOSPHATE AND BETAMETHASONE ACETATE 6 MG: 3; 3 INJECTION, SUSPENSION INTRA-ARTICULAR; INTRALESIONAL; INTRAMUSCULAR; SOFT TISSUE at 14:18

## 2022-08-12 NOTE — PROGRESS NOTES
[address] [place] [address]  [phone]           Patient: Archie Lennox                MRN: [id number]       SSN: [id number]  YOB: 1940        AGE: 78 y.o. SEX: female  Body mass index is 29.04 kg/m². PCP: Lennox Ponder, MD  08/12/22            REVIEW OF SYSTEMS:  Constitutional: Negative for fever, chills, weight loss and malaise/fatigue. HENT: Negative. Eyes: Negative. Respiratory: Negative. Cardiovascular: Negative. Gastrointestinal: No bowel incontinence or constipation. Genitourinary: No bladder incontinence or saddle anesthesia. Skin: Negative. Neurological: Negative. Endo/Heme/Allergies: Negative. Psychiatric/Behavioral: Negative. Musculoskeletal: As per HPI above.      Past Medical History:   Diagnosis Date    AAA (abdominal aortic aneurysm) without rupture [facility])     AAA repair 7/2018 Dr Mitchell Peguero adenomas     2003 Dr Rafa Evans; 4/13 Dr Pily Gillis    Degenerative arthritis of lumbar spine 12/2018    Dr Amalia Butler; Liz Rice showed multilevel degen changes, mod spinal stenosis L3-5, mod severe right L4-5 foraminal disease; s/p epidural; isabel    Dyslipidemia     FHx: heart disease     Gastritis 2006    on EGD Dr. Eddie Tierney, neg sprue    Hearing loss     LEFT Dr Bettye Councilman; sensorineural    Hypertension     Hypothyroidism 2007    Dr. Kyle Wilkes    Hypovitaminosis D     IPMN (intraductal papillary mucinous neoplasm) 06/2018    on mrcp 8mm; no change 10/19, no change 12/20    Lung nodule 06/2016    6 mm RLL (6/16); no change (10/17); no change (12/18), (5/21)    Obesity     peak weight 187 lbs, bmi 33.1 from 4/13; IF 3/18    Osteopenia FRAX 4/12 9.7 and 1.4     DEXA t score 0.6 spine, -1 hip (12/09);  -0.4 spine, -1.3 hip (4/12); -1.2 wrist, -1.6 hip w FRAX 11/2.2 (4/14); -1.0 wrist, -1.6 hip w FRAX 12/2.6 (4/16); -0.7 wrist, -1.6 hip (5/18); -1.1 wrist, -1.8 hip (4/21) Pancreatitis, chronic ([facility] [address] 2018    Psoriasis 2003    on bx Dr. Homer Serna    Venous insufficiency 2003    on dopplers    Zoster 05/2016    LEFT T11-12; mild postherpetic neuralgia         Current Outpatient Medications:     Synthroid 75 mcg tablet, , Disp: , Rfl:     lisinopriL (PRINIVIL, ZESTRIL) 5 mg tablet, , Disp: , Rfl:     DULoxetine (CYMBALTA) 30 mg capsule, Take 1 Capsule by mouth in the morning., Disp: 30 Capsule, Rfl: 5    lisinopriL (PRINIVIL, ZESTRIL) 10 mg tablet, Take 1 Tablet by mouth daily. , Disp: 90 Tablet, Rfl: 3    Levothyroxine 75 mcg cap, Take 1 Tablet by mouth daily. , Disp: 90 Capsule, Rfl: 3    ezetimibe (ZETIA) 10 mg tablet, Take 1 Tablet by mouth daily. , Disp: 90 Tablet, Rfl: 3    atorvastatin (LIPITOR) 40 mg tablet, Take 1 Tablet by mouth daily. , Disp: 90 Tablet, Rfl: 3    lidocaine (LIDODERM) 5 %, Apply patch to the affected area for 12 hours a day and remove for 12 hours a day., Disp: 90 Patch, Rfl: 1    ergocalciferol (VITAMIN D2) 50,000 unit capsule, Take 1 Cap by mouth every seven (7) days. , Disp: 12 Cap, Rfl: 3    acetaminophen (TYLENOL) 650 mg TbER, Take 650 mg by mouth every eight (8) hours as needed for Pain.  (Patient not taking: No sig reported), Disp: , Rfl:     Allergies   Allergen Reactions    Atenolol Other (comments)     [place] poorly    Cozaar [Losartan] Other (comments)     Headaches    Norvasc [Amlodipine] Other (comments)     Felt poorly      Tramadol Other (comments)       Social History     Socioeconomic History    Marital status:      Spouse name: Not on file    Number of children: Not on file    Years of education: Not on file    Highest education level: Not on file   Occupational History    Occupation: ret office mgr   Tobacco Use    Smoking status: Never    Smokeless tobacco: Never   Vaping Use    Vaping Use: Never used   Substance and Sexual Activity    Alcohol use: Yes     Comment: occasionally    Drug use: No    Sexual activity: Not on file   Other Topics Concern    Not on file   Social History Narrative    Not on file     Social Determinants of Health     Financial Resource Strain: Not on file   Food Insecurity: Not on file   Transportation Needs: Not on file   Physical Activity: Not on file   Stress: Not on file   Social Connections: Not on file   Intimate Partner Violence: Not on file   Housing Stability: Not on file       Past Surgical History:   Procedure Laterality Date    HX AAA REPAIR  07/17/2018    Eli Steele Left     Dr. Gema Underwood CATARACT REMOVAL Bilateral     HX COLONOSCOPY      polyp 5/08 Dr Yaw Souza;  4/13 negative Dr. Fartun Sanchez GI  08/2018    Dr David Nesbitt; ERCP s/p removal cbd stone complicated by pamcreatitis    HX HIP REPLACEMENT Left 05/2021    Dr Miguel Castaneda  09/18/2018    Dr. Maricel Hooper      neg MRA/MRI head [facility] 2007; CT head neg 9/10    [place] CARDIAC SURG PROCEDURE UNLIST  09/2016    NST negative    [place] CHEST SURGERY PROCEDURE UNLISTED  06/2016    CTA neg PE, 6 mm subpleural nodule       Patient seen evaluated today for complaints of right hip pain. She has had a previous left hip replacement done and done very well with it. She has been having some right hip pain which has been worsening. Has not been any falls. She reports some lateral base discomfort as well as some groin pain at times. She denies any current radiating pain down the lower extremity. No change in bowel or bladder habits. Patient denies recent fevers, chills, chest pain, SOB, or injuries. No recent systemic changes noted. A 12-point review of systems is performed today. Pertinent positives are noted. All other systems reviewed and otherwise are negative. Physical exam: General: Alert and oriented x3, nad.  well-developed, well nourished. normal affect, AF. NC/AT, EOMI, neck supple, trachea midline, no JVD present. Breathing is non-labored.   Examination of the lower extremities reveals pretty well-maintained range of motion of the right hip however does have some discomfort with internal rotation. This reproduces her groin discomfort. She also has pain to palpation of the trochanter bursa on the right side. Negative straight leg raise. Negative calf tenderness. Negative Homans. No signs of DVT present. Review of radiographs done from previous does confirm moderate advanced arthritis of the hip. No acute abnormalities noted. Significant low back spondylosis, scoliosis    Assessment: #1 right hip trochanter bursitis, #2 right hip osteoarthritis    Plan: At this point, we discussed treatment options. Total hip replacements were discussed however elected against given her advanced age of 78. Today in the office we will move for the cortisone junction for the right hip, trochanter bursa. After informed consent, under aseptic conditions, with US guided assitance, the right hip was prepped with betadine and a mxiture of 3ml 1% lidocaine and 6mg of celestone was injected without complications. The patient tolerated the injection well. The patient is instructed on post-injection care. We will get her set up for an intra-articular injection of the right hip as well. I like to see her back about 3 to 4 weeks after the intra-articular injection to  efficacy. Chart reviewed for the following:  Rita MORENO PA-C, have reviewed the History, Physical and updated the Allergic reactions for Janiya Martinez [facility]?     TIME OUT performed immediately prior to start of procedure:  Rita MORENO PA-C, have performed the following reviews on Faith Keita prior to the start of the procedure:  ????????  * Patient was identified by name and date of birth   * Agreement on procedure being performed was verified  * Risks and Benefits explained to the patient  * Procedure site verified and marked as necessary  * Patient was positioned for comfort  * Consent was signed and verified    Time:2:15 PM    Body part: right hip, intra-bursal    Medication & Dose: 3ml 1% lidocaine and 6mg celestone    Date of procedure: 08/12/22    Procedure performed by: Gabi Marion PA-C    Provider assisted by: none    Patient assisted by: self    How tolerated by patient: tolerated the procedure well with no complications    Post Procedural Pain Scale: 7    Comments:   [address] using a frequency of 10MHz with a 12L-RS transducer head was used to confirm needle placement.   Ultrasound images captured using [address] Ultrasound machine and scanned into patient's chart       Bladimir Mendieta PA-C, ATC

## 2022-08-30 ENCOUNTER — TELEPHONE (OUTPATIENT)
Dept: ORTHOPEDIC SURGERY | Age: 82
End: 2022-08-30

## 2022-08-30 ENCOUNTER — TELEPHONE (OUTPATIENT)
Dept: INTERNAL MEDICINE CLINIC | Age: 82
End: 2022-08-30

## 2022-08-30 RX ORDER — DULOXETIN HYDROCHLORIDE 60 MG/1
60 CAPSULE, DELAYED RELEASE ORAL DAILY
Qty: 30 CAPSULE | Refills: 11 | Status: SHIPPED | OUTPATIENT
Start: 2022-08-30

## 2022-08-30 NOTE — TELEPHONE ENCOUNTER
Pill changed to 60mg dose    Finish out her 35s by taking 2 at a time Spoke with Mother unable to provide school excuse without a visit   Information for [facility] given   Mother verbalizes an understanding

## 2022-08-30 NOTE — TELEPHONE ENCOUNTER
Patient called stating her medication is not helping with her pain, and wanted to know if another prescription can be called in. Please advise patient at [phone].

## 2022-08-30 NOTE — TELEPHONE ENCOUNTER
Pt calling asking if you would increase her Duloxetine to 2 a day. Says it is not working for her.      She would need a new rx to [facility]. With the updated directions

## 2022-08-30 NOTE — TELEPHONE ENCOUNTER
I called and spoke to the pt. The pt was identified using 2 pt identifiers. She was asked which medication she is referring to for her pain medication. The pt mentioned duloxetine and wanted to know if it can be increased. A chart review was done and this medication was prescribed by Dr. Kavin Chacko. The pt was made aware of this and advised to contact his office since he has been prescribing this to her. She verbalized understanding and will call his office.

## 2022-09-09 ENCOUNTER — OFFICE VISIT (OUTPATIENT)
Dept: ORTHOPEDIC SURGERY | Age: 82
End: 2022-09-09
Payer: MEDICARE

## 2022-09-09 VITALS
SYSTOLIC BLOOD PRESSURE: 122 MMHG | HEART RATE: 82 BPM | TEMPERATURE: 96 F | WEIGHT: 168 LBS | BODY MASS INDEX: 28.84 KG/M2 | DIASTOLIC BLOOD PRESSURE: 73 MMHG | OXYGEN SATURATION: 96 %

## 2022-09-09 DIAGNOSIS — M70.72 BURSITIS OF LEFT HIP, UNSPECIFIED BURSA: Primary | ICD-10-CM

## 2022-09-09 DIAGNOSIS — M54.50 LUMBAR PAIN: ICD-10-CM

## 2022-09-09 DIAGNOSIS — M25.551 RIGHT HIP PAIN: ICD-10-CM

## 2022-09-09 DIAGNOSIS — M16.11 ARTHRITIS OF RIGHT HIP: ICD-10-CM

## 2022-09-09 DIAGNOSIS — M48.061 SPINAL STENOSIS OF LUMBAR REGION WITHOUT NEUROGENIC CLAUDICATION: ICD-10-CM

## 2022-09-09 PROCEDURE — 1123F ACP DISCUSS/DSCN MKR DOCD: CPT | Performed by: PHYSICIAN ASSISTANT

## 2022-09-09 PROCEDURE — G8427 DOCREV CUR MEDS BY ELIG CLIN: HCPCS | Performed by: PHYSICIAN ASSISTANT

## 2022-09-09 PROCEDURE — G8536 NO DOC ELDER MAL SCRN: HCPCS | Performed by: PHYSICIAN ASSISTANT

## 2022-09-09 PROCEDURE — G8399 PT W/DXA RESULTS DOCUMENT: HCPCS | Performed by: PHYSICIAN ASSISTANT

## 2022-09-09 PROCEDURE — G8754 DIAS BP LESS 90: HCPCS | Performed by: PHYSICIAN ASSISTANT

## 2022-09-09 PROCEDURE — G8417 CALC BMI ABV UP PARAM F/U: HCPCS | Performed by: PHYSICIAN ASSISTANT

## 2022-09-09 PROCEDURE — 20611 DRAIN/INJ JOINT/BURSA W/US: CPT | Performed by: PHYSICIAN ASSISTANT

## 2022-09-09 PROCEDURE — G8752 SYS BP LESS 140: HCPCS | Performed by: PHYSICIAN ASSISTANT

## 2022-09-09 PROCEDURE — 1090F PRES/ABSN URINE INCON ASSESS: CPT | Performed by: PHYSICIAN ASSISTANT

## 2022-09-09 PROCEDURE — 99214 OFFICE O/P EST MOD 30 MIN: CPT | Performed by: PHYSICIAN ASSISTANT

## 2022-09-09 PROCEDURE — G8432 DEP SCR NOT DOC, RNG: HCPCS | Performed by: PHYSICIAN ASSISTANT

## 2022-09-09 PROCEDURE — 1101F PT FALLS ASSESS-DOCD LE1/YR: CPT | Performed by: PHYSICIAN ASSISTANT

## 2022-09-09 RX ORDER — BETAMETHASONE SODIUM PHOSPHATE AND BETAMETHASONE ACETATE 3; 3 MG/ML; MG/ML
6 INJECTION, SUSPENSION INTRA-ARTICULAR; INTRALESIONAL; INTRAMUSCULAR; SOFT TISSUE ONCE
Status: COMPLETED | OUTPATIENT
Start: 2022-09-09 | End: 2022-09-09

## 2022-09-09 RX ADMIN — BETAMETHASONE SODIUM PHOSPHATE AND BETAMETHASONE ACETATE 6 MG: 3; 3 INJECTION, SUSPENSION INTRA-ARTICULAR; INTRALESIONAL; INTRAMUSCULAR; SOFT TISSUE at 11:13

## 2022-09-09 NOTE — PROGRESS NOTES
[address] [place] [address]  [phone]           Patient: Luke Lance                MRN: [id number]       SSN: [id number]  YOB: 1940        AGE: 78 y.o. SEX: female  Body mass index is 28.84 kg/m². PCP: Sergio Sepulveda MD  09/09/22            REVIEW OF SYSTEMS:  Constitutional: Negative for fever, chills, weight loss and malaise/fatigue. HENT: Negative. Eyes: Negative. Respiratory: Negative. Cardiovascular: Negative. Gastrointestinal: No bowel incontinence or constipation. Genitourinary: No bladder incontinence or saddle anesthesia. Skin: Negative. Neurological: Negative. Endo/Heme/Allergies: Negative. Psychiatric/Behavioral: Negative. Musculoskeletal: As per HPI above.      Past Medical History:   Diagnosis Date    AAA (abdominal aortic aneurysm) without rupture [facility])     AAA repair 7/2018 Dr Milan Garcia adenomas     2003 Dr Yaw Souza; 4/13 Dr Maria Walters    Degenerative arthritis of lumbar spine 12/2018    Dr Inna Flood; To Callejas showed multilevel degen changes, mod spinal stenosis L3-5, mod severe right L4-5 foraminal disease; s/p epidural; isabel    Dyslipidemia     FHx: heart disease     Gastritis 2006    on EGD Dr. Mia Tan, neg sprue    Hearing loss     LEFT Dr Cristy Huggins; sensorineural    Hypertension     Hypothyroidism 2007    Dr. Neptali Hernandez    Hypovitaminosis D     IPMN (intraductal papillary mucinous neoplasm) 06/2018    on mrcp 8mm; no change 10/19, no change 12/20    Lung nodule 06/2016    6 mm RLL (6/16); no change (10/17); no change (12/18), (5/21)    Obesity     peak weight 187 lbs, bmi 33.1 from 4/13; IF 3/18    Osteopenia FRAX 4/12 9.7 and 1.4     DEXA t score 0.6 spine, -1 hip (12/09);  -0.4 spine, -1.3 hip (4/12); -1.2 wrist, -1.6 hip w FRAX 11/2.2 (4/14); -1.0 wrist, -1.6 hip w FRAX 12/2.6 (4/16); -0.7 wrist, -1.6 hip (5/18); -1.1 wrist, -1.8 hip (4/21) Pancreatitis, chronic ([facility] [address] 2018    Psoriasis 2003    on bx Dr. Mary Ortiz    Venous insufficiency 2003    on dopplers    Zoster 05/2016    LEFT T11-12; mild postherpetic neuralgia         Current Outpatient Medications:     DULoxetine (CYMBALTA) 60 mg capsule, Take 1 Capsule by mouth daily. , Disp: 30 Capsule, Rfl: 11    acetaminophen (TYLENOL) 650 mg TbER, Take 650 mg by mouth every eight (8) hours as needed for Pain., Disp: , Rfl:     lisinopriL (PRINIVIL, ZESTRIL) 10 mg tablet, Take 1 Tablet by mouth daily. , Disp: 90 Tablet, Rfl: 3    Levothyroxine 75 mcg cap, Take 1 Tablet by mouth daily. , Disp: 90 Capsule, Rfl: 3    ezetimibe (ZETIA) 10 mg tablet, Take 1 Tablet by mouth daily. , Disp: 90 Tablet, Rfl: 3    atorvastatin (LIPITOR) 40 mg tablet, Take 1 Tablet by mouth daily. , Disp: 90 Tablet, Rfl: 3    lidocaine (LIDODERM) 5 %, Apply patch to the affected area for 12 hours a day and remove for 12 hours a day., Disp: 90 Patch, Rfl: 1    ergocalciferol (VITAMIN D2) 50,000 unit capsule, Take 1 Cap by mouth every seven (7) days. , Disp: 12 Cap, Rfl: 3    Synthroid 75 mcg tablet, , Disp: , Rfl:     lisinopriL (PRINIVIL, ZESTRIL) 5 mg tablet, , Disp: , Rfl:     Allergies   Allergen Reactions    Atenolol Other (comments)     [place] poorly    Cozaar [Losartan] Other (comments)     Headaches    Norvasc [Amlodipine] Other (comments)     Felt poorly      Tramadol Other (comments)       Social History     Socioeconomic History    Marital status:      Spouse name: Not on file    Number of children: Not on file    Years of education: Not on file    Highest education level: Not on file   Occupational History    Occupation: ret office mgr   Tobacco Use    Smoking status: Never    Smokeless tobacco: Never   Vaping Use    Vaping Use: Never used   Substance and Sexual Activity    Alcohol use: Yes     Comment: occasionally    Drug use: No    Sexual activity: Not on file   Other Topics Concern    Not on file   Social History Narrative Not on file     Social Determinants of Health     Financial Resource Strain: Not on file   Food Insecurity: Not on file   Transportation Needs: Not on file   Physical Activity: Not on file   Stress: Not on file   Social Connections: Not on file   Intimate Partner Violence: Not on file   Housing Stability: Not on file       Past Surgical History:   Procedure Laterality Date    HX AAA REPAIR  07/17/2018    Linsey Media Left     Dr. Anne Marie Cardona CATARACT REMOVAL Bilateral     HX COLONOSCOPY      polyp 5/08 Dr Brian Hansen;  4/13 negative Dr. Nathaniel Haque GI  08/2018    Dr Casey Flores; ERCP s/p removal cbd stone complicated by pamcreatitis    HX HIP REPLACEMENT Left 05/2021    Dr Dunbar Franklin  09/18/2018    Dr. Mo Santiago      neg MRA/MRI head [facility] 2007; CT head neg 9/10    [place] CARDIAC SURG PROCEDURE UNLIST  09/2016    NST negative    [place] CHEST SURGERY PROCEDURE UNLISTED  06/2016    CTA neg PE, 6 mm subpleural nodule     Patient seen evaluated today for her low back and bilateral hips. She does have a lot of degenerative changes in her back. She has some back pain. She is followed by the spine center. She had injections in the past and she has been set up for repeat injection. She is having lateral base discomfort of her hips. Is worse on the left side. She had a previous right hip injection which worked well for her. She denies any groin pain or thigh pain. There is no radiating pain down the lower extremities currently. No change in bowel or bladder habits. Patient denies recent fevers, chills, chest pain, SOB, or injuries. No recent systemic changes noted. A 12-point review of systems is performed today. Pertinent positives are noted. All other systems reviewed and otherwise are negative. Physical exam: General: Alert and oriented x3, nad.  well-developed, well nourished. normal affect, AF.   NC/AT, EOMI, neck supple, trachea midline, no JVD present. Breathing is non-labored. Examination of the lower extremities reveals pain-free range of motion of the hips. There is discomfort to palpation the trochanter bursa bilaterally which is worse on the left side. Negative straight leg raise. Negative calf tenderness. Negative Homans. No signs of DVT present. Assessment: #1 left hip trochanter bursitis, #2 lumbar degenerative disc disease    Plan: At this point, we discussed treatment options. We will move forward with a cortisone injection of the left hip, trochanter bursa. After informed consent, under aseptic conditions, with US guided assitance, the left hip was prepped with betadine and a mxiture of 3ml 1% lidocaine and 6mg of celestone was injected without complications. The patient tolerated the injection well. The patient is instructed on post-injection care. She will continue with the spine center for her back. She will continue activity as tolerated. She will continue with a home exercise program.  We will see her back in the office in about 3 months time for evaluation. Chart reviewed for the following:  Franklyn MORENO PA-C, have reviewed the History, Physical and updated the Allergic reactions for [facility]?     TIME OUT performed immediately prior to start of procedure:  Franklyn MORENO PA-C, have performed the following reviews on Khurram Brown prior to the start of the procedure:  ????????  * Patient was identified by name and date of birth   * Agreement on procedure being performed was verified  * Risks and Benefits explained to the patient  * Procedure site verified and marked as necessary  * Patient was positioned for comfort  * Consent was signed and verified    Time:11:03 AM    Body part: left hip, intra-bursal    Medication & Dose: 3ml 1% lidocaine and 6mg celestone    Date of procedure: 09/09/22    Procedure performed by: Franklyn Robledo PA-C    Provider assisted by: none    Patient assisted by: self    How tolerated by patient: tolerated the procedure well with no complications    Post Procedural Pain Scale: 4    Comments:   [address] using a frequency of 10MHz with a 12L-RS transducer head was used to confirm needle placement.   Ultrasound images captured using [address] Ultrasound machine and scanned into patient's chart       Rocio Lobo PA-C, ATC

## 2022-09-19 NOTE — IP AVS SNAPSHOT
[address] [address] 
[phone] Patient: [place] MRN: [id number] ROJ:7/65/1214 About your hospitalization You were admitted on:  July 17, 2018 You last received care in the:  [facility] 2 CV INTNSV CARE You were discharged on:  July 18, 2018 Why you were hospitalized Your primary diagnosis was:  Not on File Your diagnoses also included:  Aaa (Abdominal Aortic Aneurysm) Without Rupture (Hcc) Follow-up Information Follow up With Details Comments Contact Info Esau Lesches, MD On 7/31/2018 @ 2 o,clock [address] [place] D 
[facility]  [facility] 
[phone] GARRETT Rodriguez On 7/25/2018 @ 9am (same office as [address] [place] [address] [address] 317 [facility] 
[phone] Your Scheduled Appointments Tuesday July 24, 2018 ENDOSCOPIC RETROGRADE CHOLANGIOPANCREATOGRAPHY WITH CHOLANGIOSCOPY with Kenyon Castellon MD  
[facility] ENDOSCOPY [facility] [address]) [address] [facility] [address] Wednesday July 25, 2018  9:00 AM EDT Office Visit with GARRETT Rodriguez [facility] ([address] [facility]) [address] [facility], [address] [facility] [address] [facility]  
[phone] Tuesday July 31, 2018  2:00 PM EDT Follow Up with [address] [place] [address] [facility] ([address] [facility]) [address] [facility] 222 608 [facility]  
[phone] Discharge Orders None A check karely indicates which time of day the medication should be taken. My Medications START taking these medications Instructions Each Dose to Equal  
 Morning Noon Evening Bedtime  
 oxyCODONE-acetaminophen 5-325 mg per tablet Commonly known as:  PERCOCET Your last dose was: Your next dose is: [facility]   Physical Therapy Treatment Note     Name: Ankita Faith  Clinic Number: [id number]    Therapy Diagnosis:   Encounter Diagnoses   Name Primary?    Chronic bilateral low back pain without sciatica Yes    Decreased range of motion of lumbar spine      Physician: Eliseo Early*    Visit Date: 9/19/2022    Physician Orders: PT Eval and Treat   Medical Diagnosis from Referral: Osteoarthritis of lower back  Evaluation Date: 9/12/2022  Authorization Period Expiration: 10/17/22  Plan of Care Expiration: 10/14/22  Visit # / Visits authorized: 1/ 12    Time In: 9:35  Time Out: 10:15  Total Billable Time: 40 minutes      PTA Visit #: 0/5     Precautions: [place] in R ear    SUBJECTIVE     Pt reports: her pain is sharper today when she got out of bed.  She state she did not have much pain over the weekend.  She was compliant with home exercise program. (1x/day in the evening)  Response to previous treatment: did well  Functional change: too soon to tell    Pain: 6/10  Location:  low back     OBJECTIVE     Objective Measures updated at progress report unless specified.     Treatment     Ankita received the treatments listed below:      therapeutic exercises to develop strength, ROM, flexibility, posture, and core stabilization for 40 minutes including: (PT speaks to pt on L side due to [place] on R)  Gently lumbar oscillation x20  LTR 3x20 sec  DKTC 3x20 sec  Piriformis stretch 3x20 sec  Supine hamstring stretch 6x10 sec  TA set x10, 5 sec hold  Bridges with arms across chest 2x10, 5 sec hold  Bridges + sh ext Y tubing x10  Squat taps on 18in box with TA set x10  Shuttle + TA set 3 bands 2x10  Seated lumbar flexion stretch, then to ea side x20 sec ea     May add: TA set + sh ext, squat lift, body mechanics for lifting and daily chores      Patient Education and Home Exercises     Home Exercises Provided and Patient Education Provided     Education provided:   - perform HEP in AM to help  Take 1 Tab by mouth every four (4) hours as needed. Max Daily Amount: 6 Tabs. 1 Tab CHANGE how you take these medications Instructions Each Dose to Equal  
 Morning Noon Evening Bedtime  
 ergocalciferol 50,000 unit capsule Commonly known as:  VITAMIN D2 What changed:  when to take this Your last dose was: Your next dose is: Take 1 Cap by mouth every seven (7) days. [address] Units CONTINUE taking these medications Instructions Each Dose to Equal  
 Morning Noon Evening Bedtime  
 aspirin 81 mg chewable tablet Your last dose was: Your next dose is: Take 1 Tab by mouth daily. 81 mg  
    
   
   
   
  
 atorvastatin 40 mg tablet Commonly known as:  LIPITOR Your last dose was: Your next dose is: Take 1 Tab by mouth daily. 40 mg  
    
   
   
   
  
 diazePAM 10 mg tablet Commonly known as:  VALIUM Your last dose was: Your next dose is: Take 1 Tab by mouth every twelve (12) hours as needed for Anxiety. Max Daily Amount: 20 mg.  
 10 mg  
    
   
   
   
  
 ezetimibe 10 mg tablet Commonly known as:  Jarome Dys Your last dose was: Your next dose is: TAKE 1 TABLET BY MOUTH DAILY  
     
   
   
   
  
 ibuprofen 800 mg tablet Commonly known as:  MOTRIN Your last dose was: Your next dose is: Take 1 Tab by mouth three (3) times daily (with meals). 800 mg  
    
   
   
   
  
 * levothyroxine 50 mcg tablet Commonly known as:  SYNTHROID Your last dose was: Your next dose is: Take 1 Tab by mouth Daily (before breakfast). 50 mcg * Levothyroxine 75 mcg Cap Your last dose was: Your next dose is: Take one tablet by mouth every other day before breakfast. Alternates with 50mcg decreased pain before getting out of bed  Pt gave verbal understanding to all education provided     Written Home Exercises Provided: yes. Exercises were reviewed and Ankita was able to demonstrate them prior to the end of the session.  Ankita demonstrated good  understanding of the education provided. See EMR under Patient Instructions for exercises provided during therapy sessions    ASSESSMENT     Pt presented with low back pain, which decreased to 2/10 after performing DKTC and LTR.  Pt instructed to perform the 2 stretches prior to getting out of bed in the morning to self manage symptoms.  She was able to perform TA set with functional motions today.  She reported decreased pain to 0/10 post tx.    Ankita Is progressing well towards her goals.   Pt prognosis is Excellent.     Pt will continue to benefit from skilled outpatient physical therapy to address the deficits listed in the problem list box on initial evaluation, provide pt/family education and to maximize pt's level of independence in the home and community environment.     Pt's spiritual, cultural and educational needs considered and pt agreeable to plan of care and goals.     Anticipated barriers to physical therapy: none    Goals:   Long Term Goals:  4 weeks (progressing, not met)  1.Report decreased    low back    pain  <   / =  2  /10 at its worst to increase tolerance for ADLs in the morning.  2. Pt to demonstrate understanding of body mechanics for lifting object floor to/from waist to prevent exacerbation of symptoms.   3. Increased R LE strength by 1/3 muscle grade in all deficient planes to increase tolerance for ADL and work activities.  4. Increased L LE strength by 1/3 muscle grade in all deficient planes to increase tolerance for ADL and work activities.   5. Pt to tolerate HEP to improve ROM and independence with ADL's      PLAN     Continue PT towards established goals.  Progress core and glute strengthening, education on body mechanics  for lifting.    Plan of care Certification: 9/12/2022 to 10/14/22.    Outpatient Physical Therapy 1 times weekly for 4 weeks to include the following interventions: Electrical Stimulation  , Manual Therapy, Moist Heat/ Ice, Neuromuscular Re-ed, Patient Education, Self Care, Therapeutic Activities, Therapeutic Exercise, and dry needling.       Miri Fletcher, PT       lidocaine 5 % Commonly known as:  Darius Halt Your last dose was: Your next dose is:    
   
   
 Apply patch to the affected area for 12 hours a day and remove for 12 hours a day. lisinopril 10 mg tablet Commonly known as:  Glen Clemons Your last dose was: Your next dose is: Take 1 Tab by mouth daily. 10 mg  
    
   
   
   
  
 omeprazole 40 mg capsule Commonly known as:  PRILOSEC Your last dose was: Your next dose is: Take 1 Cap by mouth daily. 40 mg  
    
   
   
   
  
 * Notice: This list has 2 medication(s) that are the same as other medications prescribed for you. Read the directions carefully, and ask your doctor or other care provider to review them with you. Where to Get Your Medications Information on where to get these meds will be given to you by the nurse or doctor. ! Ask your nurse or doctor about these medications  
  oxyCODONE-acetaminophen 5-325 mg per tablet Opioid Education Prescription Opioids: What You Need to Know: 
 
 
 
F-face looks uneven A-arms unable to move or move unevenly S-speech slurred or non-existent T-time-call 911 as soon as signs and symptoms begin-DO NOT go Back to bed or wait to see if you get better-TIME IS BRAIN. Warning Signs of HEART ATTACK Call 911 if you have these symptoms: 
? Chest discomfort.  Most heart attacks involve discomfort in the center of the chest that lasts more than a few minutes, or that goes away and comes back. It can feel like uncomfortable pressure, squeezing, fullness, or pain. ? Discomfort in other areas of the upper body. Symptoms can include pain or discomfort in one or both arms, the back, neck, jaw, or stomach. ? Shortness of breath with or without chest discomfort. ? Other signs may include breaking out in a cold sweat, nausea, or lightheadedness. Don't wait more than five minutes to call [address]! Fast action can save your life. Calling 911 is almost always the fastest way to get lifesaving treatment. Emergency Medical Services staff can begin treatment when they arrive  up to an hour sooner than if someone gets to the hospital by car. The discharge information has been reviewed with the patient. The patient verbalized understanding. Discharge medications reviewed with the patient and appropriate educational materials and side effects teaching were provided. ___________________________________________________________________________________________________________________________________ ACO Transitions of Care [facility] [address] [place] [address] offers a voluntary care coordination program to provide high quality service and care to [facility] fee-for-service beneficiaries. Sebastian Calvert was designed to help you enhance your health and well-being through the following services: ? Transitions of Care  support for individuals who are transitioning from one care setting to another (example: Hospital to home). ? Chronic and Complex Care Coordination  support for individuals and caregivers of those with serious or chronic illnesses or with more than one chronic (ongoing) condition and those who take a number of different medications.   
 
 
If you meet specific medical criteria, a [facility] Coordinator may call you directly to coordinate your care with your primary care physician and your other care providers. For questions about the [facility] programs, please, contact your physicians office. For general questions or additional information about Accountable Care Organizations: 
Please visit www.medicare.gov/acos. html or call 1-800-MEDICARE (1-643.468.2692) TTY users should call 6-938.808.8487. [employer] Announcement We are excited to announce that we are making your provider's discharge notes available to you in [employer]. You will see these notes when they are completed and signed by the physician that discharged you from your recent hospital stay. If you have any questions or concerns about any information you see in [employer], please call the Health Information Department where you were seen or reach out to your Primary Care Provider for more information about your plan of care. Introducing [facility] [place] [facility]! Dear [employer]: Thank you for requesting a [employer] account. Our records indicate that you already have an active [employer] account. You can access your account anytime at [email or web address] Did you know that you can access your hospital and ER discharge instructions at any time in [employer]? You can also review all of your test results from your hospital stay or ER visit. Additional Information If you have questions, please visit the Frequently Asked Questions section of the [employer] website at [email or web address] Remember, [employer] is NOT to be used for urgent needs. For medical emergencies, dial 911. Now available from your iPhone and Android! Introducing Pancho Corona As a [employer] patient, I wanted to make you aware of our electronic visit tool called Pancho Corona. [employer] 24/7 allows you to connect within minutes with a medical provider 24 hours a day, seven days a week via a mobile device or tablet or logging into a secure website from your computer. You can access Pancho Chiufin from anywhere in the [place]. A virtual visit might be right for you when you have a simple condition and feel like you just dont want to get out of bed, or cant get away from work for an appointment, when your regular [facility] provider is not available (evenings, weekends or holidays), or when youre out of town and need minor care. Electronic visits cost only $49 and if the [facility] 24/7 provider determines a prescription is needed to treat your condition, one can be electronically transmitted to a nearby pharmacy*. Please take a moment to enroll today if you have not already done so. The enrollment process is free and takes just a few minutes. To enroll, please download the [facility]en 24/Werk[employer] gini to your tablet or phone, or visit [email or web address] to enroll on your computer. And, as an [address] [place] [address] patient with a [employer] account, the results of your visits will be scanned into your electronic medical record and your primary care provider will be able to view the scanned results. We urge you to continue to see your regular [facility] provider for your ongoing medical care. And while your primary care provider may not be the one available when you seek a Pancho Colbycamachofin virtual visit, the peace of mind you get from getting a real diagnosis real time can be priceless. For more information on Pancho Colbycamachofin, view our Frequently Asked Questions (FAQs) at [email or web address]. Sincerely, 
 
Rafaela George MD 
Chief Medical Officer [employer] *:  certain medications cannot be prescribed via Pancho ChiuForever Unresulted Labs-Please follow up with your PCP about these lab tests Order Current Status [place] XR TECHNOLOGIST SERVICE In process Providers Seen During Your Hospitalization Provider Specialty Primary office phone Ventura Miller MD Vascular Surgery [phone] Your Primary Care Physician (PCP) Primary Care Physician Office Phone Office Fax Zakia Scott [phone] You are allergic to the following Allergen Reactions Atenolol Other (comments) Felt poorly Cozaar (Losartan) Other (comments) Headaches Norvasc (Amlodipine) Other (comments) Felt poorly Recent Documentation Height Weight BMI OB Status Smoking Status 1.638 m 79 kg 29.43 kg/m2 Postmenopausal Never Smoker Emergency Contacts Name Discharge Info Relation Home Work Mobile [address] CAREGIVER [3] Spouse [3] [phone] Patient Belongings The following personal items are in your possession at time of discharge: 
  Dental Appliances: Partials, At bedside  Visual Aid: Glasses, At bedside      Home Medications: None   Jewelry: None  Clothing: Pants, Shirt, Undergarments, Socks, Footwear, At bedside    Other Valuables: Eyeglasses, At bedside  Personal Items Sent to Safe: Declines Discharge Instructions Attachments/References AORTIC ANEURYSM ABDOMINAL OR THORACIC: ENDOVASCULAR REPAIR: POST-OP (ENGLISH) OXYCODONE/ACETAMINOPHEN (BY MOUTH) (ENGLISH) Patient Handouts Endovascular Aortic Aneurysm Repair: What to Expect at Home Your Recovery Endovascular aortic aneurysm repair is a procedure to fix a weak and bulging section of the aorta. The aorta is the large blood vessel (artery) that carries blood from the heart through the belly to the rest of the body. The doctor put a man-made tube called a graft inside the aneurysm. Blood will pass through the graft in the aorta without pushing on the aneurysm.  
You can expect the cuts (incisions) in your groin to be sore for 1 to 2 weeks. If you have stitches or staples in your incisions, the doctor may need to take them out. You may feel more tired than usual for 1 to 2 weeks after surgery. You may be able to do many of your usual activities after 1 to 2 weeks. But you will probably need up to 4 weeks to fully recover. Strenuous activities will not hurt the graft in your aorta, but they may cause problems with the incisions in your groin. You can be more active when your groin is no longer sore. This care sheet gives you a general idea about how long it will take for you to recover. But each person recovers at a different pace. Follow the steps below to get better as quickly as possible. How can you care for yourself at home? Activity 
  · Rest when you feel tired. Getting enough sleep will help you recover.  
  · Try to walk each day. Start by walking a little more than you did the day before. Bit by bit, increase the amount you walk. Walking boosts blood flow and helps prevent pneumonia and constipation.  
  · Avoid strenuous activities, such as bicycle riding, jogging, weight lifting, or aerobic exercise. Your doctor will tell youwhen it's okay to do strenuous activity.  
  · Ask your doctor when you can drive again.  
  · You will probably need to take at least 1 to 2 weeks off from work. It depends on the type of work you do and how you feel.  
  · You may shower as usual. Pat the incisions dry. Do not take a bath for the first 2 weeks, or until your doctor tells you it is okay. Diet 
  · You can eat your normal diet. If your stomach is upset, try bland, low-fat foods like plain rice, broiled chicken, toast, and yogurt.  
  · Drink plenty of fluids (unless your doctor tells you not to).  
  · You may notice that your bowel movements are not regular right after your surgery. This is common. Try to avoid constipation and straining with bowel movements. You may want to take a fiber supplement every day.  If you have not had a bowel movement after a couple of days, ask your doctor about taking a mild laxative. Medicines 
  · Your doctor will tell you if and when you can restart your medicines. He or she will also give you instructions about taking any new medicines.  
  · If you take blood thinners, such as warfarin (Coumadin), clopidogrel (Plavix), or aspirin, be sure to talk to your doctor. He or she will tell you if and when to start taking those medicines again. Make sure that you understand exactly what your doctor wants you to do.  
  · Be safe with medicines. Take pain medicines exactly as directed. ¨ If the doctor gave you a prescription medicine for pain, take it as prescribed. ¨ If you are not taking a prescription pain medicine, ask your doctor if you can take an over-the-counter medicine. ¨ Do not take two or more pain medicines at the same time unless the doctor told you to. Many pain medicines have acetaminophen, which is Tylenol. Too much acetaminophen (Tylenol) can be harmful.  
  · If you think your pain medicine is making you sick to your stomach: 
¨ Take your medicine after meals (unless your doctor has told you not to). ¨ Ask your doctor for a different pain medicine.  
  · If your doctor prescribed antibiotics, take them as directed. Do not stop taking them just because you feel better. You need to take the full course of antibiotics. Incision care 
  · If you have strips of tape on the incisions, leave the tape on for a week or until it falls off.  
  · Wash the area daily with water and pat it dry. Other cleaning products, such as hydrogen peroxide, can make the wounds heal more slowly. You may cover the area with a gauze bandage if it weeps or rubs against clothing. Change the bandage every day.  
  · Keep the area clean and dry. Follow-up care is a key part of your treatment and safety.  Be sure to make and go to all appointments, and call your doctor if you are having problems. It's also a good idea to know your test results and keep a list of the medicines you take. When should you call for help? Call 911 anytime you think you may need emergency care. For example, call if: 
  · You passed out (lost consciousness).  
  · You have severe trouble breathing.  
  · You have sudden chest pain and shortness of breath, or you cough up blood or foamy, pink mucus.  
  · You have a lump that is getting bigger under your skin where the incisions were made in your groin.  
  · You have severe pain in your belly.  
  · You have chest pain or pressure. This may occur with: ¨ Sweating. ¨ Shortness of breath. ¨ Nausea or vomiting. ¨ Pain that spreads from the chest to the neck, jaw, or one or both shoulders or arms. ¨ Dizziness or lightheadedness. ¨ A fast or uneven pulse. After calling 911, chew 1 adult-strength or 2 to 4 low-dose aspirin. Wait for an ambulance. Do not try to drive yourself.  
 Call your doctor now or seek immediate medical care if: 
  · You have new or increased shortness of breath.  
  · You are dizzy or lightheaded, or you feel like you may faint.  
  · You are sick to your stomach or cannot keep fluids down.  
  · You have pain that does not get better after you take pain medicine.  
  · You have a fever over 100°F.  
  · You have loose stitches, or one of your incisions comes open.  
  · Bright red blood has soaked through the bandage over your incisions.  
  · You have signs of infection, such as: 
¨ Increased pain, swelling, warmth, or redness. ¨ Red streaks leading from the incisions. ¨ Pus draining from the incisions. ¨ Swollen lymph nodes in your neck, armpits, or groin. ¨ A fever.  
  · You have signs of a blood clot, such as: 
¨ Pain in your calf, back of the knee, thigh, or groin. ¨ Redness and swelling in your leg or groin.  
 Watch closely for any changes in your health, and be sure to contact your doctor if:   · You have sudden weight gain, such as 3 pounds or more in 2 to 3 days.  
  · You have increased swelling in your legs, ankles, or feet. Where can you learn more? Go to http://tal-chava.info/. Enter 66 [phone] 75 in the search box to learn more about \"Endovascular Aortic Aneurysm Repair: What to Expect at Home. \" Current as of: November 21, 2017 Content Version: 11.7 © 1456-6530 [employer]. Care instructions adapted under license by [employer] (which disclaims liability or warranty for this information). If you have questions about a medical condition or this instruction, always ask your healthcare professional. [place] [address] any warranty or liability for your use of this information. Oxycodone/Acetaminophen (By mouth) Acetaminophen (z-qrin-n-MIN-oh-fen), Oxycodone Hydrochloride (qk-o-YVN-done tonia-droe-KLOR-renetta) Treats moderate to moderately severe pain. This medicine is a narcotic pain reliever. Brand Name(s): Endocet, Percocet, Primlev, Xartemis XR There may be other brand names for this medicine. When This Medicine Should Not Be Used: This medicine is not right for everyone. Do not use it if you had an allergic reaction to acetaminophen or oxycodone, or if you have serious breathing problems or paralytic ileus. How to Use This Medicine:  
Capsule, Liquid, Tablet, Long Acting Tablet · Your doctor will tell you how much medicine to use. Do not use more than directed. · An overdose can be dangerous. Follow directions carefully so you do not get too much medicine at one time. · Oral liquid: Measure the oral liquid medicine with a marked measuring spoon, oral syringe, or medicine cup. · Swallow the extended-release tablet whole. Do not crush, break, or chew it. Do not lick or wet the tablet before placing it in your mouth. Do not give this medicine through a feeding tube. · This medicine should come with a Medication Guide. Ask your pharmacist for a copy if you do not have one. · Missed dose: If you miss a dose of this medicine, skip the missed dose and go back to your regular dosing schedule. Do not double doses. · Store the medicine in a closed container at room temperature, away from heat, moisture, and direct light. Ask your pharmacist about the best way to dispose of medicine you do not use. Drugs and Foods to Avoid: Ask your doctor or pharmacist before using any other medicine, including over-the-counter medicines, vitamins, and herbal products. · Do not use Xartemis XR if you are using or have used an MAO inhibitor in the past 14 days. · Some medicines can affect how this medicine works. Tell your doctor if you are using any of the following: ¨ Carbamazepine, erythromycin, ketoconazole, lamotrigine, mirtazapine, naltrexone, phenytoin, propranolol, rifampin, ritonavir, tramadol, trazodone, or zidovudine ¨ Birth control pills ¨ Diuretic (water pill) ¨ Medicine to treat depression ¨ Phenothiazine medicine ¨ Triptan medicine to treat migraine headaches · Do not drink alcohol while you are using this medicine. Acetaminophen can damage your liver, and alcohol can increase this risk. Do not take acetaminophen without asking your doctor if you have 3 or more drinks of alcohol every day. · Tell your doctor if you use anything else that makes you sleepy. Some examples are allergy medicine, narcotic pain medicine, and alcohol. Tell your doctor if you are using buprenorphine, butorphanol, nalbuphine, pentazocine, a benzodiazepine, or a muscle relaxer. Warnings While Using This Medicine: · Tell your doctor if you are pregnant or breastfeeding, or if you have kidney disease, liver disease, heart disease, low blood pressure, breathing problems or lung disease (such as asthma, COPD), thyroid problems, Nadeem disease, pancreas or gallbladder problems, prostate problems, trouble urinating, or a stomach problems, or a history of head injury or brain damage, seizures, or alcohol or drug abuse. Tell your doctor if you are allergic to codeine. · This medicine may cause the following problems: 
¨ High risk of overdose, which can lead to death ¨ Respiratory depression (serious breathing problem that can be life-threatening) ¨ Liver problems ¨ Serious skin reactions ¨ Serotonin syndrome (when used with certain medicines) · This medicine may make you dizzy or drowsy. Do not drive or do anything that could be dangerous until you know how this medicine affects you. Sit or lie down if you feel dizzy. Stand up carefully. · This medicine contains acetaminophen. Read the labels of all other medicines you are using to see if they also contain acetaminophen, or ask your doctor or pharmacist. Radha Garners not use more than 4 grams (4,000 milligrams) total of acetaminophen in one day. · This medicine can be habit-forming. Do not use more than your prescribed dose. Call your doctor if you think your medicine is not working. · Do not stop using this medicine suddenly. Your doctor will need to slowly decrease your dose before you stop it completely. · This medicine could cause infertility. Talk with your doctor before using this medicine if you plan to have children. · This medicine may cause constipation, especially with long-term use. Ask your doctor if you should use a laxative to prevent and treat constipation. · Keep all medicine out of the reach of children. Never share your medicine with anyone. Possible Side Effects While Using This Medicine:  
Call your doctor right away if you notice any of these side effects: · Allergic reaction: Itching or hives, swelling in your face or hands, swelling or tingling in your mouth or throat, chest tightness, trouble breathing · Anxiety, restlessness, fast heartbeat, fever, muscle spasms, twitching, diarrhea, seeing or hearing things that are not there · Blistering, peeling, red skin rash · Blue lips, fingernails, or skin · Dark urine or pale stools, loss of appetite, stomach pain, yellow skin or eyes · Extreme weakness, shallow breathing, uneven heartbeat, seizures, sweating, or cold or clammy skin · Severe confusion, lightheadedness, dizziness, or fainting · Severe constipation, nausea, or vomiting · Trouble breathing or slow breathing If you notice these less serious side effects, talk with your doctor:  
· Headache · Mild constipation, nausea, or vomiting · Mild sleepiness or drowsiness If you notice other side effects that you think are caused by this medicine, tell your doctor. Call your doctor for medical advice about side effects. You may report side effects to FDA at 1-273-FDA-8929 © 2017 [address] Information is for End User's use only and may not be sold, redistributed or otherwise used for commercial purposes. The above information is an  only. It is not intended as medical advice for individual conditions or treatments. Talk to your doctor, nurse or pharmacist before following any medical regimen to see if it is safe and effective for you. Please provide this summary of care documentation to your next provider. Signatures-by signing, you are acknowledging that this After Visit Summary has been reviewed with you and you have received a copy. Patient Signature:  ____________________________________________________________ Date:  ____________________________________________________________  
  
Jesus Barriga Provider Signature:  ____________________________________________________________ Date:  ____________________________________________________________

## 2022-09-22 ENCOUNTER — HOSPITAL ENCOUNTER (OUTPATIENT)
Dept: LAB | Age: 82
Discharge: HOME | End: 2022-09-22
Payer: MEDICARE

## 2022-09-22 ENCOUNTER — OFFICE VISIT (OUTPATIENT)
Dept: INTERNAL MEDICINE CLINIC | Age: 82
End: 2022-09-22
Payer: MEDICARE

## 2022-09-22 VITALS
DIASTOLIC BLOOD PRESSURE: 89 MMHG | OXYGEN SATURATION: 96 % | HEIGHT: 64 IN | HEART RATE: 97 BPM | BODY MASS INDEX: 28.51 KG/M2 | RESPIRATION RATE: 16 BRPM | SYSTOLIC BLOOD PRESSURE: 139 MMHG | WEIGHT: 167 LBS | TEMPERATURE: 98.4 F

## 2022-09-22 DIAGNOSIS — R42 DIZZINESS: ICD-10-CM

## 2022-09-22 DIAGNOSIS — N30.00 ACUTE CYSTITIS WITHOUT HEMATURIA: Primary | ICD-10-CM

## 2022-09-22 DIAGNOSIS — N30.00 ACUTE CYSTITIS WITHOUT HEMATURIA: ICD-10-CM

## 2022-09-22 DIAGNOSIS — Z23 NEEDS FLU SHOT: ICD-10-CM

## 2022-09-22 PROCEDURE — G8754 DIAS BP LESS 90: HCPCS | Performed by: INTERNAL MEDICINE

## 2022-09-22 PROCEDURE — 87186 SC STD MICRODIL/AGAR DIL: CPT

## 2022-09-22 PROCEDURE — 90694 VACC AIIV4 NO PRSRV 0.5ML IM: CPT | Performed by: INTERNAL MEDICINE

## 2022-09-22 PROCEDURE — 1123F ACP DISCUSS/DSCN MKR DOCD: CPT | Performed by: INTERNAL MEDICINE

## 2022-09-22 PROCEDURE — G8752 SYS BP LESS 140: HCPCS | Performed by: INTERNAL MEDICINE

## 2022-09-22 PROCEDURE — G8417 CALC BMI ABV UP PARAM F/U: HCPCS | Performed by: INTERNAL MEDICINE

## 2022-09-22 PROCEDURE — 87077 CULTURE AEROBIC IDENTIFY: CPT

## 2022-09-22 PROCEDURE — G8536 NO DOC ELDER MAL SCRN: HCPCS | Performed by: INTERNAL MEDICINE

## 2022-09-22 PROCEDURE — G0463 HOSPITAL OUTPT CLINIC VISIT: HCPCS | Performed by: INTERNAL MEDICINE

## 2022-09-22 PROCEDURE — G8399 PT W/DXA RESULTS DOCUMENT: HCPCS | Performed by: INTERNAL MEDICINE

## 2022-09-22 PROCEDURE — 87086 URINE CULTURE/COLONY COUNT: CPT

## 2022-09-22 PROCEDURE — 1101F PT FALLS ASSESS-DOCD LE1/YR: CPT | Performed by: INTERNAL MEDICINE

## 2022-09-22 PROCEDURE — 1090F PRES/ABSN URINE INCON ASSESS: CPT | Performed by: INTERNAL MEDICINE

## 2022-09-22 PROCEDURE — 99214 OFFICE O/P EST MOD 30 MIN: CPT | Performed by: INTERNAL MEDICINE

## 2022-09-22 PROCEDURE — G8510 SCR DEP NEG, NO PLAN REQD: HCPCS | Performed by: INTERNAL MEDICINE

## 2022-09-22 PROCEDURE — G8427 DOCREV CUR MEDS BY ELIG CLIN: HCPCS | Performed by: INTERNAL MEDICINE

## 2022-09-22 RX ORDER — MECLIZINE HYDROCHLORIDE 25 MG/1
25 TABLET ORAL
Qty: 30 TABLET | Refills: 0 | Status: SHIPPED | OUTPATIENT
Start: 2022-09-22

## 2022-09-22 RX ORDER — SULFAMETHOXAZOLE AND TRIMETHOPRIM 800; 160 MG/1; MG/1
1 TABLET ORAL 2 TIMES DAILY
Qty: 10 TABLET | Refills: 0 | Status: SHIPPED | OUTPATIENT
Start: 2022-09-22 | End: 2022-09-27

## 2022-09-22 NOTE — PROGRESS NOTES
Faith Keita presents with   Chief Complaint   Patient presents with    Dizziness     X 3 days, patient states she may have UTI                1. \"Have you been to the ER, urgent care clinic since your last visit? Hospitalized since your last visit? \" No    2. \"Have you seen or consulted any other health care providers outside of the [address] [place] [address] since your last visit? \" No     3. For patients aged 39-70: Has the patient had a colonoscopy / FIT/ Cologuard? NA - based on age      If the patient is female:    4. For patients aged 41-77: Has the patient had a mammogram within the past 2 years? NA - based on age or sex      11. For patients aged 21-65: Has the patient had a pap smear?  NA - based on age or sex

## 2022-09-22 NOTE — PROGRESS NOTES
78 y.o. female who presents for evaluation. She reports dizziness spells reminiscent of her prior vertigo episodes ongoing for 3 days. Describes a spinning dizziness particularly when she turns her head to the right. No ear pain, new hearing loss or tinnitus. Denies any fevers or focal neurologic complaints. She has not had syncope or any cardiac symptoms    Secondly, she thinks she has a UTI. Been having some urgency and mild dysuria. No back pain flank pain or fevers    Past Medical History:   Diagnosis Date    AAA (abdominal aortic aneurysm) without rupture ([address]     AAA repair 7/2018 Dr Tete Charles adenomas     2003 Dr Benji Lizarraga; 4/13 Dr Shantal Gilliland    Degenerative arthritis of lumbar spine 12/2018    Dr Nikolas Guadarrama; Ezequiel Fuse showed multilevel degen changes, mod spinal stenosis L3-5, mod severe right L4-5 foraminal disease; s/p epidural; isabel    Dyslipidemia     FHx: heart disease     Gastritis 2006    on EGD Dr. Rocío Givens, neg sprue    Hearing loss     LEFT Dr Richie Green; sensorineural    Hypertension     Hypothyroidism 2007    Dr. Simon Joiner    Hypovitaminosis D     IPMN (intraductal papillary mucinous neoplasm) 06/2018    on mrcp 8mm; no change 10/19, no change 12/20    Lung nodule 06/2016    6 mm RLL (6/16); no change (10/17); no change (12/18), (5/21)    Obesity     peak weight 187 lbs, bmi 33.1 from 4/13; IF 3/18    Osteopenia FRAX 4/12 9.7 and 1.4     DEXA t score 0.6 spine, -1 hip (12/09);  -0.4 spine, -1.3 hip (4/12); -1.2 wrist, -1.6 hip w FRAX 11/2.2 (4/14); -1.0 wrist, -1.6 hip w FRAX 12/2.6 (4/16); -0.7 wrist, -1.6 hip (5/18); -1.1 wrist, -1.8 hip (4/21)    Pancreatitis, chronic ([address] 2018    Psoriasis 2003    on bx Dr. Archuleta Single    Venous insufficiency 2003    on dopplers    Zoster 05/2016    LEFT T11-12; mild postherpetic neuralgia     Current Outpatient Medications   Medication Sig    meclizine (ANTIVERT) 25 mg tablet Take 1 Tablet by mouth three (3) times daily as needed for Dizziness. trimethoprim-sulfamethoxazole (BACTRIM DS, SEPTRA DS) 160-800 mg per tablet Take 1 Tablet by mouth two (2) times a day for 5 days. DULoxetine (CYMBALTA) 60 mg capsule Take 1 Capsule by mouth daily. Synthroid 75 mcg tablet     lisinopriL (PRINIVIL, ZESTRIL) 5 mg tablet     acetaminophen (TYLENOL) 650 mg TbER Take 650 mg by mouth every eight (8) hours as needed for Pain. lisinopriL (PRINIVIL, ZESTRIL) 10 mg tablet Take 1 Tablet by mouth daily. Levothyroxine 75 mcg cap Take 1 Tablet by mouth daily. ezetimibe (ZETIA) 10 mg tablet Take 1 Tablet by mouth daily. atorvastatin (LIPITOR) 40 mg tablet Take 1 Tablet by mouth daily. lidocaine (LIDODERM) 5 % Apply patch to the affected area for 12 hours a day and remove for 12 hours a day. ergocalciferol (VITAMIN D2) 50,000 unit capsule Take 1 Cap by mouth every seven (7) days. No current facility-administered medications for this visit. Allergies   Allergen Reactions    Atenolol Other (comments)     [place] poorly    Cozaar [Losartan] Other (comments)     Headaches    Norvasc [Amlodipine] Other (comments)     Felt poorly      Tramadol Other (comments)     Visit Vitals  /89   Pulse 97   Temp 98.4 °F (36.9 °C) (Temporal)   Resp 16   Ht 5' 4\" (1.626 m)   Wt 167 lb (75.8 kg)   SpO2 96%   BMI 28.67 kg/m²   Sinuses nontender, TMs normal, no adenopathy. Lungs are clear. Heart showed regular rate rhythm. Back showed no CVA tenderness. Abdomen soft and nontender, no hepatosplenomegaly or masses. Positive Lashae-Hallpike on the right with associated nystagmus    Urinalysis showed trace leukocytes, positive nitrites, 1+ protein, negative blood    Assessment and plan:  1. Rule out UTI. Give her Bactrim, call if no improvement  2. Vertigo. Antivert given, call if no improvement          Above conditions discussed at length and patient vocalized understanding.   All questions answered to patient satisfaction

## 2022-09-22 NOTE — PATIENT INSTRUCTIONS
Vaccine Information Statement    Influenza (Flu) Vaccine (Inactivated or Recombinant): What You Need to Know    Many vaccine information statements are available in Serbian and other languages. See www.immunize.org/vis. Hojas de información sobre vacunas están disponibles en español y en muchos otros idiomas. Visite www.immunize.org/vis. 1. Why get vaccinated? Influenza vaccine can prevent influenza (flu). Flu is a contagious disease that spreads around the [facility] every year, usually between October and May. Anyone can get the flu, but it is more dangerous for some people. Infants and young children, people 72 years and older, pregnant people, and people with certain health conditions or a weakened immune system are at greatest risk of flu complications. Pneumonia, bronchitis, sinus infections, and ear infections are examples of flu-related complications. If you have a medical condition, such as heart disease, cancer, or diabetes, flu can make it worse. Flu can cause fever and chills, sore throat, muscle aches, fatigue, cough, headache, and runny or stuffy nose. Some people may have vomiting and diarrhea, though this is more common in children than adults. In an average year, thousands of people in the [facility] die from flu, and many more are hospitalized. Flu vaccine prevents millions of illnesses and flu-related visits to the doctor each year. 2. Influenza vaccines     CDC recommends everyone 6 months and older get vaccinated every flu season. Children 6 months through 6years of age may need 2 doses during a single flu season. Everyone else needs only 1 dose each flu season. It takes about 2 weeks for protection to develop after vaccination. There are many flu viruses, and they are always changing. Each year a new flu vaccine is made to protect against the influenza viruses believed to be likely to cause disease in the upcoming flu season.  Even when the vaccine doesnt exactly match these viruses, it may still provide some protection. Influenza vaccine does not cause flu. Influenza vaccine may be given at the same time as other vaccines. 3. Talk with your health care provider    Tell your vaccination provider if the person getting the vaccine:  Has had an allergic reaction after a previous dose of influenza vaccine, or has any severe, life-threatening allergies   Has ever had Guillain-Barré Syndrome (also called GBS)    In some cases, your health care provider may decide to postpone influenza vaccination until a future visit. Influenza vaccine can be administered at any time during pregnancy. People who are or will be pregnant during influenza season should receive inactivated influenza vaccine. People with minor illnesses, such as a cold, may be vaccinated. People who are moderately or severely ill should usually wait until they recover before getting influenza vaccine. Your health care provider can give you more information. 4. Risks of a vaccine reaction    Soreness, redness, and swelling where the shot is given, fever, muscle aches, and headache can happen after influenza vaccination. There may be a very small increased risk of Guillain-Barré Syndrome (GBS) after inactivated influenza vaccine (the flu shot). Venida Ludy children who get the flu shot along with pneumococcal vaccine (PCV13) and/or DTaP vaccine at the same time might be slightly more likely to have a seizure caused by fever. Tell your health care provider if a child who is getting flu vaccine has ever had a seizure. People sometimes faint after medical procedures, including vaccination. Tell your provider if you feel dizzy or have vision changes or ringing in the ears. As with any medicine, there is a very remote chance of a vaccine causing a severe allergic reaction, other serious injury, or death. 5. What if there is a serious problem?     An allergic reaction could occur after the vaccinated person leaves the clinic. If you see signs of a severe allergic reaction (hives, swelling of the face and throat, difficulty breathing, a fast heartbeat, dizziness, or weakness), call 9-1-1 and get the person to the nearest hospital.    For other signs that concern you, call your health care provider. Adverse reactions should be reported to the Vaccine Adverse Event Reporting System (VAERS). Your health care provider will usually file this report, or you can do it yourself. Visit the VAERS website at www.vaers. [facility].gov or call 1-115.580.5643. VAERS is only for reporting reactions, and VAERS staff members do not give medical advice. 6. The National Vaccine Injury Compensation Program    The [facility] Vaccine Injury Compensation Program (VICP) is a federal program that was created to compensate people who may have been injured by certain vaccines. Claims regarding alleged injury or death due to vaccination have a time limit for filing, which may be as short as two years. Visit the VICP website at www.[facility].gov/vaccinecompensation or call 9-966.479.9904 to learn about the program and about filing a claim. 7. How can I learn more? Ask your health care provider. Call your local or state health department. Visit the website of the Food and Drug Administration (FDA) for vaccine package inserts and additional information at www.fda.gov/vaccines-blood-biologics/vaccines. Contact the Centers for Disease Control and Prevention (CDC): Call 0-728.203.4715 (1-800-CDC-INFO) or  Visit CDCs influenza website at www.cdc.gov/flu. Vaccine Information Statement   Inactivated Influenza Vaccine   8/6/2021  42 [address] [id number]   Department of Health and Human Services  Centers for Disease Control and Prevention    Office Use Only

## 2022-09-22 NOTE — PROGRESS NOTES
Nancy Dewitt is a 78 y.o. female who presents for routine immunizations. She denies any symptoms , reactions or allergies that would exclude them from being immunized today. Risks and adverse reactions were discussed and the VIS was given to them. All questions were addressed. She was observed for 3 min post injection. There were no reactions observed. Verbal order for Flu IM received per Danial Jose MD for pt Jaxson Cabral. Order written down and read back to provider for verification prior to completion.

## 2022-09-25 LAB
BACTERIA SPEC CULT: ABNORMAL
CC UR VC: ABNORMAL
SERVICE CMNT-IMP: ABNORMAL

## 2022-09-26 ENCOUNTER — CLINICAL SUPPORT (OUTPATIENT)
Dept: ORTHOPEDIC SURGERY | Age: 82
End: 2022-09-26
Payer: MEDICARE

## 2022-09-26 VITALS
WEIGHT: 167 LBS | HEART RATE: 94 BPM | TEMPERATURE: 98.2 F | OXYGEN SATURATION: 96 % | BODY MASS INDEX: 28.51 KG/M2 | HEIGHT: 64 IN

## 2022-09-26 DIAGNOSIS — M48.061 SPINAL STENOSIS OF LUMBAR REGION WITHOUT NEUROGENIC CLAUDICATION: ICD-10-CM

## 2022-09-26 DIAGNOSIS — M53.3 CHRONIC RIGHT SACROILIAC PAIN: Primary | ICD-10-CM

## 2022-09-26 DIAGNOSIS — G89.29 CHRONIC RIGHT SACROILIAC PAIN: Primary | ICD-10-CM

## 2022-09-26 DIAGNOSIS — M47.816 SPONDYLOSIS OF LUMBAR REGION WITHOUT MYELOPATHY OR RADICULOPATHY: ICD-10-CM

## 2022-09-26 PROCEDURE — 1123F ACP DISCUSS/DSCN MKR DOCD: CPT | Performed by: PHYSICAL MEDICINE & REHABILITATION

## 2022-09-26 PROCEDURE — 99214 OFFICE O/P EST MOD 30 MIN: CPT | Performed by: PHYSICAL MEDICINE & REHABILITATION

## 2022-09-26 RX ORDER — CLOBETASOL PROPIONATE 0.05 G/100ML
SHAMPOO TOPICAL
COMMUNITY
Start: 2022-09-03

## 2022-09-26 NOTE — PROGRESS NOTES
Isamar Jaquez presents today for   Chief Complaint   Patient presents with    Hip Pain     bilateral    Back Pain     Lower         Is someone accompanying this pt? no    Is the patient using any DME equipment during OV? Yes, rolling walker    Depression Screening:  3 most recent PHQ Screens 9/22/2022   Little interest or pleasure in doing things Not at all   Feeling down, depressed, irritable, or hopeless Not at all   Total Score PHQ 2 0       Learning Assessment:  Learning Assessment 3/14/2019   PRIMARY LEARNER Patient   HIGHEST LEVEL OF EDUCATION - PRIMARY LEARNER  -   BARRIERS PRIMARY LEARNER -   CO-LEARNER CAREGIVER -   PRIMARY LANGUAGE ENGLISH   LEARNER PREFERENCE PRIMARY LISTENING   ANSWERED BY patient   RELATIONSHIP SELF       Abuse Screening:  Abuse Screening Questionnaire 9/22/2022   Do you ever feel afraid of your partner? N   Are you in a relationship with someone who physically or mentally threatens you? N   Is it safe for you to go home? Y       Fall Risk  Fall Risk Assessment, last 12 mths 9/22/2022   Able to walk? Yes   Fall in past 12 months? 0   Do you feel unsteady? 0   Are you worried about falling 0   Is TUG test greater than 12 seconds? -   Is the gait abnormal? -   Number of falls in past 12 months -   Fall with injury? -         Coordination of Care:  1. Have you been to the ER, urgent care clinic since your last visit? no  Hospitalized since your last visit? no    2. Have you seen or consulted any other health care providers outside of the [address] [place] [address] since your last visit? Yes, pcp and ortho Include any pap smears or colon screening.  no

## 2022-09-26 NOTE — PROGRESS NOTES
[address]  [address], [address]  [facility], [address]  Phone: [phone]  Fax: [phone]      Patient: Albania Nunze                                                                              MRN: [id number]        YOB: 1940          AGE: 78 y.o. PCP: Felicia Tucker MD  Date:  09/26/22    Reason for Consultation: Hip Pain (bilateral) and Back Pain (Lower/)      HPI:  Al[place]ia Nunez is a 78 y.o. female with relevant PMH of chronic low back, L [place] 5/2021 -Dr Mahsa Reyes who presents with right low back pain. She is a patient of Tomas Patel NP referred for evaluation for right hip injection. She has a chronic history of low back pain- MRI lumbar spine -  6/25/22- DDD, facet arthritis, moderate right foraminal stenosis at L2/3, and l4/5 right paracentral disc with mild impression on right L5. In the past she has tried a right SI joint injection which was helpful. Currently she is on her last day of abx for a UTI and has been having an issue with vertigo for the past week    Neurologic symptoms: No numbness, tingling, weakness, bowel or bladder changes. No recent falls      Location: The pain is located in the right low back    Radiation: The pain does not radiate. Pain Score: Currently: 8/10    Quality: Pain is of a Achy quality. Aggravating: Pain is exacerbated by walking, sitting  Alleviating: The pain is alleviated by nothing    Prior Treatments:  Physical therapy: NO  Injections:YES  3/18/2022- SI joint injection 90%- relief  9/9/22- left GT bursa injection JR hoffman     Previous Medications: duloxetine - stopped - no relief   Current Medications:  Previous work-up has included:   X-ray right hip 7/20/22  Frontal and lateral views of the right hip. Osteopenia and  superimposed bowel gas slightly limits evaluation of bony detail. No definite  acute fracture or subluxation identified.  Mild to moderately advanced right hip  degenerative changes. Total left hip arthroplasty noted. Bilateral iliac  vascular stents noted. MRI lumbar spine 6/25/22  T12-L1, mild diffuse disc bulge. No canal or foraminal stenosis. Moderate left  facet arthropathy. L1-L2, marked degenerative disc space narrowing with moderate disc osteophyte formation. Foraminal stenosis is present bilaterally. No canal stenosis. L2-L3, foraminal disc protrusions, left greater than right. Moderate facet  arthropathy, left greater than right. Probably mild to moderate left foraminal  stenosis. Moderate to marked right foraminal stenosis. The canal remains patent. L3-L4, diffuse disc bulge with foraminal involvement. Moderate facet  arthropathy. Mild canal stenosis. Mild foraminal narrowing bilaterally. L4-L5, moderate to marked loss of disc space height with minimal diffuse disc  bulge. Disc slightly asymmetric to right paracentral position with mild  impression of the crossing right L5 nerve (as on axial, 11). Moderate facet  arthropathy. L5-S1, no significant disc pathology. Advanced facet arthropathy bilaterally. The canal and foramen remain.   Past Medical History:   Past Medical History:   Diagnosis Date    AAA (abdominal aortic aneurysm) without rupture [facility])     AAA repair 7/2018 Dr Nadeem Oliveira adenomas     2003 Dr Andrea Guerra; 4/13 Dr Rohades Standard    Degenerative arthritis of lumbar spine 12/2018    Dr Tirso Peña; [place] showed multilevel degen changes, mod spinal stenosis L3-5, mod severe right L4-5 foraminal disease; s/p epidural; isabel    Dyslipidemia     FHx: heart disease     Gastritis 2006    on EGD Dr. Joselito Garza, neg sprue    Hearing loss     LEFT Dr Mary Gage; sensorineural    Hypertension     Hypothyroidism 2007    Dr. Mckayla Kuo    Hypovitaminosis D     IPMN (intraductal papillary mucinous neoplasm) 06/2018    on mrcp 8mm; no change 10/19, no change 12/20    Lung nodule 06/2016    6 mm RLL (6/16); no change (10/17); no change (12/18), (5/21)    Obesity     peak weight 187 lbs, bmi 33.1 from 4/13; IF 3/18    Osteopenia FRAX 4/12 9.7 and 1.4     DEXA t score 0.6 spine, -1 hip (12/09);  -0.4 spine, -1.3 hip (4/12); -1.2 wrist, -1.6 hip w FRAX 11/2.2 (4/14); -1.0 wrist, -1.6 hip w FRAX 12/2.6 (4/16); -0.7 wrist, -1.6 hip (5/18); -1.1 wrist, -1.8 hip (4/21)    Pancreatitis, chronic ([facility] [address] 2018    Psoriasis 2003    on bx Dr. Nelli Barnes    Venous insufficiency 2003    on dopplers    Zoster 05/2016    LEFT T11-12; mild postherpetic neuralgia      Past Surgical History:   Past Surgical History:   Procedure Laterality Date    HX AAA REPAIR  07/17/2018    HX [place]chure Left     Dr. Sallie Guzman     HX CATARACT REMOVAL Bilateral     HX COLONOSCOPY      polyp 5/08 Dr Brian Hansen;  4/13 negative Dr. Nathaniel Haque GI  08/2018    Dr Casey Flores; ERCP s/p removal cbd stone complicated by pamcreatitis    HX HIP REPLACEMENT Left 05/2021    Dr West Lemus    HX [address]  09/18/2018    Dr. Mo Santiago      [facility]; CT head neg 9/10    NH CARDIAC SURG PROCEDURE UNLIST  09/2016    NST negative    [facility] CHEST SURGERY PROCEDURE UNLISTED  06/2016    CTA neg PE, 6 mm subpleural nodule      SocHx:   Social History     Tobacco Use    Smoking status: Never    Smokeless tobacco: Never   Substance Use Topics    Alcohol use: Yes     Comment: occasionally      FamHx:? Family History   Problem Relation Age of Onset    Breast Cancer Mother     Cancer Mother     Heart Disease Father        Current Medications:    Current Outpatient Medications   Medication Sig Dispense Refill    clobetasoL 0.05 % sham USE AS SHAMPOO DAILY      meclizine (ANTIVERT) 25 mg tablet Take 1 Tablet by mouth three (3) times daily as needed for Dizziness. 30 Tablet 0    trimethoprim-sulfamethoxazole (BACTRIM DS, SEPTRA DS) 160-800 mg per tablet Take 1 Tablet by mouth two (2) times a day for 5 days.  10 Tablet 0    DULoxetine (CYMBALTA) 60 mg capsule Take 1 Capsule by mouth daily. 30 Capsule 11    Synthroid 75 mcg tablet       acetaminophen (TYLENOL) 650 mg TbER Take 650 mg by mouth every eight (8) hours as needed for Pain. lisinopriL (PRINIVIL, ZESTRIL) 10 mg tablet Take 1 Tablet by mouth daily. 90 Tablet 3    Levothyroxine 75 mcg cap Take 1 Tablet by mouth daily. 90 Capsule 3    ezetimibe (ZETIA) 10 mg tablet Take 1 Tablet by mouth daily. 90 Tablet 3    atorvastatin (LIPITOR) 40 mg tablet Take 1 Tablet by mouth daily. 90 Tablet 3    lidocaine (LIDODERM) 5 % Apply patch to the affected area for 12 hours a day and remove for 12 hours a day. 90 Patch 1    ergocalciferol (VITAMIN D2) 50,000 unit capsule Take 1 Cap by mouth every seven (7) days. 12 Cap 3    lisinopriL (PRINIVIL, ZESTRIL) 5 mg tablet         Allergies: Allergies   Allergen Reactions    Atenolol Other (comments)     [place] poorly    Cozaar [Losartan] Other (comments)     Headaches    Norvasc [Amlodipine] Other (comments)     Felt poorly      Tramadol Other (comments)        Review of Systems:   Gen:    Denied fevers, chills, malaise, fatigue, weight changes   Resp: Denied shortness of breath, cough, wheezing   CVS: Denied chest pain, palpitations   : Denied urinary urgency, frequency, incontinence   GI: Denied nausea, vomiting, constipation, diarrhea   Skin: Denied rashes, wounds   Psych: Denied anxiety, depression   Vasc: Denied claudication, ulcers   Hem: Denied easy bruising/bleeding   MSK: See HPI   Neuro: See HPI         Physical Exam     Vital Signs: Visit Vitals  Pulse 94   Temp 98.2 °F (36.8 °C) (Oral)   Ht 5' 4\" (1.626 m)   Wt 167 lb (75.8 kg)   SpO2 96% Comment: RA   BMI 28.67 kg/m²      General: ??????? Well nourished and well developed female without any acute distress   Psychiatric: ?  Alert and oriented x 3 with normal mood    HEENT: ???????? Atraumatic   Respiratory:   Breathing non-labored and non dyspneic   CV: ????????????????  Peripheral pulses intact, no peripheral edema   Skin: ????????????? No rashes       Neurologic: ?? Sensation: normal and grossly intact thebilateral, lower extremity(s)    Strength: 5/5 in the bilateral, lower extremity(s)   Reflexes: reveals 2+ symmetric DTRs throughout except absent bilateral achilles  Gait: antalgic    Musculoskeletal: Lumbar Exam     Inspection:   Alignment: Normal  Atrophy: None       Tenderness to Palpation:   Lumbar paraspinals Positive right  Lumbar spinous processes Negative  SI Joint:  Positive right  Gluteal:Positive   Greater trochanter: Negative      ROM:   Lumbar ROM: Abnormal restricted ROM   Lumbar facet loading: Negative  Hip ROM: No reproduction of pain with movement seated, supine mild pain with hip IR on right     Special Tests      Slump test: Negative  SLR: Negative  RIZWAN: Positive right low back pain  FADIR: Negative  Log Roll: Negative  SI joint compression: Positive  + P4 on right        Medical Decision Making:    Images: The imaging results as well as the actual images of the studies below were reviewed, visualized and interpreted by me. Labs: The results below were reviewed. X-ray right hip-7/20/22 mild to moderate hip OA    MRI lumbar spine- 6/25/22- facet arthropathy, DDD, L4/5 right lateral recess narrowing, L2/3 moderate/marked right foraminal stenosis    Assessment:   - lumbar spondylosis/facet arthritis  - right SI joint pain    Plan:    =physical therapy- discussed possible vestibular therapy of vertigo persists   -Medications - encouraged her to try cymbalta 60mg for 1 month if no side effects. Counseled regarding side effects and appropriate administration of medications.    -Diagnostics/Imaging - reviewed MRI lumbar spine x-ray right hip   -Injections - Referral to repeat right SI joint injection which has helped in the past   -Lifestyle - encouraged regular walking program   -Education - The patient's diagnosis, prognosis and treatment options were discussed today. All questions were answered. F/U - Marcelo Music NP after SI joint injection      Estefany Plaza MD  [facility]          Total time spent with patient:  32 mins for today's visit was devoted to face-to-face counseling regarding the following:  Discussed diagnosis, treatment options, and risks and benefits of treatment          ?

## 2022-09-27 NOTE — TELEPHONE ENCOUNTER
Pt calling asking to speak to RD nurse.  Says she has a question about her mri
Pt wants to speak with  about results from her MRI.
normal...

## 2022-09-30 ENCOUNTER — TELEPHONE (OUTPATIENT)
Dept: INTERNAL MEDICINE CLINIC | Age: 82
End: 2022-09-30

## 2022-09-30 DIAGNOSIS — R42 DIZZINESS: Primary | ICD-10-CM

## 2022-09-30 RX ORDER — DIAZEPAM 5 MG/1
5 TABLET ORAL
Qty: 20 TABLET | Refills: 0 | Status: SHIPPED | OUTPATIENT
Start: 2022-09-30

## 2022-09-30 NOTE — TELEPHONE ENCOUNTER
Per note    Assessment and plan:  1. Rule out UTI. Give her Bactrim, call if no improvement  2. Vertigo.   Antivert given, call if no improvement       We gave her antivert for the dizziness, not the abx    Valium prn dizziness sent  [facility] ENT pls

## 2022-09-30 NOTE — TELEPHONE ENCOUNTER
Pt calling, says she has finished the antibiotic RD gave her a few days ago. She is still feeling dizzy. Wants to know what else RD would recommend?

## 2022-10-13 NOTE — PROGRESS NOTES
78 y.o. WHITE/NON- female who presents for evaluation    Her back has been flaring and ortega recently got an SI joint injection with minimal effect, has follow up in early Nov w Dr Bonilla [place]    This has been limiting her exercise. No cardiovascular complaints.  They are planning another  [facility] [address] [facility]    She has been having recurring dizziness and has appt with ENT in early Nov as well    No new gi or gu complaints     LAST MEDICARE WELLNESS EXAM: 4/1/14, 4/8/14, 4/14/16, 4/25/17, 5/7/18, 10/2/19, 10/8/20, 10/13/21, 10/26/22    Past Medical History:   Diagnosis Date    AAA (abdominal aortic aneurysm) without rupture     AAA repair 7/2018 Dr Symone Levin     CKD (chronic kidney disease) 2014    Colon adenomas     2003 Dr Rich Pham; 4/13 Dr Gisele Fontenot    Degenerative arthritis of lumbar spine 12/2018    Dr Bonilla [place]; [place] showed multilevel degen changes, mod spinal stenosis L3-5, mod severe right L4-5 foraminal disease; s/p epidural; isabel    Dyslipidemia     FHx: heart disease     Gastritis 2006    on EGD Dr. Mikala De La Cruz, neg sprue    H/O cardiovascular stress test     NST neg (9/16)    H/O echocardiogram 12/2017    ef 55%, no wma, mild inc thickness, gr 1 dd, mild MARIEL    Hearing loss     LEFT Dr Lilliam Jaquez; sensorineural    Hypertension     Hypothyroidism 2007    Dr. Bayron Nagel    IPMN (intraductal papillary mucinous neoplasm) 06/2018    on mrcp 8mm; no change 10/19, no change 12/20    Lung nodule 06/2016    6 mm RLL (6/16); no change (10/17); no change (12/18), (5/21)    Obesity     peak weight 187 lbs, bmi 33.1 from 4/13; IF 3/18    Osteopenia FRAX 4/12 9.7 and 1.4     DEXA t score 0.6 spine, -1 hip (12/09);  -0.4 spine, -1.3 hip (4/12); -1.2 wrist, -1.6 hip w FRAX 11/2.2 (4/14); -1.0 wrist, -1.6 hip w FRAX 12/2.6 (4/16); -0.7 wrist, -1.6 hip (5/18); -1.1 wrist, -1.8 hip (4/21)    [facility]    Psoriasis 2003    on bx Dr. Valerie Terrell    Venous insufficiency 2003    on dopplers    Vitamin D deficiency     Zoster 05/2016    LEFT T11-12; mild postherpetic neuralgia     Past Surgical History:   Procedure Laterality Date    HX AAA REPAIR  07/17/2018    HX Pb Malady Left     Dr. Mk Wells     HX CATARACT REMOVAL Bilateral     HX COLONOSCOPY      polyp 5/08 Dr Silvestre;  4/13 negative Dr. Rehan Kim GI  08/2018    Dr Lorna Garcia; ERCP s/p removal cbd stone complicated by pamcreatitis    HX HIP REPLACEMENT Left 05/2021    Dr Adán Templeton  09/18/2018    Dr. Mervat Brooks      neg MRA/MRI head [facility] 2007; CT head neg 9/10    [place] CHEST SURGERY PROCEDURE UNLISTED  06/2016    CTA neg PE, 6 mm subpleural nodule     Social History     Socioeconomic History    Marital status:      Spouse name: Not on file    Number of children: Not on file    Years of education: Not on file    Highest education level: Not on file   Occupational History    Occupation: ret office mgr   Tobacco Use    Smoking status: Never    Smokeless tobacco: Never   Vaping Use    Vaping Use: Never used   Substance and Sexual Activity    Alcohol use: Yes     Comment: occasionally    Drug use: No    Sexual activity: Not on file   Other Topics Concern    Not on file   Social History Narrative    Not on file     Social Determinants of Health     Financial Resource Strain: Not on file   Food Insecurity: Not on file   Transportation Needs: Not on file   Physical Activity: Not on file   Stress: Not on file   Social Connections: Not on file   Intimate Partner Violence: Not on file   Housing Stability: Not on file     Allergies   Allergen Reactions    Atenolol Other (comments)     [place] poorly    Cozaar [Losartan] Other (comments)     Headaches    Norvasc [Amlodipine] Other (comments)     Felt poorly      Tramadol Other (comments)     Current Outpatient Medications   Medication Sig    diazePAM (VALIUM) 5 mg tablet Take 1 Tablet by mouth every eight (8) hours as needed (dizziness). Max Daily Amount: 15 mg.    clobetasoL 0.05 % sham USE AS SHAMPOO DAILY    meclizine (ANTIVERT) 25 mg tablet Take 1 Tablet by mouth three (3) times daily as needed for Dizziness. DULoxetine (CYMBALTA) 60 mg capsule Take 1 Capsule by mouth daily. Synthroid 75 mcg tablet     acetaminophen (TYLENOL) 650 mg TbER Take 650 mg by mouth every eight (8) hours as needed for Pain. lisinopriL (PRINIVIL, ZESTRIL) 10 mg tablet Take 1 Tablet by mouth daily. Levothyroxine 75 mcg cap Take 1 Tablet by mouth daily. ezetimibe (ZETIA) 10 mg tablet Take 1 Tablet by mouth daily. atorvastatin (LIPITOR) 40 mg tablet Take 1 Tablet by mouth daily. lidocaine (LIDODERM) 5 % Apply patch to the affected area for 12 hours a day and remove for 12 hours a day. ergocalciferol (VITAMIN D2) 50,000 unit capsule Take 1 Cap by mouth every seven (7) days. No current facility-administered medications for this visit. REVIEW OF SYSTEMS:  mammo 12/20, DEXA 4/21, colo 4/13 Dr Rupali Ochoa, sees Dr Helena Smith  Ophtho - no vision change or eye pain  Oral - no mouth pain, tongue or tooth problems  Ears - no hearing loss, ear pain, fullness  Throat - no swallowing problems  Cardiac - no CP, PND, orthopnea, edema, palpitations or syncope  Chest - no breast masses  Resp - no wheezing, chronic coughing, dyspnea  GI - no heartburn, nausea, vomiting, change in bowel habits, bleeding, hemorrhoids  Urinary - no dysuria, hematuria, flank pain, urgency, frequency    Visit Vitals  /79   Pulse 100   Temp 97 °F (36.1 °C) (Temporal)   Resp 16   Ht 5' 4\" (1.626 m)   Wt 167 lb (75.8 kg)   SpO2 98%   BMI 28.67 kg/m²     A&O x3  Affect is appropriate. Mood stable  No apparent distress  Anicteric, no JVD, adenopathy or thyromegaly. No carotid bruits or radiated murmur  Lungs clear to auscultation, no wheezes or rales  Heart showed regular rate and rhythm.  No murmur, rubs, gallops  Abdomen soft nondistended, no hepatosplenomegaly or masses. Extremities without edema.   Pulses 1-2+ symmetrically    LABS  From 3/10 showed                           vit d 38.0  From 9/10 showed                             wbc 7.2, hb 14.3, plt 158  From 3/12 showed   gluc 88, cr 0.87, gfr 66, alt 15, tsh 1.04, ldl-p 1515  From 9/12 showed                                                    tsh 1.69, ldl-p 1469, chol 152, tg 165, hdl 42, ldl-c 77  From 3/13 showed   gluc 83, cr 0.91, gfr 62, alt 10,      ldl-p 1274, chol 124, tg 137, hdl 37, ldl-c 60,   wbc 7.0, hb 14.3, plt 164  From 9/13 showed                 chol 147, tg 139, hdl 39, ldl-c 80  From 3/14 showed   gluc 89, cr 1.02, gfr 53, alt<5,  tsh 1.66,          chol 148, tg 132, hdl 36, ldl-c 86,   wbc 6.7, hb 14.4, plt 179, vit d 45.4   From 10/14 showed                 chol 137, tg 117, hdl 40, ldl-c 74  From 4/15 showed   gluc 91, cr 1.24, gfr 42, alt 30, tsh 0.64,            wbc 6.8, hb 14.8, plt 176  From 10/15 showed gluc 93, cr 1.13, gfr 47,            tsh 0.32,           chol 139, tg 131, hdl 36, ldl-c 77   From 4/16 showed   gluc 86, cr 1.00, gfr 54, alt 24,             chol 142, tg 137, hdl 41, ldl-c 74,   wbc 6.4, hb 13.9, plt 203,       ua pro 30  From 6/16 showed   gluc 97, cr 1.20,               wbc 7.3, hb 14.1, plt 216,       ck/trop-  From 10/16 showed gluc 78, cr 1.03, gfr 52, alt 21, tsh 1.21,           chol 145, tg 151, hdl 43, ldl-c 72,               From 4/17 showed                tsh 2.57, ft4 1.10  From 10/17 showed gluc 79, cr 1.03, gfr 52, alt 27,             chol 199, tg 161, hdl 45, ldl-c 122, wbc 6.3, hb 14.7, plt 208  From 5/18 showed                tsh 5.23, ft4 1.10,      chol 320, tg 196, hdl 40, ldl-c 221,               vit d 57.0  From 8/18 showed   glu 141, cr 0.87, gfr>60,alt 34,                         wb 10.9, hb 13.0, plt 179,      lip 8208  From 11/18 showed                tsh 4.20, ft4 1.00,      chol 133, tg 171, hdl 41, ldl-c 58, vit d 56.5  From 3/19 showed   gluc 81, cr 1.13, gfr 46, alt 23, tsh 3.50, ft4 1.10,               wbc 5.7, hb 14.2, plt 195,        fe 91, %sat 34, b12 306, fol 6, spep neg  From 9/19 showed                tsh 1.40,           chol 151, tg 135, hdl 42, ldl-c 82  From 4/20 showed   gluc 87, cr 1.03, gfr 51, alt 22,              wbc 6.8, hb 14.1, plt 184  From 10/20 showed                tsh 2.44          chol 150, tg 145, hdl 46, ldl-c 75,      From 4/21 showed   glu 109, cr 1.03, gfr 48, alt 21,              wbc 8.5, hb 14.8, plt 214  Form 10/21 showed               tsh 0.4,  ft4 1.24       chol 162, tg 107, hdl 50, ldl-c 91,                From 4/22 showed   gluc 80, cr 1.03, gfr 51, alt 22,              wbc10.3, hb 15.0, plt 225    Results for orders placed or performed during the hospital encounter of 10/19/22   LIPID PANEL   Result Value Ref Range    LIPID PROFILE          Cholesterol, total 148 <200 MG/DL    Triglyceride 153 (H) <150 MG/DL    HDL Cholesterol 45 40 - 60 MG/DL    LDL, calculated 72.4 0 - 100 MG/DL    VLDL, calculated 30.6 MG/DL    CHOL/HDL Ratio 3.3 0 - 5.0     TSH 3RD GENERATION   Result Value Ref Range    TSH 0.42 0.36 - 3.74 uIU/mL   T4, FREE   Result Value Ref Range    T4, Free 1.1 0.7 - 1.5 NG/DL   VITAMIN D, 25 HYDROXY   Result Value Ref Range    Vitamin D 25-Hydroxy 27.1 (L) 30 - 100 ng/mL       We reviewed the patient's labs from the last several visits to point out trends in the numbers        Patient Active Problem List   Diagnosis Code    Colon adenomas 2003, last 2014 Dr Johnson Rank D12.6    Hypovitaminosis D E55.9    Venous insufficiency dopplers 2003 I87.2    Osteopenia FRAX 4/14 11 and 2.2 M85.80    Dyslipidemia E78.5    Hypothyroidism due to acquired atrophy of thyroid E03.4    Arthritis, degenerative M19.90    Obesity (BMI 30.0-34. 9) E66.9    Advance directive discussed with patient Z71.89    Primary hypertension I10    Spondylosis of lumbar region without myelopathy or radiculopathy M47.816    S/P AAA repair Z98.890, Z86.79    Hip arthritis M16.10    Stage 3a chronic kidney disease ([facility] [address] N18.31     ASSESSMENT AND PLAN:  1. Spine. Per Dr Litzy Coleman  2. Hypertension. Controlled on alia  3. Hypovit D.  supplementation   4. Colon adenomas. Fiber  5. Obesity. Lifestyle and dietary measures. Portion control reiterated. 6.  CKD. Stable   7. Dyslipidemia. at target on lipitor/zetia  8. Hypothyroidism. Therapeutic dosing  9. Osteopenia. Wt bearing exercise, ca/D  10. Vascular. Per Dr Melissa Diaz        RTC 4/23    Above conditions discussed at length and patient vocalized understanding. All questions answered to patient satisfaction        ICD-10-CM ICD-9-CM    1. Osteoarthritis, unspecified osteoarthritis type, unspecified site  M19.90 715.90       2. Stage 3a chronic kidney disease (HCC)  N18.31 585.3       3. Dyslipidemia  E78.5 272.4       4. Hypothyroidism due to acquired atrophy of thyroid  E03.4 244.8 T4, FREE     246.8 TSH 3RD GENERATION      5. Primary hypertension  I10 401.9 CBC W/O DIFF      METABOLIC PANEL, COMPREHENSIVE      LIPID PANEL      6.  Colon adenomas 2003, last 2014 Dr Cammy Norton  D12.6 211.3

## 2022-10-19 ENCOUNTER — APPOINTMENT (OUTPATIENT)
Dept: INTERNAL MEDICINE CLINIC | Age: 82
End: 2022-10-19

## 2022-10-19 ENCOUNTER — HOSPITAL ENCOUNTER (OUTPATIENT)
Age: 82
Setting detail: OUTPATIENT SURGERY
Discharge: HOME | End: 2022-10-19
Attending: PHYSICAL MEDICINE & REHABILITATION | Admitting: PHYSICAL MEDICINE & REHABILITATION
Payer: MEDICARE

## 2022-10-19 ENCOUNTER — APPOINTMENT (OUTPATIENT)
Dept: GENERAL RADIOLOGY | Age: 82
End: 2022-10-19
Attending: PHYSICAL MEDICINE & REHABILITATION
Payer: MEDICARE

## 2022-10-19 ENCOUNTER — HOSPITAL ENCOUNTER (OUTPATIENT)
Dept: LAB | Age: 82
Discharge: HOME | End: 2022-10-19
Payer: MEDICARE

## 2022-10-19 VITALS
DIASTOLIC BLOOD PRESSURE: 88 MMHG | HEART RATE: 91 BPM | SYSTOLIC BLOOD PRESSURE: 142 MMHG | RESPIRATION RATE: 16 BRPM | OXYGEN SATURATION: 95 % | TEMPERATURE: 98.7 F

## 2022-10-19 DIAGNOSIS — E03.4 HYPOTHYROIDISM DUE TO ACQUIRED ATROPHY OF THYROID: ICD-10-CM

## 2022-10-19 DIAGNOSIS — E55.9 HYPOVITAMINOSIS D: ICD-10-CM

## 2022-10-19 DIAGNOSIS — E78.5 DYSLIPIDEMIA: ICD-10-CM

## 2022-10-19 DIAGNOSIS — M53.3 SACROILIAC JOINT PAIN: Primary | ICD-10-CM

## 2022-10-19 LAB
25(OH)D3 SERPL-MCNC: 27.1 NG/ML (ref 30–100)
CHOLEST SERPL-MCNC: 148 MG/DL
HDLC SERPL-MCNC: 45 MG/DL (ref 40–60)
HDLC SERPL: 3.3 {RATIO} (ref 0–5)
LDLC SERPL CALC-MCNC: 72.4 MG/DL (ref 0–100)
LIPID PROFILE,FLP: ABNORMAL
T4 FREE SERPL-MCNC: 1.1 NG/DL (ref 0.7–1.5)
TRIGL SERPL-MCNC: 153 MG/DL (ref ?–150)
TSH SERPL DL<=0.05 MIU/L-ACNC: 0.42 UIU/ML (ref 0.36–3.74)
VLDLC SERPL CALC-MCNC: 30.6 MG/DL

## 2022-10-19 PROCEDURE — 82306 VITAMIN D 25 HYDROXY: CPT

## 2022-10-19 PROCEDURE — 36415 COLL VENOUS BLD VENIPUNCTURE: CPT

## 2022-10-19 PROCEDURE — 77030003676 HC NDL SPN MPRI -A: Performed by: PHYSICAL MEDICINE & REHABILITATION

## 2022-10-19 PROCEDURE — 74011000250 HC RX REV CODE- 250: Performed by: PHYSICAL MEDICINE & REHABILITATION

## 2022-10-19 PROCEDURE — 2709999900 HC NON-CHARGEABLE SUPPLY: Performed by: PHYSICAL MEDICINE & REHABILITATION

## 2022-10-19 PROCEDURE — 77030039433 HC TY MYLEOGRAM BD -B: Performed by: PHYSICAL MEDICINE & REHABILITATION

## 2022-10-19 PROCEDURE — 80061 LIPID PANEL: CPT

## 2022-10-19 PROCEDURE — 27096 INJECT SACROILIAC JOINT: CPT | Performed by: PHYSICAL MEDICINE & REHABILITATION

## 2022-10-19 PROCEDURE — 84439 ASSAY OF FREE THYROXINE: CPT

## 2022-10-19 PROCEDURE — 74011000636 HC RX REV CODE- 636: Performed by: PHYSICAL MEDICINE & REHABILITATION

## 2022-10-19 PROCEDURE — 84443 ASSAY THYROID STIM HORMONE: CPT

## 2022-10-19 PROCEDURE — 74011250636 HC RX REV CODE- 250/636: Performed by: PHYSICAL MEDICINE & REHABILITATION

## 2022-10-19 PROCEDURE — 76010000009 HC PAIN MGT 0 TO 30 MIN PROC: Performed by: PHYSICAL MEDICINE & REHABILITATION

## 2022-10-19 RX ORDER — DEXAMETHASONE SODIUM PHOSPHATE 100 MG/10ML
INJECTION INTRAMUSCULAR; INTRAVENOUS AS NEEDED
Status: DISCONTINUED | OUTPATIENT
Start: 2022-10-19 | End: 2022-10-19 | Stop reason: HOSPADM

## 2022-10-19 RX ORDER — DIAZEPAM 5 MG/1
5-20 TABLET ORAL ONCE
Status: DISCONTINUED | OUTPATIENT
Start: 2022-10-19 | End: 2022-10-19 | Stop reason: HOSPADM

## 2022-10-19 RX ORDER — LIDOCAINE HYDROCHLORIDE 10 MG/ML
INJECTION, SOLUTION EPIDURAL; INFILTRATION; INTRACAUDAL; PERINEURAL AS NEEDED
Status: DISCONTINUED | OUTPATIENT
Start: 2022-10-19 | End: 2022-10-19 | Stop reason: HOSPADM

## 2022-10-19 NOTE — PROCEDURES
Evangelina Kuhn MD   Physician   Specialty:  Physical Medicine and Rehabilitation Physician   Procedures      Signed   Date of Service:  10/19/22 1316                  Procedure Note     Patient Name: Ciara Pablo [facility]     Date of Procedure: October 19, 2022     Preoperative Diagnosis: Sacroiliac Joint Dysfunction     Post Operative Diagnosis: same     Procedure: SI Joint Injection right      Consent: Informed consent was obtained prior to the procedure. The patient was given the opportunity to ask questions regarding the procedure and its associated risks. In addition to the potential risks associated with the procedure itself, the patient was informed both verbally and in writing of potential side effects of the use of glucocorticoids. The patient appeared to comprehend the informed consent and desired to have the procedure performed. Procedure: The patient was placed in the prone position on the flouroscopy table and the back was prepped and draped in the usual sterile manner. A #22 gauge spinal needle was then advanced to lie within the SI joint after local Lidocaine 1% injection. 1 cc isovue used to confirm the position. A total of 10 mg of preservative free dexamethasone and 5 cc of Lidocaine was introduced into and around the SI joint. The injection area was cleaned and bandaids applied. No excessive bleeding was noted. Patient dressed and was discharged to home with instructions. Discussion:    The patient tolerated the procedure well.                 Candance Gills, MD  October 19, 2022

## 2022-10-19 NOTE — PROCEDURES
Procedure Note    Patient Name: Dominique Velarde [facility]    Date of Procedure: October 19, 2022    Preoperative Diagnosis: Sacroiliac Joint Dysfunction    Post Operative Diagnosis: same    Procedure: SI Joint Injection right     Consent: Informed consent was obtained prior to the procedure. The patient was given the opportunity to ask questions regarding the procedure and its associated risks. In addition to the potential risks associated with the procedure itself, the patient was informed both verbally and in writing of potential side effects of the use of glucocorticoids. The patient appeared to comprehend the informed consent and desired to have the procedure performed. Procedure: The patient was placed in the prone position on the flouroscopy table and the back was prepped and draped in the usual sterile manner. A #22 gauge spinal needle was then advanced to lie within the SI joint after local Lidocaine 1% injection. 1 cc isovue used to confirm the position. A total of 10 mg of preservative free dexamethasone and 5 cc of Lidocaine was introduced into and around the SI joint. The injection area was cleaned and bandaids applied. No excessive bleeding was noted. Patient dressed and was discharged to home with instructions. Discussion: The patient tolerated the procedure well.      Terrell Mitchell MD  October 19, 2022

## 2022-10-19 NOTE — PERIOP NOTES
Patient verbalized understanding of discharge instructions and verbally consented to [facility] Patient Consent. Signature pad not working.

## 2022-10-19 NOTE — H&P
Date of Surgery Update:  Emmanuel Nugent was seen and examined. History and physical has been reviewed. The patient has been examined. There have been no significant clinical changes since the last office visit. The patient was counseled at length about the risks of matt Covid-19 during their perioperative period and any recovery window from their procedure. The patient was made aware that matt Covid-19  may worsen their prognosis for recovering from their procedure and lend to a higher morbidity and/or mortality risk. All material risks, benefits, and reasonable alternatives including postponing the procedure were discussed. The patient does  wish to proceed with the procedure at this time.     Pre Injection pain level:      8 /10  Post Injection pain level:        5/10       Signed By: Eula Coleman MD     October 19, 2022 1:14 PM

## 2022-10-26 ENCOUNTER — OFFICE VISIT (OUTPATIENT)
Dept: INTERNAL MEDICINE CLINIC | Age: 82
End: 2022-10-26
Payer: MEDICARE

## 2022-10-26 VITALS
OXYGEN SATURATION: 98 % | WEIGHT: 167 LBS | RESPIRATION RATE: 16 BRPM | DIASTOLIC BLOOD PRESSURE: 79 MMHG | BODY MASS INDEX: 28.51 KG/M2 | TEMPERATURE: 97 F | HEIGHT: 64 IN | SYSTOLIC BLOOD PRESSURE: 139 MMHG | HEART RATE: 100 BPM

## 2022-10-26 DIAGNOSIS — Z71.89 ADVANCED DIRECTIVES, COUNSELING/DISCUSSION: ICD-10-CM

## 2022-10-26 DIAGNOSIS — E78.5 DYSLIPIDEMIA: ICD-10-CM

## 2022-10-26 DIAGNOSIS — I10 ESSENTIAL HYPERTENSION WITH GOAL BLOOD PRESSURE LESS THAN 140/90: ICD-10-CM

## 2022-10-26 DIAGNOSIS — Z76.0 MEDICATION REFILL: Primary | ICD-10-CM

## 2022-10-26 DIAGNOSIS — Z00.00 MEDICARE ANNUAL WELLNESS VISIT, SUBSEQUENT: Primary | ICD-10-CM

## 2022-10-26 DIAGNOSIS — E03.9 ACQUIRED HYPOTHYROIDISM: ICD-10-CM

## 2022-10-26 DIAGNOSIS — E03.4 HYPOTHYROIDISM DUE TO ACQUIRED ATROPHY OF THYROID: ICD-10-CM

## 2022-10-26 DIAGNOSIS — M19.90 OSTEOARTHRITIS, UNSPECIFIED OSTEOARTHRITIS TYPE, UNSPECIFIED SITE: ICD-10-CM

## 2022-10-26 DIAGNOSIS — D12.6 COLON ADENOMAS: ICD-10-CM

## 2022-10-26 DIAGNOSIS — Z12.31 ENCOUNTER FOR SCREENING MAMMOGRAM FOR MALIGNANT NEOPLASM OF BREAST: ICD-10-CM

## 2022-10-26 DIAGNOSIS — N18.31 STAGE 3A CHRONIC KIDNEY DISEASE (HCC): ICD-10-CM

## 2022-10-26 PROCEDURE — G8752 SYS BP LESS 140: HCPCS | Performed by: INTERNAL MEDICINE

## 2022-10-26 PROCEDURE — G8754 DIAS BP LESS 90: HCPCS | Performed by: INTERNAL MEDICINE

## 2022-10-26 PROCEDURE — 1123F ACP DISCUSS/DSCN MKR DOCD: CPT | Performed by: INTERNAL MEDICINE

## 2022-10-26 PROCEDURE — 3074F SYST BP LT 130 MM HG: CPT | Performed by: INTERNAL MEDICINE

## 2022-10-26 PROCEDURE — 99497 ADVNCD CARE PLAN 30 MIN: CPT | Performed by: INTERNAL MEDICINE

## 2022-10-26 PROCEDURE — 1090F PRES/ABSN URINE INCON ASSESS: CPT | Performed by: INTERNAL MEDICINE

## 2022-10-26 PROCEDURE — 99214 OFFICE O/P EST MOD 30 MIN: CPT | Performed by: INTERNAL MEDICINE

## 2022-10-26 PROCEDURE — G0439 PPPS, SUBSEQ VISIT: HCPCS | Performed by: INTERNAL MEDICINE

## 2022-10-26 PROCEDURE — G0463 HOSPITAL OUTPT CLINIC VISIT: HCPCS | Performed by: INTERNAL MEDICINE

## 2022-10-26 PROCEDURE — G8417 CALC BMI ABV UP PARAM F/U: HCPCS | Performed by: INTERNAL MEDICINE

## 2022-10-26 PROCEDURE — G8432 DEP SCR NOT DOC, RNG: HCPCS | Performed by: INTERNAL MEDICINE

## 2022-10-26 PROCEDURE — 3078F DIAST BP <80 MM HG: CPT | Performed by: INTERNAL MEDICINE

## 2022-10-26 PROCEDURE — G8427 DOCREV CUR MEDS BY ELIG CLIN: HCPCS | Performed by: INTERNAL MEDICINE

## 2022-10-26 PROCEDURE — G8399 PT W/DXA RESULTS DOCUMENT: HCPCS | Performed by: INTERNAL MEDICINE

## 2022-10-26 PROCEDURE — 1101F PT FALLS ASSESS-DOCD LE1/YR: CPT | Performed by: INTERNAL MEDICINE

## 2022-10-26 PROCEDURE — G8536 NO DOC ELDER MAL SCRN: HCPCS | Performed by: INTERNAL MEDICINE

## 2022-10-26 RX ORDER — LISINOPRIL 10 MG/1
10 TABLET ORAL DAILY
Qty: 90 TABLET | Refills: 3 | Status: SHIPPED | OUTPATIENT
Start: 2022-10-26

## 2022-10-26 RX ORDER — EZETIMIBE 10 MG/1
10 TABLET ORAL DAILY
Qty: 90 TABLET | Refills: 3 | Status: SHIPPED | OUTPATIENT
Start: 2022-10-26

## 2022-10-26 RX ORDER — ATORVASTATIN CALCIUM 40 MG/1
40 TABLET, FILM COATED ORAL DAILY
Qty: 90 TABLET | Refills: 3 | Status: SHIPPED | OUTPATIENT
Start: 2022-10-26

## 2022-10-26 RX ORDER — LEVOTHYROXINE SODIUM 75 UG/1
1 CAPSULE ORAL DAILY
Qty: 90 CAPSULE | Refills: 3 | Status: SHIPPED | OUTPATIENT
Start: 2022-10-26

## 2022-10-26 NOTE — PROGRESS NOTES
Sindy Padilla presents with No chief complaint on file. Patient  came in and stated she needs the pended meds, although I think she still has refills on synthroid if it makes a difference.

## 2022-10-26 NOTE — PATIENT INSTRUCTIONS

## 2022-10-26 NOTE — ACP (ADVANCE CARE PLANNING)
Advance Care Planning     Advance Care Planning (ACP) Physician/NP/PA Conversation      Date of Conversation: 10/26/2022  Conducted with: Patient with Decision Making Capacity    Healthcare Decision Maker:     Primary Decision Maker: Axel Cabral - Spouse - [phone]  Click here to complete [address] including selection of the Healthcare Decision Maker Relationship (ie \"Primary\")      Today we documented Decision Maker(s) consistent with Legal Next of Kin hierarchy. Care Preferences:    Hospitalization: \"If your health worsens and it becomes clear that your chance of recovery is unlikely, what would be your preference regarding hospitalization? \"  The patient would prefer hospitalization. Ventilation: \"If you were unable to breathe on your own and your chance of recovery was unlikely, what would be your preference about the use of a ventilator (breathing machine) if it was available to you? \"   The patient would desire the use of a ventilator. Resuscitation: \"In the event your heart stopped as a result of an underlying serious health condition, would you want attempts to be made to restart your heart, or would you prefer a natural death? \"   Yes, attempt to resuscitate.     Additional topics discussed: ventilation preferences, hospitalization preferences, and resuscitation preferences    Conversation Outcomes / Follow-Up Plan:   ACP in process - information provided, considering goals and options  Reviewed DNR/DNI and patient elects Full Code (Attempt Resuscitation)     Length of Voluntary ACP Conversation in minutes:  16 minutes    Isabell Shaw MD

## 2022-10-26 NOTE — PROGRESS NOTES
This is a Subsequent Medicare Annual Wellness Visit    I have reviewed the patient's medical history in detail and updated the computerized patient record. Assessment/Plan   Education and counseling provided:  Are appropriate based on today's review and evaluation  End-of-Life planning (with patient's consent)  Influenza Vaccine  Screening Mammography  Cardiovascular screening blood test  Diabetes screening test    1. Medicare annual wellness visit, subsequent  2. Osteoarthritis, unspecified osteoarthritis type, unspecified site  3. Stage 3a chronic kidney disease ([facility] [address]  4. Dyslipidemia  5. Hypothyroidism due to acquired atrophy of thyroid  -     T4, FREE; Future  -     TSH 3RD GENERATION; Future  6. Primary hypertension  -     CBC W/O DIFF; Future  -     METABOLIC PANEL, COMPREHENSIVE; Future  -     LIPID PANEL; Future  7. Colon adenomas 2003, last 2014 Dr Rita Iverson  8. Advanced directives, counseling/discussion  -     ADVANCE CARE PLANNING FIRST 30 MINS  9. Encounter for screening mammogram for malignant neoplasm of breast  -     SARWAT MAMMO BI SCREENING INCL CAD; Future       Depression Risk Factor Screening     3 most recent PHQ Screens 9/22/2022   Little interest or pleasure in doing things Not at all   Feeling down, depressed, irritable, or hopeless Not at all   Total Score PHQ 2 0       Alcohol & Drug Abuse Risk Screen    Do you average more than 1 drink per night or more than 7 drinks a week:  No    On any one occasion in the past three months have you have had more than 3 drinks containing alcohol:  No          Functional Ability and Level of Safety    Hearing: Hearing is good. Activities of Daily Living: The home contains: no safety equipment. Patient does total self care      Ambulation: with no difficulty     Fall Risk:  Fall Risk Assessment, last 12 mths 9/22/2022   Able to walk? Yes   Fall in past 12 months? 0   Do you feel unsteady?  0   Are you worried about falling 0   Is TUG test greater than 12 seconds? -   Is the gait abnormal? -   Number of falls in past 12 months -   Fall with injury? -      Abuse Screen:  Patient is not abused       Cognitive Screening    Has your family/caregiver stated any concerns about your memory: no     Cognitive Screening: Normal - Mini Cog Test    Health Maintenance Due     Health Maintenance Due   Topic Date Due    COVID-19 Vaccine (4 - Booster for Kendall Murphy series) 12/23/2021       Patient Care Team   Patient Care Team:  Sharifa Schumacher MD as PCP - General (Internal Medicine Physician)  Sharifa Schumacher MD as PCP - [facility]  Abida Andre NP (Nurse Practitioner)  Libby Burciaga MD (Physical Medicine and Rehabilitation Physician)    History     Patient Active Problem List   Diagnosis Code    Colon adenomas 2003, last 2014 Dr Rupali Ochoa D12.6    Hypovitaminosis D E55.9    Venous insufficiency dopplers 2003 I87.2    Osteopenia FRAX 4/14 11 and 2.2 M85.80    Dyslipidemia E78.5    Hypothyroidism due to acquired atrophy of thyroid E03.4    Arthritis, degenerative M19.90    Obesity (BMI 30.0-34. 9) E66.9    Advance directive discussed with patient Z71.89    Primary hypertension I10    Spondylosis of lumbar region without myelopathy or radiculopathy M47.816    S/P AAA repair Z98.890, Z86.79    Hip arthritis M16.10    Stage 3a chronic kidney disease ([address] N18.31     Past Medical History:   Diagnosis Date    AAA (abdominal aortic aneurysm) without rupture     AAA repair 7/2018 Dr Ben Ramos     CKD (chronic kidney disease) 2014    Colon adenomas     2003 Dr Grace Aguilera; 4/13 Dr Rupali Ochoa    Degenerative arthritis of lumbar spine 12/2018    Dr Yang Murphy; Talia Mcqueen showed multilevel degen changes, mod spinal stenosis L3-5, mod severe right L4-5 foraminal disease; s/p epidural; isabel    Dyslipidemia     FHx: heart disease     Gastritis 2006    on EGD Dr. Flaquito Almanza, neg sprue    H/O cardiovascular stress test     NST neg (9/16)    H/O echocardiogram 12/2017    ef 55%, no wma, mild inc thickness, gr 1 dd, mild MARIEL    Hearing loss     LEFT Dr Lurdes Chahal; sensorineural    Hypertension     Hypothyroidism 2007    Dr. Lauro De La Fuente    IPMN (intraductal papillary mucinous neoplasm) 06/2018    on mrcp 8mm; no change 10/19, no change 12/20    Lung nodule 06/2016    6 mm RLL (6/16); no change (10/17); no change (12/18), (5/21)    Obesity     peak weight 187 lbs, bmi 33.1 from 4/13; IF 3/18    Osteopenia FRAX 4/12 9.7 and 1.4     DEXA t score 0.6 spine, -1 hip (12/09);  -0.4 spine, -1.3 hip (4/12); -1.2 wrist, -1.6 hip w FRAX 11/2.2 (4/14); -1.0 wrist, -1.6 hip w FRAX 12/2.6 (4/16); -0.7 wrist, -1.6 hip (5/18); -1.1 wrist, -1.8 hip (4/21)    Pancreatitis, chronic ([facility] [address] 2018    Psoriasis 2003    on bx Dr. Nubia Ryan    Venous insufficiency 2003    on dopplers    Vitamin D deficiency     Zoster 05/2016    LEFT T11-12; mild postherpetic neuralgia      Past Surgical History:   Procedure Laterality Date    HX AAA REPAIR  07/17/2018    HX Deb Mix Left     Dr. Manuel Nurse     HX CATARACT REMOVAL Bilateral     HX COLONOSCOPY      polyp 5/08 Dr Suhas Miguel;  4/13 negative Dr. Domitila Moyer GI  08/2018    Dr Cari Schofield; ERCP s/p removal cbd stone complicated by pamcreatitis    HX HIP REPLACEMENT Left 05/2021    Dr Hilda Clark    HX [address]  09/18/2018    Dr. Tyler Brand      neg MRA/MRI head in [facility] [place] [facility] 2007; CT head neg 9/10    ID CHEST SURGERY PROCEDURE UNLISTED  06/2016    CTA neg PE, 6 mm subpleural nodule     Current Outpatient Medications   Medication Sig Dispense Refill    diazePAM (VALIUM) 5 mg tablet Take 1 Tablet by mouth every eight (8) hours as needed (dizziness). Max Daily Amount: 15 mg. 20 Tablet 0    clobetasoL 0.05 % sham USE AS SHAMPOO DAILY      meclizine (ANTIVERT) 25 mg tablet Take 1 Tablet by mouth three (3) times daily as needed for Dizziness. 30 Tablet 0    DULoxetine (CYMBALTA) 60 mg capsule Take 1 Capsule by mouth daily. 30 Capsule 11    Synthroid 75 mcg tablet       acetaminophen (TYLENOL) 650 mg TbER Take 650 mg by mouth every eight (8) hours as needed for Pain. lisinopriL (PRINIVIL, ZESTRIL) 10 mg tablet Take 1 Tablet by mouth daily. 90 Tablet 3    Levothyroxine 75 mcg cap Take 1 Tablet by mouth daily. 90 Capsule 3    ezetimibe (ZETIA) 10 mg tablet Take 1 Tablet by mouth daily. 90 Tablet 3    atorvastatin (LIPITOR) 40 mg tablet Take 1 Tablet by mouth daily. 90 Tablet 3    lidocaine (LIDODERM) 5 % Apply patch to the affected area for 12 hours a day and remove for 12 hours a day. 90 Patch 1    ergocalciferol (VITAMIN D2) 50,000 unit capsule Take 1 Cap by mouth every seven (7) days.  12 Cap 3     Allergies   Allergen Reactions    Atenolol Other (comments)     [place] poorly    Cozaar [Losartan] Other (comments)     Headaches    Norvasc [Amlodipine] Other (comments)     Felt poorly      Tramadol Other (comments)       Family History   Problem Relation Age of Onset    Breast Cancer Mother     Cancer Mother     Heart Disease Father      Social History     Tobacco Use    Smoking status: Never    Smokeless tobacco: Never   Substance Use Topics    Alcohol use: Yes     Comment: occasionally         [place] MD

## 2022-10-29 DIAGNOSIS — Z12.31 ENCOUNTER FOR SCREENING MAMMOGRAM FOR MALIGNANT NEOPLASM OF BREAST: ICD-10-CM

## 2022-11-01 ENCOUNTER — TELEPHONE (OUTPATIENT)
Dept: ORTHOPEDIC SURGERY | Age: 82
End: 2022-11-01

## 2022-11-04 DIAGNOSIS — Z95.828 HISTORY OF ENDOVASCULAR STENT GRAFT FOR ABDOMINAL AORTIC ANEURYSM: ICD-10-CM

## 2022-11-04 DIAGNOSIS — I71.40 AAA (ABDOMINAL AORTIC ANEURYSM) WITHOUT RUPTURE: ICD-10-CM

## 2022-12-05 ENCOUNTER — OFFICE VISIT (OUTPATIENT)
Dept: ORTHOPEDIC SURGERY | Age: 82
End: 2022-12-05
Payer: MEDICARE

## 2022-12-05 VITALS
HEIGHT: 64 IN | WEIGHT: 173 LBS | OXYGEN SATURATION: 97 % | HEART RATE: 79 BPM | TEMPERATURE: 97.7 F | BODY MASS INDEX: 29.53 KG/M2

## 2022-12-05 DIAGNOSIS — M48.061 SPINAL STENOSIS OF LUMBAR REGION WITHOUT NEUROGENIC CLAUDICATION: ICD-10-CM

## 2022-12-05 DIAGNOSIS — G89.29 CHRONIC RIGHT SACROILIAC PAIN: Primary | ICD-10-CM

## 2022-12-05 DIAGNOSIS — M47.816 SPONDYLOSIS OF LUMBAR REGION WITHOUT MYELOPATHY OR RADICULOPATHY: ICD-10-CM

## 2022-12-05 DIAGNOSIS — M53.3 CHRONIC RIGHT SACROILIAC PAIN: Primary | ICD-10-CM

## 2022-12-05 PROCEDURE — G8432 DEP SCR NOT DOC, RNG: HCPCS | Performed by: NURSE PRACTITIONER

## 2022-12-05 PROCEDURE — 1090F PRES/ABSN URINE INCON ASSESS: CPT | Performed by: NURSE PRACTITIONER

## 2022-12-05 PROCEDURE — 1123F ACP DISCUSS/DSCN MKR DOCD: CPT | Performed by: NURSE PRACTITIONER

## 2022-12-05 PROCEDURE — 1101F PT FALLS ASSESS-DOCD LE1/YR: CPT | Performed by: NURSE PRACTITIONER

## 2022-12-05 PROCEDURE — G8536 NO DOC ELDER MAL SCRN: HCPCS | Performed by: NURSE PRACTITIONER

## 2022-12-05 PROCEDURE — G8427 DOCREV CUR MEDS BY ELIG CLIN: HCPCS | Performed by: NURSE PRACTITIONER

## 2022-12-05 PROCEDURE — G8756 NO BP MEASURE DOC: HCPCS | Performed by: NURSE PRACTITIONER

## 2022-12-05 PROCEDURE — G8417 CALC BMI ABV UP PARAM F/U: HCPCS | Performed by: NURSE PRACTITIONER

## 2022-12-05 PROCEDURE — 99214 OFFICE O/P EST MOD 30 MIN: CPT | Performed by: NURSE PRACTITIONER

## 2022-12-05 PROCEDURE — G8399 PT W/DXA RESULTS DOCUMENT: HCPCS | Performed by: NURSE PRACTITIONER

## 2022-12-05 RX ORDER — TOPIRAMATE 25 MG/1
TABLET ORAL
Qty: 90 TABLET | Refills: 1 | Status: SHIPPED | OUTPATIENT
Start: 2022-12-05

## 2022-12-05 NOTE — PROGRESS NOTES
Chief complaint   Chief Complaint   Patient presents with    Back Pain       History of Present Illness:  Alexis Kincaid is a  78 y.o.  female who comes in today for follow-up of her chronic low back pain. She underwent a right SI joint injection on October 19, 2022 which she reports 60% relief. She states it is starting to wear off now. She does wear a back brace which does help. She states she does not wear it all the time but when she is out and about she will wear it and it helps. She is also had a previous left total hip replacement in May 2021. She has tried and failed tramadol and Tylenol 3 along with gabapentin and Cymbalta. She denies fever bowel bladder dysfunction. Physical Exam: Patient is a 24-year-old female well-developed well-nourished who is alert and oriented with a normal mood and affect. She has a slow but full weightbearing antalgic gait utilizing a single-point cane. She has 4 out of 5 strength bilateral lower extremities. Negative straight leg raise. She does have some pain with hyperextension lumbar spine. Assessment and Plan: This is a patient who has lumbar degenerative disc disease and mild central stenosis at L3-4 with chronic right sided SI joint pain. I will try her on Topamax and ramp her up to 75 mg at nighttime. We will see her back in 2 months sooner if needed. Medications:  Current Outpatient Medications   Medication Sig Dispense Refill    topiramate (TOPAMAX) 25 mg tablet 1 tab po q hs x 1 week then 2 tabs q hs x 1 week then 3 tabs q hs 90 Tablet 1    lisinopriL (PRINIVIL, ZESTRIL) 10 mg tablet Take 1 Tablet by mouth daily. 90 Tablet 3    ezetimibe (ZETIA) 10 mg tablet Take 1 Tablet by mouth daily. 90 Tablet 3    Levothyroxine 75 mcg cap Take 1 Tablet by mouth daily. 90 Capsule 3    atorvastatin (LIPITOR) 40 mg tablet Take 1 Tablet by mouth daily.  90 Tablet 3    diazePAM (VALIUM) 5 mg tablet Take 1 Tablet by mouth every eight (8) hours as needed (dizziness). Max Daily Amount: 15 mg. 20 Tablet 0    clobetasoL 0.05 % sham USE AS SHAMPOO DAILY      meclizine (ANTIVERT) 25 mg tablet Take 1 Tablet by mouth three (3) times daily as needed for Dizziness. 30 Tablet 0    Synthroid 75 mcg tablet       acetaminophen (TYLENOL) 650 mg TbER Take 650 mg by mouth every eight (8) hours as needed for Pain.      lidocaine (LIDODERM) 5 % Apply patch to the affected area for 12 hours a day and remove for 12 hours a day. 90 Patch 1    ergocalciferol (VITAMIN D2) 50,000 unit capsule Take 1 Cap by mouth every seven (7) days.  12 Cap 3           Review of systems:    Past Medical History:   Diagnosis Date    AAA (abdominal aortic aneurysm) without rupture     AAA repair 7/2018 Dr Valerie Skinner     CKD (chronic kidney disease) 2014    Colon adenomas     2003 Dr James Flower; 4/13 Dr Rita Iverson    Degenerative arthritis of lumbar spine 12/2018    Dr Emani Mancera; Tata Koch showed multilevel degen changes, mod spinal stenosis L3-5, mod severe right L4-5 foraminal disease; s/p epidural; isabel    Dyslipidemia     FHx: heart disease     Gastritis 2006    on EGD Dr. Rk Antoine, neg sprue    H/O cardiovascular stress test     NST neg (9/16)    H/O echocardiogram 12/2017    ef 55%, no wma, mild inc thickness, gr 1 dd, mild MARIEL    Hearing loss     LEFT Dr Angelic Moise; sensorineural    Hypertension     Hypothyroidism 2007    Dr. Dylon Naylor    IPMN (intraductal papillary mucinous neoplasm) 06/2018    on mrcp 8mm; no change 10/19, no change 12/20    Lung nodule 06/2016    6 mm RLL (6/16); no change (10/17); no change (12/18), (5/21)    Obesity     peak weight 187 lbs, bmi 33.1 from 4/13; IF 3/18    Osteopenia FRAX 4/12 9.7 and 1.4     DEXA t score 0.6 spine, -1 hip (12/09);  -0.4 spine, -1.3 hip (4/12); -1.2 wrist, -1.6 hip w FRAX 11/2.2 (4/14); -1.0 wrist, -1.6 hip w FRAX 12/2.6 (4/16); -0.7 wrist, -1.6 hip (5/18); -1.1 wrist, -1.8 hip (4/21)    Pancreatitis, chronic ([facility] 2018    Psoriasis 2003    on bx Dr. Elliott Postal Venous insufficiency 2003    on dopplers    Vitamin D deficiency     Zoster 05/2016    LEFT T11-12; mild postherpetic neuralgia     Past Surgical History:   Procedure Laterality Date    HX AAA REPAIR  07/17/2018    HX Parminder Booth Left     Dr. Yocasta Harris     HX CATARACT REMOVAL Bilateral     HX COLONOSCOPY      polyp 5/08 Dr Silvia Ronquillo;  4/13 negative Dr. Kristy Rosenthal GI  08/2018    Dr Savanna Mancia; ERCP s/p removal cbd stone complicated by pamcreatitis    HX HIP REPLACEMENT Left 05/2021    Dr Radha Alcocer  09/18/2018    Dr. Cristo Peguero      neg MRA[facility] 2007; CT head neg 9/10    AL CHEST SURGERY PROCEDURE UNLISTED  06/2016    CTA neg PE, 6 mm subpleural nodule     Social History     Socioeconomic History    Marital status:      Spouse name: Not on file    Number of children: Not on file    Years of education: Not on file    Highest education level: Not on file   Occupational History    Occupation: ret office mgr   Tobacco Use    Smoking status: Never    Smokeless tobacco: Never   Vaping Use    Vaping Use: Never used   Substance and Sexual Activity    Alcohol use: Yes     Comment: occasionally    Drug use: No    Sexual activity: Not on file   Other Topics Concern    Not on file   Social History Narrative    Not on file     Social Determinants of Health     Financial Resource Strain: Not on file   Food Insecurity: Not on file   Transportation Needs: Not on file   Physical Activity: Not on file   Stress: Not on file   Social Connections: Not on file   Intimate Partner Violence: Not on file   Housing Stability: Not on file     Family History   Problem Relation Age of Onset    Breast Cancer Mother     Cancer Mother     Heart Disease Father        Physical Exam:  Visit Vitals  Pulse 79   Temp 97.7 °F (36.5 °C) (Temporal)   Ht 5' 4\" (1.626 m)   Wt 173 lb (78.5 kg)   SpO2 97%   BMI 29.70 kg/m²     Pain Scale: 6/10       has been .reviewed and is appropriate          Diagnoses and all orders for this visit:    1. Chronic right sacroiliac pain  -     topiramate (TOPAMAX) 25 mg tablet; 1 tab po q hs x 1 week then 2 tabs q hs x 1 week then 3 tabs q hs    2. Spinal stenosis of lumbar region without neurogenic claudication  -     topiramate (TOPAMAX) 25 mg tablet; 1 tab po q hs x 1 week then 2 tabs q hs x 1 week then 3 tabs q hs    3. Spondylosis of lumbar region without myelopathy or radiculopathy  -     topiramate (TOPAMAX) 25 mg tablet; 1 tab po q hs x 1 week then 2 tabs q hs x 1 week then 3 tabs q hs          Follow-up and Dispositions    Return in about 2 months (around 2/5/2023) for with np Burton. We have informed Olegario Cortes to notify us for immediate appointment if she has any worsening neurogical symptoms or if an emergency situation presents, then call 911    Please note that this dictation was completed with [employer], the computer voice recognition software. Quite often unanticipated grammatical, syntax, homophones, and other interpretive errors are inadvertently transcribed by the computer software. Please disregard these errors. Please excuse any errors that have escaped final proofreading.

## 2022-12-05 NOTE — PROGRESS NOTES
Emmanuel Nugent presents today for   Chief Complaint   Patient presents with    Back Pain       Is someone accompanying this pt? no    Is the patient using any DME equipment during OV? no    Depression Screening:  3 most recent PHQ Screens 9/22/2022   Little interest or pleasure in doing things Not at all   Feeling down, depressed, irritable, or hopeless Not at all   Total Score PHQ 2 0       Learning Assessment:  Learning Assessment 3/14/2019   PRIMARY LEARNER Patient   HIGHEST LEVEL OF EDUCATION - PRIMARY LEARNER  -   BARRIERS PRIMARY LEARNER -   CO-LEARNER CAREGIVER -   PRIMARY LANGUAGE ENGLISH   LEARNER PREFERENCE PRIMARY LISTENING   ANSWERED BY patient   RELATIONSHIP SELF       Abuse Screening:  Abuse Screening Questionnaire 9/22/2022   Do you ever feel afraid of your partner? N   Are you in a relationship with someone who physically or mentally threatens you? N   Is it safe for you to go home? Y       Fall Risk  Fall Risk Assessment, last 12 mths 9/22/2022   Able to walk? Yes   Fall in past 12 months? 0   Do you feel unsteady? 0   Are you worried about falling 0   Is TUG test greater than 12 seconds? -   Is the gait abnormal? -   Number of falls in past 12 months -   Fall with injury? -       OPIOID RISK TOOL  No flowsheet data found. Coordination of Care:  1. Have you been to the ER, urgent care clinic since your last visit? no  Hospitalized since your last visit? no    2. Have you seen or consulted any other health care providers outside of the [address] [place] [address] since your last visit? no Include any pap smears or colon screening.  no

## 2022-12-14 ENCOUNTER — HOSPITAL ENCOUNTER (OUTPATIENT)
Dept: MAMMOGRAPHY | Age: 82
Discharge: HOME | End: 2022-12-14
Attending: INTERNAL MEDICINE
Payer: MEDICARE

## 2022-12-14 DIAGNOSIS — Z12.31 ENCOUNTER FOR SCREENING MAMMOGRAM FOR MALIGNANT NEOPLASM OF BREAST: ICD-10-CM

## 2022-12-14 PROCEDURE — 77063 BREAST TOMOSYNTHESIS BI: CPT

## 2022-12-19 ENCOUNTER — OFFICE VISIT (OUTPATIENT)
Dept: ORTHOPEDIC SURGERY | Age: 82
End: 2022-12-19
Payer: MEDICARE

## 2022-12-19 DIAGNOSIS — M70.71 BURSITIS OF RIGHT HIP, UNSPECIFIED BURSA: ICD-10-CM

## 2022-12-19 DIAGNOSIS — M70.72 BURSITIS OF LEFT HIP, UNSPECIFIED BURSA: ICD-10-CM

## 2022-12-19 DIAGNOSIS — M25.551 RIGHT HIP PAIN: ICD-10-CM

## 2022-12-19 DIAGNOSIS — M16.11 ARTHRITIS OF RIGHT HIP: Primary | ICD-10-CM

## 2022-12-19 PROCEDURE — 20611 DRAIN/INJ JOINT/BURSA W/US: CPT | Performed by: PHYSICIAN ASSISTANT

## 2022-12-19 RX ORDER — BETAMETHASONE SODIUM PHOSPHATE AND BETAMETHASONE ACETATE 3; 3 MG/ML; MG/ML
6 INJECTION, SUSPENSION INTRA-ARTICULAR; INTRALESIONAL; INTRAMUSCULAR; SOFT TISSUE ONCE
Status: COMPLETED | OUTPATIENT
Start: 2022-12-19 | End: 2022-12-19

## 2022-12-19 RX ADMIN — BETAMETHASONE SODIUM PHOSPHATE AND BETAMETHASONE ACETATE 6 MG: 3; 3 INJECTION, SUSPENSION INTRA-ARTICULAR; INTRALESIONAL; INTRAMUSCULAR; SOFT TISSUE at 10:05

## 2022-12-19 NOTE — PROGRESS NOTES
[address] [place] [address]  [phone]           Patient: Braulio Thomason                MRN: [id number]       SSN: [id number]  YOB: 1940        AGE: 78 y.o. SEX: female  There is no height or weight on file to calculate BMI. PCP: Fernanda Zarco MD  12/19/22            REVIEW OF SYSTEMS:  Constitutional: Negative for fever, chills, weight loss and malaise/fatigue. HENT: Negative. Eyes: Negative. Respiratory: Negative. Cardiovascular: Negative. Gastrointestinal: No bowel incontinence or constipation. Genitourinary: No bladder incontinence or saddle anesthesia. Skin: Negative. Neurological: Negative. Endo/Heme/Allergies: Negative. Psychiatric/Behavioral: Negative. Musculoskeletal: As per HPI above.      Past Medical History:   Diagnosis Date    AAA (abdominal aortic aneurysm) without rupture     AAA repair 7/2018 Dr Harjinder Almanza     CKD (chronic kidney disease) 2014    Colon adenomas     2003 Dr Maricarmen Roberts; 4/13 Dr Myrna Irving    Degenerative arthritis of lumbar spine 12/2018     [facility]; [facility] showed multilevel degen changes, mod spinal stenosis L3-5, mod severe right L4-5 foraminal disease; s/p epidural; isabel    Dyslipidemia     FHx: heart disease     Gastritis 2006    on EGD Dr. Michelle Wise, neg sprue    H/O cardiovascular stress test     NST neg (9/16)    H/O echocardiogram 12/2017    ef 55%, no wma, mild inc thickness, gr 1 dd, mild MARIEL    Hearing loss     LEFT Dr Harjinder Dorsey; sensorineural    Hypertension     Hypothyroidism 2007    Dr. Krunal Mcnamara    IPMN (intraductal papillary mucinous neoplasm) 06/2018    on mrcp 8mm; no change 10/19, no change 12/20    Lung nodule 06/2016    6 mm RLL (6/16); no change (10/17); no change (12/18), (5/21)    Obesity     peak weight 187 lbs, bmi 33.1 from 4/13; IF 3/18    Osteopenia FRAX 4/12 9.7 and 1.4     DEXA t score 0.6 spine, -1 hip (12/09);  -0.4 spine, -1.3 hip (4/12); -1.2 wrist, -1.6 hip w FRAX 11/2.2 (4/14); -1.0 wrist, -1.6 hip w FRAX 12/2.6 (4/16); -0.7 wrist, -1.6 hip (5/18); -1.1 wrist, -1.8 hip (4/21)    Pancreatitis, chronic ([facility] [address] 2018    Psoriasis 2003    on bx Dr. Qiana Baker    Venous insufficiency 2003    on dopplers    Vitamin D deficiency     Zoster 05/2016    LEFT T11-12; mild postherpetic neuralgia         Current Outpatient Medications:     topiramate (TOPAMAX) 25 mg tablet, 1 tab po q hs x 1 week then 2 tabs q hs x 1 week then 3 tabs q hs, Disp: 90 Tablet, Rfl: 1    lisinopriL (PRINIVIL, ZESTRIL) 10 mg tablet, Take 1 Tablet by mouth daily. , Disp: 90 Tablet, Rfl: 3    ezetimibe (ZETIA) 10 mg tablet, Take 1 Tablet by mouth daily. , Disp: 90 Tablet, Rfl: 3    Levothyroxine 75 mcg cap, Take 1 Tablet by mouth daily. , Disp: 90 Capsule, Rfl: 3    atorvastatin (LIPITOR) 40 mg tablet, Take 1 Tablet by mouth daily. , Disp: 90 Tablet, Rfl: 3    diazePAM (VALIUM) 5 mg tablet, Take 1 Tablet by mouth every eight (8) hours as needed (dizziness). Max Daily Amount: 15 mg., Disp: 20 Tablet, Rfl: 0    clobetasoL 0.05 % sham, USE AS SHAMPOO DAILY, Disp: , Rfl:     meclizine (ANTIVERT) 25 mg tablet, Take 1 Tablet by mouth three (3) times daily as needed for Dizziness. , Disp: 30 Tablet, Rfl: 0    Synthroid 75 mcg tablet, , Disp: , Rfl:     acetaminophen (TYLENOL) 650 mg TbER, Take 650 mg by mouth every eight (8) hours as needed for Pain., Disp: , Rfl:     lidocaine (LIDODERM) 5 %, Apply patch to the affected area for 12 hours a day and remove for 12 hours a day., Disp: 90 Patch, Rfl: 1    ergocalciferol (VITAMIN D2) 50,000 unit capsule, Take 1 Cap by mouth every seven (7) days. , Disp: 12 Cap, Rfl: 3    Allergies   Allergen Reactions    Atenolol Other (comments)     [place] poorly    Cozaar [Losartan] Other (comments)     Headaches    Norvasc [Amlodipine] Other (comments)     Felt poorly      Tramadol Other (comments)       Social History     Socioeconomic History    Marital status:      Spouse name: Not on file    Number of children: Not on file    Years of education: Not on file    Highest education level: Not on file   Occupational History    Occupation: ret office mgr   Tobacco Use    Smoking status: Never    Smokeless tobacco: Never   Vaping Use    Vaping Use: Never used   Substance and Sexual Activity    Alcohol use: Yes     Comment: occasionally    Drug use: No    Sexual activity: Not on file   Other Topics Concern    Not on file   Social History Narrative    Not on file     Social Determinants of Health     Financial Resource Strain: Not on file   Food Insecurity: Not on file   Transportation Needs: Not on file   Physical Activity: Not on file   Stress: Not on file   Social Connections: Not on file   Intimate Partner Violence: Not on file   Housing Stability: Not on file       Past Surgical History:   Procedure Laterality Date    HX AAA REPAIR  07/17/2018    HX Holly Martín Left     Dr. Julia Park     HX CATARACT REMOVAL Bilateral     HX COLONOSCOPY      polyp 5/08 Dr Yani Renteria;  4/13 negative Dr. Zara PRYOR  08/2018    Dr Lea Acuna; ERCP s/p removal cbd stone complicated by pamcreatitis    HX HIP REPLACEMENT Left 05/2021    Dr Brayan Gonzalez  09/18/2018    Dr. An Or      neg MRA/[facility] head [facility] 2007; CT head neg 9/10    MT CHEST SURGERY PROCEDURE UNLISTED  06/2016    CTA neg PE, 6 mm subpleural nodule       Patient seen evaluated today with complaints of lateral base discomfort of her right hip. She also some back troubles being followed by the spine center. She denies any radiating pain down the lower extremity. She does have increased discomfort with lying on her right side at night as well as with ambulation. Patient denies recent fevers, chills, chest pain, SOB, or injuries. No recent systemic changes noted. A 12-point review of systems is performed today.   Pertinent positives are noted. All other systems reviewed and otherwise are negative. Physical exam: General: Alert and oriented x3, nad.  well-developed, well nourished. normal affect, AF. NC/AT, EOMI, neck supple, trachea midline, no JVD present. Breathing is non-labored. Examination of the lower extremities reveals pain-free range of motion of the hips. She does have some tenderness to palpation to the SI joint on the right side. The pelvis is stable. There is pain to palpation the trochanter bursa on the right side. Negative straight leg raise. Negative calf tenderness. Negative Homans. No signs of DVT present. Assessment: Right hip trochanter bursitis    Plan: At this point, we discussed treatment options. We will move forward a cortisone junction for the right hip, trochanter bursa. After informed consent, under aseptic conditions, with US guided assitance, the right hip was prepped with betadine and a mxiture of 3ml 1% lidocaine and 6mg of celestone was injected without complications. The patient tolerated the injection well. The patient is instructed on post-injection care. We will see her back in the office in 3 months time for evaluation. We will plan an AP pelvis upon her return. She will call with any questions or concerns that should arise. Chart reviewed for the following:  I, Marylene Osgood, PA-C, have reviewed the History, Physical and updated the Allergic reactions for Sabiha Breeding [facility]?     TIME OUT performed immediately prior to start of procedure:  I, Marylene Osgood, PA-C, have performed the following reviews on Antonina Moralez prior to the start of the procedure:  ????????  * Patient was identified by name and date of birth   * Agreement on procedure being performed was verified  * Risks and Benefits explained to the patient  * Procedure site verified and marked as necessary  * Patient was positioned for comfort  * Consent was signed and verified    Time:9:59 AM    Body part: right hip, intra-bursal    Medication & Dose: 3ml 1% lidocaine and 6mg celestone    Date of procedure: 12/19/22    Procedure performed by: Ishaan Dumont PA-C    Provider assisted by: none    Patient assisted by: self    How tolerated by patient: tolerated the procedure well with no complications    Post Procedural Pain Scale: 6    Comments:   [address] using a frequency of 10MHz with a 12L-RS transducer head was used to confirm needle placement.   Ultrasound images captured using [address] Ultrasound machine and scanned into patient's chart       Kelsy Drake PA-C, ATC

## 2023-02-04 DIAGNOSIS — I10 ESSENTIAL HYPERTENSION WITH GOAL BLOOD PRESSURE LESS THAN 140/90: Primary | ICD-10-CM

## 2023-02-05 DIAGNOSIS — E03.4 HYPOTHYROIDISM DUE TO ACQUIRED ATROPHY OF THYROID: Primary | ICD-10-CM

## 2023-02-06 DIAGNOSIS — E03.4 HYPOTHYROIDISM DUE TO ACQUIRED ATROPHY OF THYROID: Primary | ICD-10-CM

## 2023-02-07 DIAGNOSIS — I10 ESSENTIAL HYPERTENSION WITH GOAL BLOOD PRESSURE LESS THAN 140/90: Primary | ICD-10-CM

## 2023-04-27 ENCOUNTER — HOSPITAL ENCOUNTER (OUTPATIENT)
Facility: HOSPITAL | Age: 83
Setting detail: SPECIMEN
Discharge: HOME | End: 2023-04-27
Payer: MEDICARE

## 2023-04-27 DIAGNOSIS — E03.4 HYPOTHYROIDISM DUE TO ACQUIRED ATROPHY OF THYROID: ICD-10-CM

## 2023-04-27 DIAGNOSIS — I10 ESSENTIAL HYPERTENSION WITH GOAL BLOOD PRESSURE LESS THAN 140/90: ICD-10-CM

## 2023-04-27 LAB
ALBUMIN SERPL-MCNC: 3.9 G/DL (ref 3.4–5)
ALBUMIN/GLOB SERPL: 1.3 (ref 0.8–1.7)
ALP SERPL-CCNC: 95 U/L (ref 45–117)
ALT SERPL-CCNC: 21 U/L (ref 13–56)
ANION GAP SERPL CALC-SCNC: 2 MMOL/L (ref 3–18)
AST SERPL-CCNC: 16 U/L (ref 10–38)
BILIRUB SERPL-MCNC: 0.6 MG/DL (ref 0.2–1)
BUN SERPL-MCNC: 27 MG/DL (ref 7–18)
BUN/CREAT SERPL: 27 (ref 12–20)
CALCIUM SERPL-MCNC: 9.8 MG/DL (ref 8.5–10.1)
CHLORIDE SERPL-SCNC: 111 MMOL/L (ref 100–111)
CHOLEST SERPL-MCNC: 151 MG/DL
CO2 SERPL-SCNC: 28 MMOL/L (ref 21–32)
CREAT SERPL-MCNC: 1 MG/DL (ref 0.6–1.3)
ERYTHROCYTE [DISTWIDTH] IN BLOOD BY AUTOMATED COUNT: 14.6 % (ref 11.6–14.5)
GLOBULIN SER CALC-MCNC: 3 G/DL (ref 2–4)
GLUCOSE SERPL-MCNC: 85 MG/DL (ref 74–99)
HCT VFR BLD AUTO: 45.5 % (ref 35–45)
HDLC SERPL-MCNC: 49 MG/DL (ref 40–60)
HDLC SERPL: 3.1 (ref 0–5)
HGB BLD-MCNC: 14.2 G/DL (ref 12–16)
LDLC SERPL CALC-MCNC: 76.8 MG/DL (ref 0–100)
LIPID PANEL: NORMAL
MCH RBC QN AUTO: 30.9 PG (ref 24–34)
MCHC RBC AUTO-ENTMCNC: 31.2 G/DL (ref 31–37)
MCV RBC AUTO: 98.9 FL (ref 78–100)
NRBC # BLD: 0 K/UL (ref 0–0.01)
NRBC BLD-RTO: 0 PER 100 WBC
PLATELET # BLD AUTO: 193 K/UL (ref 135–420)
PMV BLD AUTO: 11.3 FL (ref 9.2–11.8)
POTASSIUM SERPL-SCNC: 4 MMOL/L (ref 3.5–5.5)
PROT SERPL-MCNC: 6.9 G/DL (ref 6.4–8.2)
RBC # BLD AUTO: 4.6 M/UL (ref 4.2–5.3)
SODIUM SERPL-SCNC: 141 MMOL/L (ref 136–145)
T4 FREE SERPL-MCNC: 1.1 NG/DL (ref 0.7–1.5)
TRIGL SERPL-MCNC: 126 MG/DL
VLDLC SERPL CALC-MCNC: 25.2 MG/DL
WBC # BLD AUTO: 7.6 K/UL (ref 4.6–13.2)

## 2023-04-27 PROCEDURE — 36415 COLL VENOUS BLD VENIPUNCTURE: CPT

## 2023-04-27 PROCEDURE — 80053 COMPREHEN METABOLIC PANEL: CPT

## 2023-04-27 PROCEDURE — 85027 COMPLETE CBC AUTOMATED: CPT

## 2023-04-27 PROCEDURE — 84439 ASSAY OF FREE THYROXINE: CPT

## 2023-04-27 PROCEDURE — 80061 LIPID PANEL: CPT

## 2023-04-28 DIAGNOSIS — Z86.79 S/P AAA REPAIR: Primary | ICD-10-CM

## 2023-04-28 DIAGNOSIS — Z98.890 S/P AAA REPAIR: Primary | ICD-10-CM

## 2023-04-28 DIAGNOSIS — I71.43 ANEURYSM OF INFRARENAL ABDOMINAL AORTA (HCC): ICD-10-CM

## 2023-05-01 DIAGNOSIS — I73.9 PAD (PERIPHERAL ARTERY DISEASE) (HCC): Primary | ICD-10-CM

## 2023-05-02 NOTE — PROGRESS NOTES
78 y.o. female who presents for evaluation. Her back has been active but she has appt w Dr Dago Montanez soon. This has been limiting her exercise but does not stop them from going on their  trips every few months to visit their son. No cardiovascular complaints.      No new gi or gu complaints     Continues to see Dr Shraddha Atkins for her ENT issues    [address]: 4/1/14, 4/8/14, 4/14/16, 4/25/17, 5/7/18, 10/2/19, 10/8/20, 10/13/21, 10/26/22    Past Medical History:   Diagnosis Date    AAA (abdominal aortic aneurysm) without rupture ([facility] [address]     AAA repair 7/2018 Dr Willam Rey     CKD (chronic kidney disease) 2014    Colon adenomas     2003 Dr Juanita Valentin; 4/13 Dr Anatoly Young    Degenerative arthritis of lumbar spine 12/2018    Dr Dago Montanez; [facility] showed multilevel degen changes, mod spinal stenosis L3-5, mod severe right L4-5 foraminal disease; s/p epidural; suma    Dyslipidemia     FHx: heart disease     Gastritis 2006    on EGD Dr. Albino Christensen, neg sprue    H/O cardiovascular stress test     NST neg (9/16)    H/O echocardiogram 12/2017    ef 55%, no wma, mild inc thickness, gr 1 dd, mild CHRISTINE    Hearing loss     LEFT Dr Sifuentes Apo; sensorineural    Hypertension     Hypothyroidism 2007    Dr. Sandra Perez    IPMN (intraductal papillary mucinous neoplasm) 06/2018    on mrcp 8mm; no change 10/19, no change 12/20    Lung nodule 06/2016    6 mm RLL (6/16); no change (10/17); no change (12/18), (5/21)    Obesity     peak weight 187 lbs, bmi 33.1 from 4/13; IF 3/18    Osteopenia     DEXA t score 0.6 spine, -1 hip (12/09);  -0.4 spine, -1.3 hip (4/12); -1.2 wrist, -1.6 hip w FRAX 11/2.2 (4/14); -1.0 wrist, -1.6 hip w FRAX 12/2.6 (4/16); -0.7 wrist, -1.6 hip (5/18); -1.1 wrist, -1.8 hip (4/21)    Pancreatitis, chronic ([facility] [address] 2018    Psoriasis 2003    on bx Dr. Topher Acevedo    Venous insufficiency 2003    on dopplers    Vitamin D deficiency     Zoster 05/2016    LEFT T11-12; mild postherpetic neuralgia     Past Surgical History:

## 2023-05-04 NOTE — PROGRESS NOTES
Milesstanley Medley presents today for   Chief Complaint   Patient presents with    Follow-up    Discuss Labs     4-27-23    Hypertension    Hyperlipidemia    Chronic Kidney Disease     Stage 3a     Thyroid Problem     Atrophy of thyroid      1. \"Have you been to the ER, urgent care clinic since your last visit? Hospitalized since your last visit? \" no    2. \"Have you seen or consulted any other health care providers outside of the [address] [place] [address] since your last visit? \" no     3. For patients aged 39-70: Has the patient had a colonoscopy / FIT/ Cologuard? NA - based on age      If the patient is female:    4. For patients aged 41-77: Has the patient had a mammogram within the past 2 years? NA - based on age or sex      11. For patients aged 21-65: Has the patient had a pap smear?  NA - based on age or sex

## 2023-05-05 ENCOUNTER — OFFICE VISIT (OUTPATIENT)
Age: 83
End: 2023-05-05
Payer: MEDICARE

## 2023-05-05 VITALS
RESPIRATION RATE: 18 BRPM | OXYGEN SATURATION: 98 % | HEART RATE: 77 BPM | DIASTOLIC BLOOD PRESSURE: 64 MMHG | TEMPERATURE: 97.5 F | WEIGHT: 169 LBS | SYSTOLIC BLOOD PRESSURE: 134 MMHG | HEIGHT: 64 IN | BODY MASS INDEX: 28.85 KG/M2

## 2023-05-05 DIAGNOSIS — N18.31 STAGE 3A CHRONIC KIDNEY DISEASE (HCC): ICD-10-CM

## 2023-05-05 DIAGNOSIS — E78.5 DYSLIPIDEMIA: ICD-10-CM

## 2023-05-05 DIAGNOSIS — I10 PRIMARY HYPERTENSION: Primary | ICD-10-CM

## 2023-05-05 DIAGNOSIS — E03.4 HYPOTHYROIDISM DUE TO ACQUIRED ATROPHY OF THYROID: ICD-10-CM

## 2023-05-05 PROCEDURE — 1123F ACP DISCUSS/DSCN MKR DOCD: CPT | Performed by: INTERNAL MEDICINE

## 2023-05-05 PROCEDURE — 3078F DIAST BP <80 MM HG: CPT | Performed by: INTERNAL MEDICINE

## 2023-05-05 PROCEDURE — 1036F TOBACCO NON-USER: CPT | Performed by: INTERNAL MEDICINE

## 2023-05-05 PROCEDURE — 99214 OFFICE O/P EST MOD 30 MIN: CPT | Performed by: INTERNAL MEDICINE

## 2023-05-05 PROCEDURE — G8419 CALC BMI OUT NRM PARAM NOF/U: HCPCS | Performed by: INTERNAL MEDICINE

## 2023-05-05 PROCEDURE — G8427 DOCREV CUR MEDS BY ELIG CLIN: HCPCS | Performed by: INTERNAL MEDICINE

## 2023-05-05 PROCEDURE — 1090F PRES/ABSN URINE INCON ASSESS: CPT | Performed by: INTERNAL MEDICINE

## 2023-05-05 PROCEDURE — G8399 PT W/DXA RESULTS DOCUMENT: HCPCS | Performed by: INTERNAL MEDICINE

## 2023-05-05 PROCEDURE — 3074F SYST BP LT 130 MM HG: CPT | Performed by: INTERNAL MEDICINE

## 2023-05-05 SDOH — ECONOMIC STABILITY: INCOME INSECURITY: HOW HARD IS IT FOR YOU TO PAY FOR THE VERY BASICS LIKE FOOD, HOUSING, MEDICAL CARE, AND HEATING?: NOT HARD AT ALL

## 2023-05-05 SDOH — ECONOMIC STABILITY: FOOD INSECURITY: WITHIN THE PAST 12 MONTHS, YOU WORRIED THAT YOUR FOOD WOULD RUN OUT BEFORE YOU GOT MONEY TO BUY MORE.: NEVER TRUE

## 2023-05-05 SDOH — ECONOMIC STABILITY: FOOD INSECURITY: WITHIN THE PAST 12 MONTHS, THE FOOD YOU BOUGHT JUST DIDN'T LAST AND YOU DIDN'T HAVE MONEY TO GET MORE.: NEVER TRUE

## 2023-05-05 SDOH — ECONOMIC STABILITY: HOUSING INSECURITY
IN THE LAST 12 MONTHS, WAS THERE A TIME WHEN YOU DID NOT HAVE A STEADY PLACE TO SLEEP OR SLEPT IN A SHELTER (INCLUDING NOW)?: NO

## 2023-05-05 ASSESSMENT — PATIENT HEALTH QUESTIONNAIRE - PHQ9
1. LITTLE INTEREST OR PLEASURE IN DOING THINGS: 0
SUM OF ALL RESPONSES TO PHQ9 QUESTIONS 1 & 2: 0
SUM OF ALL RESPONSES TO PHQ QUESTIONS 1-9: 0
SUM OF ALL RESPONSES TO PHQ QUESTIONS 1-9: 0
2. FEELING DOWN, DEPRESSED OR HOPELESS: 0
SUM OF ALL RESPONSES TO PHQ QUESTIONS 1-9: 0
SUM OF ALL RESPONSES TO PHQ QUESTIONS 1-9: 0

## 2023-05-08 ENCOUNTER — HOSPITAL ENCOUNTER (OUTPATIENT)
Facility: HOSPITAL | Age: 83
Discharge: HOME | End: 2023-05-11
Payer: MEDICARE

## 2023-05-08 DIAGNOSIS — I71.40 ABDOMINAL AORTIC ANEURYSM (AAA) WITHOUT RUPTURE, UNSPECIFIED PART (HCC): ICD-10-CM

## 2023-05-08 DIAGNOSIS — Z95.828 HISTORY OF ENDOVASCULAR STENT GRAFT FOR ABDOMINAL AORTIC ANEURYSM: ICD-10-CM

## 2023-05-08 PROCEDURE — 74150 CT ABDOMEN W/O CONTRAST: CPT

## 2023-05-19 ENCOUNTER — TRANSCRIBE ORDERS (OUTPATIENT)
Facility: HOSPITAL | Age: 83
End: 2023-05-19

## 2023-05-19 DIAGNOSIS — R42 DIZZINESS: Primary | ICD-10-CM

## 2023-05-24 ENCOUNTER — OFFICE VISIT (OUTPATIENT)
Age: 83
End: 2023-05-24
Payer: MEDICARE

## 2023-05-24 VITALS
SYSTOLIC BLOOD PRESSURE: 108 MMHG | WEIGHT: 167 LBS | DIASTOLIC BLOOD PRESSURE: 60 MMHG | OXYGEN SATURATION: 96 % | BODY MASS INDEX: 28.51 KG/M2 | HEIGHT: 64 IN | HEART RATE: 89 BPM

## 2023-05-24 DIAGNOSIS — Z98.890 STATUS POST ENDOVASCULAR ANEURYSM REPAIR (EVAR): Primary | ICD-10-CM

## 2023-05-24 DIAGNOSIS — Z86.79 STATUS POST ENDOVASCULAR ANEURYSM REPAIR (EVAR): Primary | ICD-10-CM

## 2023-05-24 PROCEDURE — 3078F DIAST BP <80 MM HG: CPT | Performed by: PHYSICIAN ASSISTANT

## 2023-05-24 PROCEDURE — 1090F PRES/ABSN URINE INCON ASSESS: CPT | Performed by: PHYSICIAN ASSISTANT

## 2023-05-24 PROCEDURE — 3074F SYST BP LT 130 MM HG: CPT | Performed by: PHYSICIAN ASSISTANT

## 2023-05-24 PROCEDURE — 1123F ACP DISCUSS/DSCN MKR DOCD: CPT | Performed by: PHYSICIAN ASSISTANT

## 2023-05-24 PROCEDURE — 99213 OFFICE O/P EST LOW 20 MIN: CPT | Performed by: PHYSICIAN ASSISTANT

## 2023-05-24 PROCEDURE — G8427 DOCREV CUR MEDS BY ELIG CLIN: HCPCS | Performed by: PHYSICIAN ASSISTANT

## 2023-05-24 PROCEDURE — 1036F TOBACCO NON-USER: CPT | Performed by: PHYSICIAN ASSISTANT

## 2023-05-24 PROCEDURE — G8419 CALC BMI OUT NRM PARAM NOF/U: HCPCS | Performed by: PHYSICIAN ASSISTANT

## 2023-05-24 PROCEDURE — G8399 PT W/DXA RESULTS DOCUMENT: HCPCS | Performed by: PHYSICIAN ASSISTANT

## 2023-05-24 ASSESSMENT — ENCOUNTER SYMPTOMS
CHEST TIGHTNESS: 0
NAUSEA: 0
BACK PAIN: 1
DIARRHEA: 0
SHORTNESS OF BREATH: 0
VOMITING: 0

## 2023-05-24 NOTE — PROGRESS NOTES
1. Have you been to the ER, urgent care clinic since your last visit? No Hospitalized since your last visit? No    2. Have you seen or consulted any other health care providers outside of the [address] [place] [address] since your last visit? Include any pap smears or colon screening.  No
deficiency     Zoster 05/2016    LEFT T11-12; mild postherpetic neuralgia       Past Surgical History:   Procedure Laterality Date    ABDOMINAL AORTIC ANEURYSM REPAIR  07/17/2018    LUCIA Duong Left     Dr. Prasanna Leon     CATARACT REMOVAL Bilateral     CHEST SURGERY  06/2016    CTA neg PE, 6 mm subpleural nodule    CHOLECYSTECTOMY, LAPAROSCOPIC  09/18/2018    Dr. Janes Keating    COLONOSCOPY      polyp 5/08 Dr Fco Serrano;  4/13 negative Dr. Adrien Baird    GI  08/2018    Dr Felix Vasquez; ERCP s/p removal cbd stone complicated by pamcreatitis    NEUROLOGICAL SURGERY      neg MRA/MRI head in [facility] 2007; CT head neg 9/10    TONSILLECTOMY      TOTAL HIP ARTHROPLASTY Left 05/2021    Dr Robbie Isaac       Allergies   Allergen Reactions    Amlodipine Other (See Comments)     Felt poorly    Atenolol Other (See Comments)     Felt poorly    Losartan Other (See Comments)     Headaches    Tramadol Other (See Comments)       Current Outpatient Medications on File Prior to Visit   Medication Sig Dispense Refill    acetaminophen (TYLENOL) 650 MG extended release tablet Take 1 tablet by mouth every 8 hours as needed      atorvastatin (LIPITOR) 40 MG tablet Take 1 tablet by mouth daily      Clobetasol Propionate 0.05 % SHAM USE AS SHAMPOO DAILY      diazePAM (VALIUM) 5 MG tablet Take 1 tablet by mouth every 8 hours as needed. ergocalciferol (ERGOCALCIFEROL) 1.25 MG (64144 UT) capsule Take 1 capsule by mouth every 7 days      ezetimibe (ZETIA) 10 MG tablet Take 1 tablet by mouth daily      Levothyroxine Sodium 75 MCG CAPS Take 1 tablet by mouth daily      levothyroxine (SYNTHROID) 75 MCG tablet ceived the following from [facility]: Outside name: Synthroid 75 mcg tablet      lidocaine (LIDODERM) 5 % Apply patch to the affected area for 12 hours a day and remove for 12 hours a day.       lisinopril (PRINIVIL;ZESTRIL) 10 MG tablet Take 1 tablet by mouth daily      meclizine (ANTIVERT) 25 MG tablet Take 1 tablet by mouth 3 times

## 2023-06-01 ENCOUNTER — HOSPITAL ENCOUNTER (OUTPATIENT)
Facility: HOSPITAL | Age: 83
Discharge: HOME | End: 2023-06-01
Attending: OTOLARYNGOLOGY
Payer: MEDICARE

## 2023-06-01 DIAGNOSIS — R42 DIZZINESS: ICD-10-CM

## 2023-06-01 LAB — CREAT UR-MCNC: 1.1 MG/DL (ref 0.6–1.3)

## 2023-06-01 PROCEDURE — A9577 INJ MULTIHANCE: HCPCS | Performed by: OTOLARYNGOLOGY

## 2023-06-01 PROCEDURE — 70553 MRI BRAIN STEM W/O & W/DYE: CPT

## 2023-06-01 PROCEDURE — 6360000004 HC RX CONTRAST MEDICATION: Performed by: OTOLARYNGOLOGY

## 2023-06-01 PROCEDURE — 82565 ASSAY OF CREATININE: CPT

## 2023-06-01 RX ADMIN — GADOBENATE DIMEGLUMINE 15 ML: 529 INJECTION, SOLUTION INTRAVENOUS at 18:20

## 2023-07-17 ENCOUNTER — TELEPHONE (OUTPATIENT)
Age: 83
End: 2023-07-17

## 2023-07-17 DIAGNOSIS — E78.5 DYSLIPIDEMIA: Primary | ICD-10-CM

## 2023-07-17 RX ORDER — EZETIMIBE 10 MG/1
10 TABLET ORAL DAILY
Qty: 90 TABLET | Refills: 3 | Status: SHIPPED | OUTPATIENT
Start: 2023-07-17

## 2023-08-31 ENCOUNTER — OFFICE VISIT (OUTPATIENT)
Age: 83
End: 2023-08-31
Payer: MEDICARE

## 2023-08-31 VITALS
HEART RATE: 87 BPM | BODY MASS INDEX: 29.19 KG/M2 | OXYGEN SATURATION: 96 % | RESPIRATION RATE: 18 BRPM | SYSTOLIC BLOOD PRESSURE: 132 MMHG | DIASTOLIC BLOOD PRESSURE: 69 MMHG | WEIGHT: 171 LBS | HEIGHT: 64 IN | TEMPERATURE: 97.4 F

## 2023-08-31 DIAGNOSIS — M48.061 SPINAL STENOSIS, LUMBAR REGION WITHOUT NEUROGENIC CLAUDICATION: Primary | ICD-10-CM

## 2023-08-31 DIAGNOSIS — M47.816 SPONDYLOSIS WITHOUT MYELOPATHY OR RADICULOPATHY, LUMBAR REGION: ICD-10-CM

## 2023-08-31 PROCEDURE — 3075F SYST BP GE 130 - 139MM HG: CPT | Performed by: NURSE PRACTITIONER

## 2023-08-31 PROCEDURE — 1090F PRES/ABSN URINE INCON ASSESS: CPT | Performed by: NURSE PRACTITIONER

## 2023-08-31 PROCEDURE — G8419 CALC BMI OUT NRM PARAM NOF/U: HCPCS | Performed by: NURSE PRACTITIONER

## 2023-08-31 PROCEDURE — 1123F ACP DISCUSS/DSCN MKR DOCD: CPT | Performed by: NURSE PRACTITIONER

## 2023-08-31 PROCEDURE — G8427 DOCREV CUR MEDS BY ELIG CLIN: HCPCS | Performed by: NURSE PRACTITIONER

## 2023-08-31 PROCEDURE — 1036F TOBACCO NON-USER: CPT | Performed by: NURSE PRACTITIONER

## 2023-08-31 PROCEDURE — G8399 PT W/DXA RESULTS DOCUMENT: HCPCS | Performed by: NURSE PRACTITIONER

## 2023-08-31 PROCEDURE — 99213 OFFICE O/P EST LOW 20 MIN: CPT | Performed by: NURSE PRACTITIONER

## 2023-08-31 PROCEDURE — 3078F DIAST BP <80 MM HG: CPT | Performed by: NURSE PRACTITIONER

## 2023-09-19 ENCOUNTER — HOSPITAL ENCOUNTER (OUTPATIENT)
Facility: HOSPITAL | Age: 83
Setting detail: RECURRING SERIES
Discharge: HOME | End: 2023-09-22
Payer: MEDICARE

## 2023-09-19 PROCEDURE — 97535 SELF CARE MNGMENT TRAINING: CPT

## 2023-09-19 PROCEDURE — 97110 THERAPEUTIC EXERCISES: CPT

## 2023-09-19 PROCEDURE — 97161 PT EVAL LOW COMPLEX 20 MIN: CPT

## 2023-09-19 NOTE — PROGRESS NOTES
[address] [facility]  [address] [facility], [address] [place] [address]  [phone] JQ:[phone]  Plan of Care / Statement of Necessity for Physical Therapy Services     Patient Name: Isela Murguia : 1940   Medical   Diagnosis: Other low back pain [M54.59] Treatment Diagnosis: M54.59  OTHER LOWER BACK PAIN    Onset Date: Longstanding, MD order 23     Referral Source: KYAW Siegel - * Start of Care [facility]): 2023   Prior Hospitalization: See medical history Provider #: [id number]   Prior Level of Function:  longstanding history of back pain with ability to tolerate ADLs and activities as able, R SC use PRN, retired, drives   Comorbidities:  left CECELIA, arthritis, back pain, HTN, incontinence, prior surgery,      Assessment / key information:  79 YO female diagnosed as above and with S/S consistent with above diagnosis presents to skilled outpatient PT [facility] back pain, worse with walking and standing, 8/10 today and worse over the past 1 1 1/2 years. Uses R SC PRN. She has decrease trunk ROM and decrease core and LE strength, she has + piriformis tension. Patient demonstrates the potential to make gains with improved ROM, strength, endurance/activity tolerance, functional FOTO survey score   and all within a reasonable time frame so as to increase their functional independence with ADLs and activities for carryover to  improve quality of life, tolerance to household and community activities. Patient requires skilled Physical Therapy so as to monitor their response to and modify their treatment plan accordingly. Patient appears to be an appropriate candidate for skilled outpatient Physical Therapy.     Evaluation Complexity:  History:  HIGH Complexity :3+ comorbidities / personal factors will impact the outcome/ POC ; Examination:  HIGH Complexity : 4+ Standardized tests and measures addressing body structure, function, activity limitation and / or
Flex  [] Ext    S[place]roilliac:  Gaenslen's: [] R    [] L    [] +    [] -     Compression: [] +    [] -     Gapping:  [] +    [] -     Thigh Thrust: [] R    [] L    [] +    [] -     Leg Length: [] +    [] -   Position:    Crests:    ASIS:    PSIS:    Sacral Sulcus:    Mobility: Standing flex:     Sitting flex:     Supine to sit:     Prone knee bend:         Hip: Odilia Taiwo:  [] R    [] L    [] +    [] -     Scour:  [] R    [] L    [] +    [] -     Piriformis: [x] R    [x] L    [x] +    [] -          Deficits: Vlad's: [] R    [] L    [] +    [] -     Martínez: [] R    [] L    [] +    [] -     Hamstrings 90/90:    Gastrocsoleus (to neutral): Right: Left:       Global Muscular Weakness:  Abdominals:  Quadratus Lumborum:  Paraspinals: Other:    Other tests/comments:       Pain Level (0-10 scale) post treatment: 8    ASSESSMENT/Changes in Function: Patient demonstrates the potential to make gains with improved ROM, strength, endurance/activity tolerance, functional FOTO survey score   and all within a reasonable time frame so as to increase their functional independence with ADLs and activities for carryover to  improve quality of life, tolerance to household and community activities. Patient requires skilled Physical Therapy so as to monitor their response to and modify their treatment plan accordingly. Patient appears to be an appropriate candidate for skilled outpatient Physical Therapy. Patient will continue to benefit from skilled PT services to modify and progress therapeutic interventions, analyze and address ROM deficits, analyze and address strength deficits, analyze and address soft tissue restrictions, analyze and cue for proper movement patterns, analyze and modify for postural abnormalities, and instruct in home and community integration to address functional deficits and attain remaining goals.     [x]  See Plan of Care - for goals and assessment     PLAN  [x]  Upgrade activities as tolerated     [x]  Continue

## 2023-09-26 ENCOUNTER — HOSPITAL ENCOUNTER (OUTPATIENT)
Facility: HOSPITAL | Age: 83
Setting detail: RECURRING SERIES
Discharge: HOME | End: 2023-09-29
Payer: MEDICARE

## 2023-09-26 PROCEDURE — 97110 THERAPEUTIC EXERCISES: CPT

## 2023-09-26 PROCEDURE — 97530 THERAPEUTIC ACTIVITIES: CPT

## 2023-09-26 PROCEDURE — 97112 NEUROMUSCULAR REEDUCATION: CPT

## 2023-09-26 NOTE — PROGRESS NOTES
PHYSICAL / OCCUPATIONAL THERAPY - DAILY TREATMENT NOTE (updated )    Patient Name: Eloise Jean Baptiste    Date: 2023    : 1940  Insurance: Payor: MEDICARE / Plan: MEDICARE PART A AND B / Product Type: *No Product type* /      Patient  verified Yes     Visit #   Current / Total 2 16   Time   In / Out 1210 1248   Pain   In / Out 7-8 6-8   Subjective Functional Status/Changes: Patient reports that she travelled to [facility] over the past week and travelling wasn't \"too comfortable\". TREATMENT AREA =  Other low back pain [M54.59]    OBJECTIVE         Therapeutic Procedures: Tx Min Billable or 1:1 Min (if diff from Tx Min) Procedure, Rationale, Specifics   22  32888 Therapeutic Exercise (timed):  increase ROM, strength, coordination, balance, and proprioception to improve patient's ability to progress to PLOF and address remaining functional goals. (see flow sheet as applicable)     Details if applicable:       8  97026 Neuromuscular Re-Education (timed):  improve balance, coordination, kinesthetic sense, posture, core stability and proprioception to improve patient's ability to develop conscious control of individual muscles and awareness of position of extremities in order to progress to PLOF and address remaining functional goals. (see flow sheet as applicable)     Details if applicable:  hip, core, and glute re-education    8  28602 Therapeutic Activity (timed):  use of dynamic activities replicating functional movements to increase ROM, strength, coordination, balance, and proprioception in order to improve patient's ability to progress to PLOF and address remaining functional goals.   (see flow sheet as applicable)     Details if applicable:  functional stepping,functional marching, functional flexing          Details if applicable:            Details if applicable:     45  [facility] Totals Reminder: bill using total billable min of TIMED therapeutic procedures (example: do not include dry

## 2023-09-28 ENCOUNTER — HOSPITAL ENCOUNTER (OUTPATIENT)
Facility: HOSPITAL | Age: 83
Setting detail: RECURRING SERIES
End: 2023-09-28
Payer: MEDICARE

## 2023-09-28 PROCEDURE — 97112 NEUROMUSCULAR REEDUCATION: CPT

## 2023-09-28 PROCEDURE — 97530 THERAPEUTIC ACTIVITIES: CPT

## 2023-09-28 PROCEDURE — 97110 THERAPEUTIC EXERCISES: CPT

## 2023-09-28 NOTE — PROGRESS NOTES
PHYSICAL / OCCUPATIONAL THERAPY - DAILY TREATMENT NOTE (updated )    Patient Name: Kristian Royal    Date: 2023    : 1940  Insurance: Payor: MEDICARE / Plan: MEDICARE PART A AND B / Product Type: *No Product type* /      Patient  verified Yes     Visit #   Current / Total 3 16   Time   In / Out 2:51 3:32   Pain   In / Out 8 7-8   Subjective Functional Status/Changes: Pt reports that she is unable to sleep due to her pain. Was a little sore after last session. TREATMENT AREA =  Other low back pain [M54.59]    OBJECTIVE      Therapeutic Procedures: Tx Min Billable or 1:1 Min (if diff from Tx Min) Procedure, Rationale, Specifics   20  [id number] Neuromuscular Re-Education (timed):  improve balance, coordination, kinesthetic sense, posture, core stability and proprioception to improve patient's ability to develop conscious control of individual muscles and awareness of position of extremities in order to progress to PLOF and address remaining functional goals. (see flow sheet as applicable)     Details if applicable:       10  93103 Therapeutic Exercise (timed):  increase ROM, strength, coordination, balance, and proprioception to improve patient's ability to progress to PLOF and address remaining functional goals. (see flow sheet as applicable)     Details if applicable:     11  09521 Therapeutic Activity (timed):  use of dynamic activities replicating functional movements to increase ROM, strength, coordination, balance, and proprioception in order to improve patient's ability to progress to PLOF and address remaining functional goals.   (see flow sheet as applicable)     Details if applicable:            Details if applicable:            Details if applicable:     39  [facility] Totals Reminder: bill using total billable min of TIMED therapeutic procedures (example: do not include dry needle or estim unattended, both untimed codes, in totals to left)  8-22 min = 1 unit; 23-37 min = 2 units;

## 2023-10-31 ENCOUNTER — OFFICE VISIT (OUTPATIENT)
Age: 83
End: 2023-10-31
Payer: MEDICARE

## 2023-10-31 VITALS
HEIGHT: 64 IN | OXYGEN SATURATION: 95 % | TEMPERATURE: 97.8 F | WEIGHT: 168 LBS | HEART RATE: 82 BPM | BODY MASS INDEX: 28.68 KG/M2 | SYSTOLIC BLOOD PRESSURE: 105 MMHG | DIASTOLIC BLOOD PRESSURE: 61 MMHG

## 2023-10-31 DIAGNOSIS — M47.819 FACET ARTHROPATHY: ICD-10-CM

## 2023-10-31 DIAGNOSIS — M47.816 SPONDYLOSIS WITHOUT MYELOPATHY OR RADICULOPATHY, LUMBAR REGION: ICD-10-CM

## 2023-10-31 DIAGNOSIS — M54.50 CHRONIC BILATERAL LOW BACK PAIN WITHOUT SCIATICA: ICD-10-CM

## 2023-10-31 DIAGNOSIS — E78.5 DYSLIPIDEMIA: ICD-10-CM

## 2023-10-31 DIAGNOSIS — M48.061 SPINAL STENOSIS, LUMBAR REGION WITHOUT NEUROGENIC CLAUDICATION: Primary | ICD-10-CM

## 2023-10-31 DIAGNOSIS — G89.29 CHRONIC BILATERAL LOW BACK PAIN WITHOUT SCIATICA: ICD-10-CM

## 2023-10-31 PROCEDURE — 3074F SYST BP LT 130 MM HG: CPT | Performed by: NURSE PRACTITIONER

## 2023-10-31 PROCEDURE — G8419 CALC BMI OUT NRM PARAM NOF/U: HCPCS | Performed by: NURSE PRACTITIONER

## 2023-10-31 PROCEDURE — 1123F ACP DISCUSS/DSCN MKR DOCD: CPT | Performed by: NURSE PRACTITIONER

## 2023-10-31 PROCEDURE — G8399 PT W/DXA RESULTS DOCUMENT: HCPCS | Performed by: NURSE PRACTITIONER

## 2023-10-31 PROCEDURE — G8484 FLU IMMUNIZE NO ADMIN: HCPCS | Performed by: NURSE PRACTITIONER

## 2023-10-31 PROCEDURE — 1036F TOBACCO NON-USER: CPT | Performed by: NURSE PRACTITIONER

## 2023-10-31 PROCEDURE — 99214 OFFICE O/P EST MOD 30 MIN: CPT | Performed by: NURSE PRACTITIONER

## 2023-10-31 PROCEDURE — 1090F PRES/ABSN URINE INCON ASSESS: CPT | Performed by: NURSE PRACTITIONER

## 2023-10-31 PROCEDURE — G8427 DOCREV CUR MEDS BY ELIG CLIN: HCPCS | Performed by: NURSE PRACTITIONER

## 2023-10-31 PROCEDURE — 3078F DIAST BP <80 MM HG: CPT | Performed by: NURSE PRACTITIONER

## 2023-10-31 RX ORDER — EZETIMIBE 10 MG/1
10 TABLET ORAL DAILY
Qty: 90 TABLET | Refills: 3 | Status: CANCELLED | OUTPATIENT
Start: 2023-10-31

## 2023-10-31 RX ORDER — ACETAMINOPHEN AND CODEINE PHOSPHATE 300; 30 MG/1; MG/1
1 TABLET ORAL NIGHTLY PRN
Qty: 30 TABLET | Refills: 0 | Status: SHIPPED | OUTPATIENT
Start: 2023-10-31 | End: 2023-11-30

## 2023-10-31 NOTE — PROGRESS NOTES
Connie Palacios presents today for   Chief Complaint   Patient presents with    Lower Back Pain       Is someone accompanying this pt? no    Is the patient using any DME equipment during OV? Yes, cane        Coordination of Care:  1. Have you been to the ER, urgent care clinic since your last visit? no  Hospitalized since your last visit? no    2. Have you seen or consulted any other health care providers outside of the [address] [place] [address] since your last visit? Yes, PCP Include any pap smears or colon screening.  no

## 2023-10-31 NOTE — PROGRESS NOTES
Chief complaint   Chief Complaint   Patient presents with    Lower Back Pain       History of Present Illness:  Jacquie Hi is a  78 y.o.  female  who comes in today for follow-up of her chronic low back pain. She has chronic low back pain without radicular pain. Last visit we put her in physical therapy to see if we could work some of that pain out. She states she did do the evaluation and 1 visit but it made her dizzy. She states another provider put in physical therapy for balance and that also made her dizzy so she was unable to do that also. She does wear a back brace which does help. She states she does not wear it all the time but when she is out and about she will wear it and it helps. She states today her back pain is pretty constant she rates it as 8 out of 10. She really is not a good candidate for NSAIDs due to decreased kidney function. We have tried her on tramadol, gabapentin, Cymbalta and Topamax she either failed them or cannot tolerate them. I thought we had also tried her on Tylenol 3 but she states she never did take it. She would like to try that just at bedtime since that is when her pain is not worse and makes her unable to sleep. She states during the day she can tolerate it better when she is up and doing things. She denies fever bowel bladder dysfunction. Physical Exam: Patient is a 70-year-old female well-developed well-nourished who is alert and oriented with a normal mood and affect. She has a slow but full weightbearing antalgic gait utilizing a single-point cane. She has 4 out of 5 strength bilateral lower extremities. Negative straight leg raise. She does have some pain with hyperextension lumbar spine. Assessment and Plan: This is a patient who has lumbar degenerative disc disease, spondylosis and stenosis she seems to be intolerant to a lot of medications.   In reviewing her MRI from last year she does have pretty advanced facet arthropathy

## 2023-11-03 ENCOUNTER — TELEPHONE (OUTPATIENT)
Age: 83
End: 2023-11-03

## 2023-11-03 NOTE — TELEPHONE ENCOUNTER
Patient states she called to inform HECTOR Anderson that she will no longer be taking Tylenol 3 because it causes her to have headaches, and makes her feel dizzy. Patient also states doesn't need for any other medicine to be called in for her.      Patent can be reached at [phone].

## 2023-11-06 ENCOUNTER — HOSPITAL ENCOUNTER (OUTPATIENT)
Facility: HOSPITAL | Age: 83
Setting detail: SPECIMEN
Discharge: HOME | End: 2023-11-09
Payer: MEDICARE

## 2023-11-06 DIAGNOSIS — I10 PRIMARY HYPERTENSION: ICD-10-CM

## 2023-11-06 LAB
ANION GAP SERPL CALC-SCNC: 3 MMOL/L (ref 3–18)
BUN SERPL-MCNC: 22 MG/DL (ref 7–18)
BUN/CREAT SERPL: 21 (ref 12–20)
CALCIUM SERPL-MCNC: 9.5 MG/DL (ref 8.5–10.1)
CHLORIDE SERPL-SCNC: 110 MMOL/L (ref 100–111)
CO2 SERPL-SCNC: 29 MMOL/L (ref 21–32)
CREAT SERPL-MCNC: 1.05 MG/DL (ref 0.6–1.3)
GLUCOSE SERPL-MCNC: 79 MG/DL (ref 74–99)
POTASSIUM SERPL-SCNC: 4 MMOL/L (ref 3.5–5.5)
SODIUM SERPL-SCNC: 142 MMOL/L (ref 136–145)
T4 FREE SERPL-MCNC: 1 NG/DL (ref 0.7–1.5)
TSH SERPL DL<=0.05 MIU/L-ACNC: 0.89 UIU/ML (ref 0.36–3.74)

## 2023-11-06 PROCEDURE — 80048 BASIC METABOLIC PNL TOTAL CA: CPT

## 2023-11-06 PROCEDURE — 84443 ASSAY THYROID STIM HORMONE: CPT

## 2023-11-06 PROCEDURE — 36415 COLL VENOUS BLD VENIPUNCTURE: CPT

## 2023-11-06 PROCEDURE — 84439 ASSAY OF FREE THYROXINE: CPT

## 2023-11-13 ENCOUNTER — HOSPITAL ENCOUNTER (OUTPATIENT)
Facility: HOSPITAL | Age: 83
Setting detail: SPECIMEN
Discharge: HOME | End: 2023-11-16
Payer: MEDICARE

## 2023-11-13 ENCOUNTER — OFFICE VISIT (OUTPATIENT)
Age: 83
End: 2023-11-13
Payer: MEDICARE

## 2023-11-13 ENCOUNTER — TELEPHONE (OUTPATIENT)
Age: 83
End: 2023-11-13

## 2023-11-13 VITALS
WEIGHT: 171 LBS | OXYGEN SATURATION: 95 % | TEMPERATURE: 96.9 F | DIASTOLIC BLOOD PRESSURE: 64 MMHG | HEIGHT: 64 IN | BODY MASS INDEX: 29.19 KG/M2 | SYSTOLIC BLOOD PRESSURE: 112 MMHG | RESPIRATION RATE: 20 BRPM | HEART RATE: 78 BPM

## 2023-11-13 DIAGNOSIS — N18.31 STAGE 3A CHRONIC KIDNEY DISEASE (HCC): ICD-10-CM

## 2023-11-13 DIAGNOSIS — N30.00 ACUTE CYSTITIS WITHOUT HEMATURIA: ICD-10-CM

## 2023-11-13 DIAGNOSIS — M47.816 SPONDYLOSIS WITHOUT MYELOPATHY OR RADICULOPATHY, LUMBAR REGION: ICD-10-CM

## 2023-11-13 DIAGNOSIS — E03.4 HYPOTHYROIDISM DUE TO ACQUIRED ATROPHY OF THYROID: ICD-10-CM

## 2023-11-13 DIAGNOSIS — I10 PRIMARY HYPERTENSION: ICD-10-CM

## 2023-11-13 DIAGNOSIS — Z23 INFLUENZA VACCINE NEEDED: ICD-10-CM

## 2023-11-13 DIAGNOSIS — N39.0 URINARY TRACT INFECTION WITHOUT HEMATURIA, SITE UNSPECIFIED: ICD-10-CM

## 2023-11-13 DIAGNOSIS — G89.29 CHRONIC BILATERAL LOW BACK PAIN WITHOUT SCIATICA: ICD-10-CM

## 2023-11-13 DIAGNOSIS — M47.819 FACET ARTHROPATHY: ICD-10-CM

## 2023-11-13 DIAGNOSIS — M54.50 CHRONIC BILATERAL LOW BACK PAIN WITHOUT SCIATICA: ICD-10-CM

## 2023-11-13 DIAGNOSIS — M48.061 SPINAL STENOSIS, LUMBAR REGION WITHOUT NEUROGENIC CLAUDICATION: Primary | ICD-10-CM

## 2023-11-13 DIAGNOSIS — Z00.00 MEDICARE ANNUAL WELLNESS VISIT, SUBSEQUENT: Primary | ICD-10-CM

## 2023-11-13 DIAGNOSIS — Z71.89 ADVANCED CARE PLANNING/COUNSELING DISCUSSION: ICD-10-CM

## 2023-11-13 DIAGNOSIS — E78.5 DYSLIPIDEMIA: ICD-10-CM

## 2023-11-13 LAB
BILIRUBIN, URINE, POC: ABNORMAL
BLOOD URINE, POC: NEGATIVE
GLUCOSE URINE, POC: NEGATIVE
KETONES, URINE, POC: ABNORMAL
LEUKOCYTE ESTERASE, URINE, POC: NEGATIVE
NITRITE, URINE, POC: NEGATIVE
PH, URINE, POC: 5 (ref 4.6–8)
PROTEIN,URINE, POC: ABNORMAL
SPECIFIC GRAVITY, URINE, POC: 1.02 (ref 1–1.03)
URINALYSIS CLARITY, POC: CLEAR
URINALYSIS COLOR, POC: ABNORMAL
UROBILINOGEN, POC: ABNORMAL

## 2023-11-13 PROCEDURE — 3074F SYST BP LT 130 MM HG: CPT | Performed by: INTERNAL MEDICINE

## 2023-11-13 PROCEDURE — 90694 VACC AIIV4 NO PRSRV 0.5ML IM: CPT | Performed by: INTERNAL MEDICINE

## 2023-11-13 PROCEDURE — 1123F ACP DISCUSS/DSCN MKR DOCD: CPT | Performed by: INTERNAL MEDICINE

## 2023-11-13 PROCEDURE — 99497 ADVNCD CARE PLAN 30 MIN: CPT | Performed by: INTERNAL MEDICINE

## 2023-11-13 PROCEDURE — 87086 URINE CULTURE/COLONY COUNT: CPT

## 2023-11-13 PROCEDURE — 1090F PRES/ABSN URINE INCON ASSESS: CPT | Performed by: INTERNAL MEDICINE

## 2023-11-13 PROCEDURE — PBSHW AMB POC URINALYSIS DIP STICK AUTO W/O MICRO: Performed by: INTERNAL MEDICINE

## 2023-11-13 PROCEDURE — G8419 CALC BMI OUT NRM PARAM NOF/U: HCPCS | Performed by: INTERNAL MEDICINE

## 2023-11-13 PROCEDURE — G8484 FLU IMMUNIZE NO ADMIN: HCPCS | Performed by: INTERNAL MEDICINE

## 2023-11-13 PROCEDURE — G8427 DOCREV CUR MEDS BY ELIG CLIN: HCPCS | Performed by: INTERNAL MEDICINE

## 2023-11-13 PROCEDURE — G0439 PPPS, SUBSEQ VISIT: HCPCS | Performed by: INTERNAL MEDICINE

## 2023-11-13 PROCEDURE — 81003 URINALYSIS AUTO W/O SCOPE: CPT | Performed by: INTERNAL MEDICINE

## 2023-11-13 PROCEDURE — 99214 OFFICE O/P EST MOD 30 MIN: CPT | Performed by: INTERNAL MEDICINE

## 2023-11-13 PROCEDURE — 3078F DIAST BP <80 MM HG: CPT | Performed by: INTERNAL MEDICINE

## 2023-11-13 PROCEDURE — G8399 PT W/DXA RESULTS DOCUMENT: HCPCS | Performed by: INTERNAL MEDICINE

## 2023-11-13 PROCEDURE — 1036F TOBACCO NON-USER: CPT | Performed by: INTERNAL MEDICINE

## 2023-11-13 PROCEDURE — PBSHW INFLUENZA, FLUAD, (AGE 65 Y+), IM, PF, 0.5 ML: Performed by: INTERNAL MEDICINE

## 2023-11-13 RX ORDER — SULFAMETHOXAZOLE AND TRIMETHOPRIM 800; 160 MG/1; MG/1
1 TABLET ORAL 2 TIMES DAILY
Qty: 10 TABLET | Refills: 0 | Status: SHIPPED | OUTPATIENT
Start: 2023-11-13 | End: 2023-11-18

## 2023-11-13 ASSESSMENT — LIFESTYLE VARIABLES: HOW MANY STANDARD DRINKS CONTAINING ALCOHOL DO YOU HAVE ON A TYPICAL DAY: PATIENT DOES NOT DRINK

## 2023-11-13 ASSESSMENT — PATIENT HEALTH QUESTIONNAIRE - PHQ9
2. FEELING DOWN, DEPRESSED OR HOPELESS: 0
SUM OF ALL RESPONSES TO PHQ QUESTIONS 1-9: 0
SUM OF ALL RESPONSES TO PHQ9 QUESTIONS 1 & 2: 0
1. LITTLE INTEREST OR PLEASURE IN DOING THINGS: 0
SUM OF ALL RESPONSES TO PHQ QUESTIONS 1-9: 0

## 2023-11-13 NOTE — PROGRESS NOTES
MCG tablet ceived the following from [facility]: Outside name: Synthroid 75 mcg tablet Yes Automatic Reconciliation, Ar   lidocaine (LIDODERM) 5 % Apply patch to the affected area for 12 hours a day and remove for 12 hours a day.  Yes Automatic Reconciliation, Ar   lisinopril (PRINIVIL;ZESTRIL) 10 MG tablet Take 1 tablet by mouth daily Yes Automatic Reconciliation, Ar   meclizine (ANTIVERT) 25 MG tablet Take 1 tablet by mouth 3 times daily as needed Yes Automatic Reconciliation, Ar   topiramate (TOPAMAX) 25 MG tablet  Yes Automatic Reconciliation, Ar       CareTeam (Including outside providers/suppliers regularly involved in providing care):   Patient Care Team:  Shaniqua Pa MD as PCP - Nora Nair MD as PCP - Empaneled Provider     Reviewed and updated this visit:  Tobacco  Allergies  Meds  Med Hx  Surg Hx  Soc Hx  Fam Hx
Bilirubin, Urine, POC 1+ Negative    Ketones, Urine, POC Trace Negative    Specific Gravity, Urine, POC 1.025 1.001 - 1.035    Blood, Urine, POC Negative Negative    pH, Urine, POC 5.0 4.6 - 8.0    Protein, Urine, POC 1+ Negative    Urobilinogen, POC 0.2 mg/dL     Nitrite, Urine, POC Negative Negative    Leukocyte Esterase, Urine, POC Negative Negative   Results for orders placed or performed during the hospital encounter of 11/06/23 (from the past 2160 hour(s))   Basic Metabolic Panel   Result Value Ref Range    Sodium 142 136 - 145 mmol/L    Potassium 4.0 3.5 - 5.5 mmol/L    Chloride 110 100 - 111 mmol/L    CO2 29 21 - 32 mmol/L    Anion Gap 3 3.0 - 18 mmol/L    Glucose 79 74 - 99 mg/dL    BUN 22 (H) 7.0 - 18 MG/DL    Creatinine 1.05 0.6 - 1.3 MG/DL    Bun/Cre Ratio 21 (H) 12 - 20      Est, Glom Filt Rate 52 (L) >60 ml/min/1.73m2    Calcium 9.5 8.5 - 10.1 MG/DL   TSH + Free T4 Panel   Result Value Ref Range    TSH, 3RD GENERATION 0.89 0.36 - 3.74 uIU/mL    T4 Free 1.0 0.7 - 1.5 NG/DL           We reviewed the patient's labs from the last several visits to point out trends in the numbers      Patient Active Problem List   Diagnosis    Dyslipidemia    Spondylosis of lumbar region without myelopathy or radiculopathy    Advance directive discussed with patient    Hypovitaminosis D    Obesity (BMI 30.0-34. 9)    Colon adenomas    Primary hypertension    Venous insufficiency    Hypothyroidism due to acquired atrophy of thyroid    S/P AAA repair    Osteopenia    Hip arthritis    Arthritis, degenerative    Stage 3a chronic kidney disease ([address])         ASSESSMENT AND PLAN:  1. Spine. Per Dr Cyrus maciel  2. Hypertension. Controlled on rossy  3. Hypovit D.  supplementation   4. Colon adenomas. Fiber, beyond age for colo  5. Obesity. Lifestyle and dietary measures. Portion control reiterated. 6.  CKD. Stable   7. Dyslipidemia. at target on lipitor/zetia  8. Hypothyroidism. Therapeutic dosing  9. Osteopenia.   Wt

## 2023-11-13 NOTE — ACP (ADVANCE CARE PLANNING)
Advance Care Planning     Advance Care Planning (ACP) Physician/NP/PA Conversation    Date of Conversation: 11/13/2023  Conducted with: Patient with Decision Making Capacity    Healthcare Decision Maker:      Primary Decision Maker: Serjio Freeman - [phone]    Click here to complete [address] including selection of the Healthcare Decision Maker Relationship (ie \"Primary\")  Today we documented Decision Maker(s) consistent with Legal Next of Kin hierarchy. Care Preferences:    Hospitalization: \"If your health worsens and it becomes clear that your chance of recovery is unlikely, what would be your preference regarding hospitalization? \"  The patient would prefer hospitalization. Ventilation: \"If you were unable to breath on your own and your chance of recovery was unlikely, what would be your preference about the use of a ventilator (breathing machine) if it was available to you? \"  The patient would desire the use of a ventilator. Resuscitation: \"In the event your heart stopped as a result of an underlying serious health condition, would you want attempts made to restart your heart, or would you prefer a natural death? \"  Yes, attempt to resuscitate.     ventilation preferences, hospitalization preferences, and resuscitation preferences    Conversation Outcomes / Follow-Up Plan:  ACP in process - information provided, considering goals and options  Reviewed DNR/DNI and patient elects Full Code (Attempt Resuscitation)    Length of Voluntary ACP Conversation in minutes:  16 minutes    Johnny Malloy MD

## 2023-11-14 ENCOUNTER — TELEPHONE (OUTPATIENT)
Age: 83
End: 2023-11-14

## 2023-11-14 LAB
BACTERIA SPEC CULT: NORMAL
SERVICE CMNT-IMP: NORMAL

## 2023-11-14 NOTE — TELEPHONE ENCOUNTER
----- Message from Mitch Lama sent at 11/13/2023  4:12 PM EST -----  Subject: Message to Provider    QUESTIONS  Information for Provider? pt. is requesting update on prescription for UTI   that was supposed to go to pt. pharmacy based on appt pt. had 11/13/2023?? pt. called pharmacy to see if medication was ready and pharmacy advised   pt. they did not receive the order/prescription from PCP Please , call pt.   once medication has been ordered so pt. can go pick it up   ---------------------------------------------------------------------------  --------------  [address]  [phone]; OK to leave message on voicemail  ---------------------------------------------------------------------------  --------------  SCRIPT ANSWERS  Relationship to Patient?  Self

## 2023-11-16 ENCOUNTER — TELEPHONE (OUTPATIENT)
Age: 83
End: 2023-11-16

## 2023-11-16 DIAGNOSIS — E03.4 HYPOTHYROIDISM DUE TO ACQUIRED ATROPHY OF THYROID: Primary | ICD-10-CM

## 2023-11-16 RX ORDER — LEVOTHYROXINE SODIUM 0.07 MG/1
75 TABLET ORAL DAILY
Qty: 90 TABLET | Refills: 3 | Status: SHIPPED | OUTPATIENT
Start: 2023-11-16

## 2023-11-16 NOTE — TELEPHONE ENCOUNTER
Please refill and send to [facility] [phone]    levothyroxine (SYNTHROID) 75 MCG tablet

## 2023-11-17 DIAGNOSIS — E03.4 HYPOTHYROIDISM DUE TO ACQUIRED ATROPHY OF THYROID: ICD-10-CM

## 2023-11-21 ENCOUNTER — TELEMEDICINE (OUTPATIENT)
Age: 83
End: 2023-11-21
Payer: MEDICARE

## 2023-11-21 DIAGNOSIS — M47.816 SPONDYLOSIS WITHOUT MYELOPATHY OR RADICULOPATHY, LUMBAR REGION: ICD-10-CM

## 2023-11-21 DIAGNOSIS — M47.819 FACET ARTHROPATHY: ICD-10-CM

## 2023-11-21 DIAGNOSIS — M54.50 CHRONIC BILATERAL LOW BACK PAIN WITHOUT SCIATICA: ICD-10-CM

## 2023-11-21 DIAGNOSIS — M48.061 SPINAL STENOSIS, LUMBAR REGION WITHOUT NEUROGENIC CLAUDICATION: Primary | ICD-10-CM

## 2023-11-21 DIAGNOSIS — G89.29 CHRONIC BILATERAL LOW BACK PAIN WITHOUT SCIATICA: ICD-10-CM

## 2023-11-21 PROCEDURE — 99442 PR PHYS/QHP TELEPHONE EVALUATION 11-20 MIN: CPT | Performed by: NURSE PRACTITIONER

## 2023-11-21 NOTE — PROGRESS NOTES
Jesse Montenegro presents today for   Chief Complaint   Patient presents with    Back Problem    Pain    Back Pain       Is someone accompanying this pt? no    Is the patient using any DME equipment during OV? no    Depression Screening:       No data to display                Learning Assessment:  No flowsheet data found. Abuse Screening:       No data to display                Fall Risk  No flowsheet data found. OPIOID RISK TOOL  No flowsheet data found. Coordination of Care:  1. Have you been to the ER, urgent care clinic since your last visit? no  Hospitalized since your last visit? no    2. Have you seen or consulted any other health care providers outside of the [address] [place] [address] since your last visit? no Include any pap smears or colon screening.  no

## 2023-11-21 NOTE — PROGRESS NOTES
Cristo Argueta is a 78 y.o. female evaluated via telephone on 11/21/2023 for Back Problem, Pain, and Back Pain  . History of Present Illness: Patient is a 18-year-old female who has chronic low back pain without radicular pain. She is unable to take NSAIDs due to decreased kidney function. She has tried and failed tramadol, gabapentin, Cymbalta and Topamax. Last visit we tried her on Tylenol 3 to give her a little pain relief but she states it made her dizzy and nauseous so she only took 1. She has appointment coming up with Dr. Juanita Trivedi for evaluation of RFA. She would like to just see if that might help her pain. She denies fever bowel bladder dysfunction. Physical Exam: The patient is a 18-year-old female who is alert and oriented. She spoke with fluency. She did not appear to be in distress. Assessment/Plan: This is a patient who has lumbar degenerative disc disease, spondylosis and stenosis. She was unable to tolerate the Tylenol 3 along with other listed medications. She will keep her follow-up appointment with Dr. Juanita Trivedi for consideration of RFA. Blane Galan was seen today for back problem, pain and back pain. Diagnoses and all orders for this visit:    Spinal stenosis, lumbar region without neurogenic claudication    Spondylosis without myelopathy or radiculopathy, lumbar region    Chronic bilateral low back pain without sciatica    Facet arthropathy          Documentation:  I communicated with the patient and/or health care decision maker about back pain. Details of this discussion including any medical advice provided: Yes    Total Time: minutes: 11-20 minutes    Cristo Argueta was evaluated through a synchronous (real-time) audio encounter. Patient identification was verified at the start of the visit. She (or guardian if applicable) is aware that this is a billable service, which includes applicable co-pays.  This visit was conducted with the patient's (and/or legal Patient transported to CT at this time.      Mansi Pryor RN  08/08/23 0445

## 2023-11-22 RX ORDER — LEVOTHYROXINE SODIUM 0.07 MG/1
75 TABLET ORAL DAILY
Qty: 90 TABLET | Refills: 3 | Status: SHIPPED | OUTPATIENT
Start: 2023-11-22

## 2023-11-29 ENCOUNTER — HOSPITAL ENCOUNTER (OUTPATIENT)
Facility: HOSPITAL | Age: 83
Setting detail: RECURRING SERIES
Discharge: HOME | End: 2023-12-02
Payer: MEDICARE

## 2023-11-29 PROCEDURE — 97162 PT EVAL MOD COMPLEX 30 MIN: CPT

## 2023-11-29 PROCEDURE — 97535 SELF CARE MNGMENT TRAINING: CPT

## 2023-11-29 NOTE — PROGRESS NOTES
[facility]  [address] [facility], [address] [place] [address]  SX:[phone] NH:[phone]  Plan of Care / Statement of Necessity for Physical Therapy Services     Patient Name: Jae Frank : 1940   Medical   Diagnosis: Other low back pain [M54.59] Treatment Diagnosis: M54.59  OTHER LOWER BACK PAIN    Onset Date: 23     Referral Source: KYAW Malcolm - * Start of Care [facility]): 2023   Prior Hospitalization: See medical history Provider #: [id number]   Prior Level of Function: Independent with ADLS, community activities and waking with no  pain   Comorbidities: Arthritis, Back pain, High Blood pressure , L Total Hip replacement      Assessment / nunez information: Jae Frank is a 78 y.o.  yo female with Dx: Spinal stenosis, Lumbar region without neurogenic claudication, Spondylosis without myelopathy or radiculopathy, Lumbar region , Chronic bilateral low back Pain without sciatica who reports  pain in Low back area , facet arthropathy  Pt rates pain as 10/10 max, 8/10 at best, 8/10 today, increasing with ADLS, community activities and walking. Objective: FOTO score = 45 (an established functional score where 100 = no disability). AROM: Lumbar spine Flex 45 degree. AROM Lumbar spine Ext  8 degree    MMT: BLE strength ( B hip Flex/ B Knee Flex / Ext ) 3/5 . Pain and Tenderness on Low back area  , Paraspinal muscles and B piriformis muscles . B SLR + . Pt instructed in HEP and will f/u in clinic for PT. Not post operative    Evaluation Complexity:  History:  MEDIUM  Complexity : 1-2 comorbidities / personal factors will impact the outcome/ POC ; Examination:  MEDIUM Complexity : 3 Standardized tests and measures addressin body structure, function, activity limitation and / or participation in recreation  ;Presentation:  MEDIUM Complexity : Evolving with changing characteristics  ; Clinical Decision Making:  MEDIUM Complexity

## 2023-11-29 NOTE — PROGRESS NOTES
[place] [address] [facility], [address] [place] [address]   Phone: [phone]  Fax: [phone]        Lay Jones  : 1940  PCP: Isidro Plummer MD    PATIENT EVALUATION      ASSESSMENT AND PLAN    Alexander Singh was seen today for lower back pain. Diagnoses and all orders for this visit:    Facet arthropathy  -     Ambulatory Referral to Ortho Injection  -     DME Order for (Specify) as OP    DDD (degenerative disc disease), lumbar  -     DME Order for (Specify) as OP    Spinal stenosis, lumbar region without neurogenic claudication         Pal Johnson is a 78 y.o. female with chronic progressive axial low back pain interfering with her day-to-day activities. Failed physical therapy and various analgesics. Risks, benefits, alternatives, and limitations of RFA discussed with patient. She wishes to proceed with medial branch blocks. Risk factors include hypertension and vascular disease. Rx for LSO to help offload painful structures. HISTORY OF PRESENT ILLNESS  Pal Johnson is referred by BHAVYA KRISHNAN and is seen today in consultation for a RFA evaluation for back pain. LV VV 2023 NP Monica Referred to Dr. Los He for consideration of RFA    Patient presents today with a single point cane and wearing a back brace. She complains of constant low back pain. She denies numbness and tingling in her legs. She states that PT provided no benefit. They told her to not lift objects that weighed over 20 lbs. The medications she has tried were either too strong or not strong enough. She has never received an RFA before. Denies persistent fevers, chills, weight changes, saddle paresthesias, and neurogenic bowel or bladder symptoms. She has aortic stents due to an aortic aneurysm. She denies being on blood thinners or having diabetes.             2023     1:37 PM   AMB PAIN ASSESSMENT   Location of Pain Back   Severity

## 2023-11-30 ENCOUNTER — OFFICE VISIT (OUTPATIENT)
Age: 83
End: 2023-11-30
Payer: MEDICARE

## 2023-11-30 VITALS
OXYGEN SATURATION: 99 % | WEIGHT: 168 LBS | BODY MASS INDEX: 28.68 KG/M2 | DIASTOLIC BLOOD PRESSURE: 62 MMHG | TEMPERATURE: 97.2 F | SYSTOLIC BLOOD PRESSURE: 97 MMHG | HEIGHT: 64 IN

## 2023-11-30 DIAGNOSIS — M51.36 DDD (DEGENERATIVE DISC DISEASE), LUMBAR: ICD-10-CM

## 2023-11-30 DIAGNOSIS — M47.819 FACET ARTHROPATHY: Primary | ICD-10-CM

## 2023-11-30 DIAGNOSIS — M48.061 SPINAL STENOSIS, LUMBAR REGION WITHOUT NEUROGENIC CLAUDICATION: ICD-10-CM

## 2023-11-30 PROCEDURE — G8419 CALC BMI OUT NRM PARAM NOF/U: HCPCS | Performed by: PHYSICAL MEDICINE & REHABILITATION

## 2023-11-30 PROCEDURE — 3078F DIAST BP <80 MM HG: CPT | Performed by: PHYSICAL MEDICINE & REHABILITATION

## 2023-11-30 PROCEDURE — 99213 OFFICE O/P EST LOW 20 MIN: CPT | Performed by: PHYSICAL MEDICINE & REHABILITATION

## 2023-11-30 PROCEDURE — G8484 FLU IMMUNIZE NO ADMIN: HCPCS | Performed by: PHYSICAL MEDICINE & REHABILITATION

## 2023-11-30 PROCEDURE — 1036F TOBACCO NON-USER: CPT | Performed by: PHYSICAL MEDICINE & REHABILITATION

## 2023-11-30 PROCEDURE — G8427 DOCREV CUR MEDS BY ELIG CLIN: HCPCS | Performed by: PHYSICAL MEDICINE & REHABILITATION

## 2023-11-30 PROCEDURE — 1123F ACP DISCUSS/DSCN MKR DOCD: CPT | Performed by: PHYSICAL MEDICINE & REHABILITATION

## 2023-11-30 PROCEDURE — G8399 PT W/DXA RESULTS DOCUMENT: HCPCS | Performed by: PHYSICAL MEDICINE & REHABILITATION

## 2023-11-30 PROCEDURE — 3074F SYST BP LT 130 MM HG: CPT | Performed by: PHYSICAL MEDICINE & REHABILITATION

## 2023-11-30 PROCEDURE — 1090F PRES/ABSN URINE INCON ASSESS: CPT | Performed by: PHYSICAL MEDICINE & REHABILITATION

## 2023-11-30 NOTE — PROGRESS NOTES
Lidia Bardales presents today for   Chief Complaint   Patient presents with    Lower Back Pain       Is someone accompanying this pt? no    Is the patient using any DME equipment during OV? Yes, cane    Coordination of Care:  1. Have you been to the ER, urgent care clinic since your last visit? no  Hospitalized since your last visit? no    2. Have you seen or consulted any other health care providers outside of the [address] [place] [address] since your last visit? no Include any pap smears or colon screening.  no

## 2023-11-30 NOTE — PROGRESS NOTES
PHYSICAL / OCCUPATIONAL THERAPY - DAILY TREATMENT NOTE (updated )  For Eval visit    Patient Name: Isela Murguia    Date: 2023    : 1940  Insurance: Payor: MEDICARE / Plan: MEDICARE PART A AND B / Product Type: *No Product type* /      Patient  verified yes     Visit #   Current / Total 1 16   Time   In / Out 1015 1055   Pain   In / Out 8 6   Subjective Functional Status/Changes: See POC     TREATMENT AREA =  see POC    OBJECTIVE          25 min   Eval - untimed                      Therapeutic Procedures: Tx Min Billable or 1:1 Min (if diff from Tx Min) Procedure, Rationale, Specifics   15  83962 Self Care/Home Management (timed):  improve patient knowledge and understanding of pain reducing techniques, activity modification, and diagnosis/prognosis  to improve patient's ability to progress to PLOF and address remaining functional goals.   (see flow sheet as applicable)     Details if applicable:              Details if applicable:            Details if applicable:            Details if applicable:            Details if applicable:     13  [facility] Totals Reminder: bill using total billable min of TIMED therapeutic procedures (example: do not include dry needle or estim unattended, both untimed codes, in totals to left)  8-22 min = 1 unit; 23-37 min = 2 units; 38-52 min = 3 units; 53-67 min = 4 units; 68-82 min = 5 units   Total Total     [x]  Patient Education billed concurrently with other procedures   [x] Review HEP    [] Progressed/Changed HEP, detail:    [] Other detail:       Objective Information/Functional Measures/Assessment    See POC    Patient will continue to benefit from skilled PT / OT services to modify and progress therapeutic interventions, analyze and address functional mobility deficits, analyze and address ROM deficits, analyze and address strength deficits, analyze and address soft tissue restrictions, analyze and cue for proper movement patterns, and analyze and

## 2023-12-04 ENCOUNTER — CLINICAL DOCUMENTATION (OUTPATIENT)
Age: 83
End: 2023-12-04

## 2023-12-04 NOTE — PROGRESS NOTES
The pt's order for back brace, last office note, and demographics were faxed over to [facility] for review. A fax confirmation was received and they should be reaching out to the pt regarding the order. The pt was advised at her last office visit to contact our office if they do not hear from [facility] within 7-10 days.

## 2023-12-06 ENCOUNTER — HOSPITAL ENCOUNTER (OUTPATIENT)
Facility: HOSPITAL | Age: 83
Setting detail: RECURRING SERIES
Discharge: HOME | End: 2023-12-09
Payer: MEDICARE

## 2023-12-06 PROCEDURE — 97110 THERAPEUTIC EXERCISES: CPT

## 2023-12-06 PROCEDURE — 97140 MANUAL THERAPY 1/> REGIONS: CPT

## 2023-12-06 NOTE — PROGRESS NOTES
PHYSICAL / OCCUPATIONAL THERAPY - DAILY TREATMENT NOTE (updated )    Patient Name: Eloise Jean Baptiste    Date: 2023    : 1940  Insurance: Payor: MEDICARE / Plan: MEDICARE PART A AND B / Product Type: *No Product type* /      Patient  verified Yes     Visit #   Current / Total 2 16   Time   In / Out 250 332   Pain   In / Out 8 5   Subjective Functional Status/Changes: Patient complains of Low back pain today      TREATMENT AREA =  Other low back pain [M54.59]    OBJECTIVE        Therapeutic Procedures: Tx Min Billable or 1:1 Min (if diff from Tx Min) Procedure, Rationale, Specifics   30  51469 Therapeutic Exercise (timed):  increase ROM, strength, coordination, balance, and proprioception to improve patient's ability to progress to PLOF and address remaining functional goals. (see flow sheet as applicable)     Details if applicable:       12  46652 Manual Therapy (timed):  decrease pain, increase ROM, increase tissue extensibility, and decrease trigger points to improve patient's ability to progress to PLOF and address remaining functional goals. The manual therapy interventions were performed at a separate and distinct time from the therapeutic activities interventions .  (see flow sheet as applicable)     Details if applicable:  STM to Lower back area and Paraspinal Muscles           Details if applicable:            Details if applicable:            Details if applicable:     43  [facility] Totals Reminder: bill using total billable min of TIMED therapeutic procedures (example: do not include dry needle or estim unattended, both untimed codes, in totals to left)  8-22 min = 1 unit; 23-37 min = 2 units; 38-52 min = 3 units; 53-67 min = 4 units; 68-82 min = 5 units   Total Total     [x]  Patient Education billed concurrently with other procedures   [x] Review HEP    [] Progressed/Changed HEP, detail:    [] Other detail:       Objective Information/Functional Measures/Assessment  Patient needs Vcs

## 2023-12-08 ENCOUNTER — APPOINTMENT (OUTPATIENT)
Facility: HOSPITAL | Age: 83
End: 2023-12-08
Payer: MEDICARE

## 2023-12-11 ENCOUNTER — HOSPITAL ENCOUNTER (OUTPATIENT)
Facility: HOSPITAL | Age: 83
Setting detail: RECURRING SERIES
Discharge: HOME | End: 2023-12-14
Payer: MEDICARE

## 2023-12-11 ENCOUNTER — TELEPHONE (OUTPATIENT)
Facility: HOSPITAL | Age: 83
End: 2023-12-11

## 2023-12-11 PROCEDURE — 97530 THERAPEUTIC ACTIVITIES: CPT

## 2023-12-11 PROCEDURE — 97110 THERAPEUTIC EXERCISES: CPT

## 2023-12-11 NOTE — TELEPHONE ENCOUNTER
Patient cancelled appts. on 12/11/23 at 1:21pm due to the patient having an appt. to get fitting for a brace & going out of town and she is not able to make her appt. on those days.

## 2023-12-11 NOTE — PROGRESS NOTES
everyday Patient Tolerated PT Tx without any Fatigue and increase in pain . Patient's pain has improved to 4/10 from 6/10 after PT Tx today      Patient will continue to benefit from skilled PT / OT services to modify and progress therapeutic interventions, analyze and address functional mobility deficits, analyze and address ROM deficits, analyze and address strength deficits, analyze and address soft tissue restrictions, analyze and cue for proper movement patterns, and analyze and modify for postural abnormalities to address functional deficits and attain remaining goals. Progress toward goals / Updated goals:  []  See Progress Note/Recertification    1. Independent with HEP. EVAL: HEP established and given to patient      2. Decrease pain to 2/10  to assist with  ADLs  and Community activities   EVAL: 8/10      Long Term Goals: To be accomplished in  6-8  weeks:  1. Decrease pain to 0/10  to assist with  ADLs  and Community activities   EVAL: 8/10         2. Improve FOTO Functional Status Score by 10  points in order to show significant functional improvement. EVAL: 45     3.   To improve BLE strength ( B hip Flex/ B Knee Flex / Ext ) to 5/5 in order to assist with ADLS and Community activities with decrease Pain   EVAL:  BLE strength ( B hip Flex/ B Knee Flex / Ext ) 3/5            Next PN 12/29/23 RC  due 1/29/24       PLAN  - Continue Plan of Care    Joel PT    12/11/2023    9:06 AM    Future Appointments   Date Time Provider [id number]  [facility]   12/14/2023  8:50 AM Misha Cuenca PT [facility]   12/19/2023  8:50 AM [facility]   12/21/2023  8:50 AM Misha Cuenca PT [facility]   12/28/2023  8:50 AM Misha Cuenca PT [facility]   5/13/2024  8:00 AM [facility] LAB VISIT [facility] BS AMB   5/20/2024  8:00 AM Girish Shaw MD [facility] BS AMB   5/22/2024  9:00 AM [facility] HV IMAGING 2 [facility] BS AMB

## 2023-12-14 ENCOUNTER — APPOINTMENT (OUTPATIENT)
Facility: HOSPITAL | Age: 83
End: 2023-12-14
Payer: MEDICARE

## 2023-12-19 ENCOUNTER — APPOINTMENT (OUTPATIENT)
Facility: HOSPITAL | Age: 83
End: 2023-12-19
Payer: MEDICARE

## 2023-12-28 ENCOUNTER — HOSPITAL ENCOUNTER (OUTPATIENT)
Facility: HOSPITAL | Age: 83
Setting detail: RECURRING SERIES
Discharge: HOME | End: 2023-12-31
Payer: MEDICARE

## 2023-12-28 ENCOUNTER — TELEPHONE (OUTPATIENT)
Age: 83
End: 2023-12-28

## 2023-12-28 DIAGNOSIS — I10 PRIMARY HYPERTENSION: Primary | ICD-10-CM

## 2023-12-28 DIAGNOSIS — E78.5 DYSLIPIDEMIA: ICD-10-CM

## 2023-12-28 PROCEDURE — 97530 THERAPEUTIC ACTIVITIES: CPT

## 2023-12-28 PROCEDURE — 97110 THERAPEUTIC EXERCISES: CPT

## 2023-12-28 PROCEDURE — 97112 NEUROMUSCULAR REEDUCATION: CPT

## 2023-12-28 RX ORDER — LISINOPRIL 10 MG/1
10 TABLET ORAL DAILY
Qty: 90 TABLET | Refills: 3 | Status: SHIPPED | OUTPATIENT
Start: 2023-12-28

## 2023-12-28 RX ORDER — ATORVASTATIN CALCIUM 40 MG/1
40 TABLET, FILM COATED ORAL DAILY
Qty: 90 TABLET | Refills: 3 | Status: SHIPPED | OUTPATIENT
Start: 2023-12-28

## 2023-12-28 NOTE — PROGRESS NOTES
Physical Therapy Discharge Instructions      [facility] - [address], [address]  [place] [address] [phone] fax    Patient: Benita Pacheco  : 1940      Continue Home Exercise Program 1-2 times per day for 2 weeks, then decrease to 3-5 times per week      Continue with    [x] Ice  as needed PRN times per day     [x] Heat           Follow up with MD:     [x] Upon completion of therapy     [] As needed      Recommendations:     [x]   Return to activity with home program    []   Return to activity with the following modifications:       []Post Rehab Program    []Join Independent aquatic program     []Return to/join local gym      Additional Comments:       Nanette Duncan, PT 2023 9:24 AM  
goals.  Patient request DC to attempt self management - to see MD and discuss injections.    Nanette Duncan, PT       12/28/2023       8:56 AM    Payor: MEDICARE / Plan: MEDICARE PART A AND B / Product Type: *No Product type* /      Not Medicaid Ins, no signature required for DC

## 2023-12-28 NOTE — TELEPHONE ENCOUNTER
Please refill and send to [facility], [address] [phone] [facility] [phone] [[id number]]     atorvastatin (LIPITOR) 40 MG tablet     lisinopril (PRINIVIL;ZESTRIL) 10 MG tablet

## 2024-01-09 ENCOUNTER — HOSPITAL ENCOUNTER (OUTPATIENT)
Facility: HOSPITAL | Age: 84
Discharge: HOME | End: 2024-01-12
Payer: MEDICARE

## 2024-01-09 VITALS
HEART RATE: 69 BPM | OXYGEN SATURATION: 95 % | RESPIRATION RATE: 16 BRPM | SYSTOLIC BLOOD PRESSURE: 146 MMHG | DIASTOLIC BLOOD PRESSURE: 82 MMHG | TEMPERATURE: 46.4 F

## 2024-01-09 PROCEDURE — 64493 INJ PARAVERT F JNT L/S 1 LEV: CPT

## 2024-01-09 PROCEDURE — 6360000002 HC RX W HCPCS: Performed by: PHYSICAL MEDICINE & REHABILITATION

## 2024-01-09 PROCEDURE — 64494 INJ PARAVERT F JNT L/S 2 LEV: CPT | Performed by: PHYSICAL MEDICINE & REHABILITATION

## 2024-01-09 PROCEDURE — 64493 INJ PARAVERT F JNT L/S 1 LEV: CPT | Performed by: PHYSICAL MEDICINE & REHABILITATION

## 2024-01-09 PROCEDURE — 64494 INJ PARAVERT F JNT L/S 2 LEV: CPT

## 2024-01-09 RX ORDER — AZELASTINE 1 MG/ML
SPRAY, METERED NASAL
COMMUNITY
Start: 2023-12-07

## 2024-01-09 RX ORDER — ROPIVACAINE HYDROCHLORIDE 2 MG/ML
10 INJECTION, SOLUTION EPIDURAL; INFILTRATION; PERINEURAL ONCE
Status: DISCONTINUED | OUTPATIENT
Start: 2024-01-09 | End: 2024-01-09

## 2024-01-09 RX ORDER — ROPIVACAINE HYDROCHLORIDE 2 MG/ML
10 INJECTION, SOLUTION EPIDURAL; INFILTRATION; PERINEURAL ONCE
Status: COMPLETED | OUTPATIENT
Start: 2024-01-09 | End: 2024-01-09

## 2024-01-09 RX ADMIN — ROPIVACAINE HYDROCHLORIDE 4 ML: 2 INJECTION EPIDURAL; INFILTRATION; PERINEURAL at 11:20

## 2024-01-09 ASSESSMENT — PAIN DESCRIPTION - DESCRIPTORS: DESCRIPTORS: ACHING

## 2024-01-09 ASSESSMENT — PAIN SCALES - GENERAL: PAINLEVEL_OUTOF10: 6

## 2024-01-09 ASSESSMENT — PAIN - FUNCTIONAL ASSESSMENT: PAIN_FUNCTIONAL_ASSESSMENT: 0-10

## 2024-01-09 NOTE — H&P
Office Visit    2023  [facility]       Susy Rizzo MD  Physical Medicine and Rehab Facet arthropathy +2 more  Dx Lower Back Pain  Reason for Visit     Progress Notes  Susy Rizzo MD (Physician)  Physical Medicine and Rehab  Expand All Collapse All         [facility]  [address], [address]  [place] [address]  Phone: [phone]  Fax: [phone]           Benita Pacheco  : 1940  PCP: Herbie Barry MD     PATIENT EVALUATION        ASSESSMENT AND PLAN     Benita was seen today for lower back pain.     Diagnoses and all orders for this visit:     Facet arthropathy  -     Ambulatory Referral to Ortho Injection  -     DME Order for (Specify) as OP     DDD (degenerative disc disease), lumbar  -     DME Order for (Specify) as OP     Spinal stenosis, lumbar region without neurogenic claudication           Benita Pacheco is a 83 y.o. female with chronic progressive axial low back pain interfering with her day-to-day activities.  Failed physical therapy and various analgesics.  Risks, benefits, alternatives, and limitations of RFA discussed with patient.  She wishes to proceed with medial branch blocks.  Risk factors include hypertension and vascular disease.  Rx for LSO to help offload painful structures.                HISTORY OF PRESENT ILLNESS  Benita Pacheco is referred by BHAVYA Anderson and is seen today in consultation for a RFA evaluation for back pain. LV VV 2023 BHAVYA Anderson Referred to Dr. Rizzo for consideration of RFA     Patient presents today with a single point cane and wearing a back brace. She complains of constant low back pain. She denies numbness and tingling in her legs.     She states that PT provided no benefit. They told her to not lift objects that weighed over 20 lbs.     The medications she has tried were either too strong or not strong enough.      She has never received an

## 2024-01-09 NOTE — INTERVAL H&P NOTE
Update History & Physical    The patient's History and Physical of November 30, 2023 was reviewed. There was no change. The surgical site was confirmed by the patient and me.     Plan: The risks, benefits, expected outcome, and alternative to the recommended procedure have been discussed with the patient. Patient understands and wants to proceed with the procedure.     Electronically signed by BRAYDEN STEVE MD on 1/9/2024 at 11:12 AM

## 2024-01-09 NOTE — PROCEDURES
DIAGNOTIC LUMBAR MEDIAL BRANCH BLOCKS  PROCEDURE REPORT TRIAL #1      PATIENT:  Benita Pacheco  YOB: 1940  DATE OF SERVICE:  1/9/2024  SITE:  [facility]:  Special Procedures [address] [place] [address]    PRE-PROCEDURE DIAGNOSIS:  Lumbar Facet Arthopathy    POST-PROCEDURE DIAGNOSIS:  Same                PROCEDURE:    bilateral   diagnostic lumbar medial branch blocks via L4, L5, and L5 dorsal ramus block for suspected facet mediated pain from L4/5 and L5/S1.      PRE-PROCEDURE NOTE:  The details of the procedure have been discussed with the patient, including the risks, benefits and alternative options and an informed consent was obtained. The availability of a responsible adult to escort the patient following the procedure were confirmed.    PROCEDURE NOTE:  The patient was brought to the procedure suite and positioned on the fluoroscopy table in the prone position. Physiologic monitors were applied. The skin was prepped in the standard surgical fashion and sterile drapes were applied over the procedure site.     Under AP fluoroscopic guidance a 25-gauge, 3-1/2 inch short bevel spinal needle was advanced to the junction of the superior articular process and transverse process of L4 and L5.   A needle was also placed along the sacral ala to block the L5 dorsal ramus.   After negative vascular aspiration at each site, then  0.5 mL of 0.2% ropivacaine was injected at each location.      The procedure was performed on the contralateral side in the same fashion.    The needles were removed intact.  The area was thoroughly cleaned and sterile bandages applied as necessary. The patient tolerated the procedure well.    Patient will be called to assess the degree of pain relief and the ability to do functional activities.  Patient reported katrin-procedural pain on Visual Analog Scale:  pre-8; post-6.      BRAYDEN STEVE MD 1/9/2024 11:13 AM

## 2024-01-10 ENCOUNTER — CLINICAL DOCUMENTATION (OUTPATIENT)
Age: 84
End: 2024-01-10

## 2024-01-10 NOTE — PROGRESS NOTES
JONATHAN MAZA 1940      Trial #1 01/09/2024  Medial Branch Block Bilateral L3/4, L4/5, L5/S1    01/08/2024 Pre-Procedure Pain Level: 7  01/10/2024 Post-Procedure Pain Level 5   The percent of pain relief 28%    Patient stated she had about 20 to 25% pain relief from the MBB.      Activities Performed: Activities Performed: getting up and down out of chair, standing, increased walking, and kitchen chores    Trial #2  MBB 1/23/2024 cancelled     Patient has a follow up appointment with Dr. Galvez on 3/19/2024. She will call the office if she needs to come in sooner.

## 2024-01-21 ENCOUNTER — PATIENT MESSAGE (OUTPATIENT)
Age: 84
End: 2024-01-21

## 2024-01-21 DIAGNOSIS — U07.1 COVID-19 VIRUS INFECTION: Primary | ICD-10-CM

## 2024-01-22 RX ORDER — NIRMATRELVIR AND RITONAVIR 150-100 MG
KIT ORAL
Qty: 20 TABLET | Refills: 0 | Status: SHIPPED | OUTPATIENT
Start: 2024-01-22 | End: 2024-01-27

## 2024-01-22 NOTE — TELEPHONE ENCOUNTER
Pt  called stating , pt tested positive for covid 01/21 -pt tried calling on call Dr to get something prescribed , she states answering service told her they do not fill prescriptions on Sundays the patient asking for something to be sent to her pharmacy   Please advise

## 2024-02-01 NOTE — TELEPHONE ENCOUNTER
CC:  Nelsy Mendoza is here today for consult for IBS diarrhea and dyspepsia.  Medications: medications verified, no change  Refills needed today?  NO  denies known Latex allergy or symptoms of Latex sensitivity.  Patient would like communication of their results via:  [employer]    Cell Phone:   Telephone Information:   Mobile [phone]     Okay to leave a message containing results? Yes   menphor script sent

## 2024-02-07 NOTE — PROGRESS NOTES
Elinor Almanza is a 60 y.o. female who presents for   Chief Complaint   Patient presents with    Pain     Patient states having pain left side chest that travels under left arm; going on 3 times a week possibly    Blood Pressure Check     Patient states blood pressure was elevated at two separate dr cornelius    Health Maintenance     Shingles, rsv, flu, covid, awv, dep, cervical, tdap, hep c, hiv    and follow up of chronic medical problems.  Patient Active Problem List   Diagnosis    Asthma    Anxiety    Obesity    Hyperlipidemia    Hypothyroidism    Colon polyps    Vertigo    Mass of left hip region    General weakness    Pharyngoesophageal dysphagia    Colon polyp    Neuroendocrine tumor    Chest pain    Shortness of breath    Anemia    GI bleed    Essential hypertension    Neuroendocrine neoplasm of stomach    Polyp, stomach    Melena    Gastric ulcer with hemorrhage    Constipation    Hypokalemia    Hypomagnesemia    Bilateral pulmonary embolism (HCC)    Abdominal pain, epigastric    Elevated d-dimer    Malignant carcinoid tumor of stomach (HCC)    Intractable vomiting    Intractable nausea and vomiting    S/P total gastrectomy and Qamar-en-Y esophagojejunal anastomosis    Food intolerance    Severe malnutrition (HCC)    Sinus tachycardia    Weight loss    Projectile vomiting with nausea    On total parenteral nutrition    H/O blood clots    Pneumonia    Sepsis (HCC)    Bilious vomiting with nausea    Asymptomatic bacteriuria    Pulmonary infiltrates on CXR    Tachycardia, paroxysmal (HCC)    Pneumonia of right upper lobe due to Mycoplasma pneumoniae    Vitamin D deficiency    S/P gastrectomy    Nausea     HPI  Here for follow-up on blood pressure and patient states that she is getting left-sided chest pain radiating going into her arm pit happening couple of times in the last week denies any other symptoms no nausea vomiting no blurred vision or double vision and no diaphoresis or shortness of breath no  [facility]  [address], [place]   Phone: [phone]  Fax: [phone]        INITIAL CONSULTATION      HISTORY OF PRESENT ILLNESS:  Lali Dodd is a 76 y.o. female whom is self-referred secondary to left-sided low back pain without radicular symptoms x 2-3 weeks. Denies specific injury/trauma. She rates pain 6/10. Lying on her left side exacerbates her pain. Pt reports left hip numbness d/t previous shingles. Pt has tried Ibuprofen without relief. Pt denies h/o lumbar spinal surgery/blocks. She has not attended physical therapy/chiropractor. Pt denies change in bowel or bladder habits. Pt denies fever, weight loss, or skin changes. The patient is RHD.  reviewed. Body mass index is 30.54 kg/(m^2).       PCP: Nancie Coker MD    Past Medical History:   Diagnosis Date    Colon adenomas     2003 Dr Antoinette Ocampo; 4/13 Dr Shagufta CROW (degenerative joint disease)     Dyslipidemia     FHx: heart disease     Gastritis 2006    on EGD Dr. Lonne Kussmaul, neg sprue    Hypertension     Hypothyroidism 2007    Dr. Edith Grande Hypovitaminosis D     Lung nodule 06/2016    6 mm RLL    Obesity     peak weight 187 lbs, bmi 33.1 from 4/13; IF 3/18    Osteopenia FRAX 4/12 9.7 and 1.4     DEXA t score 0.6 spine, -1 hip (12/09);  -0.4 spine, -1.3 hip (4/12); -1.2 wrist, -1.6 hip w FRAX 11/2.2 (4/14); -1.0 wrist, -1.6 hip w FRAX 12/2.6 (4/16)    Psoriasis 2003    on bx Dr. Felicitas Frankel hearing loss     left side Dr Jill Cotton Venous insufficiency 2003    on dopplers    Zoster 05/2016    left T11-12; mild postherpetic neuralgia          Past Surgical History:   Procedure Laterality Date    CARDIAC SURG PROCEDURE UNLIST  09/2016    thallium negative    CHEST SURGERY PROCEDURE UNLISTED  06/2016    CTA neg PE, 6 mm subpleural nodule    HX Deb Roys      Dr. Susan Alcazar left    HX COLONOSCOPY      benign polyp 5/08 Dr Antoinette Ocampo;  4/13 negative  Monie    HX TONSILLECTOMY      NEUROLOGICAL PROCEDURE UNLISTED  2007    neg MRA/[facility] head [facility] NEUROLOGICAL PROCEDURE UNLISTED  9/10    CT head negative    VASCULAR SURGERY PROCEDURE UNLIST  2007    neg [facility]         Social History   Substance Use Topics    Smoking status: Never Smoker    Smokeless tobacco: Never Used    Alcohol use Yes      Comment: occasionally     Work status: The patient is retired. Marital status: The patient is . Current Outpatient Prescriptions   Medication Sig Dispense Refill    methylPREDNISolone (MEDROL DOSEPACK) 4 mg tablet Per dose pack instructions 1 Dose Pack 0    ibuprofen (MOTRIN) 800 mg tablet Take 1 Tab by mouth three (3) times daily (with meals). 45 Tab 0    lisinopril (PRINIVIL, ZESTRIL) 10 mg tablet Take 1 Tab by mouth daily. 90 Tab 3    atorvastatin (LIPITOR) 40 mg tablet Take 1 Tab by mouth daily. 90 Tab 3    diazePAM (VALIUM) 10 mg tablet Take 1 Tab by mouth every twelve (12) hours as needed for Anxiety. Max Daily Amount: 20 mg. 60 Tab 1    Levothyroxine 75 mcg cap Take one tablet by mouth every other day before breakfast. Alternates with 50mcg 45 Cap 3    ergocalciferol (VITAMIN D2) 50,000 unit capsule Take 1 Cap by mouth every seven (7) days. 12 Cap 1    lidocaine (LIDODERM) 5 % Apply patch to the affected area for 12 hours a day and remove for 12 hours a day. 30 Each 5    levothyroxine (SYNTHROID) 50 mcg tablet Take 1 Tab by mouth Daily (before breakfast). 90 Tab 3    ezetimibe (ZETIA) 10 mg tablet TAKE 1 TABLET BY MOUTH DAILY 90 Tab 3    aspirin 81 mg chewable tablet Take 1 Tab by mouth daily.  90 Tab 3       Allergies   Allergen Reactions    Atenolol Other (comments)     Felt poorly    Cozaar [Losartan] Other (comments)     Headaches    Norvasc [Amlodipine] Other (comments)     Felt poorly              Family History   Problem Relation Age of Onset    Breast Cancer Mother     Cancer Mother     Heart Disease Father          REVIEW OF SYSTEMS  Constitutional symptoms: Negative  Eyes: Negative  Ears, Nose, Throat, and Mouth: Negative  Cardiovascular: Negative  Respiratory: Negative  Genitourinary: Negative  Integumentary (Skin and/or breast): Negative  Musculoskeletal: Positive for left-sided low back pain. Extremities: Negative for edema. Endocrine/Rheumatologic: Negative  Hematologic/Lymphatic: Negative  Allergic/Immunologic: Negative  Psychiatric: Negative       PHYSICAL EXAMINATION    Visit Vitals    /70    Pulse 74    Resp 16    Ht 5' 3\" (1.6 m)    Wt 78.2 kg (172 lb 6.4 oz)    BMI 30.54 kg/m2       CONSTITUTIONAL: NAD, A&O x 3  HEART: Regular rate and rhythm  ABDOMEN: Positive bowel sounds, soft, nontender, and nondistended  LUNGS: Clear to auscultation bilaterally. SKIN: Negative for rash. RANGE OF MOTION: The patient has full passive range of motion in all four extremities. SENSATION: Decreased sensation to light touch at digit 1 of the LUE (previous h/o CTS). Sensation to light touch otherwise intact. Arbutus Ring MOTOR:   Straight Leg Raise: Negative, bilateral  Adkins: Negative, bilateral  Deep tendon reflexes are 1+ at the brachioradialis, biceps, and triceps. Deep tendon reflexes are 2+ at the knees and 0 at the ankles bilaterally. Shoulder AB/Flex Elbow Flex Wrist Ext Elbow Ext Wrist Flex Hand Intrin Tone   Right +4/5 +4/5 +4/5 +4/5 +4/5 +4/5 +4/5   Left +4/5 +4/5 +4/5 +4/5 +4/5 +4/5 +4/5              Hip Flex Knee Ext Knee Flex Ankle DF GTE Ankle PF Tone   Right +4/5 +4/5 +4/5 +4/5 +4/5 +4/5 +4/5   Left +4/5 +4/5 +4/5 +4/5 +4/5 +4/5 +4/5     RADIOGRAPHS  Preliminary reading of lumbar plain films:  Mild-to-moderate scoliosis, apex L1 convex to the right. Grade I retrolisthesis of L2 on L3. Disc space narrowing noted throughout all levels of the lumbar spine; severity is difficult to assess due to her scoliosis. No acute pathology identified.      These are being sent out for official reading by Dr. Loi Devi. ASSESSMENT   Diagnoses and all orders for this visit:    1. Low back pain without sciatica, unspecified back pain laterality, unspecified chronicity  -     [37960] L/S 2-3 views    2. Spondylolisthesis, lumbar region    3. Spondylosis of lumbar region without myelopathy or radiculopathy    4. Scoliosis of lumbar spine, unspecified scoliosis type    5. DDD (degenerative disc disease), lumbar    Other orders  -     methylPREDNISolone (MEDROL DOSEPACK) 4 mg tablet; Per dose pack instructions  -     ibuprofen (MOTRIN) 800 mg tablet; Take 1 Tab by mouth three (3) times daily (with meals). IMPRESSIONS/RECOMMENDATIONS:  The patient presents for acute onset of left-sided low back pain without radicular symptoms x 2-3 weeks. She is neurologically intact. We did discuss multiple treatment options. She wishes to proceed conservatively. I will start her on Medrol Dosepak. I gave her a prescription for Ibuprofen 800 mg TID x 2 weeks following completion of the Medrol Dosepak. I will see the patient back in 1 month's time or earlier if needed. Written by Aniceto Lawler, as dictated by Chayito Zamarripa MD  I examined the patient, reviewed and agree with the note.

## 2024-02-16 ENCOUNTER — OFFICE VISIT (OUTPATIENT)
Age: 84
End: 2024-02-16
Payer: MEDICARE

## 2024-02-16 VITALS
TEMPERATURE: 97.5 F | SYSTOLIC BLOOD PRESSURE: 108 MMHG | BODY MASS INDEX: 28.68 KG/M2 | OXYGEN SATURATION: 97 % | HEIGHT: 64 IN | HEART RATE: 76 BPM | WEIGHT: 168 LBS | DIASTOLIC BLOOD PRESSURE: 75 MMHG | RESPIRATION RATE: 14 BRPM

## 2024-02-16 DIAGNOSIS — H57.89 PERIORBITAL SWELLING: Primary | ICD-10-CM

## 2024-02-16 PROCEDURE — 99211 OFF/OP EST MAY X REQ PHY/QHP: CPT | Performed by: INTERNAL MEDICINE

## 2024-02-16 PROCEDURE — G8427 DOCREV CUR MEDS BY ELIG CLIN: HCPCS | Performed by: INTERNAL MEDICINE

## 2024-02-16 PROCEDURE — 1090F PRES/ABSN URINE INCON ASSESS: CPT | Performed by: INTERNAL MEDICINE

## 2024-02-16 PROCEDURE — G8484 FLU IMMUNIZE NO ADMIN: HCPCS | Performed by: INTERNAL MEDICINE

## 2024-02-16 PROCEDURE — 3078F DIAST BP <80 MM HG: CPT | Performed by: INTERNAL MEDICINE

## 2024-02-16 PROCEDURE — 3074F SYST BP LT 130 MM HG: CPT | Performed by: INTERNAL MEDICINE

## 2024-02-16 PROCEDURE — G8399 PT W/DXA RESULTS DOCUMENT: HCPCS | Performed by: INTERNAL MEDICINE

## 2024-02-16 PROCEDURE — 1036F TOBACCO NON-USER: CPT | Performed by: INTERNAL MEDICINE

## 2024-02-16 PROCEDURE — 1123F ACP DISCUSS/DSCN MKR DOCD: CPT | Performed by: INTERNAL MEDICINE

## 2024-02-16 PROCEDURE — G8419 CALC BMI OUT NRM PARAM NOF/U: HCPCS | Performed by: INTERNAL MEDICINE

## 2024-02-16 PROCEDURE — 99213 OFFICE O/P EST LOW 20 MIN: CPT | Performed by: INTERNAL MEDICINE

## 2024-02-16 NOTE — PROGRESS NOTES
Benita Pacheco presents today for   Chief Complaint   Patient presents with    Facial Swelling     Left cheek       \"Have you been to the ER, urgent care clinic since your last visit?  Hospitalized since your last visit?\"    NO    “Have you seen or consulted any other health care providers outside of [facility] since your last visit?”    NO             
EACH NOSTRIL TWICE DAILY    atorvastatin (LIPITOR) 40 MG tablet Take 1 tablet by mouth daily    lisinopril (PRINIVIL;ZESTRIL) 10 MG tablet Take 1 tablet by mouth daily    levothyroxine (SYNTHROID) 75 MCG tablet Take 1 tablet by mouth Daily ceived the following from [facility]: Outside name: Synthroid 75 mcg tablet    ezetimibe (ZETIA) 10 MG tablet Take 1 tablet by mouth daily    acetaminophen (TYLENOL) 650 MG extended release tablet Take 1 tablet by mouth every 8 hours as needed    Clobetasol Propionate 0.05 % SHAM USE AS SHAMPOO DAILY    ergocalciferol (ERGOCALCIFEROL) 1.25 MG (87405 UT) capsule Take 1 capsule by mouth every 7 days    lidocaine (LIDODERM) 5 % Apply patch to the affected area for 12 hours a day and remove for 12 hours a day.     No current facility-administered medications for this visit.     Vitals:    02/16/24 1037   BP: 108/75   Site: Left Upper Arm   Position: Sitting   Cuff Size: Small Adult   Pulse: 76   Resp: 14   Temp: 97.5 °F (36.4 °C)   TempSrc: Temporal   SpO2: 97%   Weight: 76.2 kg (168 lb)   Height: 1.626 m (5' 4\")   Non spec edema left lower orbit, no warmth or tenderness, clear conjunctiva, sinuses nt, op benign    Assessment and plan:  1. Periorbital swelling, non spec.   No other lesion anywhere. Agree with antihistamine and would do cold compress and go from there          Above conditions discussed at length and patient vocalized understanding.  All questions answered to patient satisfaction

## 2024-02-21 DIAGNOSIS — E78.5 DYSLIPIDEMIA: ICD-10-CM

## 2024-02-25 RX ORDER — EZETIMIBE 10 MG/1
10 TABLET ORAL DAILY
Qty: 90 TABLET | Refills: 3 | Status: SHIPPED | OUTPATIENT
Start: 2024-02-25

## 2024-02-27 DIAGNOSIS — E78.5 DYSLIPIDEMIA: ICD-10-CM

## 2024-02-27 RX ORDER — ATORVASTATIN CALCIUM 40 MG/1
40 TABLET, FILM COATED ORAL DAILY
Qty: 90 TABLET | Refills: 3 | Status: CANCELLED | OUTPATIENT
Start: 2024-02-27

## 2024-02-29 NOTE — TELEPHONE ENCOUNTER
Duplicate request.      Medication sent to pharmacy on 12/28/2023 with a 90 day supply and 3 refills.     My chart message sent to patient.

## 2024-03-19 ENCOUNTER — OFFICE VISIT (OUTPATIENT)
Age: 84
End: 2024-03-19
Payer: MEDICARE

## 2024-03-19 VITALS
TEMPERATURE: 97.7 F | WEIGHT: 173 LBS | DIASTOLIC BLOOD PRESSURE: 80 MMHG | HEART RATE: 72 BPM | SYSTOLIC BLOOD PRESSURE: 123 MMHG | HEIGHT: 64 IN | BODY MASS INDEX: 29.53 KG/M2 | OXYGEN SATURATION: 95 %

## 2024-03-19 DIAGNOSIS — M48.061 SPINAL STENOSIS, LUMBAR REGION WITHOUT NEUROGENIC CLAUDICATION: ICD-10-CM

## 2024-03-19 DIAGNOSIS — M47.816 SPONDYLOSIS WITHOUT MYELOPATHY OR RADICULOPATHY, LUMBAR REGION: ICD-10-CM

## 2024-03-19 DIAGNOSIS — M51.36 DDD (DEGENERATIVE DISC DISEASE), LUMBAR: ICD-10-CM

## 2024-03-19 DIAGNOSIS — M47.819 FACET ARTHROPATHY: Primary | ICD-10-CM

## 2024-03-19 PROCEDURE — 99213 OFFICE O/P EST LOW 20 MIN: CPT | Performed by: PHYSICAL MEDICINE & REHABILITATION

## 2024-03-19 PROCEDURE — G8484 FLU IMMUNIZE NO ADMIN: HCPCS | Performed by: PHYSICAL MEDICINE & REHABILITATION

## 2024-03-19 PROCEDURE — 3079F DIAST BP 80-89 MM HG: CPT | Performed by: PHYSICAL MEDICINE & REHABILITATION

## 2024-03-19 PROCEDURE — 1123F ACP DISCUSS/DSCN MKR DOCD: CPT | Performed by: PHYSICAL MEDICINE & REHABILITATION

## 2024-03-19 PROCEDURE — 1036F TOBACCO NON-USER: CPT | Performed by: PHYSICAL MEDICINE & REHABILITATION

## 2024-03-19 PROCEDURE — G8419 CALC BMI OUT NRM PARAM NOF/U: HCPCS | Performed by: PHYSICAL MEDICINE & REHABILITATION

## 2024-03-19 PROCEDURE — G8399 PT W/DXA RESULTS DOCUMENT: HCPCS | Performed by: PHYSICAL MEDICINE & REHABILITATION

## 2024-03-19 PROCEDURE — 3074F SYST BP LT 130 MM HG: CPT | Performed by: PHYSICAL MEDICINE & REHABILITATION

## 2024-03-19 PROCEDURE — G8427 DOCREV CUR MEDS BY ELIG CLIN: HCPCS | Performed by: PHYSICAL MEDICINE & REHABILITATION

## 2024-03-19 PROCEDURE — 1090F PRES/ABSN URINE INCON ASSESS: CPT | Performed by: PHYSICAL MEDICINE & REHABILITATION

## 2024-03-19 RX ORDER — ACETAMINOPHEN AND CODEINE PHOSPHATE 300; 30 MG/1; MG/1
1 TABLET ORAL EVERY 6 HOURS PRN
Qty: 28 TABLET | Refills: 0 | Status: SHIPPED | OUTPATIENT
Start: 2024-03-19 | End: 2024-03-19 | Stop reason: SDUPTHER

## 2024-03-19 RX ORDER — ACETAMINOPHEN AND CODEINE PHOSPHATE 300; 30 MG/1; MG/1
1 TABLET ORAL EVERY 6 HOURS PRN
Qty: 28 TABLET | Refills: 0 | Status: SHIPPED | OUTPATIENT
Start: 2024-03-19 | End: 2024-03-26

## 2024-03-19 NOTE — PROGRESS NOTES
Benita Pacheco presents today for   Chief Complaint   Patient presents with    Lower Back Pain       Is someone accompanying this pt? no    Is the patient using any DME equipment during OV? no      Coordination of Care:  1. Have you been to the ER, urgent care clinic since your last visit? no  Hospitalized since your last visit? no    2. Have you seen or consulted any other health care providers outside of the [facility] since your last visit? Yes, physical therapy, pcp  Include any pap smears or colon screening. no        
canal remains patent.   L3-L4, diffuse disc bulge with foraminal involvement. Moderate facet   arthropathy. Mild canal stenosis. Mild foraminal narrowing bilaterally.   L4-L5, moderate to marked loss of disc space height with minimal diffuse disc   bulge. Disc slightly asymmetric to right paracentral position with mild   impression of the crossing right L5 nerve (as on axial, 11). Moderate facet   arthropathy.   L5-S1, no significant disc pathology. Advanced facet arthropathy bilaterally.   The canal and foramen remain.   _______________       IMPRESSION       Moderately advanced multilevel degenerative disc and joint disease exacerbated   by scoliosis. Canal stenosis is present at L3-L4. There are multiple levels of   foraminal stenosis as described above.       There is no L5 compression deformity. There is likely some degenerative loss of   vertebral body height but no acute osseous findings.       Low suspicion lesion within the lower pole right kidney, likely a cyst. This has   been described previously       Written by Edita Liu, as dictated by Alpa Galvez MD.  I, Dr. Alpa Galvez confirm that all documentation is accurate.

## 2024-04-23 ENCOUNTER — OFFICE VISIT (OUTPATIENT)
Age: 84
End: 2024-04-23
Payer: MEDICARE

## 2024-04-23 VITALS — HEART RATE: 74 BPM | BODY MASS INDEX: 29.37 KG/M2 | OXYGEN SATURATION: 95 % | WEIGHT: 172 LBS | HEIGHT: 64 IN

## 2024-04-23 DIAGNOSIS — M51.36 DDD (DEGENERATIVE DISC DISEASE), LUMBAR: ICD-10-CM

## 2024-04-23 DIAGNOSIS — M48.061 SPINAL STENOSIS, LUMBAR REGION WITHOUT NEUROGENIC CLAUDICATION: ICD-10-CM

## 2024-04-23 DIAGNOSIS — R21 RASH: ICD-10-CM

## 2024-04-23 DIAGNOSIS — M47.819 FACET ARTHROPATHY: Primary | ICD-10-CM

## 2024-04-23 DIAGNOSIS — G89.29 OTHER CHRONIC PAIN: ICD-10-CM

## 2024-04-23 PROCEDURE — 1123F ACP DISCUSS/DSCN MKR DOCD: CPT | Performed by: PHYSICAL MEDICINE & REHABILITATION

## 2024-04-23 PROCEDURE — 1090F PRES/ABSN URINE INCON ASSESS: CPT | Performed by: PHYSICAL MEDICINE & REHABILITATION

## 2024-04-23 PROCEDURE — G8419 CALC BMI OUT NRM PARAM NOF/U: HCPCS | Performed by: PHYSICAL MEDICINE & REHABILITATION

## 2024-04-23 PROCEDURE — G8399 PT W/DXA RESULTS DOCUMENT: HCPCS | Performed by: PHYSICAL MEDICINE & REHABILITATION

## 2024-04-23 PROCEDURE — 99214 OFFICE O/P EST MOD 30 MIN: CPT | Performed by: PHYSICAL MEDICINE & REHABILITATION

## 2024-04-23 PROCEDURE — G8427 DOCREV CUR MEDS BY ELIG CLIN: HCPCS | Performed by: PHYSICAL MEDICINE & REHABILITATION

## 2024-04-23 PROCEDURE — 1036F TOBACCO NON-USER: CPT | Performed by: PHYSICAL MEDICINE & REHABILITATION

## 2024-04-23 RX ORDER — ACETAMINOPHEN AND CODEINE PHOSPHATE 300; 30 MG/1; MG/1
1 TABLET ORAL EVERY 6 HOURS PRN
Qty: 28 TABLET | Refills: 0 | Status: SHIPPED | OUTPATIENT
Start: 2024-04-23 | End: 2024-04-30

## 2024-04-23 RX ORDER — METHYLPREDNISOLONE 4 MG/1
TABLET ORAL
Qty: 1 KIT | Refills: 0 | Status: SHIPPED | OUTPATIENT
Start: 2024-04-23 | End: 2024-04-29

## 2024-04-23 RX ORDER — TRIAMCINOLONE ACETONIDE 1 MG/G
CREAM TOPICAL
COMMUNITY
Start: 2024-03-22

## 2024-04-23 RX ORDER — HYDROXYZINE HYDROCHLORIDE 10 MG/1
TABLET, FILM COATED ORAL
COMMUNITY
Start: 2024-03-21

## 2024-04-23 NOTE — PROGRESS NOTES
90 tablet, Rfl: 3    acetaminophen (TYLENOL) 650 MG extended release tablet, Take 1 tablet by mouth every 8 hours as needed, Disp: , Rfl:     Clobetasol Propionate 0.05 % SHAM, USE AS SHAMPOO DAILY, Disp: , Rfl:     ergocalciferol (ERGOCALCIFEROL) 1.25 MG (59424 UT) capsule, Take 1 capsule by mouth every 7 days, Disp: , Rfl:     lidocaine (LIDODERM) 5 %, Apply patch to the affected area for 12 hours a day and remove for 12 hours a day., Disp: , Rfl:      Allergies   Allergen Reactions    Amlodipine Other (See Comments)     Felt poorly    Atenolol Other (See Comments)     Felt poorly    Losartan Other (See Comments)     Headaches    Tramadol Other (See Comments)         REVIEW OF SYSTEMS    Constitutional: Negative for fever, chills, or weight change.   Respiratory: Negative for cough or shortness of breath.     Cardiovascular: Negative for chest pain or palpitations.  Gastrointestinal: Negative for acid reflux, change in bowel habits, or constipation.  Genitourinary: Negative for dysuria and flank pain.   Musculoskeletal: Positive for lumbar pain.  Skin: Positive for rash with itching  Neurological: Negative for headaches, dizziness, or numbness.   Endo/Heme/Allergies: Negative for increased bruising.   Psychiatric/Behavioral: Negative for difficulty with sleep.    As per HPI    PHYSICAL EXAMINATION  Pulse 74   Ht 1.626 m (5' 4\")   Wt 78 kg (172 lb)   LMP  (LMP Unknown)   SpO2 95%   BMI 29.52 kg/m²     Constitutional: Awake, alert, and in no acute distress.  Neurological: 1+ symmetrical DTRs in the upper extremities. 1+ symmetrical DTRs in the lower extremities. Sensation to light touch is intact. Negative Atkins's sign bilaterally.  Skin: warm, dry, and intact; scattered excoriations on forearms, scattered hives  Musculoskeletal: Pain with extension, axial loading, and forward flexion. No pain with internal or external rotation of her hips. Negative straight  leg raise bilaterally. She is wearing an LSO.

## 2024-05-13 ENCOUNTER — HOSPITAL ENCOUNTER (OUTPATIENT)
Facility: HOSPITAL | Age: 84
Setting detail: SPECIMEN
Discharge: HOME | End: 2024-05-16
Payer: MEDICARE

## 2024-05-13 DIAGNOSIS — E78.5 DYSLIPIDEMIA: ICD-10-CM

## 2024-05-13 LAB
ALBUMIN SERPL-MCNC: 3.6 G/DL (ref 3.4–5)
ALBUMIN/GLOB SERPL: 1.2 (ref 0.8–1.7)
ALP SERPL-CCNC: 87 U/L (ref 45–117)
ALT SERPL-CCNC: 20 U/L (ref 13–56)
ANION GAP SERPL CALC-SCNC: 5 MMOL/L (ref 3–18)
AST SERPL-CCNC: 20 U/L (ref 10–38)
BASOPHILS # BLD: 0 K/UL (ref 0–0.1)
BASOPHILS NFR BLD: 1 % (ref 0–2)
BILIRUB SERPL-MCNC: 0.7 MG/DL (ref 0.2–1)
BUN SERPL-MCNC: 23 MG/DL (ref 7–18)
BUN/CREAT SERPL: 19 (ref 12–20)
CALCIUM SERPL-MCNC: 9.5 MG/DL (ref 8.5–10.1)
CHLORIDE SERPL-SCNC: 108 MMOL/L (ref 100–111)
CHOLEST SERPL-MCNC: 159 MG/DL
CO2 SERPL-SCNC: 27 MMOL/L (ref 21–32)
CREAT SERPL-MCNC: 1.19 MG/DL (ref 0.6–1.3)
DIFFERENTIAL METHOD BLD: ABNORMAL
EOSINOPHIL # BLD: 0.5 K/UL (ref 0–0.4)
EOSINOPHIL NFR BLD: 6 % (ref 0–5)
ERYTHROCYTE [DISTWIDTH] IN BLOOD BY AUTOMATED COUNT: 14.6 % (ref 11.6–14.5)
GLOBULIN SER CALC-MCNC: 3.1 G/DL (ref 2–4)
GLUCOSE SERPL-MCNC: 87 MG/DL (ref 74–99)
HCT VFR BLD AUTO: 45.3 % (ref 35–45)
HDLC SERPL-MCNC: 47 MG/DL (ref 40–60)
HDLC SERPL: 3.4 (ref 0–5)
HGB BLD-MCNC: 14.3 G/DL (ref 12–16)
IMM GRANULOCYTES # BLD AUTO: 0 K/UL (ref 0–0.04)
IMM GRANULOCYTES NFR BLD AUTO: 0 % (ref 0–0.5)
LDLC SERPL CALC-MCNC: 85.2 MG/DL (ref 0–100)
LIPID PANEL: NORMAL
LYMPHOCYTES # BLD: 2.2 K/UL (ref 0.9–3.6)
LYMPHOCYTES NFR BLD: 29 % (ref 21–52)
MCH RBC QN AUTO: 31.4 PG (ref 24–34)
MCHC RBC AUTO-ENTMCNC: 31.6 G/DL (ref 31–37)
MCV RBC AUTO: 99.6 FL (ref 78–100)
MONOCYTES # BLD: 0.8 K/UL (ref 0.05–1.2)
MONOCYTES NFR BLD: 10 % (ref 3–10)
NEUTS SEG # BLD: 4.2 K/UL (ref 1.8–8)
NEUTS SEG NFR BLD: 54 % (ref 40–73)
NRBC # BLD: 0 K/UL (ref 0–0.01)
NRBC BLD-RTO: 0 PER 100 WBC
PLATELET # BLD AUTO: 189 K/UL (ref 135–420)
PMV BLD AUTO: 11.6 FL (ref 9.2–11.8)
POTASSIUM SERPL-SCNC: 4.1 MMOL/L (ref 3.5–5.5)
PROT SERPL-MCNC: 6.7 G/DL (ref 6.4–8.2)
RBC # BLD AUTO: 4.55 M/UL (ref 4.2–5.3)
SODIUM SERPL-SCNC: 140 MMOL/L (ref 136–145)
TRIGL SERPL-MCNC: 134 MG/DL
VLDLC SERPL CALC-MCNC: 26.8 MG/DL
WBC # BLD AUTO: 7.7 K/UL (ref 4.6–13.2)

## 2024-05-13 PROCEDURE — 85025 COMPLETE CBC W/AUTO DIFF WBC: CPT

## 2024-05-13 PROCEDURE — 36415 COLL VENOUS BLD VENIPUNCTURE: CPT

## 2024-05-13 PROCEDURE — 80061 LIPID PANEL: CPT

## 2024-05-13 PROCEDURE — 80053 COMPREHEN METABOLIC PANEL: CPT

## 2024-05-14 NOTE — TELEPHONE ENCOUNTER
From: Humaira Cabral  To: Magi Khalil MD  Sent: 12/18/2017 10:05 AM EST  Subject: Medication Renewal Request    Original authorizing provider: MD Bella Sotelo would like a refill of the following medications:  ergocalciferol (VITAMIN D2) 50,000 unit capsule Magi Khalil MD]    Preferred pharmacy: [address] [place] [address] Road: If you are a smoker, it is important for your health to stop smoking. Please be aware that second hand smoke is also harmful.

## 2024-05-23 ENCOUNTER — OFFICE VISIT (OUTPATIENT)
Age: 84
End: 2024-05-23
Payer: MEDICARE

## 2024-05-23 DIAGNOSIS — Z86.79 STATUS POST ENDOVASCULAR ANEURYSM REPAIR (EVAR): Primary | ICD-10-CM

## 2024-05-23 DIAGNOSIS — Z98.890 STATUS POST ENDOVASCULAR ANEURYSM REPAIR (EVAR): Primary | ICD-10-CM

## 2024-05-23 PROCEDURE — 1036F TOBACCO NON-USER: CPT | Performed by: SURGERY

## 2024-05-23 PROCEDURE — 1123F ACP DISCUSS/DSCN MKR DOCD: CPT | Performed by: SURGERY

## 2024-05-23 PROCEDURE — 1090F PRES/ABSN URINE INCON ASSESS: CPT | Performed by: SURGERY

## 2024-05-23 PROCEDURE — G8419 CALC BMI OUT NRM PARAM NOF/U: HCPCS | Performed by: SURGERY

## 2024-05-23 PROCEDURE — G8399 PT W/DXA RESULTS DOCUMENT: HCPCS | Performed by: SURGERY

## 2024-05-23 PROCEDURE — G8428 CUR MEDS NOT DOCUMENT: HCPCS | Performed by: SURGERY

## 2024-05-23 PROCEDURE — 99213 OFFICE O/P EST LOW 20 MIN: CPT | Performed by: SURGERY

## 2024-05-23 NOTE — PROGRESS NOTES
[facility]    Vascular Surgery Phone Call Appointment    Benita Pacheco  Chief Complaint   Patient presents with    Follow-up       History:  82 y/o F with hx of EVAR in 2018.  She has been recommended to undergo annual surveillance duplex ultrasounds. She was last seen 1 year ago and recommended to repeat EVAR duplex in 1 year.     I called the pt today to discuss results from her recent duplex. She denies abdominal or new back pain. She reports feeling well otherwise.       PE:   No physical exam was performed for this phone call appointment.     Imaging/Studies:   EVAR duplex May 2024: Patent EVAR endograft. The aortic sac continues to decrease in size. No sign of endoleak.       Impression:  Doing well s/p EVAR in 2018 with no sings of endoleak or aneurysm sac expansion.     Plan:  Repeat EVAR duplex in 1 year and office visit at that time. Pt amenable to this plan.     Eliane Martin MD  Vascular Surgeon      This telephone appointment was performed at the request of the patient. Prior to the appointment, the patient was informed that a telephone appointment is a billable service, and that he/she might receive an insurance bill. She consented to proceed with the visit.  I spent approximately 20 minutes on this patient encounter, which includes but is not limited to performing a history, reviewing primary care and consultant documentation, reviewing imaging/studies, and speaking with her regarding my impression and plan.       Past Medical History:   Diagnosis Date    AAA (abdominal aortic aneurysm) without rupture (HCC)     AAA repair 7/2018 Dr Chapman    Anxiety     Bursitis     Carpal tunnel syndrome     CKD (chronic kidney disease) 2014    Colon adenomas     2003 Dr Dalal; 4/13 Dr Denise    COVID-19 vaccine dose declined 11/2023    boosters    Degenerative arthritis of lumbar spine 12/2018    Dr Galvez; mri showed multilevel degen changes, mod spinal

## 2024-05-24 ENCOUNTER — TELEPHONE (OUTPATIENT)
Age: 84
End: 2024-05-24

## 2024-05-24 ENCOUNTER — OFFICE VISIT (OUTPATIENT)
Age: 84
End: 2024-05-24

## 2024-05-24 VITALS
HEIGHT: 64 IN | OXYGEN SATURATION: 95 % | BODY MASS INDEX: 29.37 KG/M2 | HEART RATE: 71 BPM | RESPIRATION RATE: 16 BRPM | WEIGHT: 172 LBS | DIASTOLIC BLOOD PRESSURE: 70 MMHG | SYSTOLIC BLOOD PRESSURE: 118 MMHG | TEMPERATURE: 97.7 F

## 2024-05-24 DIAGNOSIS — E03.4 HYPOTHYROIDISM DUE TO ACQUIRED ATROPHY OF THYROID: ICD-10-CM

## 2024-05-24 DIAGNOSIS — D12.6 COLON ADENOMAS: ICD-10-CM

## 2024-05-24 DIAGNOSIS — N18.31 STAGE 3A CHRONIC KIDNEY DISEASE (HCC): ICD-10-CM

## 2024-05-24 DIAGNOSIS — Z98.890 S/P AAA REPAIR: ICD-10-CM

## 2024-05-24 DIAGNOSIS — E78.5 DYSLIPIDEMIA: Primary | ICD-10-CM

## 2024-05-24 DIAGNOSIS — Z86.79 S/P AAA REPAIR: ICD-10-CM

## 2024-05-24 DIAGNOSIS — I10 PRIMARY HYPERTENSION: ICD-10-CM

## 2024-05-24 DIAGNOSIS — I71.40 ABDOMINAL AORTIC ANEURYSM (AAA) WITHOUT RUPTURE, UNSPECIFIED PART (HCC): ICD-10-CM

## 2024-05-24 PROBLEM — M16.10 HIP ARTHRITIS: Status: RESOLVED | Noted: 2021-05-18 | Resolved: 2024-05-24

## 2024-05-24 RX ORDER — ATORVASTATIN CALCIUM 80 MG/1
80 TABLET, FILM COATED ORAL DAILY
Qty: 90 TABLET | Refills: 3 | Status: SHIPPED | OUTPATIENT
Start: 2024-05-24

## 2024-05-24 SDOH — ECONOMIC STABILITY: INCOME INSECURITY: HOW HARD IS IT FOR YOU TO PAY FOR THE VERY BASICS LIKE FOOD, HOUSING, MEDICAL CARE, AND HEATING?: NOT HARD AT ALL

## 2024-05-24 SDOH — ECONOMIC STABILITY: FOOD INSECURITY: WITHIN THE PAST 12 MONTHS, YOU WORRIED THAT YOUR FOOD WOULD RUN OUT BEFORE YOU GOT MONEY TO BUY MORE.: NEVER TRUE

## 2024-05-24 SDOH — ECONOMIC STABILITY: FOOD INSECURITY: WITHIN THE PAST 12 MONTHS, THE FOOD YOU BOUGHT JUST DIDN'T LAST AND YOU DIDN'T HAVE MONEY TO GET MORE.: NEVER TRUE

## 2024-05-24 NOTE — TELEPHONE ENCOUNTER
----- Message from Katelyn Mcdonnell sent at 5/24/2024 12:10 PM EDT -----  Subject: Message to Provider    QUESTIONS  Information for Provider? Pt just left the office from a appt with Dr. Barry. She told them her pharmacy was [facility] but she needs it to go   to the [facility] pharmacy. Please call pt to advise.  ---------------------------------------------------------------------------  --------------  CALL BACK INFO  [phone]; OK to leave message on voicemail  ---------------------------------------------------------------------------  --------------  SCRIPT ANSWERS  Relationship to Patient? Self

## 2024-05-24 NOTE — PROGRESS NOTES
Benita Pacheco presents today for   Chief Complaint   Patient presents with    Follow-up Chronic Condition     Accompanied by spouse.     \"Have you been to the ER, urgent care clinic since your last visit?  Hospitalized since your last visit?\"    NO    “Have you seen or consulted any other health care providers outside of [facility] since your last visit?”    YES - When: approximately 1 months ago.  Where and Why: [facility], lower back pain.

## 2024-05-24 NOTE — PROGRESS NOTES
83 y.o. female who presents for evaluation.   was present for the evaluaion    No cardiovascular complaints. No set exercise, just adls    She had follow up w Dr Martin yesterday and follow up duplex was ok     No new gi or gu complaints    She has a rash for which she is seeing Dr Valentin    LAST MEDICARE WELLNESS EXAM: 4/1/14, 4/8/14, 4/14/16, 4/25/17, 5/7/18, 10/2/19, 10/8/20, 10/13/21, 10/26/22, 11/13/23    Past Medical History:   Diagnosis Date    AAA (abdominal aortic aneurysm) without rupture (HCC)     AAA repair (7/18) Dr Chapman; Dr Martin (5/24)    Anxiety     Bursitis     Carpal tunnel syndrome     CKD (chronic kidney disease) 2014    Colon adenomas     2003 Dr Dalal; 4/13 Dr Denise    COVID-19 vaccine dose declined 11/2023    boosters    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 Quezada, neg sprue    H/O cardiovascular stress test     NST neg (9/16)    H/O echocardiogram 12/2017    ef 55%, no wma, mild inc thickness, gr 1 dd, mild CHRISTINE    Hearing loss     LEFT Dr Wyman; sensorineural    Hypertension     Hypothyroidism 2007    Dr. 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 Morales    Venous insufficiency 2003    on dopplers    Vitamin D deficiency     Zoster

## 2024-06-12 ENCOUNTER — OFFICE VISIT (OUTPATIENT)
Age: 84
End: 2024-06-12
Payer: MEDICARE

## 2024-06-12 VITALS
SYSTOLIC BLOOD PRESSURE: 100 MMHG | HEART RATE: 88 BPM | DIASTOLIC BLOOD PRESSURE: 59 MMHG | BODY MASS INDEX: 29.52 KG/M2 | OXYGEN SATURATION: 95 % | HEIGHT: 64 IN

## 2024-06-12 DIAGNOSIS — G89.29 OTHER CHRONIC PAIN: ICD-10-CM

## 2024-06-12 DIAGNOSIS — R21 RASH: ICD-10-CM

## 2024-06-12 DIAGNOSIS — M47.819 FACET ARTHROPATHY: Primary | ICD-10-CM

## 2024-06-12 DIAGNOSIS — M51.36 DDD (DEGENERATIVE DISC DISEASE), LUMBAR: ICD-10-CM

## 2024-06-12 DIAGNOSIS — M48.061 SPINAL STENOSIS, LUMBAR REGION WITHOUT NEUROGENIC CLAUDICATION: ICD-10-CM

## 2024-06-12 PROCEDURE — 1036F TOBACCO NON-USER: CPT | Performed by: PHYSICAL MEDICINE & REHABILITATION

## 2024-06-12 PROCEDURE — 3074F SYST BP LT 130 MM HG: CPT | Performed by: PHYSICAL MEDICINE & REHABILITATION

## 2024-06-12 PROCEDURE — 3078F DIAST BP <80 MM HG: CPT | Performed by: PHYSICAL MEDICINE & REHABILITATION

## 2024-06-12 PROCEDURE — G8399 PT W/DXA RESULTS DOCUMENT: HCPCS | Performed by: PHYSICAL MEDICINE & REHABILITATION

## 2024-06-12 PROCEDURE — 1090F PRES/ABSN URINE INCON ASSESS: CPT | Performed by: PHYSICAL MEDICINE & REHABILITATION

## 2024-06-12 PROCEDURE — 1123F ACP DISCUSS/DSCN MKR DOCD: CPT | Performed by: PHYSICAL MEDICINE & REHABILITATION

## 2024-06-12 PROCEDURE — 99214 OFFICE O/P EST MOD 30 MIN: CPT | Performed by: PHYSICAL MEDICINE & REHABILITATION

## 2024-06-12 PROCEDURE — G8419 CALC BMI OUT NRM PARAM NOF/U: HCPCS | Performed by: PHYSICAL MEDICINE & REHABILITATION

## 2024-06-12 PROCEDURE — G8427 DOCREV CUR MEDS BY ELIG CLIN: HCPCS | Performed by: PHYSICAL MEDICINE & REHABILITATION

## 2024-06-12 RX ORDER — ACETAMINOPHEN AND CODEINE PHOSPHATE 300; 30 MG/1; MG/1
1 TABLET ORAL EVERY 6 HOURS PRN
Qty: 28 TABLET | Refills: 0 | Status: SHIPPED | OUTPATIENT
Start: 2024-06-12 | End: 2024-06-19

## 2024-06-12 NOTE — PROGRESS NOTES
[facility]  [address] [place] [address], [address]  [place] [address]  Phone: [phone]  Fax: [phone]    Pt's YOB: 1940    ASSESSMENT   Benita was seen today for lower back pain.    Diagnoses and all orders for this visit:    Facet arthropathy  -     acetaminophen-codeine (TYLENOL/CODEINE #3) 300-30 MG per tablet; Take 1 tablet by mouth every 6 hours as needed for Pain for up to 7 days. Intended supply: 7 days. Take lowest dose possible to manage pain Max Daily Amount: 4 tablets    Other chronic pain  -     acetaminophen-codeine (TYLENOL/CODEINE #3) 300-30 MG per tablet; Take 1 tablet by mouth every 6 hours as needed for Pain for up to 7 days. Intended supply: 7 days. Take lowest dose possible to manage pain Max Daily Amount: 4 tablets    Spinal stenosis, lumbar region without neurogenic claudication    DDD (degenerative disc disease), lumbar    Rash         IMPRESSION AND PLAN:  Patient is 83-year-old female with history of chronic lumbar pain recently tried on Tylenol 3 as a way to better manage her symptoms.  Patient also states that she was recently noted to have a rash which was unclear if it was related to the Tylenol 3 but she reports she had this actually prior to taking the medication    1) Pt was given refill of Tylenol #3 for pain--patient will take a full tablet twice per day for better management of her pain  2) Pain agreement discussed if pt continue with medication  3) Patient will follow-up with dermatology for further evaluation of her rash  4) Ms. Pacheco has a reminder for a \"due or due soon\" health maintenance. I have asked that she contact her primary care provider, Herbie Barry MD, for follow-up on this health maintenance.  5)  demonstrated consistency with prescribing.     Return in about 4 weeks (around 7/10/2024) for Medication follow up.        HISTORY OF PRESENT ILLNESS:  Benita Pacheco is a 83 y.o.  female with

## 2024-06-19 ENCOUNTER — OFFICE VISIT (OUTPATIENT)
Age: 84
End: 2024-06-19
Payer: MEDICARE

## 2024-06-19 VITALS — BODY MASS INDEX: 28.51 KG/M2 | HEIGHT: 64 IN | WEIGHT: 167 LBS

## 2024-06-19 DIAGNOSIS — M70.62 TROCHANTERIC BURSITIS OF LEFT HIP: ICD-10-CM

## 2024-06-19 DIAGNOSIS — Z96.642 HISTORY OF TOTAL HIP REPLACEMENT, LEFT: ICD-10-CM

## 2024-06-19 DIAGNOSIS — M16.11 UNILATERAL PRIMARY OSTEOARTHRITIS, RIGHT HIP: Primary | ICD-10-CM

## 2024-06-19 PROCEDURE — 1123F ACP DISCUSS/DSCN MKR DOCD: CPT | Performed by: ORTHOPAEDIC SURGERY

## 2024-06-19 PROCEDURE — 1036F TOBACCO NON-USER: CPT | Performed by: ORTHOPAEDIC SURGERY

## 2024-06-19 PROCEDURE — G8399 PT W/DXA RESULTS DOCUMENT: HCPCS | Performed by: ORTHOPAEDIC SURGERY

## 2024-06-19 PROCEDURE — G8419 CALC BMI OUT NRM PARAM NOF/U: HCPCS | Performed by: ORTHOPAEDIC SURGERY

## 2024-06-19 PROCEDURE — 1090F PRES/ABSN URINE INCON ASSESS: CPT | Performed by: ORTHOPAEDIC SURGERY

## 2024-06-19 PROCEDURE — 73502 X-RAY EXAM HIP UNI 2-3 VIEWS: CPT | Performed by: ORTHOPAEDIC SURGERY

## 2024-06-19 PROCEDURE — G8427 DOCREV CUR MEDS BY ELIG CLIN: HCPCS | Performed by: ORTHOPAEDIC SURGERY

## 2024-06-19 PROCEDURE — 20610 DRAIN/INJ JOINT/BURSA W/O US: CPT | Performed by: ORTHOPAEDIC SURGERY

## 2024-06-19 PROCEDURE — 99214 OFFICE O/P EST MOD 30 MIN: CPT | Performed by: ORTHOPAEDIC SURGERY

## 2024-06-19 RX ORDER — LIDOCAINE HYDROCHLORIDE 10 MG/ML
3 INJECTION, SOLUTION INFILTRATION; PERINEURAL ONCE
Status: COMPLETED | OUTPATIENT
Start: 2024-06-19 | End: 2024-06-19

## 2024-06-19 RX ORDER — BETAMETHASONE SODIUM PHOSPHATE AND BETAMETHASONE ACETATE 3; 3 MG/ML; MG/ML
6 INJECTION, SUSPENSION INTRA-ARTICULAR; INTRALESIONAL; INTRAMUSCULAR; SOFT TISSUE ONCE
Status: COMPLETED | OUTPATIENT
Start: 2024-06-19 | End: 2024-06-19

## 2024-06-19 RX ADMIN — LIDOCAINE HYDROCHLORIDE 3 ML: 10 INJECTION, SOLUTION INFILTRATION; PERINEURAL at 13:21

## 2024-06-19 RX ADMIN — BETAMETHASONE SODIUM PHOSPHATE AND BETAMETHASONE ACETATE 6 MG: 3; 3 INJECTION, SUSPENSION INTRA-ARTICULAR; INTRALESIONAL; INTRAMUSCULAR; SOFT TISSUE at 13:20

## 2024-06-19 NOTE — PROGRESS NOTES
Patient: Benita Pacheco                MRN: [id number]       SSN: [id number]  YOB: 1940        AGE: 83 y.o.        SEX: female  Body mass index is 28.67 kg/m².    PCP: Herbie Barry MD  06/19/24    Benita is here today for reevaluation of bilateral hip pain depending on the day it where it hurts currently both of her hips are hurting her and her low back as well she saw Dr. Galvez does not require back operation thankfully and she does not get some groin pain on the left side and bilateral lateral hip pain also some low back pain on the left side I did examine her today the right hip SymTouch stiff which will reproduce some groin discomfort but she is living with it and she has some tenderness on the right greater trochanter examination the left hip is quite a benign exam nicely healed incision she does have some tenderness over the trochanter and she has a touch of hip flexor tendinitis as well on that side low back is also tender around L4-5 but not severely so no foot drop and tib and EHL 5 out of 5    I think her left groin pain is coming from her hip flexor tendon I think she has been compensating because the right hip is fairly arthritic and bursitis she like to have a left hip bursal injection today which was very well-tolerated social determinants of health were reviewed no negative factors affecting patient care today but she is ghazal return to see us in about 4 weeks and I suspect will reevaluate the right hip and possible bursal injection for the right side as well no surgery is indicated currently she.  Arthritis is moderately advanced but not severely so I do not think we need an intra-articular injection at this point on the right side either thank you    3 views of the right hip 6/19/2024 shows left hip replacement in excellent position and alignment mild evidence of enthesiopathy previous vascular bypass and moderately advanced arthritis involving the right hip

## 2024-06-20 NOTE — TELEPHONE ENCOUNTER
Last RX given was for Tramadol 50mg on 6-8-22 #28
Patient called and is asking for Tylenol #3 medication from Dr. Christianna Osler. [facility] on [address]  Tel. [phone]. Patient tel [phone].
Patient contacted and given this information.  She expressed understanding
The MRI showed that she did not have a compression fracture.   Have her go to arthritis strength tylenol 1 tab qid
No

## 2024-06-21 ENCOUNTER — TELEPHONE (OUTPATIENT)
Age: 84
End: 2024-06-21

## 2024-06-21 NOTE — TELEPHONE ENCOUNTER
Patient called for .    Patient said that  had prescribed her some Acetaminophen with Codeine on 6/12/24.    Patient said that  had prescribe her the medication before , and it had given her a rash, but  wanted her to try the medication again.     Patient said the medication does not work and it bothers her.    Patient is asking if  could prescribe her a different medication.     [facility] on [address]   Tel.[phone].    Patient tel. [phone].

## 2024-06-24 ENCOUNTER — TELEPHONE (OUTPATIENT)
Facility: CLINIC | Age: 84
End: 2024-06-24

## 2024-06-24 DIAGNOSIS — E78.5 DYSLIPIDEMIA: ICD-10-CM

## 2024-06-24 RX ORDER — ATORVASTATIN CALCIUM 80 MG/1
80 TABLET, FILM COATED ORAL DAILY
Qty: 90 TABLET | Refills: 3 | Status: SHIPPED | OUTPATIENT
Start: 2024-06-24

## 2024-06-24 NOTE — TELEPHONE ENCOUNTER
Pt spouse called in states Rx for atorvastatin (LIPITOR) 40 MG tablet   Was canceled and needs a refill.     Yanira advise.      Pharmacy   [facility] - [place], [place] - [facility] [address] - P [phone] - F [phone] [[id number]]

## 2024-07-03 NOTE — TELEPHONE ENCOUNTER
Patient said that she was home yesterday afternoon and didn't get any calls. Please try again later this afternoon at [phone]. She did say that her  has several appts today so call later in the afternoon.

## 2024-07-18 ENCOUNTER — OFFICE VISIT (OUTPATIENT)
Age: 84
End: 2024-07-18
Payer: MEDICARE

## 2024-07-18 VITALS — WEIGHT: 172 LBS | HEIGHT: 64 IN | BODY MASS INDEX: 29.37 KG/M2

## 2024-07-18 DIAGNOSIS — M70.62 TROCHANTERIC BURSITIS OF LEFT HIP: Primary | ICD-10-CM

## 2024-07-18 DIAGNOSIS — M81.0 AGE-RELATED OSTEOPOROSIS WITHOUT CURRENT PATHOLOGICAL FRACTURE: ICD-10-CM

## 2024-07-18 DIAGNOSIS — Z96.642 HISTORY OF TOTAL HIP REPLACEMENT, LEFT: ICD-10-CM

## 2024-07-18 DIAGNOSIS — M16.11 UNILATERAL PRIMARY OSTEOARTHRITIS, RIGHT HIP: ICD-10-CM

## 2024-07-18 PROCEDURE — G8427 DOCREV CUR MEDS BY ELIG CLIN: HCPCS | Performed by: ORTHOPAEDIC SURGERY

## 2024-07-18 PROCEDURE — G8419 CALC BMI OUT NRM PARAM NOF/U: HCPCS | Performed by: ORTHOPAEDIC SURGERY

## 2024-07-18 PROCEDURE — 1036F TOBACCO NON-USER: CPT | Performed by: ORTHOPAEDIC SURGERY

## 2024-07-18 PROCEDURE — 1090F PRES/ABSN URINE INCON ASSESS: CPT | Performed by: ORTHOPAEDIC SURGERY

## 2024-07-18 PROCEDURE — G8399 PT W/DXA RESULTS DOCUMENT: HCPCS | Performed by: ORTHOPAEDIC SURGERY

## 2024-07-18 PROCEDURE — 1123F ACP DISCUSS/DSCN MKR DOCD: CPT | Performed by: ORTHOPAEDIC SURGERY

## 2024-07-18 PROCEDURE — 99214 OFFICE O/P EST MOD 30 MIN: CPT | Performed by: ORTHOPAEDIC SURGERY

## 2024-07-18 RX ORDER — DICLOFENAC EPOLAMINE 0.01 G/1
1 SYSTEM TOPICAL 2 TIMES DAILY
Qty: 30 PATCH | Refills: 1 | Status: SHIPPED | OUTPATIENT
Start: 2024-07-18

## 2024-07-18 NOTE — PROGRESS NOTES
Patient: Benita Pacheco                MRN: [id number]       SSN: [id number]  YOB: 1940        AGE: 83 y.o.        SEX: female  Body mass index is 29.52 kg/m².    PCP: Herbie Barry MD  07/18/24    Benita is here today follow-up left hip bursitis and pain she is feeling much better with the injection still little irritation I did examine her today her right hip surprisingly moves well considering it is fairly arthritic and both trochanters are relatively nontender abductors are firing well hip flexors nontender as well and both feet warm and well-perfused    Social determinants of health were again reviewed no negative factors affecting patient care and very pleased with how she is doing organ to do a follow-up visit in about 3 months sooner if there is any problem and I recommend nonoperative measures for her currently is been a pleasure to share in her care thank you    Prescription for Flector patches generated today usual precautions    REVIEW OF SYSTEMS:      CON: negative  EYE: negative   ENT: negative  RESP: negative  GI:    negative   :  negative  MSK: Positive  A twelve point review of systems was completed, positives noted and all other systems were reviewed and are negative          Past Medical History:   Diagnosis Date    AAA (abdominal aortic aneurysm) without rupture (HCC)     AAA repair (7/18) Dr Chapman; Dr Martin (5/24)    Anxiety     Bursitis     Carpal tunnel syndrome     CKD (chronic kidney disease) 2014    Colon adenomas     2003 Dr Dalal; 4/13 Dr Denise    COVID-19 vaccine dose declined 11/2023    boosters    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 Quezada, neg sprue    H/O cardiovascular stress test     NST neg (9/16)    H/O

## 2024-08-13 ENCOUNTER — OFFICE VISIT (OUTPATIENT)
Age: 84
End: 2024-08-13
Payer: MEDICARE

## 2024-08-13 VITALS
HEIGHT: 64 IN | HEART RATE: 75 BPM | BODY MASS INDEX: 28.54 KG/M2 | DIASTOLIC BLOOD PRESSURE: 59 MMHG | OXYGEN SATURATION: 96 % | WEIGHT: 167.2 LBS | SYSTOLIC BLOOD PRESSURE: 94 MMHG | TEMPERATURE: 97.7 F

## 2024-08-13 DIAGNOSIS — M48.061 SPINAL STENOSIS, LUMBAR REGION WITHOUT NEUROGENIC CLAUDICATION: ICD-10-CM

## 2024-08-13 DIAGNOSIS — M53.3 SACROILIAC JOINT PAIN: Primary | ICD-10-CM

## 2024-08-13 DIAGNOSIS — M47.819 FACET ARTHROPATHY: ICD-10-CM

## 2024-08-13 PROCEDURE — 1036F TOBACCO NON-USER: CPT | Performed by: PHYSICAL MEDICINE & REHABILITATION

## 2024-08-13 PROCEDURE — G8427 DOCREV CUR MEDS BY ELIG CLIN: HCPCS | Performed by: PHYSICAL MEDICINE & REHABILITATION

## 2024-08-13 PROCEDURE — 3074F SYST BP LT 130 MM HG: CPT | Performed by: PHYSICAL MEDICINE & REHABILITATION

## 2024-08-13 PROCEDURE — G8399 PT W/DXA RESULTS DOCUMENT: HCPCS | Performed by: PHYSICAL MEDICINE & REHABILITATION

## 2024-08-13 PROCEDURE — 99214 OFFICE O/P EST MOD 30 MIN: CPT | Performed by: PHYSICAL MEDICINE & REHABILITATION

## 2024-08-13 PROCEDURE — 3078F DIAST BP <80 MM HG: CPT | Performed by: PHYSICAL MEDICINE & REHABILITATION

## 2024-08-13 PROCEDURE — G8419 CALC BMI OUT NRM PARAM NOF/U: HCPCS | Performed by: PHYSICAL MEDICINE & REHABILITATION

## 2024-08-13 PROCEDURE — 1090F PRES/ABSN URINE INCON ASSESS: CPT | Performed by: PHYSICAL MEDICINE & REHABILITATION

## 2024-08-13 PROCEDURE — 1123F ACP DISCUSS/DSCN MKR DOCD: CPT | Performed by: PHYSICAL MEDICINE & REHABILITATION

## 2024-08-13 RX ORDER — METHYLPREDNISOLONE 4 MG/1
TABLET ORAL
Qty: 1 KIT | Refills: 0 | Status: SHIPPED | OUTPATIENT
Start: 2024-08-13 | End: 2024-08-19

## 2024-08-13 NOTE — PROGRESS NOTES
Benita Pacheco presents today for   Chief Complaint   Patient presents with    Back Pain     LBP is hurting today       Is someone accompanying this pt? no  no  Is the patient using any DME equipment during OV? Yes, graham      Coordination of Care:  1. Have you been to the ER, urgent care clinic since your last visit? no  Hospitalized since your last visit? no    2. Have you seen or consulted any other health care providers outside of the [facility] since your last visit? no Include any pap smears or colon screening. no    
acid reflux, change in bowel habits, or constipation.  Genitourinary: Negative for dysuria and flank pain.   Musculoskeletal: Positive for lower lumbar pain.  Skin: Negative for rash.   Neurological: Negative for headaches, dizziness, or numbness.  Endo/Heme/Allergies: Negative for increased bruising.   Psychiatric/Behavioral: Negative for difficulty with sleep.    As per HPI    PHYSICAL EXAMINATION  BP (!) 94/59 (Site: Left Upper Arm, Position: Sitting, Cuff Size: Medium Adult)   Pulse 75   Temp 97.7 °F (36.5 °C) (Skin)   Ht 1.626 m (5' 4\")   Wt 75.8 kg (167 lb 3.2 oz)   LMP  (LMP Unknown)   SpO2 96% Comment: RA  BMI 28.70 kg/m²     Constitutional: Awake, alert, and in no acute distress.  Neurological: 1+ symmetrical DTRs in the upper extremities. 1+ symmetrical DTRs in the lower extremities. Sensation to light touch is intact. Negative Atkins's sign bilaterally.  Skin: warm, dry, and intact.   Musculoskeletal: Positive NEREYDA's thrust and compression test. Mild pain with extension, axial loading, or forward flexion. No pain with internal or external rotation of her hips. Negative straight leg raise bilaterally.      Biceps  Triceps Deltoids Wrist Ext Wrist Flex Hand Intrin   Right +4/5 +4/5 +4/5 +4/5 +4/5 +4/5   Left +4/5 +4/5 +4/5 +4/5 +4/5 +4/5      Hip Flex  Quads Hamstrings Ankle DF EHL Ankle PF   Right +4/5 +4/5 +4/5 +4/5 +4/5 +4/5   Left +4/5 +4/5 +4/5 +4/5 +4/5 +4/5     IMAGING:  Lumbar spine MRI from 6/8/2022 were personally reviewed with the patient and demonstrated:      FINDINGS:         There are 5 nonrib-bearing lumbar type vertebral bodies seen.  There is   dextrocurvature of the lumbar spine, apex at L2-L3. Discogenic marrow signal   changes are noted, most severe at L1-L2 and L4-L5. The conus medullaris   terminates at L1. The visualized lumbar cord is unremarkable.       The paravertebral soft tissues appear unremarkable with the exception of a 10 mm   T2 hyperintense cortical lesion within

## 2024-08-29 ENCOUNTER — HOSPITAL ENCOUNTER (OUTPATIENT)
Facility: HOSPITAL | Age: 84
Discharge: HOME | End: 2024-08-29
Payer: MEDICARE

## 2024-08-29 ENCOUNTER — HOSPITAL ENCOUNTER (OUTPATIENT)
Facility: HOSPITAL | Age: 84
End: 2024-08-29
Payer: MEDICARE

## 2024-08-29 VITALS
SYSTOLIC BLOOD PRESSURE: 104 MMHG | HEART RATE: 75 BPM | RESPIRATION RATE: 17 BRPM | OXYGEN SATURATION: 96 % | TEMPERATURE: 99.1 F | DIASTOLIC BLOOD PRESSURE: 63 MMHG

## 2024-08-29 PROBLEM — M53.3 SACROILIAC PAIN: Status: ACTIVE | Noted: 2024-08-29

## 2024-08-29 PROCEDURE — 27096 INJECT SACROILIAC JOINT: CPT

## 2024-08-29 PROCEDURE — 27096 INJECT SACROILIAC JOINT: CPT | Performed by: PHYSICAL MEDICINE & REHABILITATION

## 2024-08-29 PROCEDURE — 6370000000 HC RX 637 (ALT 250 FOR IP): Performed by: PHYSICAL MEDICINE & REHABILITATION

## 2024-08-29 PROCEDURE — 2500000003 HC RX 250 WO HCPCS: Performed by: PHYSICAL MEDICINE & REHABILITATION

## 2024-08-29 PROCEDURE — 6360000002 HC RX W HCPCS: Performed by: PHYSICAL MEDICINE & REHABILITATION

## 2024-08-29 RX ORDER — DEXAMETHASONE SODIUM PHOSPHATE 10 MG/ML
10 INJECTION, SOLUTION INTRAMUSCULAR; INTRAVENOUS ONCE
Status: COMPLETED | OUTPATIENT
Start: 2024-08-29 | End: 2024-08-29

## 2024-08-29 RX ORDER — DIAZEPAM 5 MG
10 TABLET ORAL ONCE
Status: COMPLETED | OUTPATIENT
Start: 2024-08-29 | End: 2024-08-29

## 2024-08-29 RX ORDER — DIAZEPAM 5 MG
5 TABLET ORAL ONCE
Status: COMPLETED | OUTPATIENT
Start: 2024-08-29 | End: 2024-08-29

## 2024-08-29 RX ORDER — DIAZEPAM 5 MG
2.5 TABLET ORAL ONCE
Status: COMPLETED | OUTPATIENT
Start: 2024-08-29 | End: 2024-08-29

## 2024-08-29 RX ORDER — IOPAMIDOL 408 MG/ML
4 INJECTION, SOLUTION INTRATHECAL
Status: DISPENSED | OUTPATIENT
Start: 2024-08-29

## 2024-08-29 RX ORDER — LIDOCAINE HYDROCHLORIDE 10 MG/ML
30 INJECTION, SOLUTION EPIDURAL; INFILTRATION; INTRACAUDAL; PERINEURAL ONCE
Status: COMPLETED | OUTPATIENT
Start: 2024-08-29 | End: 2024-08-29

## 2024-08-29 RX ADMIN — LIDOCAINE HYDROCHLORIDE 12 ML: 10 INJECTION, SOLUTION EPIDURAL; INFILTRATION; INTRACAUDAL; PERINEURAL at 14:27

## 2024-08-29 RX ADMIN — DEXAMETHASONE SODIUM PHOSPHATE 10 MG: 10 INJECTION, SOLUTION INTRAMUSCULAR; INTRAVENOUS at 14:28

## 2024-08-29 RX ADMIN — DIAZEPAM 5 MG: 5 TABLET ORAL at 13:33

## 2024-08-29 ASSESSMENT — PAIN DESCRIPTION - DESCRIPTORS: DESCRIPTORS: ACHING;THROBBING;DISCOMFORT

## 2024-08-29 ASSESSMENT — PAIN - FUNCTIONAL ASSESSMENT: PAIN_FUNCTIONAL_ASSESSMENT: 0-10

## 2024-08-29 NOTE — OP NOTE
Procedure Note    Patient Name: Benita Pacheco    Date of Procedure: August 29, 2024    Preoperative Diagnosis: Sacrilitis    Post Operative Diagnosis: same    Procedure: SI Joint Injection bilateral     Consent: Informed consent was obtained prior to the procedure. The patient was given the opportunity to ask questions regarding the procedure and its associated risks. In addition to the potential risks associated with the procedure itself, the patient was informed both verbally and in writing of potential side effects of the use of glucocorticoids. The patient appeared to comprehend the informed consent and desired to have the procedure perfored.    Procedure: The patient was placed in the prone position on the flouroscopy table and the back was prepped and draped in the usual sterile manner. A #22 gauge spinal needle was then advanced to lie within the SI joint after local Lidocaine 1% injection. A total of 10 mg of preservative free Dexamethasone and 2 cc of Lidocaine was introduced into the SI joint.     The injection area was cleaned and bandaids applied. No excessive bleeding was noted. Patient dressed and was discharged to home with instructions.    Discussion: The patient tolerated the procedure well.     NURYS DIAZ III, MD  August 29, 2024

## 2024-09-05 ENCOUNTER — TELEPHONE (OUTPATIENT)
Facility: CLINIC | Age: 84
End: 2024-09-05

## 2024-09-05 DIAGNOSIS — E78.5 DYSLIPIDEMIA: ICD-10-CM

## 2024-09-05 RX ORDER — EZETIMIBE 10 MG/1
10 TABLET ORAL DAILY
Qty: 90 TABLET | Refills: 3 | Status: SHIPPED | OUTPATIENT
Start: 2024-09-05

## 2024-09-30 DIAGNOSIS — I10 PRIMARY HYPERTENSION: ICD-10-CM

## 2024-09-30 RX ORDER — LISINOPRIL 10 MG/1
10 TABLET ORAL DAILY
Qty: 90 TABLET | Refills: 0 | Status: SHIPPED | OUTPATIENT
Start: 2024-09-30

## 2024-09-30 NOTE — TELEPHONE ENCOUNTER
PCP: Herbie Barry MD    LAST OFFICE VISIT: 05/24/2024    LAST REFILL PER CHART:  Medication:lisinopril (PRINIVIL;ZESTRIL) 10 MG tablet   Ordered On:12/28/2023  Instructions:Take 1 tablet by mouth daily   Dispense:90 tablets  Refills:3      Future Appointments   Date Time Provider Department Center   10/18/2024  9:30 AM Dylan Lopez MD [facility]   10/22/2024  1:40 PM Alpa Galvez MD [facility]   11/26/2024  8:30 AM C LAB VISIT [facility] DE[facility]   12/3/2024 11:00 AM Herbie Barry MD [facility] DE[facility]

## 2024-10-07 ENCOUNTER — TELEPHONE (OUTPATIENT)
Age: 84
End: 2024-10-07

## 2024-10-07 NOTE — TELEPHONE ENCOUNTER
Patient states that Dr. Galvez prescribed her Tylenol w/codeine a while ago and she took a half of one to her her sleep at night but it's not helping.    She also says that the injections she had didn't work and is asking what else can she do because she isn't getting any sleep.    Please advise.    Patient can be reached at [phone].

## 2024-10-08 DIAGNOSIS — E78.5 DYSLIPIDEMIA: ICD-10-CM

## 2024-10-08 NOTE — TELEPHONE ENCOUNTER
Last OV: 05/24/24  Next OV: 12/03/24  Last RX: 06/24/24    Please refuse. Last fill date 06/24 was for 90 dys w/ x3 refill.      HAROON Tarango LPN

## 2024-10-11 RX ORDER — ATORVASTATIN CALCIUM 80 MG/1
80 TABLET, FILM COATED ORAL DAILY
Qty: 90 TABLET | Refills: 3 | Status: SHIPPED | OUTPATIENT
Start: 2024-10-11

## 2024-10-14 NOTE — TELEPHONE ENCOUNTER
Patient states that it is the pain that is keeping her up at night. She does have an appt on 10-22-24. Would you like me to add her on sooner for a virtual?

## 2024-10-18 ENCOUNTER — OFFICE VISIT (OUTPATIENT)
Age: 84
End: 2024-10-18
Payer: MEDICARE

## 2024-10-18 VITALS — HEIGHT: 64 IN | BODY MASS INDEX: 28.7 KG/M2

## 2024-10-18 DIAGNOSIS — M48.061 SPINAL STENOSIS, LUMBAR REGION WITHOUT NEUROGENIC CLAUDICATION: Primary | ICD-10-CM

## 2024-10-18 PROCEDURE — 1036F TOBACCO NON-USER: CPT | Performed by: ORTHOPAEDIC SURGERY

## 2024-10-18 PROCEDURE — 1123F ACP DISCUSS/DSCN MKR DOCD: CPT | Performed by: ORTHOPAEDIC SURGERY

## 2024-10-18 PROCEDURE — G8484 FLU IMMUNIZE NO ADMIN: HCPCS | Performed by: ORTHOPAEDIC SURGERY

## 2024-10-18 PROCEDURE — G8428 CUR MEDS NOT DOCUMENT: HCPCS | Performed by: ORTHOPAEDIC SURGERY

## 2024-10-18 PROCEDURE — 99214 OFFICE O/P EST MOD 30 MIN: CPT | Performed by: ORTHOPAEDIC SURGERY

## 2024-10-18 PROCEDURE — G8419 CALC BMI OUT NRM PARAM NOF/U: HCPCS | Performed by: ORTHOPAEDIC SURGERY

## 2024-10-18 PROCEDURE — 1090F PRES/ABSN URINE INCON ASSESS: CPT | Performed by: ORTHOPAEDIC SURGERY

## 2024-10-18 NOTE — PROGRESS NOTES
Patient: Benita Pacheco                MRN: [id number]       SSN: [id number]  YOB: 1940        AGE: 84 y.o.        SEX: female  Body mass index is 28.7 kg/m².    PCP: Herbie Barry MD  10/18/24    Benita is here today reevaluation of bilateral hip pain low back pain her hips are feeling actually quite good she has known advanced arthritis right hip relatively recent symptomatic and she status post left total hip replacement plagued with some bursitis that is quiescent as well she did get some pain medication and some other medications from spine which is helping to some degree.    She is tried some injections in the back to no avail and it is really her back that is bothering her the most today    She denies bowel or bladder problems the examination both hips actually rotate adequately the left hip is full motion right hip is little restricted but nontender and both trochanters are nontender to palpation low backs tender around L4-5 calf nontender Homans' sign is negative    Overall impression is advanced arthritis right hip stable not symptomatic left hip bursitis quiescent low back remains a problem she is thinking of the second opinion for her back and commiserate with her it is no fun living with back problems having said this I do have all the confidence that if with the physicians at the spine center in which I have mentioned to her as well thank you    REVIEW OF SYSTEMS:      CON: negative  EYE: negative   ENT: negative  RESP: negative  GI:    negative   :  negative  MSK: Positive  A twelve point review of systems was completed, positives noted and all other systems were reviewed and are negative          Past Medical History:   Diagnosis Date    AAA (abdominal aortic aneurysm) without rupture (HCC)     AAA repair (7/18) Dr Chapman; Dr Martin (5/24)    Anxiety     Bursitis     Carpal tunnel syndrome     CKD (chronic kidney disease) 2014    Colon adenomas     2003

## 2024-10-22 ENCOUNTER — OFFICE VISIT (OUTPATIENT)
Age: 84
End: 2024-10-22
Payer: MEDICARE

## 2024-10-22 VITALS
HEART RATE: 76 BPM | TEMPERATURE: 97.6 F | SYSTOLIC BLOOD PRESSURE: 106 MMHG | WEIGHT: 165.2 LBS | BODY MASS INDEX: 28.2 KG/M2 | HEIGHT: 64 IN | DIASTOLIC BLOOD PRESSURE: 65 MMHG | OXYGEN SATURATION: 96 %

## 2024-10-22 DIAGNOSIS — M16.11 PRIMARY OSTEOARTHRITIS OF RIGHT HIP: ICD-10-CM

## 2024-10-22 DIAGNOSIS — G89.4 CHRONIC PAIN SYNDROME: ICD-10-CM

## 2024-10-22 DIAGNOSIS — M47.819 FACET ARTHROPATHY: Primary | ICD-10-CM

## 2024-10-22 DIAGNOSIS — M48.061 SPINAL STENOSIS, LUMBAR REGION WITHOUT NEUROGENIC CLAUDICATION: ICD-10-CM

## 2024-10-22 DIAGNOSIS — F11.90 CHRONIC, CONTINUOUS USE OF OPIOIDS: ICD-10-CM

## 2024-10-22 PROCEDURE — 1090F PRES/ABSN URINE INCON ASSESS: CPT | Performed by: PHYSICAL MEDICINE & REHABILITATION

## 2024-10-22 PROCEDURE — 1123F ACP DISCUSS/DSCN MKR DOCD: CPT | Performed by: PHYSICAL MEDICINE & REHABILITATION

## 2024-10-22 PROCEDURE — 99214 OFFICE O/P EST MOD 30 MIN: CPT | Performed by: PHYSICAL MEDICINE & REHABILITATION

## 2024-10-22 PROCEDURE — 1036F TOBACCO NON-USER: CPT | Performed by: PHYSICAL MEDICINE & REHABILITATION

## 2024-10-22 PROCEDURE — G8419 CALC BMI OUT NRM PARAM NOF/U: HCPCS | Performed by: PHYSICAL MEDICINE & REHABILITATION

## 2024-10-22 PROCEDURE — G8484 FLU IMMUNIZE NO ADMIN: HCPCS | Performed by: PHYSICAL MEDICINE & REHABILITATION

## 2024-10-22 PROCEDURE — 1159F MED LIST DOCD IN RCRD: CPT | Performed by: PHYSICAL MEDICINE & REHABILITATION

## 2024-10-22 PROCEDURE — 1125F AMNT PAIN NOTED PAIN PRSNT: CPT | Performed by: PHYSICAL MEDICINE & REHABILITATION

## 2024-10-22 PROCEDURE — 1160F RVW MEDS BY RX/DR IN RCRD: CPT | Performed by: PHYSICAL MEDICINE & REHABILITATION

## 2024-10-22 PROCEDURE — G8427 DOCREV CUR MEDS BY ELIG CLIN: HCPCS | Performed by: PHYSICAL MEDICINE & REHABILITATION

## 2024-10-22 RX ORDER — ACETAMINOPHEN AND CODEINE PHOSPHATE 300; 30 MG/1; MG/1
1 TABLET ORAL 2 TIMES DAILY PRN
Qty: 60 TABLET | Refills: 2 | Status: SHIPPED | OUTPATIENT
Start: 2024-10-22 | End: 2025-01-20

## 2024-10-22 NOTE — PROGRESS NOTES
Benita Pacheco presents today for   Chief Complaint   Patient presents with    Back Pain     Back pain about the same  JANN SIJ 8-29-24 not helpful at all, felt worse       Is someone accompanying this pt? no    Is the patient using any DME equipment during OV? Yes, graham          Coordination of Care:  1. Have you been to the ER, urgent care clinic since your last visit? no Hospitalized since your last visit? no    2. Have you seen or consulted any other health care providers outside of the [facility] since your last visit? no Include any pap smears or colon screening. no    
has a reminder for a \"due or due soon\" health maintenance. I have asked that she contact her primary care provider, Herbie Barry MD, for follow-up on this health maintenance.   demonstrated consistency with prescribing.   UDS obtained today  If pain worsens or persist may also consider updating lumbar MRI.    Return in about 2 months (around 12/22/2024) for Medication follow up.      HISTORY OF PRESENT ILLNESS:  Benita Pacheco is a 84 y.o. female who presents to the office today for 2 month follow up. At the time of her last OV with me (08/13/2024), patient was referred to Dr. Moore for a sacroiliac injection,she was prescribed a MDP 4 mg for inflammatory pain.     Patient states she did undergo bilateral SI injections with Dr. Cormier.  She states this really did not give her any significant relief.  She notes that she has been followed by Dr. Lopez for her right hip osteoarthritis.  She has had a left total hip replacement and right hip surgery has been briefly discussed but is not currently being planned.  Patient does not when she does take the Tylenol 3 she has been using is 1/2 tablet/day and she does get some relief of her pain.  She does not have any side effects from the medication including constipation or any sedation.  She does states that the Tylenol with codeine is very helpful for her pain but she has been trying to stretch out her medication as she had a limited number for previous prescription.  She does also use a lumbar elastic waistband but also seems to give her some improvement.  Stressed and lengthy discussion with patient about various treatment options injections in past course of treatment, it was agreed that she may try to use the Tylenol 3 more frequently and take a whole tablet for better management of her symptoms.  She is agreeable to UDS and pain agreement.    Pain Scale: 4/10    PCP: Herbie Barry MD         Diagnosis Date    AAA (abdominal aortic aneurysm)

## 2024-11-25 NOTE — PROGRESS NOTES
84 y.o. female who presents for evaluation.   was present for the evaluaion    No cardiovascular complaints. No set exercise, just adls mainly due to motivational issues    Under a lot of stress with family and the election results    Sess Dr Martin for the AA follow up and recent Us ok    No new gi or gu complaints although she has noted nocturia and almost having incontinence when she has to get to the bathroom quickly    We inc lipitor at the last visit and no issues    LAST MEDICARE WELLNESS EXAM: 4/1/14, 4/8/14, 4/14/16, 4/25/17, 5/7/18, 10/2/19, 10/8/20, 10/13/21, 10/26/22, 11/13/23, 12/3/24    Past Medical History:   Diagnosis Date    AAA (abdominal aortic aneurysm) without rupture (HCC)     AAA repair (7/18) Dr Chapman; Dr Martin (5/24)    Anxiety     Bursitis     Carpal tunnel syndrome     CKD (chronic kidney disease) 2014    Colon adenomas     2003 Dr Dalal; 4/13 Dr Denise    COVID-19 vaccine dose declined 11/2023    boosters    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 Quezada, neg sprue    H/O cardiovascular stress test     NST neg (9/16)    H/O echocardiogram 12/2017    ef 55%, no wma, mild inc thickness, gr 1 dd, mild CHRISTINE    Hearing loss     LEFT Dr Wyman; sensorineural    Hypertension     Hypothyroidism 2007    Dr. Weinberg    IPMN (intraductal papillary mucinous neoplasm) 06/2018    on mrcp 8mm; no change 10/19, no change 12/20    Lung nodule 06/2016    6 mm RLL (6/16); no change (10/17); no change (12/18), (5/21)    Obesity     peak weight 187 lbs, bmi 33.1 from 4/13; IF 3/18    Osteopenia     DEXA t score 0.6 spine, -1 hip (12/09);  -0.4 spine, -1.3 hip (4/12); -1.2 wrist, -1.6 hip w FRAX 11/2.2 (4/14); -1.0 wrist, -1.6 hip w FRAX 12/2.6 (4/16); -0.7 wrist, -1.6 hip

## 2024-11-26 ENCOUNTER — HOSPITAL ENCOUNTER (OUTPATIENT)
Facility: HOSPITAL | Age: 84
Setting detail: SPECIMEN
Discharge: HOME | End: 2024-11-29
Payer: MEDICARE

## 2024-11-26 DIAGNOSIS — E03.4 HYPOTHYROIDISM DUE TO ACQUIRED ATROPHY OF THYROID: ICD-10-CM

## 2024-11-26 DIAGNOSIS — E78.5 DYSLIPIDEMIA: ICD-10-CM

## 2024-11-26 LAB
ALBUMIN SERPL-MCNC: 4 G/DL (ref 3.4–5)
ALBUMIN/GLOB SERPL: 1.4 (ref 0.8–1.7)
ALP SERPL-CCNC: 85 U/L (ref 45–117)
ALT SERPL-CCNC: 24 U/L (ref 13–56)
AST SERPL-CCNC: 24 U/L (ref 10–38)
BILIRUB DIRECT SERPL-MCNC: 0.2 MG/DL (ref 0–0.2)
BILIRUB SERPL-MCNC: 0.7 MG/DL (ref 0.2–1)
CHOLEST SERPL-MCNC: 146 MG/DL
GLOBULIN SER CALC-MCNC: 2.8 G/DL (ref 2–4)
HDLC SERPL-MCNC: 52 MG/DL (ref 40–60)
HDLC SERPL: 2.8 (ref 0–5)
LDLC SERPL CALC-MCNC: 69.8 MG/DL (ref 0–100)
LIPID PANEL: NORMAL
PROT SERPL-MCNC: 6.8 G/DL (ref 6.4–8.2)
T4 FREE SERPL-MCNC: 1.2 NG/DL (ref 0.7–1.5)
TRIGL SERPL-MCNC: 121 MG/DL
TSH SERPL DL<=0.05 MIU/L-ACNC: 0.43 UIU/ML (ref 0.36–3.74)
VLDLC SERPL CALC-MCNC: 24.2 MG/DL

## 2024-11-26 PROCEDURE — 80061 LIPID PANEL: CPT

## 2024-11-26 PROCEDURE — 80076 HEPATIC FUNCTION PANEL: CPT

## 2024-11-26 PROCEDURE — 84439 ASSAY OF FREE THYROXINE: CPT

## 2024-11-26 PROCEDURE — 36415 COLL VENOUS BLD VENIPUNCTURE: CPT

## 2024-11-26 PROCEDURE — 84443 ASSAY THYROID STIM HORMONE: CPT

## 2024-12-03 ENCOUNTER — OFFICE VISIT (OUTPATIENT)
Facility: CLINIC | Age: 84
End: 2024-12-03

## 2024-12-03 VITALS
HEIGHT: 64 IN | BODY MASS INDEX: 28.17 KG/M2 | HEART RATE: 83 BPM | OXYGEN SATURATION: 96 % | DIASTOLIC BLOOD PRESSURE: 65 MMHG | WEIGHT: 165 LBS | TEMPERATURE: 98.9 F | RESPIRATION RATE: 16 BRPM | SYSTOLIC BLOOD PRESSURE: 113 MMHG

## 2024-12-03 DIAGNOSIS — Z00.00 MEDICARE ANNUAL WELLNESS VISIT, SUBSEQUENT: Primary | ICD-10-CM

## 2024-12-03 DIAGNOSIS — E03.4 HYPOTHYROIDISM DUE TO ACQUIRED ATROPHY OF THYROID: ICD-10-CM

## 2024-12-03 DIAGNOSIS — N39.3 STRESS INCONTINENCE OF URINE: ICD-10-CM

## 2024-12-03 DIAGNOSIS — E78.5 DYSLIPIDEMIA: ICD-10-CM

## 2024-12-03 DIAGNOSIS — I10 PRIMARY HYPERTENSION: ICD-10-CM

## 2024-12-03 DIAGNOSIS — N18.31 STAGE 3A CHRONIC KIDNEY DISEASE (HCC): ICD-10-CM

## 2024-12-03 RX ORDER — LEVOTHYROXINE SODIUM 75 UG/1
75 TABLET ORAL DAILY
Qty: 90 TABLET | Refills: 3 | Status: SHIPPED | OUTPATIENT
Start: 2024-12-03

## 2024-12-03 RX ORDER — ATORVASTATIN CALCIUM 80 MG/1
80 TABLET, FILM COATED ORAL DAILY
Qty: 90 TABLET | Refills: 3 | Status: SHIPPED | OUTPATIENT
Start: 2024-12-03

## 2024-12-03 RX ORDER — TOLTERODINE 2 MG/1
2 CAPSULE, EXTENDED RELEASE ORAL DAILY
Qty: 90 CAPSULE | Refills: 1 | Status: SHIPPED | OUTPATIENT
Start: 2024-12-03

## 2024-12-03 ASSESSMENT — PATIENT HEALTH QUESTIONNAIRE - PHQ9
SUM OF ALL RESPONSES TO PHQ QUESTIONS 1-9: 0
SUM OF ALL RESPONSES TO PHQ QUESTIONS 1-9: 0
2. FEELING DOWN, DEPRESSED OR HOPELESS: NOT AT ALL
SUM OF ALL RESPONSES TO PHQ QUESTIONS 1-9: 0
SUM OF ALL RESPONSES TO PHQ9 QUESTIONS 1 & 2: 0
SUM OF ALL RESPONSES TO PHQ QUESTIONS 1-9: 0
1. LITTLE INTEREST OR PLEASURE IN DOING THINGS: NOT AT ALL

## 2024-12-03 ASSESSMENT — LIFESTYLE VARIABLES
HOW OFTEN DO YOU HAVE A DRINK CONTAINING ALCOHOL: MONTHLY OR LESS
HOW MANY STANDARD DRINKS CONTAINING ALCOHOL DO YOU HAVE ON A TYPICAL DAY: 1 OR 2

## 2024-12-03 NOTE — PROGRESS NOTES
Medicare Annual Wellness Visit    Benita Pacheco is here for Medicare AWV    Assessment & Plan   Stress incontinence of urine  -     tolterodine (DETROL LA) 2 MG extended release capsule; Take 1 capsule by mouth daily, Disp-90 capsule, R-1Normal  Hypothyroidism due to acquired atrophy of thyroid  -     levothyroxine (SYNTHROID) 75 MCG tablet; Take 1 tablet by mouth Daily ceived the following from [facility]: Outside name: Synthroid 75 mcg tablet, Disp-90 tablet, R-3Normal  Dyslipidemia  -     atorvastatin (LIPITOR) 80 MG tablet; Take 1 tablet by mouth daily, Disp-90 tablet, R-3Normal  -     Comprehensive Metabolic Panel; Future  -     CBC with Auto Differential; Future  Primary hypertension  Stage 3a chronic kidney disease (HCC)  Medicare annual wellness visit, subsequent    Recommendations for Preventive Services Due: see orders and patient instructions/AVS.  Recommended screening schedule for the next 5-10 years is provided to the patient in written form: see Patient Instructions/AVS.     Return in 6 months (on 6/3/2025).     Subjective       Patient's complete Health Risk Assessment and screening values have been reviewed and are found in Flowsheets. The following problems were reviewed today and where indicated follow up appointments were made and/or referrals ordered.    Positive Risk Factor Screenings with Interventions:          Controlled Medication Review:    Today's Pain Level: Pain Score: Zero     Opioid Risk: (Low risk score <55) Opioid risk score: 35    Patient is low risk for opioid use disorder or overdose.    Last PDMP Adonay as Reviewed:  Review User Review Instant Review Result   JOCELYN HUSTON 10/22/2024  2:14 PM     Reviewed PDMP [1]         General HRA Questions:  Select all that apply: (!) Stress, Anger  Interventions - Stress:  Patient declined any further interventions or treatment  Interventions - Anger:  Patient declined any further interventions or

## 2024-12-03 NOTE — PROGRESS NOTES
Benita Pacheco presents today for   Chief Complaint   Patient presents with    Medicare AWV       \"Have you been to the ER, urgent care clinic since your last visit?  Hospitalized since your last visit?\"    NO    “Have you seen or consulted any other health care providers outside of [facility] since your last visit?”    NO

## 2024-12-03 NOTE — PATIENT INSTRUCTIONS

## 2025-01-06 DIAGNOSIS — I10 PRIMARY HYPERTENSION: ICD-10-CM

## 2025-01-06 RX ORDER — LISINOPRIL 10 MG/1
10 TABLET ORAL DAILY
Qty: 90 TABLET | Refills: 3 | Status: SHIPPED | OUTPATIENT
Start: 2025-01-06

## 2025-01-06 NOTE — TELEPHONE ENCOUNTER
Last Office Visit: 12/03/2024  Next Office Visit: 06/10/2025  Last Refill: 09/30/2024 (covering provider, no refills)

## 2025-01-14 ENCOUNTER — TELEPHONE (OUTPATIENT)
Age: 85
End: 2025-01-14

## 2025-01-14 NOTE — TELEPHONE ENCOUNTER
Patient states that her rx was never called in.    Medication:  acetaminophen-codeine (TYLENOL #3) 300-30 MG per tablet         Pharmacy:    [id number] - [place], [place] - [address] -  [phone] - F [phone] [address] [address]  Phone: [phone]  Fax: [phone]

## 2025-02-16 ENCOUNTER — HOSPITAL ENCOUNTER (EMERGENCY)
Facility: HOSPITAL | Age: 85
Discharge: HOME | End: 2025-02-16
Attending: STUDENT IN AN ORGANIZED HEALTH CARE EDUCATION/TRAINING PROGRAM
Payer: MEDICARE

## 2025-02-16 VITALS
OXYGEN SATURATION: 100 % | WEIGHT: 166 LBS | BODY MASS INDEX: 28.34 KG/M2 | DIASTOLIC BLOOD PRESSURE: 89 MMHG | RESPIRATION RATE: 18 BRPM | SYSTOLIC BLOOD PRESSURE: 136 MMHG | HEART RATE: 89 BPM | TEMPERATURE: 99 F | HEIGHT: 64 IN

## 2025-02-16 DIAGNOSIS — J06.9 VIRAL URI WITH COUGH: Primary | ICD-10-CM

## 2025-02-16 LAB
FLUAV RNA SPEC QL NAA+PROBE: NOT DETECTED
FLUBV RNA SPEC QL NAA+PROBE: NOT DETECTED
S PYO DNA THROAT QL NAA+PROBE: NOT DETECTED
SARS-COV-2 RNA RESP QL NAA+PROBE: NOT DETECTED
SOURCE: NORMAL

## 2025-02-16 PROCEDURE — 87636 SARSCOV2 & INF A&B AMP PRB: CPT

## 2025-02-16 PROCEDURE — 99283 EMERGENCY DEPT VISIT LOW MDM: CPT

## 2025-02-16 PROCEDURE — 87651 STREP A DNA AMP PROBE: CPT

## 2025-02-16 ASSESSMENT — PAIN SCALES - GENERAL: PAINLEVEL_OUTOF10: 0

## 2025-02-16 ASSESSMENT — PAIN - FUNCTIONAL ASSESSMENT: PAIN_FUNCTIONAL_ASSESSMENT: 0-10

## 2025-02-16 ASSESSMENT — LIFESTYLE VARIABLES
HOW MANY STANDARD DRINKS CONTAINING ALCOHOL DO YOU HAVE ON A TYPICAL DAY: PATIENT DOES NOT DRINK
HOW OFTEN DO YOU HAVE A DRINK CONTAINING ALCOHOL: NEVER

## 2025-02-16 NOTE — ED PROVIDER NOTES
[facility] EMERGENCY DEPARTMENT  EMERGENCY DEPARTMENT ENCOUNTER      Pt Name: Benita Pacheco  MRN: [id number]  Birthdate 1940  Date of evaluation: 2/16/2025  Provider: Elodia Da Silva MD    CHIEF COMPLAINT       Chief Complaint   Patient presents with    Cough    Pharyngitis         HISTORY OF PRESENT ILLNESS   (Location/Symptom, Timing/Onset, Context/Setting, Quality, Duration, Modifying Factors, Severity)  Note limiting factors.   Benita Pacheco is a 84 y.o. female who presents to the emergency department for a 1 week history of nasal congestion, sore throat and cough.  Denies any chest pain or shortness of breath.  Denies any nausea or vomiting.  Her  is sick with similar symptoms but has been sick longer than her.  Denies any swelling or lower extremities, history of blood clots.  Has not been taking anything for her symptoms.  Denies any fevers     Nursing Notes were reviewed.    REVIEW OF SYSTEMS    (2-9 systems for level 4, 10 or more for level 5)     Constitutional: No fever  HENT: No ear pain  Eyes: No change in vision  Respiratory: No SOB  Cardio: No chest pain  GI: No blood in stool  : No hematuria  MSK: No back pain  Skin: No rashes  Neuro: No headache    Except as noted above the remainder of the review of systems was reviewed and negative.       PAST MEDICAL HISTORY     Past Medical History:   Diagnosis Date    AAA (abdominal aortic aneurysm) without rupture (HCC)     AAA repair (7/18) Dr Chapman; Dr Martin (5/24)    Anxiety     Bursitis     Carpal tunnel syndrome     CKD (chronic kidney disease) 2014    Colon adenomas     2003 Dr Dalal; 4/13 Dr Denise    COVID-19 vaccine dose declined 11/2023    boosters    Degenerative arthritis of lumbar spine 12/2018    Dr Galvez; mri showed multilevel degen changes, mod spinal stenosis L3-5, mod severe right L4-5 foraminal disease; s/p epidural; [place]a    Dyslipidemia     FHx: heart disease     Gastritis 2006    on

## 2025-02-16 NOTE — DISCHARGE INSTRUCTIONS
Recommend taking extra strength Tylenol every 4-6 hours and ibuprofen 600 mg every 6 hours as needed for pain and discomfort.    Consider having a humidifier at bedside as this can help with viral symptoms that help to decrease the spread to your family members.  Also consider using a sinus rinse or salt water in your nose as this has been shown to decrease the duration of your symptoms and also prevent the spread to your family.

## 2025-02-19 ENCOUNTER — TELEPHONE (OUTPATIENT)
Facility: CLINIC | Age: 85
End: 2025-02-19

## 2025-02-19 NOTE — TELEPHONE ENCOUNTER
Spoke with pt 110am 2/19/25    She and her  were seen in the ER 2/16/25 w 3 weeks of uri sx.  He tested positive for covid and was treted with augmentin. She was neg and dx'ed with viral syndrome and told to use otc meds.  Reports she is some better.  No f, facial pain, purulet drainage, cough is improving and remains non-prod.  No wheezing, sob  Would continue otc meds unless she has prorgessive or new sx

## 2025-06-04 ENCOUNTER — HOSPITAL ENCOUNTER (OUTPATIENT)
Facility: HOSPITAL | Age: 85
Setting detail: SPECIMEN
Discharge: HOME | End: 2025-06-07
Payer: MEDICARE

## 2025-06-04 DIAGNOSIS — E78.5 DYSLIPIDEMIA: ICD-10-CM

## 2025-06-04 LAB
ALBUMIN SERPL-MCNC: 3.6 G/DL (ref 3.4–5)
ALBUMIN/GLOB SERPL: 1.3 (ref 0.8–1.7)
ALP SERPL-CCNC: 90 U/L (ref 45–117)
ALT SERPL-CCNC: 15 U/L (ref 10–35)
ANION GAP SERPL CALC-SCNC: 12 MMOL/L (ref 3–18)
AST SERPL-CCNC: 23 U/L (ref 10–38)
BASOPHILS # BLD: 0.05 K/UL (ref 0–0.1)
BASOPHILS NFR BLD: 0.6 % (ref 0–2)
BILIRUB SERPL-MCNC: 0.6 MG/DL (ref 0.2–1)
BUN SERPL-MCNC: 24 MG/DL (ref 6–23)
BUN/CREAT SERPL: 21 (ref 12–20)
CALCIUM SERPL-MCNC: 10 MG/DL (ref 8.5–10.1)
CHLORIDE SERPL-SCNC: 106 MMOL/L (ref 98–107)
CO2 SERPL-SCNC: 25 MMOL/L (ref 21–32)
CREAT SERPL-MCNC: 1.13 MG/DL (ref 0.6–1.3)
DIFFERENTIAL METHOD BLD: ABNORMAL
EOSINOPHIL # BLD: 0.16 K/UL (ref 0–0.4)
EOSINOPHIL NFR BLD: 2.1 % (ref 0–5)
ERYTHROCYTE [DISTWIDTH] IN BLOOD BY AUTOMATED COUNT: 15.6 % (ref 11.6–14.5)
GLOBULIN SER CALC-MCNC: 2.7 G/DL (ref 2–4)
GLUCOSE SERPL-MCNC: 84 MG/DL (ref 74–108)
HCT VFR BLD AUTO: 42.5 % (ref 35–45)
HGB BLD-MCNC: 12.9 G/DL (ref 12–16)
IMM GRANULOCYTES # BLD AUTO: 0.02 K/UL (ref 0–0.04)
IMM GRANULOCYTES NFR BLD AUTO: 0.3 % (ref 0–0.5)
LYMPHOCYTES # BLD: 1.78 K/UL (ref 0.9–3.6)
LYMPHOCYTES NFR BLD: 22.9 % (ref 21–52)
MCH RBC QN AUTO: 30.6 PG (ref 24–34)
MCHC RBC AUTO-ENTMCNC: 30.4 G/DL (ref 31–37)
MCV RBC AUTO: 100.7 FL (ref 78–100)
MONOCYTES # BLD: 0.69 K/UL (ref 0.05–1.2)
MONOCYTES NFR BLD: 8.9 % (ref 3–10)
NEUTS SEG # BLD: 5.08 K/UL (ref 1.8–8)
NEUTS SEG NFR BLD: 65.2 % (ref 40–73)
NRBC # BLD: 0 K/UL (ref 0–0.01)
NRBC BLD-RTO: 0 PER 100 WBC
PLATELET # BLD AUTO: 160 K/UL (ref 135–420)
PMV BLD AUTO: 11.4 FL (ref 9.2–11.8)
POTASSIUM SERPL-SCNC: 4.1 MMOL/L (ref 3.5–5.5)
PROT SERPL-MCNC: 6.3 G/DL (ref 6.4–8.2)
RBC # BLD AUTO: 4.22 M/UL (ref 4.2–5.3)
SODIUM SERPL-SCNC: 142 MMOL/L (ref 136–145)
WBC # BLD AUTO: 7.8 K/UL (ref 4.6–13.2)

## 2025-06-04 PROCEDURE — 85025 COMPLETE CBC W/AUTO DIFF WBC: CPT

## 2025-06-04 PROCEDURE — 80053 COMPREHEN METABOLIC PANEL: CPT

## 2025-06-04 PROCEDURE — 36415 COLL VENOUS BLD VENIPUNCTURE: CPT

## 2025-06-06 ENCOUNTER — HOSPITAL ENCOUNTER (EMERGENCY)
Age: 85
Discharge: HOME | End: 2025-06-06
Attending: STUDENT IN AN ORGANIZED HEALTH CARE EDUCATION/TRAINING PROGRAM
Payer: MEDICARE

## 2025-06-06 ENCOUNTER — APPOINTMENT (OUTPATIENT)
Age: 85
End: 2025-06-06
Payer: MEDICARE

## 2025-06-06 VITALS
DIASTOLIC BLOOD PRESSURE: 64 MMHG | TEMPERATURE: 99 F | RESPIRATION RATE: 18 BRPM | OXYGEN SATURATION: 96 % | BODY MASS INDEX: 27.66 KG/M2 | HEART RATE: 82 BPM | WEIGHT: 162 LBS | SYSTOLIC BLOOD PRESSURE: 109 MMHG | HEIGHT: 64 IN

## 2025-06-06 DIAGNOSIS — J18.9 PNEUMONIA OF RIGHT LOWER LOBE DUE TO INFECTIOUS ORGANISM: Primary | ICD-10-CM

## 2025-06-06 DIAGNOSIS — M54.6 ACUTE BILATERAL THORACIC BACK PAIN: ICD-10-CM

## 2025-06-06 PROCEDURE — 71046 X-RAY EXAM CHEST 2 VIEWS: CPT

## 2025-06-06 PROCEDURE — 6360000002 HC RX W HCPCS: Performed by: STUDENT IN AN ORGANIZED HEALTH CARE EDUCATION/TRAINING PROGRAM

## 2025-06-06 PROCEDURE — 96372 THER/PROPH/DIAG INJ SC/IM: CPT

## 2025-06-06 PROCEDURE — 99284 EMERGENCY DEPT VISIT MOD MDM: CPT

## 2025-06-06 PROCEDURE — 6370000000 HC RX 637 (ALT 250 FOR IP): Performed by: STUDENT IN AN ORGANIZED HEALTH CARE EDUCATION/TRAINING PROGRAM

## 2025-06-06 RX ORDER — IBUPROFEN 600 MG/1
600 TABLET, FILM COATED ORAL 4 TIMES DAILY PRN
Qty: 40 TABLET | Refills: 0 | Status: ON HOLD | OUTPATIENT
Start: 2025-06-06 | End: 2025-06-14 | Stop reason: HOSPADM

## 2025-06-06 RX ORDER — ACETAMINOPHEN 500 MG
500 TABLET ORAL 4 TIMES DAILY PRN
Qty: 60 TABLET | Refills: 0 | Status: SHIPPED | OUTPATIENT
Start: 2025-06-06

## 2025-06-06 RX ORDER — LANOLIN ALCOHOL/MO/W.PET/CERES
400 CREAM (GRAM) TOPICAL ONCE
Status: COMPLETED | OUTPATIENT
Start: 2025-06-06 | End: 2025-06-06

## 2025-06-06 RX ORDER — ACETAMINOPHEN 500 MG
1000 TABLET ORAL
Status: COMPLETED | OUTPATIENT
Start: 2025-06-06 | End: 2025-06-06

## 2025-06-06 RX ORDER — KETOROLAC TROMETHAMINE 15 MG/ML
15 INJECTION, SOLUTION INTRAMUSCULAR; INTRAVENOUS ONCE
Status: COMPLETED | OUTPATIENT
Start: 2025-06-06 | End: 2025-06-06

## 2025-06-06 RX ORDER — METHOCARBAMOL 750 MG/1
750 TABLET, FILM COATED ORAL 4 TIMES DAILY PRN
Qty: 40 TABLET | Refills: 0 | Status: SHIPPED | OUTPATIENT
Start: 2025-06-06 | End: 2025-06-16

## 2025-06-06 RX ORDER — DOXYCYCLINE HYCLATE 100 MG
100 TABLET ORAL 2 TIMES DAILY
Qty: 14 TABLET | Refills: 0 | Status: ON HOLD | OUTPATIENT
Start: 2025-06-06 | End: 2025-06-14 | Stop reason: HOSPADM

## 2025-06-06 RX ADMIN — KETOROLAC TROMETHAMINE 15 MG: 15 INJECTION, SOLUTION INTRAMUSCULAR; INTRAVENOUS at 07:58

## 2025-06-06 RX ADMIN — ACETAMINOPHEN 1000 MG: 500 TABLET ORAL at 07:58

## 2025-06-06 RX ADMIN — Medication 400 MG: at 07:58

## 2025-06-06 ASSESSMENT — LIFESTYLE VARIABLES
HOW MANY STANDARD DRINKS CONTAINING ALCOHOL DO YOU HAVE ON A TYPICAL DAY: 1 OR 2
HOW OFTEN DO YOU HAVE A DRINK CONTAINING ALCOHOL: MONTHLY OR LESS

## 2025-06-06 ASSESSMENT — PAIN SCALES - GENERAL: PAINLEVEL_OUTOF10: 10

## 2025-06-06 ASSESSMENT — PAIN - FUNCTIONAL ASSESSMENT: PAIN_FUNCTIONAL_ASSESSMENT: 0-10

## 2025-06-06 NOTE — ED TRIAGE NOTES
Pt c/o mid-upper right back pain that started 2 days ago. Has had back problems several months ago and was given Tylenol with codeine. Took the last one last night. Pt has not called her doctor. Pt just rode in a car from [place].

## 2025-06-06 NOTE — DISCHARGE INSTRUCTIONS
Very important to do alternative therapies to help alleviate back pain.  This includes using your incentive spirometer regularly especially if you are sitting around.  Get up and move.  Do exercises included in this pamphlet to help alleviate pain and spasm.  Take medications as needed to make it tolerable.  Follow-up with your PCP and Dr. Galvez for continued management.    You were sent home with muscle relaxers.  These can be quite sedating.  Always take with ibuprofen.  If they are too sedating avoid taking them and changed to over-the-counter magnesium supplements.  Continue to use your lidocaine patches.

## 2025-06-08 ENCOUNTER — APPOINTMENT (OUTPATIENT)
Facility: HOSPITAL | Age: 85
End: 2025-06-08
Payer: MEDICARE

## 2025-06-08 ENCOUNTER — HOSPITAL ENCOUNTER (INPATIENT)
Facility: HOSPITAL | Age: 85
LOS: 6 days | Discharge: HOME | End: 2025-06-14
Attending: STUDENT IN AN ORGANIZED HEALTH CARE EDUCATION/TRAINING PROGRAM | Admitting: STUDENT IN AN ORGANIZED HEALTH CARE EDUCATION/TRAINING PROGRAM
Payer: MEDICARE

## 2025-06-08 DIAGNOSIS — R10.13 EPIGASTRIC PAIN: ICD-10-CM

## 2025-06-08 DIAGNOSIS — I26.99 PULMONARY EMBOLUS, RIGHT (HCC): ICD-10-CM

## 2025-06-08 DIAGNOSIS — R11.2 NAUSEA AND VOMITING, UNSPECIFIED VOMITING TYPE: ICD-10-CM

## 2025-06-08 DIAGNOSIS — K25.1 ACUTE GASTRIC ULCER WITH PERFORATION (HCC): Primary | ICD-10-CM

## 2025-06-08 DIAGNOSIS — I26.99 ACUTE PULMONARY EMBOLISM, UNSPECIFIED PULMONARY EMBOLISM TYPE, UNSPECIFIED WHETHER ACUTE COR PULMONALE PRESENT (HCC): ICD-10-CM

## 2025-06-08 DIAGNOSIS — M54.6 PAIN IN THORACIC SPINE: ICD-10-CM

## 2025-06-08 DIAGNOSIS — I10 PRIMARY HYPERTENSION: ICD-10-CM

## 2025-06-08 DIAGNOSIS — I71.40 ABDOMINAL AORTIC ANEURYSM (AAA) WITHOUT RUPTURE, UNSPECIFIED PART: ICD-10-CM

## 2025-06-08 DIAGNOSIS — Z86.79 S/P AAA REPAIR: ICD-10-CM

## 2025-06-08 DIAGNOSIS — Z98.890 S/P AAA REPAIR: ICD-10-CM

## 2025-06-08 PROBLEM — K25.5 GASTRIC ULCER WITH PERFORATION (HCC): Status: ACTIVE | Noted: 2025-06-08

## 2025-06-08 LAB
ABO + RH BLD: NORMAL
ALBUMIN SERPL-MCNC: 3.1 G/DL (ref 3.4–5)
ALBUMIN/GLOB SERPL: 1 (ref 0.8–1.7)
ALP SERPL-CCNC: 166 U/L (ref 45–117)
ALT SERPL-CCNC: 34 U/L (ref 10–35)
ANION GAP SERPL CALC-SCNC: 17 MMOL/L (ref 3–18)
APTT PPP: 30.4 SEC (ref 23–36.4)
AST SERPL-CCNC: 37 U/L (ref 10–38)
BASOPHILS # BLD: 0.02 K/UL (ref 0–0.1)
BASOPHILS NFR BLD: 0.2 % (ref 0–2)
BILIRUB SERPL-MCNC: 0.6 MG/DL (ref 0.2–1)
BLOOD GROUP ANTIBODIES SERPL: NORMAL
BUN SERPL-MCNC: 26 MG/DL (ref 6–23)
BUN/CREAT SERPL: 19 (ref 12–20)
CALCIUM SERPL-MCNC: 10.5 MG/DL (ref 8.5–10.1)
CHLORIDE SERPL-SCNC: 101 MMOL/L (ref 98–107)
CO2 SERPL-SCNC: 19 MMOL/L (ref 21–32)
CREAT SERPL-MCNC: 1.39 MG/DL (ref 0.6–1.3)
D DIMER PPP FEU-MCNC: 3.89 UG/ML(FEU)
DIFFERENTIAL METHOD BLD: ABNORMAL
EOSINOPHIL # BLD: 0.01 K/UL (ref 0–0.4)
EOSINOPHIL NFR BLD: 0.1 % (ref 0–5)
ERYTHROCYTE [DISTWIDTH] IN BLOOD BY AUTOMATED COUNT: 14.8 % (ref 11.6–14.5)
ERYTHROCYTE [DISTWIDTH] IN BLOOD BY AUTOMATED COUNT: 14.9 % (ref 11.6–14.5)
GLOBULIN SER CALC-MCNC: 3.3 G/DL (ref 2–4)
GLUCOSE SERPL-MCNC: 122 MG/DL (ref 74–108)
HCT VFR BLD AUTO: 39.6 % (ref 35–45)
HCT VFR BLD AUTO: 40.2 % (ref 35–45)
HGB BLD-MCNC: 12.6 G/DL (ref 12–16)
HGB BLD-MCNC: 13.1 G/DL (ref 12–16)
IMM GRANULOCYTES # BLD AUTO: 0.06 K/UL (ref 0–0.04)
IMM GRANULOCYTES NFR BLD AUTO: 0.5 % (ref 0–0.5)
LACTATE BLD-SCNC: 1.03 MMOL/L (ref 0.4–2)
LIPASE SERPL-CCNC: 23 U/L (ref 13–75)
LYMPHOCYTES # BLD: 1.91 K/UL (ref 0.9–3.6)
LYMPHOCYTES NFR BLD: 14.6 % (ref 21–52)
MCH RBC QN AUTO: 30.6 PG (ref 24–34)
MCH RBC QN AUTO: 31.2 PG (ref 24–34)
MCHC RBC AUTO-ENTMCNC: 31.8 G/DL (ref 31–37)
MCHC RBC AUTO-ENTMCNC: 32.6 G/DL (ref 31–37)
MCV RBC AUTO: 95.7 FL (ref 78–100)
MCV RBC AUTO: 96.1 FL (ref 78–100)
MONOCYTES # BLD: 0.7 K/UL (ref 0.05–1.2)
MONOCYTES NFR BLD: 5.4 % (ref 3–10)
NEUTS SEG # BLD: 10.37 K/UL (ref 1.8–8)
NEUTS SEG NFR BLD: 79.2 % (ref 40–73)
NRBC # BLD: 0 K/UL (ref 0–0.01)
NRBC # BLD: 0 K/UL (ref 0–0.01)
NRBC BLD-RTO: 0 PER 100 WBC
NRBC BLD-RTO: 0 PER 100 WBC
NT PRO BNP: 1367 PG/ML (ref 36–1800)
PLATELET # BLD AUTO: 227 K/UL (ref 135–420)
PLATELET # BLD AUTO: 229 K/UL (ref 135–420)
PMV BLD AUTO: 10.8 FL (ref 9.2–11.8)
PMV BLD AUTO: 11.7 FL (ref 9.2–11.8)
POTASSIUM SERPL-SCNC: 4.1 MMOL/L (ref 3.5–5.5)
PROCALCITONIN SERPL-MCNC: 0.21 NG/ML
PROT SERPL-MCNC: 6.4 G/DL (ref 6.4–8.2)
RBC # BLD AUTO: 4.12 M/UL (ref 4.2–5.3)
RBC # BLD AUTO: 4.2 M/UL (ref 4.2–5.3)
SODIUM SERPL-SCNC: 137 MMOL/L (ref 136–145)
SPECIMEN EXP DATE BLD: NORMAL
TROPONIN T SERPL HS-MCNC: 13.9 NG/L (ref 0–14)
TROPONIN T SERPL HS-MCNC: 14.7 NG/L (ref 0–14)
WBC # BLD AUTO: 13.1 K/UL (ref 4.6–13.2)
WBC # BLD AUTO: 17 K/UL (ref 4.6–13.2)

## 2025-06-08 PROCEDURE — 96365 THER/PROPH/DIAG IV INF INIT: CPT

## 2025-06-08 PROCEDURE — 87040 BLOOD CULTURE FOR BACTERIA: CPT

## 2025-06-08 PROCEDURE — 99233 SBSQ HOSP IP/OBS HIGH 50: CPT | Performed by: NURSE PRACTITIONER

## 2025-06-08 PROCEDURE — 6360000004 HC RX CONTRAST MEDICATION

## 2025-06-08 PROCEDURE — 86901 BLOOD TYPING SEROLOGIC RH(D): CPT

## 2025-06-08 PROCEDURE — 94761 N-INVAS EAR/PLS OXIMETRY MLT: CPT

## 2025-06-08 PROCEDURE — 2580000003 HC RX 258

## 2025-06-08 PROCEDURE — 86900 BLOOD TYPING SEROLOGIC ABO: CPT

## 2025-06-08 PROCEDURE — 6360000002 HC RX W HCPCS

## 2025-06-08 PROCEDURE — 96375 TX/PRO/DX INJ NEW DRUG ADDON: CPT

## 2025-06-08 PROCEDURE — 85027 COMPLETE CBC AUTOMATED: CPT

## 2025-06-08 PROCEDURE — 93005 ELECTROCARDIOGRAM TRACING: CPT

## 2025-06-08 PROCEDURE — 96376 TX/PRO/DX INJ SAME DRUG ADON: CPT

## 2025-06-08 PROCEDURE — 80053 COMPREHEN METABOLIC PANEL: CPT

## 2025-06-08 PROCEDURE — 83690 ASSAY OF LIPASE: CPT

## 2025-06-08 PROCEDURE — 74018 RADEX ABDOMEN 1 VIEW: CPT

## 2025-06-08 PROCEDURE — 71045 X-RAY EXAM CHEST 1 VIEW: CPT

## 2025-06-08 PROCEDURE — 84145 PROCALCITONIN (PCT): CPT

## 2025-06-08 PROCEDURE — 85730 THROMBOPLASTIN TIME PARTIAL: CPT

## 2025-06-08 PROCEDURE — 85025 COMPLETE CBC W/AUTO DIFF WBC: CPT

## 2025-06-08 PROCEDURE — 99285 EMERGENCY DEPT VISIT HI MDM: CPT

## 2025-06-08 PROCEDURE — 71275 CT ANGIOGRAPHY CHEST: CPT

## 2025-06-08 PROCEDURE — 85379 FIBRIN DEGRADATION QUANT: CPT

## 2025-06-08 PROCEDURE — 83880 ASSAY OF NATRIURETIC PEPTIDE: CPT

## 2025-06-08 PROCEDURE — 84484 ASSAY OF TROPONIN QUANT: CPT

## 2025-06-08 PROCEDURE — 99222 1ST HOSP IP/OBS MODERATE 55: CPT | Performed by: SURGERY

## 2025-06-08 PROCEDURE — 83605 ASSAY OF LACTIC ACID: CPT

## 2025-06-08 PROCEDURE — 86850 RBC ANTIBODY SCREEN: CPT

## 2025-06-08 PROCEDURE — 1100000000 HC RM PRIVATE

## 2025-06-08 RX ORDER — ONDANSETRON 4 MG/1
4 TABLET, ORALLY DISINTEGRATING ORAL EVERY 8 HOURS PRN
Status: DISCONTINUED | OUTPATIENT
Start: 2025-06-08 | End: 2025-06-14 | Stop reason: HOSPADM

## 2025-06-08 RX ORDER — ONDANSETRON 2 MG/ML
4 INJECTION INTRAMUSCULAR; INTRAVENOUS EVERY 6 HOURS PRN
Status: DISCONTINUED | OUTPATIENT
Start: 2025-06-08 | End: 2025-06-14 | Stop reason: HOSPADM

## 2025-06-08 RX ORDER — MORPHINE SULFATE 2 MG/ML
2 INJECTION, SOLUTION INTRAMUSCULAR; INTRAVENOUS
Refills: 0 | Status: COMPLETED | OUTPATIENT
Start: 2025-06-08 | End: 2025-06-08

## 2025-06-08 RX ORDER — ONDANSETRON 2 MG/ML
4 INJECTION INTRAMUSCULAR; INTRAVENOUS
Status: COMPLETED | OUTPATIENT
Start: 2025-06-08 | End: 2025-06-08

## 2025-06-08 RX ORDER — HEPARIN SODIUM 1000 [USP'U]/ML
40 INJECTION, SOLUTION INTRAVENOUS; SUBCUTANEOUS PRN
Status: CANCELLED | OUTPATIENT
Start: 2025-06-08

## 2025-06-08 RX ORDER — LEVOTHYROXINE SODIUM 75 UG/1
75 TABLET ORAL DAILY
Status: DISCONTINUED | OUTPATIENT
Start: 2025-06-09 | End: 2025-06-13

## 2025-06-08 RX ORDER — HEPARIN SODIUM 1000 [USP'U]/ML
80 INJECTION, SOLUTION INTRAVENOUS; SUBCUTANEOUS PRN
Status: CANCELLED | OUTPATIENT
Start: 2025-06-08

## 2025-06-08 RX ORDER — LISINOPRIL 10 MG/1
10 TABLET ORAL DAILY
Status: DISCONTINUED | OUTPATIENT
Start: 2025-06-08 | End: 2025-06-13

## 2025-06-08 RX ORDER — FENTANYL CITRATE 50 UG/ML
50 INJECTION, SOLUTION INTRAMUSCULAR; INTRAVENOUS
Refills: 0 | Status: COMPLETED | OUTPATIENT
Start: 2025-06-08 | End: 2025-06-08

## 2025-06-08 RX ORDER — ACETAMINOPHEN 325 MG/1
650 TABLET ORAL EVERY 6 HOURS PRN
Status: DISCONTINUED | OUTPATIENT
Start: 2025-06-08 | End: 2025-06-14 | Stop reason: HOSPADM

## 2025-06-08 RX ORDER — SODIUM CHLORIDE 0.9 % (FLUSH) 0.9 %
5-40 SYRINGE (ML) INJECTION EVERY 12 HOURS SCHEDULED
Status: DISCONTINUED | OUTPATIENT
Start: 2025-06-08 | End: 2025-06-14 | Stop reason: HOSPADM

## 2025-06-08 RX ORDER — HEPARIN SODIUM 1000 [USP'U]/ML
80 INJECTION, SOLUTION INTRAVENOUS; SUBCUTANEOUS ONCE
Status: CANCELLED | OUTPATIENT
Start: 2025-06-08 | End: 2025-06-08

## 2025-06-08 RX ORDER — HEPARIN SODIUM 10000 [USP'U]/100ML
5-30 INJECTION, SOLUTION INTRAVENOUS CONTINUOUS
Status: CANCELLED | OUTPATIENT
Start: 2025-06-08

## 2025-06-08 RX ORDER — HEPARIN SODIUM 1000 [USP'U]/ML
80 INJECTION, SOLUTION INTRAVENOUS; SUBCUTANEOUS PRN
Status: DISPENSED | OUTPATIENT
Start: 2025-06-08 | End: 2025-06-12

## 2025-06-08 RX ORDER — SODIUM CHLORIDE, SODIUM LACTATE, POTASSIUM CHLORIDE, CALCIUM CHLORIDE 600; 310; 30; 20 MG/100ML; MG/100ML; MG/100ML; MG/100ML
INJECTION, SOLUTION INTRAVENOUS CONTINUOUS
Status: DISCONTINUED | OUTPATIENT
Start: 2025-06-08 | End: 2025-06-11

## 2025-06-08 RX ORDER — SODIUM CHLORIDE 9 MG/ML
INJECTION, SOLUTION INTRAVENOUS PRN
Status: DISCONTINUED | OUTPATIENT
Start: 2025-06-08 | End: 2025-06-14 | Stop reason: HOSPADM

## 2025-06-08 RX ORDER — SODIUM CHLORIDE 0.9 % (FLUSH) 0.9 %
5-40 SYRINGE (ML) INJECTION PRN
Status: DISCONTINUED | OUTPATIENT
Start: 2025-06-08 | End: 2025-06-14 | Stop reason: HOSPADM

## 2025-06-08 RX ORDER — OXYCODONE HYDROCHLORIDE 5 MG/1
5 TABLET ORAL EVERY 4 HOURS PRN
Refills: 0 | Status: DISCONTINUED | OUTPATIENT
Start: 2025-06-08 | End: 2025-06-14 | Stop reason: HOSPADM

## 2025-06-08 RX ORDER — MORPHINE SULFATE 2 MG/ML
2 INJECTION, SOLUTION INTRAMUSCULAR; INTRAVENOUS EVERY 4 HOURS PRN
Refills: 0 | Status: DISCONTINUED | OUTPATIENT
Start: 2025-06-08 | End: 2025-06-14 | Stop reason: HOSPADM

## 2025-06-08 RX ORDER — HEPARIN SODIUM 10000 [USP'U]/100ML
5-30 INJECTION, SOLUTION INTRAVENOUS CONTINUOUS
Status: DISCONTINUED | OUTPATIENT
Start: 2025-06-08 | End: 2025-06-11

## 2025-06-08 RX ORDER — HYDRALAZINE HYDROCHLORIDE 20 MG/ML
10 INJECTION INTRAMUSCULAR; INTRAVENOUS EVERY 6 HOURS PRN
Status: DISCONTINUED | OUTPATIENT
Start: 2025-06-08 | End: 2025-06-09

## 2025-06-08 RX ORDER — HEPARIN SODIUM 1000 [USP'U]/ML
80 INJECTION, SOLUTION INTRAVENOUS; SUBCUTANEOUS ONCE
Status: COMPLETED | OUTPATIENT
Start: 2025-06-08 | End: 2025-06-08

## 2025-06-08 RX ORDER — HEPARIN SODIUM 1000 [USP'U]/ML
40 INJECTION, SOLUTION INTRAVENOUS; SUBCUTANEOUS PRN
Status: DISPENSED | OUTPATIENT
Start: 2025-06-08 | End: 2025-06-12

## 2025-06-08 RX ORDER — IODIXANOL 320 MG/ML
100 INJECTION, SOLUTION INTRAVASCULAR
Status: COMPLETED | OUTPATIENT
Start: 2025-06-08 | End: 2025-06-08

## 2025-06-08 RX ORDER — MORPHINE SULFATE 4 MG/ML
4 INJECTION, SOLUTION INTRAMUSCULAR; INTRAVENOUS
Status: COMPLETED | OUTPATIENT
Start: 2025-06-08 | End: 2025-06-08

## 2025-06-08 RX ORDER — EZETIMIBE 10 MG/1
10 TABLET ORAL DAILY
Status: DISCONTINUED | OUTPATIENT
Start: 2025-06-08 | End: 2025-06-14 | Stop reason: HOSPADM

## 2025-06-08 RX ORDER — ACETAMINOPHEN 650 MG/1
650 SUPPOSITORY RECTAL EVERY 6 HOURS PRN
Status: DISCONTINUED | OUTPATIENT
Start: 2025-06-08 | End: 2025-06-14 | Stop reason: HOSPADM

## 2025-06-08 RX ORDER — ATORVASTATIN CALCIUM 40 MG/1
80 TABLET, FILM COATED ORAL DAILY
Status: DISCONTINUED | OUTPATIENT
Start: 2025-06-08 | End: 2025-06-14 | Stop reason: HOSPADM

## 2025-06-08 RX ADMIN — MORPHINE SULFATE 4 MG: 4 INJECTION, SOLUTION INTRAMUSCULAR; INTRAVENOUS at 17:59

## 2025-06-08 RX ADMIN — HEPARIN SODIUM 18 UNITS/KG/HR: 10000 INJECTION, SOLUTION INTRAVENOUS at 20:58

## 2025-06-08 RX ADMIN — IODIXANOL 80 ML: 320 INJECTION, SOLUTION INTRAVASCULAR at 17:23

## 2025-06-08 RX ADMIN — PIPERACILLIN AND TAZOBACTAM 4500 MG: 4; .5 INJECTION, POWDER, FOR SOLUTION INTRAVENOUS at 19:35

## 2025-06-08 RX ADMIN — ONDANSETRON 4 MG: 2 INJECTION, SOLUTION INTRAMUSCULAR; INTRAVENOUS at 16:19

## 2025-06-08 RX ADMIN — HEPARIN SODIUM 5900 UNITS: 1000 INJECTION INTRAVENOUS; SUBCUTANEOUS at 20:56

## 2025-06-08 RX ADMIN — ONDANSETRON 4 MG: 2 INJECTION, SOLUTION INTRAMUSCULAR; INTRAVENOUS at 17:58

## 2025-06-08 RX ADMIN — MORPHINE SULFATE 2 MG: 2 INJECTION, SOLUTION INTRAMUSCULAR; INTRAVENOUS at 16:18

## 2025-06-08 RX ADMIN — FENTANYL CITRATE 50 MCG: 0.05 INJECTION, SOLUTION INTRAMUSCULAR; INTRAVENOUS at 19:29

## 2025-06-08 ASSESSMENT — PAIN DESCRIPTION - ONSET: ONSET: ON-GOING

## 2025-06-08 ASSESSMENT — PAIN DESCRIPTION - PAIN TYPE
TYPE: ACUTE PAIN
TYPE: ACUTE PAIN

## 2025-06-08 ASSESSMENT — PAIN SCALES - GENERAL
PAINLEVEL_OUTOF10: 10

## 2025-06-08 ASSESSMENT — PAIN DESCRIPTION - LOCATION
LOCATION: BACK;ABDOMEN
LOCATION: ABDOMEN
LOCATION: ABDOMEN

## 2025-06-08 ASSESSMENT — PAIN - FUNCTIONAL ASSESSMENT
PAIN_FUNCTIONAL_ASSESSMENT: ACTIVITIES ARE NOT PREVENTED
PAIN_FUNCTIONAL_ASSESSMENT: 0-10
PAIN_FUNCTIONAL_ASSESSMENT: ACTIVITIES ARE NOT PREVENTED

## 2025-06-08 ASSESSMENT — PAIN DESCRIPTION - DESCRIPTORS: DESCRIPTORS: ACHING

## 2025-06-08 ASSESSMENT — PAIN DESCRIPTION - FREQUENCY: FREQUENCY: CONTINUOUS

## 2025-06-08 ASSESSMENT — PAIN DESCRIPTION - ORIENTATION: ORIENTATION: RIGHT;LEFT

## 2025-06-08 NOTE — PROGRESS NOTES
Pharmacy Note     Piperacillin/Tazobactam 3.375 gm once ordered for treatment of Intra-abdominal Infection. Per [facility] Policy, Piperacillin/Tazobactam will be changed to 4.5 gm once.     Estimated Creatinine Clearance: Estimated Creatinine Clearance: 29 mL/min (A) (based on SCr of 1.39 mg/dL (H)).  Dialysis Status, ALEXANDRE, CKD: -    BMI:  Body mass index is 27.81 kg/m².    Rationale for Adjustment:  [facility] B-Lactam extended infusion policy    Pharmacy will continue to monitor and adjust dose as necessary.      Please call with any questions.    Thank you,  Trever Rao [facility]

## 2025-06-08 NOTE — H&P
No Wheezing or Rhonchi. No rales.  Abdomen:        Soft, +epigastic tenderness. Not distended.  Bowel sounds normal.   Extremities:     No cyanosis.  No edema. Pulses 2+ b/l  Neurologic:      Alert and oriented X 4.  Follows commands, responds appropriately. No focal neurological deficit was noted  Skin:                Warm and dry.  No rashes.     LAB DATA REVIEWED:    No components found for: \"GLPOC\"  Recent Labs     06/08/25  1555      K 4.1      CO2 19*   BUN 26*   WBC 13.1   HGB 13.1   HCT 40.2        IMAGING RESULTS:     [x]  I have personally reviewed the actual   []CXR  [x]CT scan    Assessment/Plan:      Large contained perforation along the anterior margin of the gastric pylorus with a small volume of gas extending into the peritoneum - Per CT on 06/08, surgery consulted per ED and recommended NG tube placement, IVF while NPO, symptom control w/ zofran & dilaudid.  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    CODE STATUS discussed and patient wishes to continue full code at this time.    Consults called / follow up - Consults placed - general surgery contacted by ED    Prophylaxis:  []Lovenox  []Coumadin  [x]Hep drip  []SCD’s  []H2B/PPI    Discussed Code Status:    [x]Full Code      []DNR     ___________________________________________________  Admitting Provider: RAVINDER Henry

## 2025-06-08 NOTE — ED PROVIDER NOTES
dry.      Capillary Refill: Capillary refill takes less than 2 seconds.   Neurological:      General: No focal deficit present.      Mental Status: She is alert and oriented to person, place, and time.      Cranial Nerves: No cranial nerve deficit.   Psychiatric:         Mood and Affect: Mood normal.         Behavior: Behavior normal.           DIAGNOSTIC RESULTS   LABS:    Labs Reviewed   CBC WITH AUTO DIFFERENTIAL - Abnormal; Notable for the following components:       Result Value    RDW 14.8 (*)     Neutrophils % 79.2 (*)     Lymphocytes % 14.6 (*)     Neutrophils Absolute 10.37 (*)     Immature Granulocytes Absolute 0.06 (*)     All other components within normal limits   COMPREHENSIVE METABOLIC PANEL - Abnormal; Notable for the following components:    CO2 19 (*)     Glucose 122 (*)     BUN 26 (*)     Creatinine 1.39 (*)     Est, Glom Filt Rate 37 (*)     Calcium 10.5 (*)     Alk Phosphatase 166 (*)     Albumin 3.1 (*)     All other components within normal limits   D-DIMER, QUANTITATIVE - Abnormal; Notable for the following components:    D-Dimer, Quant 3.89 (*)     All other components within normal limits   URINALYSIS - Abnormal; Notable for the following components:    Specific Gravity, UA >1.030 (*)     Protein,  (*)     Ketones, Urine 15 (*)     All other components within normal limits   TROPONIN - Abnormal; Notable for the following components:    Troponin T 14.7 (*)     All other components within normal limits   CBC - Abnormal; Notable for the following components:    WBC 17.0 (*)     RBC 4.12 (*)     RDW 14.9 (*)     All other components within normal limits   CULTURE, BLOOD 1   CULTURE, BLOOD 2   TROPONIN   LIPASE   BRAIN NATRIURETIC PEPTIDE   PROCALCITONIN   APTT   APTT   APTT   CBC WITH AUTO DIFFERENTIAL   BASIC METABOLIC PANEL   APTT   APTT   URINALYSIS, MICRO   POCT LACTIC ACID (LACTATE)   TYPE AND SCREEN       When ordered only abnormal lab results are displayed. All other labs were  6/6/2025  EXAM: CHEST  CPT code: 27706 CLINICAL INDICATION/HISTORY: Lower posterior back/rib pain particularly in the bilateral right lung and lower right ribs. Right lower lobe crackles with back pain on examination. COMPARISON: CT chest, abdomen pelvis of 8 May 2023. TECHNIQUE: PA and lateral views of the chest. FINDINGS:  There is suboptimal inspiratory effort. There are increased interstitial opacities in both lung bases with possible focal infiltrate in the lateral right base. Upper lungs are grossly clear.  The cardiomediastinal silhouette is normal except for some tortuosity of the descending thoracic aorta.  The bones and soft tissues are unremarkable.     Bibasilar interstitial opacities and possible infiltrates, right somewhat more prominent than left. Electronically signed by Tony Eduardo      No results found.    PROCEDURES   Unless otherwise noted below, none     Critical Care    Performed by: Jael Abarca PA-C  Authorized by: Samm Koenig MD    Critical care provider statement:     Critical care time (minutes):  55    Critical care time was exclusive of:  Separately billable procedures and treating other patients and teaching time    Critical care was necessary to treat or prevent imminent or life-threatening deterioration of the following conditions:  Circulatory failure and cardiac failure    Critical care was time spent personally by me on the following activities:  Blood draw for specimens, development of treatment plan with patient or surrogate, discussions with consultants, evaluation of patient's response to treatment, examination of patient, interpretation of cardiac output measurements, ordering and performing treatments and interventions, ordering and review of radiographic studies, ordering and review of laboratory studies, re-evaluation of patient's condition, review of old charts and vascular access procedures    I assumed direction of critical care for this patient from  antibiotic doxycycline and diagnosed with pneumonia.  She states that her symptoms did not improve on antibiotics and she started having new onset nausea vomiting and pain in the abdomen and rib cage today.  She is afebrile nontoxic well-appearing on arrival in no acute respiratory distress.  She has epigastric tenderness on my exam.  Her abdomen is soft distended.  She is endorsing right-sided thoracic spine pain.  Patient does have a complex history of AAA with EVAR and 2018.  Due to location of pain in the abdomen with radiation to the back will perform CT of the abdomen and pelvis angiogram for evaluation of PE, AAA and abdominal pain.  She was given Zofran and morphine on arrival.  She has no leukocytosis on labs she has a mildly elevated creatinine of 1.39 BUN of 26 glucose stable 122 carbon dioxide is 19.  Her D-dimer is significantly elevated at 3.89 lipase within normal meds proBNP within normal limits negative procalcitonin.  Her initial troponin is normal at 13.9 increasing to 14.7.  Patient continued to endorse significant pain even after additional rounds of morphine and fentanyl.  EKG sinus bradycardia at a rate of 58 with no STEMI.      Her CTA of the abdomen and pelvis was positive for large contained perforation near the gastric pylorus with gas into the peritoneum suggestive of a perforated gastric ulcer gas collection measuring 10 x 2 x 3 cm, pulmonary embolus is lobar segmental and subsegmental with small right lower pulmonary infarct, her endograft of her aortic aneurysm appears to have a type II endoleak with no evidence of acute dissection or syndrome.  I discussed case with general surgery Dr. Solomon who recommended an NG tube be placed and agrees with Zosyn for antibiotic coverage.  She states that there will not be emergent surgery at this time due to presence of abscess.  Will initiate heparin for patient's pulmonary embolus.  Discussed case with hospitalist Dr. Koenig is agreeable to

## 2025-06-08 NOTE — ED TRIAGE NOTES
Patient presents to ER with complaints of vomiting that started just a few minutes ago. Patient came from [place] and medicine is not sitting well in her stomach. Back is still hurting.   
Female

## 2025-06-09 ENCOUNTER — APPOINTMENT (OUTPATIENT)
Facility: HOSPITAL | Age: 85
End: 2025-06-09
Payer: MEDICARE

## 2025-06-09 ENCOUNTER — APPOINTMENT (OUTPATIENT)
Facility: HOSPITAL | Age: 85
End: 2025-06-09
Attending: STUDENT IN AN ORGANIZED HEALTH CARE EDUCATION/TRAINING PROGRAM
Payer: MEDICARE

## 2025-06-09 LAB
ANION GAP SERPL CALC-SCNC: 14 MMOL/L (ref 3–18)
APPEARANCE UR: CLEAR
APTT PPP: 164 SEC (ref 23–36.4)
APTT PPP: 178.9 SEC (ref 23–36.4)
APTT PPP: 60.8 SEC (ref 23–36.4)
APTT PPP: >180 SEC (ref 23–36.4)
BACTERIA URNS QL MICRO: ABNORMAL /HPF
BASOPHILS # BLD: 0.03 K/UL (ref 0–0.1)
BASOPHILS NFR BLD: 0.2 % (ref 0–2)
BILIRUB UR QL: NEGATIVE
BUN SERPL-MCNC: 28 MG/DL (ref 6–23)
BUN/CREAT SERPL: 21 (ref 12–20)
CALCIUM SERPL-MCNC: 9.7 MG/DL (ref 8.5–10.1)
CHLORIDE SERPL-SCNC: 104 MMOL/L (ref 98–107)
CO2 SERPL-SCNC: 19 MMOL/L (ref 21–32)
COLOR UR: YELLOW
CREAT SERPL-MCNC: 1.31 MG/DL (ref 0.6–1.3)
DIFFERENTIAL METHOD BLD: ABNORMAL
ECHO AO ASC DIAM: 3.4 CM
ECHO AO ASCENDING AORTA INDEX: 1.9 CM/M2
ECHO AO ROOT DIAM: 3.4 CM
ECHO AO ROOT INDEX: 1.9 CM/M2
ECHO AV AREA PEAK VELOCITY: 2.3 CM2
ECHO AV AREA VTI: 2.3 CM2
ECHO AV AREA/BSA PEAK VELOCITY: 1.3 CM2/M2
ECHO AV AREA/BSA VTI: 1.3 CM2/M2
ECHO AV MEAN GRADIENT: 5 MMHG
ECHO AV MEAN VELOCITY: 1 M/S
ECHO AV PEAK GRADIENT: 10 MMHG
ECHO AV PEAK VELOCITY: 1.6 M/S
ECHO AV VELOCITY RATIO: 0.75
ECHO AV VTI: 31.7 CM
ECHO BSA: 1.82 M2
ECHO BSA: 1.82 M2
ECHO EST RA PRESSURE: 15 MMHG
ECHO LA VOL A-L A2C: 100 ML (ref 22–52)
ECHO LA VOL A-L A4C: 73 ML (ref 22–52)
ECHO LA VOL BP: 81 ML (ref 22–52)
ECHO LA VOL MOD A2C: 97 ML (ref 22–52)
ECHO LA VOL MOD A4C: 67 ML (ref 22–52)
ECHO LA VOL/BSA BIPLANE: 45 ML/M2 (ref 16–34)
ECHO LA VOLUME AREA LENGTH: 86 ML
ECHO LA VOLUME INDEX A-L A2C: 56 ML/M2 (ref 16–34)
ECHO LA VOLUME INDEX A-L A4C: 41 ML/M2 (ref 16–34)
ECHO LA VOLUME INDEX AREA LENGTH: 48 ML/M2 (ref 16–34)
ECHO LA VOLUME INDEX MOD A2C: 54 ML/M2 (ref 16–34)
ECHO LA VOLUME INDEX MOD A4C: 37 ML/M2 (ref 16–34)
ECHO LV E' LATERAL VELOCITY: 5.39 CM/S
ECHO LV E' SEPTAL VELOCITY: 3.78 CM/S
ECHO LV EDV A2C: 64 ML
ECHO LV EDV A4C: 30 ML
ECHO LV EDV BP: 45 ML (ref 56–104)
ECHO LV EDV INDEX A4C: 17 ML/M2
ECHO LV EDV INDEX BP: 25 ML/M2
ECHO LV EDV NDEX A2C: 36 ML/M2
ECHO LV EF PHYSICIAN: 60 %
ECHO LV EJECTION FRACTION A2C: 66 %
ECHO LV EJECTION FRACTION A4C: 59 %
ECHO LV EJECTION FRACTION BIPLANE: 63 % (ref 55–100)
ECHO LV ESV A2C: 22 ML
ECHO LV ESV A4C: 12 ML
ECHO LV ESV BP: 17 ML (ref 19–49)
ECHO LV ESV INDEX A2C: 12 ML/M2
ECHO LV ESV INDEX A4C: 7 ML/M2
ECHO LV ESV INDEX BP: 9 ML/M2
ECHO LV FRACTIONAL SHORTENING: 30 % (ref 28–44)
ECHO LV GLOBAL LONGITUDINAL STRAIN (GLS): -18.5 %
ECHO LV INTERNAL DIMENSION DIASTOLE INDEX: 2.79 CM/M2
ECHO LV INTERNAL DIMENSION DIASTOLIC: 5 CM (ref 3.9–5.3)
ECHO LV INTERNAL DIMENSION SYSTOLIC INDEX: 1.96 CM/M2
ECHO LV INTERNAL DIMENSION SYSTOLIC: 3.5 CM
ECHO LV IVSD: 1 CM (ref 0.6–0.9)
ECHO LV MASS 2D: 169.9 G (ref 67–162)
ECHO LV MASS INDEX 2D: 94.9 G/M2 (ref 43–95)
ECHO LV POSTERIOR WALL DIASTOLIC: 0.9 CM (ref 0.6–0.9)
ECHO LV RELATIVE WALL THICKNESS RATIO: 0.36
ECHO LVOT AREA: 3.1 CM2
ECHO LVOT AV VTI INDEX: 0.72
ECHO LVOT DIAM: 2 CM
ECHO LVOT MEAN GRADIENT: 3 MMHG
ECHO LVOT PEAK GRADIENT: 5 MMHG
ECHO LVOT PEAK VELOCITY: 1.2 M/S
ECHO LVOT STROKE VOLUME INDEX: 39.8 ML/M2
ECHO LVOT SV: 71.3 ML
ECHO LVOT VTI: 22.7 CM
ECHO MV A VELOCITY: 1.31 M/S
ECHO MV E DECELERATION TIME (DT): 279.7 MS
ECHO MV E VELOCITY: 0.77 M/S
ECHO MV E/A RATIO: 0.59
ECHO MV E/E' LATERAL: 14.29
ECHO MV E/E' RATIO (AVERAGED): 17.33
ECHO MV E/E' SEPTAL: 20.37
ECHO PV MAX VELOCITY: 0.9 M/S
ECHO PV PEAK GRADIENT: 3 MMHG
ECHO RA VOLUME: 22 ML
ECHO RA VOLUME: 23 ML
ECHO RIGHT VENTRICULAR SYSTOLIC PRESSURE (RVSP): 53 MMHG
ECHO RV BASAL DIMENSION: 3.8 CM
ECHO RV FREE WALL PEAK S': 13.4 CM/S
ECHO RV GLOBAL SYSTOLIC STRAIN (GLS): -19 %
ECHO RV MID DIMENSION: 2.6 CM
ECHO RV TAPSE: 1.5 CM (ref 1.7–?)
ECHO RV TAPSE: 2.1 CM (ref 1.7–?)
ECHO TV REGURGITANT MAX VELOCITY: 3.09 M/S
ECHO TV REGURGITANT PEAK GRADIENT: 38 MMHG
EKG ATRIAL RATE: 58 BPM
EKG DIAGNOSIS: NORMAL
EKG P AXIS: 74 DEGREES
EKG P-R INTERVAL: 166 MS
EKG Q-T INTERVAL: 430 MS
EKG QRS DURATION: 104 MS
EKG QTC CALCULATION (BAZETT): 422 MS
EKG R AXIS: -33 DEGREES
EKG T AXIS: 47 DEGREES
EKG VENTRICULAR RATE: 58 BPM
EOSINOPHIL # BLD: 0.07 K/UL (ref 0–0.4)
EOSINOPHIL NFR BLD: 0.4 % (ref 0–5)
EPITH CASTS URNS QL MICRO: ABNORMAL /LPF (ref 0–5)
ERYTHROCYTE [DISTWIDTH] IN BLOOD BY AUTOMATED COUNT: 14.9 % (ref 11.6–14.5)
GLUCOSE SERPL-MCNC: 137 MG/DL (ref 74–108)
GLUCOSE UR STRIP.AUTO-MCNC: NEGATIVE MG/DL
HCT VFR BLD AUTO: 38 % (ref 35–45)
HGB BLD-MCNC: 12.5 G/DL (ref 12–16)
HGB UR QL STRIP: NEGATIVE
HYALINE CASTS URNS QL MICRO: ABNORMAL /LPF (ref 0–2)
IMM GRANULOCYTES # BLD AUTO: 0.26 K/UL (ref 0–0.04)
IMM GRANULOCYTES NFR BLD AUTO: 1.4 % (ref 0–0.5)
INR PPP: 1.1 (ref 0.9–1.1)
KETONES UR QL STRIP.AUTO: 15 MG/DL
LEUKOCYTE ESTERASE UR QL STRIP.AUTO: NEGATIVE
LYMPHOCYTES # BLD: 1.25 K/UL (ref 0.9–3.6)
LYMPHOCYTES NFR BLD: 6.9 % (ref 21–52)
MCH RBC QN AUTO: 31.4 PG (ref 24–34)
MCHC RBC AUTO-ENTMCNC: 32.9 G/DL (ref 31–37)
MCV RBC AUTO: 95.5 FL (ref 78–100)
MONOCYTES # BLD: 1.15 K/UL (ref 0.05–1.2)
MONOCYTES NFR BLD: 6.3 % (ref 3–10)
MUCOUS THREADS URNS QL MICRO: ABNORMAL /LPF
NEUTS SEG # BLD: 15.38 K/UL (ref 1.8–8)
NEUTS SEG NFR BLD: 84.8 % (ref 40–73)
NITRITE UR QL STRIP.AUTO: NEGATIVE
NRBC # BLD: 0 K/UL (ref 0–0.01)
NRBC BLD-RTO: 0 PER 100 WBC
PH UR STRIP: 5.5 (ref 5–8)
PLATELET # BLD AUTO: 232 K/UL (ref 135–420)
PMV BLD AUTO: 10.8 FL (ref 9.2–11.8)
POTASSIUM SERPL-SCNC: 4.1 MMOL/L (ref 3.5–5.5)
PROT UR STRIP-MCNC: 100 MG/DL
PROTHROMBIN TIME: 14.7 SEC (ref 11.9–14.9)
RBC # BLD AUTO: 3.98 M/UL (ref 4.2–5.3)
RBC #/AREA URNS HPF: ABNORMAL /HPF (ref 0–5)
SODIUM SERPL-SCNC: 138 MMOL/L (ref 136–145)
SP GR UR REFRACTOMETRY: >1.03 (ref 1–1.04)
UROBILINOGEN UR QL STRIP.AUTO: 0.2 EU/DL (ref 0.2–1)
WBC # BLD AUTO: 18.1 K/UL (ref 4.6–13.2)
WBC URNS QL MICRO: ABNORMAL /HPF (ref 0–5)

## 2025-06-09 PROCEDURE — 1100000003 HC PRIVATE W/ TELEMETRY

## 2025-06-09 PROCEDURE — 99233 SBSQ HOSP IP/OBS HIGH 50: CPT | Performed by: INTERNAL MEDICINE

## 2025-06-09 PROCEDURE — 80048 BASIC METABOLIC PNL TOTAL CA: CPT

## 2025-06-09 PROCEDURE — 85730 THROMBOPLASTIN TIME PARTIAL: CPT

## 2025-06-09 PROCEDURE — 93010 ELECTROCARDIOGRAM REPORT: CPT | Performed by: INTERNAL MEDICINE

## 2025-06-09 PROCEDURE — 6360000002 HC RX W HCPCS

## 2025-06-09 PROCEDURE — 2500000003 HC RX 250 WO HCPCS: Performed by: NURSE PRACTITIONER

## 2025-06-09 PROCEDURE — 36415 COLL VENOUS BLD VENIPUNCTURE: CPT

## 2025-06-09 PROCEDURE — 81001 URINALYSIS AUTO W/SCOPE: CPT

## 2025-06-09 PROCEDURE — 85610 PROTHROMBIN TIME: CPT

## 2025-06-09 PROCEDURE — 93356 MYOCRD STRAIN IMG SPCKL TRCK: CPT

## 2025-06-09 PROCEDURE — 2580000003 HC RX 258: Performed by: NURSE PRACTITIONER

## 2025-06-09 PROCEDURE — 6360000002 HC RX W HCPCS: Performed by: NURSE PRACTITIONER

## 2025-06-09 PROCEDURE — 93970 EXTREMITY STUDY: CPT | Performed by: INTERNAL MEDICINE

## 2025-06-09 PROCEDURE — 93356 MYOCRD STRAIN IMG SPCKL TRCK: CPT | Performed by: INTERNAL MEDICINE

## 2025-06-09 PROCEDURE — 99222 1ST HOSP IP/OBS MODERATE 55: CPT

## 2025-06-09 PROCEDURE — 94761 N-INVAS EAR/PLS OXIMETRY MLT: CPT

## 2025-06-09 PROCEDURE — 6360000002 HC RX W HCPCS: Performed by: INTERNAL MEDICINE

## 2025-06-09 PROCEDURE — 85025 COMPLETE CBC W/AUTO DIFF WBC: CPT

## 2025-06-09 PROCEDURE — 93306 TTE W/DOPPLER COMPLETE: CPT | Performed by: INTERNAL MEDICINE

## 2025-06-09 PROCEDURE — 93970 EXTREMITY STUDY: CPT

## 2025-06-09 RX ORDER — HYDRALAZINE HYDROCHLORIDE 20 MG/ML
5 INJECTION INTRAMUSCULAR; INTRAVENOUS EVERY 6 HOURS PRN
Status: DISCONTINUED | OUTPATIENT
Start: 2025-06-09 | End: 2025-06-14 | Stop reason: HOSPADM

## 2025-06-09 RX ORDER — NALOXONE HYDROCHLORIDE 0.4 MG/ML
0.4 INJECTION, SOLUTION INTRAMUSCULAR; INTRAVENOUS; SUBCUTANEOUS PRN
Status: DISCONTINUED | OUTPATIENT
Start: 2025-06-09 | End: 2025-06-14 | Stop reason: HOSPADM

## 2025-06-09 RX ORDER — IPRATROPIUM BROMIDE AND ALBUTEROL SULFATE 2.5; .5 MG/3ML; MG/3ML
1 SOLUTION RESPIRATORY (INHALATION) EVERY 4 HOURS PRN
Status: DISCONTINUED | OUTPATIENT
Start: 2025-06-09 | End: 2025-06-14 | Stop reason: HOSPADM

## 2025-06-09 RX ADMIN — PIPERACILLIN AND TAZOBACTAM 3375 MG: 3; .375 INJECTION, POWDER, LYOPHILIZED, FOR SOLUTION INTRAVENOUS at 02:09

## 2025-06-09 RX ADMIN — HEPARIN SODIUM 17 UNITS/KG/HR: 10000 INJECTION, SOLUTION INTRAVENOUS at 19:29

## 2025-06-09 RX ADMIN — PIPERACILLIN AND TAZOBACTAM 3375 MG: 3; .375 INJECTION, POWDER, LYOPHILIZED, FOR SOLUTION INTRAVENOUS at 08:48

## 2025-06-09 RX ADMIN — SODIUM CHLORIDE, SODIUM LACTATE, POTASSIUM CHLORIDE, AND CALCIUM CHLORIDE: .6; .31; .03; .02 INJECTION, SOLUTION INTRAVENOUS at 01:46

## 2025-06-09 RX ADMIN — ONDANSETRON 4 MG: 2 INJECTION, SOLUTION INTRAMUSCULAR; INTRAVENOUS at 09:21

## 2025-06-09 RX ADMIN — SODIUM CHLORIDE, PRESERVATIVE FREE 10 ML: 5 INJECTION INTRAVENOUS at 08:51

## 2025-06-09 RX ADMIN — HEPARIN SODIUM 2900 UNITS: 1000 INJECTION INTRAVENOUS; SUBCUTANEOUS at 04:54

## 2025-06-09 RX ADMIN — SODIUM CHLORIDE, PRESERVATIVE FREE 10 ML: 5 INJECTION INTRAVENOUS at 21:55

## 2025-06-09 RX ADMIN — SODIUM CHLORIDE, SODIUM LACTATE, POTASSIUM CHLORIDE, AND CALCIUM CHLORIDE: .6; .31; .03; .02 INJECTION, SOLUTION INTRAVENOUS at 16:58

## 2025-06-09 RX ADMIN — HYDROMORPHONE HYDROCHLORIDE 0.5 MG: 1 INJECTION, SOLUTION INTRAMUSCULAR; INTRAVENOUS; SUBCUTANEOUS at 17:10

## 2025-06-09 RX ADMIN — SODIUM CHLORIDE, PRESERVATIVE FREE 10 ML: 5 INJECTION INTRAVENOUS at 02:11

## 2025-06-09 RX ADMIN — PIPERACILLIN AND TAZOBACTAM 3375 MG: 3; .375 INJECTION, POWDER, LYOPHILIZED, FOR SOLUTION INTRAVENOUS at 17:19

## 2025-06-09 RX ADMIN — HYDROMORPHONE HYDROCHLORIDE 0.5 MG: 1 INJECTION, SOLUTION INTRAMUSCULAR; INTRAVENOUS; SUBCUTANEOUS at 09:20

## 2025-06-09 RX ADMIN — HYDROMORPHONE HYDROCHLORIDE 0.5 MG: 1 INJECTION, SOLUTION INTRAMUSCULAR; INTRAVENOUS; SUBCUTANEOUS at 21:58

## 2025-06-09 RX ADMIN — HYDROMORPHONE HYDROCHLORIDE 0.5 MG: 1 INJECTION, SOLUTION INTRAMUSCULAR; INTRAVENOUS; SUBCUTANEOUS at 03:46

## 2025-06-09 ASSESSMENT — PAIN - FUNCTIONAL ASSESSMENT
PAIN_FUNCTIONAL_ASSESSMENT: ACTIVITIES ARE NOT PREVENTED

## 2025-06-09 ASSESSMENT — PAIN DESCRIPTION - LOCATION
LOCATION: ABDOMEN

## 2025-06-09 ASSESSMENT — ENCOUNTER SYMPTOMS
VOMITING: 0
SHORTNESS OF BREATH: 0
COLOR CHANGE: 0
ABDOMINAL PAIN: 1
NAUSEA: 0

## 2025-06-09 ASSESSMENT — PAIN DESCRIPTION - DESCRIPTORS
DESCRIPTORS: ACHING;BURNING;DISCOMFORT
DESCRIPTORS: ACHING
DESCRIPTORS: ACHING
DESCRIPTORS: ACHING;BURNING;DISCOMFORT

## 2025-06-09 ASSESSMENT — PAIN SCALES - GENERAL
PAINLEVEL_OUTOF10: 9
PAINLEVEL_OUTOF10: 9
PAINLEVEL_OUTOF10: 7
PAINLEVEL_OUTOF10: 6
PAINLEVEL_OUTOF10: 0
PAINLEVEL_OUTOF10: 5
PAINLEVEL_OUTOF10: 0

## 2025-06-09 ASSESSMENT — PAIN DESCRIPTION - ORIENTATION
ORIENTATION: ANTERIOR
ORIENTATION: POSTERIOR
ORIENTATION: POSTERIOR

## 2025-06-09 ASSESSMENT — PAIN DESCRIPTION - FREQUENCY: FREQUENCY: CONTINUOUS

## 2025-06-09 ASSESSMENT — PAIN DESCRIPTION - ONSET: ONSET: ON-GOING

## 2025-06-09 ASSESSMENT — PAIN DESCRIPTION - PAIN TYPE: TYPE: ACUTE PAIN

## 2025-06-09 NOTE — PROGRESS NOTES
Medicine Progress Note    Patient: Benita Pacheco   Age:  84 y.o.  DOA: 6/8/2025   Admit Dx / CC: Pulmonary embolus, right (HCC) [I26.99]  S/P AAA repair [Z98.890, Z86.79]  LOS:  LOS: 1 day     Assessment/Plan   Principal Problem:    Pulmonary embolus, right (HCC)  Active Problems:    Gastric ulcer with perforation (HCC)  Resolved Problems:    * No resolved hospital problems. *      Additional Plan notes     Large Contained Perforation along the Anterior margin of the Gastric Pylorus with Intraperitoneal Air   - Nasogastric Tube (a.k.a. NG Tube) placed in [facility] ER  Continue IV Piperacillin-Tazobactam (a.k.a. Zosyn), IV Pantoprazole 40 mg BID, and IV Fluids (LR 75 mL/hr) while Patient is NPO.  PRN Antiemetics, PRN Pain Control, and PRN Naloxone.  General Surgical services consulted.    Subserosal Perigastric Fluid Collection  - Not amenable to percutaneous drain placement, per IR  Continue conservative management, per General Surgical services.    Acute Right Lower Lobe Pulmonary Embolism (a.k.a. PE) 2°/2 Acute of Left Lower Extremity Deep Vein Thrombosis (a.k.a. DVT)  - Large, Lobar, Segmental, and Subsegmental PE  - possibly provoked by car ride to [place] last month  IV Heparin gtt.  Echocardiogram has been performed, but the results are PENDING.  Anticipate Transition to oral anticoagulation prior to discharge, this acute hospitalization.      Acute Non-Occlusive Left Lower Extremity Deep Vein Thrombosis (a.k.a. DVT) in 1 of 2 Posterior Tibial and 1 of 2 Peroneal Veins  See #3.    Likely Dehydration  - UA shows 0-3 Hyaline Casts  Continue IV LR 75 mL/hr at this time.    Elevated Troponin  - Troponin-T 13.9 ng/L to 14.7 ng/L on 6/08/2025  Minimally Elevated Troponin.  Track Troponin until it plateaus.    Type 2 Endoleak; History of (a.k.a. H/O) of Abdominal Aortic Aneurysm  Monitor.    Hypertension  HOLD home Lisinopril.  Consider resumption when Patient can tolerate an oral diet.      Hyperlipidemia  HOLD  Oral Q6H PRN    Or    acetaminophen (TYLENOL) suppository 650 mg  650 mg Rectal Q6H PRN    lactated ringers infusion   IntraVENous Continuous    HYDROmorphone (DILAUDID) injection 0.5 mg  0.5 mg IntraVENous Q4H PRN    hydrALAZINE (APRESOLINE) injection 10 mg  10 mg IntraVENous Q6H PRN    [Held by provider] levothyroxine (SYNTHROID) tablet 75 mcg  75 mcg Oral Daily    [Held by provider] lisinopril (PRINIVIL;ZESTRIL) tablet 10 mg  10 mg Oral Daily    [Held by provider] ezetimibe (ZETIA) tablet 10 mg  10 mg Oral Daily    [Held by provider] atorvastatin (LIPITOR) tablet 80 mg  80 mg Oral Daily    piperacillin-tazobactam (ZOSYN) 3,375 mg in sodium chloride 0.9 % 50 mL IVPB (addEASE)  3,375 mg IntraVENous Q8H    pantoprazole (PROTONIX) 80 mg in sodium chloride (PF) 0.9 % 20 mL injection  80 mg IntraVENous Once    [START ON 6/11/2025] pantoprazole (PROTONIX) 40 mg in sodium chloride (PF) 0.9 % 10 mL injection  40 mg IntraVENous Q12H    morphine (PF) injection 2 mg  2 mg IntraVENous Q4H PRN    oxyCODONE (ROXICODONE) immediate release tablet 5 mg  5 mg Oral Q4H PRN       Sarmad Aden DO    June 9, 2025

## 2025-06-09 NOTE — PROGRESS NOTES
Admit Date: 6/8/2025    Assessment    Benita Pacheco is a 84 y.o. female who was admitted with epigastric pain. She was found to have large acute pulmonary embolism and a contained perforated ulcer of the stomach with abscess measuring 10 x 2 x 3 cm on CT chest, abdomen and pelvis.     Patient Active Problem List   Diagnosis    Dyslipidemia    Spondylosis of lumbar region without myelopathy or radiculopathy    Advance directive discussed with patient    Hypovitaminosis D    Obesity (BMI 30.0-34.9)    Colon adenomas    Primary hypertension    Venous insufficiency    Hypothyroidism due to acquired atrophy of thyroid    S/P AAA repair    Osteopenia    Arthritis, degenerative    Stage 3a chronic kidney disease (HCC)    Sacroiliac pain    Pulmonary embolus, right (HCC)    Gastric ulcer with perforation (HCC)       Plan  -NPO  -NG tube to low intermittent suction  -No NSAIDS  -Continue Protonix drip   -Anticoagulation per medicine team  -IR consult to evaluate for potential abscess drainage procedure    Subjective    Overnight events: No acute overnight events.  She had brief episode of nausea overnight.  Denies vomiting.    Review of Systems   Constitutional:  Negative for chills and fever.   Respiratory:  Negative for shortness of breath.    Cardiovascular:  Negative for chest pain.   Gastrointestinal:  Positive for abdominal pain. Negative for nausea and vomiting.   Skin:  Negative for color change, pallor, rash and wound.   Hematological:  Negative for adenopathy. Bruises/bleeds easily.       Objective    Physical Exam:  /69   Pulse 74   Temp 97.6 °F (36.4 °C) (Temporal)   Resp 18   Ht 1.626 m (5' 4\")   Wt 73.5 kg (162 lb)   LMP  (LMP Unknown)   SpO2 98%   BMI 27.81 kg/m²   No intake or output data in the 24 hours ending 06/09/25 0803  Physical Exam  Constitutional:       Appearance: Normal appearance.   HENT:      Head: Normocephalic and atraumatic.   Eyes:      Extraocular Movements: Extraocular

## 2025-06-09 NOTE — PROGRESS NOTES
No role for IR at this time. Continue with conservative management. UGI on Wed via NGT. NPO, PPI, IV antibiotics    Nadya Yeshtokin, DO

## 2025-06-09 NOTE — PROGRESS NOTES
Bedside and Verbal shift change report given to MARGOT Galvez (oncoming nurse) by MARGOT Obrien (offgoing nurse). Report included the following information Nurse Handoff Report and Adult Overview.

## 2025-06-09 NOTE — PROGRESS NOTES
Pharmacy Note     Piperacillin/Tazobactam 4.5 gm q6h ordered for treatment of Intra-abdominal Infection. Per [facility] Policy, Piperacillin/Tazobactam will be changed to 3.375 gm q8h.     Estimated Creatinine Clearance: Estimated Creatinine Clearance: 29 mL/min (A) (based on SCr of 1.39 mg/dL (H)).  Dialysis Status, ALEXANDRE, CKD: -    BMI:  Body mass index is 27.81 kg/m².    Rationale for Adjustment:  [facility] B-Lactam extended infusion policy    Pharmacy will continue to monitor and adjust dose as necessary.      Please call with any questions.    Thank you,  Trever Rao [facility]

## 2025-06-09 NOTE — PROGRESS NOTES
Bedside and Verbal shift change report given to MARGOT Goodwin (oncoming nurse) by MARGOT Galvez (offgoing nurse). Report included the following information Nurse Handoff Report and Adult Overview.

## 2025-06-09 NOTE — CARE COORDINATION
06/09/25 1304   Service Assessment   Patient Orientation Alert and Oriented   Cognition Alert   History Provided By Patient   Primary Caregiver Self   Support Systems Spouse/Significant Other;Children   Patient's Healthcare Decision Maker is: Legal Next of Kin   PCP Verified by CM Yes   Last Visit to PCP Within last 3 months   Prior Functional Level Independent in ADLs/IADLs   Current Functional Level Independent in ADLs/IADLs   Can patient return to prior living arrangement Yes   Ability to make needs known: Good   Family able to assist with home care needs: Yes   Would you like for me to discuss the discharge plan with any other family members/significant others, and if so, who? No   Financial Resources Medicare   Community Resources None   Social/Functional History   Lives With Spouse   Type of Home House   Home Layout Two level   Home Access Stairs to enter with rails   Entrance Stairs - Number of Steps 5   Entrance Stairs - Rails Both   Bathroom Shower/Tub Walk-in shower;Tub only   Bathroom Toilet Standard   Bathroom Equipment Grab bars in shower;Grab bars around toilet   Bathroom Accessibility Accessible   Home Equipment None   Receives Help From Family   Prior Level of Assist for ADLs Independent   Prior Level of Assist for Homemaking Independent   Homemaking Responsibilities Yes   Ambulation Assistance Independent   Prior Level of Assist for Transfers Independent   Active  Yes   Mode of Transportation Car   Occupation Retired   Discharge Planning   Type of Residence House   Living Arrangements Spouse/Significant Other   Current Services Prior To Admission None   Potential Assistance Needed N/A   DME Ordered? No   Potential Assistance Purchasing Medications No   Type of Home Care Services None   Patient expects to be discharged to: House   One/Two Story Residence Two story   # of Interior Steps 7   Interior Rails Both   Lift Chair Available No   History of falls? 0   Services At/After Discharge

## 2025-06-09 NOTE — PROGRESS NOTES
Received Pt. Per stretcher  from ED.   Pt is AOX 4 transferred to bed without a problem.  Pt.  Educated on room equipments and call bell  when in need of help and assistance. .Pt. Educated on MD's  orders and plans for the evening.   Pt. Verbalized understanding.  Pt. Denies discomfort.   Will continue to monitor Pt. Condition.     0200 Pt. Head to Toe Assessment Done and documented.  Pt. Educated on call bell when in need of help and assistance.  Pt.  understanding.     0400  Pt made no complaints.    0600  Pt. Resting comfortably in bed.    0655  Pt.  \"Jun\"  notified of Pt room and whereabout.  Pt.  said he will come ezekiel.

## 2025-06-09 NOTE — PLAN OF CARE
Problem: Pain  Goal: Verbalizes/displays adequate comfort level or baseline comfort level  Outcome: Progressing  Flowsheets (Taken 6/9/2025 0754)  Verbalizes/displays adequate comfort level or baseline comfort level:   Encourage patient to monitor pain and request assistance   Assess pain using appropriate pain scale  Note: Pt. Educated on pain meds per MAR for pain management.  PT. Verbalized understanding.     Problem: Skin/Tissue Integrity  Goal: Skin integrity remains intact  Description: 1.  Monitor for areas of redness and/or skin breakdown2.  Assess vascular access sites hourly3.  Every 4-6 hours minimum:  Change oxygen saturation probe site4.  Every 4-6 hours:  If on nasal continuous positive airway pressure, respiratory therapy assess nares and determine need for appliance change or resting period  Outcome: Progressing  Flowsheets  Taken 6/9/2025 0754  Skin Integrity Remains Intact: Monitor for areas of redness and/or skin breakdown  Taken 6/9/2025 0200  Skin Integrity Remains Intact: Monitor for areas of redness and/or skin breakdown  Note: Pt. Skin is monitored for redness and breakdown.  Pt. Skin is cdi     Problem: Safety - Adult  Goal: Free from fall injury  Outcome: Progressing  Flowsheets (Taken 6/9/2025 0754)  Free From Fall Injury: Instruct family/caregiver on patient safety  Note: Pt. Educated on call bell for help and assistance.  Pt. Bed alarm on.

## 2025-06-09 NOTE — CONSULTS
History & Physical    Date: 6/8/2025    Referring Physician: ER    Assessment:    Full code    Principal Problem:    Pulmonary embolus, right (HCC)  Resolved Problems:    * No resolved hospital problems. *  Gastric perforation  Gastric abscess    Plan:   I have discussed the above findings with Ms. Benita Pacheco and personally reviewed her CT chest, abdomen and pelvis demonstrating a large acute pulmonary embolism, a contained perforated ulcer of the stomach with abscess measuring 10 x 2 x 3.0 cm.  Recommend n.p.o. and NG tube to low intermittent suction at this time.  NGT must remain until UGI confirms no leak. Protonix drip.  IV antibiotics.  No NSAIDs.  Will discuss with interventional radiology in the morning if this is appropriate for drainage procedure.  Anticoagulation has been started for large PE.    History of Present Illness:  Ms. Pacheco is a 84 y.o. year old female who presented with epigastric pain in when she woke up.  She has been experiencing back pain for the last several days and recently went to the emergency room on 6/6/2025 and was diagnosed with pneumonia.  She states she was lifting heavy stuff in her condo in [place] and then drove back here.  Muscle relaxer and Tylenol was not helping with her pain and she was diagnosed with pneumonia of the right lower lobe.Previous abdominal surgery included AAA repair, laparoscopic cholecystectomy.  She does admit to NSAID use nightly for back pain.  No smoking history.     History:  Past Medical History:   Diagnosis Date    AAA (abdominal aortic aneurysm) without rupture     AAA repair (7/18) Dr Chapman; Dr Martin (5/24)    Anxiety     Bursitis     Carpal tunnel syndrome     CKD (chronic kidney disease) 2014    Colon adenomas     2003 Dr Dalal; 4/13 Dr Denise    COVID-19 vaccine dose declined 11/2023    boosters    Degenerative arthritis of lumbar spine 12/2018    Dr Galvez; mri showed multilevel degen changes, mod spinal stenosis  History    Marital status:      Spouse name: Not on file    Number of children: Not on file    Years of education: Not on file    Highest education level: Not on file   Occupational History    Occupation: ret office mgr   Tobacco Use    Smoking status: Never     Passive exposure: Never    Smokeless tobacco: Never   Vaping Use    Vaping status: Never Used   Substance and Sexual Activity    Alcohol use: Yes     Alcohol/week: 1.0 standard drink of alcohol     Types: 1 Glasses of wine per week     Comment: wine ocassionally    Drug use: No    Sexual activity: Yes     Partners: Male   Other Topics Concern    Not on file   Social History Narrative    Not on file     Social Drivers of Health     Financial Resource Strain: Low Risk  (5/24/2024)    Overall Financial Resource Strain (CARDIA)     Difficulty of Paying Living Expenses: Not hard at all   Food Insecurity: No Food Insecurity (5/24/2024)    Hunger Vital Sign     Worried About Running Out of Food in the Last Year: Never true     Ran Out of Food in the Last Year: Never true   Transportation Needs: Unknown (5/24/2024)    PRAPARE - Transportation     Lack of Transportation (Medical): Not on file     Lack of Transportation (Non-Medical): No   Physical Activity: Inactive (12/3/2024)    Exercise Vital Sign     Days of Exercise per Week: 0 days     Minutes of Exercise per Session: 0 min   Stress: Not on file   Social Connections: Not on file   Intimate Partner Violence: Not on file   Housing Stability: Unknown (5/24/2024)    Housing Stability Vital Sign     Unable to Pay for Housing in the Last Year: Not on file     Number of Places Lived in the Last Year: Not on file     Unstable Housing in the Last Year: No     Allergies   Allergen Reactions    Amlodipine Other (See Comments)     Felt poorly    Atenolol Other (See Comments)     Felt poorly    Losartan Other (See Comments)     Headaches    Tramadol Other (See Comments)       Medications:  No current  Bronson Comer via telephone at 6/8/2025 6:33 PM.    Aortobiiliac endograft with excluded aneurysm sac measuring 36 x 33 mm as above.  Type II endoleak is present. No evidence of acute aortic syndrome.    Electronically signed by Bronson Comer  XR CHEST 1 VIEW  Narrative: EXAM: XR CHEST 1 VIEW    INDICATION: Recent pneumonia    COMPARISON: June 6, 2025    TECHNIQUE: Single portable, frontal view of the chest    FINDINGS:    EKG leads project over the patient.    The cardiomediastinal silhouette is stable, with redemonstrated tortuosity of  the descending thoracic aorta.    Hypoinflated lungs. Right greater than left linear atelectasis. No confluent  consolidations. No pneumothorax. No definite pleural effusion.     No acute osseous abnormalities.  Impression: Right greater than left linear opacities, favored atelectasis. Infectious  infiltrate not excluded.    Electronically signed by Angelica Solomon DO  [phone]

## 2025-06-09 NOTE — CONSULTS
Interventional Radiology Consult Note  Patient: Benita Pacheco               Sex: female          DOA: 6/8/2025       YOB: 1940      Age:  84 y.o.        LOS:  LOS: 1 day              Assessment   Abdominal contained subserosal perigastric fluid collection  - not amenable to percutaneous drain placement  Pulmonary embolism is non interventional - continue treatment with anticoagulation    Case and images reviewed by Dr. Tello. Discussed with Dr Solomon    Plan     Management per primary team and surgery  IR will sign off and remain available as needed    Thank you,  Katerine Alcaraz, PA  [phone]    HPI:     Consult for evaluation of abdominal fluid collection drainage received from Dr. Solomon and reviewed with Dr. Tello.    Benita Pacheco is a 84 y.o. female with a PMH of AAA s/p repair, HTN, PNA who presented to [facility] on 6/8/25 for abdominal pain.  ED evaluation was significant for CT demonstrating perforated gastric ulcer and a right lobar PE.  Labs show WBC 18.1. Patient was admitted for further evaluation and management. Surgery is following. The patient is on hep gtt.      Past Medical History:   Diagnosis Date    AAA (abdominal aortic aneurysm) without rupture     AAA repair (7/18) Dr Chapman; Dr Martin (5/24)    Anxiety     Bursitis     Carpal tunnel syndrome     CKD (chronic kidney disease) 2014    Colon adenomas     2003 Dr Dalal; 4/13 Dr Denise    COVID-19 vaccine dose declined 11/2023    boosters    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 Quezada, neg sprue    H/O cardiovascular stress test     NST neg (9/16)    H/O echocardiogram 12/2017    ef 55%, no wma, mild inc thickness, gr 1 dd, mild CHRISTINE    Hearing loss     LEFT Dr Wyman; sensorineural     tablet Take 1 tablet by mouth daily 12/3/24  Yes Herbie Barry MD   ezetimibe (ZETIA) 10 MG tablet Take 1 tablet by mouth daily 9/5/24  Yes Herbie Barry MD   ibuprofen (ADVIL;MOTRIN) 600 MG tablet Take 1 tablet by mouth 4 times daily as needed for Pain 6/6/25   Elodia Da Silva MD   naloxone (NARCAN) 4 MG/0.1ML LIQD nasal spray 1 spray by Nasal route as needed for Opioid Reversal 10/22/24   Alpa Galvez MD   diclofenac (FLECTOR) 1.3 % PTCH patch Place 1 patch onto the skin 2 times daily 7/18/24   Dylan Lopez MD   Clobetasol Propionate 0.05 % SHAM USE AS SHAMPOO DAILY 9/3/22   Automatic Reconciliation, Ar     Allergies   Allergen Reactions    Amlodipine Other (See Comments)     Felt poorly    Atenolol Other (See Comments)     Felt poorly    Losartan Other (See Comments)     Headaches    Tramadol Other (See Comments)       Review of Systems  As above    Physical Exam:      Visit Vitals  BP (!) 140/77   Pulse 73   Temp 98.2 °F (36.8 °C) (Oral)   Resp 17   Ht 1.626 m (5' 4\")   Wt 73.5 kg (162 lb)   SpO2 90%   BMI 27.81 kg/m²

## 2025-06-10 LAB
APTT PPP: 121.4 SEC (ref 23–36.4)
APTT PPP: 62.9 SEC (ref 23–36.4)
APTT PPP: 71.3 SEC (ref 23–36.4)
ERYTHROCYTE [DISTWIDTH] IN BLOOD BY AUTOMATED COUNT: 15 % (ref 11.6–14.5)
HCT VFR BLD AUTO: 34.6 % (ref 35–45)
HGB BLD-MCNC: 11.2 G/DL (ref 12–16)
MCH RBC QN AUTO: 31 PG (ref 24–34)
MCHC RBC AUTO-ENTMCNC: 32.4 G/DL (ref 31–37)
MCV RBC AUTO: 95.8 FL (ref 78–100)
NRBC # BLD: 0 K/UL (ref 0–0.01)
NRBC BLD-RTO: 0 PER 100 WBC
PLATELET # BLD AUTO: 249 K/UL (ref 135–420)
PMV BLD AUTO: 10.6 FL (ref 9.2–11.8)
RBC # BLD AUTO: 3.61 M/UL (ref 4.2–5.3)
TROPONIN T SERPL HS-MCNC: 13.2 NG/L (ref 0–14)
WBC # BLD AUTO: 13.2 K/UL (ref 4.6–13.2)

## 2025-06-10 PROCEDURE — 6360000002 HC RX W HCPCS: Performed by: INTERNAL MEDICINE

## 2025-06-10 PROCEDURE — 2580000003 HC RX 258: Performed by: NURSE PRACTITIONER

## 2025-06-10 PROCEDURE — 84484 ASSAY OF TROPONIN QUANT: CPT

## 2025-06-10 PROCEDURE — 85027 COMPLETE CBC AUTOMATED: CPT

## 2025-06-10 PROCEDURE — 6360000002 HC RX W HCPCS

## 2025-06-10 PROCEDURE — 1100000003 HC PRIVATE W/ TELEMETRY

## 2025-06-10 PROCEDURE — 85730 THROMBOPLASTIN TIME PARTIAL: CPT

## 2025-06-10 PROCEDURE — 94761 N-INVAS EAR/PLS OXIMETRY MLT: CPT

## 2025-06-10 PROCEDURE — 99222 1ST HOSP IP/OBS MODERATE 55: CPT

## 2025-06-10 PROCEDURE — 99231 SBSQ HOSP IP/OBS SF/LOW 25: CPT | Performed by: INTERNAL MEDICINE

## 2025-06-10 PROCEDURE — 2500000003 HC RX 250 WO HCPCS: Performed by: NURSE PRACTITIONER

## 2025-06-10 PROCEDURE — 36415 COLL VENOUS BLD VENIPUNCTURE: CPT

## 2025-06-10 PROCEDURE — 6360000002 HC RX W HCPCS: Performed by: NURSE PRACTITIONER

## 2025-06-10 RX ADMIN — SODIUM CHLORIDE, PRESERVATIVE FREE 10 ML: 5 INJECTION INTRAVENOUS at 08:10

## 2025-06-10 RX ADMIN — SODIUM CHLORIDE, PRESERVATIVE FREE 10 ML: 5 INJECTION INTRAVENOUS at 20:28

## 2025-06-10 RX ADMIN — SODIUM CHLORIDE, SODIUM LACTATE, POTASSIUM CHLORIDE, AND CALCIUM CHLORIDE: .6; .31; .03; .02 INJECTION, SOLUTION INTRAVENOUS at 06:49

## 2025-06-10 RX ADMIN — HYDROMORPHONE HYDROCHLORIDE 0.5 MG: 1 INJECTION, SOLUTION INTRAMUSCULAR; INTRAVENOUS; SUBCUTANEOUS at 20:28

## 2025-06-10 RX ADMIN — PIPERACILLIN AND TAZOBACTAM 3375 MG: 3; .375 INJECTION, POWDER, LYOPHILIZED, FOR SOLUTION INTRAVENOUS at 02:28

## 2025-06-10 RX ADMIN — PIPERACILLIN AND TAZOBACTAM 3375 MG: 3; .375 INJECTION, POWDER, LYOPHILIZED, FOR SOLUTION INTRAVENOUS at 09:24

## 2025-06-10 RX ADMIN — SODIUM CHLORIDE, PRESERVATIVE FREE 10 ML: 5 INJECTION INTRAVENOUS at 13:41

## 2025-06-10 RX ADMIN — HYDROMORPHONE HYDROCHLORIDE 0.5 MG: 1 INJECTION, SOLUTION INTRAMUSCULAR; INTRAVENOUS; SUBCUTANEOUS at 02:24

## 2025-06-10 RX ADMIN — HYDROMORPHONE HYDROCHLORIDE 0.5 MG: 1 INJECTION, SOLUTION INTRAMUSCULAR; INTRAVENOUS; SUBCUTANEOUS at 13:39

## 2025-06-10 RX ADMIN — PIPERACILLIN AND TAZOBACTAM 3375 MG: 3; .375 INJECTION, POWDER, LYOPHILIZED, FOR SOLUTION INTRAVENOUS at 16:27

## 2025-06-10 RX ADMIN — SODIUM CHLORIDE, SODIUM LACTATE, POTASSIUM CHLORIDE, AND CALCIUM CHLORIDE: .6; .31; .03; .02 INJECTION, SOLUTION INTRAVENOUS at 16:32

## 2025-06-10 RX ADMIN — HEPARIN SODIUM 14 UNITS/KG/HR: 10000 INJECTION, SOLUTION INTRAVENOUS at 22:03

## 2025-06-10 RX ADMIN — HEPARIN SODIUM 5900 UNITS: 1000 INJECTION INTRAVENOUS; SUBCUTANEOUS at 17:20

## 2025-06-10 ASSESSMENT — PAIN - FUNCTIONAL ASSESSMENT
PAIN_FUNCTIONAL_ASSESSMENT: ACTIVITIES ARE NOT PREVENTED
PAIN_FUNCTIONAL_ASSESSMENT: ACTIVITIES ARE NOT PREVENTED

## 2025-06-10 ASSESSMENT — PAIN DESCRIPTION - DESCRIPTORS
DESCRIPTORS: ACHING

## 2025-06-10 ASSESSMENT — PAIN DESCRIPTION - LOCATION
LOCATION: ABDOMEN

## 2025-06-10 ASSESSMENT — PAIN SCALES - GENERAL
PAINLEVEL_OUTOF10: 6
PAINLEVEL_OUTOF10: 4
PAINLEVEL_OUTOF10: 3
PAINLEVEL_OUTOF10: 0

## 2025-06-10 ASSESSMENT — PAIN DESCRIPTION - PAIN TYPE
TYPE: ACUTE PAIN
TYPE: ACUTE PAIN

## 2025-06-10 ASSESSMENT — PAIN DESCRIPTION - FREQUENCY
FREQUENCY: CONTINUOUS
FREQUENCY: CONTINUOUS

## 2025-06-10 ASSESSMENT — ENCOUNTER SYMPTOMS
NAUSEA: 0
VOMITING: 0
COLOR CHANGE: 0
ABDOMINAL PAIN: 1
SHORTNESS OF BREATH: 0

## 2025-06-10 ASSESSMENT — PAIN DESCRIPTION - ORIENTATION
ORIENTATION: UPPER
ORIENTATION: UPPER
ORIENTATION: ANTERIOR

## 2025-06-10 ASSESSMENT — PAIN DESCRIPTION - ONSET
ONSET: ON-GOING
ONSET: ON-GOING

## 2025-06-10 NOTE — FLOWSHEET NOTE
06/10/25 1339 06/10/25 1400   Vital Signs   Respirations 16  --    Pain Assessment   Pain Assessment  --  0-10  (reassessment after pain medication)   Pain Level 6 3   Patient's Stated Pain Goal 0 - No pain 0 - No pain   Pain Location Abdomen Abdomen   Pain Orientation Upper Upper   Pain Descriptors Aching Aching   Non-Pharmaceutical Pain Intervention(s) Rest None - Patient Satisfied   Opioid-Induced Sedation   POSS Score 1  --

## 2025-06-10 NOTE — PLAN OF CARE
Problem: Discharge Planning  Goal: Discharge to home or other facility with appropriate resources  Description: Pt. Is to be discharged to home with Jun after meeting with .  Outcome: Progressing  Flowsheets (Taken 6/9/2025 0338 by Micah Alicea, RN)  Discharge to home or other facility with appropriate resources: Identify barriers to discharge with patient and caregiver  Note: Pt. To be discharged home with Clement.    Pt. Still on light suction for NG tube.  Waiting for further providers assessments.     Problem: Pain  Goal: Verbalizes/displays adequate comfort level or baseline comfort level  6/10/2025 1200 by Sally Meyer, RN  Outcome: Progressing  Flowsheets (Taken 6/10/2025 0300 by Christa Salgado RN)  Verbalizes/displays adequate comfort level or baseline comfort level:   Encourage patient to monitor pain and request assistance   Assess pain using appropriate pain scale   Administer analgesics based on type and severity of pain and evaluate response   Implement non-pharmacological measures as appropriate and evaluate response  Note: Pt. Stated she was 3 of 10 on pain scale.  Pt. Has met pain goal.  6/10/2025 0300 by Christa Salgado RN  Outcome: Progressing  Flowsheets (Taken 6/10/2025 0300)  Verbalizes/displays adequate comfort level or baseline comfort level:   Encourage patient to monitor pain and request assistance   Assess pain using appropriate pain scale   Administer analgesics based on type and severity of pain and evaluate response   Implement non-pharmacological measures as appropriate and evaluate response     Problem: Skin/Tissue Integrity  Goal: Skin integrity remains intact  Description: 1.  Monitor for areas of redness and/or skin breakdown2.  Assess vascular access sites hourly3.  Every 4-6 hours minimum:  Change oxygen saturation probe site4.  Every 4-6 hours:  If on nasal continuous positive airway pressure, respiratory therapy assess nares and  cc.  6/10/2025 0300 by Christa Salgado RN  Outcome: Progressing  Flowsheets (Taken 6/10/2025 0300)  Minimal or absence of nausea and vomiting: Nasogastric tube to low intermittent suction as ordered  Goal: Maintains adequate nutritional intake  6/10/2025 1200 by Sally Meyer RN  Outcome: Progressing  Note: Pt. Is NPO, but expressing wanting to eat and drink.  No N/V/D reported.  6/10/2025 0300 by Christa Salgado RN  Outcome: Progressing  Note: Patient currently NPO     Problem: Infection - Adult  Goal: Absence of infection at discharge  6/10/2025 1200 by Sally Meyer RN  Outcome: Progressing  Flowsheets (Taken 6/10/2025 0802)  Absence of infection at discharge:   Assess and monitor for signs and symptoms of infection   Monitor lab/diagnostic results  Note: Continue to monitor Pt. For any worsening S/S for infection.  6/10/2025 0300 by Christa Salgado RN  Outcome: Progressing  Flowsheets (Taken 6/10/2025 0300)  Absence of infection at discharge:   Assess and monitor for signs and symptoms of infection   Monitor lab/diagnostic results   Monitor all insertion sites i.e., indwelling lines, tubes and drains   Administer medications as ordered  Note: Continue to administer scheduled antibiotics.     Problem: Hematologic - Adult  Goal: Maintains hematologic stability  6/10/2025 1200 by Sally Meyer RN  Outcome: Progressing  Flowsheets (Taken 6/10/2025 0802)  Maintains hematologic stability:   Assess for signs and symptoms of bleeding or hemorrhage   Monitor labs for bleeding or clotting disorders  Note: Continue to monitor Pt. For any S/S of bleeding due to heparin drip.  6/10/2025 0303 by Christa Salgado RN  Outcome: Progressing  6/10/2025 0300 by Christa Salgado RN  Flowsheets (Taken 6/10/2025 0300)  Maintains hematologic stability:   Assess for signs and symptoms of bleeding or hemorrhage   Monitor labs for bleeding or clotting disorders   Administer blood products/factors as ordered  Note:

## 2025-06-10 NOTE — FLOWSHEET NOTE
06/10/25 0802 06/10/25 1100 06/10/25 1120   Vital Signs   Temp 98.1 °F (36.7 °C)  --  98.8 °F (37.1 °C)   Temp Source Oral  --  Oral   Pulse 68 70 67   Heart Rate Source Monitor  --  Monitor   Respirations (!) 36  (rn aware)  --  22   BP (!) 163/79  (rn aware)  --  (!) 149/79   MAP (Calculated) 107  --  102   BP Location Right lower arm  --  Right lower arm   BP Method  --   --  Automatic   Patient Position  --   --  Semi fowlers   Oxygen Therapy   SpO2 91 %  --  91 %   O2 Device Nasal cannula  --  Nasal cannula   Rhythm Interpretation   Cardiac Rhythm Sinus rhythm Sinus rhythm  --

## 2025-06-10 NOTE — PROGRESS NOTES
1335: Phlebotomy called and asked what time's the patient's APTT due. This nurse told the phlebotomist that the APTT is due now and she said \"okay.\"    1629: Phlebotomist at bedside drawing APTT that was due at 1300.

## 2025-06-10 NOTE — PROGRESS NOTES
Patient placed on 2 L nasal cannula. Patient desat to 89% on room air. Patient denies shortness of breath at rest. Oxygen saturation increased to 91-93% on 2 L nasal cannula.

## 2025-06-10 NOTE — PROGRESS NOTES
Medicine Progress Note    Patient: Benita Pacheco   Age:  84 y.o.  DOA: 6/8/2025   Admit Dx / CC: Pulmonary embolus, right (HCC) [I26.99]  S/P AAA repair [Z98.890, Z86.79]  LOS:  LOS: 2 days     Assessment/Plan   Principal Problem:    Pulmonary embolus, right (HCC)  Active Problems:    Gastric ulcer with perforation (HCC)  Resolved Problems:    * No resolved hospital problems. *      Additional Plan notes     Large Contained Perforation along the Anterior margin of the Gastric Pylorus with Intraperitoneal Air   - Nasogastric Tube (a.k.a. NG Tube) placed in [facility]  Continue IV Piperacillin-Tazobactam (a.k.a. Zosyn), IV Pantoprazole 40 mg BID, and IV Fluids (LR 75 mL/hr) while Patient is NPO.  PRN Antiemetics, PRN Pain Control, and PRN Naloxone.  General Surgical services consulted.    Subserosal Perigastric Fluid Collection  - Not amenable to percutaneous drain placement, per IR  Continue conservative management, per General Surgical services.  Tentative plan for Upper GI series tomorrow (6/11/2025).    Acute Right Lower Lobe Pulmonary Embolism (a.k.a. PE) 2°/2 Acute of Left Lower Extremity Deep Vein Thrombosis (a.k.a. DVT)  - Large, Lobar, Segmental, and Subsegmental PE  - possibly provoked by car ride to [place] last month  - LVEF 55-60% with Grade I Diastolic Dysfunction and Reduced Right Ventricular Systolic Function by Transthoracic Echocardiogram (a.k.a. TTE) on 6/09/2025  IV Heparin gtt.  Anticipate Transition to oral anticoagulation prior to discharge during this acute hospitalization.      Acute Non-Occlusive Left Lower Extremity Deep Vein Thrombosis (a.k.a. DVT) in 1 of 2 Posterior Tibial and 1 of 2 Peroneal Veins  See #3.    Likely Dehydration  - UA shows 0-3 Hyaline Casts  Continue IV LR 75 mL/hr at this time.    Elevated Troponin (RESOLVED)  - Troponin-T 13.9 ng/L to 14.7 ng/L to 13.2 ng/L    Type 2 Endoleak; History of (a.k.a. H/O) of Abdominal Aortic Aneurysm  Monitor.    Hypertension  HOLD

## 2025-06-10 NOTE — PLAN OF CARE
Problem: Pain  Goal: Verbalizes/displays adequate comfort level or baseline comfort level  Outcome: Progressing  Flowsheets (Taken 6/10/2025 0300)  Verbalizes/displays adequate comfort level or baseline comfort level:   Encourage patient to monitor pain and request assistance   Assess pain using appropriate pain scale   Administer analgesics based on type and severity of pain and evaluate response   Implement non-pharmacological measures as appropriate and evaluate response     Problem: Skin/Tissue Integrity  Goal: Skin integrity remains intact  Description: 1.  Monitor for areas of redness and/or skin breakdown2.  Assess vascular access sites hourly3.  Every 4-6 hours minimum:  Change oxygen saturation probe site4.  Every 4-6 hours:  If on nasal continuous positive airway pressure, respiratory therapy assess nares and determine need for appliance change or resting period  Outcome: Progressing  Flowsheets (Taken 6/10/2025 0300)  Skin Integrity Remains Intact: Monitor for areas of redness and/or skin breakdown     Problem: ABCDS Injury Assessment  Goal: Absence of physical injury  Outcome: Progressing  Flowsheets (Taken 6/10/2025 0300)  Absence of Physical Injury: Implement safety measures based on patient assessment     Problem: Safety - Adult  Goal: Free from fall injury  Outcome: Progressing  Flowsheets (Taken 6/10/2025 0300)  Free From Fall Injury: Instruct family/caregiver on patient safety     Problem: Gastrointestinal - Adult  Goal: Minimal or absence of nausea and vomiting  Outcome: Progressing  Flowsheets (Taken 6/10/2025 0300)  Minimal or absence of nausea and vomiting: Nasogastric tube to low intermittent suction as ordered  Goal: Maintains adequate nutritional intake  Outcome: Progressing  Note: Patient currently NPO     Problem: Infection - Adult  Goal: Absence of infection at discharge  Outcome: Progressing  Flowsheets (Taken 6/10/2025 0300)  Absence of infection at discharge:   Assess and monitor for

## 2025-06-10 NOTE — PROGRESS NOTES
Admit Date: 6/8/2025    Assessment    Benita Pacheco is a 84 y.o. female who was admitted with epigastric pain. She was found to have large acute pulmonary embolism and a contained perforated ulcer of the stomach with abscess measuring 10 x 2 x 3 cm on CT chest, abdomen and pelvis.     Patient Active Problem List   Diagnosis    Dyslipidemia    Spondylosis of lumbar region without myelopathy or radiculopathy    Advance directive discussed with patient    Hypovitaminosis D    Obesity (BMI 30.0-34.9)    Colon adenomas    Primary hypertension    Venous insufficiency    Hypothyroidism due to acquired atrophy of thyroid    S/P AAA repair    Osteopenia    Arthritis, degenerative    Stage 3a chronic kidney disease (HCC)    Sacroiliac pain    Pulmonary embolus, right (HCC)    Gastric ulcer with perforation (HCC)       Plan  - Upper GI series via NGT tomorrow  - NPO  - NG tube to low intermittent suction  - Continue PPI and IV antibiotics    Subjective    Overnight events: No acute overnight events.  Passing flatus, no BM    Review of Systems   Constitutional:  Negative for chills and fever.   Respiratory:  Negative for shortness of breath.    Cardiovascular:  Negative for chest pain.   Gastrointestinal:  Positive for abdominal pain. Negative for nausea and vomiting.   Skin:  Negative for color change, pallor, rash and wound.   Hematological:  Negative for adenopathy. Does not bruise/bleed easily.       Objective    Physical Exam:  BP (!) 163/79 Comment: rn aware  Pulse 68   Temp 98.1 °F (36.7 °C) (Oral)   Resp (!) 36 Comment: rn aware  Ht 1.626 m (5' 4\")   Wt 73.5 kg (162 lb)   LMP  (LMP Unknown)   SpO2 91%   BMI 27.81 kg/m²     Intake/Output Summary (Last 24 hours) at 6/10/2025 1014  Last data filed at 6/9/2025 2130  Gross per 24 hour   Intake --   Output 50 ml   Net -50 ml     Physical Exam  Constitutional:       Appearance: Normal appearance.   HENT:      Head: Normocephalic and atraumatic.   Eyes:

## 2025-06-10 NOTE — PROGRESS NOTES
Advance Care Planning   Healthcare Decision Maker:    Primary Decision Maker: EMMETT PACHECO - Spouse - [phone]    Click here to complete Healthcare Decision Makers including selection of the Healthcare Decision Maker Relationship (ie \"Primary\").    Spiritual Health History and Assessment/Progress Note  [facility]    (P) Spiritual/Emotional Needs,  ,  ,      Name: Benita Pacheco MRN: [id number]    Age: 84 y.o.     Sex: female   Language: English   Mosque: Congregation   Pulmonary embolus, right (HCC)     Date: 6/10/2025            Total Time Calculated: (P) 9 min              Spiritual Assessment began in [facility]        Referral/Consult From: (P) Rounding   Encounter Overview/Reason: (P) Spiritual/Emotional Needs  Service Provided For: (P) Patient and family together    Nancy, Belief, Meaning:   Patient identifies as spiritual and has beliefs or practices that help with coping during difficult times  Family/Friends identify as spiritual and have beliefs or practices that help with coping during difficult times      Importance and Influence:  Patient has spiritual/personal beliefs that influence decisions regarding their health  Family/Friends have spiritual/personal beliefs that influence decisions regarding the patient's health    Community:  Patient feels well-supported. Support system includes: Spouse/Partner  Family/Friends feel well-supported. Support system includes: Spouse/Partner    Assessment and Plan of Care:     Patient Interventions include: Facilitated expression of thoughts and feelings, Affirmed coping skills/support systems, and Provided sacramental/Jew ritual  Family/Friends Interventions include: Facilitated expression of thoughts and feelings and Provided sacramental/Jew ritual    Patient Plan of Care: Spiritual Care available upon further referral  Family/Friends Plan of Care: Spiritual Care available upon further referral    Electronically signed  by Chaplain Della on 6/10/2025 at 12:37 PM

## 2025-06-10 NOTE — NURSE NAVIGATOR
Pt. Tolerated pain IV medication well.  Resting in position of comfort, call bell w/in reach, 3 side rails up.

## 2025-06-11 ENCOUNTER — APPOINTMENT (OUTPATIENT)
Facility: HOSPITAL | Age: 85
End: 2025-06-11
Payer: MEDICARE

## 2025-06-11 LAB
ALBUMIN SERPL-MCNC: 2.1 G/DL (ref 3.4–5)
ALBUMIN/GLOB SERPL: 0.6 (ref 0.8–1.7)
ALP SERPL-CCNC: 141 U/L (ref 45–117)
ALT SERPL-CCNC: 22 U/L (ref 10–35)
ANION GAP SERPL CALC-SCNC: 10 MMOL/L (ref 3–18)
APTT PPP: 55 SEC (ref 23–36.4)
APTT PPP: >180 SEC (ref 23–36.4)
AST SERPL-CCNC: 29 U/L (ref 10–38)
BASOPHILS # BLD: 0.04 K/UL (ref 0–0.1)
BASOPHILS NFR BLD: 0.3 % (ref 0–2)
BILIRUB SERPL-MCNC: 0.4 MG/DL (ref 0.2–1)
BUN SERPL-MCNC: 41 MG/DL (ref 6–23)
BUN/CREAT SERPL: 38 (ref 12–20)
CALCIUM SERPL-MCNC: 9.1 MG/DL (ref 8.5–10.1)
CHLORIDE SERPL-SCNC: 108 MMOL/L (ref 98–107)
CO2 SERPL-SCNC: 23 MMOL/L (ref 21–32)
CREAT SERPL-MCNC: 1.07 MG/DL (ref 0.6–1.3)
DIFFERENTIAL METHOD BLD: ABNORMAL
EOSINOPHIL # BLD: 0.11 K/UL (ref 0–0.4)
EOSINOPHIL NFR BLD: 0.9 % (ref 0–5)
ERYTHROCYTE [DISTWIDTH] IN BLOOD BY AUTOMATED COUNT: 14.8 % (ref 11.6–14.5)
GLOBULIN SER CALC-MCNC: 3.5 G/DL (ref 2–4)
GLUCOSE SERPL-MCNC: 92 MG/DL (ref 74–108)
HCT VFR BLD AUTO: 31.4 % (ref 35–45)
HGB BLD-MCNC: 10.2 G/DL (ref 12–16)
IMM GRANULOCYTES # BLD AUTO: 0.11 K/UL (ref 0–0.04)
IMM GRANULOCYTES NFR BLD AUTO: 0.9 % (ref 0–0.5)
LYMPHOCYTES # BLD: 1.86 K/UL (ref 0.9–3.6)
LYMPHOCYTES NFR BLD: 15.9 % (ref 21–52)
MCH RBC QN AUTO: 31.3 PG (ref 24–34)
MCHC RBC AUTO-ENTMCNC: 32.5 G/DL (ref 31–37)
MCV RBC AUTO: 96.3 FL (ref 78–100)
MONOCYTES # BLD: 0.99 K/UL (ref 0.05–1.2)
MONOCYTES NFR BLD: 8.5 % (ref 3–10)
NEUTS SEG # BLD: 8.57 K/UL (ref 1.8–8)
NEUTS SEG NFR BLD: 73.5 % (ref 40–73)
NRBC # BLD: 0 K/UL (ref 0–0.01)
NRBC BLD-RTO: 0 PER 100 WBC
PLATELET # BLD AUTO: 252 K/UL (ref 135–420)
PMV BLD AUTO: 11.1 FL (ref 9.2–11.8)
POTASSIUM SERPL-SCNC: 3.6 MMOL/L (ref 3.5–5.5)
PROT SERPL-MCNC: 5.6 G/DL (ref 6.4–8.2)
RBC # BLD AUTO: 3.26 M/UL (ref 4.2–5.3)
SODIUM SERPL-SCNC: 142 MMOL/L (ref 136–145)
WBC # BLD AUTO: 11.7 K/UL (ref 4.6–13.2)

## 2025-06-11 PROCEDURE — 6360000002 HC RX W HCPCS

## 2025-06-11 PROCEDURE — 99222 1ST HOSP IP/OBS MODERATE 55: CPT

## 2025-06-11 PROCEDURE — 36415 COLL VENOUS BLD VENIPUNCTURE: CPT

## 2025-06-11 PROCEDURE — 6360000004 HC RX CONTRAST MEDICATION: Performed by: SURGERY

## 2025-06-11 PROCEDURE — 6360000002 HC RX W HCPCS: Performed by: INTERNAL MEDICINE

## 2025-06-11 PROCEDURE — 74240 X-RAY XM UPR GI TRC 1CNTRST: CPT

## 2025-06-11 PROCEDURE — 94761 N-INVAS EAR/PLS OXIMETRY MLT: CPT

## 2025-06-11 PROCEDURE — 2500000003 HC RX 250 WO HCPCS: Performed by: NURSE PRACTITIONER

## 2025-06-11 PROCEDURE — 99231 SBSQ HOSP IP/OBS SF/LOW 25: CPT | Performed by: INTERNAL MEDICINE

## 2025-06-11 PROCEDURE — 85730 THROMBOPLASTIN TIME PARTIAL: CPT

## 2025-06-11 PROCEDURE — 80053 COMPREHEN METABOLIC PANEL: CPT

## 2025-06-11 PROCEDURE — 6360000002 HC RX W HCPCS: Performed by: NURSE PRACTITIONER

## 2025-06-11 PROCEDURE — 6360000002 HC RX W HCPCS: Performed by: SURGERY

## 2025-06-11 PROCEDURE — 1100000003 HC PRIVATE W/ TELEMETRY

## 2025-06-11 PROCEDURE — 85025 COMPLETE CBC W/AUTO DIFF WBC: CPT

## 2025-06-11 PROCEDURE — 2500000003 HC RX 250 WO HCPCS: Performed by: SURGERY

## 2025-06-11 PROCEDURE — 2580000003 HC RX 258: Performed by: NURSE PRACTITIONER

## 2025-06-11 RX ORDER — HEPARIN SODIUM 10000 [USP'U]/100ML
5-30 INJECTION, SOLUTION INTRAVENOUS CONTINUOUS
Status: DISPENSED | OUTPATIENT
Start: 2025-06-11 | End: 2025-06-12

## 2025-06-11 RX ORDER — DIATRIZOATE MEGLUMINE AND DIATRIZOATE SODIUM 660; 100 MG/ML; MG/ML
120 SOLUTION ORAL; RECTAL ONCE
Status: COMPLETED | OUTPATIENT
Start: 2025-06-11 | End: 2025-06-11

## 2025-06-11 RX ADMIN — SODIUM CHLORIDE, PRESERVATIVE FREE 10 ML: 5 INJECTION INTRAVENOUS at 21:28

## 2025-06-11 RX ADMIN — DIATRIZOATE MEGLUMINE AND DIATRIZOATE SODIUM 120 ML: 660; 100 LIQUID ORAL; RECTAL at 09:20

## 2025-06-11 RX ADMIN — PIPERACILLIN AND TAZOBACTAM 3375 MG: 3; .375 INJECTION, POWDER, LYOPHILIZED, FOR SOLUTION INTRAVENOUS at 18:35

## 2025-06-11 RX ADMIN — PIPERACILLIN AND TAZOBACTAM 3375 MG: 3; .375 INJECTION, POWDER, LYOPHILIZED, FOR SOLUTION INTRAVENOUS at 00:40

## 2025-06-11 RX ADMIN — SODIUM CHLORIDE 40 MG: 9 INJECTION, SOLUTION INTRAMUSCULAR; INTRAVENOUS; SUBCUTANEOUS at 22:31

## 2025-06-11 RX ADMIN — SODIUM CHLORIDE, PRESERVATIVE FREE 10 ML: 5 INJECTION INTRAVENOUS at 10:06

## 2025-06-11 RX ADMIN — MORPHINE SULFATE 2 MG: 2 INJECTION, SOLUTION INTRAMUSCULAR; INTRAVENOUS at 06:33

## 2025-06-11 RX ADMIN — SODIUM CHLORIDE, SODIUM LACTATE, POTASSIUM CHLORIDE, AND CALCIUM CHLORIDE: .6; .31; .03; .02 INJECTION, SOLUTION INTRAVENOUS at 12:33

## 2025-06-11 RX ADMIN — MORPHINE SULFATE 2 MG: 2 INJECTION, SOLUTION INTRAMUSCULAR; INTRAVENOUS at 00:40

## 2025-06-11 RX ADMIN — HEPARIN SODIUM 2900 UNITS: 1000 INJECTION INTRAVENOUS; SUBCUTANEOUS at 14:45

## 2025-06-11 RX ADMIN — HEPARIN SODIUM 13 UNITS/KG/HR: 10000 INJECTION, SOLUTION INTRAVENOUS at 14:48

## 2025-06-11 RX ADMIN — HEPARIN SODIUM 13 UNITS/KG/HR: 10000 INJECTION, SOLUTION INTRAVENOUS at 21:25

## 2025-06-11 RX ADMIN — ONDANSETRON 4 MG: 2 INJECTION, SOLUTION INTRAMUSCULAR; INTRAVENOUS at 00:40

## 2025-06-11 RX ADMIN — PIPERACILLIN AND TAZOBACTAM 3375 MG: 3; .375 INJECTION, POWDER, LYOPHILIZED, FOR SOLUTION INTRAVENOUS at 10:06

## 2025-06-11 ASSESSMENT — ENCOUNTER SYMPTOMS
COLOR CHANGE: 0
NAUSEA: 0
VOMITING: 0
SHORTNESS OF BREATH: 0

## 2025-06-11 ASSESSMENT — PAIN DESCRIPTION - LOCATION
LOCATION: ABDOMEN
LOCATION: ABDOMEN

## 2025-06-11 ASSESSMENT — PAIN SCALES - GENERAL
PAINLEVEL_OUTOF10: 7
PAINLEVEL_OUTOF10: 5
PAINLEVEL_OUTOF10: 6
PAINLEVEL_OUTOF10: 0
PAINLEVEL_OUTOF10: 0

## 2025-06-11 ASSESSMENT — PAIN DESCRIPTION - PAIN TYPE
TYPE: ACUTE PAIN
TYPE: ACUTE PAIN

## 2025-06-11 ASSESSMENT — PAIN DESCRIPTION - DESCRIPTORS
DESCRIPTORS: ACHING
DESCRIPTORS: ACHING

## 2025-06-11 ASSESSMENT — PAIN DESCRIPTION - ORIENTATION
ORIENTATION: ANTERIOR
ORIENTATION: ANTERIOR

## 2025-06-11 ASSESSMENT — PAIN DESCRIPTION - FREQUENCY
FREQUENCY: CONTINUOUS
FREQUENCY: CONTINUOUS

## 2025-06-11 ASSESSMENT — PAIN DESCRIPTION - ONSET
ONSET: ON-GOING
ONSET: ON-GOING

## 2025-06-11 NOTE — PROGRESS NOTES
Bedside and Verbal shift change report given to MARGOT Diallo (oncoming nurse) by MARGOT Galvez (offgoing nurse). Report included the following information Nurse Handoff Report, Adult Overview, Intake/Output, MAR, and Recent Results.     Bedside and Verbal shift change report given to MARGOT Dill/MARGOT Carmen (oncoming nurse) by MARGOT Diallo (offgoing nurse). Report included the following information Nurse Handoff Report, Adult Overview, Intake/Output, MAR, and Recent Results.

## 2025-06-11 NOTE — PROGRESS NOTES
Bedside and Verbal shift change report given to MARGOT Diallo  (oncoming nurse) by MARGOT Galvez (offgoing nurse). Report included the following information Nurse Handoff Report and Adult Overview.

## 2025-06-11 NOTE — PROGRESS NOTES
Medicine Progress Note    Patient: Benita Pacheco   Age:  84 y.o.  DOA: 6/8/2025   Admit Dx / CC: Pulmonary embolus, right (HCC) [I26.99]  S/P AAA repair [Z98.890, Z86.79]  LOS:  LOS: 3 days     Assessment/Plan   Principal Problem:    Pulmonary embolus, right (HCC)  Active Problems:    Gastric ulcer with perforation (HCC)  Resolved Problems:    * No resolved hospital problems. *      Additional Plan notes     Large Contained Perforation along the Anterior margin of the Gastric Pylorus with Intraperitoneal Air   - Nasogastric Tube (a.k.a. NG Tube) placed in [facility] ER  - Upper GI Series shows no rochelle communication between Stomach and Peritoneal Cavity  DISCONTINUE IV Fluids.  Continue IV Piperacillin-Tazobactam (a.k.a. Zosyn), IV Pantoprazole 40 mg BID.  PRN Antiemetics, PRN Pain Control, and PRN Naloxone.  Patient ordered a Clear Liquid Diet on 6/11/2025.  General Surgical services is following.    Subserosal Perigastric Fluid Collection  - Not amenable to percutaneous drain placement, per IR  Continue conservative management, per General Surgical services.    Acute Right Lower Lobe Pulmonary Embolism (a.k.a. PE) 2°/2 Acute of Left Lower Extremity Deep Vein Thrombosis (a.k.a. DVT)  - Large, Lobar, Segmental, and Subsegmental PE  - possibly provoked by car ride to [place] last month  - LVEF 55-60% with Grade I Diastolic Dysfunction and Reduced Right Ventricular Systolic Function by Transthoracic Echocardiogram (a.k.a. TTE) on 6/09/2025  Continue IV Heparin gtt.  Will Transition Patient to oral anticoagulation at 0900 EST tomorrow (6/12/2025) and also order IV Heparin gtt to be STOPPED at that time.  Anticipate resumption of other oral agents if this transitioning is well tolerated.    Acute Non-Occlusive Left Lower Extremity Deep Vein Thrombosis (a.k.a. DVT) in 1 of 2 Posterior Tibial and 1 of 2 Peroneal Veins  See #3.    Likely Dehydration  - UA shows 0-3 Hyaline Casts    Elevated Troponin (RESOLVED)  -  vomiting.    Objective:   Visit Vitals  BP (!) 154/67   Pulse 66   Temp 98.1 °F (36.7 °C) (Oral)   Resp 18   Ht 1.626 m (5' 4\")   Wt 73.5 kg (162 lb)   SpO2 93%   BMI 27.81 kg/m²       Physical Exam:  General:  Older adult female lying in bed in no acute distress  HEENT:  Atraumatic, normocephalic; Pupils equally round; Extraocular muscles grossly intact; (+) Mostly Moist Oropharynx  Chest:  No pectus carinatum; No pectus excavatum  Cardiovascular:  Regular rate and rhythm without rubs, gallops, or murmurs  Respiratory:  No wheezes, rales, or rhonchi; normal effort of breathing on Room Air  Abdominal:  Soft, non-tense, (+) Mildly Tender Epigastrium; BS present without guarding  :  Deferred  Extremities:  Pulses 2+ x4 without pitting edema, clubbing, or cyanosis  Musculoskeletal:  Strength 5/5 in BUE  Integument:  No rash on face, forearms, or legs  Neurological:  Alert & Ostensibly Oriented x4/4; No gross deficits of Visual Acuity, Eye Movement, Jaw Opening, Facial Expression, Hearing, Phonation, or Head Movement; No gross deficits of Tongue Movement or Slurring of Speech  Psychiatric:  Affect is appropriate; Language is present and fluent; Behavior is appropriate      Intake and Output:  Current Shift:  06/11 0701 - 06/11 1900  In: -   Out: 50   Last three shifts:  06/09 1901 - 06/11 0700  In: -   Out: 650 [Urine:500]    Lab/Data Reviewed:  All labs obtained in [facility] in the last 24 hours have been reviewed.    Medications Reviewed:  Current Facility-Administered Medications   Medication Dose Route Frequency    nitroglycerin (NITRO-BID) 2 % ointment 0.5 inch  0.5 inch Topical Q6H PRN    hydrALAZINE (APRESOLINE) injection 5 mg  5 mg IntraVENous Q6H PRN    naloxone (NARCAN) injection 0.4 mg  0.4 mg IntraVENous PRN    ipratropium 0.5 mg-albuterol 2.5 mg (DUONEB) nebulizer solution 1 Dose  1 Dose Inhalation Q4H PRN    heparin (porcine) injection 5,900 Units  80 Units/kg IntraVENous PRN    heparin (porcine) injection

## 2025-06-11 NOTE — PLAN OF CARE
Problem: Respiratory - Adult  Goal: Achieves optimal ventilation and oxygenation  Outcome: Progressing  Flowsheets (Taken 6/11/2025 0601)  Achieves optimal ventilation and oxygenation:   Assess for changes in respiratory status   Position to facilitate oxygenation and minimize respiratory effort   Respiratory therapy support as indicated   Assess for changes in mentation and behavior   Oxygen supplementation based on oxygen saturation or arterial blood gases   Encourage broncho-pulmonary hygiene including cough, deep breathe, incentive spirometry   Assess and instruct to report shortness of breath or any respiratory difficulty  Note: Patient admitted for pulmonary embolism. Currently on heparin infusion. Monitor for respiratory distress.

## 2025-06-11 NOTE — PLAN OF CARE
Problem: Discharge Planning  Goal: Discharge to home or other facility with appropriate resources  Description: Pt. Is to be discharged to home with Jun after meeting with .  Outcome: Progressing  Flowsheets (Taken 6/11/2025 0557)  Discharge to home or other facility with appropriate resources:   Identify barriers to discharge with patient and caregiver   Identify discharge learning needs (meds, wound care, etc)   Refer to discharge planning if patient needs post-hospital services based on physician order or complex needs related to functional status, cognitive ability or social support system     Problem: Pain  Goal: Verbalizes/displays adequate comfort level or baseline comfort level  Outcome: Progressing  Flowsheets (Taken 6/11/2025 0557)  Verbalizes/displays adequate comfort level or baseline comfort level:   Encourage patient to monitor pain and request assistance   Assess pain using appropriate pain scale   Administer analgesics based on type and severity of pain and evaluate response   Implement non-pharmacological measures as appropriate and evaluate response  Note: Pain with continuous pain to the abdomen. Assess pain and administer PRN pain medications. Assess effectiveness of medication.     Problem: Skin/Tissue Integrity  Goal: Skin integrity remains intact  Description: 1.  Monitor for areas of redness and/or skin breakdown2.  Assess vascular access sites hourly3.  Every 4-6 hours minimum:  Change oxygen saturation probe site4.  Every 4-6 hours:  If on nasal continuous positive airway pressure, respiratory therapy assess nares and determine need for appliance change or resting period  Outcome: Progressing  Flowsheets (Taken 6/11/2025 0557)  Skin Integrity Remains Intact: Monitor for areas of redness and/or skin breakdown  Note: Skin intact. Scattered bruising to BUE     Problem: ABCDS Injury Assessment  Goal: Absence of physical injury  Outcome: Progressing  Flowsheets (Taken  6/11/2025 0557)  Absence of Physical Injury: Implement safety measures based on patient assessment  Note: Ensure safety while patient on heparin infusion. Increased risk of bleeding.     Problem: Safety - Adult  Goal: Free from fall injury  Outcome: Progressing  Flowsheets (Taken 6/11/2025 0557)  Free From Fall Injury: Instruct family/caregiver on patient safety  Note: Encourage patient to call for assistance.       Problem: Gastrointestinal - Adult  Goal: Minimal or absence of nausea and vomiting  Outcome: Progressing  Flowsheets (Taken 6/11/2025 0557)  Minimal or absence of nausea and vomiting:   Nasogastric tube to low intermittent suction as ordered   Administer ordered antiemetic medications as needed   Administer IV fluids as ordered to ensure adequate hydration     Problem: Gastrointestinal - Adult  Goal: Maintains adequate nutritional intake  Outcome: Progressing     Problem: Infection - Adult  Goal: Absence of infection at discharge  Outcome: Progressing  Flowsheets (Taken 6/11/2025 0557)  Absence of infection at discharge:   Assess and monitor for signs and symptoms of infection   Monitor lab/diagnostic results   Administer medications as ordered     Problem: Hematologic - Adult  Goal: Maintains hematologic stability  Outcome: Progressing  Flowsheets (Taken 6/11/2025 0557)  Maintains hematologic stability:   Monitor labs for bleeding or clotting disorders   Administer blood products/factors as ordered   Assess for signs and symptoms of bleeding or hemorrhage

## 2025-06-11 NOTE — PROGRESS NOTES
Bedside and Verbal shift change report given to MARGOT Galvez (oncoming nurse) by MARGOT Goodwin (offgoing nurse). Report included the following information Nurse Handoff Report and Adult Overview.

## 2025-06-11 NOTE — PROGRESS NOTES
Admit Date: 6/8/2025    Assessment    Benita Pacheco is a 84 y.o. female who was admitted with epigastric pain. She was found to have large acute pulmonary embolism and a contained perforated ulcer of the stomach with abscess measuring 10 x 2 x 3 cm on CT chest, abdomen and pelvis.     Patient Active Problem List   Diagnosis    Dyslipidemia    Spondylosis of lumbar region without myelopathy or radiculopathy    Advance directive discussed with patient    Hypovitaminosis D    Obesity (BMI 30.0-34.9)    Colon adenomas    Primary hypertension    Venous insufficiency    Hypothyroidism due to acquired atrophy of thyroid    S/P AAA repair    Osteopenia    Arthritis, degenerative    Stage 3a chronic kidney disease (HCC)    Sacroiliac pain    Pulmonary embolus, right (HCC)    Gastric ulcer with perforation (HCC)       Plan  -Upper GI series today via NG tube  -NPO  -NG tube to low intermittent suction  -Continue PPI  -Continue antibiotics     Subjective    Overnight events: No acute overnight events. Denies abdominal pain this AM.     Review of Systems   Constitutional:  Negative for chills and fever.   Respiratory:  Negative for shortness of breath.    Cardiovascular:  Negative for chest pain.   Gastrointestinal:  Negative for nausea and vomiting.   Skin:  Negative for color change, pallor, rash and wound.   Hematological:  Negative for adenopathy. Does not bruise/bleed easily.       Objective    Physical Exam:  BP (!) 146/68   Pulse 66   Temp 98.1 °F (36.7 °C) (Oral)   Resp 16   Ht 1.626 m (5' 4\")   Wt 73.5 kg (162 lb)   LMP  (LMP Unknown)   SpO2 92%   BMI 27.81 kg/m²     Intake/Output Summary (Last 24 hours) at 6/11/2025 0654  Last data filed at 6/11/2025 0040  Gross per 24 hour   Intake --   Output 400 ml   Net -400 ml     Physical Exam  Constitutional:       Appearance: Normal appearance.   HENT:      Head: Normocephalic and atraumatic.   Eyes:      Extraocular Movements: Extraocular movements intact.       Conjunctiva/sclera: Conjunctivae normal.      Pupils: Pupils are equal, round, and reactive to light.   Cardiovascular:      Pulses: Normal pulses.   Pulmonary:      Effort: Pulmonary effort is normal.   Abdominal:      General: Abdomen is flat.      Palpations: Abdomen is soft.      Tenderness: There is no abdominal tenderness. There is no guarding or rebound.      Comments: Nontender to palpation this AM   Skin:     General: Skin is warm and dry.   Neurological:      Mental Status: She is alert and oriented to person, place, and time.   Psychiatric:         Mood and Affect: Mood normal.         Behavior: Behavior normal.         Thought Content: Thought content normal.         Judgment: Judgment normal.               Flori Chamorro PA-C

## 2025-06-12 LAB
ANION GAP SERPL CALC-SCNC: 10 MMOL/L (ref 3–18)
APTT PPP: 62.9 SEC (ref 23–36.4)
APTT PPP: 85.8 SEC (ref 23–36.4)
BASOPHILS # BLD: 0.07 K/UL (ref 0–0.1)
BASOPHILS NFR BLD: 0.6 % (ref 0–2)
BUN SERPL-MCNC: 34 MG/DL (ref 6–23)
BUN/CREAT SERPL: 36 (ref 12–20)
CALCIUM SERPL-MCNC: 8.9 MG/DL (ref 8.5–10.1)
CHLORIDE SERPL-SCNC: 105 MMOL/L (ref 98–107)
CO2 SERPL-SCNC: 25 MMOL/L (ref 21–32)
CREAT SERPL-MCNC: 0.94 MG/DL (ref 0.6–1.3)
DIFFERENTIAL METHOD BLD: ABNORMAL
EOSINOPHIL # BLD: 0.35 K/UL (ref 0–0.4)
EOSINOPHIL NFR BLD: 2.8 % (ref 0–5)
ERYTHROCYTE [DISTWIDTH] IN BLOOD BY AUTOMATED COUNT: 14.4 % (ref 11.6–14.5)
GLUCOSE SERPL-MCNC: 95 MG/DL (ref 74–108)
HCT VFR BLD AUTO: 33.4 % (ref 35–45)
HGB BLD-MCNC: 11 G/DL (ref 12–16)
IMM GRANULOCYTES # BLD AUTO: 0.27 K/UL (ref 0–0.04)
IMM GRANULOCYTES NFR BLD AUTO: 2.2 % (ref 0–0.5)
LYMPHOCYTES # BLD: 1.88 K/UL (ref 0.9–3.6)
LYMPHOCYTES NFR BLD: 15.2 % (ref 21–52)
MCH RBC QN AUTO: 31.3 PG (ref 24–34)
MCHC RBC AUTO-ENTMCNC: 32.9 G/DL (ref 31–37)
MCV RBC AUTO: 94.9 FL (ref 78–100)
MONOCYTES # BLD: 1.07 K/UL (ref 0.05–1.2)
MONOCYTES NFR BLD: 8.6 % (ref 3–10)
NEUTS SEG # BLD: 8.73 K/UL (ref 1.8–8)
NEUTS SEG NFR BLD: 70.6 % (ref 40–73)
NRBC # BLD: 0 K/UL (ref 0–0.01)
NRBC BLD-RTO: 0 PER 100 WBC
PLATELET # BLD AUTO: 260 K/UL (ref 135–420)
PMV BLD AUTO: 10.4 FL (ref 9.2–11.8)
POTASSIUM SERPL-SCNC: 3.1 MMOL/L (ref 3.5–5.5)
RBC # BLD AUTO: 3.52 M/UL (ref 4.2–5.3)
SODIUM SERPL-SCNC: 140 MMOL/L (ref 136–145)
WBC # BLD AUTO: 12.4 K/UL (ref 4.6–13.2)

## 2025-06-12 PROCEDURE — 85025 COMPLETE CBC W/AUTO DIFF WBC: CPT

## 2025-06-12 PROCEDURE — 1100000003 HC PRIVATE W/ TELEMETRY

## 2025-06-12 PROCEDURE — 6360000002 HC RX W HCPCS: Performed by: SURGERY

## 2025-06-12 PROCEDURE — 99232 SBSQ HOSP IP/OBS MODERATE 35: CPT | Performed by: INTERNAL MEDICINE

## 2025-06-12 PROCEDURE — 2500000003 HC RX 250 WO HCPCS: Performed by: NURSE PRACTITIONER

## 2025-06-12 PROCEDURE — 36415 COLL VENOUS BLD VENIPUNCTURE: CPT

## 2025-06-12 PROCEDURE — 99222 1ST HOSP IP/OBS MODERATE 55: CPT

## 2025-06-12 PROCEDURE — 97162 PT EVAL MOD COMPLEX 30 MIN: CPT

## 2025-06-12 PROCEDURE — 97112 NEUROMUSCULAR REEDUCATION: CPT

## 2025-06-12 PROCEDURE — 6360000002 HC RX W HCPCS: Performed by: NURSE PRACTITIONER

## 2025-06-12 PROCEDURE — 80048 BASIC METABOLIC PNL TOTAL CA: CPT

## 2025-06-12 PROCEDURE — 97535 SELF CARE MNGMENT TRAINING: CPT

## 2025-06-12 PROCEDURE — 94761 N-INVAS EAR/PLS OXIMETRY MLT: CPT

## 2025-06-12 PROCEDURE — 2500000003 HC RX 250 WO HCPCS: Performed by: SURGERY

## 2025-06-12 PROCEDURE — 85730 THROMBOPLASTIN TIME PARTIAL: CPT

## 2025-06-12 PROCEDURE — 2580000003 HC RX 258: Performed by: NURSE PRACTITIONER

## 2025-06-12 PROCEDURE — 97166 OT EVAL MOD COMPLEX 45 MIN: CPT

## 2025-06-12 PROCEDURE — 6370000000 HC RX 637 (ALT 250 FOR IP): Performed by: INTERNAL MEDICINE

## 2025-06-12 RX ADMIN — APIXABAN 10 MG: 5 TABLET, FILM COATED ORAL at 09:08

## 2025-06-12 RX ADMIN — SODIUM CHLORIDE 40 MG: 9 INJECTION, SOLUTION INTRAMUSCULAR; INTRAVENOUS; SUBCUTANEOUS at 09:18

## 2025-06-12 RX ADMIN — PIPERACILLIN AND TAZOBACTAM 3375 MG: 3; .375 INJECTION, POWDER, LYOPHILIZED, FOR SOLUTION INTRAVENOUS at 16:56

## 2025-06-12 RX ADMIN — SODIUM CHLORIDE, PRESERVATIVE FREE 10 ML: 5 INJECTION INTRAVENOUS at 22:36

## 2025-06-12 RX ADMIN — SODIUM CHLORIDE 40 MG: 9 INJECTION, SOLUTION INTRAMUSCULAR; INTRAVENOUS; SUBCUTANEOUS at 22:36

## 2025-06-12 RX ADMIN — OXYCODONE HYDROCHLORIDE 5 MG: 5 TABLET ORAL at 22:36

## 2025-06-12 RX ADMIN — APIXABAN 10 MG: 5 TABLET, FILM COATED ORAL at 22:36

## 2025-06-12 RX ADMIN — SODIUM CHLORIDE, PRESERVATIVE FREE 10 ML: 5 INJECTION INTRAVENOUS at 09:09

## 2025-06-12 RX ADMIN — PIPERACILLIN AND TAZOBACTAM 3375 MG: 3; .375 INJECTION, POWDER, LYOPHILIZED, FOR SOLUTION INTRAVENOUS at 01:24

## 2025-06-12 RX ADMIN — PIPERACILLIN AND TAZOBACTAM 3375 MG: 3; .375 INJECTION, POWDER, LYOPHILIZED, FOR SOLUTION INTRAVENOUS at 09:24

## 2025-06-12 ASSESSMENT — PAIN SCALES - GENERAL
PAINLEVEL_OUTOF10: 0
PAINLEVEL_OUTOF10: 5
PAINLEVEL_OUTOF10: 0
PAINLEVEL_OUTOF10: 3
PAINLEVEL_OUTOF10: 6

## 2025-06-12 ASSESSMENT — ENCOUNTER SYMPTOMS
VOMITING: 0
DIARRHEA: 0
COLOR CHANGE: 0
SHORTNESS OF BREATH: 0

## 2025-06-12 ASSESSMENT — PAIN DESCRIPTION - DESCRIPTORS: DESCRIPTORS: DISCOMFORT;CRAMPING

## 2025-06-12 ASSESSMENT — PAIN DESCRIPTION - PAIN TYPE: TYPE: CHRONIC PAIN

## 2025-06-12 ASSESSMENT — PAIN DESCRIPTION - FREQUENCY: FREQUENCY: INTERMITTENT

## 2025-06-12 ASSESSMENT — PAIN - FUNCTIONAL ASSESSMENT: PAIN_FUNCTIONAL_ASSESSMENT: PREVENTS OR INTERFERES SOME ACTIVE ACTIVITIES AND ADLS

## 2025-06-12 ASSESSMENT — PAIN DESCRIPTION - ONSET: ONSET: GRADUAL

## 2025-06-12 ASSESSMENT — PAIN DESCRIPTION - ORIENTATION: ORIENTATION: LOWER;MID

## 2025-06-12 ASSESSMENT — PAIN DESCRIPTION - LOCATION: LOCATION: BACK

## 2025-06-12 NOTE — PLAN OF CARE
Problem: Occupational Therapy - Adult  Goal: By Discharge: Performs self-care activities at highest level of function for planned discharge setting.  See evaluation for individualized goals.  Description: Occupational Therapy Goals:  Initiated 6/12/2025 to be met within 7-10 days.    1.  Patient will perform grooming with modified independence while standing at the sink for > 5 min with Good balance.   2.  Patient will perform bathing with modified independence.  3.  Patient will perform lower body dressing with modified independence for seated and standing aspects.  4.  Patient will perform toilet transfers with modified independence.  5.  Patient will perform all aspects of toileting with modified independence.  6.  Patient will participate in upper extremity therapeutic exercise/activities with supervision/set-up for 8 minutes to improve endurance and UB strength needed for ADLs    7.  Patient will utilize energy conservation techniques during functional activities with verbal cues.    PLOF: Pt lives with spouse in [place], reports being independent with ADLs and functional mobility.  Outcome: Progressing  OCCUPATIONAL THERAPY EVALUATION    Patient: Benita Pacheco (84 y.o. female)  Date: 6/12/2025  Primary Diagnosis: Pulmonary embolus, right (HCC) [I26.99]  S/P AAA repair [Z98.890, Z86.79]       Precautions: General Precautions    ASSESSMENT :  Pt is agreeable to OT, reports not being up since her hospital admission. Pt required additional time for functional mobility tasks this date. CGA to don socks due to long toe nails, using crossed LE technique. SBA to std initially with HHA, noted to be soiled with BM. Min A to change underwear and for thoroughness to perform katrin-care. SBA to maneuver to the BR for standing handwashing, cues for safety awareness. Pt returned to bed at the end of the session, all needs met.    Recommend for next OT session:  Standing ADLs    DEFICITS/IMPAIRMENTS:  Performance deficits  Supervision  Sit to Supine: Supervision  Scooting: Supervision  Transfers:   Transfer Training  Transfer Training: Yes  Sit to Stand: Stand by assistance  Stand to Sit: Stand by assistance    ADL Assessment:   Feeding: Modified independent   Grooming: Stand by assistance (standing)  UE Bathing: Supervision  LE Bathing: Stand by assistance  UE Dressing: Stand by assistance  LE Dressing: Minimal assistance  Toileting: Minimal assistance   ADL Intervention:  LB dressing - socks management CGA  Schriever/donning underwear with Min A  Toileting with Min A for thoroughness in standing  Pain:  Intensity Pre-treatment: 0/10   Intensity Post-treatment: 0/10  Scale: Numeric Rating Scale    Activity Tolerance:   Activity Tolerance: Patient Tolerated treatment well  Please refer to the flowsheet for vital signs taken during this treatment.    After treatment:   [] Patient left in no apparent distress sitting up in chair  [x] Patient left in no apparent distress in bed  [x] Call bell left within reach  [x] Nursing notified  [] Caregiver present  [x] Bed/chair alarm activated  [] No alarm    COMMUNICATION/EDUCATION:   Patient Education  Education Given To: Patient  Education Provided: Role of Therapy;Energy Conservation;Fall Prevention Strategies;Plan of Care;Transfer Training;ADL Adaptive Strategies  Education Method: Demonstration;Verbal;Teach Back  Barriers to Learning: None  Education Outcome: Verbalized understanding    Thank you for this referral.  Yemi Patricio OTR/L  Minutes: 22    Eval Complexity: Decision Making: Low Complexity

## 2025-06-12 NOTE — NURSE NAVIGATOR
Pt. Tolerated IV meds well.  Sat with pt at the edge of bed to ambulate.  Pt.did well and was able to verbalize when she wanted to lie back down.

## 2025-06-12 NOTE — PROGRESS NOTES
Bedside and Verbal shift change report given to MARGOT Diallo (oncoming nurse) by MARGOT Dill/MARGOT Carmen (offgoing nurse). Report included the following information Nurse Handoff Report, Adult Overview, Intake/Output, MAR, and Recent Results.     Bedside and Verbal shift change report given to MARGOT Dill (oncoming nurse) by MARGOT Diallo (offgoing nurse). Report included the following information Nurse Handoff Report, Adult Overview, Intake/Output, MAR, and Recent Results.

## 2025-06-12 NOTE — PROGRESS NOTES
Agree with HECTOR Chamorro,     PPI BID upon discharge  Recommend EGD 6-8  weeks  Avoid NSAIDs  No acute surgery needs- general surgery will sign off ok to advance diet as tolerated    Nadya Solomon, DO

## 2025-06-12 NOTE — FLOWSHEET NOTE
06/12/25 0800   Assessment   Charting Type Shift assessment   Reassessment Performed No change to previous assessment   Psychosocial   Psychosocial (WDL) WDL   Patient Behaviors Verbal   Neurological   Level of Consciousness 0   Orientation Level Oriented X4   Cognition Follows commands   Speech Clear   Neuro (WDL) WDL   Swallow Screening   Is the patient unable to remain alert for testing? No   Is the patient on a modified diet (thickened liquids) due to pre-existing dysphagia? (!) Yes  (clear liquids)   Is there presence of existing enteral tube feeding via the stomach or nose? No   Is there presence of head-of-bed restrictions (less than 30 degrees)? No   Is there presence of tracheotomy tube? No   Is the patient ordered nothing-by-mouth status? No   3 oz Water Swallow Screen Pass   [place] Coma Scale   Eye Opening 4   Best Verbal Response 5   Best Motor Response 6   Julio Cesar Coma Scale Score 15   HEENT (Head, Ears, Eyes, Nose, & Throat)   HEENT (WDL) WDL   Left Eye Glasses   Nose Other (comment)  (NG tube removed yesterday, skin intact)   Teeth Missing teeth   Respiratory   Respiratory Pattern Regular   Respiratory Depth Normal   Respiratory Quality/Effort Unlabored   Chest Assessment Chest expansion symmetrical   Level of Activity/Mobility 1  (PT and OT at bedside now)   Respiratory (WDL) WDL   Respiratory Interventions H.O.B. elevated   Breath Sounds   Breath Sounds Bilateral Clear   Care Plan - Respiratory Goals   Achieves optimal ventilation and oxygenation Position to facilitate oxygenation and minimize respiratory effort;Assess for changes in respiratory status   Cardiac   Cardiac (WDL) WDL   Rhythm Interpretation   Pulse 57   Cardiac Monitor   Cardiac/Telemetry Monitor On Portable telemetry pack applied   Alarm Audible Yes   Alarms Set Yes   Gastrointestinal   Abdominal (WDL) X   Abdomen Inspection Distended;Soft   Last BM (including prior to admit) 06/12/25   RUQ Bowel Sounds Active   LUQ Bowel Sounds  Active   RLQ Bowel Sounds Active   LLQ Bowel Sounds Active   GI Symptoms Nausea   Tenderness Soft;Tenderness;RUQ;LUQ   NG/OG/NJ/NE Tube Nasogastric 16 fr Right nostril   Placement Date/Time: 06/08/25 2010   Inserted by: Tiffany HUSTON RN  Insertion attempts: 1  Tube Type: Nasogastric  Tube Size (fr): 16 fr  Tube Location: Right nostril  External Catheter Length (cm): 54 cm   Surrounding Skin Clean, dry & intact;Other (Comment)  (pt. with band aid across bridge on nose)   Care Plan - Gastrointestinal Goals   Minimal or absence of nausea and vomiting Advance diet as tolerated, if ordered   Maintains adequate nutritional intake Monitor intake and output, weight and lab values   Genitourinary   Genitourinary (WDL) WDL   Flank Tenderness Right   Dysuria (Pain/Burning w/Urination) No   Difficulty Urinating/Starting Stream No   Peripheral Vascular   Peripheral Vascular (WDL) WDL   Anti-Embolism   Anti-Embolism Devices Other (Comment)  (medications. pt. is mobile with assistance)   Dual Clinician Skin Assessment   Dual Skin Assessment (4 Eyes) WDL   Second Clinical  (First and Last Name) grisel FROST   Skin Integumentary    Skin Integrity Ecchymosis  (around IV sites or attempts)   Location both arms, distal   Skin Integumentary (WDL) WDL   Wound Prevention and Protection Methods   Dressing Present  Yes   Skin Assessed Underneath Dressing This Shift Yes   Wound Offloading (Prevention Methods) Bed, pressure reduction mattress;Pillows;Repositioning   Care Plan - Skin/Tissue Integrity Goals   Skin Integrity Remains Intact Monitor for areas of redness and/or skin breakdown   Musculoskeletal   Musculoskeletal (WDL) WDL   Care Plan - Infection Goals   Absence of infection at discharge Assess and monitor for signs and symptoms of infection;Monitor lab/diagnostic results   Care Plan - Hematologic Goals   Maintains hematologic stability Monitor labs for bleeding or clotting disorders   Care Plan - Discharge Planning Goals    Discharge to home or other facility with appropriate resources Identify barriers to discharge with patient and caregiver;Arrange for needed discharge resources and transportation as appropriate   Family/Significant Other Communication   Reason , Gilberto. Visitor.   Method of Communication In Person   Handoff   Communication Given Shift Handoff   Handoff Given To Aniyah FROST and Nella FROST   Handoff Received From MARGOT Diallo   Handoff Communication At bedside   End of Shift Check Performed No

## 2025-06-12 NOTE — PROGRESS NOTES
Admit Date: 6/8/2025    Assessment    Benita Pacheco is a 84 y.o. female who was admitted with epigastric pain. She was found to have large acute pulmonary embolism and a contained perforated ulcer of the stomach with abscess measuring 10 x 2 x 3 cm on CT chest, abdomen and pelvis.     Patient Active Problem List   Diagnosis    Dyslipidemia    Spondylosis of lumbar region without myelopathy or radiculopathy    Advance directive discussed with patient    Hypovitaminosis D    Obesity (BMI 30.0-34.9)    Colon adenomas    Primary hypertension    Venous insufficiency    Hypothyroidism due to acquired atrophy of thyroid    S/P AAA repair    Osteopenia    Arthritis, degenerative    Stage 3a chronic kidney disease (HCC)    Sacroiliac pain    Pulmonary embolus, right (HCC)    Gastric ulcer with perforation (HCC)       Plan  -I have personally reviewed UGI series from 5/11/2025 demonstrating mucosal irregularities and contrast outpouching at the gastric antrum and duodenal bulb without evidence of gross contrast leak  - Full liquid diet as tolerated  - Will need EGD outpatient in a few weeks        Subjective    Overnight events: No acute overnight events. 3 bowel movements yesterday.  Denies abdominal pain, nausea, vomiting.  Tolerating clear liquid diet.    Review of Systems   Constitutional:  Negative for chills and fever.   Respiratory:  Negative for shortness of breath.    Cardiovascular:  Negative for chest pain.   Gastrointestinal:  Negative for diarrhea and vomiting.   Skin:  Negative for color change, pallor, rash and wound.   Hematological:  Negative for adenopathy. Does not bruise/bleed easily.       Objective    Physical Exam:  BP (!) 136/58   Pulse 63   Temp 98.6 °F (37 °C) (Oral)   Resp 18   Ht 1.626 m (5' 4\")   Wt 73.5 kg (162 lb)   LMP  (LMP Unknown)   SpO2 95%   BMI 27.81 kg/m²     Intake/Output Summary (Last 24 hours) at 6/12/2025 0713  Last data filed at 6/11/2025 1234  Gross per 24 hour

## 2025-06-12 NOTE — PLAN OF CARE
Problem: Discharge Planning  Goal: Discharge to home or other facility with appropriate resources  Description: Pt. Is to be discharged to home with Jun after meeting with .  Outcome: Progressing  Flowsheets (Taken 6/12/2025 0229)  Discharge to home or other facility with appropriate resources:   Identify discharge learning needs (meds, wound care, etc)   Arrange for needed discharge resources and transportation as appropriate   Identify barriers to discharge with patient and caregiver  Note: Involve patient in discharge planning   Update patient on plan of care daily such as monitoring for bleeding, transitioning off Heparin drip.      Problem: Pain  Goal: Verbalizes/displays adequate comfort level or baseline comfort level  Outcome: Progressing  Flowsheets (Taken 6/12/2025 0229)  Verbalizes/displays adequate comfort level or baseline comfort level:   Assess pain using appropriate pain scale   Encourage patient to monitor pain and request assistance   Consider cultural and social influences on pain and pain management  Note: Assess for pain  Offer to reposition to provide pain relief     Problem: Skin/Tissue Integrity  Goal: Skin integrity remains intact  Description: 1.  Monitor for areas of redness and/or skin breakdown2.  Assess vascular access sites hourly3.  Every 4-6 hours minimum:  Change oxygen saturation probe site4.  Every 4-6 hours:  If on nasal continuous positive airway pressure, respiratory therapy assess nares and determine need for appliance change or resting period  Outcome: Progressing  Flowsheets (Taken 6/12/2025 0229)  Skin Integrity Remains Intact:   Every 4-6 hours minimum:  Change oxygen saturation probe site   Monitor for areas of redness and/or skin breakdown   Turn and reposition as indicated  Note: Remind patient to turn and reposition as needed     Problem: ABCDS Injury Assessment  Goal: Absence of physical injury  Outcome: Progressing     Problem: Safety -  Adult  Goal: Free from fall injury  Outcome: Progressing  Flowsheets (Taken 6/12/2025 0229)  Free From Fall Injury:   Based on caregiver fall risk screen, instruct family/caregiver to ask for assistance with transferring infant if caregiver noted to have fall risk factors   Instruct family/caregiver on patient safety  Note: Place call light within reach  Place bed alarm on at all times  Place bed in lowest position     Problem: Gastrointestinal - Adult  Goal: Minimal or absence of nausea and vomiting  Outcome: Progressing  Flowsheets (Taken 6/12/2025 0229)  Minimal or absence of nausea and vomiting:   Administer IV fluids as ordered to ensure adequate hydration   Maintain NPO status until nausea and vomiting are resolved   Advance diet as tolerated, if ordered  Note: Monitor how patient tolerates on clear liquid diet  Monitor for n&v, adminster antiemetics as needed     Problem: Infection - Adult  Goal: Absence of infection at discharge  Outcome: Progressing  Flowsheets (Taken 6/12/2025 0229)  Absence of infection at discharge:   Assess and monitor for signs and symptoms of infection   Monitor lab/diagnostic results  Note: Monitor for fever or increase in WBC     Problem: Hematologic - Adult  Goal: Maintains hematologic stability  Outcome: Progressing  Flowsheets (Taken 6/12/2025 0229)  Maintains hematologic stability:   Monitor labs for bleeding or clotting disorders   Assess for signs and symptoms of bleeding or hemorrhage  Note: Monitor H&H while patient is on heparin drip  Notify MD when there are s/s of bleeding

## 2025-06-12 NOTE — FLOWSHEET NOTE
06/12/25 1430   Vital Signs   Temp 98.1 °F (36.7 °C)   Temp Source Oral   Pulse 59   Respirations 18   /66   MAP (Calculated) 89   BP Location Right upper arm   BP Method Automatic   Patient Position Semi fowlers   Pain Assessment   Pain Assessment None - Denies Pain   Pain Level 0   Oxygen Therapy   SpO2 96 %   O2 Device None (Room air)

## 2025-06-12 NOTE — PROGRESS NOTES
Bedside and Verbal shift change report given to Joelle RN (oncoming nurse) by Imani RN (offgoing nurse). Report included the following information Nurse Handoff Report.     1000: Pt transitioned from clear to full liquid.

## 2025-06-12 NOTE — FLOWSHEET NOTE
06/12/25 1005   Discharge Planning   Type of Residence House  (PT/OT by pts side. advised to stay on first floor.)   Living Arrangements Spouse/Significant Other   Support Systems Spouse/Significant Other;Family Members   Current Services Prior To Admission Oxygen Therapy  (Pt states, \"only when needs it\".  Walker used, \"only when needs it\".)   Potential Assistance Needed N/A   DME Ordered? No   Potential Assistance Purchasing Medications No   Type of Home Care Services None   Patient expects to be discharged to: House   One/Two Story Residence Two story, live on first floor   # of Interior Steps 7   Interior Rails Both   Lift Chair Available No   History of falls? 0

## 2025-06-12 NOTE — PLAN OF CARE
Problem: Physical Therapy - Adult  Goal: By Discharge: Performs mobility at highest level of function for planned discharge setting.  See evaluation for individualized goals.  Description: Physical Therapy Goals  Initiated 6/12/2025 and to be accomplished within 7 day(s)  1.  Patient will move from supine to sit and sit to supine  in bed with modified independence in preparation for seated tasks.    2.  Patient will transfer from bed to chair and chair to bed with modified independence using the least restrictive device in preparation for out of bed.  3.  Patient will perform sit to stand with modified independence in preparation for out of bed tasks.  4.  Patient will ambulate with modified independence for 100 feet with the least restrictive device in preparation for home setting.   5.  Patient will ascend/descend 4 stairs with R handrail with supervision/set-up in preparation for negotiation of home set-up.    PLOF: Lives in 2 story home with  and son. 4 steps to enter with R handrail. Household amb independently. Amb with cane for long distances.      Outcome: Progressing     PHYSICAL THERAPY EVALUATION    Patient: Benita Pacheco (84 y.o. female)  Date: 6/12/2025  Primary Diagnosis: Pulmonary embolus, right (HCC) [I26.99]  S/P AAA repair [Z98.890, Z86.79]       Precautions: General Precautions,  ASSESSMENT :  Evaluated for skilled PT. Received pt in supine with HOB elevated, A&O x4. Supervision for all bed mobility to sit EOB. Fair dynamic seated balance noted. SBA for sit to stand transition. Maintained standing for 4 mins during katrin care and chux pad changing. Noted symmetrical foot clearance during marching to change briefs. Good static and dynamic standing balance with increased trunk sway with LE movement. Amb 6ft SBA to bathroom to perform self care tasks. Noted increased postural sway with no loss of balance. Amb 6ft SBA back to bed. Supervision for stand to sit and bed mobility to return  AM-PAC score of 18 (14 without stairs) or greater is associated with a discharge to the patient's home setting.     SUBJECTIVE:   Patient stated “My 62th wedding anniversary is in 2 days.”    OBJECTIVE DATA SUMMARY:     Past Medical History:   Diagnosis Date    AAA (abdominal aortic aneurysm) without rupture     AAA repair (7/18) Dr Chapman; Dr Martin (5/24)    Anxiety     Bursitis     Carpal tunnel syndrome     CKD (chronic kidney disease) 2014    Colon adenomas     2003 Dr Dalal; 4/13 Dr Denise    COVID-19 vaccine dose declined 11/2023    boosters    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 Quezada, neg sprue    H/O cardiovascular stress test     NST neg (9/16)    H/O echocardiogram 12/2017    ef 55%, no wma, mild inc thickness, gr 1 dd, mild CHRISTINE    Hearing loss     LEFT Dr Wyman; sensorineural    Hypertension     Hypothyroidism 2007    Dr. 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 Morales    Venous insufficiency 2003    on dopplers    Vitamin D deficiency     Zoster 05/2016    LEFT T11-12; mild postherpetic neuralgia     Past Surgical History:   Procedure Laterality Date    ABDOMEN SURGERY      ABDOMINAL AORTIC ANEURYSM REPAIR  07/17/2018    CARPAL TUNNEL RELEASE Left

## 2025-06-13 LAB
ANION GAP SERPL CALC-SCNC: 10 MMOL/L (ref 3–18)
BASOPHILS # BLD: 0.05 K/UL (ref 0–0.1)
BASOPHILS NFR BLD: 0.5 % (ref 0–2)
BUN SERPL-MCNC: 27 MG/DL (ref 6–23)
BUN/CREAT SERPL: 28 (ref 12–20)
CALCIUM SERPL-MCNC: 8.6 MG/DL (ref 8.5–10.1)
CHLORIDE SERPL-SCNC: 103 MMOL/L (ref 98–107)
CO2 SERPL-SCNC: 24 MMOL/L (ref 21–32)
CREAT SERPL-MCNC: 0.95 MG/DL (ref 0.6–1.3)
DIFFERENTIAL METHOD BLD: ABNORMAL
EOSINOPHIL # BLD: 0.24 K/UL (ref 0–0.4)
EOSINOPHIL NFR BLD: 2.4 % (ref 0–5)
ERYTHROCYTE [DISTWIDTH] IN BLOOD BY AUTOMATED COUNT: 14.4 % (ref 11.6–14.5)
GLUCOSE SERPL-MCNC: 100 MG/DL (ref 74–108)
HCT VFR BLD AUTO: 32.5 % (ref 35–45)
HGB BLD-MCNC: 10.6 G/DL (ref 12–16)
IMM GRANULOCYTES # BLD AUTO: 0.25 K/UL (ref 0–0.04)
IMM GRANULOCYTES NFR BLD AUTO: 2.5 % (ref 0–0.5)
LYMPHOCYTES # BLD: 1.43 K/UL (ref 0.9–3.6)
LYMPHOCYTES NFR BLD: 14.2 % (ref 21–52)
MAGNESIUM SERPL-MCNC: 2.1 MG/DL (ref 1.6–2.6)
MCH RBC QN AUTO: 30.9 PG (ref 24–34)
MCHC RBC AUTO-ENTMCNC: 32.6 G/DL (ref 31–37)
MCV RBC AUTO: 94.8 FL (ref 78–100)
MONOCYTES # BLD: 0.9 K/UL (ref 0.05–1.2)
MONOCYTES NFR BLD: 8.9 % (ref 3–10)
NEUTS SEG # BLD: 7.23 K/UL (ref 1.8–8)
NEUTS SEG NFR BLD: 71.5 % (ref 40–73)
NRBC # BLD: 0.02 K/UL (ref 0–0.01)
NRBC BLD-RTO: 0.2 PER 100 WBC
PLATELET # BLD AUTO: 274 K/UL (ref 135–420)
PMV BLD AUTO: 10.4 FL (ref 9.2–11.8)
POTASSIUM SERPL-SCNC: 3 MMOL/L (ref 3.5–5.5)
POTASSIUM SERPL-SCNC: 3.7 MMOL/L (ref 3.5–5.5)
RBC # BLD AUTO: 3.43 M/UL (ref 4.2–5.3)
SODIUM SERPL-SCNC: 137 MMOL/L (ref 136–145)
WBC # BLD AUTO: 10.1 K/UL (ref 4.6–13.2)

## 2025-06-13 PROCEDURE — 84132 ASSAY OF SERUM POTASSIUM: CPT

## 2025-06-13 PROCEDURE — 6360000002 HC RX W HCPCS: Performed by: INTERNAL MEDICINE

## 2025-06-13 PROCEDURE — 2500000003 HC RX 250 WO HCPCS: Performed by: INTERNAL MEDICINE

## 2025-06-13 PROCEDURE — 6360000002 HC RX W HCPCS: Performed by: SURGERY

## 2025-06-13 PROCEDURE — 80048 BASIC METABOLIC PNL TOTAL CA: CPT

## 2025-06-13 PROCEDURE — 99232 SBSQ HOSP IP/OBS MODERATE 35: CPT | Performed by: INTERNAL MEDICINE

## 2025-06-13 PROCEDURE — 85025 COMPLETE CBC W/AUTO DIFF WBC: CPT

## 2025-06-13 PROCEDURE — 2500000003 HC RX 250 WO HCPCS: Performed by: NURSE PRACTITIONER

## 2025-06-13 PROCEDURE — 6370000000 HC RX 637 (ALT 250 FOR IP): Performed by: INTERNAL MEDICINE

## 2025-06-13 PROCEDURE — 2580000003 HC RX 258: Performed by: NURSE PRACTITIONER

## 2025-06-13 PROCEDURE — 94761 N-INVAS EAR/PLS OXIMETRY MLT: CPT

## 2025-06-13 PROCEDURE — 36415 COLL VENOUS BLD VENIPUNCTURE: CPT

## 2025-06-13 PROCEDURE — 1100000003 HC PRIVATE W/ TELEMETRY

## 2025-06-13 PROCEDURE — 83735 ASSAY OF MAGNESIUM: CPT

## 2025-06-13 PROCEDURE — 6360000002 HC RX W HCPCS: Performed by: NURSE PRACTITIONER

## 2025-06-13 PROCEDURE — 2500000003 HC RX 250 WO HCPCS: Performed by: SURGERY

## 2025-06-13 RX ORDER — MAGNESIUM SULFATE IN WATER 40 MG/ML
2000 INJECTION, SOLUTION INTRAVENOUS PRN
Status: DISCONTINUED | OUTPATIENT
Start: 2025-06-13 | End: 2025-06-14 | Stop reason: HOSPADM

## 2025-06-13 RX ORDER — DEXTROSE MONOHYDRATE, SODIUM CHLORIDE, AND POTASSIUM CHLORIDE 50; 2.98; 4.5 G/1000ML; G/1000ML; G/1000ML
INJECTION, SOLUTION INTRAVENOUS CONTINUOUS
Status: DISCONTINUED | OUTPATIENT
Start: 2025-06-13 | End: 2025-06-14

## 2025-06-13 RX ORDER — POTASSIUM CHLORIDE 29.8 MG/ML
20 INJECTION INTRAVENOUS PRN
Status: DISCONTINUED | OUTPATIENT
Start: 2025-06-13 | End: 2025-06-14 | Stop reason: HOSPADM

## 2025-06-13 RX ORDER — LEVOTHYROXINE SODIUM 75 UG/1
75 TABLET ORAL DAILY
Status: DISCONTINUED | OUTPATIENT
Start: 2025-06-13 | End: 2025-06-14 | Stop reason: HOSPADM

## 2025-06-13 RX ORDER — LISINOPRIL 10 MG/1
10 TABLET ORAL DAILY
Status: DISCONTINUED | OUTPATIENT
Start: 2025-06-13 | End: 2025-06-14 | Stop reason: HOSPADM

## 2025-06-13 RX ORDER — POTASSIUM CHLORIDE 7.45 MG/ML
10 INJECTION INTRAVENOUS PRN
Status: DISCONTINUED | OUTPATIENT
Start: 2025-06-13 | End: 2025-06-14 | Stop reason: HOSPADM

## 2025-06-13 RX ORDER — FLUCONAZOLE 200 MG/1
200 TABLET ORAL DAILY
Status: DISCONTINUED | OUTPATIENT
Start: 2025-06-13 | End: 2025-06-14 | Stop reason: HOSPADM

## 2025-06-13 RX ORDER — PANTOPRAZOLE SODIUM 40 MG/1
40 TABLET, DELAYED RELEASE ORAL
Status: DISCONTINUED | OUTPATIENT
Start: 2025-06-13 | End: 2025-06-14 | Stop reason: HOSPADM

## 2025-06-13 RX ADMIN — SODIUM CHLORIDE, PRESERVATIVE FREE 10 ML: 5 INJECTION INTRAVENOUS at 09:00

## 2025-06-13 RX ADMIN — APIXABAN 10 MG: 5 TABLET, FILM COATED ORAL at 08:30

## 2025-06-13 RX ADMIN — PANTOPRAZOLE SODIUM 40 MG: 40 TABLET, DELAYED RELEASE ORAL at 16:40

## 2025-06-13 RX ADMIN — LISINOPRIL 10 MG: 10 TABLET ORAL at 16:40

## 2025-06-13 RX ADMIN — POTASSIUM CHLORIDE 10 MEQ: 7.46 INJECTION, SOLUTION INTRAVENOUS at 09:14

## 2025-06-13 RX ADMIN — SODIUM CHLORIDE: 0.9 INJECTION, SOLUTION INTRAVENOUS at 04:27

## 2025-06-13 RX ADMIN — POTASSIUM CHLORIDE 10 MEQ: 7.46 INJECTION, SOLUTION INTRAVENOUS at 11:39

## 2025-06-13 RX ADMIN — AMOXICILLIN AND CLAVULANATE POTASSIUM 1 TABLET: 875; 125 TABLET, FILM COATED ORAL at 20:49

## 2025-06-13 RX ADMIN — PIPERACILLIN AND TAZOBACTAM 3375 MG: 3; .375 INJECTION, POWDER, LYOPHILIZED, FOR SOLUTION INTRAVENOUS at 00:43

## 2025-06-13 RX ADMIN — DEXTROSE MONOHYDRATE, SODIUM CHLORIDE, AND POTASSIUM CHLORIDE: 50; 4.5; 2.98 INJECTION, SOLUTION INTRAVENOUS at 16:46

## 2025-06-13 RX ADMIN — HYDROMORPHONE HYDROCHLORIDE 0.5 MG: 1 INJECTION, SOLUTION INTRAMUSCULAR; INTRAVENOUS; SUBCUTANEOUS at 04:26

## 2025-06-13 RX ADMIN — LEVOTHYROXINE SODIUM 75 MCG: 75 TABLET ORAL at 16:40

## 2025-06-13 RX ADMIN — POTASSIUM CHLORIDE 10 MEQ: 7.46 INJECTION, SOLUTION INTRAVENOUS at 06:22

## 2025-06-13 RX ADMIN — SODIUM CHLORIDE 40 MG: 9 INJECTION, SOLUTION INTRAMUSCULAR; INTRAVENOUS; SUBCUTANEOUS at 10:30

## 2025-06-13 RX ADMIN — APIXABAN 10 MG: 5 TABLET, FILM COATED ORAL at 20:49

## 2025-06-13 RX ADMIN — HYDRALAZINE HYDROCHLORIDE 5 MG: 20 INJECTION INTRAMUSCULAR; INTRAVENOUS at 08:22

## 2025-06-13 RX ADMIN — FLUCONAZOLE 200 MG: 200 TABLET ORAL at 16:40

## 2025-06-13 RX ADMIN — SODIUM CHLORIDE, PRESERVATIVE FREE 10 ML: 5 INJECTION INTRAVENOUS at 20:49

## 2025-06-13 RX ADMIN — POTASSIUM CHLORIDE 10 MEQ: 7.46 INJECTION, SOLUTION INTRAVENOUS at 08:03

## 2025-06-13 RX ADMIN — POTASSIUM CHLORIDE 10 MEQ: 7.46 INJECTION, SOLUTION INTRAVENOUS at 04:28

## 2025-06-13 RX ADMIN — PIPERACILLIN AND TAZOBACTAM 3375 MG: 3; .375 INJECTION, POWDER, LYOPHILIZED, FOR SOLUTION INTRAVENOUS at 08:44

## 2025-06-13 RX ADMIN — POTASSIUM CHLORIDE 10 MEQ: 7.46 INJECTION, SOLUTION INTRAVENOUS at 10:28

## 2025-06-13 ASSESSMENT — PAIN DESCRIPTION - PAIN TYPE: TYPE: CHRONIC PAIN

## 2025-06-13 ASSESSMENT — PAIN SCALES - GENERAL
PAINLEVEL_OUTOF10: 0
PAINLEVEL_OUTOF10: 0
PAINLEVEL_OUTOF10: 4
PAINLEVEL_OUTOF10: 8
PAINLEVEL_OUTOF10: 0
PAINLEVEL_OUTOF10: 4

## 2025-06-13 ASSESSMENT — PAIN DESCRIPTION - ONSET: ONSET: AWAKENED FROM SLEEP

## 2025-06-13 ASSESSMENT — PAIN - FUNCTIONAL ASSESSMENT: PAIN_FUNCTIONAL_ASSESSMENT: PREVENTS OR INTERFERES SOME ACTIVE ACTIVITIES AND ADLS

## 2025-06-13 ASSESSMENT — PAIN DESCRIPTION - ORIENTATION: ORIENTATION: POSTERIOR;DISTAL

## 2025-06-13 ASSESSMENT — PAIN DESCRIPTION - FREQUENCY: FREQUENCY: INTERMITTENT

## 2025-06-13 ASSESSMENT — PAIN DESCRIPTION - LOCATION: LOCATION: BACK;SHOULDER

## 2025-06-13 ASSESSMENT — PAIN DESCRIPTION - DESCRIPTORS: DESCRIPTORS: DISCOMFORT;SORE

## 2025-06-13 NOTE — PLAN OF CARE
Problem: Skin/Tissue Integrity  Goal: Skin integrity remains intact  Description: 1.  Monitor for areas of redness and/or skin breakdown2.  Assess vascular access sites hourly3.  Every 4-6 hours minimum:  Change oxygen saturation probe site4.  Every 4-6 hours:  If on nasal continuous positive airway pressure, respiratory therapy assess nares and determine need for appliance change or resting period  6/13/2025 0933 by Chumek, Magdaline, RN  Outcome: Progressing  Flowsheets  Taken 6/13/2025 0933  Skin Integrity Remains Intact:   Monitor for areas of redness and/or skin breakdown   Turn and reposition as indicated   Assess vascular access sites hourly   Pressure redistribution bed/mattress (bed type)  Taken 6/13/2025 0800  Skin Integrity Remains Intact: Monitor for areas of redness and/or skin breakdown  6/12/2025 2317 by Imani Hernandez, RN  Outcome: Progressing  Flowsheets (Taken 6/12/2025 2317)  Skin Integrity Remains Intact:   Every 4-6 hours minimum:  Change oxygen saturation probe site   Monitor for areas of redness and/or skin breakdown   Turn and reposition as indicated   Pressure redistribution bed/mattress (bed type)  Note: Remind patient to turn and reposition q2 hour to prevent skin breakdown     Problem: Safety - Adult  Goal: Free from fall injury  6/13/2025 0933 by Chumek, Magdaline, RN  Note: Use side rails x3   Educate pt on use of call bell   Use non skid socks   Bed at lower position   Hourly roudings w/ 5 P's          6/12/2025 2317 by Imani Hernandez, RN  Outcome: Progressing  Flowsheets (Taken 6/12/2025 2317)  Free From Fall Injury:   Instruct family/caregiver on patient safety   Based on caregiver fall risk screen, instruct family/caregiver to ask for assistance with transferring infant if caregiver noted to have fall risk factors  Note: Place bed in lowest position  Place gripper socks on  Perform hourly rounding

## 2025-06-13 NOTE — PROGRESS NOTES
Bedside and Verbal shift change report given to Aniyah RN (oncoming nurse) by Imani RN (offgoing nurse). Report included the following information Nurse Handoff Report.     1300: K+ runs complete. K+ re-check ordered.    1552: K+ repeat 3.7.    1730: Pt ambulated to bathroom with stand by assist. Tolerated well. Pt voided and had medium, dark green BM.    Bedside and Verbal shift change report given to Milly FROST (oncoming nurse) by Aniyah RN (offgoing nurse). Report included the following information Nurse Handoff Report.

## 2025-06-13 NOTE — PROGRESS NOTES
Medicine Progress Note    Patient: Benita Pacheco   Age:  84 y.o.  DOA: 6/8/2025   Admit Dx / CC: Pulmonary embolus, right (HCC) [I26.99]  S/P AAA repair [Z98.890, Z86.79]  LOS:  LOS: 5 days     Assessment/Plan   Principal Problem:    Pulmonary embolus, right (HCC)  Active Problems:    Gastric ulcer with perforation (HCC)  Resolved Problems:    * No resolved hospital problems. *      Additional Plan notes     Large Contained Perforation along the Anterior margin of the Gastric Pylorus with Intraperitoneal Air     - Upper GI Series shows no rochelle communication between Stomach and Peritoneal Cavity;    S/p  IV Piperacillin-Tazobactam (a.k.a. Zosyn), IV Pantoprazole 40 mg BID.  PRN Antiemetics, PRN Pain Control, and PRN ;    Will advance to soft diet;     Change antibiotics to p.o. Augmentin and Diflucan    Change IV to p.o. PPI twice daily    .  General Surgical services signed off on 6/12/2025.  I will follow-up in the office    Discharge Recommendations per General Surgical services:    PPI BID upon discharge  Recommend EGD 6-8  weeks  Avoid NSAIDs  No acute surgery needs- general surgery will sign off ok to advance diet as tolerated       Subserosal Perigastric Fluid Collection  - Not amenable to percutaneous drain placement, per IR  Continue conservative management, per General Surgical services.    Acute Right Lower Lobe Pulmonary Embolism (a.k.a. PE) 2°/2 Acute of Left Lower Extremity Deep Vein Thrombosis (a.k.a. DVT)  - Large, Lobar, Segmental, and Subsegmental PE  - possibly provoked by car ride to [place] last month  - LVEF 55-60% with Grade I Diastolic Dysfunction and Reduced Right Ventricular Systolic Function by Transthoracic Echocardiogram (a.k.a. TTE) on 6/09/2025      Patient transitioned to oral Apixaban (a.k.a. Eliquis) on 6/12/2025.  Recommend resumption of other oral agents if Patient tolerates diet and Apixaban until AM of 9/13/2025.    Acute Non-Occlusive Left Lower Extremity Deep Vein  apixaban (ELIQUIS) tablet 5 mg  5 mg Oral BID    nitroglycerin (NITRO-BID) 2 % ointment 0.5 inch  0.5 inch Topical Q6H PRN    hydrALAZINE (APRESOLINE) injection 5 mg  5 mg IntraVENous Q6H PRN    naloxone (NARCAN) injection 0.4 mg  0.4 mg IntraVENous PRN    ipratropium 0.5 mg-albuterol 2.5 mg (DUONEB) nebulizer solution 1 Dose  1 Dose Inhalation Q4H PRN    sodium chloride flush 0.9 % injection 5-40 mL  5-40 mL IntraVENous 2 times per day    sodium chloride flush 0.9 % injection 5-40 mL  5-40 mL IntraVENous PRN    0.9 % sodium chloride infusion   IntraVENous PRN    ondansetron (ZOFRAN-ODT) disintegrating tablet 4 mg  4 mg Oral Q8H PRN    Or    ondansetron (ZOFRAN) injection 4 mg  4 mg IntraVENous Q6H PRN    melatonin tablet 3 mg  3 mg Oral Nightly PRN    acetaminophen (TYLENOL) tablet 650 mg  650 mg Oral Q6H PRN    Or    acetaminophen (TYLENOL) suppository 650 mg  650 mg Rectal Q6H PRN    HYDROmorphone (DILAUDID) injection 0.5 mg  0.5 mg IntraVENous Q4H PRN    [Held by provider] ezetimibe (ZETIA) tablet 10 mg  10 mg Oral Daily    [Held by provider] atorvastatin (LIPITOR) tablet 80 mg  80 mg Oral Daily    morphine (PF) injection 2 mg  2 mg IntraVENous Q4H PRN    oxyCODONE (ROXICODONE) immediate release tablet 5 mg  5 mg Oral Q4H PRN       Devna Pemberton MD    June 13, 2025

## 2025-06-13 NOTE — PROGRESS NOTES
Medicine Progress Note    Patient: Benita Pacheco   Age:  84 y.o.  DOA: 6/8/2025   Admit Dx / CC: Pulmonary embolus, right (HCC) [I26.99]  S/P AAA repair [Z98.890, Z86.79]  LOS:  LOS: 4 days     Assessment/Plan   Principal Problem:    Pulmonary embolus, right (HCC)  Active Problems:    Gastric ulcer with perforation (HCC)  Resolved Problems:    * No resolved hospital problems. *      Additional Plan notes     Large Contained Perforation along the Anterior margin of the Gastric Pylorus with Intraperitoneal Air   - Nasogastric Tube (a.k.a. NG Tube) placed in [facility] ER  - Upper GI Series shows no rochelle communication between Stomach and Peritoneal Cavity  Continue IV Piperacillin-Tazobactam (a.k.a. Zosyn), IV Pantoprazole 40 mg BID.  PRN Antiemetics, PRN Pain Control, and PRN Naloxone.  Patient ordered a Clear Liquid Diet on 6/11/2025.  General Surgical services signed off on 6/12/2025.    Discharge Recommendations per General Surgical services:    PPI BID upon discharge  Recommend EGD 6-8  weeks  Avoid NSAIDs  No acute surgery needs- general surgery will sign off ok to advance diet as tolerated       Subserosal Perigastric Fluid Collection  - Not amenable to percutaneous drain placement, per IR  Continue conservative management, per General Surgical services.    Acute Right Lower Lobe Pulmonary Embolism (a.k.a. PE) 2°/2 Acute of Left Lower Extremity Deep Vein Thrombosis (a.k.a. DVT)  - Large, Lobar, Segmental, and Subsegmental PE  - possibly provoked by car ride to [place] last month  - LVEF 55-60% with Grade I Diastolic Dysfunction and Reduced Right Ventricular Systolic Function by Transthoracic Echocardiogram (a.k.a. TTE) on 6/09/2025  Patient transitioned to oral Apixaban (a.k.a. Eliquis) on 6/12/2025.  Recommend resumption of other oral agents if Patient tolerates diet and Apixaban until AM of 9/13/2025.    Acute Non-Occlusive Left Lower Extremity Deep Vein Thrombosis (a.k.a. DVT) in 1 of 2 Posterior Tibial  Patient tolerates a Regular Diet and labs are looking appropriate, consider Discharging Patient Home.      6/11/2025: Patient started on Full Liquid Diet and Nasogastric Tube (a.k.a. NG Tube) Tube Removed.      Objective:   Visit Vitals  /66   Pulse 69   Temp 98.1 °F (36.7 °C) (Oral)   Resp 18   Ht 1.626 m (5' 4\")   Wt 73.5 kg (162 lb)   SpO2 96%   BMI 27.81 kg/m²       Physical Exam:  General:  Older adult female lying in bed in no acute distress  HEENT:  Atraumatic, normocephalic; Pupils equally round; Extraocular muscles grossly intact; (+) Somewhat Moist Oropharynx  Chest:  No pectus carinatum; No pectus excavatum  Cardiovascular:  Regular rate and rhythm without rubs, gallops, or murmurs  Respiratory:  No wheezes, rales, or rhonchi; normal effort of breathing on Room Air  Abdominal:  Soft, non-tense, (+) Mildly Tender Epigastrium; BS present without guarding  :  Deferred  Extremities:  Pulses 2+ x4 without pitting edema, clubbing, or cyanosis  Musculoskeletal:  Strength 5/5 in BUE  Integument:  No rash on face, forearms, or legs  Neurological:  Alert & Ostensibly Oriented x4/4; No gross deficits of Visual Acuity, Eye Movement, Jaw Opening, Facial Expression, Hearing, Phonation, or Head Movement; No gross deficits of Tongue Movement or Slurring of Speech  Psychiatric:  Affect is appropriate; Language is present and fluent; Behavior is appropriate      Intake and Output:  Current Shift:  No intake/output data recorded.  Last three shifts:  06/11 0701 - 06/12 1900  In: 900 [P.O.:900]  Out: 50     Lab/Data Reviewed:  All labs obtained in [facility] in the last 24 hours have been reviewed.    Medications Reviewed:  Current Facility-Administered Medications   Medication Dose Route Frequency    apixaban (ELIQUIS) tablet 10 mg  10 mg Oral BID    Followed by    [START ON 6/19/2025] apixaban (ELIQUIS) tablet 5 mg  5 mg Oral BID    nitroglycerin (NITRO-BID) 2 % ointment 0.5 inch  0.5 inch Topical Q6H PRN    hydrALAZINE

## 2025-06-13 NOTE — PLAN OF CARE
Problem: Discharge Planning  Goal: Discharge to home or other facility with appropriate resources  Description: Pt. Is to be discharged to home with Jun after meeting with .  Outcome: Progressing  Flowsheets (Taken 6/12/2025 2317)  Discharge to home or other facility with appropriate resources:   Identify barriers to discharge with patient and caregiver   Arrange for needed discharge resources and transportation as appropriate   Identify discharge learning needs (meds, wound care, etc)  Note: Identify potential barriers to discharge  Advance patient's diet as tolerated       Problem: Pain  Goal: Verbalizes/displays adequate comfort level or baseline comfort level  Outcome: Progressing  Flowsheets (Taken 6/12/2025 2317)  Verbalizes/displays adequate comfort level or baseline comfort level:   Assess pain using appropriate pain scale   Encourage patient to monitor pain and request assistance  Note: Assess pain and administer medications based on pain score  Reassess pain within 1 hour after administration  Monitor blood pressure and respirations      Problem: Skin/Tissue Integrity  Goal: Skin integrity remains intact  Description: 1.  Monitor for areas of redness and/or skin breakdown2.  Assess vascular access sites hourly3.  Every 4-6 hours minimum:  Change oxygen saturation probe site4.  Every 4-6 hours:  If on nasal continuous positive airway pressure, respiratory therapy assess nares and determine need for appliance change or resting period  Outcome: Progressing  Flowsheets (Taken 6/12/2025 2317)  Skin Integrity Remains Intact:   Every 4-6 hours minimum:  Change oxygen saturation probe site   Monitor for areas of redness and/or skin breakdown   Turn and reposition as indicated   Pressure redistribution bed/mattress (bed type)  Note: Remind patient to turn and reposition q2 hour to prevent skin breakdown     Problem: Safety - Adult  Goal: Free from fall injury  Outcome:  Progressing  Flowsheets (Taken 6/12/2025 2317)  Free From Fall Injury:   Instruct family/caregiver on patient safety   Based on caregiver fall risk screen, instruct family/caregiver to ask for assistance with transferring infant if caregiver noted to have fall risk factors  Note: Place bed in lowest position  Place gripper socks on  Perform hourly rounding     Problem: Gastrointestinal - Adult  Goal: Minimal or absence of nausea and vomiting  Outcome: Progressing  Flowsheets (Taken 6/12/2025 2317)  Minimal or absence of nausea and vomiting:   Administer ordered antiemetic medications as needed   Administer IV fluids as ordered to ensure adequate hydration   Nasogastric tube to low intermittent suction as ordered  Note: Monitor tolerance of current diet, advance as patient tolerates  Monitor for N&V     Problem: Infection - Adult  Goal: Absence of infection at discharge  Outcome: Progressing  Flowsheets (Taken 6/12/2025 2317)  Absence of infection at discharge:   [place] appropriate cooling/warming therapies per order   Monitor all insertion sites i.e., indwelling lines, tubes and drains   Monitor lab/diagnostic results   Assess and monitor for signs and symptoms of infection  Note: Monitor WBC     Problem: Hematologic - Adult  Goal: Maintains hematologic stability  Outcome: Progressing  Flowsheets (Taken 6/12/2025 2317)  Maintains hematologic stability:   Administer blood products/factors as ordered   Assess for signs and symptoms of bleeding or hemorrhage  Note: Monitor hemoglobin  Transfuse if hemoglobin < 7     Problem: Physical Therapy - Adult  Goal: By Discharge: Performs mobility at highest level of function for planned discharge setting.  See evaluation for individualized goals.  Description: Physical Therapy Goals  Initiated 6/12/2025 and to be accomplished within 7 day(s)  1.  Patient will move from supine to sit and sit to supine  in bed with modified independence in preparation for seated tasks.    2.

## 2025-06-13 NOTE — CARE COORDINATION
MSW followed up with patient and confirmed that patient does NOT want HH at DC and that spouse is to transport.    BETHANY Iyer   Inpatient Case Management (ICM)   [facility]

## 2025-06-13 NOTE — PLAN OF CARE
Problem: Discharge Planning  Goal: Discharge to home or other facility with appropriate resources  Description: Pt. Is to be discharged to home with Jun after meeting with .  Outcome: Progressing  Flowsheets (Taken 6/13/2025 0800 by Chumek, Magdaline, RN)  Discharge to home or other facility with appropriate resources:   Identify barriers to discharge with patient and caregiver   Arrange for needed discharge resources and transportation as appropriate  Note: Pt will d/c home with family to transport today or tomorrow. Awaiting K+ re-check, IV abx, and if pt tolerated advanced diet.     Problem: Pain  Goal: Verbalizes/displays adequate comfort level or baseline comfort level  Outcome: Progressing  Flowsheets (Taken 6/12/2025 2317 by Imani Hernandez, RN)  Verbalizes/displays adequate comfort level or baseline comfort level:   Assess pain using appropriate pain scale   Encourage patient to monitor pain and request assistance  Note: Pt has no c/o pain. Will assess pain using numeric pain scale at least once per shift and PRN.       Problem: Skin/Tissue Integrity  Goal: Skin integrity remains intact  Description: 1.  Monitor for areas of redness and/or skin breakdown2.  Assess vascular access sites hourly3.  Every 4-6 hours minimum:  Change oxygen saturation probe site4.  Every 4-6 hours:  If on nasal continuous positive airway pressure, respiratory therapy assess nares and determine need for appliance change or resting period  6/13/2025 1443 by Aniyah Cameron, RN  Outcome: Progressing  Flowsheets (Taken 6/13/2025 0933 by Chumek, Magdaline, RN)  Skin Integrity Remains Intact:   Monitor for areas of redness and/or skin breakdown   Turn and reposition as indicated   Assess vascular access sites hourly   Pressure redistribution bed/mattress (bed type)  Note: Pt skin will be assessed Q4H and at shift handoff. Pt educated to alert staff of any signs of new skin breakdown.       Problem: Safety -

## 2025-06-14 VITALS
TEMPERATURE: 98.4 F | HEIGHT: 64 IN | BODY MASS INDEX: 27.66 KG/M2 | OXYGEN SATURATION: 94 % | HEART RATE: 77 BPM | DIASTOLIC BLOOD PRESSURE: 66 MMHG | RESPIRATION RATE: 17 BRPM | WEIGHT: 162 LBS | SYSTOLIC BLOOD PRESSURE: 142 MMHG

## 2025-06-14 LAB
ANION GAP SERPL CALC-SCNC: 10 MMOL/L (ref 3–18)
BACTERIA SPEC CULT: NORMAL
BACTERIA SPEC CULT: NORMAL
BASOPHILS # BLD: 0.06 K/UL (ref 0–0.1)
BASOPHILS NFR BLD: 0.6 % (ref 0–2)
BUN SERPL-MCNC: 16 MG/DL (ref 6–23)
BUN/CREAT SERPL: 17 (ref 12–20)
CALCIUM SERPL-MCNC: 9 MG/DL (ref 8.5–10.1)
CHLORIDE SERPL-SCNC: 105 MMOL/L (ref 98–107)
CO2 SERPL-SCNC: 22 MMOL/L (ref 21–32)
CREAT SERPL-MCNC: 0.96 MG/DL (ref 0.6–1.3)
DIFFERENTIAL METHOD BLD: ABNORMAL
EOSINOPHIL # BLD: 0.21 K/UL (ref 0–0.4)
EOSINOPHIL NFR BLD: 1.9 % (ref 0–5)
ERYTHROCYTE [DISTWIDTH] IN BLOOD BY AUTOMATED COUNT: 14.3 % (ref 11.6–14.5)
GLUCOSE SERPL-MCNC: 114 MG/DL (ref 74–108)
HCT VFR BLD AUTO: 32.7 % (ref 35–45)
HGB BLD-MCNC: 11.1 G/DL (ref 12–16)
IMM GRANULOCYTES # BLD AUTO: 0.46 K/UL (ref 0–0.04)
IMM GRANULOCYTES NFR BLD AUTO: 4.3 % (ref 0–0.5)
LYMPHOCYTES # BLD: 1.9 K/UL (ref 0.9–3.6)
LYMPHOCYTES NFR BLD: 17.6 % (ref 21–52)
MAGNESIUM SERPL-MCNC: 2 MG/DL (ref 1.6–2.6)
MCH RBC QN AUTO: 31.4 PG (ref 24–34)
MCHC RBC AUTO-ENTMCNC: 33.9 G/DL (ref 31–37)
MCV RBC AUTO: 92.6 FL (ref 78–100)
MONOCYTES # BLD: 1.04 K/UL (ref 0.05–1.2)
MONOCYTES NFR BLD: 9.6 % (ref 3–10)
NEUTS SEG # BLD: 7.14 K/UL (ref 1.8–8)
NEUTS SEG NFR BLD: 66 % (ref 40–73)
NRBC # BLD: 0 K/UL (ref 0–0.01)
NRBC BLD-RTO: 0 PER 100 WBC
PLATELET # BLD AUTO: 278 K/UL (ref 135–420)
PMV BLD AUTO: 10.3 FL (ref 9.2–11.8)
POTASSIUM SERPL-SCNC: 3.5 MMOL/L (ref 3.5–5.5)
PROCALCITONIN SERPL-MCNC: 0.18 NG/ML
RBC # BLD AUTO: 3.53 M/UL (ref 4.2–5.3)
SERVICE CMNT-IMP: NORMAL
SERVICE CMNT-IMP: NORMAL
SODIUM SERPL-SCNC: 137 MMOL/L (ref 136–145)
WBC # BLD AUTO: 10.8 K/UL (ref 4.6–13.2)

## 2025-06-14 PROCEDURE — 83735 ASSAY OF MAGNESIUM: CPT

## 2025-06-14 PROCEDURE — 84145 PROCALCITONIN (PCT): CPT

## 2025-06-14 PROCEDURE — 85025 COMPLETE CBC W/AUTO DIFF WBC: CPT

## 2025-06-14 PROCEDURE — 6370000000 HC RX 637 (ALT 250 FOR IP): Performed by: INTERNAL MEDICINE

## 2025-06-14 PROCEDURE — 36415 COLL VENOUS BLD VENIPUNCTURE: CPT

## 2025-06-14 PROCEDURE — 2500000003 HC RX 250 WO HCPCS: Performed by: INTERNAL MEDICINE

## 2025-06-14 PROCEDURE — 94761 N-INVAS EAR/PLS OXIMETRY MLT: CPT

## 2025-06-14 PROCEDURE — 6360000002 HC RX W HCPCS: Performed by: INTERNAL MEDICINE

## 2025-06-14 PROCEDURE — 99239 HOSP IP/OBS DSCHRG MGMT >30: CPT | Performed by: INTERNAL MEDICINE

## 2025-06-14 PROCEDURE — 2500000003 HC RX 250 WO HCPCS: Performed by: NURSE PRACTITIONER

## 2025-06-14 PROCEDURE — 80048 BASIC METABOLIC PNL TOTAL CA: CPT

## 2025-06-14 PROCEDURE — 97535 SELF CARE MNGMENT TRAINING: CPT

## 2025-06-14 PROCEDURE — 97530 THERAPEUTIC ACTIVITIES: CPT

## 2025-06-14 RX ORDER — PANTOPRAZOLE SODIUM 40 MG/1
40 TABLET, DELAYED RELEASE ORAL
Qty: 60 TABLET | Refills: 3 | Status: SHIPPED | OUTPATIENT
Start: 2025-06-14

## 2025-06-14 RX ORDER — ONDANSETRON 4 MG/1
4 TABLET, ORALLY DISINTEGRATING ORAL EVERY 8 HOURS PRN
Qty: 20 TABLET | Refills: 0 | Status: SHIPPED | OUTPATIENT
Start: 2025-06-14

## 2025-06-14 RX ORDER — FLUCONAZOLE 200 MG/1
200 TABLET ORAL DAILY
Qty: 12 TABLET | Refills: 0 | Status: SHIPPED | OUTPATIENT
Start: 2025-06-15 | End: 2025-06-27

## 2025-06-14 RX ORDER — CARVEDILOL 6.25 MG/1
6.25 TABLET ORAL 2 TIMES DAILY WITH MEALS
Status: DISCONTINUED | OUTPATIENT
Start: 2025-06-14 | End: 2025-06-14 | Stop reason: HOSPADM

## 2025-06-14 RX ORDER — OXYCODONE HYDROCHLORIDE 5 MG/1
5 TABLET ORAL EVERY 8 HOURS PRN
Qty: 10 TABLET | Refills: 0 | Status: SHIPPED | OUTPATIENT
Start: 2025-06-14 | End: 2025-06-17

## 2025-06-14 RX ORDER — CARVEDILOL 6.25 MG/1
6.25 TABLET ORAL 2 TIMES DAILY WITH MEALS
Qty: 60 TABLET | Refills: 3 | Status: SHIPPED | OUTPATIENT
Start: 2025-06-14

## 2025-06-14 RX ADMIN — APIXABAN 10 MG: 5 TABLET, FILM COATED ORAL at 08:47

## 2025-06-14 RX ADMIN — NITROGLYCERIN 0.5 INCH: 20 OINTMENT TOPICAL at 03:36

## 2025-06-14 RX ADMIN — POTASSIUM CHLORIDE 10 MEQ: 7.46 INJECTION, SOLUTION INTRAVENOUS at 10:45

## 2025-06-14 RX ADMIN — PANTOPRAZOLE SODIUM 40 MG: 40 TABLET, DELAYED RELEASE ORAL at 17:19

## 2025-06-14 RX ADMIN — POTASSIUM CHLORIDE 10 MEQ: 7.46 INJECTION, SOLUTION INTRAVENOUS at 07:01

## 2025-06-14 RX ADMIN — CARVEDILOL 6.25 MG: 6.25 TABLET, FILM COATED ORAL at 17:19

## 2025-06-14 RX ADMIN — POTASSIUM CHLORIDE 10 MEQ: 7.46 INJECTION, SOLUTION INTRAVENOUS at 08:58

## 2025-06-14 RX ADMIN — PANTOPRAZOLE SODIUM 40 MG: 40 TABLET, DELAYED RELEASE ORAL at 05:45

## 2025-06-14 RX ADMIN — LEVOTHYROXINE SODIUM 75 MCG: 75 TABLET ORAL at 05:45

## 2025-06-14 RX ADMIN — DEXTROSE MONOHYDRATE, SODIUM CHLORIDE, AND POTASSIUM CHLORIDE: 50; 4.5; 2.98 INJECTION, SOLUTION INTRAVENOUS at 05:49

## 2025-06-14 RX ADMIN — HYDRALAZINE HYDROCHLORIDE 5 MG: 20 INJECTION INTRAMUSCULAR; INTRAVENOUS at 13:07

## 2025-06-14 RX ADMIN — AMOXICILLIN AND CLAVULANATE POTASSIUM 1 TABLET: 875; 125 TABLET, FILM COATED ORAL at 08:47

## 2025-06-14 RX ADMIN — FLUCONAZOLE 200 MG: 200 TABLET ORAL at 08:47

## 2025-06-14 RX ADMIN — POTASSIUM CHLORIDE 10 MEQ: 7.46 INJECTION, SOLUTION INTRAVENOUS at 05:48

## 2025-06-14 RX ADMIN — SODIUM CHLORIDE, PRESERVATIVE FREE 10 ML: 5 INJECTION INTRAVENOUS at 08:47

## 2025-06-14 RX ADMIN — LISINOPRIL 10 MG: 10 TABLET ORAL at 08:47

## 2025-06-14 ASSESSMENT — PAIN SCALES - GENERAL
PAINLEVEL_OUTOF10: 0

## 2025-06-14 NOTE — PLAN OF CARE
Problem: Discharge Planning  Goal: Discharge to home or other facility with appropriate resources  Description: Pt. Is to be discharged to home with Jun after meeting with .  Outcome: Progressing  Flowsheets (Taken 6/14/2025 0852)  Discharge to home or other facility with appropriate resources: Identify barriers to discharge with patient and caregiver     Problem: Pain  Goal: Verbalizes/displays adequate comfort level or baseline comfort level  Outcome: Progressing     Problem: Skin/Tissue Integrity  Goal: Skin integrity remains intact  Description: 1.  Monitor for areas of redness and/or skin breakdown2.  Assess vascular access sites hourly3.  Every 4-6 hours minimum:  Change oxygen saturation probe site4.  Every 4-6 hours:  If on nasal continuous positive airway pressure, respiratory therapy assess nares and determine need for appliance change or resting period  Outcome: Progressing  Flowsheets (Taken 6/14/2025 0852)  Skin Integrity Remains Intact:   Monitor for areas of redness and/or skin breakdown   Assess vascular access sites hourly   Turn and reposition as indicated     Problem: ABCDS Injury Assessment  Goal: Absence of physical injury  Outcome: Progressing     Problem: Safety - Adult  Goal: Free from fall injury  Outcome: Progressing     Problem: Gastrointestinal - Adult  Goal: Minimal or absence of nausea and vomiting  Outcome: Progressing     Problem: Infection - Adult  Goal: Absence of infection at discharge  Outcome: Progressing  Flowsheets (Taken 6/14/2025 0852)  Absence of infection at discharge:   Assess and monitor for signs and symptoms of infection   Monitor lab/diagnostic results   Monitor all insertion sites i.e., indwelling lines, tubes and drains     Problem: Hematologic - Adult  Goal: Maintains hematologic stability  Outcome: Progressing  Flowsheets (Taken 6/14/2025 0852)  Maintains hematologic stability:   Assess for signs and symptoms of bleeding or hemorrhage

## 2025-06-14 NOTE — PROGRESS NOTES
1515:Bedside and Verbal shift change report given to MARGOT Negrete (oncoming nurse) by MARGOT Eugene (offgoing nurse). Report included the following information Nurse Handoff Report, Adult Overview, and MAR.       Pulse oximetry assessment   96% at rest on room air (if 88% or less, skip next steps)  94% while ambulating on room air  96% at rest on 0LPM  94% while ambulating on 0LPM      1742: AVS reviewed with patient they have no further questions at this time. Patient will be discharged.

## 2025-06-14 NOTE — DISCHARGE SUMMARY
Hospitalist Discharge Summary     Patient ID:  Benita Pacheco  [id number]  84 y.o.  1940    PCP on record: Herbie Barry MD    Admit date: 6/8/2025  Discharge date and time: 6/14/2025    Discharge Diagnoses:                                             Large Contained Perforation along the Anterior margin of the Gastric Pylorus with Intraperitoneal Air      - Upper GI Series shows no rochelle communication between Stomach and Peritoneal Cavity;     S/p  IV Piperacillin-Tazobactam (a.k.a. Zosyn), IV Pantoprazole 40 mg BID.  PRN Antiemetics, PRN Pain Control, and PRN ;     Patient tolerating soft diet;      Continue p.o. Augmentin and Diflucan     Change IV to p.o. PPI twice daily     .  General Surgical services signed off on 6/12/2025.  I will follow-up in the office     Discharge Recommendations per General Surgical services:    PPI BID upon discharge  Recommend EGD 6-8  weeks  Avoid NSAIDs  No acute surgery needs- general surgery will sign off ok to advance diet as tolerated         Subserosal Perigastric Fluid Collection  - Not amenable to percutaneous drain placement, per IR  Continue conservative management, per General Surgical services.     Acute Right Lower Lobe Pulmonary Embolism (a.k.a. PE) 2°/2 Acute of Left Lower Extremity Deep Vein Thrombosis (a.k.a. DVT)  - Large, Lobar, Segmental, and Subsegmental PE  - possibly provoked by car ride to [place] last month  - LVEF 55-60% with Grade I Diastolic Dysfunction and Reduced Right Ventricular Systolic Function by Transthoracic Echocardiogram (a.k.a. TTE) on 6/09/2025        Patient transitioned to oral Apixaban (a.k.a. Eliquis) on 6/12/2025.       Acute Non-Occlusive Left Lower Extremity Deep Vein Thrombosis (a.k.a. DVT) in 1 of 2 Posterior Tibial and 1 of 2 Peroneal Veins  See #3.     Hypokalemia replaced;      Elevated Troponin (RESOLVED)  - Troponin-T 13.9 ng/L to 14.7 ng/L to 13.2 ng/L     Type 2 Endoleak; History of (a.k.a.  ROXICODONE  Take 1 tablet by mouth every 8 hours as needed for Pain for up to 3 days. Max Daily Amount: 15 mg     pantoprazole 40 MG tablet  Commonly known as: PROTONIX  Take 1 tablet by mouth 2 times daily (before meals)            CHANGE how you take these medications      acetaminophen 500 MG tablet  Commonly known as: TYLENOL  Take 1 tablet by mouth 4 times daily as needed for Pain  What changed: Another medication with the same name was removed. Continue taking this medication, and follow the directions you see here.            CONTINUE taking these medications      atorvastatin 80 MG tablet  Commonly known as: LIPITOR  Take 1 tablet by mouth daily     Clobetasol Propionate 0.05 % Sham     ezetimibe 10 MG tablet  Commonly known as: ZETIA  Take 1 tablet by mouth daily     levothyroxine 75 MCG tablet  Commonly known as: SYNTHROID  Take 1 tablet by mouth Daily ceived the following from [facility]: Outside name: Synthroid 75 mcg tablet     lisinopril 10 MG tablet  Commonly known as: PRINIVIL;ZESTRIL  Take 1 tablet by mouth daily     methocarbamol 750 MG tablet  Commonly known as: ROBAXIN  Take 1 tablet by mouth 4 times daily as needed (back pain)     naloxone 4 MG/0.1ML Liqd nasal spray  Commonly known as: Narcan  1 spray by Nasal route as needed for Opioid Reversal            STOP taking these medications      diclofenac 1.3 % Ptch patch  Commonly known as: FLECTOR     doxycycline hyclate 100 MG tablet  Commonly known as: VIBRA-TABS     ergocalciferol 1.25 MG (85548 UT) capsule  Commonly known as: ERGOCALCIFEROL     ibuprofen 600 MG tablet  Commonly known as: ADVIL;MOTRIN     lidocaine 5 %  Commonly known as: LIDODERM     tolterodine 2 MG extended release capsule  Commonly known as: DETROL LA     triamcinolone 0.1 % cream  Commonly known as: KENALOG               Where to Get Your Medications        These medications were sent to [id number] - [place], [place] - [address]  [phone] - F [phone]  [address] [address]      Phone: [phone]   amoxicillin-clavulanate 875-125 MG per tablet  apixaban 5 MG Tabs tablet  fluconazole 200 MG tablet  ondansetron 4 MG disintegrating tablet  oxyCODONE 5 MG immediate release tablet  pantoprazole 40 MG tablet             My Recommended Diet, Activity, Wound Care, and follow-up labs are listed in the patient's Discharge Insturctions which I have personally completed and reviewed.      Disposition:     [] Home with family     [x]  PT/RN   [] SNF/NH   [] Inpatient Rehab/AUDELIA  Condition at Discharge:  Stable    Follow up with:   PCP : Herbie Barry MD        >30 minutes spent coordinating this discharge (review instructions/follow-up, prescriptions, preparing report for sign off)    Signed:  Devan Pemberton MD  6/14/2025  4:08 PM

## 2025-06-14 NOTE — DISCHARGE INSTRUCTIONS
Continue a GI bland low-fat low residual diet.;    If your abdominal pain gets worse or starts spiking fevers or if you develop nausea vomiting come back to the ED

## 2025-06-14 NOTE — PLAN OF CARE
Problem: Discharge Planning  Goal: Discharge to home or other facility with appropriate resources  Description: Pt. Is to be discharged to home with Jun after meeting with .  6/14/2025 1400 by Jabier Kraus RN  Outcome: Progressing  6/14/2025 1359 by Jabier Kraus RN  Outcome: Progressing  Flowsheets (Taken 6/14/2025 0852)  Discharge to home or other facility with appropriate resources: Identify barriers to discharge with patient and caregiver     Problem: Pain  Goal: Verbalizes/displays adequate comfort level or baseline comfort level  6/14/2025 1400 by Jabier Kraus RN  Outcome: Progressing  6/14/2025 1359 by Jabier Kraus RN  Outcome: Progressing     Problem: Skin/Tissue Integrity  Goal: Skin integrity remains intact  Description: 1.  Monitor for areas of redness and/or skin breakdown2.  Assess vascular access sites hourly3.  Every 4-6 hours minimum:  Change oxygen saturation probe site4.  Every 4-6 hours:  If on nasal continuous positive airway pressure, respiratory therapy assess nares and determine need for appliance change or resting period  6/14/2025 1400 by Jabier Kraus RN  Outcome: Progressing  6/14/2025 1359 by Jabier Kraus RN  Outcome: Progressing  Flowsheets (Taken 6/14/2025 0852)  Skin Integrity Remains Intact:   Monitor for areas of redness and/or skin breakdown   Assess vascular access sites hourly   Turn and reposition as indicated     Problem: ABCDS Injury Assessment  Goal: Absence of physical injury  6/14/2025 1400 by Jabier Kraus RN  Outcome: Progressing  6/14/2025 1359 by Jabier Kraus RN  Outcome: Progressing     Problem: Safety - Adult  Goal: Free from fall injury  6/14/2025 1400 by Jabier Kraus RN  Outcome: Progressing  6/14/2025 1359 by Jabier Kraus RN  Outcome: Progressing     Problem: Gastrointestinal - Adult  Goal: Minimal or absence of nausea and vomiting  6/14/2025 1400 by Nayana

## 2025-06-14 NOTE — PLAN OF CARE
Problem: Occupational Therapy - Adult  Goal: By Discharge: Performs self-care activities at highest level of function for planned discharge setting.  See evaluation for individualized goals.  Description: Occupational Therapy Goals:  Initiated 6/12/2025 to be met within 7-10 days.    1.  Patient will perform grooming with modified independence while standing at the sink for > 5 min with Good balance.   2.  Patient will perform bathing with modified independence.  3.  Patient will perform lower body dressing with modified independence for seated and standing aspects.  4.  Patient will perform toilet transfers with modified independence.  5.  Patient will perform all aspects of toileting with modified independence.  6.  Patient will participate in upper extremity therapeutic exercise/activities with supervision/set-up for 8 minutes to improve endurance and UB strength needed for ADLs    7.  Patient will utilize energy conservation techniques during functional activities with verbal cues.    PLOF: Pt lives with spouse in [place], reports being independent with ADLs and functional mobility.  Outcome: Progressing   OCCUPATIONAL THERAPY TREATMENT    Patient: Benita Pacheco (84 y.o. female)  Date: 6/14/2025  Diagnosis: Pulmonary embolus, right (HCC) [I26.99]  S/P AAA repair [Z98.890, Z86.79] Pulmonary embolus, right (HCC)      Precautions: General Precautions,  ,  ,  ,  ,  ,  ,      Chart, occupational therapy assessment, plan of care, and goals were reviewed.  ASSESSMENT:  Pt presented supine in bed upon entry and agreeable. Pt has no c/o chest pains at this time. BP assessed at 168/90 HR 76 bpm. She was able to transition to EOB Supervision in prep for functional tasks. Once sitting, she demo G balance and was able to luz maria her socks SBA w/ leg cross method. STS transfer SBA with support. She maneuvered into BR SBA without AD and stood at sink side. Pt tolerated standing ~ 2 mins to perform oral hygiene w/

## 2025-06-14 NOTE — PROGRESS NOTES
Physician Progress Note      PATIENT:               JONATHAN LOCO  CSN #:                  [id number]  :                       1940  ADMIT DATE:       2025 3:32 PM  DISCH DATE:  RESPONDING  PROVIDER #:        Carmela Ledezma NP          QUERY TEXT:    Acute Kidney Injury is documented in the Internal Medicine H&P by Carmela Ledezma ACNP at 2025 with 1.39, 1.31).  Please provide additional   clinical indicators supportive of your documentation. Or please document if   the diagnosis of ALEXANDRE has been ruled out after study.    The clinical indicators include:  - 84-year-old female, CKD stage 3a, HTN, dehydration    - \" Continue fluid resuscitation for ALEXANDRE, Lab's notable for BMP with   creatinine of 1.39 (baseline 1.13)\" (Internal Medicine H&P by Carmela Ledezma ACNP at 2025)    - On  - Creatinine - 1.39, BUN - 26, GFR - 37  - On  - Creatinine - 1.31, BUN - 28, GFR - 40      - 0.9 % sodium chloride infusion, serial labs    Thank you,  Tushar Dillon, [facility], S  Options provided:  -- Acute kidney injury evidenced by, Please document evidence as well as a   numerical baseline creatinine, if known.  -- CKD stage 3A without ALEXANDRE  -- Other - I will add my own diagnosis  -- Disagree - Not applicable / Not valid  -- Disagree - Clinically unable to determine / Unknown  -- Refer to Clinical Documentation Reviewer    PROVIDER RESPONSE TEXT:    Risk for renal injury likely due to dehydration    Query created by: Tushar Dillon on 6/10/2025 7:04 AM      Electronically signed by:  Carmela Ledezma NP 2025 10:23 PM

## 2025-06-14 NOTE — CARE COORDINATION
Discharge order noted for today. Orders received. No needs identified at this time. Case management remains available as needed.  Spouse to transport patient home at time of discharge.    Nneka WATTN,RN, [facility]  Case Management  [phone]

## 2025-06-16 ENCOUNTER — CARE COORDINATION (OUTPATIENT)
Facility: CLINIC | Age: 85
End: 2025-06-16

## 2025-06-16 DIAGNOSIS — I26.99 PULMONARY EMBOLUS, RIGHT (HCC): Primary | ICD-10-CM

## 2025-06-16 PROCEDURE — 1111F DSCHRG MED/CURRENT MED MERGE: CPT | Performed by: INTERNAL MEDICINE

## 2025-06-16 NOTE — CARE COORDINATION
Called patient to discuss results for hepatoma results. 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 We will follow up with him as planned. Called and left voicemail for patient asking him to call us back to discuss.    Care Transitions Note    Initial Call - Call within 2 business days of discharge: Yes    Patient Current Location:  Home: [address] [place] [address]   [facility] [address]    Care Transition Nurse contacted the patient by telephone to perform post hospital discharge assessment, verified name and  as identifiers.  Provided introduction to self, and explanation of the Care Transition Nurse role.    Patient: Benita Pacheco    Patient : 1940   MRN: [id number]    Reason for Admission: Pulmonary embolus, right   Discharge Date: 25  RURS: Readmission Risk Score: 9.2      Last Discharge Facility       Date Complaint Diagnosis Description Type Department Provider    25 Back Pain; Vomiting Acute gastric ulcer with perforation (HCC) ... ED to Hosp-Admission (Discharged) (ADMITTED) [id number] Laws MD; ANJEL Koenig.            Was this an external facility discharge? No    Additional needs identified to be addressed with provider   No needs identified             Method of communication with provider: none.    Patients top risk factors for readmission: recent hospital admission.     Interventions to address risk factors:   Continue to take all medications as prescribed. Call CTN, PCP or specialist office for any questions, concerns and/or needs.      Care Summary Note:     Patient reports that she is feeling better.   No new or worsening of symptoms as per Pt.   Pt. Denied CP, SOB, fever, chills, and S/S of bleeding at this time.    Patient reports that she  spoke with Dr. Barry's  nurse about home health Patient states she is now waiting a call back from Dr. Barry office.   Patient states that she will schedule her own PCP appt.     Patient reminded that there is a physician on call 24 hours a day / 7 days a week (M-F 5pm to 8am and from Friday 5pm until Monday 8a for the weekend) should the patient have questions or concerns.  Patient reminded to call 911 if situation is emergent ( such as chest

## 2025-06-20 ENCOUNTER — CARE COORDINATION (OUTPATIENT)
Facility: CLINIC | Age: 85
End: 2025-06-20

## 2025-06-20 NOTE — CARE COORDINATION
Care Transitions Note    Follow Up Call     Attempted to reach patient for transitions of care follow up.  Unable to reach patient.      Outreach Attempts:   HIPAA compliant voicemail left for patient.     Care Summary Note:     Follow Up Appointment:   Future Appointments         Provider Specialty Dept Phone    6/24/2025 10:00 AM Herbie Barry MD Internal Medicine [phone]            Plan for follow-up call in 6-10 days based on severity of symptoms and risk factors. Plan for next call: follow up on symptoms, completion of antibiotic, follow up home health assess for any needs.     Elda Ladd RN

## 2025-06-24 ENCOUNTER — OFFICE VISIT (OUTPATIENT)
Facility: CLINIC | Age: 85
End: 2025-06-24

## 2025-06-24 VITALS
SYSTOLIC BLOOD PRESSURE: 97 MMHG | HEART RATE: 72 BPM | OXYGEN SATURATION: 95 % | RESPIRATION RATE: 16 BRPM | HEIGHT: 64 IN | TEMPERATURE: 97.9 F | DIASTOLIC BLOOD PRESSURE: 67 MMHG | WEIGHT: 160 LBS | BODY MASS INDEX: 27.31 KG/M2

## 2025-06-24 DIAGNOSIS — N18.31 STAGE 3A CHRONIC KIDNEY DISEASE (HCC): ICD-10-CM

## 2025-06-24 DIAGNOSIS — I26.99 ACUTE PULMONARY EMBOLISM WITHOUT ACUTE COR PULMONALE, UNSPECIFIED PULMONARY EMBOLISM TYPE (HCC): ICD-10-CM

## 2025-06-24 DIAGNOSIS — K25.1 ACUTE GASTRIC ULCER WITH PERFORATION (HCC): Primary | ICD-10-CM

## 2025-06-24 DIAGNOSIS — I82.4Y2 ACUTE DEEP VEIN THROMBOSIS (DVT) OF PROXIMAL VEIN OF LEFT LOWER EXTREMITY (HCC): ICD-10-CM

## 2025-06-24 PROBLEM — K27.9 PUD (PEPTIC ULCER DISEASE): Status: ACTIVE | Noted: 2025-06-01

## 2025-06-24 NOTE — PROGRESS NOTES
Benita Pacheco presents today for   Chief Complaint   Patient presents with    Follow-Up from Hospital     06/08-14 [facility]: acute gastric ulcer with perforation     Accompanied by spouse.    \"Have you been to the ER, urgent care clinic since your last visit?  Hospitalized since your last visit?\"    YES - When: approximately 10 days ago.  Where and Why: [facility]: acute gastric ulcer with perforation.    “Have you seen or consulted any other health care providers outside of [facility] since your last visit?”    NO

## 2025-06-25 NOTE — PROGRESS NOTES
Patient being seen today for transitional care management.   was present for the evaluation    Patient was admitted to [facility] from 6/8-14/25              Initial interactive contact was done by nurse navigator     I thoroughly reviewed the discharge summary, notes, consults, labs and imaging studies in the electronic record.  Pertinent details are summarized below and the record has been updated to reflect recent events    They recently came back from [place].  [facility] and treated with doxy.  She came back to the ER day of admission c/o abd pain.  CTA showed right sided PE along with gastric pud w contained perforation.  She was admitted and started on abx and blood thinners. Seen by Dr Solomon/gen surg who advocated conservative tx.  Also seen by IR but her perigastric fluid collection was not felt amenable to perc drain placement.  Subsequent UGIm showed no contrast leak so sent home on bid ppi with plans for outpt egd by GI.  Echo done as below, dvt study+ LLE, she was changed to DOAC without problem.  She's felt fine since [place]. Of note, she was using nsaid for her arthritis on reguilar basis    Past Medical History:   Diagnosis Date    AAA (abdominal aortic aneurysm) without rupture     AAA repair (7/18) Dr Chapman; Dr Martin (5/24)    Anxiety     Bursitis     Carpal tunnel syndrome     CKD (chronic kidney disease) 2014    Colon adenomas     2003 Dr Dalal; 4/13 Dr Denise    COVID-19 vaccine dose declined 11/2023    boosters    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 Quezada, neg sprue    H/O cardiovascular stress test     NST neg (9/16)    H/O echocardiogram 12/2017    ef 55%, no wma, mild inc thickness, gr 1 dd, mild CHRISTINE (12/17); ef 55%, no wma,

## 2025-06-27 ENCOUNTER — CARE COORDINATION (OUTPATIENT)
Facility: CLINIC | Age: 85
End: 2025-06-27

## 2025-06-27 NOTE — CARE COORDINATION
Care Transitions Note    Follow Up Call     Patient Current Location:  Home: [address] [place] [address]   [place] [address]    LPN Care Coordinator contacted the patient by telephone. Verified name and  as identifiers.    Additional needs identified to be addressed with provider   No needs identified                 Method of communication with provider: none.      Care Summary Note:   Spoke with patient.  Patient states she is doing a lot better.  Patient followed up with pcp on 25.\patient has no questions or concerns.    Advance Care Planning:   Does patient have an Advance Directive: Not on file; patient encouraged to bring existing ACP documents to a [facility] facility. Patient states pcp already has a copy.    Medication Review:  No changes since last call.     Remote Patient Monitoring:  Offered patient enrollment in the Remote Patient Monitoring (RPM) program for in-home monitoring: Yes, but did not enroll at this time: controlled chronic disease management.    Assessments:  Care Transitions Subsequent and Final Call    Subsequent and Final Calls  Care Transitions Interventions  Other Interventions:              Follow Up Appointment:   Reviewed upcoming appointment(s).  Future Appointments         Provider Specialty Dept Phone    2025 8:00 AM [facility] LAB VISIT Internal Medicine [phone]    2025 9:00 AM Herbie Barry MD Internal Medicine [phone]            LPN Care Coordinator provided contact information.  Plan for follow-up call in 6-10 days based on severity of symptoms and risk factors.  Plan for next call: symptom management-assess for GI red flags  referrals-follow up on GI referral     Viri Pride LPN

## 2025-07-05 ENCOUNTER — APPOINTMENT (OUTPATIENT)
Facility: HOSPITAL | Age: 85
End: 2025-07-05
Payer: MEDICARE

## 2025-07-05 ENCOUNTER — HOSPITAL ENCOUNTER (EMERGENCY)
Facility: HOSPITAL | Age: 85
Discharge: HOME | End: 2025-07-06
Attending: STUDENT IN AN ORGANIZED HEALTH CARE EDUCATION/TRAINING PROGRAM
Payer: MEDICARE

## 2025-07-05 DIAGNOSIS — R53.83 OTHER FATIGUE: Primary | ICD-10-CM

## 2025-07-05 DIAGNOSIS — K25.9 GASTRIC ULCER WITHOUT HEMORRHAGE OR PERFORATION, UNSPECIFIED CHRONICITY: ICD-10-CM

## 2025-07-05 DIAGNOSIS — K86.1 CHRONIC PANCREATITIS, UNSPECIFIED PANCREATITIS TYPE (HCC): ICD-10-CM

## 2025-07-05 DIAGNOSIS — R63.0 POOR APPETITE: ICD-10-CM

## 2025-07-05 LAB
ALBUMIN SERPL-MCNC: 3.3 G/DL (ref 3.4–5)
ALBUMIN/GLOB SERPL: 1 (ref 0.8–1.7)
ALP SERPL-CCNC: 130 U/L (ref 45–117)
ALT SERPL-CCNC: 18 U/L (ref 10–35)
ANION GAP SERPL CALC-SCNC: 13 MMOL/L (ref 3–18)
APPEARANCE UR: CLEAR
AST SERPL-CCNC: 31 U/L (ref 10–38)
BASOPHILS # BLD: 0.04 K/UL (ref 0–0.1)
BASOPHILS NFR BLD: 0.6 % (ref 0–2)
BILIRUB DIRECT SERPL-MCNC: 0.2 MG/DL (ref 0–0.2)
BILIRUB SERPL-MCNC: 0.5 MG/DL (ref 0.2–1)
BILIRUB UR QL: NEGATIVE
BUN SERPL-MCNC: 15 MG/DL (ref 6–23)
BUN/CREAT SERPL: 12 (ref 12–20)
CALCIUM SERPL-MCNC: 9.9 MG/DL (ref 8.5–10.1)
CHLORIDE SERPL-SCNC: 102 MMOL/L (ref 98–107)
CO2 SERPL-SCNC: 23 MMOL/L (ref 21–32)
COLOR UR: YELLOW
CREAT SERPL-MCNC: 1.21 MG/DL (ref 0.6–1.3)
DIFFERENTIAL METHOD BLD: ABNORMAL
EOSINOPHIL # BLD: 0.16 K/UL (ref 0–0.4)
EOSINOPHIL NFR BLD: 2.5 % (ref 0–5)
ERYTHROCYTE [DISTWIDTH] IN BLOOD BY AUTOMATED COUNT: 14.8 % (ref 11.6–14.5)
FLUAV RNA SPEC QL NAA+PROBE: NOT DETECTED
FLUBV RNA SPEC QL NAA+PROBE: NOT DETECTED
GLOBULIN SER CALC-MCNC: 3.3 G/DL (ref 2–4)
GLUCOSE SERPL-MCNC: 100 MG/DL (ref 74–108)
GLUCOSE UR STRIP.AUTO-MCNC: NEGATIVE MG/DL
HCT VFR BLD AUTO: 37.9 % (ref 35–45)
HGB BLD-MCNC: 12.1 G/DL (ref 12–16)
HGB UR QL STRIP: NEGATIVE
IMM GRANULOCYTES # BLD AUTO: 0.03 K/UL (ref 0–0.04)
IMM GRANULOCYTES NFR BLD AUTO: 0.5 % (ref 0–0.5)
KETONES UR QL STRIP.AUTO: ABNORMAL MG/DL
LACTATE BLD-SCNC: 0.97 MMOL/L (ref 0.4–2)
LEUKOCYTE ESTERASE UR QL STRIP.AUTO: NEGATIVE
LYMPHOCYTES # BLD: 2.02 K/UL (ref 0.9–3.6)
LYMPHOCYTES NFR BLD: 31.4 % (ref 21–52)
MAGNESIUM SERPL-MCNC: 2.1 MG/DL (ref 1.6–2.6)
MCH RBC QN AUTO: 30.7 PG (ref 24–34)
MCHC RBC AUTO-ENTMCNC: 31.9 G/DL (ref 31–37)
MCV RBC AUTO: 96.2 FL (ref 78–100)
MONOCYTES # BLD: 0.55 K/UL (ref 0.05–1.2)
MONOCYTES NFR BLD: 8.6 % (ref 3–10)
NEUTS SEG # BLD: 3.63 K/UL (ref 1.8–8)
NEUTS SEG NFR BLD: 56.4 % (ref 40–73)
NITRITE UR QL STRIP.AUTO: NEGATIVE
NRBC # BLD: 0 K/UL (ref 0–0.01)
NRBC BLD-RTO: 0 PER 100 WBC
NT PRO BNP: 408 PG/ML (ref 36–1800)
PH UR STRIP: 6 (ref 5–8)
PLATELET # BLD AUTO: 208 K/UL (ref 135–420)
PMV BLD AUTO: 9.9 FL (ref 9.2–11.8)
POTASSIUM SERPL-SCNC: 4 MMOL/L (ref 3.5–5.5)
PROT SERPL-MCNC: 6.6 G/DL (ref 6.4–8.2)
PROT UR STRIP-MCNC: NEGATIVE MG/DL
RBC # BLD AUTO: 3.94 M/UL (ref 4.2–5.3)
SARS-COV-2 RNA RESP QL NAA+PROBE: NOT DETECTED
SODIUM SERPL-SCNC: 139 MMOL/L (ref 136–145)
SOURCE: NORMAL
SP GR UR REFRACTOMETRY: 1.02 (ref 1–1.03)
T4 FREE SERPL-MCNC: 1.2 NG/DL (ref 0.9–1.7)
TROPONIN T SERPL HS-MCNC: 13.5 NG/L (ref 0–14)
TROPONIN T SERPL HS-MCNC: 13.7 NG/L (ref 0–14)
TSH, 3RD GENERATION: 8.85 UIU/ML (ref 0.27–4.2)
UROBILINOGEN UR QL STRIP.AUTO: 1 EU/DL (ref 0.2–1)
WBC # BLD AUTO: 6.4 K/UL (ref 4.6–13.2)

## 2025-07-05 PROCEDURE — 83880 ASSAY OF NATRIURETIC PEPTIDE: CPT

## 2025-07-05 PROCEDURE — 80048 BASIC METABOLIC PNL TOTAL CA: CPT

## 2025-07-05 PROCEDURE — 85025 COMPLETE CBC W/AUTO DIFF WBC: CPT

## 2025-07-05 PROCEDURE — 84439 ASSAY OF FREE THYROXINE: CPT

## 2025-07-05 PROCEDURE — 83605 ASSAY OF LACTIC ACID: CPT

## 2025-07-05 PROCEDURE — 80076 HEPATIC FUNCTION PANEL: CPT

## 2025-07-05 PROCEDURE — 6360000004 HC RX CONTRAST MEDICATION: Performed by: STUDENT IN AN ORGANIZED HEALTH CARE EDUCATION/TRAINING PROGRAM

## 2025-07-05 PROCEDURE — 74177 CT ABD & PELVIS W/CONTRAST: CPT

## 2025-07-05 PROCEDURE — 84443 ASSAY THYROID STIM HORMONE: CPT

## 2025-07-05 PROCEDURE — 2580000003 HC RX 258: Performed by: STUDENT IN AN ORGANIZED HEALTH CARE EDUCATION/TRAINING PROGRAM

## 2025-07-05 PROCEDURE — 84484 ASSAY OF TROPONIN QUANT: CPT

## 2025-07-05 PROCEDURE — 71046 X-RAY EXAM CHEST 2 VIEWS: CPT

## 2025-07-05 PROCEDURE — 99285 EMERGENCY DEPT VISIT HI MDM: CPT

## 2025-07-05 PROCEDURE — 87636 SARSCOV2 & INF A&B AMP PRB: CPT

## 2025-07-05 PROCEDURE — 81003 URINALYSIS AUTO W/O SCOPE: CPT

## 2025-07-05 PROCEDURE — 83735 ASSAY OF MAGNESIUM: CPT

## 2025-07-05 PROCEDURE — 93005 ELECTROCARDIOGRAM TRACING: CPT | Performed by: STUDENT IN AN ORGANIZED HEALTH CARE EDUCATION/TRAINING PROGRAM

## 2025-07-05 RX ORDER — 0.9 % SODIUM CHLORIDE 0.9 %
1000 INTRAVENOUS SOLUTION INTRAVENOUS ONCE
Status: COMPLETED | OUTPATIENT
Start: 2025-07-05 | End: 2025-07-05

## 2025-07-05 RX ORDER — IOPAMIDOL 612 MG/ML
100 INJECTION, SOLUTION INTRAVASCULAR
Status: COMPLETED | OUTPATIENT
Start: 2025-07-05 | End: 2025-07-05

## 2025-07-05 RX ADMIN — SODIUM CHLORIDE 1000 ML: 0.9 INJECTION, SOLUTION INTRAVENOUS at 19:10

## 2025-07-05 RX ADMIN — IOPAMIDOL 100 ML: 612 INJECTION, SOLUTION INTRAVENOUS at 23:26

## 2025-07-05 NOTE — ED TRIAGE NOTES
Pt wheeled to triage with  c/o not eating and weakness.  upset that HH was never started after her discharge from hospital. Per chart, pt stated to CM that she did not want HH.  states pt cannot walk.

## 2025-07-05 NOTE — ED PROVIDER NOTES
Emergency Physician Note  [facility] EMERGENCY DEPARTMENT  [address] [facility] [address]  Dept: [phone]  Loc: [phone]  Open Note Time:  4:56 PM EDT    Chief Complaint  Fatigue and Anorexia       History of Present Illness  Benita Pacheco is a 84 y.o. female  has a past medical history of AAA (abdominal aortic aneurysm) without rupture, Anxiety, Bursitis, Carpal tunnel syndrome, CKD (chronic kidney disease), Colon adenomas, COVID-19 vaccine dose declined, Degenerative arthritis of lumbar spine, Dyslipidemia, FHx: heart disease, Gastritis, H/O cardiovascular stress test, H/O echocardiogram, Hearing loss, Hypertension, Hypothyroidism, IPMN (intraductal papillary mucinous neoplasm), Lung nodule, Obesity, Osteopenia, Pancreatitis, chronic (HCC), Psoriasis, PUD (peptic ulcer disease), Pulmonary embolus (HCC), Venous insufficiency, Vitamin D deficiency, and Zoster. who presents to the ED for ***    ROS  Clinically pertinent medically relevant review of systems per HPI.    [unfilled]   has a past medical history of AAA (abdominal aortic aneurysm) without rupture, Anxiety, Bursitis, Carpal tunnel syndrome, CKD (chronic kidney disease) (2014), Colon adenomas, COVID-19 vaccine dose declined (11/2023), Degenerative arthritis of lumbar spine (12/2018), Dyslipidemia, FHx: heart disease, Gastritis (2006), H/O cardiovascular stress test, H/O echocardiogram (12/2017), Hearing loss, Hypertension, Hypothyroidism (2007), IPMN (intraductal papillary mucinous neoplasm) (06/2018), Lung nodule (06/2016), Obesity, Osteopenia, Pancreatitis, chronic (HCC) (2018), Psoriasis (2003), PUD (peptic ulcer disease) (06/2025), Pulmonary embolus (HCC) (06/2025), Venous insufficiency (2003), Vitamin D deficiency, and Zoster (05/2016).    Nursing notes reviewed.    ED Triage Vitals   Encounter Vitals Group      BP 07/05/25 1637 94/70      Systolic BP Percentile --       Diastolic BP Percentile  Reviewed (Optional):00718}    {Escalation of care, including admission/OBS considered:97647}         Medical Decision Making & Disposition Considerations   (include 1 Tests not done, Shared Decision Making, Pt expectation of Test or Tx.):  ***    Disposition  {SGDISPOCUSTOM:43083}    Pt is in {condition:30229} condition upon {Plan; disposition:92619}.    The note was completed using Dragon voice recognition transcription. Every effort was made to ensure accuracy; however, inadvertent transcription errors may be present despite my best efforts to edit errors.    Adnrew Salcedo, DO  [facility] PGY-4

## 2025-07-06 VITALS
RESPIRATION RATE: 27 BRPM | BODY MASS INDEX: 27.31 KG/M2 | HEIGHT: 64 IN | HEART RATE: 72 BPM | WEIGHT: 160 LBS | TEMPERATURE: 98.3 F | SYSTOLIC BLOOD PRESSURE: 135 MMHG | DIASTOLIC BLOOD PRESSURE: 76 MMHG | OXYGEN SATURATION: 92 %

## 2025-07-06 NOTE — DISCHARGE INSTRUCTIONS
You have a lot of reasons to feel off.  Very important that you call and make an appointment with general surgery, to schedule an outpatient EGD 6 weeks after discharge.  Continue to take your medications as prescribed.  Return to the emergency department if you develop any worsening pain, vomiting, fevers or gets severely dehydrated.

## 2025-07-06 NOTE — ED NOTES
Pt straight cathed for urine, sterile technique maintained. 300 mL output, sample collected and sent to lab. Pt tolerated procedure well.

## 2025-07-07 ENCOUNTER — CARE COORDINATION (OUTPATIENT)
Facility: CLINIC | Age: 85
End: 2025-07-07

## 2025-07-07 LAB
EKG ATRIAL RATE: 76 BPM
EKG DIAGNOSIS: NORMAL
EKG P AXIS: 51 DEGREES
EKG P-R INTERVAL: 178 MS
EKG Q-T INTERVAL: 410 MS
EKG QRS DURATION: 110 MS
EKG QTC CALCULATION (BAZETT): 461 MS
EKG R AXIS: -45 DEGREES
EKG T AXIS: 51 DEGREES
EKG VENTRICULAR RATE: 76 BPM

## 2025-07-07 PROCEDURE — 93010 ELECTROCARDIOGRAM REPORT: CPT | Performed by: INTERNAL MEDICINE

## 2025-07-07 NOTE — CARE COORDINATION
Care Transitions Note    Follow Up Call     Patient Current Location:  Home: [address] [place], [place] [address]   [place] [address]    LPN Care Coordinator contacted the patient by telephone. Verified name and  as identifiers.    Additional needs identified to be addressed with provider   No needs identified                 Method of communication with provider: none.    Care Summary Note:   Spoke with patient.  Patient states she is doing okay.  Patient seen ib ED on 25 for fatigue.  Patient states she is doing better.   Patient states she is eating more and using a walker to get around.  Patient confirms pcp appointment on 25.  Patient has no questions or concerns for writer.    Advance Care Planning:   Does patient have an Advance Directive: reviewed during previous call, see note. .    Medication Review:  No changes since last call.     Assessments:  ED visit- improving    Follow Up Appointment:   Reviewed upcoming appointment(s).  Future Appointments         Provider Specialty Dept Phone    2025 11:40 AM Herbie Barry MD Internal Medicine [phone]    2025 8:00 AM [facility] LAB VISIT Internal Medicine [phone]    2025 9:00 AM Herbie Barry MD Internal Medicine [phone]            N Care Coordinator provided contact information.  Plan for follow-up call in 6-10 days based on severity of symptoms and risk factors.  Plan for next call: symptom management-assess for red flags  follow-up appointment-did patient follow up with pcp  referrals-review for any new referrals, refer to ACM if needed     Viri Pride LPN

## 2025-07-09 ENCOUNTER — OFFICE VISIT (OUTPATIENT)
Facility: CLINIC | Age: 85
End: 2025-07-09

## 2025-07-09 VITALS
HEART RATE: 77 BPM | RESPIRATION RATE: 16 BRPM | BODY MASS INDEX: 26.46 KG/M2 | WEIGHT: 155 LBS | TEMPERATURE: 98 F | HEIGHT: 64 IN | DIASTOLIC BLOOD PRESSURE: 68 MMHG | SYSTOLIC BLOOD PRESSURE: 103 MMHG | OXYGEN SATURATION: 95 %

## 2025-07-09 DIAGNOSIS — N18.31 STAGE 3A CHRONIC KIDNEY DISEASE (HCC): ICD-10-CM

## 2025-07-09 DIAGNOSIS — K25.1 ACUTE GASTRIC ULCER WITH PERFORATION (HCC): ICD-10-CM

## 2025-07-09 DIAGNOSIS — I26.99 PULMONARY EMBOLUS, RIGHT (HCC): Primary | ICD-10-CM

## 2025-07-09 DIAGNOSIS — R63.0 ANOREXIA: ICD-10-CM

## 2025-07-09 DIAGNOSIS — R53.81 DEBILITY: ICD-10-CM

## 2025-07-09 DIAGNOSIS — E03.4 HYPOTHYROIDISM DUE TO ACQUIRED ATROPHY OF THYROID: ICD-10-CM

## 2025-07-09 RX ORDER — MEGESTROL ACETATE 40 MG/1
40 TABLET ORAL DAILY
Qty: 30 TABLET | Refills: 3 | Status: SHIPPED | OUTPATIENT
Start: 2025-07-09

## 2025-07-09 NOTE — PROGRESS NOTES
Benita Pacheco presents today for   Chief Complaint   Patient presents with    ED FOLLOW-UP     07/05 [facility] ED: fatigue, anorexia     Accompanied by spouse.    \"Have you been to the ER, urgent care clinic since your last visit?  Hospitalized since your last visit?\"    YES - When: approximately 4 days ago.  Where and Why: [facility] ED: fatigue, anorexia.    “Have you seen or consulted any other health care providers outside of [facility] since your last visit?”    NO             
status: Never     Passive exposure: Never    Smokeless tobacco: Never   Vaping Use    Vaping status: Never Used   Substance and Sexual Activity    Alcohol use: Yes     Alcohol/week: 1.0 standard drink of alcohol     Types: 1 Glasses of wine per week     Comment: wine ocassionally    Drug use: No    Sexual activity: Yes     Partners: Male   Other Topics Concern    Not on file   Social History Narrative    Not on file     Social Drivers of Health     Financial Resource Strain: Low Risk  (5/24/2024)    Overall Financial Resource Strain (CARDIA)     Difficulty of Paying Living Expenses: Not hard at all   Food Insecurity: No Food Insecurity (6/9/2025)    Hunger Vital Sign     Worried About Running Out of Food in the Last Year: Never true     Ran Out of Food in the Last Year: Never true   Transportation Needs: No Transportation Needs (6/9/2025)    PRAPARE - Transportation     Lack of Transportation (Medical): No     Lack of Transportation (Non-Medical): No   Physical Activity: Inactive (12/3/2024)    Exercise Vital Sign     Days of Exercise per Week: 0 days     Minutes of Exercise per Session: 0 min   Stress: Not on file   Social Connections: Not on file   Intimate Partner Violence: Not on file   Housing Stability: High Risk (6/9/2025)    Housing Stability Vital Sign     Unable to Pay for Housing in the Last Year: No     Number of Times Moved in the Last Year: 0     Homeless in the Last Year: Yes     Current Outpatient Medications   Medication Sig    apixaban (ELIQUIS) 5 MG TABS tablet Take 2 tabs po BID from 6/12 to 6/18 then 1 tab po bid; Dx DVT/PE    ondansetron (ZOFRAN-ODT) 4 MG disintegrating tablet Take 1 tablet by mouth every 8 hours as needed for Nausea or Vomiting    pantoprazole (PROTONIX) 40 MG tablet Take 1 tablet by mouth 2 times daily (before meals)    carvedilol (COREG) 6.25 MG tablet Take 1 tablet by mouth 2 times daily (with meals)    acetaminophen (TYLENOL) 500 MG tablet Take 1 tablet by mouth 4 times

## 2025-07-16 ENCOUNTER — OFFICE VISIT (OUTPATIENT)
Age: 85
End: 2025-07-16
Payer: MEDICARE

## 2025-07-16 ENCOUNTER — CARE COORDINATION (OUTPATIENT)
Facility: CLINIC | Age: 85
End: 2025-07-16

## 2025-07-16 VITALS
OXYGEN SATURATION: 99 % | TEMPERATURE: 97.1 F | RESPIRATION RATE: 20 BRPM | DIASTOLIC BLOOD PRESSURE: 80 MMHG | BODY MASS INDEX: 26.67 KG/M2 | WEIGHT: 156.2 LBS | HEIGHT: 64 IN | SYSTOLIC BLOOD PRESSURE: 130 MMHG | HEART RATE: 85 BPM

## 2025-07-16 DIAGNOSIS — I26.99 PULMONARY EMBOLUS, RIGHT (HCC): ICD-10-CM

## 2025-07-16 DIAGNOSIS — K25.5 GASTRIC ULCER WITH PERFORATION, UNSPECIFIED CHRONICITY (HCC): Primary | ICD-10-CM

## 2025-07-16 PROCEDURE — 99204 OFFICE O/P NEW MOD 45 MIN: CPT | Performed by: SURGERY

## 2025-07-16 PROCEDURE — G8419 CALC BMI OUT NRM PARAM NOF/U: HCPCS | Performed by: SURGERY

## 2025-07-16 PROCEDURE — 1036F TOBACCO NON-USER: CPT | Performed by: SURGERY

## 2025-07-16 PROCEDURE — G8427 DOCREV CUR MEDS BY ELIG CLIN: HCPCS | Performed by: SURGERY

## 2025-07-16 PROCEDURE — 1159F MED LIST DOCD IN RCRD: CPT | Performed by: SURGERY

## 2025-07-16 PROCEDURE — 1160F RVW MEDS BY RX/DR IN RCRD: CPT | Performed by: SURGERY

## 2025-07-16 PROCEDURE — 1123F ACP DISCUSS/DSCN MKR DOCD: CPT | Performed by: SURGERY

## 2025-07-16 PROCEDURE — 1090F PRES/ABSN URINE INCON ASSESS: CPT | Performed by: SURGERY

## 2025-07-16 PROCEDURE — 1126F AMNT PAIN NOTED NONE PRSNT: CPT | Performed by: SURGERY

## 2025-07-16 RX ORDER — SUCRALFATE ORAL 1 G/10ML
1 SUSPENSION ORAL 4 TIMES DAILY
Qty: 1200 ML | Refills: 3 | Status: SHIPPED | OUTPATIENT
Start: 2025-07-16

## 2025-07-16 NOTE — ED PROVIDER NOTES
[facility] EMERGENCY DEPARTMENT  EMERGENCY DEPARTMENT ENCOUNTER      Pt Name: Benita Pacheco  MRN: [id number]  Birthdate 1940  Date of evaluation: 7/5/2025  Provider: Elodia Da Silva MD    CHIEF COMPLAINT       Chief Complaint   Patient presents with    Fatigue    Anorexia         HISTORY OF PRESENT ILLNESS   (Location/Symptom, Timing/Onset, Context/Setting, Quality, Duration, Modifying Factors, Severity)  Note limiting factors.   Benita Pacheco is a 84 y.o. female who presents to the emergency department for what they described as continuing to do poorly after her recent admission.  She was recently admitted for a pulmonary embolism and partially perforated gastric ulcer.  He states that since going home she has just been Weak and not eating well.  They also states that they wanted to get home health started but apparently they called and the patient stated that she did not need it.  Her weakness is generalized.  She is still able to get up and go to the bathroom and do her basic ADLs but is not doing much beyond that.  Patient states that she is not eating to because she does not have an appetite.  She denies any actual pain or discomfort nausea or vomiting with eating.  States she is not really Bab[place] her bowel movements.  Denies any recent fever, sweats or chills.  Denies any chest pain or shortness of breath.  Patient states that she has not been eating much.     Nursing Notes were reviewed.    REVIEW OF SYSTEMS    (2-9 systems for level 4, 10 or more for level 5)     Constitutional: No fever  HENT: No ear pain  Eyes: No change in vision  Respiratory: No SOB  Cardio: No chest pain  GI: No blood in stool  : No hematuria  MSK: No back pain  Skin: No rashes  Neuro: No headache    Except as noted above the remainder of the review of systems was reviewed and negative.       PAST MEDICAL HISTORY     Past Medical History:   Diagnosis Date    AAA (abdominal aortic aneurysm) without rupture

## 2025-07-16 NOTE — CARE COORDINATION
Care Transitions Note    Final Call     Patient Current Location:  Home: [address] [place] [address]   [place] [address]    LPN Care Coordinator contacted the patient by telephone. Verified name and  as identifiers.    Patient graduated from the Care Transitions program on 25.  Patient/family progressing towards self-management. .      Advance Care Planning:   Does patient have an Advance Directive: reviewed during previous call, see note. .    Handoff:   Patient/family agreeable to AC outreach. , Condition management for Perforated ulcer- NG tube, PE, generalized debility .     Care Summary Note:   Spoke with patient.  Patient states she is doing ok. Patient seen by general surgeon today.  Patient states she would like further follow up calls.  Lawrence PEREZ has started     Assessments:  Care Transitions Subsequent and Final Call    Subsequent and Final Calls  Do you have any ongoing symptoms?: No  Have your medications changed?: No  Do you have any questions related to your medications?: No  Do you currently have any active services?: No  Are you currently active with any services?: Home Health  Identified Barriers: None  Care Transitions Interventions  No Identified Needs  Other Interventions:              Upcoming Appointments:    Future Appointments         Provider Specialty Dept Phone    2025 8:00 AM [facility] LAB VISIT Internal Medicine [phone]    2025 9:00 AM Herbie Barry MD Internal Medicine [phone]            Patient has agreed to contact primary care provider and/or specialist for any further questions, concerns, or needs.    Viri Pride LPN

## 2025-07-16 NOTE — PROGRESS NOTES
Benita Nair Junior is a 84 y.o. female (: 1940)     Chief Complaint   Patient presents with    New Patient     Perforated gastric ulcer        Medication list and allergies have been reviewed with Benita Pacheco and updated as of today's date.     I have gone over all Medical, Surgical and Social History with Benita Pacheco and updated/added the information accordingly.    
- 2.0 % Final    Immature Granulocytes % 06/04/2025 0.3  0.0 - 0.5 % Final    Neutrophils Absolute 06/04/2025 5.08  1.80 - 8.00 K/UL Final    Lymphocytes Absolute 06/04/2025 1.78  0.90 - 3.60 K/UL Final    Monocytes Absolute 06/04/2025 0.69  0.05 - 1.20 K/UL Final    Eosinophils Absolute 06/04/2025 0.16  0.00 - 0.40 K/UL Final    Basophils Absolute 06/04/2025 0.05  0.00 - 0.10 K/UL Final    Immature Granulocytes Absolute 06/04/2025 0.02  0.00 - 0.04 K/UL Final    Differential Type 06/04/2025 AUTOMATED    Final       CT ABDOMEN PELVIS W IV CONTRAST Additional Contrast? None  Narrative: EXAM: CT ABDOMEN AND PELVIS WITH CONTRAST    CLINICAL INDICATION/HISTORY: poor appetite in setting of recent perforated ulcer    COMPARISON: June 8, 2025    TECHNIQUE:  CT abdomen and pelvis with IV contrast.  All CT scans at this facility are performed using dose optimization technique as  appropriate to the performed examination, to include automated exposure control,  adjustment of the mA and/or kV according to patient's size (including  appropriate matching for site-specific examinations), or use of an iterative  reconstruction technique.    FINDINGS:  Lower chest: Bibasilar atelectasis.    Liver: Small hepatic cysts.    Biliary: Cholecystectomy. Biliary prominence, likely reservoir effect.    Pancreas: Scattered pancreatic parenchymal calcifications with parenchymal  atrophy.    Spleen: Negative.    Adrenal glands: Negative.    Kidneys: Renal cysts.    Bladder and Pelvic Organs: Under distended urinary bladder. Venous collaterals  in the adnexa bilaterally, left greater than right.     Stomach, Small Bowel and Colon: The previously seen perforation involving the  stomach is noted. There is a possible ulcer in the gastric body best seen on  axial image 57. No other focal bowel wall thickening is identified. Colonic  diverticulosis which is mild in degree. The appendix is normal.    Lymph nodes: No lymphadenopathy.     Vessels:

## 2025-07-17 ASSESSMENT — ENCOUNTER SYMPTOMS
SHORTNESS OF BREATH: 0
NAUSEA: 0
CHEST TIGHTNESS: 0
RECTAL PAIN: 0
ABDOMINAL PAIN: 1

## 2025-07-18 ENCOUNTER — CLINICAL DOCUMENTATION (OUTPATIENT)
Age: 85
End: 2025-07-18

## 2025-07-18 NOTE — PROGRESS NOTES
Patient was referred to [facility] for GI consultation. That office will call the patient for an appointment.

## 2025-07-21 DIAGNOSIS — E78.5 DYSLIPIDEMIA: ICD-10-CM

## 2025-07-21 RX ORDER — ATORVASTATIN CALCIUM 80 MG/1
80 TABLET, FILM COATED ORAL DAILY
Qty: 90 TABLET | Refills: 3 | Status: SHIPPED | OUTPATIENT
Start: 2025-07-21

## 2025-07-21 NOTE — TELEPHONE ENCOUNTER
PCP: Herbie Barry MD    Refill Request     LAST OFFICE VISIT: 07/09/25      Medication:   atorvastatin (LIPITOR) 80 MG tablet     Dispense:       Pharmacy [id number] [address] [place] [address] -  [phone] - F [phone] [[id number]]       Future Appointments   Date Time Provider Department Center   12/17/2025  8:00 AM [facility] LAB VISIT [facility] DEP   12/24/2025  9:00 AM Herbie Barry MD [facility] DEP

## 2025-07-22 ENCOUNTER — TELEPHONE (OUTPATIENT)
Age: 85
End: 2025-07-22

## 2025-07-22 ENCOUNTER — CARE COORDINATION (OUTPATIENT)
Facility: CLINIC | Age: 85
End: 2025-07-22

## 2025-07-22 NOTE — CARE COORDINATION
provider of any symptoms that indicate a worsening of my condition.    Barriers: none  Plan for overcoming my barriers: N/A  Confidence: 10/10  Anticipated Goal Completion Date: 10/22/25         Medication Management        I will take my medication as directed.  I will notify my provider of any problems with medications, like adverse effects or side effects.  I will notify my provider/Care Coordinator if I am unable to afford my medications.  I will notify my provider for advice before I stop taking any of my medication.    Barriers: none  Plan for overcoming my barriers: N/A  Confidence: 10/10  Anticipated Goal Completion Date: 10/22/25       Reduce Risk of Hospitalization        I will take appropriate steps to reduce my risk of Hospitalization to include:  Take all of medications as prescribed  Visit w/ all of my recommended specialists.  Visit w/ my PCP as recommended.  Avoid activities that can trigger my chronic conditions.    Barriers: none  Plan for overcoming my barriers: N/A  Confidence: 10/10  Anticipated Goal Completion Date: 10/22/25                 PCP/Specialist follow up:   Future Appointments         Provider Specialty Dept Phone    12/17/2025 8:00 AM [facility] LAB VISIT Internal Medicine [phone]    12/24/2025 9:00 AM Herbie Barry MD Internal Medicine [phone]            Follow Up:   Plan for next [facility] outreach in approximately 1 week to complete:  - disease specific assessments  - SDOH assessments  - medication review   - advance care planning   - goal progression  - education .   Patient  is agreeable to this plan.

## 2025-07-22 NOTE — TELEPHONE ENCOUNTER
Lvm asking pt to return call.   (3) Responds only with reflex motor or autonomic effects or totally unresponsive, flaccid, and areflexic

## 2025-07-25 ENCOUNTER — TELEPHONE (OUTPATIENT)
Facility: HOSPITAL | Age: 85
End: 2025-07-25

## 2025-07-25 ENCOUNTER — TELEPHONE (OUTPATIENT)
Age: 85
End: 2025-07-25

## 2025-07-25 RX ORDER — SUCRALFATE ORAL 1 G/10ML
1 SUSPENSION ORAL 4 TIMES DAILY
Qty: 1200 ML | Refills: 3 | Status: SHIPPED | OUTPATIENT
Start: 2025-07-25

## 2025-07-29 ENCOUNTER — CARE COORDINATION (OUTPATIENT)
Facility: CLINIC | Age: 85
End: 2025-07-29

## 2025-07-29 ASSESSMENT — SOCIAL DETERMINANTS OF HEALTH (SDOH)
DO YOU BELONG TO ANY CLUBS OR ORGANIZATIONS SUCH AS CHURCH GROUPS UNIONS, FRATERNAL OR ATHLETIC GROUPS, OR SCHOOL GROUPS?: NO
HOW OFTEN DO YOU ATTEND CHURCH OR RELIGIOUS SERVICES?: NEVER
IN A TYPICAL WEEK, HOW MANY TIMES DO YOU TALK ON THE PHONE WITH FAMILY, FRIENDS, OR NEIGHBORS?: THREE TIMES A WEEK
HOW OFTEN DO YOU ATTENT MEETINGS OF THE CLUB OR ORGANIZATION YOU BELONG TO?: NEVER
HOW OFTEN DO YOU GET TOGETHER WITH FRIENDS OR RELATIVES?: THREE TIMES A WEEK

## 2025-07-29 NOTE — CARE COORDINATION
Ambulatory Care Coordination Note     2025 10:04 AM     Patient Current Location:  Home: [address] [place] [address]   [facility] [address]     ACM contacted the patient by telephone. Verified name and  with patient as identifiers.         ACM: Antonio Dia RN     Challenges to be reviewed by the provider   Additional needs identified to be addressed with provider No                 Method of communication with provider: none.    Utilization: Has the patient been seen in the ED since your last call? no    Care Summary Note: .    Hx of AAA w/ repair 2018, Anxiety, CKD, HLD, HTN, hypothyroidism, chronic pancreatitis, Upper GI ulcer  HRCM after ARLETTE h/o post admission for Acute gastric ulcer w/ a perforation  Pt states doing ok  Pt w/ upper GI scheduled for tomorrow  Pt aware of upcoming appts.  Pt states no other questions/issues at this time    Offered patient enrollment in the Remote Patient Monitoring (RPM) program for in-home monitoring: Yes, but did not enroll at this time: controlled chronic disease management.     Assessments Completed:   General Assessment    Do you have any symptoms that are causing concern?: No      ,   Social Drivers of Health     Tobacco Use: Low Risk  (2025)    Patient History     Smoking Tobacco Use: Never     Smokeless Tobacco Use: Never     Passive Exposure: Never   Alcohol Use: Not At Risk (2025)    AUDIT-C     Frequency of Alcohol Consumption: Monthly or less     Average Number of Drinks: 1 or 2     Frequency of Binge Drinking: Never   Financial Resource Strain: Low Risk  (2024)    Overall Financial Resource Strain (CARDIA)     Difficulty of Paying Living Expenses: Not hard at all   Food Insecurity: No Food Insecurity (2025)    Hunger Vital Sign     Worried About Running Out of Food in the Last Year: Never true     Ran Out of Food in the Last Year: Never true   Transportation Needs: No Transportation Needs (2025)    PRAPARE - Transportation     Lack of

## 2025-07-30 ENCOUNTER — HOSPITAL ENCOUNTER (OUTPATIENT)
Facility: HOSPITAL | Age: 85
Discharge: HOME | End: 2025-08-02
Payer: MEDICARE

## 2025-07-30 DIAGNOSIS — K25.5 GASTRIC ULCER WITH PERFORATION, UNSPECIFIED CHRONICITY (HCC): ICD-10-CM

## 2025-07-30 PROCEDURE — 2500000003 HC RX 250 WO HCPCS: Performed by: PHYSICIAN ASSISTANT

## 2025-07-30 PROCEDURE — 6370000000 HC RX 637 (ALT 250 FOR IP): Performed by: PHYSICIAN ASSISTANT

## 2025-07-30 PROCEDURE — 74240 X-RAY XM UPR GI TRC 1CNTRST: CPT

## 2025-07-30 RX ADMIN — BARIUM SULFATE 140 ML: 980 POWDER, FOR SUSPENSION ORAL at 12:37

## 2025-07-30 RX ADMIN — ANTACID/ANTIFLATULENT 2 EACH: 380; 550; 10; 10 GRANULE, EFFERVESCENT ORAL at 12:36

## 2025-07-30 RX ADMIN — BARIUM SULFATE 176 G: 960 POWDER, FOR SUSPENSION ORAL at 12:37

## 2025-07-31 ENCOUNTER — TELEPHONE (OUTPATIENT)
Facility: CLINIC | Age: 85
End: 2025-07-31

## 2025-07-31 NOTE — TELEPHONE ENCOUNTER
Pt went yesterday for an endoscopy and she didn't have it done. Pt said that other tests were done besides the endoscopy and she's not sure what needs to be done at this point. Please advise.

## 2025-08-01 ENCOUNTER — TELEPHONE (OUTPATIENT)
Age: 85
End: 2025-08-01

## 2025-08-05 ENCOUNTER — TELEPHONE (OUTPATIENT)
Facility: CLINIC | Age: 85
End: 2025-08-05

## 2025-08-05 ENCOUNTER — CARE COORDINATION (OUTPATIENT)
Facility: CLINIC | Age: 85
End: 2025-08-05

## 2025-08-08 ENCOUNTER — TELEPHONE (OUTPATIENT)
Facility: CLINIC | Age: 85
End: 2025-08-08

## 2025-08-12 ENCOUNTER — TELEPHONE (OUTPATIENT)
Facility: CLINIC | Age: 85
End: 2025-08-12

## 2025-08-12 ENCOUNTER — HOSPITAL ENCOUNTER (OUTPATIENT)
Age: 85
Discharge: HOME | End: 2025-08-12
Payer: MEDICARE

## 2025-08-12 DIAGNOSIS — R30.0 DYSURIA: ICD-10-CM

## 2025-08-12 DIAGNOSIS — R30.0 DYSURIA: Primary | ICD-10-CM

## 2025-08-12 LAB
APPEARANCE UR: ABNORMAL
BACTERIA URNS QL MICRO: ABNORMAL /HPF
BILIRUB UR QL: NEGATIVE
COLOR UR: ABNORMAL
EPITH CASTS URNS QL MICRO: ABNORMAL /LPF (ref 0–5)
GLUCOSE UR STRIP.AUTO-MCNC: NEGATIVE MG/DL
HGB UR QL STRIP: ABNORMAL
KETONES UR QL STRIP.AUTO: ABNORMAL MG/DL
LEUKOCYTE ESTERASE UR QL STRIP.AUTO: ABNORMAL
NITRITE UR QL STRIP.AUTO: POSITIVE
PH UR STRIP: 5.5 (ref 5–8)
PROT UR STRIP-MCNC: 30 MG/DL
RBC #/AREA URNS HPF: ABNORMAL /HPF (ref 0–5)
SP GR UR REFRACTOMETRY: 1.03 (ref 1–1.03)
UROBILINOGEN UR QL STRIP.AUTO: 1 EU/DL (ref 0.2–1)
WBC URNS QL MICRO: ABNORMAL /HPF (ref 0–5)

## 2025-08-12 PROCEDURE — 87186 SC STD MICRODIL/AGAR DIL: CPT

## 2025-08-12 PROCEDURE — 87086 URINE CULTURE/COLONY COUNT: CPT

## 2025-08-12 PROCEDURE — 81003 URINALYSIS AUTO W/O SCOPE: CPT

## 2025-08-12 PROCEDURE — 87088 URINE BACTERIA CULTURE: CPT

## 2025-08-12 PROCEDURE — 81001 URINALYSIS AUTO W/SCOPE: CPT

## 2025-08-13 ENCOUNTER — RESULTS FOLLOW-UP (OUTPATIENT)
Facility: CLINIC | Age: 85
End: 2025-08-13

## 2025-08-13 ENCOUNTER — TELEMEDICINE (OUTPATIENT)
Facility: CLINIC | Age: 85
End: 2025-08-13

## 2025-08-13 ENCOUNTER — CARE COORDINATION (OUTPATIENT)
Facility: CLINIC | Age: 85
End: 2025-08-13

## 2025-08-13 DIAGNOSIS — N39.0 URINARY TRACT INFECTION WITHOUT HEMATURIA, SITE UNSPECIFIED: Primary | ICD-10-CM

## 2025-08-13 ASSESSMENT — PATIENT HEALTH QUESTIONNAIRE - PHQ9
SUM OF ALL RESPONSES TO PHQ QUESTIONS 1-9: 0
SUM OF ALL RESPONSES TO PHQ QUESTIONS 1-9: 0
1. LITTLE INTEREST OR PLEASURE IN DOING THINGS: NOT AT ALL
2. FEELING DOWN, DEPRESSED OR HOPELESS: NOT AT ALL
SUM OF ALL RESPONSES TO PHQ QUESTIONS 1-9: 0
SUM OF ALL RESPONSES TO PHQ QUESTIONS 1-9: 0

## 2025-08-14 LAB
BACTERIA SPEC CULT: ABNORMAL
CC UR VC: ABNORMAL
SERVICE CMNT-IMP: ABNORMAL

## 2025-08-26 ENCOUNTER — TRANSCRIBE ORDERS (OUTPATIENT)
Facility: HOSPITAL | Age: 85
End: 2025-08-26

## 2025-08-26 DIAGNOSIS — I26.09 OTHER ACUTE PULMONARY EMBOLISM WITH ACUTE COR PULMONALE (HCC): ICD-10-CM

## 2025-08-26 DIAGNOSIS — I82.4Y2 ACUTE DEEP VEIN THROMBOSIS (DVT) OF PROXIMAL VEIN OF LEFT LOWER EXTREMITY (HCC): Primary | ICD-10-CM

## 2025-08-26 DIAGNOSIS — I82.402 ACUTE THROMBOEMBOLISM OF DEEP VEINS OF LOWER EXTREMITY, LEFT (HCC): Primary | ICD-10-CM

## 2025-08-26 DIAGNOSIS — I26.09 ACUTE PULMONARY EMBOLISM WITH ACUTE COR PULMONALE, UNSPECIFIED PULMONARY EMBOLISM TYPE (HCC): ICD-10-CM

## 2025-08-27 ENCOUNTER — CARE COORDINATION (OUTPATIENT)
Facility: CLINIC | Age: 85
End: 2025-08-27

## (undated) DEVICE — BLADELESS OPTICAL TROCAR WITH FIXATION CANNULA: Brand: VERSAPORT

## (undated) DEVICE — SYR 10ML LUER LOK 1/5ML GRAD --

## (undated) DEVICE — DERMABOND SKIN ADH 0.7ML -- DERMABOND ADVANCED 12/BX

## (undated) DEVICE — (D)PREP SKN CHLRAPRP APPL 26ML -- CONVERT TO ITEM 371833

## (undated) DEVICE — SKIN MARKER,REGULAR TIP WITH RULER AND LABELS: Brand: DEVON

## (undated) DEVICE — SUTURE PERMA-HAND SZ 3-0 L24IN NONABSORBABLE BLK W/O NDL SA74H

## (undated) DEVICE — GUIDEWIRE VASC L180CM DIA0035IN L15CM STR TIP PTFE HEP S STL

## (undated) DEVICE — PRESSURE MONITORING SET: Brand: TRUWAVE

## (undated) DEVICE — (D)BNDG ADHESIVE FABRIC 3/4X3 -- DISC BY MFR USE ITEM 357960

## (undated) DEVICE — INSULATED BLADE ELECTRODE: Brand: EDGE

## (undated) DEVICE — LIGHT HANDLE: Brand: DEVON

## (undated) DEVICE — SYR POWER 150ML 8IN FILL TUBE --

## (undated) DEVICE — INTRODUCER SHTH 6FR CANN L11CM DIL TIP 35MM GRN TUNGSTEN

## (undated) DEVICE — SOLUTION IV 1000ML 0.9% SOD CHL

## (undated) DEVICE — FLEX ADVANTAGE 3000CC: Brand: FLEX ADVANTAGE

## (undated) DEVICE — SUTURE MCRYL SZ 2-0 L36IN ABSRB UD L36MM CT-1 1/2 CIR Y945H

## (undated) DEVICE — PREP SKN CHLRAPRP 26ML TNT -- CONVERT TO ITEM 373320

## (undated) DEVICE — ESOPHAGEAL BALLOON DILATATION CATHETER: Brand: CRE FIXED WIRE

## (undated) DEVICE — SUTURE VCRL + SZ 2 L27IN ABSRB UD TP-1 L65MM 1/2 CIR TAPR VCP849G

## (undated) DEVICE — BLANKET WRM W29.9XL79.1IN UP BODY FORC AIR MISTRAL-AIR

## (undated) DEVICE — APPLICATOR SCRB 26ML TEAL STRL -- CHLORAPREP 26ML

## (undated) DEVICE — DRAPE SURG EQUIP W105XH13XL20IN 3 ARM DISPOSABLE DA VINCI S

## (undated) DEVICE — KIT CLN UP BON SECOURS MARYV

## (undated) DEVICE — SUTURE MCRYL SZ 4-0 L18IN ABSRB UD L19MM PS-2 3/8 CIR PRIM Y496G

## (undated) DEVICE — BANDAGE ADH W0.75XL3IN UNIV WVN FAB NAT GEN USE STRP N ADH

## (undated) DEVICE — SYR 50ML LR LCK 1ML GRAD NSAF --

## (undated) DEVICE — NEEDLE HYPO 25GA L1.5IN BVL ORIENTED ECLIPSE

## (undated) DEVICE — MEDI-VAC SUCTION HIGH CAPACITY: Brand: CARDINAL HEALTH

## (undated) DEVICE — TRAY CATH OD16FR SIL URIN M STATLOK STBL DEV SURSTP

## (undated) DEVICE — CATHETER OMNI FLSH SZ 4FR 70CM 0.35IN GWIRE W/ RADPQ MRK 20

## (undated) DEVICE — PERCLOSE PROGLIDE™ SUTURE-MEDIATED CLOSURE SYSTEM: Brand: PERCLOSE PROGLIDE™

## (undated) DEVICE — SYRINGE MED 3ML NDL 22GA L1 1/2IN REG BVL SFGLDE

## (undated) DEVICE — SPECIMEN RETRIEVAL POUCH: Brand: ENDO CATCH GOLD

## (undated) DEVICE — SUTURE MCRYL 3-0 L27IN ABSRB VLT SH L26MM 1/2 CIR Y316H

## (undated) DEVICE — 3M™ WARMING BLANKET, UPPER BODY, 10 PER CASE, 42268: Brand: BAIR HUGGER™

## (undated) DEVICE — REM POLYHESIVE ADULT PATIENT RETURN ELECTRODE: Brand: VALLEYLAB

## (undated) DEVICE — NEEDLE SPNL 22GA L3.5IN BLK HUB S STL REG WALL FIT STYL W/

## (undated) DEVICE — SYR LR LCK 1ML GRAD NSAF 30ML --

## (undated) DEVICE — SUTURE PROL SZ 5-0 L36IN NONABSORBABLE BLU L13MM C-1 3/8 8720H

## (undated) DEVICE — SYRINGE MED 25GA 3ML L5/8IN SUBQ PLAS W/ DETACH NDL SFTY

## (undated) DEVICE — TRAY MYEL SFTY +

## (undated) DEVICE — GAUZE SPONGES,16 PLY: Brand: CURITY

## (undated) DEVICE — SUT VCRL + 0 27IN CT1 UD --

## (undated) DEVICE — GUIDEWIRE VASC L180CM DIA0.035IN TIP L7CM PTFE S STL STR

## (undated) DEVICE — INTENDED FOR TISSUE SEPARATION, AND OTHER PROCEDURES THAT REQUIRE A SHARP SURGICAL BLADE TO PUNCTURE OR CUT.: Brand: BARD-PARKER SAFETY BLADES SIZE 11, STERILE

## (undated) DEVICE — PACK PROCEDURE SURG TOT HIP BSHR LF

## (undated) DEVICE — NDL PRT INJ NSAF BLNT 18GX1.5 --

## (undated) DEVICE — INTENDED FOR TISSUE SEPARATION, AND OTHER PROCEDURES THAT REQUIRE A SHARP SURGICAL BLADE TO PUNCTURE OR CUT.: Brand: BARD-PARKER SAFETY BLADES SIZE 15, STERILE

## (undated) DEVICE — BLANKET WRM AD W50XL85.8IN PACU FULL BODY FORC AIR

## (undated) DEVICE — Device

## (undated) DEVICE — WIREGUIDED RETRIEVAL BASKET: Brand: TRAPEZOID RX

## (undated) DEVICE — 3M(TM) MEDIPORE(TM) +PAD SOFT CLOTH ADHESIVE WOUND DRESSING 3569: Brand: 3M™ MEDIPORE™

## (undated) DEVICE — RADIFOCUS GLIDEWIRE: Brand: GLIDEWIRE

## (undated) DEVICE — DRAPE XR C ARM 41X74IN LF --

## (undated) DEVICE — SOLUTION IRRIG 3000ML LAC R FLX CONT

## (undated) DEVICE — MAYO STAND COVER: Brand: CONVERTORS

## (undated) DEVICE — 3M™ MEDIPORE™ H SOFT CLOTH SURGICAL TAPE 2864, 4 INCH X 10 YARD (10CM X 9,14M), 12 ROLLS/CASE: Brand: 3M™ MEDIPORE™

## (undated) DEVICE — GLOVE SURG SZ 7.5 L11.73IN FNGR THK9.8MIL STRW LTX POLYMER

## (undated) DEVICE — COVADERM: Brand: DEROYAL

## (undated) DEVICE — GOWN ISOL IMPERV UNIV, DISP, OPEN BACK, BLUE --

## (undated) DEVICE — STENT GRAFT BLLN CATH 65CM --

## (undated) DEVICE — DRAPE,TOP,102X53,STERILE: Brand: MEDLINE

## (undated) DEVICE — 4-PORT MANIFOLD: Brand: NEPTUNE 2

## (undated) DEVICE — KENDALL SCD EXPRESS SLEEVES, KNEE LENGTH, MEDIUM: Brand: KENDALL SCD

## (undated) DEVICE — CARTRIDGE CLP LIG HEMLOK GRN --

## (undated) DEVICE — STOCKING ANTIEMB KNEE MED REG --

## (undated) DEVICE — CATHETER SUCT TR FL TIP 14FR W/ O CTRL

## (undated) DEVICE — ANGIO-SEAL VIP VASCULAR CLOSURE DEVICE: Brand: ANGIO-SEAL

## (undated) DEVICE — EXTENSION SET 20 IN 3 CC PINCH CLMP 2 PC M LL STRL

## (undated) DEVICE — COVER US PRB W15XL120CM W/ GEL RUBBERBAND TAPE STRP FLD GEN

## (undated) DEVICE — TAPE,CLOTH/SILK,CURAD,3"X10YD,LF,40/CS: Brand: CURAD

## (undated) DEVICE — AIRLIFE™ NASAL OXYGEN CANNULA CURVED, FLARED TIP WITH 14 FOOT (4.3 M) CRUSH-RESISTANT TUBING, OVER-THE-EAR STYLE: Brand: AIRLIFE™

## (undated) DEVICE — TUBING PRSS 48IN 1200PSI CLR HI PRSS INJ EXCITE FLX

## (undated) DEVICE — MEDIA CONTRAST 10ML 200MG/ML 41%

## (undated) DEVICE — SYR ASSEMB INFL BLLN 60ML --

## (undated) DEVICE — SOL IRR NACL 0.9% 500ML POUR --

## (undated) DEVICE — NEEDLE ANGIO 1 WALL ULT SMOOTH 19GAX7CM MERIT AVANCE

## (undated) DEVICE — SYR ART 700 CLEAR MARK 7 -- ARTERION

## (undated) DEVICE — Z DISCONTINUED USE 2744636  DRESSING AQUACEL 14 IN ALG W3.5XL14IN POLYUR FLM CVR W/ HYDRCOLL

## (undated) DEVICE — BASIN EMESIS 500CC ROSE 250/CS 60/PLT: Brand: MEDEGEN MEDICAL PRODUCTS, LLC

## (undated) DEVICE — SUTURE VCRL + SZ 1-0 L36IN ABSRB UD CTX 1/2 CIR TAPR PNT VCP977H

## (undated) DEVICE — SPHINCTEROTOME: Brand: DREAMTOME™ RX 44

## (undated) DEVICE — NDL SPNE MANAN 22GX6IN --

## (undated) DEVICE — SUTURE MCRYL + SZ 2-0 L36IN ABSRB UD CT-1 L36MM 1/2 CIR MCP945H

## (undated) DEVICE — DEVICE LCK BILI RAP EXCHG OLPS --

## (undated) DEVICE — SHTH INTRO FLEX 12FR 33CM -- DRYSEAL

## (undated) DEVICE — Z DISCONTINUED BY MEDLINE USE 2711682 TRAY SKIN PREP DRY W/ PREM GLV

## (undated) DEVICE — X-RAY SPONGES,12 PLY: Brand: DERMACEA

## (undated) DEVICE — NEEDLE HYPO 18GA L1.5IN PNK S STL HUB POLYPR SHLD REG BVL

## (undated) DEVICE — OBTRTR BLDELSS 8MM DISP -- DA VINCI - SNGL USE

## (undated) DEVICE — HANDPIECE SET WITH HIGH FLOW TIP AND SUCTION TUBE: Brand: INTERPULSE

## (undated) DEVICE — NEEDLE HYPO 21GA L1.5IN INTRAMUSCULAR S STL LATCH BVL UP

## (undated) DEVICE — CATHETER ANGIO BER2 038 4 FRX65 CM TEMPO AQUA

## (undated) DEVICE — SPIROMETER INCENT 2500ML W ONE W VLV

## (undated) DEVICE — DEVICE INFL 60ML 12ATM CONVENIENT LOK REL HNDL HI PRSS FLX

## (undated) DEVICE — INTRODUCER SHTH 8FR CANN L11CM DIL TIP 35MM BLU TUNGSTEN

## (undated) DEVICE — Device: Brand: VISIONS PV .035 DIGITAL IVUS CATHETER

## (undated) DEVICE — TIP COVER ACCESSORY

## (undated) DEVICE — CANNULA ORIG TL CLR W FOAM CUSHIONS AND 14FT SUPL TB 3 CHN

## (undated) DEVICE — STERILE POLYISOPRENE POWDER-FREE SURGICAL GLOVES: Brand: PROTEXIS

## (undated) DEVICE — MEDI-VAC NON-CONDUCTIVE SUCTION TUBING: Brand: CARDINAL HEALTH

## (undated) DEVICE — INTENDED FOR TISSUE SEPARATION, AND OTHER PROCEDURES THAT REQUIRE A SHARP SURGICAL BLADE TO PUNCTURE OR CUT.: Brand: BARD-PARKER SAFETY BLADES SIZE 10, STERILE

## (undated) DEVICE — INTENDED FOR TISSUE SEPARATION, AND OTHER PROCEDURES THAT REQUIRE A SHARP SURGICAL BLADE TO PUNCTURE OR CUT.: Brand: BARD-PARKER ® CARBON RIB-BACK BLADES

## (undated) DEVICE — ESOPHAGEAL/PYLORIC/BILIARY WIREGUIDED BALLOON DILATATION CATHETER: Brand: CRE™ PRO

## (undated) DEVICE — BIPOLAR SEALER 23-112-1 AQM 6.0: Brand: AQUAMANTYS ®

## (undated) DEVICE — GOWN,REINFORCED,POLY,AURORA,XXLARGE,STR: Brand: MEDLINE

## (undated) DEVICE — GARMENT,MEDLINE,DVT,INT,CALF,MED, GEN2: Brand: MEDLINE

## (undated) DEVICE — (D)GLOVE EXAM LG NITRL NS -- DISC BY MFR NO SUB

## (undated) DEVICE — SYR 50ML SLIP TIP NSAF LF STRL --

## (undated) DEVICE — DECANTER BAG 9": Brand: MEDLINE INDUSTRIES, INC.

## (undated) DEVICE — TABLE COVER: Brand: CONVERTORS

## (undated) DEVICE — INFUSOR PRSS BG CUF 500ML -- MEDICHOICE

## (undated) DEVICE — CATHETER ART 20 GAX4 CM 22 GA RADIAL FEP

## (undated) DEVICE — 3M™ STERI-DRAPE™ INSTRUMENT POUCH 1018: Brand: STERI-DRAPE™

## (undated) DEVICE — SUT PROL 6-0 30IN C1 DA BLU --

## (undated) DEVICE — FLUFF AND POLYMER UNDERPAD,EXTRA HEAVY: Brand: WINGS

## (undated) DEVICE — FORCEPS BX L240CM JAW DIA2.8MM L CAP W/ NDL MIC MESH TOOTH

## (undated) DEVICE — 3M™ DURAPORE™ SURGICAL TAPE 2 INCHES X 10YARDS (5.0CM X 9.1M) 6ROLLS/CARTON 10CARTONS/CASE 1538-2: Brand: 3M™ DURAPORE™

## (undated) DEVICE — SOLUTION IRRIG 1000ML H2O STRL BLT

## (undated) DEVICE — SOL ANTI-FOG 6ML MEDC -- MEDICHOICE - CONVERT TO 358427

## (undated) DEVICE — TUBE NG 16FR L48IN 2 LUMN W/ PREVENT ANTIREFLX FLTR FOR FLD

## (undated) DEVICE — RADIFOCUS TORQUE DEVICE MULTI-TORQUE VISE: Brand: RADIFOCUS TORQUE DEVICE

## (undated) DEVICE — BLADELESS OPTICAL TROCAR WITH FIXATION CANNULA: Brand: VERSAONE

## (undated) DEVICE — GLOVE SURG BIOGEL 8.0 STRL -- SKINSENSE

## (undated) DEVICE — STRYKER PERFORMANCE SERIES SAGITTAL BLADE: Brand: STRYKER PERFORMANCE SERIES

## (undated) DEVICE — HIP PILLOW, ABDUCTION: Brand: DEROYAL

## (undated) DEVICE — GOWN,REINF,POLY,ECL,PP SLV,3XL,XLONG: Brand: MEDLINE

## (undated) DEVICE — 3M™ STERI-DRAPE™ U-DRAPE 1015: Brand: STERI-DRAPE™

## (undated) DEVICE — SYRINGE MED 20ML STD CLR PLAS LUERLOCK TIP N CTRL DISP

## (undated) DEVICE — APPLIER CLP AUTO MED 9.75 IN TI SURGCLP SUPER INTLOK 20 DISP

## (undated) DEVICE — RETRIEVAL BALLOON CATHETER: Brand: EXTRACTOR™ PRO RX

## (undated) DEVICE — SUTURE PERMA-HAND SZ 2-0 L24IN NONABSORBABLE BLK W/O NDL SA75H

## (undated) DEVICE — SHEATH INTRO 18FR L33CM OD6.7MM ID6MM HYDRPHLC KINK

## (undated) DEVICE — BITE BLOCK ENDOSCP UNIV AD 6 TO 9.4 MM